# Patient Record
Sex: MALE | Race: WHITE | NOT HISPANIC OR LATINO | Employment: UNEMPLOYED | URBAN - METROPOLITAN AREA
[De-identification: names, ages, dates, MRNs, and addresses within clinical notes are randomized per-mention and may not be internally consistent; named-entity substitution may affect disease eponyms.]

---

## 2023-11-07 NOTE — H&P (VIEW-ONLY)
PULMONARY CONSULT     SERVICE DATE:  11/7/2023   SERVICE TIME:  5:20 PM    REASON FOR CONSULT:  acute pulmonary embolism, acute resp failure    REQUESTING PHYSICIAN:  Delmar Childress MD  PRIMARY CARE PHYSICIAN:  Garfield Eaton MD    Impression/Recommendations   Gaurav Mckay is a 53 year old male with acute hypoxemic respiratory failure, syncope due to, submassive pulmonary embolism, possible RUL lung infarct, acute RV strain and acute right leg DVT. Associated EDGAR, and mild thrombocytopenia. Medical history of HANY- CPAP non compliant, COPD/ asthma, laryngeal cancer s/p RTX, HTN, HLD, GERD, RLS, chronic pancreatitis, chronic pain, anxiety, depression, moderate obesity and nicotine use disorder.    Agree with plan to transfer to tertiary center for consideration for possible catheter directed thrombolytics. Continue heparin anticoagulation, o2 supplementation, DuoNeb, pain relief, PPI and current care. Thanks for the consult.   CC time ~ 40 minutes          Subjective  HISTORY OF PRESENT ILLNESS:  Mr. Mckay is a 53 year old male admitted yesterday with syncope and sob. Report he passed out while sitting on his couch at home. +worsening sob of 1 week duration. +dry cough, occasional wheezing. +retrosternal pain since yesterday. No diaphoresis.  ACTIVE SMOKER, smokes 1/2 ppd cigarettes daily.            Chest CT Done 11/6/2023, visualized:    1.  Saddle pulmonary embolus with bilateral pulmonary embolism.   2.  Increased RV LV ratio which can be seen with right ventricular strain.   3.  Small groundglass peripheral opacities in the right upper lobe which   raises the possibility of small pulmonary infarcts.         Bilateral LE Venous Doppler Done 11/7/2023:  THROMBUS WITHIN RIGHT DISTAL FEMORAL AND RIGHT POPLITEAL VEIN WITH   COMPLETE OCCLUSION.   NO ACUTE DVT IN THE LEFT LOWER EXTREMITY.         Echo Done 11/7/2023:  CONCLUSIONS:   - Exam indication: Acute pulmonary embolism to guide therapy   - The left  ventricle is normal in size. There is mild left ventricular   hypertrophy. Left ventricular systolic function is normal. EF = 60 ± 5% (2D   biplane) Definity contrast used for endocardial border detection. Grade I left   ventricular diastolic dysfunction.   - The right ventricle is dilated. Right ventricular systolic function is mildly   decreased.   - There is moderate (2+) tricuspid valve regurgitation.   - Estimated right ventricular systolic pressure is 53 mmHg consistent with   moderate pulmonary hypertension. Estimated right atrial pressure is 3 mmHg   (although IVC not seen).   - Exam was compared with the prior CC echocardiographic exam performed on 4/5/2016    All the findings of the RV function , dimenssion and pressure are new, as all   were normal in 2016.         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   • Allergic rhinitis    • Anxiety    • Arthritis    • ASNHL (asymmetrical sensorineural hearing loss)    • Asthma    • Back pain     disc herniated per patient    • COPD (chronic obstructive pulmonary disease) ([facility])    • Depression    • Dizziness and giddiness 3/9/2016   • ETOH abuse     quit    • GERD (gastroesophageal reflux disease)    • Hyperlipidemia    • Hypertension    • Lumbosacral radiculopathy 5/12/2016   • Lumbosacral radiculopathy 5/12/2016   • Neck pain    • Neurogenic orthostatic hypotension (HCC) 5/12/2016   • Pancreatitis    • RLS (restless legs syndrome) 8/1/2018   • Syncope and collapse 3/9/2016   • Tinnitus    • Tobacco abuse      PAST SURGICAL HISTORY   Procedure Laterality Date   • LARYNGOSCOPY DIRECT OPERATIVE W/BIOPSY  10/30/2023    by Dr Leonardo - Direct Laryngoscopy with Biopsy, Cervical Esophagoscopy   • LARYNGOSCOPY EXC KAYLAN&/STRIPPING CORDS/EPIGLOTT Bilateral 02/16/2022    by Dr Leonardo - Microlaryngoscopy w/ Coblation, removal of vocal cord polyps   • ORTHOPEDIC  SURGERY HX Left     ankle-plates and screws     FAMILY HISTORY    Problem Relation Age of Onset   • other (unknown) Father    • Diabetes Mother    • Hypertension Mother      Social History     Tobacco Use   • Smoking status: Every Day     Packs/day: 1.00     Years: 30.00     Additional pack years: 0.00     Total pack years: 30.00     Types: Cigarettes   • Smokeless tobacco: Former     Types: Snuff   • Tobacco comments:     1 PPD- Started at 17 yo   Vaping Use   • Vaping Use: Never used   Substance Use Topics   • Alcohol use: Not Currently     Comment: none since 7/27/2023   • Drug use: No     meclizine (ANTIVERT) 25 mg tab, Take 1 tablet by mouth three times a day., Disp: 90 tablet, Rfl: 5, Unknown  risperiDONE (RISPERDAL) 1 mg tablet, Take by mouth two times a day. 1.5 tablets at bedtime, Disp: , Rfl: , Unknown  atorvastatin (LIPITOR) 20 mg tablet, TAKE 1 TABLET BY MOUTH EVERY NIGHT AT BEDTIME FOR CHOLESTEROL, Disp: 30 tablet, Rfl: 0, Unknown  Disulfiram 500 mg tab, TAKE 1 TABLET BY MOUTH DAILY FOR ALCOHOL DEPENDENCE AS DIRECTED, Disp: 30 tablet, Rfl: 0, Unknown  docusate sodium (COLACE) 100 mg capsule, TAKE 1 CAPSULE BY MOUTH TWICE A DAY FOR CONSTIPATION, Disp: 60 capsule, Rfl: 0, Unknown  metoprolol tartrate, short acting, (LOPRESSOR) 25 mg tablet, take 1 tablet by mouth daily for high blood pressure, Disp: 30 tablet, Rfl: 0, Unknown  montelukast (SINGULAIR) 10 mg tablet, TAKE 1 TABLET BY MOUTH DAILY FOR ASTHMA, Disp: 30 tablet, Rfl: 0, Unknown  topiramate (TOPAMAX) 100 mg tablet, TAKE 1 TABLET BY MOUTH TWICE A DAY FOR RESTLESSNESS, Disp: 60 tablet, Rfl: 0, Unknown  Nicotine Polacrilex 2 mg lozenge, Place 1 Lozenge between cheek and gum as needed., Disp: 150 Lozenge, Rfl: 1, Unknown  loratadine (CLARITIN) 10 mg tablet, TAKE 1 TABLET BY MOUTH DAILY FOR ALLERGIES, Disp: 30 tablet, Rfl: 11, Unknown  celecoxib (CELEBREX) 200 mg capsule, Take 1 capsule by mouth once daily., Disp: 90 capsule, Rfl: 3,  Unknown  Pregabalin (LYRICA) 200 mg capsule, Take 1 capsule by mouth three times daily for 60 days., Disp: 90 capsule, Rfl: 1  calcium-carbonate-vitamin D3 (OYSTER SHELL CALCIUM-VITAMIN D) 500 mg-5 mcg (200 unit) per tablet, TAKE ONE (1) TABLET BY MOUTH TWICE DAILY, Disp: 60 tablet, Rfl: 11, Unknown  potassium chloride (K-TAB) 10 mEq tablet, Take 1 tablet by mouth once daily., Disp: 30 tablet, Rfl: 11, Unknown  DULoxetine (CYMBALTA) 60 mg capsule, Take 60 mg by mouth once daily., Disp: , Rfl: , Unknown  simvastatin (ZOCOR) 40 mg tablet, Take 1 tablet by mouth daily at bedtime., Disp: 90 tablet, Rfl: 3, Unknown  rOPINIRole (REQUIP) 0.5 mg tablet, TAKE 1 TABLET BY MOUTH THREE TIMES A DAY *EMERGENCY REFILL*, Disp: 90 tablet, Rfl: 0, Unknown  CREON 12,000-38,000 -60,000 unit delayed release capsule, TAKE 1 CAPSULE BY MOUTH THREE TIMES A DAY, Disp: 90 capsule, Rfl: 3, Unknown  omeprazole (PRILOSEC) 40 mg capsule, Take 1 capsule by mouth once daily., Disp: 30 capsule, Rfl: 10, Unknown  cyclobenzaprine (FLEXERIL) 10 mg tablet, TAKE 1 TABLET BY MOUTH THREE TIMES DAILY AS NEEDED, Disp: 90 tablet, Rfl: 10, Unknown  DULoxetine (CYMBALTA) 30 mg capsule, Take 30 mg by mouth twice daily., Disp: , Rfl: , Unknown  albuterol HFA (PROVENTIL HFA, VENTOLIN HFA) 90 mcg/actuation inhaler, Inhale 2 Puffs as instructed every 6 hours as needed for wheezing/shortness of breath., Disp: 1 Each, Rfl: 4  benztropine (COGENTIN) 0.5 mg tablet, Take 0.5 mg by mouth twice daily., Disp: , Rfl: , Unknown  melatonin 10 mg ODT, Dissolve 1 tablet under the tongue at bedtime as needed., Disp: , Rfl: , Unknown  CREON 6,000-19,000 -30,000 unit delayed release capsule, TAKE 2 CAPSULES BY MOUTH THREE TIMES A DAY AS NEEDED FOR DIGESTION (Patient not taking: Reported on 10/23/2023), Disp: 60 capsule, Rfl: 1, Unknown  VENTOLIN HFA 90 mcg/actuation inhaler, INHALE 1 PUFF BY MOUTH EVERY FOUR HOURS AS NEEDED FOR ASTHMA, Disp: 18 g, Rfl: 0, Unknown  QUEtiapine  (SEROQUEL) 25 mg tablet, TAKE 1-2 TABLET BY MOUTH AT BEDTIME AS NEEDED INSOMNIA, Disp: , Rfl: , Unknown  albuterol (PROVENTIL) 2.5 mg /3 mL (0.083 %) nebulizer solution, Use 3 mL via nebulizer every 4 hours as needed for wheezing/shortness of breath. OVER 5-15 MINUTES. FOR WHEEZING AND SHORTNESS OF BREATH. (Patient not taking: Reported on 10/23/2023), Disp: 270 mL, Rfl: 5, Unknown  traZODone (DESYREL) 50 mg tablet, TAKE 1 TABLET BY MOUTH DAILY AT BEDTIME, Disp: 30 tablet, Rfl: 10, Unknown      Current Facility-Administered Medications   Medication Dose Route Frequency   • iv contrast (radiology procedure)   INTRAVENOUS AS DIRECTED PRN   • heparin iv infusion ([facility] DVT/PE NOMOGRAM) 25,000 units in NaCl 0.45% 250 mL  400-2,000 Units/hr INTRAVENOUS CONTINUOUS   • heparin 1,000 unit/mL 2,500-5,000 Units injection  2,500-5,000 Units INTRAVENOUS PRN   • benztropine 0.5 mg tab(s) (COGENTIN)  0.5 mg ORAL BID   • DULoxetine 30 mg cap(s) (CYMBALTA)  30 mg ORAL BID   • cyclobenzaprine 10 mg tab(s) (FLEXERIL)  10 mg ORAL TID   • rOPINIRole 0.5 mg tab(s) (REQUIP)  0.5 mg ORAL TID   • lipase-protease-amylase 1 capsule cap(s) (CREON 12)  1 capsule ORAL TID   • omeprazole 40 mg cap(s) (PriLOSEC)  40 mg ORAL DAILY   • DULoxetine 60 mg cap(s) (CYMBALTA)  60 mg ORAL DAILY   • calcium-carbonate-vitamin D3 500 mg-5 mcg (200 unit) 1 tablet  1 tablet ORAL BID   • montelukast 10 mg tab(s) (SINGULAIR)  10 mg ORAL DAILY   • topiramate 100 mg tab(s) (TOPAMAX)  100 mg ORAL BID   • risperiDONE (RisperDAL) tab(s) 1.5 mg  1.5 mg ORAL BID   • meclizine 25 mg tab(s) (ANTIVERT)  25 mg ORAL TID   • NaCl 0.9% iv flush bag  20 mL INTRAVENOUS PRN   • NaCl 0.9% iv infusion  75 mL/hr INTRAVENOUS CONTINUOUS   • ipratropium-albuterol 3 mL nebulizer solution (DUONEB)  3 mL INHALATION q 4 H PRN   • ondansetron orally disintegrating 4 mg tab(s) (ZOFRAN ODT)  4 mg ORAL q 6 H PRN    Or   • ondansetron (PF) 4 mg injection (ZOFRAN)  4 mg INTRAVENOUS q 6 H PRN   •  docusate sodium 100 mg cap(s) (COLACE)  100 mg ORAL BID PRN   • sodium chloride 0.9 % (flush) 2-10 mL (BD POSIFLUSH)  2-10 mL INTRAVENOUS AS DIRECTED PRN    And   • perflutren protein-A microspheres 0.22 mg/mL 0.7 mg injection (OPTISON)  3 mL INTRAVENOUS AS DIRECTED PRN   • melatonin 9 mg tab(s)  9 mg ORAL DAILY (8 PM)   • acetaminophen 650 mg tab(s) (TYLENOL)  650 mg ORAL q 4 H PRN   • atorvastatin 20 mg tab(s) (LIPITOR)  20 mg ORAL DAILY   • cetirizine 10 mg tab(s) (ZYRTEC)  10 mg ORAL DAILY     ALLERGIES   Allergen Reactions   • Oxycontin [Oxycodon* Itching   • Seasonal Allergies   Unknown            Objective  PHYSICAL EXAM:  Patient Vitals for the past 24 hrs:   BP Temp Temp src Pulse Resp SpO2 Height Weight   11/07/23 1400 148/83 -- -- 97 23 91 % -- --   11/07/23 1300 152/93 -- -- 98 24 92 % -- --   11/07/23 1200 143/94 -- -- 109 -- 89 % -- --   11/07/23 1100 143/82 -- -- 100 15 90 % -- --   11/07/23 1000 148/90 -- -- 99 24 91 % -- --   11/07/23 0900 149/84 -- -- 97 (!) 32 93 % -- --   11/07/23 0800 123/64 -- -- 97 (!) 37 94 % -- --   11/07/23 0700 147/90 -- -- 93 21 96 % -- --   11/07/23 0600 134/92 -- -- 92 22 94 % -- --   11/07/23 0500 129/71 -- -- 94 25 94 % -- --   11/07/23 0400 148/70 36.4 °C (97.6 °F) Temporal 94 24 94 % -- --   11/07/23 0300 151/89 -- -- 93 23 94 % -- --   11/07/23 0230 146/75 -- -- 94 28 94 % -- --   11/07/23 0200 138/90 -- -- 96 26 93 % -- --   11/07/23 0130 146/78 -- -- 97 20 96 % -- --   11/07/23 0100 142/89 36.3 °C (97.4 °F) Temporal 96 20 96 % 182.9 cm (6') 126.9 kg (279 lb 12.2 oz)   11/07/23 0030 121/66 -- -- (!) 99 -- (!) 91 % -- --   11/07/23 0015 -- -- -- (!) 101 -- (!) 92 % -- --   11/07/23 0000 156/76 -- -- (!) 101 -- (!) 92 % -- --   11/06/23 2345 -- -- -- (!) 102 -- (!) 91 % -- --   11/06/23 2330 154/70 -- -- (!) 100 -- (!) 92 % -- --   11/06/23 2315 -- -- -- (!) 101 -- (!) 92 % -- --   11/06/23 2300 150/90 -- -- (!) 98 -- (!) 91 % -- --   11/06/23 2245 -- -- -- (!) 103  (!) 33 (!) 93 % -- --   11/06/23 2215 -- -- -- (!) 98 (!) 43 (!) 91 % -- --   11/06/23 2200 157/65 -- -- (!) 101 21 (!) 92 % -- --   11/06/23 2145 -- -- -- (!) 100 (!) 25 (!) 94 % -- --   11/06/23 2130 152/68 -- -- (!) 102 (!) 26 (!) 92 % -- --   11/06/23 2115 -- -- -- (!) 101 (!) 25 (!) 94 % -- --   11/06/23 2100 137/79 -- -- (!) 101 21 (!) 94 % -- --   11/06/23 2045 -- -- -- (!) 103 (!) 25 (!) 93 % -- --   11/06/23 2030 153/70 -- -- (!) 106 (!) 34 (!) 93 % -- --   11/06/23 2015 -- -- -- (!) 109 23 (!) 93 % -- --   11/06/23 2000 136/84 -- -- (!) 105 20 95 % -- --   11/06/23 1945 135/77 -- -- (!) 108 (!) 38 (!) 94 % -- --   11/06/23 1930 -- -- -- (!) 109 (!) 35 (!) 93 % -- --   11/06/23 1915 -- -- -- (!) 107 23 (!) 94 % -- --   11/06/23 1900 168/79 -- -- (!) 106 21 (!) 94 % -- --   11/06/23 1845 186/82 -- -- (!) 113 (!) 53 95 % -- --   11/06/23 1830 165/78 -- -- (!) 115 (!) 30 (!) 93 % -- --   11/06/23 1800 132/76 -- -- (!) 114 (!) 41 (!) 92 % -- --     Body mass index is 37.94 kg/m².      GENERAL: obese, on 3L o2  LUNGS: no wheezes, no rhonchi, no rales  CARDIAC: RRR  ABDOMEN: soft, obese, non-tender  EXTREMITIES: no leg edema  NEURO: awake, alert       DATA:   Diagnostic tests reviewed for today's visit:    Most recent labs BUN 11, Cr 1.1, neg Covid, neg troponins, nl BNP, wbc 10, Hct 40, plat 137       Latest Reference Range & Units 11/06/23 17:25   Temp BG F 98.6   pH, Arterial 7.35 - 7.45  7.38   pCO2, Arterial 35.0 - 45.0 TORR 33.7 (L)   pO2, Arterial 80.0 - 100.0 TORR 70.6 (L)   Bicarbonate, Arterial 22.0 - 26.0 MEQ/L 19.7 (L)   Base Deficit, Arterial MEQ/L 4.6   Oxyhemoglobin, Arterial 95.0 - 100.0 % 91.2 (L)   Carboxyhemoglobin, Arterial 0.0 - 2.0 % 2.2 (H)   Methemoglobin, Arterial 0.00 - 2.0 % 0.8   Allens ABG POSITIVE  POSITIVE   Blood Gas Comment, Art  2LNC 28%   BG FIO2 % 28.0   (L): Data is abnormally low  (H): Data is abnormally high      SIGNATURE: Gino Juárez MD PATIENT NAME: Gaurav Mckay    DATE: November 7, 2023 MRN: [id number]   TIME: 5:31 PM

## 2023-11-08 ENCOUNTER — HOSPITAL ENCOUNTER (INPATIENT)
Facility: HOSPITAL | Age: 53
LOS: 9 days | Discharge: HOME | End: 2023-11-17
Attending: INTERNAL MEDICINE | Admitting: INTERNAL MEDICINE
Payer: COMMERCIAL

## 2023-11-08 ENCOUNTER — HOSPITAL ENCOUNTER (OUTPATIENT)
Dept: CARDIOLOGY | Facility: HOSPITAL | Age: 53
Discharge: HOME | End: 2023-11-08
Payer: COMMERCIAL

## 2023-11-08 DIAGNOSIS — K59.00 CONSTIPATION, UNSPECIFIED CONSTIPATION TYPE: ICD-10-CM

## 2023-11-08 DIAGNOSIS — J42 CHRONIC BRONCHITIS, UNSPECIFIED CHRONIC BRONCHITIS TYPE (MULTI): ICD-10-CM

## 2023-11-08 DIAGNOSIS — I26.99 PULMONARY EMBOLISM (MULTI): ICD-10-CM

## 2023-11-08 DIAGNOSIS — I26.09 OTHER PULMONARY EMBOLISM WITH ACUTE COR PULMONALE (MULTI): ICD-10-CM

## 2023-11-08 DIAGNOSIS — I26.99 ACUTE MASSIVE PULMONARY EMBOLISM (MULTI): Primary | ICD-10-CM

## 2023-11-08 LAB
ANION GAP SERPL CALC-SCNC: 11 MMOL/L (ref 10–20)
BUN SERPL-MCNC: 14 MG/DL (ref 6–23)
CALCIUM SERPL-MCNC: 9.3 MG/DL (ref 8.6–10.3)
CHLORIDE SERPL-SCNC: 106 MMOL/L (ref 98–107)
CO2 SERPL-SCNC: 26 MMOL/L (ref 21–32)
CREAT SERPL-MCNC: 1.01 MG/DL (ref 0.5–1.3)
ERYTHROCYTE [DISTWIDTH] IN BLOOD BY AUTOMATED COUNT: 15.2 % (ref 11.5–14.5)
GFR SERPL CREATININE-BSD FRML MDRD: 89 ML/MIN/1.73M*2
GLUCOSE SERPL-MCNC: 108 MG/DL (ref 74–99)
HCT VFR BLD AUTO: 44.6 % (ref 41–52)
HGB BLD-MCNC: 14.7 G/DL (ref 13.5–17.5)
MAGNESIUM SERPL-MCNC: 2.19 MG/DL (ref 1.6–2.4)
MCH RBC QN AUTO: 29.6 PG (ref 26–34)
MCHC RBC AUTO-ENTMCNC: 33 G/DL (ref 32–36)
MCV RBC AUTO: 90 FL (ref 80–100)
NRBC BLD-RTO: 0 /100 WBCS (ref 0–0)
PLATELET # BLD AUTO: 155 X10*3/UL (ref 150–450)
POTASSIUM SERPL-SCNC: 3.8 MMOL/L (ref 3.5–5.3)
RBC # BLD AUTO: 4.96 X10*6/UL (ref 4.5–5.9)
SODIUM SERPL-SCNC: 139 MMOL/L (ref 136–145)
UFH PPP CHRO-ACNC: 0.5 IU/ML
UFH PPP CHRO-ACNC: 0.5 IU/ML
WBC # BLD AUTO: 8 X10*3/UL (ref 4.4–11.3)

## 2023-11-08 PROCEDURE — 9420000001 HC RT PATIENT EDUCATION 5 MIN

## 2023-11-08 PROCEDURE — 94762 N-INVAS EAR/PLS OXIMTRY CONT: CPT

## 2023-11-08 PROCEDURE — 36415 COLL VENOUS BLD VENIPUNCTURE: CPT

## 2023-11-08 PROCEDURE — 85347 COAGULATION TIME ACTIVATED: CPT | Performed by: INTERNAL MEDICINE

## 2023-11-08 PROCEDURE — 2550000001 HC RX 255 CONTRASTS: Performed by: INTERNAL MEDICINE

## 2023-11-08 PROCEDURE — 2780000003 HC OR 278 NO HCPCS: Performed by: INTERNAL MEDICINE

## 2023-11-08 PROCEDURE — 75743 ARTERY X-RAYS LUNGS: CPT | Performed by: INTERNAL MEDICINE

## 2023-11-08 PROCEDURE — 92973 PRQ TRLUML C MCHN ASP THRMBC: CPT | Performed by: INTERNAL MEDICINE

## 2023-11-08 PROCEDURE — 94664 DEMO&/EVAL PT USE INHALER: CPT

## 2023-11-08 PROCEDURE — 83735 ASSAY OF MAGNESIUM: CPT

## 2023-11-08 PROCEDURE — C1769 GUIDE WIRE: HCPCS | Performed by: INTERNAL MEDICINE

## 2023-11-08 PROCEDURE — 99291 CRITICAL CARE FIRST HOUR: CPT | Performed by: INTERNAL MEDICINE

## 2023-11-08 PROCEDURE — 2500000001 HC RX 250 WO HCPCS SELF ADMINISTERED DRUGS (ALT 637 FOR MEDICARE OP): Performed by: INTERNAL MEDICINE

## 2023-11-08 PROCEDURE — X2CY3T7 EXTIRPATION OF MATTER FROM GREAT VESSEL USING COMPUTER-AIDED MECHANICAL ASPIRATION, PERCUTANEOUS APPROACH, NEW TECHNOLOGY GROUP 7: ICD-10-PCS | Performed by: INTERNAL MEDICINE

## 2023-11-08 PROCEDURE — 2500000004 HC RX 250 GENERAL PHARMACY W/ HCPCS (ALT 636 FOR OP/ED)

## 2023-11-08 PROCEDURE — 93005 ELECTROCARDIOGRAM TRACING: CPT

## 2023-11-08 PROCEDURE — C1894 INTRO/SHEATH, NON-LASER: HCPCS | Performed by: INTERNAL MEDICINE

## 2023-11-08 PROCEDURE — 2500000002 HC RX 250 W HCPCS SELF ADMINISTERED DRUGS (ALT 637 FOR MEDICARE OP, ALT 636 FOR OP/ED): Performed by: INTERNAL MEDICINE

## 2023-11-08 PROCEDURE — 93010 ELECTROCARDIOGRAM REPORT: CPT | Performed by: INTERNAL MEDICINE

## 2023-11-08 PROCEDURE — 37184 PRIM ART M-THRMBC 1ST VSL: CPT | Performed by: INTERNAL MEDICINE

## 2023-11-08 PROCEDURE — 85520 HEPARIN ASSAY: CPT

## 2023-11-08 PROCEDURE — B31U1ZZ FLUOROSCOPY OF PULMONARY TRUNK USING LOW OSMOLAR CONTRAST: ICD-10-PCS | Performed by: INTERNAL MEDICINE

## 2023-11-08 PROCEDURE — 80048 BASIC METABOLIC PNL TOTAL CA: CPT

## 2023-11-08 PROCEDURE — C1753 CATH, INTRAVAS ULTRASOUND: HCPCS | Performed by: INTERNAL MEDICINE

## 2023-11-08 PROCEDURE — 2500000004 HC RX 250 GENERAL PHARMACY W/ HCPCS (ALT 636 FOR OP/ED): Performed by: INTERNAL MEDICINE

## 2023-11-08 PROCEDURE — 85027 COMPLETE CBC AUTOMATED: CPT

## 2023-11-08 PROCEDURE — 2500000005 HC RX 250 GENERAL PHARMACY W/O HCPCS: Performed by: INTERNAL MEDICINE

## 2023-11-08 PROCEDURE — 2020000001 HC ICU ROOM DAILY

## 2023-11-08 PROCEDURE — 36014 PLACE CATHETER IN ARTERY: CPT | Performed by: INTERNAL MEDICINE

## 2023-11-08 PROCEDURE — 2720000007 HC OR 272 NO HCPCS: Performed by: INTERNAL MEDICINE

## 2023-11-08 RX ORDER — SIMVASTATIN 40 MG/1
40 TABLET, FILM COATED ORAL NIGHTLY
COMMUNITY

## 2023-11-08 RX ORDER — ALBUTEROL SULFATE 90 UG/1
2 AEROSOL, METERED RESPIRATORY (INHALATION) EVERY 4 HOURS PRN
Status: DISCONTINUED | OUTPATIENT
Start: 2023-11-08 | End: 2023-11-17 | Stop reason: HOSPADM

## 2023-11-08 RX ORDER — TRAZODONE HYDROCHLORIDE 50 MG/1
50 TABLET ORAL NIGHTLY
COMMUNITY

## 2023-11-08 RX ORDER — HEPARIN SODIUM 5000 [USP'U]/ML
2000-4000 INJECTION, SOLUTION INTRAVENOUS; SUBCUTANEOUS EVERY 4 HOURS PRN
Status: DISCONTINUED | OUTPATIENT
Start: 2023-11-08 | End: 2023-11-08

## 2023-11-08 RX ORDER — CELECOXIB 200 MG/1
200 CAPSULE ORAL DAILY
COMMUNITY

## 2023-11-08 RX ORDER — QUETIAPINE FUMARATE 25 MG/1
25 TABLET, FILM COATED ORAL NIGHTLY PRN
Status: DISCONTINUED | OUTPATIENT
Start: 2023-11-08 | End: 2023-11-15

## 2023-11-08 RX ORDER — IPRATROPIUM BROMIDE AND ALBUTEROL SULFATE 2.5; .5 MG/3ML; MG/3ML
3 SOLUTION RESPIRATORY (INHALATION) EVERY 2 HOUR PRN
Status: DISCONTINUED | OUTPATIENT
Start: 2023-11-08 | End: 2023-11-17 | Stop reason: HOSPADM

## 2023-11-08 RX ORDER — PANTOPRAZOLE SODIUM 40 MG/1
40 TABLET, DELAYED RELEASE ORAL
Status: DISCONTINUED | OUTPATIENT
Start: 2023-11-09 | End: 2023-11-17 | Stop reason: HOSPADM

## 2023-11-08 RX ORDER — BENZTROPINE MESYLATE 0.5 MG/1
0.5 TABLET ORAL 2 TIMES DAILY
Status: DISCONTINUED | OUTPATIENT
Start: 2023-11-08 | End: 2023-11-17 | Stop reason: HOSPADM

## 2023-11-08 RX ORDER — DOCUSATE SODIUM 100 MG/1
100 CAPSULE, LIQUID FILLED ORAL 2 TIMES DAILY
COMMUNITY

## 2023-11-08 RX ORDER — SODIUM CHLORIDE 9 MG/ML
INJECTION, SOLUTION INTRAVENOUS CONTINUOUS PRN
Status: COMPLETED | OUTPATIENT
Start: 2023-11-08 | End: 2023-11-08

## 2023-11-08 RX ORDER — IPRATROPIUM BROMIDE AND ALBUTEROL SULFATE 2.5; .5 MG/3ML; MG/3ML
3 SOLUTION RESPIRATORY (INHALATION)
Status: DISCONTINUED | OUTPATIENT
Start: 2023-11-08 | End: 2023-11-08

## 2023-11-08 RX ORDER — HEPARIN SODIUM 10000 [USP'U]/100ML
1500 INJECTION, SOLUTION INTRAVENOUS CONTINUOUS
Status: DISCONTINUED | OUTPATIENT
Start: 2023-11-08 | End: 2023-11-09

## 2023-11-08 RX ORDER — ATORVASTATIN CALCIUM 20 MG/1
20 TABLET, FILM COATED ORAL NIGHTLY
COMMUNITY

## 2023-11-08 RX ORDER — HEPARIN SODIUM 5000 [USP'U]/ML
4000 INJECTION, SOLUTION INTRAVENOUS; SUBCUTANEOUS ONCE
Status: DISCONTINUED | OUTPATIENT
Start: 2023-11-08 | End: 2023-11-08

## 2023-11-08 RX ORDER — NICOTINE POLACRILEX 2 MG/1
2 LOZENGE ORAL AS NEEDED
COMMUNITY

## 2023-11-08 RX ORDER — TOPIRAMATE 100 MG/1
100 TABLET, FILM COATED ORAL 2 TIMES DAILY
COMMUNITY

## 2023-11-08 RX ORDER — HEPARIN SODIUM 1000 [USP'U]/ML
INJECTION, SOLUTION INTRAVENOUS; SUBCUTANEOUS AS NEEDED
Status: DISCONTINUED | OUTPATIENT
Start: 2023-11-08 | End: 2023-11-08 | Stop reason: HOSPADM

## 2023-11-08 RX ORDER — RISPERIDONE 1 MG/1
1.5 TABLET ORAL NIGHTLY
COMMUNITY

## 2023-11-08 RX ORDER — RISPERIDONE 1 MG/1
1 TABLET ORAL DAILY
COMMUNITY
End: 2023-11-17 | Stop reason: HOSPADM

## 2023-11-08 RX ORDER — DISULFIRAM 250 MG/1
500 TABLET ORAL DAILY
COMMUNITY

## 2023-11-08 RX ORDER — TRAZODONE HYDROCHLORIDE 50 MG/1
50 TABLET ORAL NIGHTLY
Status: DISCONTINUED | OUTPATIENT
Start: 2023-11-08 | End: 2023-11-17 | Stop reason: HOSPADM

## 2023-11-08 RX ORDER — LIDOCAINE HYDROCHLORIDE 20 MG/ML
INJECTION, SOLUTION INFILTRATION; PERINEURAL AS NEEDED
Status: DISCONTINUED | OUTPATIENT
Start: 2023-11-08 | End: 2023-11-08 | Stop reason: HOSPADM

## 2023-11-08 RX ORDER — LORATADINE 10 MG/1
10 TABLET ORAL DAILY
COMMUNITY
End: 2023-11-17 | Stop reason: HOSPADM

## 2023-11-08 RX ORDER — DULOXETIN HYDROCHLORIDE 30 MG/1
30 CAPSULE, DELAYED RELEASE ORAL NIGHTLY
COMMUNITY

## 2023-11-08 RX ORDER — FERROUS SULFATE, DRIED 160(50) MG
1 TABLET, EXTENDED RELEASE ORAL 2 TIMES DAILY
COMMUNITY

## 2023-11-08 RX ORDER — ALBUTEROL SULFATE 90 UG/1
2 AEROSOL, METERED RESPIRATORY (INHALATION) EVERY 4 HOURS PRN
COMMUNITY

## 2023-11-08 RX ORDER — QUETIAPINE FUMARATE 25 MG/1
25 TABLET, FILM COATED ORAL NIGHTLY PRN
COMMUNITY

## 2023-11-08 RX ORDER — OMEPRAZOLE 40 MG/1
40 CAPSULE, DELAYED RELEASE ORAL
COMMUNITY

## 2023-11-08 RX ORDER — HEPARIN SODIUM 5000 [USP'U]/ML
5000-10000 INJECTION, SOLUTION INTRAVENOUS; SUBCUTANEOUS EVERY 4 HOURS PRN
Status: DISCONTINUED | OUTPATIENT
Start: 2023-11-08 | End: 2023-11-09

## 2023-11-08 RX ORDER — MONTELUKAST SODIUM 10 MG/1
10 TABLET ORAL DAILY
COMMUNITY

## 2023-11-08 RX ORDER — CYCLOBENZAPRINE HCL 10 MG
10 TABLET ORAL 3 TIMES DAILY PRN
Status: DISCONTINUED | OUTPATIENT
Start: 2023-11-08 | End: 2023-11-17 | Stop reason: HOSPADM

## 2023-11-08 RX ORDER — CYCLOBENZAPRINE HCL 10 MG
10 TABLET ORAL 3 TIMES DAILY PRN
COMMUNITY

## 2023-11-08 RX ORDER — IPRATROPIUM BROMIDE AND ALBUTEROL SULFATE 2.5; .5 MG/3ML; MG/3ML
3 SOLUTION RESPIRATORY (INHALATION)
Status: DISCONTINUED | OUTPATIENT
Start: 2023-11-09 | End: 2023-11-15

## 2023-11-08 RX ORDER — FERROUS SULFATE, DRIED 160(50) MG
1 TABLET, EXTENDED RELEASE ORAL 2 TIMES DAILY
Status: DISCONTINUED | OUTPATIENT
Start: 2023-11-08 | End: 2023-11-17 | Stop reason: HOSPADM

## 2023-11-08 RX ORDER — BENZTROPINE MESYLATE 0.5 MG/1
0.5 TABLET ORAL 2 TIMES DAILY
COMMUNITY

## 2023-11-08 RX ORDER — ROPINIROLE 0.5 MG/1
0.5 TABLET, FILM COATED ORAL 3 TIMES DAILY
COMMUNITY

## 2023-11-08 RX ORDER — ATORVASTATIN CALCIUM 20 MG/1
20 TABLET, FILM COATED ORAL NIGHTLY
Status: DISCONTINUED | OUTPATIENT
Start: 2023-11-08 | End: 2023-11-17 | Stop reason: HOSPADM

## 2023-11-08 RX ORDER — METOPROLOL TARTRATE 25 MG/1
25 TABLET, FILM COATED ORAL DAILY
Status: DISCONTINUED | OUTPATIENT
Start: 2023-11-08 | End: 2023-11-17 | Stop reason: HOSPADM

## 2023-11-08 RX ORDER — CALCIUM CARBONATE 300MG(750)
1 TABLET,CHEWABLE ORAL NIGHTLY PRN
COMMUNITY

## 2023-11-08 RX ORDER — METOPROLOL TARTRATE 25 MG/1
25 TABLET, FILM COATED ORAL DAILY
COMMUNITY

## 2023-11-08 RX ORDER — PREGABALIN 200 MG/1
200 CAPSULE ORAL 3 TIMES DAILY
COMMUNITY

## 2023-11-08 RX ORDER — DULOXETIN HYDROCHLORIDE 60 MG/1
90 CAPSULE, DELAYED RELEASE ORAL EVERY MORNING
COMMUNITY

## 2023-11-08 RX ORDER — IBUPROFEN 200 MG
1 TABLET ORAL DAILY
Status: DISCONTINUED | OUTPATIENT
Start: 2023-11-08 | End: 2023-11-17 | Stop reason: HOSPADM

## 2023-11-08 RX ORDER — POTASSIUM CHLORIDE 750 MG/1
10 CAPSULE, EXTENDED RELEASE ORAL DAILY
COMMUNITY

## 2023-11-08 RX ORDER — MECLIZINE HYDROCHLORIDE 25 MG/1
25 TABLET ORAL 3 TIMES DAILY
COMMUNITY
End: 2023-11-17 | Stop reason: HOSPADM

## 2023-11-08 RX ADMIN — HEPARIN SODIUM 1500 UNITS/HR: 10000 INJECTION, SOLUTION INTRAVENOUS at 18:56

## 2023-11-08 RX ADMIN — PREGABALIN 200 MG: 75 CAPSULE ORAL at 20:44

## 2023-11-08 RX ADMIN — ATORVASTATIN CALCIUM 20 MG: 20 TABLET, FILM COATED ORAL at 20:44

## 2023-11-08 RX ADMIN — BENZTROPINE MESYLATE 0.5 MG: 0.5 TABLET ORAL at 21:43

## 2023-11-08 RX ADMIN — HEPARIN SODIUM 1500 UNITS/HR: 10000 INJECTION, SOLUTION INTRAVENOUS at 14:27

## 2023-11-08 RX ADMIN — TRAZODONE HYDROCHLORIDE 50 MG: 50 TABLET ORAL at 20:44

## 2023-11-08 RX ADMIN — IPRATROPIUM BROMIDE AND ALBUTEROL SULFATE 3 ML: 2.5; .5 SOLUTION RESPIRATORY (INHALATION) at 19:17

## 2023-11-08 RX ADMIN — Medication 1 TABLET: at 20:45

## 2023-11-08 SDOH — SOCIAL STABILITY: SOCIAL INSECURITY: HAS ANYONE EVER THREATENED TO HURT YOUR FAMILY OR YOUR PETS?: NO

## 2023-11-08 SDOH — SOCIAL STABILITY: SOCIAL INSECURITY: DO YOU FEEL UNSAFE GOING BACK TO THE PLACE WHERE YOU ARE LIVING?: NO

## 2023-11-08 SDOH — SOCIAL STABILITY: SOCIAL INSECURITY: HAVE YOU HAD THOUGHTS OF HARMING ANYONE ELSE?: NO

## 2023-11-08 SDOH — SOCIAL STABILITY: SOCIAL INSECURITY: ARE YOU OR HAVE YOU BEEN THREATENED OR ABUSED PHYSICALLY, EMOTIONALLY, OR SEXUALLY BY ANYONE?: NO

## 2023-11-08 SDOH — SOCIAL STABILITY: SOCIAL INSECURITY: ARE THERE ANY APPARENT SIGNS OF INJURIES/BEHAVIORS THAT COULD BE RELATED TO ABUSE/NEGLECT?: NO

## 2023-11-08 SDOH — SOCIAL STABILITY: SOCIAL INSECURITY: DO YOU FEEL ANYONE HAS EXPLOITED OR TAKEN ADVANTAGE OF YOU FINANCIALLY OR OF YOUR PERSONAL PROPERTY?: NO

## 2023-11-08 SDOH — SOCIAL STABILITY: SOCIAL INSECURITY: ABUSE: ADULT

## 2023-11-08 SDOH — SOCIAL STABILITY: SOCIAL INSECURITY: WERE YOU ABLE TO COMPLETE ALL THE BEHAVIORAL HEALTH SCREENINGS?: YES

## 2023-11-08 SDOH — SOCIAL STABILITY: SOCIAL INSECURITY: DOES ANYONE TRY TO KEEP YOU FROM HAVING/CONTACTING OTHER FRIENDS OR DOING THINGS OUTSIDE YOUR HOME?: NO

## 2023-11-08 ASSESSMENT — ACTIVITIES OF DAILY LIVING (ADL)
GROOMING: INDEPENDENT
WALKS IN HOME: INDEPENDENT
DRESSING YOURSELF: INDEPENDENT
LACK_OF_TRANSPORTATION: NO
JUDGMENT_ADEQUATE_SAFELY_COMPLETE_DAILY_ACTIVITIES: YES
HEARING - RIGHT EAR: FUNCTIONAL
TOILETING: INDEPENDENT
ADEQUATE_TO_COMPLETE_ADL: YES
BATHING: INDEPENDENT
PATIENT'S MEMORY ADEQUATE TO SAFELY COMPLETE DAILY ACTIVITIES?: YES
HEARING - LEFT EAR: FUNCTIONAL
FEEDING YOURSELF: INDEPENDENT

## 2023-11-08 ASSESSMENT — COGNITIVE AND FUNCTIONAL STATUS - GENERAL
MOBILITY SCORE: 24
DAILY ACTIVITIY SCORE: 24
PATIENT BASELINE BEDBOUND: NO

## 2023-11-08 ASSESSMENT — LIFESTYLE VARIABLES
SKIP TO QUESTIONS 9-10: 1
HOW OFTEN DO YOU HAVE 6 OR MORE DRINKS ON ONE OCCASION: NEVER
HOW MANY STANDARD DRINKS CONTAINING ALCOHOL DO YOU HAVE ON A TYPICAL DAY: PATIENT DOES NOT DRINK
AUDIT-C TOTAL SCORE: 0
AUDIT-C TOTAL SCORE: 0
HOW OFTEN DO YOU HAVE A DRINK CONTAINING ALCOHOL: NEVER

## 2023-11-08 ASSESSMENT — ENCOUNTER SYMPTOMS
AGITATION: 0
CONSTIPATION: 0
ABDOMINAL DISTENTION: 0
HEADACHES: 0
DYSURIA: 0
COUGH: 1
FACIAL ASYMMETRY: 0
APPETITE CHANGE: 0
ABDOMINAL PAIN: 0
SHORTNESS OF BREATH: 1
DIFFICULTY URINATING: 0
CHEST TIGHTNESS: 1
DIARRHEA: 0
ACTIVITY CHANGE: 0
PALPITATIONS: 0
FREQUENCY: 0
CHILLS: 0
DIZZINESS: 0

## 2023-11-08 ASSESSMENT — COLUMBIA-SUICIDE SEVERITY RATING SCALE - C-SSRS
6. HAVE YOU EVER DONE ANYTHING, STARTED TO DO ANYTHING, OR PREPARED TO DO ANYTHING TO END YOUR LIFE?: NO
2. HAVE YOU ACTUALLY HAD ANY THOUGHTS OF KILLING YOURSELF?: NO
1. IN THE PAST MONTH, HAVE YOU WISHED YOU WERE DEAD OR WISHED YOU COULD GO TO SLEEP AND NOT WAKE UP?: NO

## 2023-11-08 ASSESSMENT — PAIN SCALES - GENERAL
PAINLEVEL_OUTOF10: 0 - NO PAIN

## 2023-11-08 ASSESSMENT — PATIENT HEALTH QUESTIONNAIRE - PHQ9
2. FEELING DOWN, DEPRESSED OR HOPELESS: NOT AT ALL
SUM OF ALL RESPONSES TO PHQ9 QUESTIONS 1 & 2: 0
1. LITTLE INTEREST OR PLEASURE IN DOING THINGS: NOT AT ALL

## 2023-11-08 ASSESSMENT — PAIN - FUNCTIONAL ASSESSMENT
PAIN_FUNCTIONAL_ASSESSMENT: 0-10

## 2023-11-08 NOTE — Clinical Note
Injected with power injection. PSI = 800 PSI. Injection rate = 15 mL/sec. Injected volume = 30 mL. Single view taken.

## 2023-11-08 NOTE — Clinical Note
Vessel(s): PULMONARY. Injected with power injection. PSI = 800 PSI. Injection rate = 15 mL/sec. Injected volume = 30 mL.

## 2023-11-08 NOTE — INTERVAL H&P NOTE
H&P reviewed. The patient was examined and there are no changes to the H&P.    Risks, benefits, and alternatives discussed in full with the patient. Written informed consent obtained and placed in the chart. Proceed with pulmonary embolectomy.

## 2023-11-08 NOTE — HOSPITAL COURSE
Gaurav Mckay is a 53-year-old male with PMHx of HTN, HLD, COPD, EtOH abuse, aggressive insufficiency, laryngeal cancer status post radiation.  Who presented initially to OSH ([facility]) on account of shortness of breath, which has been diagnosed as saddle PE s/p CT angio     Patient was in his usual state of health until about 1 week prior to presentation at OSH when he noticed sudden onset of worsening shortness of breath and nonproductive cough which was progressive.  At about the same time patient noted he was having some chest discomfort, which he described as congestion.  He denied associated wheezing, palpitation, orthopnea, PND, fever or chills.  Patient stated that shortness of breath was initially on mild exertion, but continued to progress to SOB at rest.  On account of worsening SOB, patient decided to present to the ED at [facility].  There is no history of weakness, slurred speech, change in bowel habits, abdominal distention, pedal edema, or calf swelling.     At the OSH, patient was initially admitted to the ED where CXR was done which was unremarkable.  Labs done were unremarkable except for mild leukocytosis.  Chest x-ray done was unremarkable, duplex ultrasound done was unremarkable however CT angio was consistent with saddle embolism and bilateral pulmonary embolism.  Patient was admitted to the ICU where he was started on heparin gtt. pulmonologist was consulted, and after evaluation recommended transfer to tertiary  center for possible catheter thrombectomy. Patient was prepped for a catheter directed suction embolectomy which was done successfully. There was no immediate intra-op and or post op complications. Patient continued to remain stable and was transitioned to Eliqu. Ambulatory O2 completed, no supplemental oxygen required.  At this time patient is hemodynamically stable and ready to be discharged home.

## 2023-11-08 NOTE — H&P
History Of Present Illness  Gaurav Mckay is a 53-year-old male with PMHx of HTN, HLD, COPD, EtOH abuse, aggressive insufficiency, laryngeal cancer status post radiation.  Who presented initially to OSH ([facility]) on account of shortness of breath, which has been diagnosed as saddle PE s/p CT angio    Patient was in his usual state of health until about 1 week prior to presentation at OSH when he noticed sudden onset of worsening shortness of breath and nonproductive cough which was progressive.  At about the same time patient noted he was having some chest discomfort, which he described as congestion.  He denied associated wheezing, palpitation, orthopnea, PND, fever or chills.  Patient stated that shortness of breath was initially on mild exertion, but continued to progress to SOB at rest.  On account of worsening SOB, patient decided to present to the ED at [facility].  There is no history of weakness, slurred speech, change in bowel habits, abdominal distention, pedal edema, or calf swelling.    At the OSH, patient was initially admitted to the ED where CXR was done which was unremarkable.  Labs done were unremarkable except for mild leukocytosis.  Chest x-ray done was unremarkable, duplex ultrasound done was unremarkable however CT angio was consistent with saddle embolism and bilateral pulmonary embolism.  Patient was admitted to the ICU where he was started on heparin gtt. pulmonologist was consulted, and after evaluation recommended transfer to tertiary  center for possible catheter thrombectomy.    Past Medical History  He has a past medical history of Acute upper respiratory infection, unspecified (03/20/2015), Alcohol abuse, in remission (02/12/2015), Alcohol dependence, in remission (CMS/[facility]) (05/07/2015), Allergic contact dermatitis due to plants, except food (05/21/2014), Allergy status to unspecified drugs, medicaments and biological substances (08/29/2013), Anxiety disorder, unspecified (03/20/2015),  Bipolar disorder, current episode manic without psychotic features, unspecified (CMS/HCC), Cervicalgia, Chronic obstructive pulmonary disease, unspecified (CMS/HCC) (12/02/2014), Encounter for immunization (09/22/2016), Other cervical disc degeneration, unspecified cervical region, Other conditions influencing health status (06/27/2013), Other long term (current) drug therapy (06/02/2015), Other specified health status, Personal history of nicotine dependence (08/16/2017), Personal history of nicotine dependence (06/26/2017), Personal history of other diseases of the digestive system (04/11/2013), Personal history of other diseases of the digestive system (08/27/2015), Personal history of other diseases of the respiratory system (03/20/2017), Personal history of other diseases of the respiratory system (02/05/2014), Personal history of other diseases of the respiratory system (01/16/2014), Personal history of other diseases of the respiratory system (09/26/2013), Personal history of other diseases of the respiratory system (06/26/2014), Personal history of other infectious and parasitic diseases, Personal history of other mental and behavioral disorders, Personal history of other specified conditions (08/25/2016), Personal history of other specified conditions (02/16/2017), and Unspecified asthma, uncomplicated (02/13/2014).    Surgical History    LARYNGOSCOPY DIRECT OPERATIVE W/BIOPSY   10/30/2023      by Dr Leonardo - Direct Laryngoscopy with Biopsy, Cervical Esophagoscopy    LARYNGOSCOPY EXC KAYLAN&/STRIPPING CORDS/EPIGLOTT Bilateral 02/16/2022     by Dr Leonardo - Microlaryngoscopy w/ Coblation, removal of vocal cord polyps    ORTHOPEDIC SURGERY HX Left       ankle-plates and screws        Social History  He reports that he quit smoking 2 days ago. His smoking use included cigarettes. He smoked an average of 1 pack per day. He has never used smokeless tobacco. Patient lives with his Spous Jacqui Hernandez  ([phone])     Family History  No family history on file.     Allergies  Oxycodone    Review of Systems   Constitutional:  Negative for activity change, appetite change and chills.   Respiratory:  Positive for cough, chest tightness and shortness of breath.    Cardiovascular:  Positive for chest pain. Negative for palpitations and leg swelling.   Gastrointestinal:  Negative for abdominal distention, abdominal pain, constipation and diarrhea.   Genitourinary:  Negative for difficulty urinating, dysuria, enuresis and frequency.   Neurological:  Negative for dizziness, facial asymmetry and headaches.   Psychiatric/Behavioral:  Negative for agitation and behavioral problems.         Physical Exam  Constitutional:       Appearance: Normal appearance.   Cardiovascular:      Rate and Rhythm: Normal rate and regular rhythm.   Pulmonary:      Effort: Pulmonary effort is normal.      Breath sounds: Normal breath sounds.   Abdominal:      Palpations: Abdomen is soft.   Musculoskeletal:         General: No swelling or deformity.   Neurological:      General: No focal deficit present.      Mental Status: He is alert and oriented to person, place, and time.       Last Recorded Vitals  BP (!) 145/95   Pulse 93   Temp 35.9 °C (96.6 °F) (Tympanic)   Resp 20   Wt 127 kg (279 lb 1.6 oz)   SpO2 98%     Relevant Results  Results for orders placed or performed during the hospital encounter of 11/08/23 (from the past 24 hour(s))   Heparin Assay   Result Value Ref Range    Heparin Unfractionated 0.5 See Comment Below for Therapeutic Ranges IU/mL   Basic Metabolic Panel   Result Value Ref Range    Glucose 108 (H) 74 - 99 mg/dL    Sodium 139 136 - 145 mmol/L    Potassium 3.8 3.5 - 5.3 mmol/L    Chloride 106 98 - 107 mmol/L    Bicarbonate 26 21 - 32 mmol/L    Anion Gap 11 10 - 20 mmol/L    Urea Nitrogen 14 6 - 23 mg/dL    Creatinine 1.01 0.50 - 1.30 mg/dL    eGFR 89 >60 mL/min/1.73m*2    Calcium 9.3 8.6 - 10.3 mg/dL   CBC   Result  Value Ref Range    WBC 8.0 4.4 - 11.3 x10*3/uL    nRBC 0.0 0.0 - 0.0 /100 WBCs    RBC 4.96 4.50 - 5.90 x10*6/uL    Hemoglobin 14.7 13.5 - 17.5 g/dL    Hematocrit 44.6 41.0 - 52.0 %    MCV 90 80 - 100 fL    MCH 29.6 26.0 - 34.0 pg    MCHC 33.0 32.0 - 36.0 g/dL    RDW 15.2 (H) 11.5 - 14.5 %    Platelets 155 150 - 450 x10*3/uL   Magnesium   Result Value Ref Range    Magnesium 2.19 1.60 - 2.40 mg/dL      US DVT LOWER BILATERAL    Result Date: 11/7/2023  * * *Final Report* * * DATE OF EXAM: Nov 7 2023  7:23AM   [facility]   1005  -  US DVT LOWER GERMÁN  / ACCESSION #  [id number] PROCEDURE REASON: Pulmonary embolism (PE), eval for therapy      * * * * Physician Interpretation * * * *  BILATERAL LOWER EXTREMITY VENOUS ULTRASOUND WITH DUPLEX DOPPLER IMAGING 11/7/2023 7:23 AM HISTORY:  Pulmonary embolism (PE), eval for therapy TECHNIQUE:  Gray scale with compression maneuvers, color and spectral (duplex) Doppler at rest and with augmentation of the bilateral distal external iliac, common femoral, femoral and popliteal veins was performed.  Gray scale with compression maneuvers of the bilateral peroneal and posterior tibial veins was performed.  Images were obtained and stored in a permanent archive. COMPARISON:  None. RESULT: RIGHT LOWER EXTREMITY:      Distal external iliac and common femoral veins:                Compression:  Normal                Doppler:  Normal, spontaneous respirophasic flow                     Normal response to augmentation      Femoral vein: Proximal and mid femoral vein           Compression:  Normal           Doppler:  Normal, spontaneous respirophasic flow                     Normal response to augmentation       DISTAL FEMORAL VEIN: No evidence for compression flow indicating complete occlusion.      Popliteal vein: Complete occlusion.           Compression:  Absent           Doppler:  Absent                     Absent      Calf deep veins:           Peroneal veins:  Normal color flow           Posterior  tibial veins:  Normal color flow Gastrocnemius and soleal veins: Not imaged. Superficial Veins: Great saphenous vein: Patent and compressible at insertion into common femoral vein. Small saphenous vein: Patent and compressible in the proximal calf. LEFT LOWER EXTREMITY:      Distal external iliac and common femoral veins:                Compression:  Normal                Doppler:  Normal, spontaneous respirophasic flow                     Normal response to augmentation      Femoral vein:           Compression:  Normal           Doppler:  Normal, spontaneous respirophasic flow                     Normal response to augmentation       Popliteal vein:           Compression:  Normal           Doppler:  Normal, spontaneous respirophasic flow                     Normal response to augmentation      Calf deep veins:           Peroneal veins:  Normal color flow           Posterior tibial veins:  Normal color flow Gastrocnemius and soleal veins: Not imaged. Superficial Veins: Great saphenous vein: Patent and compressible at insertion into common femoral vein. Small saphenous vein: Patent and compressible in the proximal calf. -    IMPRESSION: THROMBUS WITHIN RIGHT DISTAL FEMORAL AND RIGHT POPLITEAL VEIN WITH COMPLETE OCCLUSION. NO ACUTE DVT IN THE LEFT LOWER EXTREMITY. : [facility]   Transcribe Date/Time: Nov 7 2023  9:12A Dictated by : PRINCE DRUMMOND MD This examination was interpreted and the report reviewed and electronically signed by: PRINCE DRUMMOND MD on Nov 7 2023  9:16AM  EST    CT CHEST W IVCON PE    Result Date: 11/6/2023  * * *Final Report* * * DATE OF EXAM: Nov 6 2023  6:55PM   [facility]   0540  -  CT CHEST W IVCON PE  / ACCESSION #  [id number] PROCEDURE REASON: Pulmonary embolism (PE) suspected, high prob      * * * * Physician Interpretation * * * *  EXAMINATION:  CHEST CT WITH CONTRAST (PULMONARY EMBOLISM PROTOCOL) CLINICAL HISTORY: Technique:  Spiral CT acquisition of the chest from the thoracic  inlet to the upper abdomen following IV contrast.  Axial 1 and 3 mm thick slices plus coronal and sagittal reformatted images. MQ:  CTCP_5 Contrast:  100 mL Omnipaque 350 IV CT Radiation dose: Integrated Dose-length product (DLP) for this visit =   775 mGy*cm CT Dose Reduction Employed: Automated exposure control(AEC) and iterative recon Comparison:  No relevant prior studies available. RESULT: Limitations:  None. Evaluation for thromboembolic disease:      - Right heart chambers:  No thromboembolic disease.      - Main pulmonary arteries:  A saddle embolus is noted.      - Lobar/segmental/subsegmental pulmonary arteries: Multiple pulmonary arterial filling defects are noted bilaterally in the bilateral upper, lower and right middle lobe pulmonary arteries.      - Additional pulmonary artery findings:  The main pulmonary artery is mildly increased in size.  Increased RV LV ratio which can be seen with right ventricular strain. Lines, tubes, and devices:  None. Lung parenchyma and airways: There are a few scattered small peripheral groundglass opacities abutting the pleural surfaces pleural surface in the right upper lobe.  Otherwise, the lungs appear clear. Pleural space:  No pleural effusion.  No pleural thickening. Lower neck, lymph nodes, and mediastinum:  The imaged thyroid gland is normal.  No lymphadenopathy in the supraclavicular, axillary, mediastinal, or hilar regions. Heart, pericardium, and thoracic vessels:  The thoracic aorta is normal in caliber. The cardiac chambers are normal in size. No coronary artery atherosclerotic calcifications are noted, although the study is not optimized for coronary assessment. No pericardial effusion or thickening. Bones and soft tissues:  Old left rib fracture deformities are noted. Upper abdomen:  No abnormality in the imaged upper abdomen.  (topogram) images:    IMPRESSION: 1.  Saddle pulmonary embolus with bilateral pulmonary embolism. 2.  Increased RV LV ratio  which can be seen with right ventricular strain. 3.  Small groundglass peripheral opacities in the right upper lobe which raises the possibility of small pulmonary infarcts. CRITICAL TEST/RESULTS: CRITICAL TEST/RESULTS: Acuity: Critical Finding: Pulmonary embolus Communication:  Communicated with TITA HERRERA on  11/6/2023 7:12 PM   via verbal communication. --END OF FINDING--   : JAIME   Transcribe Date/Time: Nov 6 2023  7:07P Dictated by : SANJAY MAE MD This examination was interpreted and the report reviewed and electronically signed by: SANJAY MAE MD on Nov 6 2023  7:14PM  EST    CT BRAIN WO IVCON    Result Date: 11/6/2023  * * *Final Report* * * DATE OF EXAM: Nov 6 2023  6:55PM   ASC   0504  -  CT BRAIN WO IVCON   / ACCESSION #  [id number] PROCEDURE REASON: Transient ischemic attack (TIA)      * * * * Physician Interpretation * * * *  EXAMINATION: CT BRAIN WO IVCON CLINICAL HISTORY: Transient ischemic attack. TECHNIQUE:  Serial axial images without IV contrast were obtained from the vertex to the foramen magnum. MQ:  CTBWO_3 CT Radiation dose: Integrated Dose-Length Product (DLP) for this visit = 830  mGy*cm CT Dose Reduction Employed: Automated exposure control(AEC) and iterative recon COMPARISON: 03/04/2019. RESULT:  (topogram) images: No additional findings. Post-operative change: None. Acute change: No evidence of an acute infarct or other acute parenchymal process. Hemorrhage: No evidence of acute intracranial hemorrhage. ECASS hemorrhagic transformation score: Not Applicable Mass Lesion / Mass Effect: There is no evidence of an intracranial mass or extraaxial fluid collection. No significant mass effect. Chronic change: Previously characterized encephalomalacia in the bety is grossly similar to previous exams but better characterized with MRI. Parenchyma: There is no significant volume loss. The brain parenchyma is otherwise within normal limits for age. Ventricles: The  ventricles are within normal limits of size and configuration for age. Paranasal sinuses and skull base: The visualized paranasal sinuses are grossly clear. The skull base and imaged soft tissues are unremarkable.    IMPRESSION: No acute intracranial abnormality. : [facility]   Transcribe Date/Time: Nov 6 2023  7:02P Dictated by : FIDEL GORDON MD This examination was interpreted and the report reviewed and electronically signed by:   FIDEL GORDON MD on Nov 6 2023  7:06PM  EST    XR CHEST 1V FRONTAL PORT    Result Date: 11/6/2023  * * *Final Report* * * DATE OF EXAM: Nov 6 2023  5:16PM   ASX   5376  -  XR CHEST 1V FRONTAL PORT  / ACCESSION #  [id number] PROCEDURE REASON: Shortness of breath      * * * * Physician Interpretation * * * *  EXAMINATION:  XR CHEST 1V FRONTAL PORT CLINICAL HISTORY: Shortness of breath Comparison:  Chest x-ray 12/16/2019 RESULT: Lines, tubes, and devices:  None Lungs and pleura:  No consolidation or edema. No pleural effusion or pneumothorax. Cardiomediastinal silhouette:  Normal cardiomediastinal silhouette. Bones/soft tissues:  No acute findings.  Remote right clavicular fracture and left-sided rib fractures.    IMPRESSION: No acute radiographic abnormality. : [facility]   Transcribe Date/Time: Nov 6 2023  5:53P Dictated by : ABEL ARMOS MD This examination was interpreted and the report reviewed and electronically signed by: ABEL RAMOS MD on Nov 6 2023  5:55PM  EST         Assessment/Plan   53-year-old male with PMHx of HTN, HLD, COPD, EtOH abuse, laryngeal cancer status post radiation.  Who presented initially to OSH ([facility]) on account of shortness of breath, which has been diagnosed as saddle PE s/p CT angio.  Patient is now transferred for further treatment and evaluation for catheter thrombectomy.    Neurology  #BiPolar Disorder   #Anxiety Disorder  - Will continue risperidone 1mg BID  - Continue trazodone 50mg at bedtime   - Continue  seroquel 25mg at bedtime      #Nicotine/alcohol use disorder  - Patient has a hx of ETOH abuse   - States that he quit 68 days ago   - No need for CIWA protocols at this time   - patient is a chronic smoker with 20pckyr hx  - Nicoderm patch 7mg/24 hours     #Restless leg Syndrome   - Continue ropinirole 0.5mg     Cardiology   #Hyperlipidemia  - Continue atorvastatin 20mg daily     #HTN  - Continue metoprolol 25mg daily     Pulmonology   #acute hypoxic respiratory failure 2/2 Saddle Pulmonary Embolism   -Patient presented to the ED with complaints of shortness of breath,  -Etiology/risk factor for PE unclear, patient does have a prior history of esophageal CA status post radiation therapy.  Has a sedentary lifestyle and is a chronic smoker with over 20-pack-year history.  -PESI score 143pts with simplified PESI graded as High risk   -CT angio consistent with saddle pulm embolism and bilateral pulm embolism  -Status post heparin GTT  -Patient mesentery maintaining SPO2 in 90s range on 3 L IN  -PERT team evaluated recommending catheter thrombectomy  -Keep patient n.p.o.  -Consult cardiology for probable suction thrombectomy    #COPD  -Continue DuoNebs every 4 as needed  -Continue INO2 to keep SPO2 greater than 88%.  -We will obtain incentive spirometry  -Tessalon Perles if persistent dry cough    Gastroenterology  #GERD  -Continue pantoprazole at 40 mg daily.    #Nutrition  -Keep patient n.p.o. pending thrombectomy  -Diet low calorie postsurgery  -We will obtain dietitian consult for patient dietary counseling for obesity     Renal   No active issue     Heme/Onc  #Esophageal cancer s/p radiation therapy  -s/p radiation therapy with last session being June 2022  -Follow up with ENT outpatient     MSK  - no active issues     Fluid: None   Electrolyte: Monitor and replete  Nutrition: NPO pending surgery   Gippx: Pantoprazole  DVTppx: heparin gtt   Access: PIV  Code Status: Full    Dispo: Patient is admitted for the  management of saddle PE with emergent catheter thrombectomy. Estimated LOS >2 days                               Gabriel Medina MD

## 2023-11-08 NOTE — POST-PROCEDURE NOTE
Physician Transition of Care Summary  Invasive Cardiovascular Lab    Procedure Date: 11/8/2023  Attending:    * Surya Templeton - Primary  Resident/Fellow/Other Assistant: Surgeon(s) and Role:    Indications:   Pre-op Diagnosis     * Pulmonary embolism (CMS/HCC) [I26.99]    Post-procedure diagnosis:   Post-op Diagnosis     * Pulmonary embolism (CMS/HCC) [I26.99]    Procedure(s):     * Pulmonary Angiogram      Procedure Findings:   Bilateral PE    Description of the Procedure:   Successful pulmonary embolectomy bilaterally; bilateral pulmonary angiography    Complications:   None    Stents/Implants:       Anticoagulation/Antiplatelet Plan:   Resume heparin gtt at previous rate    Estimated Blood Loss:   440 mL    Anesthesia: * No anesthesia type entered * Anesthesia Staff: No anesthesia staff entered.    Any Specimen(s) Removed:   No specimens collected during this procedure.    Disposition:   ICU; bedrest until 4 hours after Figure of 8 suture removed in AM on 11/9/2023.      Electronically signed by: Surya Templeton MD, 11/8/2023 5:16 PM

## 2023-11-08 NOTE — SIGNIFICANT EVENT
PULMONARY EMBOLISM RESPONSE TEAM (PERT) NOTE    This note represents a summary of a virtual evaluation by the pulmonary embolism response team requested by [facility] ICU team.  Suggestions and impression are summarized from the initial virtual encounter and discussion with the referring medical team. These suggestions supplement but should not be a substitute for bedside evaluation and management by the attending of record.     PERT Members on the Call:  Critical Care Attending: Dr. Cho.  [facility] Interventional Cardiology Attending: Dr. Bronson  Vascular Medicine Attending: Dr. Torre  Critical Care Fellow: Dr. Velez.    Brief Clinical Summary:  Gaurav Mckay is a 53 y.o. male presenting with PE.  Mr. Mckay presented on 11/6 with 1 episode of syncope, and shortness of breath for 1 week, was found to be tachycardic and hypoxic requiring 3 L of oxygen via nasal cannula.  CT PE showed saddle PE with bilateral emboli with signs suggestive of RV strain.  Patient was started on heparin drip, his blood pressure and heart rate remained stable, however he continued to be hypoxic requiring up to 6 L of oxygen via nasal cannula and tachypneic with respiratory rate up to 35.  Continued also to be symptomatic with worsening shortness of breath with any kind of exertion.     Vital Signs  There were no vitals taken for this visit.     Laboratory  Recent Labs     04/03/22  0530 04/02/22  2153 04/02/22  1921 03/29/20  1955 03/29/20  1715   BNP  --   --   --   --  14   LACTATE 1.8 2.8* 2.4*   < > 2.5*    < > = values in this interval not displayed.         PESI Score Dylan LILLY. Et al. Arch Intern Med. 2010;[id number].           PESI Score:  133, consistent with JLPESIRISK: Class V, or Very High risk (10-24.5% 30-day mortality risk) PE.    CT Scan reviewed:  Clot burden Saddle and bilateral PE, present in Main and bilateral PA.  RV/LV ratio >1 by CT scan    TTE reviewed (if available):  Dilated RV with  decreased systolic function.    Venous duplex (if available):  Right distal femoral and popliteal DVT.      Contraindications to anticoagulation: None.  Contraindications to thrombolytics: None.    Initial Impressions:  ERS/ESC Pulmonary Embolism Risk Category: JLPECLASS: Intermediate-high risk.  Intermediate- high risk PE with large clot burden and currently symptomatic with minimal exertion.     Initial Suggestions/Plans:  Transfer to [facility] ICU for possible thrombectomy by interventional cardiology.   Please send the images with the patient as the team was unable to get them to PACS.  Initial therapy suggested: Continue heparin gtt.  Consults suggested: Interventional cardiology.  Additional suggestions for re-evaluation/reconference or retesting: in case of of hemodynamic changes.    To re conference the PERT team please call the  Transfer Center at [phone].

## 2023-11-09 LAB
ALBUMIN SERPL BCP-MCNC: 3.5 G/DL (ref 3.4–5)
ANION GAP SERPL CALC-SCNC: 13 MMOL/L (ref 10–20)
BUN SERPL-MCNC: 13 MG/DL (ref 6–23)
CALCIUM SERPL-MCNC: 8.7 MG/DL (ref 8.6–10.3)
CHLORIDE SERPL-SCNC: 106 MMOL/L (ref 98–107)
CO2 SERPL-SCNC: 23 MMOL/L (ref 21–32)
CREAT SERPL-MCNC: 0.97 MG/DL (ref 0.5–1.3)
ERYTHROCYTE [DISTWIDTH] IN BLOOD BY AUTOMATED COUNT: 14.7 % (ref 11.5–14.5)
GFR SERPL CREATININE-BSD FRML MDRD: >90 ML/MIN/1.73M*2
GLUCOSE SERPL-MCNC: 109 MG/DL (ref 74–99)
HCT VFR BLD AUTO: 40.6 % (ref 41–52)
HGB BLD-MCNC: 13.2 G/DL (ref 13.5–17.5)
MAGNESIUM SERPL-MCNC: 1.93 MG/DL (ref 1.6–2.4)
MCH RBC QN AUTO: 28.9 PG (ref 26–34)
MCHC RBC AUTO-ENTMCNC: 32.5 G/DL (ref 32–36)
MCV RBC AUTO: 89 FL (ref 80–100)
NRBC BLD-RTO: 0 /100 WBCS (ref 0–0)
PHOSPHATE SERPL-MCNC: 3.9 MG/DL (ref 2.5–4.9)
PLATELET # BLD AUTO: 145 X10*3/UL (ref 150–450)
POTASSIUM SERPL-SCNC: 4 MMOL/L (ref 3.5–5.3)
RBC # BLD AUTO: 4.57 X10*6/UL (ref 4.5–5.9)
SODIUM SERPL-SCNC: 138 MMOL/L (ref 136–145)
UFH PPP CHRO-ACNC: 0.3 IU/ML
WBC # BLD AUTO: 8.2 X10*3/UL (ref 4.4–11.3)

## 2023-11-09 PROCEDURE — 2500000001 HC RX 250 WO HCPCS SELF ADMINISTERED DRUGS (ALT 637 FOR MEDICARE OP): Performed by: INTERNAL MEDICINE

## 2023-11-09 PROCEDURE — 36415 COLL VENOUS BLD VENIPUNCTURE: CPT

## 2023-11-09 PROCEDURE — 2500000005 HC RX 250 GENERAL PHARMACY W/O HCPCS: Performed by: INTERNAL MEDICINE

## 2023-11-09 PROCEDURE — 83735 ASSAY OF MAGNESIUM: CPT

## 2023-11-09 PROCEDURE — 2500000002 HC RX 250 W HCPCS SELF ADMINISTERED DRUGS (ALT 637 FOR MEDICARE OP, ALT 636 FOR OP/ED): Performed by: INTERNAL MEDICINE

## 2023-11-09 PROCEDURE — 2500000004 HC RX 250 GENERAL PHARMACY W/ HCPCS (ALT 636 FOR OP/ED): Performed by: INTERNAL MEDICINE

## 2023-11-09 PROCEDURE — 2500000005 HC RX 250 GENERAL PHARMACY W/O HCPCS

## 2023-11-09 PROCEDURE — 85520 HEPARIN ASSAY: CPT

## 2023-11-09 PROCEDURE — 1200000002 HC GENERAL ROOM WITH TELEMETRY DAILY

## 2023-11-09 PROCEDURE — 2500000004 HC RX 250 GENERAL PHARMACY W/ HCPCS (ALT 636 FOR OP/ED)

## 2023-11-09 PROCEDURE — 80069 RENAL FUNCTION PANEL: CPT

## 2023-11-09 PROCEDURE — S4991 NICOTINE PATCH NONLEGEND: HCPCS | Performed by: INTERNAL MEDICINE

## 2023-11-09 PROCEDURE — 2500000001 HC RX 250 WO HCPCS SELF ADMINISTERED DRUGS (ALT 637 FOR MEDICARE OP)

## 2023-11-09 PROCEDURE — 97165 OT EVAL LOW COMPLEX 30 MIN: CPT | Mod: GO

## 2023-11-09 PROCEDURE — 85027 COMPLETE CBC AUTOMATED: CPT

## 2023-11-09 PROCEDURE — 97161 PT EVAL LOW COMPLEX 20 MIN: CPT | Mod: GP

## 2023-11-09 PROCEDURE — 99291 CRITICAL CARE FIRST HOUR: CPT | Performed by: INTERNAL MEDICINE

## 2023-11-09 PROCEDURE — 94640 AIRWAY INHALATION TREATMENT: CPT

## 2023-11-09 RX ORDER — LIDOCAINE 560 MG/1
1 PATCH PERCUTANEOUS; TOPICAL; TRANSDERMAL DAILY
Status: DISCONTINUED | OUTPATIENT
Start: 2023-11-09 | End: 2023-11-15

## 2023-11-09 RX ORDER — AMOXICILLIN 250 MG
1 CAPSULE ORAL NIGHTLY
Status: DISCONTINUED | OUTPATIENT
Start: 2023-11-09 | End: 2023-11-17 | Stop reason: HOSPADM

## 2023-11-09 RX ORDER — DULOXETIN HYDROCHLORIDE 30 MG/1
30 CAPSULE, DELAYED RELEASE ORAL NIGHTLY
Status: DISCONTINUED | OUTPATIENT
Start: 2023-11-09 | End: 2023-11-15

## 2023-11-09 RX ORDER — POLYETHYLENE GLYCOL 3350 17 G/17G
17 POWDER, FOR SOLUTION ORAL DAILY
Status: DISCONTINUED | OUTPATIENT
Start: 2023-11-09 | End: 2023-11-17 | Stop reason: HOSPADM

## 2023-11-09 RX ORDER — ACETAMINOPHEN 325 MG/1
650 TABLET ORAL EVERY 6 HOURS PRN
Status: DISCONTINUED | OUTPATIENT
Start: 2023-11-09 | End: 2023-11-15

## 2023-11-09 RX ADMIN — Medication 2 L/MIN: at 10:30

## 2023-11-09 RX ADMIN — Medication 2 L/MIN: at 08:10

## 2023-11-09 RX ADMIN — POLYETHYLENE GLYCOL 3350 17 G: 17 POWDER, FOR SOLUTION ORAL at 10:46

## 2023-11-09 RX ADMIN — LIDOCAINE 1 PATCH: 4 PATCH TOPICAL at 11:20

## 2023-11-09 RX ADMIN — TRAZODONE HYDROCHLORIDE 50 MG: 50 TABLET ORAL at 20:59

## 2023-11-09 RX ADMIN — APIXABAN 10 MG: 5 TABLET, FILM COATED ORAL at 10:46

## 2023-11-09 RX ADMIN — APIXABAN 10 MG: 5 TABLET, FILM COATED ORAL at 20:58

## 2023-11-09 RX ADMIN — ATORVASTATIN CALCIUM 20 MG: 20 TABLET, FILM COATED ORAL at 20:58

## 2023-11-09 RX ADMIN — IPRATROPIUM BROMIDE AND ALBUTEROL SULFATE 3 ML: 2.5; .5 SOLUTION RESPIRATORY (INHALATION) at 19:27

## 2023-11-09 RX ADMIN — NICOTINE 1 PATCH: 14 PATCH, EXTENDED RELEASE TRANSDERMAL at 08:11

## 2023-11-09 RX ADMIN — IPRATROPIUM BROMIDE AND ALBUTEROL SULFATE 3 ML: 2.5; .5 SOLUTION RESPIRATORY (INHALATION) at 08:10

## 2023-11-09 RX ADMIN — PREGABALIN 200 MG: 75 CAPSULE ORAL at 20:57

## 2023-11-09 RX ADMIN — Medication 1 TABLET: at 08:01

## 2023-11-09 RX ADMIN — PANTOPRAZOLE SODIUM 40 MG: 40 TABLET, DELAYED RELEASE ORAL at 08:01

## 2023-11-09 RX ADMIN — Medication 1 TABLET: at 20:59

## 2023-11-09 RX ADMIN — BENZTROPINE MESYLATE 0.5 MG: 0.5 TABLET ORAL at 08:01

## 2023-11-09 RX ADMIN — ACETAMINOPHEN 650 MG: 325 TABLET ORAL at 11:21

## 2023-11-09 RX ADMIN — METOPROLOL TARTRATE 25 MG: 25 TABLET, FILM COATED ORAL at 08:01

## 2023-11-09 RX ADMIN — PREGABALIN 200 MG: 75 CAPSULE ORAL at 14:40

## 2023-11-09 RX ADMIN — BENZTROPINE MESYLATE 0.5 MG: 0.5 TABLET ORAL at 20:59

## 2023-11-09 RX ADMIN — DULOXETINE HYDROCHLORIDE 30 MG: 30 CAPSULE, DELAYED RELEASE ORAL at 20:57

## 2023-11-09 RX ADMIN — SENNOSIDES AND DOCUSATE SODIUM 1 TABLET: 8.6; 5 TABLET ORAL at 20:58

## 2023-11-09 RX ADMIN — PREGABALIN 200 MG: 75 CAPSULE ORAL at 08:01

## 2023-11-09 ASSESSMENT — COGNITIVE AND FUNCTIONAL STATUS - GENERAL
DRESSING REGULAR LOWER BODY CLOTHING: A LOT
TOILETING: A LOT
STANDING UP FROM CHAIR USING ARMS: A LOT
MOVING FROM LYING ON BACK TO SITTING ON SIDE OF FLAT BED WITH BEDRAILS: A LITTLE
TURNING FROM BACK TO SIDE WHILE IN FLAT BAD: A LITTLE
MOVING TO AND FROM BED TO CHAIR: A LOT
HELP NEEDED FOR BATHING: A LOT
WALKING IN HOSPITAL ROOM: A LOT
DAILY ACTIVITIY SCORE: 18
CLIMB 3 TO 5 STEPS WITH RAILING: A LOT
MOBILITY SCORE: 14

## 2023-11-09 ASSESSMENT — PAIN - FUNCTIONAL ASSESSMENT
PAIN_FUNCTIONAL_ASSESSMENT: 0-10

## 2023-11-09 ASSESSMENT — PAIN SCALES - GENERAL
PAINLEVEL_OUTOF10: 5 - MODERATE PAIN
PAINLEVEL_OUTOF10: 0 - NO PAIN
PAINLEVEL_OUTOF10: 0 - NO PAIN
PAINLEVEL_OUTOF10: 8
PAINLEVEL_OUTOF10: 4
PAINLEVEL_OUTOF10: 0 - NO PAIN

## 2023-11-09 ASSESSMENT — ACTIVITIES OF DAILY LIVING (ADL): ADL_ASSISTANCE: INDEPENDENT

## 2023-11-09 ASSESSMENT — PAIN DESCRIPTION - ORIENTATION: ORIENTATION: RIGHT

## 2023-11-09 ASSESSMENT — PAIN DESCRIPTION - LOCATION: LOCATION: GROIN

## 2023-11-09 NOTE — PROGRESS NOTES
Occupational Therapy    Evaluation    Patient Name: Gaurav Mckay  MRN: [id number]  Today's Date: 11/9/2023  Time Calculation  Start Time: 1440  Stop Time: 1456  Time Calculation (min): 16 min    Assessment  IP OT Assessment  OT Assessment: Pt is a 54 y/o male who was admitted for saddle PE and underwent pulmonary embolectomy bilaterally; bilateral pulmonary angiography. Pt typically is completely independent with ADL's and IADL's and not using any AD. Today pt presents with pain, dizziness, decreased balance, and endurance. Pt to benefit from skilled OT services to increase independence with ADL's, transfers, and mobility.  Prognosis: Excellent  Barriers to Discharge: None  Evaluation/Treatment Tolerance: Patient limited by fatigue  Medical Staff Made Aware: Yes  End of Session Communication: Bedside nurse  End of Session Patient Position: Bed, 3 rail up, Alarm on  Plan:  Treatment Interventions: ADL retraining, Functional transfer training, Endurance training  OT Frequency: 3 times per week  OT Discharge Recommendations: Moderate intensity level of continued care  Equipment Recommended upon Discharge: Wheeled walker  OT Recommended Transfer Status: Moderate assist, Assist of 1  OT - OK to Discharge: Yes    Subjective   Current Problem:  1. Acute massive pulmonary embolism (CMS/HCC)        2. Pulmonary embolism (CMS/HCC)  Case Request Cath Lab: Pulmonary Angiogram    Case Request Cath Lab: Pulmonary Angiogram    Invasive vascular procedure    Invasive vascular procedure        General:  General  Reason for Referral: Pt is a 54 y/o male who was admitted for acute massive pulmonary embolism. Pt underwent pulmonary embolectomy bilaterally; bilateral pulmonary angiography  Past Medical History Relevant to Rehab: HTN, HLD, laryngeal CA s/p radiation, bipolar  Family/Caregiver Present: No  Prior to Session Communication: Bedside nurse  Patient Position Received: Bed, 3 rail up, Alarm on  General Comment: Pt agreeable  to OT nestor. Pt presented with pain, SOB and dizziness with position change. Pt's SpO2 ranged from 89-92% on 2 L of oxygen.  Precautions:  Medical Precautions: Fall precautions (telemetry, 2 L of oxygen, alarms)  Vital Signs:  Heart Rate: 88  SpO2: 91 %  BP: 126/74  BP Location: Right arm  BP Method: Automatic  Patient Position: Lying  Pain:  Pain Assessment  Pain Assessment: 0-10  Pain Score: 8  Pain Type: Acute pain  Pain Location:  (R groin area where they went in for procedure)  Pain Interventions: Repositioned    Objective   Cognition:  Overall Cognitive Status: Within Functional Limits     Home Living:  Type of Home: Mobile home  Lives With: Spouse  Home Adaptive Equipment: None  Home Layout: One level  Home Access: Stairs to enter with rails  Entrance Stairs-Rails: Both  Entrance Stairs-Number of Steps: 5  Bathroom Shower/Tub: Tub/shower unit  Bathroom Toilet: Standard  Bathroom Equipment: Grab bars in shower   Prior Function:  Level of [place]: Independent with ADLs and functional transfers, Independent with homemaking with ambulation  ADL Assistance: Independent  Homemaking Assistance: Independent  Ambulatory Assistance: Independent  Prior Function Comments: Pt was completely independent and not using any AD  ADL:  LE Dressing Assistance: Minimal  LE Dressing Deficit:  (Pt required assistance to start the hospital pants over his feet while supine in bed. Pt then able to pull pants up over needs and over hips while bridging in bed)  ADL Comments: Anticipate min-mod A for other LB ADL's d/t decreased standing balance  Activity Tolerance:  Endurance: Tolerates less than 10 min exercise, no significant change in vital signs  Bed Mobility/Transfers: Bed Mobility  Bed Mobility: Yes  Bed Mobility 1  Bed Mobility 1: Supine to sitting, Sitting to supine  Level of Assistance 1: Close supervision  Bed Mobility 2  Bed Mobility  2: Scooting  Level of Assistance 2: Close supervision    Transfers  Transfer:  Yes  Transfer 1  Technique 1: Sit to stand  Transfer Device 1: Walker  Transfer Level of Assistance 1: Moderate assistance, Minimal verbal cues  Transfers 2  Technique 2: Stand to sit  Transfer Device 2: Walker  Transfer Level of Assistance 2: Minimum assistance, Minimal verbal cues    Ambulation/Gait Training:  Ambulation/Gait Training  Ambulation/Gait Training Performed: No (Not attempted d/t decreased standing balance and dizziness)  Sitting Balance:  Static Sitting Balance  Static Sitting-Balance Support: No upper extremity supported, Feet supported  Static Sitting-Level of Assistance: Independent  Standing Balance:  Static Standing Balance  Static Standing-Balance Support: Bilateral upper extremity supported  Static Standing-Level of Assistance: Moderate assistance    Strength:  Strength Comments: B UE's: 5/5    Extremities: RUE   RUE : Within Functional Limits and LUE   LUE: Within Functional Limits    Outcome Measures: [facility] Daily Activity  Putting on and taking off regular lower body clothing: A lot  Bathing (including washing, rinsing, drying): A lot  Putting on and taking off regular upper body clothing: None  Toileting, which includes using toilet, bedpan or urinal: A lot  Taking care of personal grooming such as brushing teeth: None  Eating Meals: None  Daily Activity - Total Score: 18      Education Documentation  Body Mechanics, taught by Isabella Serna OT at 11/9/2023  3:21 PM.  Learner: Patient  Readiness: Acceptance  Method: Explanation  Response: Verbalizes Understanding    Education Comments  No comments found.      Goals:   Encounter Problems       Encounter Problems (Active)       Balance       Pt will demo good static/dynamic standing balance during ADL and functional activity with LRAD for improved independence and participation in ADL        Start:  11/09/23    Expected End:  11/23/23               Dressings Lower Extremities       Patient to complete lower body dressing with LRAD at  mod I        Start:  11/09/23    Expected End:  11/23/23               Endurance       Pt will increase endurance to tolerate 10 min of activity with no more than 1 rest break in order to increase ability to engage in ADL completion.        Start:  11/09/23    Expected End:  11/23/23                     Mobility       Pt will demo increased functional mobility to tolerate tasks necessary to complete ADL routine with LRAD at mod I        Start:  11/09/23    Expected End:  11/23/23               Toileting       Patient will complete toileting tasks with LRAD at mod I        Start:  11/09/23    Expected End:  11/23/23            Patient to increase standing tolerance with LRAD >5 minutes       Start:  11/09/23    Expected End:  11/23/23               Transfers       Pt to demonstrate transfers with LRAD at mod I        Start:  11/09/23    Expected End:  11/23/23

## 2023-11-09 NOTE — PROGRESS NOTES
Shona Mcbride is a 53 y.o. male on day 1 of admission presenting with Acute massive pulmonary embolism (CMS/HCC).      Subjective   Patient seen and evaluated today. He has no new complaints. Denies chest pain and evaluation of femoral access shows no evidence of bleeding      Objective     Last Recorded Vitals  /79   Pulse 90   Temp 36.7 °C (98.1 °F) (Temporal)   Resp 21   Wt 128 kg (281 lb 8.4 oz)   SpO2 92%   Intake/Output last 3 Shifts:    Intake/Output Summary (Last 24 hours) at 11/9/2023 1039  Last data filed at 11/9/2023 0949  Gross per 24 hour   Intake 1211.04 ml   Output 740 ml   Net 471.04 ml       Admission Weight  Weight: 127 kg (279 lb 1.6 oz) (11/08/23 1410)    Daily Weight  11/09/23 : 128 kg (281 lb 8.4 oz)    Image Results  XR ankle  Narrative: Interpreted By:  DWIGHT GARZA MD  MRN: [id number]  Patient Name: SHONA MCBRIDE     STUDY:  ANKLE, COMPLETE, MIN 3 VIEWS; Left;  7/28/2023 3:41 pm     INDICATION:  ankle pain, previous surgery with hardware .     COMPARISON:  02/24/2021     ACCESSION NUMBER(S):  [id number]     ORDERING CLINICIAN:  LAZ CERVANTES     FINDINGS:  Interval post ORIF of the comminuted distal fibular and medial tibial  fractures, there is improved alignment of the fracture fragments.  Soft tissue swelling around the ankle. Mild degenerative changes in  the ankle joint. No acute fractures noted. Mild subcutaneous edema in  the distal leg.     Impression: No acute fracture, moderate soft tissue swelling around the ankle  joint.        MACRO:  None    Results for orders placed or performed during the hospital encounter of 11/08/23 (from the past 24 hour(s))   Heparin Assay   Result Value Ref Range    Heparin Unfractionated 0.5 See Comment Below for Therapeutic Ranges IU/mL   Basic Metabolic Panel   Result Value Ref Range    Glucose 108 (H) 74 - 99 mg/dL    Sodium 139 136 - 145 mmol/L    Potassium 3.8 3.5 - 5.3 mmol/L    Chloride 106 98 - 107 mmol/L    Bicarbonate 26 21 -  32 mmol/L    Anion Gap 11 10 - 20 mmol/L    Urea Nitrogen 14 6 - 23 mg/dL    Creatinine 1.01 0.50 - 1.30 mg/dL    eGFR 89 >60 mL/min/1.73m*2    Calcium 9.3 8.6 - 10.3 mg/dL   CBC   Result Value Ref Range    WBC 8.0 4.4 - 11.3 x10*3/uL    nRBC 0.0 0.0 - 0.0 /100 WBCs    RBC 4.96 4.50 - 5.90 x10*6/uL    Hemoglobin 14.7 13.5 - 17.5 g/dL    Hematocrit 44.6 41.0 - 52.0 %    MCV 90 80 - 100 fL    MCH 29.6 26.0 - 34.0 pg    MCHC 33.0 32.0 - 36.0 g/dL    RDW 15.2 (H) 11.5 - 14.5 %    Platelets 155 150 - 450 x10*3/uL   Magnesium   Result Value Ref Range    Magnesium 2.19 1.60 - 2.40 mg/dL   Heparin Assay, UFH   Result Value Ref Range    Heparin Unfractionated 0.5 See Comment Below for Therapeutic Ranges IU/mL   Renal Function Panel   Result Value Ref Range    Glucose 109 (H) 74 - 99 mg/dL    Sodium 138 136 - 145 mmol/L    Potassium 4.0 3.5 - 5.3 mmol/L    Chloride 106 98 - 107 mmol/L    Bicarbonate 23 21 - 32 mmol/L    Anion Gap 13 10 - 20 mmol/L    Urea Nitrogen 13 6 - 23 mg/dL    Creatinine 0.97 0.50 - 1.30 mg/dL    eGFR >90 >60 mL/min/1.73m*2    Calcium 8.7 8.6 - 10.3 mg/dL    Phosphorus 3.9 2.5 - 4.9 mg/dL    Albumin 3.5 3.4 - 5.0 g/dL   CBC   Result Value Ref Range    WBC 8.2 4.4 - 11.3 x10*3/uL    nRBC 0.0 0.0 - 0.0 /100 WBCs    RBC 4.57 4.50 - 5.90 x10*6/uL    Hemoglobin 13.2 (L) 13.5 - 17.5 g/dL    Hematocrit 40.6 (L) 41.0 - 52.0 %    MCV 89 80 - 100 fL    MCH 28.9 26.0 - 34.0 pg    MCHC 32.5 32.0 - 36.0 g/dL    RDW 14.7 (H) 11.5 - 14.5 %    Platelets 145 (L) 150 - 450 x10*3/uL   Magnesium   Result Value Ref Range    Magnesium 1.93 1.60 - 2.40 mg/dL   Heparin Assay, UFH   Result Value Ref Range    Heparin Unfractionated 0.3 See Comment Below for Therapeutic Ranges IU/mL      Physical Exam  Constitutional:       Appearance: Normal appearance.   Cardiovascular:      Rate and Rhythm: Normal rate and regular rhythm.      Pulses: Normal pulses.      Heart sounds: Normal heart sounds.   Pulmonary:      Breath sounds:  Normal breath sounds.   Abdominal:      Palpations: Abdomen is soft.   Skin:     General: Skin is dry.   Neurological:      General: No focal deficit present.      Mental Status: He is alert and oriented to person, place, and time.       Assessment/Plan   53-year-old male with PMHx of HTN, HLD, COPD, EtOH abuse, laryngeal cancer status post radiation.  Who presented initially to OSH ([facility]) on account of shortness of breath, which has been diagnosed as saddle PE s/p CT angio.  Patient is now transferred for further treatment and evaluation for catheter thrombectomy.     Neurology  #BiPolar Disorder   #Anxiety Disorder  - Will continue risperidone 1mg BID  - Continue trazodone 50mg at bedtime   - Continue seroquel 25mg at bedtime       #Nicotine/alcohol use disorder  - Patient has a hx of ETOH abuse   - States that he quit 68 days ago   - No need for CIWA protocols at this time   - patient is a chronic smoker with 20pckyr hx  - Nicoderm patch 7mg/24 hours      #Restless leg Syndrome   - Continue ropinirole 0.5mg      Cardiology   #Hyperlipidemia  - Continue atorvastatin 20mg daily      #HTN  - Continue metoprolol 25mg daily      Pulmonology   #acute hypoxic respiratory failure 2/2 Saddle Pulmonary Embolism   -Status post catheter guided suction thrombectomy   - Patient has continued to remain stable post off  - will wean off heparin gtt   - Start patient on eliquis 10mg Bid for 7 days then 5mg BID after   - Patient will be set up with cardiology on outpatient basis      #COPD  -Continue DuoNebs every 4 as needed  -Continue INO2 to keep SPO2 greater than 88%.  -We will obtain incentive spirometry  -Tessalon Perles if persistent dry cough     Gastroenterology  #GERD  -Continue pantoprazole at 40 mg daily.     #Nutrition  -Keep patient n.p.o. pending thrombectomy  -Diet low calorie postsurgery  -We will obtain dietitian consult for patient dietary counseling for obesity      Renal   No active issue       Heme/Onc  #Esophageal cancer s/p radiation therapy  -s/p radiation therapy with last session being June 2022  -Follow up with ENT outpatient      MSK  - no active issues      Fluid: None   Electrolyte: Monitor and replete  Nutrition: NPO pending surgery   Gippx: Pantoprazole  DVTppx: Eliquis 10mg BID   Access: PIV  Code Status: Full     Dispo: Patient will be transferred to the floors for further management   Gabriel Medina MD

## 2023-11-09 NOTE — PROGRESS NOTES
11/09/23 1001   Discharge Planning   Living Arrangements Spouse/significant other   Support Systems Spouse/significant other;Family members;Friends/neighbors   Assistance Needed Patient is from home with significant other, A&O X3 and uses no DME at baseline. Patient is currently requiring O2 which is ACUTE. Per patient he is on room air at home. Patient does not drive.   Type of Residence Private residence   Number of Stairs to Enter Residence 4   Number of Stairs Within Residence 0   Who is requesting discharge planning? Provider   Home or Post Acute Services Other (Comment)  (TBD- C VS SNF)   Patient expects to be discharged to: TBD   Does the patient need discharge transport arranged? No     11/09/2023 1000 Patient is from home with significant other and independent at baseline but voices concern of being too SOB to complete ADLs. Next site of care/DC needs to be determined once patient is more medically stable. Nursing [facility]- 24/24.

## 2023-11-09 NOTE — PROGRESS NOTES
Physical Therapy    Physical Therapy Evaluation & Treatment    Patient Name: Gaurav Mckay  MRN: [id number]  Today's Date: 11/9/2023   Time Calculation  Start Time: 1311  Stop Time: 1326  Time Calculation (min): 15 min    Assessment/Plan   PT Assessment  PT Assessment Results: Decreased strength, Decreased endurance, Decreased mobility, Obesity  IP OR SWING BED PT PLAN  Inpatient or Swing Bed: Inpatient  PT Plan  Treatment/Interventions: Bed mobility, Transfer training, Gait training, Strengthening  PT Plan: Skilled PT  PT Frequency: 3 times per week  PT - OK to Discharge: Yes (once progressing towards goals)      Subjective     General Visit Information:  General  Reason for Referral:  (52 yo male admitted 2' to SOB, saddle PE s/p CT angio with B pulmonary embolism.)  Referred By:  (Dr. NICK Medina)  Past Medical History Relevant to Rehab:  (HTN, HLD, laryngeal CA s/p radiation, bipolar)  Prior to Session Communication: Bedside nurse  Patient Position Received: Bed, 3 rail up, Alarm on  General Comment:  (Pt cleared for PT by RN, Pt supine and agreeable to participate with PT. Pt moving cautiously but gave good efort. Pt became lightheaded upon standing and was unable to take any steps as a result. recommend Pt have MODERATE follow up srevices.)  Home Living:  Home Living  Type of Home: Mobile home  Home Adaptive Equipment: None  Home Layout: One level  Home Access: Stairs to enter with rails  Entrance Stairs-Rails: Both  Entrance Stairs-Number of Steps:  (5)  Bathroom Shower/Tub: Tub/shower unit  Bathroom Toilet: Standard  Bathroom Equipment: Grab bars in shower  Prior Level of Function:  Prior Function Per Pt/Caregiver Report  Level of [place]: Independent with ADLs and functional transfers, Independent with homemaking with ambulation (no devices)  Precautions:  Precautions  Medical Precautions: Fall precautions  Vital Signs:  Vital Signs  Heart Rate: 86  SpO2: 95 %    Objective   Pain:  Pain  Assessment  Pain Assessment: 0-10  Cognition:  Cognition  Overall Cognitive Status: Within Functional Limits    General Assessments:                Activity Tolerance  Endurance: Tolerates less than 10 min exercise, no significant change in vital signs         Static Sitting Balance  Static Sitting-Balance Support: Bilateral upper extremity supported  Static Sitting-Level of Assistance: Close supervision    Static Standing Balance  Static Standing-Balance Support: No upper extremity supported  Static Standing-Level of Assistance: Minimum assistance, Moderate assistance (1 person)  Functional Assessments:  Bed Mobility  Bed Mobility: Yes  Bed Mobility 1  Bed Mobility 1: Supine to sitting, Sitting to supine  Level of Assistance 1: Contact guard, Close supervision    Transfers  Transfer: Yes  Transfer 1  Transfer From 1: Bed to  Transfer to 1: Stand  Technique 1: Sit to stand, Stand to sit  Transfer Device 1:  (no device)  Transfer Level of Assistance 1: Minimum assistance, Moderate assistance (Pt is exhibiting L retro lean)    Ambulation/Gait Training  Ambulation/Gait Training Performed: No (Pt is unable to take any steps at this time)  Extremity/Trunk Assessments:  RLE   RLE : Within Functional Limits  LLE   LLE : Within Functional Limits  Treatments:       Bed Mobility  Bed Mobility: Yes  Bed Mobility 1  Bed Mobility 1: Supine to sitting, Sitting to supine  Level of Assistance 1: Contact guard, Close supervision    Ambulation/Gait Training  Ambulation/Gait Training Performed: No (Pt is unable to take any steps at this time)  Outcome Measures:  [facility] Basic Mobility  Turning from your back to your side while in a flat bed without using bedrails: A little  Moving from lying on your back to sitting on the side of a flat bed without using bedrails: A little  Moving to and from bed to chair (including a wheelchair): A lot  Standing up from a chair using your arms (e.g. wheelchair or bedside chair): A lot  To walk in  hospital room: A lot  Climbing 3-5 steps with railing: A lot  Basic Mobility - Total Score: 14    Encounter Problems       Encounter Problems (Active)       Mobility       STG - Patient will ambulate 150 feet MOD I with LRD (Progressing)       Start:  11/09/23    Expected End:  11/23/23               Pain - Adult          Transfers       STG - Patient to transfer to and from sit to supine independently (Progressing)       Start:  11/09/23    Expected End:  11/16/23            STG - Patient will transfer sit to and from stand MOD I with LRD (Progressing)       Start:  11/09/23    Expected End:  11/23/23                   Education Documentation  No documentation found.  Education Comments  No comments found.

## 2023-11-10 LAB
ALBUMIN SERPL BCP-MCNC: 3.8 G/DL (ref 3.4–5)
ANION GAP SERPL CALC-SCNC: 15 MMOL/L (ref 10–20)
BUN SERPL-MCNC: 17 MG/DL (ref 6–23)
CALCIUM SERPL-MCNC: 9.5 MG/DL (ref 8.6–10.3)
CHLORIDE SERPL-SCNC: 104 MMOL/L (ref 98–107)
CO2 SERPL-SCNC: 24 MMOL/L (ref 21–32)
CREAT SERPL-MCNC: 1.01 MG/DL (ref 0.5–1.3)
ERYTHROCYTE [DISTWIDTH] IN BLOOD BY AUTOMATED COUNT: 14.8 % (ref 11.5–14.5)
GFR SERPL CREATININE-BSD FRML MDRD: 89 ML/MIN/1.73M*2
GLUCOSE SERPL-MCNC: 107 MG/DL (ref 74–99)
HCT VFR BLD AUTO: 41.6 % (ref 41–52)
HGB BLD-MCNC: 13.6 G/DL (ref 13.5–17.5)
MAGNESIUM SERPL-MCNC: 1.89 MG/DL (ref 1.6–2.4)
MCH RBC QN AUTO: 29 PG (ref 26–34)
MCHC RBC AUTO-ENTMCNC: 32.7 G/DL (ref 32–36)
MCV RBC AUTO: 89 FL (ref 80–100)
NRBC BLD-RTO: 0 /100 WBCS (ref 0–0)
PHOSPHATE SERPL-MCNC: 4.7 MG/DL (ref 2.5–4.9)
PLATELET # BLD AUTO: 171 X10*3/UL (ref 150–450)
POTASSIUM SERPL-SCNC: 3.8 MMOL/L (ref 3.5–5.3)
RBC # BLD AUTO: 4.69 X10*6/UL (ref 4.5–5.9)
SODIUM SERPL-SCNC: 139 MMOL/L (ref 136–145)
WBC # BLD AUTO: 9.7 X10*3/UL (ref 4.4–11.3)

## 2023-11-10 PROCEDURE — 2500000002 HC RX 250 W HCPCS SELF ADMINISTERED DRUGS (ALT 637 FOR MEDICARE OP, ALT 636 FOR OP/ED): Performed by: INTERNAL MEDICINE

## 2023-11-10 PROCEDURE — 94640 AIRWAY INHALATION TREATMENT: CPT

## 2023-11-10 PROCEDURE — 80069 RENAL FUNCTION PANEL: CPT

## 2023-11-10 PROCEDURE — 85027 COMPLETE CBC AUTOMATED: CPT

## 2023-11-10 PROCEDURE — 99233 SBSQ HOSP IP/OBS HIGH 50: CPT | Performed by: INTERNAL MEDICINE

## 2023-11-10 PROCEDURE — 2500000005 HC RX 250 GENERAL PHARMACY W/O HCPCS

## 2023-11-10 PROCEDURE — 2500000005 HC RX 250 GENERAL PHARMACY W/O HCPCS: Performed by: INTERNAL MEDICINE

## 2023-11-10 PROCEDURE — 2500000004 HC RX 250 GENERAL PHARMACY W/ HCPCS (ALT 636 FOR OP/ED): Performed by: INTERNAL MEDICINE

## 2023-11-10 PROCEDURE — 36415 COLL VENOUS BLD VENIPUNCTURE: CPT

## 2023-11-10 PROCEDURE — 1200000002 HC GENERAL ROOM WITH TELEMETRY DAILY

## 2023-11-10 PROCEDURE — 2500000004 HC RX 250 GENERAL PHARMACY W/ HCPCS (ALT 636 FOR OP/ED)

## 2023-11-10 PROCEDURE — 2500000001 HC RX 250 WO HCPCS SELF ADMINISTERED DRUGS (ALT 637 FOR MEDICARE OP)

## 2023-11-10 PROCEDURE — 2500000001 HC RX 250 WO HCPCS SELF ADMINISTERED DRUGS (ALT 637 FOR MEDICARE OP): Performed by: INTERNAL MEDICINE

## 2023-11-10 PROCEDURE — S4991 NICOTINE PATCH NONLEGEND: HCPCS | Performed by: INTERNAL MEDICINE

## 2023-11-10 PROCEDURE — 83735 ASSAY OF MAGNESIUM: CPT

## 2023-11-10 PROCEDURE — 97530 THERAPEUTIC ACTIVITIES: CPT | Mod: GO

## 2023-11-10 RX ADMIN — SENNOSIDES AND DOCUSATE SODIUM 1 TABLET: 8.6; 5 TABLET ORAL at 20:00

## 2023-11-10 RX ADMIN — Medication 1 TABLET: at 08:20

## 2023-11-10 RX ADMIN — DULOXETINE HYDROCHLORIDE 90 MG: 30 CAPSULE, DELAYED RELEASE ORAL at 08:20

## 2023-11-10 RX ADMIN — IPRATROPIUM BROMIDE AND ALBUTEROL SULFATE 3 ML: 2.5; .5 SOLUTION RESPIRATORY (INHALATION) at 08:00

## 2023-11-10 RX ADMIN — PANTOPRAZOLE SODIUM 40 MG: 40 TABLET, DELAYED RELEASE ORAL at 08:21

## 2023-11-10 RX ADMIN — PREGABALIN 200 MG: 75 CAPSULE ORAL at 08:19

## 2023-11-10 RX ADMIN — DULOXETINE HYDROCHLORIDE 30 MG: 30 CAPSULE, DELAYED RELEASE ORAL at 21:00

## 2023-11-10 RX ADMIN — ATORVASTATIN CALCIUM 20 MG: 20 TABLET, FILM COATED ORAL at 20:00

## 2023-11-10 RX ADMIN — POLYETHYLENE GLYCOL 3350 17 G: 17 POWDER, FOR SOLUTION ORAL at 08:21

## 2023-11-10 RX ADMIN — ACETAMINOPHEN 650 MG: 325 TABLET ORAL at 19:53

## 2023-11-10 RX ADMIN — TRAZODONE HYDROCHLORIDE 50 MG: 50 TABLET ORAL at 20:00

## 2023-11-10 RX ADMIN — APIXABAN 10 MG: 5 TABLET, FILM COATED ORAL at 08:19

## 2023-11-10 RX ADMIN — Medication 1 TABLET: at 21:00

## 2023-11-10 RX ADMIN — PREGABALIN 200 MG: 75 CAPSULE ORAL at 14:43

## 2023-11-10 RX ADMIN — BENZTROPINE MESYLATE 0.5 MG: 0.5 TABLET ORAL at 20:00

## 2023-11-10 RX ADMIN — LIDOCAINE 1 PATCH: 4 PATCH TOPICAL at 08:21

## 2023-11-10 RX ADMIN — Medication 3 L/MIN: at 20:00

## 2023-11-10 RX ADMIN — NICOTINE 1 PATCH: 14 PATCH, EXTENDED RELEASE TRANSDERMAL at 08:22

## 2023-11-10 RX ADMIN — METOPROLOL TARTRATE 25 MG: 25 TABLET, FILM COATED ORAL at 08:20

## 2023-11-10 RX ADMIN — BENZTROPINE MESYLATE 0.5 MG: 0.5 TABLET ORAL at 08:20

## 2023-11-10 RX ADMIN — APIXABAN 10 MG: 5 TABLET, FILM COATED ORAL at 20:02

## 2023-11-10 RX ADMIN — IPRATROPIUM BROMIDE AND ALBUTEROL SULFATE 3 ML: 2.5; .5 SOLUTION RESPIRATORY (INHALATION) at 20:13

## 2023-11-10 RX ADMIN — IPRATROPIUM BROMIDE AND ALBUTEROL SULFATE 3 ML: 2.5; .5 SOLUTION RESPIRATORY (INHALATION) at 14:34

## 2023-11-10 RX ADMIN — PREGABALIN 200 MG: 75 CAPSULE ORAL at 21:00

## 2023-11-10 ASSESSMENT — COGNITIVE AND FUNCTIONAL STATUS - GENERAL
MOVING FROM LYING ON BACK TO SITTING ON SIDE OF FLAT BED WITH BEDRAILS: A LITTLE
TURNING FROM BACK TO SIDE WHILE IN FLAT BAD: A LITTLE
TOILETING: A LOT
HELP NEEDED FOR BATHING: A LITTLE
DRESSING REGULAR LOWER BODY CLOTHING: A LOT
CLIMB 3 TO 5 STEPS WITH RAILING: A LOT
TOILETING: A LOT
MOBILITY SCORE: 15
MOVING TO AND FROM BED TO CHAIR: A LITTLE
WALKING IN HOSPITAL ROOM: A LOT
DRESSING REGULAR LOWER BODY CLOTHING: A LITTLE
DAILY ACTIVITIY SCORE: 20
STANDING UP FROM CHAIR USING ARMS: A LOT
HELP NEEDED FOR BATHING: A LITTLE
DAILY ACTIVITIY SCORE: 19

## 2023-11-10 ASSESSMENT — PAIN - FUNCTIONAL ASSESSMENT
PAIN_FUNCTIONAL_ASSESSMENT: 0-10

## 2023-11-10 ASSESSMENT — PAIN SCALES - GENERAL
PAINLEVEL_OUTOF10: 2
PAINLEVEL_OUTOF10: 8
PAINLEVEL_OUTOF10: 8

## 2023-11-10 ASSESSMENT — ACTIVITIES OF DAILY LIVING (ADL)
BATHING_LEVEL_OF_ASSISTANCE: CONTACT GUARD
BATHING_WHERE_ASSESSED: STANDING SINKSIDE

## 2023-11-10 NOTE — PROGRESS NOTES
Physical Therapy                 Therapy Communication Note    Patient Name: Gaurav Mckay  MRN: [id number]  Today's Date: 11/10/2023     Discipline: Physical Therapy    Missed Visit Reason: Missed Visit Reason: Patient refused (Patient declined therapy at this time. Patient stated he was too tired as he just worked with OT getting out of bed and getting self dressed. Attempt @ 1206)    Missed Time: Attempt    Comment:

## 2023-11-10 NOTE — PROGRESS NOTES
11/10/23 1538   Discharge Planning   Living Arrangements Spouse/significant other   Support Systems Spouse/significant other;Family members;Friends/neighbors   Assistance Needed Patient is from home with significant other, A&O X3 and uses no DME at baseline. Patient is currently requiring O2 which is ACUTE. Per patient he is on room air at home. Patient does not drive.   Type of Residence Private residence   Number of Stairs to Enter Residence 4   Number of Stairs Within Residence 0   Who is requesting discharge planning? Provider   Home or Post Acute Services Other (Comment)  (TBD- HHC VS SNF)   Patient expects to be discharged to: TBD   Does the patient need discharge transport arranged? Yes   RoundTrip coordination needed? Yes   Has discharge transport been arranged? No

## 2023-11-10 NOTE — PROGRESS NOTES
Occupational Therapy    Occupational Therapy Treatment    Name: Gaurav Mckay  MRN: [id number]  : 1970  Date: 11/10/23  Time Calculation  Start Time: 1116  Stop Time: 1143  Time Calculation (min): 27 min  Plan:  Treatment Interventions: ADL retraining, Functional transfer training, Endurance training  OT Frequency: 3 times per week  OT Discharge Recommendations: Moderate intensity level of continued care    Subjective   General:  OT Last Visit  OT Received On: 11/10/23  General  Reason for Referral: Pt is a 52 y/o male who was admitted for acute massive pulmonary embolism. Pt underwent pulmonary embolectomy bilaterally; bilateral pulmonary angiography  Referred By:  (Dr. NICK Medina)  Past Medical History Relevant to Rehab: HTN, HLD, laryngeal CA s/p radiation, bipolar  Family/Caregiver Present: No  Prior to Session Communication: Bedside nurse  Patient Position Received: Up in chair  General Comment: Pt pleasent and agreeable to therapy this date, pt SOB and dizzy when standing, no significant changes in vital signs     Pain Assessment:  Pain Assessment  Pain Assessment: 0-10  Pain Score: 8  Pain Type: Acute pain  Pain Location: Groin  Pain Interventions: Repositioned     Objective   Activities of Daily Living: Grooming  Grooming Level of Assistance: Independent  Grooming Where Assessed: Chair  Grooming Comments: completed oral hygiene    UE Bathing  UE Bathing Level of Assistance: Independent  UE Bathing Where Assessed: Sitting sinkside  UE Bathing Comments: washed BUE, chest, back with soap and water    LE Bathing  LE Bathing Level of Assistance: Contact guard  LE Bathing Where Assessed: Standing sinkside  LE Bathing Comments: stood at WW to complete BLE and front/rear sanjay hygiene, pt dizzy after ~2 minutes and rest breaks required. assist for safety         LE Dressing  LE Dressing: Yes  Pants Level of Assistance: Contact guard  LE Dressing Where Assessed: Chair  LE Dressing Comments: forward flexion  to thread feet through, CG when standing to arrange over hips with dizziness upon standing, increased time needed    Functional Standing Tolerance:  Functional Standing Tolerance  Time: 3 minutes  Activity: when completing ADL  Functional Standing Tolerance Comments: Pt dizzy and required rest breaks, no significant changes in vital signs  Bed Mobility/Transfers:      Transfers  Transfer: Yes  Transfer 1  Transfer From 1: Sit to  Transfer to 1: Stand  Transfer Device 1: Walker  Transfer Level of Assistance 1: Contact guard  Trials/Comments 1: multiple trials for ADL, CG for safety; demo'd good balance    Strength:  Strength  Strength Comments: ERYN UE's: 5/5    Outcome Measures:  [facility] Daily Activity  Putting on and taking off regular lower body clothing: A little  Bathing (including washing, rinsing, drying): A little  Putting on and taking off regular upper body clothing: None  Toileting, which includes using toilet, bedpan or urinal: A lot  Taking care of personal grooming such as brushing teeth: None  Eating Meals: None  Daily Activity - Total Score: 20        Education Documentation  Handouts, taught by Isauro Handy OT at 11/10/2023 12:06 PM.  Learner: Patient  Readiness: Acceptance  Method: Explanation  Response: Verbalizes Understanding    Body Mechanics, taught by Isauro Handy OT at 11/10/2023 12:06 PM.  Learner: Patient  Readiness: Acceptance  Method: Explanation  Response: Verbalizes Understanding    Precautions, taught by Isauro Handy OT at 11/10/2023 12:06 PM.  Learner: Patient  Readiness: Acceptance  Method: Explanation  Response: Verbalizes Understanding    Home Exercise Program, taught by Isauro Handy OT at 11/10/2023 12:06 PM.  Learner: Patient  Readiness: Acceptance  Method: Explanation  Response: Verbalizes Understanding    ADL Training, taught by Isauro Handy OT at 11/10/2023 12:06 PM.  Learner: Patient  Readiness: Acceptance  Method: Explanation  Response:  Verbalizes Understanding    Education Comments  No comments found.      Goals:  Encounter Problems       Encounter Problems (Active)       Balance       Pt will demo good static/dynamic standing balance during ADL and functional activity with LRAD for improved independence and participation in ADL  (Progressing)       Start:  11/09/23    Expected End:  11/23/23               Dressings Lower Extremities       Patient to complete lower body dressing with LRAD at mod I  (Progressing)       Start:  11/09/23    Expected End:  11/23/23               Endurance       Pt will increase endurance to tolerate 10 min of activity with no more than 1 rest break in order to increase ability to engage in ADL completion.  (Progressing)       Start:  11/09/23    Expected End:  11/23/23                       Mobility       Pt will demo increased functional mobility to tolerate tasks necessary to complete ADL routine with LRAD at mod I  (Progressing)       Start:  11/09/23    Expected End:  11/23/23               Toileting       Patient will complete toileting tasks with LRAD at mod I  (Progressing)       Start:  11/09/23    Expected End:  11/23/23            Patient to increase standing tolerance with LRAD >5 minutes (Progressing)       Start:  11/09/23    Expected End:  11/23/23                 Transfers       Pt to demonstrate transfers with LRAD at mod I  (Progressing)       Start:  11/09/23    Expected End:  11/23/23

## 2023-11-10 NOTE — CARE PLAN
The patient's goals for the shift include      The clinical goals for the shift include pt. vitals will remain hemodynamically stable

## 2023-11-10 NOTE — PROGRESS NOTES
Shona Mcbride is a 53 y.o. male on day 2 of admission presenting with Acute massive pulmonary embolism (CMS/HCC).      Subjective   Patient seen and evaluated today. He has no new complaints. Denies chest pain and evaluation of femoral access shows no evidence of bleeding. He is on minimal O2 requirements likely in the setting of COPD     Objective     Last Recorded Vitals  /84 (BP Location: Right arm, Patient Position: Lying)   Pulse 84   Temp 36.3 °C (97.3 °F) (Temporal)   Resp 21   Wt 123 kg (270 lb 4.5 oz)   SpO2 93%   Intake/Output last 3 Shifts:    Intake/Output Summary (Last 24 hours) at 11/10/2023 1108  Last data filed at 11/10/2023 0900  Gross per 24 hour   Intake 1038.25 ml   Output 2300 ml   Net -1261.75 ml         Admission Weight  Weight: 127 kg (279 lb 1.6 oz) (11/08/23 1410)    Daily Weight  11/10/23 : 123 kg (270 lb 4.5 oz)    Image Results  XR ankle  Narrative: Interpreted By:  DWIGHT GARZA MD  MRN: [id number]  Patient Name: SHONA MCBRIDE     STUDY:  ANKLE, COMPLETE, MIN 3 VIEWS; Left;  7/28/2023 3:41 pm     INDICATION:  ankle pain, previous surgery with hardware .     COMPARISON:  02/24/2021     ACCESSION NUMBER(S):  [id number]     ORDERING CLINICIAN:  LAZ CERVANTES     FINDINGS:  Interval post ORIF of the comminuted distal fibular and medial tibial  fractures, there is improved alignment of the fracture fragments.  Soft tissue swelling around the ankle. Mild degenerative changes in  the ankle joint. No acute fractures noted. Mild subcutaneous edema in  the distal leg.     Impression: No acute fracture, moderate soft tissue swelling around the ankle  joint.        MACRO:  None    Results for orders placed or performed during the hospital encounter of 11/08/23 (from the past 24 hour(s))   Renal Function Panel   Result Value Ref Range    Glucose 107 (H) 74 - 99 mg/dL    Sodium 139 136 - 145 mmol/L    Potassium 3.8 3.5 - 5.3 mmol/L    Chloride 104 98 - 107 mmol/L    Bicarbonate 24 21  - 32 mmol/L    Anion Gap 15 10 - 20 mmol/L    Urea Nitrogen 17 6 - 23 mg/dL    Creatinine 1.01 0.50 - 1.30 mg/dL    eGFR 89 >60 mL/min/1.73m*2    Calcium 9.5 8.6 - 10.3 mg/dL    Phosphorus 4.7 2.5 - 4.9 mg/dL    Albumin 3.8 3.4 - 5.0 g/dL   CBC   Result Value Ref Range    WBC 9.7 4.4 - 11.3 x10*3/uL    nRBC 0.0 0.0 - 0.0 /100 WBCs    RBC 4.69 4.50 - 5.90 x10*6/uL    Hemoglobin 13.6 13.5 - 17.5 g/dL    Hematocrit 41.6 41.0 - 52.0 %    MCV 89 80 - 100 fL    MCH 29.0 26.0 - 34.0 pg    MCHC 32.7 32.0 - 36.0 g/dL    RDW 14.8 (H) 11.5 - 14.5 %    Platelets 171 150 - 450 x10*3/uL   Magnesium   Result Value Ref Range    Magnesium 1.89 1.60 - 2.40 mg/dL      Physical Exam  Constitutional:       Appearance: Normal appearance.   Cardiovascular:      Rate and Rhythm: Normal rate and regular rhythm.      Pulses: Normal pulses.      Heart sounds: Normal heart sounds.   Pulmonary:      Breath sounds: Normal breath sounds.   Abdominal:      Palpations: Abdomen is soft.   Skin:     General: Skin is dry.   Neurological:      General: No focal deficit present.      Mental Status: He is alert and oriented to person, place, and time.       Assessment/Plan   53-year-old male with PMHx of HTN, HLD, COPD, EtOH abuse, laryngeal cancer status post radiation.  Who presented initially to OSH ([facility]) on account of shortness of breath, which has been diagnosed as saddle PE s/p CT angio.  Patient is now transferred for further treatment and evaluation for catheter thrombectomy.     Neurology  #BiPolar Disorder   #Anxiety Disorder  - Will continue risperidone 1mg BID  - Continue trazodone 50mg at bedtime   - Continue seroquel 25mg at bedtime       #Nicotine/alcohol use disorder  - No need for CIWA protocols at this time   - patient is a chronic smoker with 20pckyr hx  - Nicoderm patch 7mg/24 hours      #Restless leg Syndrome   - Continue ropinirole 0.5mg      Cardiology   #Hyperlipidemia  - Continue atorvastatin 20mg daily      #HTN  - Continue  metoprolol 25mg daily      Pulmonology   #Status post suction thrombectomy for saddle PE   - Patient has continued to remain stable post off  - Continue patient on eliquis 10mg Bid for 7 days then 5mg BID after   - Patient will be set up with cardiology on outpatient basis      #COPD  -Continue DuoNebs every 4 as needed  -Continue INO2 to keep SPO2 greater than 88%.  -Continue incentive spirometry    Gastroenterology  #GERD  -Continue pantoprazole at 40 mg daily.     #Nutrition  -Diet low calorie   -We will obtain dietitian consult for patient dietary counseling for obesity      Renal   No active issue      Heme/Onc  #Esophageal cancer s/p radiation therapy  -s/p radiation therapy with last session being June 2022  -Follow up with ENT/HemeOnc outpatient      MSK  - no active issues      Fluid: oral  Electrolyte: Monitor and replete  Nutrition: diet low ambrosio   Gippx: Pantoprazole  DVTppx: Eliquis 10mg BID   Access: PIV  Code Status: Full     Dispo: Patient will be transferred to the floors for further management   Gabriel Medina MD

## 2023-11-11 PROCEDURE — 2500000005 HC RX 250 GENERAL PHARMACY W/O HCPCS

## 2023-11-11 PROCEDURE — 94640 AIRWAY INHALATION TREATMENT: CPT

## 2023-11-11 PROCEDURE — 2500000004 HC RX 250 GENERAL PHARMACY W/ HCPCS (ALT 636 FOR OP/ED)

## 2023-11-11 PROCEDURE — 2500000001 HC RX 250 WO HCPCS SELF ADMINISTERED DRUGS (ALT 637 FOR MEDICARE OP)

## 2023-11-11 PROCEDURE — 2500000004 HC RX 250 GENERAL PHARMACY W/ HCPCS (ALT 636 FOR OP/ED): Performed by: INTERNAL MEDICINE

## 2023-11-11 PROCEDURE — S4991 NICOTINE PATCH NONLEGEND: HCPCS | Performed by: INTERNAL MEDICINE

## 2023-11-11 PROCEDURE — 2500000002 HC RX 250 W HCPCS SELF ADMINISTERED DRUGS (ALT 637 FOR MEDICARE OP, ALT 636 FOR OP/ED): Performed by: INTERNAL MEDICINE

## 2023-11-11 PROCEDURE — 1200000002 HC GENERAL ROOM WITH TELEMETRY DAILY

## 2023-11-11 PROCEDURE — 2500000001 HC RX 250 WO HCPCS SELF ADMINISTERED DRUGS (ALT 637 FOR MEDICARE OP): Performed by: INTERNAL MEDICINE

## 2023-11-11 PROCEDURE — 99233 SBSQ HOSP IP/OBS HIGH 50: CPT | Performed by: INTERNAL MEDICINE

## 2023-11-11 RX ORDER — POTASSIUM CHLORIDE 750 MG/1
10 TABLET, FILM COATED, EXTENDED RELEASE ORAL ONCE
Status: COMPLETED | OUTPATIENT
Start: 2023-11-11 | End: 2023-11-11

## 2023-11-11 RX ADMIN — QUETIAPINE FUMARATE 25 MG: 25 TABLET ORAL at 21:27

## 2023-11-11 RX ADMIN — POTASSIUM CHLORIDE 10 MEQ: 750 TABLET, FILM COATED, EXTENDED RELEASE ORAL at 15:23

## 2023-11-11 RX ADMIN — BENZTROPINE MESYLATE 0.5 MG: 0.5 TABLET ORAL at 21:26

## 2023-11-11 RX ADMIN — BENZTROPINE MESYLATE 0.5 MG: 0.5 TABLET ORAL at 08:16

## 2023-11-11 RX ADMIN — NICOTINE 1 PATCH: 14 PATCH, EXTENDED RELEASE TRANSDERMAL at 08:55

## 2023-11-11 RX ADMIN — Medication 1 TABLET: at 20:59

## 2023-11-11 RX ADMIN — CYCLOBENZAPRINE 10 MG: 10 TABLET, FILM COATED ORAL at 21:26

## 2023-11-11 RX ADMIN — IPRATROPIUM BROMIDE AND ALBUTEROL SULFATE 3 ML: 2.5; .5 SOLUTION RESPIRATORY (INHALATION) at 08:31

## 2023-11-11 RX ADMIN — LIDOCAINE 1 PATCH: 4 PATCH TOPICAL at 08:55

## 2023-11-11 RX ADMIN — DULOXETINE HYDROCHLORIDE 90 MG: 30 CAPSULE, DELAYED RELEASE ORAL at 08:14

## 2023-11-11 RX ADMIN — IPRATROPIUM BROMIDE AND ALBUTEROL SULFATE 3 ML: 2.5; .5 SOLUTION RESPIRATORY (INHALATION) at 19:42

## 2023-11-11 RX ADMIN — PREGABALIN 200 MG: 75 CAPSULE ORAL at 08:15

## 2023-11-11 RX ADMIN — PREGABALIN 200 MG: 75 CAPSULE ORAL at 15:16

## 2023-11-11 RX ADMIN — APIXABAN 10 MG: 5 TABLET, FILM COATED ORAL at 08:15

## 2023-11-11 RX ADMIN — DULOXETINE HYDROCHLORIDE 30 MG: 30 CAPSULE, DELAYED RELEASE ORAL at 20:59

## 2023-11-11 RX ADMIN — Medication 1 TABLET: at 08:15

## 2023-11-11 RX ADMIN — SENNOSIDES AND DOCUSATE SODIUM 1 TABLET: 8.6; 5 TABLET ORAL at 20:59

## 2023-11-11 RX ADMIN — PANTOPRAZOLE SODIUM 40 MG: 40 TABLET, DELAYED RELEASE ORAL at 08:12

## 2023-11-11 RX ADMIN — PREGABALIN 200 MG: 75 CAPSULE ORAL at 20:59

## 2023-11-11 RX ADMIN — APIXABAN 10 MG: 5 TABLET, FILM COATED ORAL at 20:59

## 2023-11-11 RX ADMIN — POLYETHYLENE GLYCOL 3350 17 G: 17 POWDER, FOR SOLUTION ORAL at 08:13

## 2023-11-11 RX ADMIN — ATORVASTATIN CALCIUM 20 MG: 20 TABLET, FILM COATED ORAL at 21:00

## 2023-11-11 RX ADMIN — METOPROLOL TARTRATE 25 MG: 25 TABLET, FILM COATED ORAL at 08:15

## 2023-11-11 RX ADMIN — TRAZODONE HYDROCHLORIDE 50 MG: 50 TABLET ORAL at 21:00

## 2023-11-11 ASSESSMENT — PAIN SCALES - GENERAL
PAINLEVEL_OUTOF10: 0 - NO PAIN
PAINLEVEL_OUTOF10: 8

## 2023-11-11 ASSESSMENT — PAIN - FUNCTIONAL ASSESSMENT
PAIN_FUNCTIONAL_ASSESSMENT: 0-10
PAIN_FUNCTIONAL_ASSESSMENT: 0-10

## 2023-11-11 NOTE — PROGRESS NOTES
11-11-23 @ 1431: OT recommending moderate skilled therapy at [place], patient did not work with PT today. Patient is agreeable to SNF. Patient selected 1. [facility] for Rehab and 2. [facility]. Referrals being sent. Will need precert started once accepted.

## 2023-11-11 NOTE — PROGRESS NOTES
Shona Mcbride is a 53 y.o. male on day 3 of admission presenting with Acute massive pulmonary embolism (CMS/HCC).      Subjective   Patient seen and evaluated today. He has no new complaints. Ambulatory O2 evaluation done today. SPO2 in the 90s on RA     Objective     Last Recorded Vitals  /85 (BP Location: Right arm, Patient Position: Lying)   Pulse 84   Temp 36.3 °C (97.3 °F) (Temporal)   Resp 18   Wt 126 kg (277 lb 1.9 oz)   SpO2 90%   Intake/Output last 3 Shifts:    Intake/Output Summary (Last 24 hours) at 11/11/2023 1427  Last data filed at 11/11/2023 0900  Gross per 24 hour   Intake 240 ml   Output 1500 ml   Net -1260 ml         Admission Weight  Weight: 127 kg (279 lb 1.6 oz) (11/08/23 1410)    Daily Weight  11/11/23 : 126 kg (277 lb 1.9 oz)    Image Results  XR ankle  Narrative: Interpreted By:  DWIGHT GARZA MD  MRN: [id number]  Patient Name: SHONA MCBRIDE     STUDY:  ANKLE, COMPLETE, MIN 3 VIEWS; Left;  7/28/2023 3:41 pm     INDICATION:  ankle pain, previous surgery with hardware .     COMPARISON:  02/24/2021     ACCESSION NUMBER(S):  [id number]     ORDERING CLINICIAN:  LAZ CERVANTES     FINDINGS:  Interval post ORIF of the comminuted distal fibular and medial tibial  fractures, there is improved alignment of the fracture fragments.  Soft tissue swelling around the ankle. Mild degenerative changes in  the ankle joint. No acute fractures noted. Mild subcutaneous edema in  the distal leg.     Impression: No acute fracture, moderate soft tissue swelling around the ankle  joint.        MACRO:  None    No results found for this or any previous visit (from the past 24 hour(s)).     Physical Exam  Constitutional:       Appearance: Normal appearance.   Cardiovascular:      Rate and Rhythm: Normal rate and regular rhythm.      Pulses: Normal pulses.      Heart sounds: Normal heart sounds.   Pulmonary:      Breath sounds: Normal breath sounds.   Abdominal:      Palpations: Abdomen is soft.   Skin:      General: Skin is dry.   Neurological:      General: No focal deficit present.      Mental Status: He is alert and oriented to person, place, and time.       Assessment/Plan   53-year-old male with PMHx of HTN, HLD, COPD, EtOH abuse, laryngeal cancer status post radiation.  Who presented initially to OSH ([facility]) on account of shortness of breath, which has been diagnosed as saddle PE s/p CT angio.  Patient is now transferred for further treatment and evaluation for catheter thrombectomy.     Neurology  #BiPolar Disorder   #Anxiety Disorder  - Will continue risperidone 1mg BID  - Continue trazodone 50mg at bedtime   - Continue seroquel 25mg at bedtime       #Nicotine/alcohol use disorder  - No need for CIWA protocols at this time   - patient is a chronic smoker with 20pckyr hx  - Nicoderm patch 7mg/24 hours      #Restless leg Syndrome   - Continue ropinirole 0.5mg      Cardiology   #Hyperlipidemia  - Continue atorvastatin 20mg daily      #HTN  - Continue metoprolol 25mg daily      Pulmonology   #Status post suction thrombectomy for saddle PE   - Patient has continued to remain stable post off  - Continue patient on eliquis 10mg Bid for 7 days then 5mg BID after   - Patient will be set up with cardiology on outpatient basis      #COPD  -Continue DuoNebs every 4 as needed  -Continue INO2 to keep SPO2 greater than 88%.  -Continue incentive spirometry    Gastroenterology  #GERD  -Continue pantoprazole at 40 mg daily.     #Nutrition  -Diet low calorie   -We will obtain dietitian consult for patient dietary counseling for obesity      Renal   No active issue      Heme/Onc  #Esophageal cancer s/p radiation therapy  -s/p radiation therapy with last session being June 2022  -Follow up with ENT/HemeOnc outpatient      MSK  - no active issues      Fluid: oral  Electrolyte: Monitor and replete  Nutrition: diet low ambrosio   Gippx: Pantoprazole  DVTppx: Eliquis 10mg BID   Access: PIV  Code Status: Full     Dispo: ICU for saddle PE.  Pending transfer to the floor.Pending SNF placement.     Susanne Chaudhari MD

## 2023-11-11 NOTE — CARE PLAN
The patient's goals for the shift include      The clinical goals for the shift include patient will increase mobiity by end of shift    Over the shift, the patient did not make progress toward the following goals. Barriers to progression include prolonged bedrest. Recommendations to address these barriers include involve pt/ot.

## 2023-11-11 NOTE — DISCHARGE INSTRUCTIONS
Please take your medications as instructed at time of hospital discharge.     Instructions at Discharge:  -Please take Apixaban (eliquis) 5 mg twice a day. This is a blood thinner to prevent another blood clot.   -please follow-up with your cardiologist     Please follow-up with your primary care provider within 5-7 days from time of discharge.   If you experience any worsening symptoms or any new acute concerns arise, please contact your primary care provider to discuss and possibly arrange an appointment. If you cannot get in touch with provider or severe symptoms are present, please return to nearest emergency room/urgent care for evaluation and treatment..

## 2023-11-12 LAB
ALBUMIN SERPL BCP-MCNC: 3.7 G/DL (ref 3.4–5)
ANION GAP SERPL CALC-SCNC: 14 MMOL/L (ref 10–20)
BUN SERPL-MCNC: 19 MG/DL (ref 6–23)
CALCIUM SERPL-MCNC: 9.8 MG/DL (ref 8.6–10.3)
CHLORIDE SERPL-SCNC: 102 MMOL/L (ref 98–107)
CO2 SERPL-SCNC: 26 MMOL/L (ref 21–32)
CREAT SERPL-MCNC: 1.02 MG/DL (ref 0.5–1.3)
ERYTHROCYTE [DISTWIDTH] IN BLOOD BY AUTOMATED COUNT: 14.5 % (ref 11.5–14.5)
GFR SERPL CREATININE-BSD FRML MDRD: 88 ML/MIN/1.73M*2
GLUCOSE SERPL-MCNC: 130 MG/DL (ref 74–99)
HCT VFR BLD AUTO: 41.1 % (ref 41–52)
HGB BLD-MCNC: 13.6 G/DL (ref 13.5–17.5)
MAGNESIUM SERPL-MCNC: 1.71 MG/DL (ref 1.6–2.4)
MCH RBC QN AUTO: 29.2 PG (ref 26–34)
MCHC RBC AUTO-ENTMCNC: 33.1 G/DL (ref 32–36)
MCV RBC AUTO: 88 FL (ref 80–100)
NRBC BLD-RTO: 0 /100 WBCS (ref 0–0)
PHOSPHATE SERPL-MCNC: 4.4 MG/DL (ref 2.5–4.9)
PLATELET # BLD AUTO: 204 X10*3/UL (ref 150–450)
POTASSIUM SERPL-SCNC: 3.8 MMOL/L (ref 3.5–5.3)
RBC # BLD AUTO: 4.65 X10*6/UL (ref 4.5–5.9)
SODIUM SERPL-SCNC: 138 MMOL/L (ref 136–145)
WBC # BLD AUTO: 11.2 X10*3/UL (ref 4.4–11.3)

## 2023-11-12 PROCEDURE — 2500000002 HC RX 250 W HCPCS SELF ADMINISTERED DRUGS (ALT 637 FOR MEDICARE OP, ALT 636 FOR OP/ED): Performed by: INTERNAL MEDICINE

## 2023-11-12 PROCEDURE — 9420000001 HC RT PATIENT EDUCATION 5 MIN

## 2023-11-12 PROCEDURE — 2500000001 HC RX 250 WO HCPCS SELF ADMINISTERED DRUGS (ALT 637 FOR MEDICARE OP)

## 2023-11-12 PROCEDURE — 85027 COMPLETE CBC AUTOMATED: CPT

## 2023-11-12 PROCEDURE — 97116 GAIT TRAINING THERAPY: CPT | Mod: GP

## 2023-11-12 PROCEDURE — 2500000004 HC RX 250 GENERAL PHARMACY W/ HCPCS (ALT 636 FOR OP/ED): Performed by: INTERNAL MEDICINE

## 2023-11-12 PROCEDURE — 83735 ASSAY OF MAGNESIUM: CPT

## 2023-11-12 PROCEDURE — 80069 RENAL FUNCTION PANEL: CPT

## 2023-11-12 PROCEDURE — 94640 AIRWAY INHALATION TREATMENT: CPT

## 2023-11-12 PROCEDURE — 36415 COLL VENOUS BLD VENIPUNCTURE: CPT

## 2023-11-12 PROCEDURE — S4991 NICOTINE PATCH NONLEGEND: HCPCS | Performed by: INTERNAL MEDICINE

## 2023-11-12 PROCEDURE — 2500000001 HC RX 250 WO HCPCS SELF ADMINISTERED DRUGS (ALT 637 FOR MEDICARE OP): Performed by: INTERNAL MEDICINE

## 2023-11-12 PROCEDURE — 2500000005 HC RX 250 GENERAL PHARMACY W/O HCPCS

## 2023-11-12 PROCEDURE — 99233 SBSQ HOSP IP/OBS HIGH 50: CPT | Performed by: INTERNAL MEDICINE

## 2023-11-12 PROCEDURE — 1200000002 HC GENERAL ROOM WITH TELEMETRY DAILY

## 2023-11-12 PROCEDURE — 97110 THERAPEUTIC EXERCISES: CPT | Mod: GP

## 2023-11-12 PROCEDURE — 2500000004 HC RX 250 GENERAL PHARMACY W/ HCPCS (ALT 636 FOR OP/ED)

## 2023-11-12 RX ADMIN — NICOTINE 1 PATCH: 14 PATCH, EXTENDED RELEASE TRANSDERMAL at 10:07

## 2023-11-12 RX ADMIN — SENNOSIDES AND DOCUSATE SODIUM 1 TABLET: 8.6; 5 TABLET ORAL at 20:47

## 2023-11-12 RX ADMIN — BENZTROPINE MESYLATE 0.5 MG: 0.5 TABLET ORAL at 20:46

## 2023-11-12 RX ADMIN — IPRATROPIUM BROMIDE AND ALBUTEROL SULFATE 3 ML: 2.5; .5 SOLUTION RESPIRATORY (INHALATION) at 08:01

## 2023-11-12 RX ADMIN — TRAZODONE HYDROCHLORIDE 50 MG: 50 TABLET ORAL at 20:47

## 2023-11-12 RX ADMIN — DULOXETINE HYDROCHLORIDE 30 MG: 30 CAPSULE, DELAYED RELEASE ORAL at 20:48

## 2023-11-12 RX ADMIN — METOPROLOL TARTRATE 25 MG: 25 TABLET, FILM COATED ORAL at 10:09

## 2023-11-12 RX ADMIN — Medication 1 TABLET: at 20:48

## 2023-11-12 RX ADMIN — PREGABALIN 200 MG: 75 CAPSULE ORAL at 20:47

## 2023-11-12 RX ADMIN — PREGABALIN 200 MG: 75 CAPSULE ORAL at 14:34

## 2023-11-12 RX ADMIN — Medication 1 TABLET: at 10:11

## 2023-11-12 RX ADMIN — PREGABALIN 200 MG: 75 CAPSULE ORAL at 10:09

## 2023-11-12 RX ADMIN — DULOXETINE HYDROCHLORIDE 90 MG: 30 CAPSULE, DELAYED RELEASE ORAL at 10:09

## 2023-11-12 RX ADMIN — BENZTROPINE MESYLATE 0.5 MG: 0.5 TABLET ORAL at 10:10

## 2023-11-12 RX ADMIN — PANTOPRAZOLE SODIUM 40 MG: 40 TABLET, DELAYED RELEASE ORAL at 10:09

## 2023-11-12 RX ADMIN — IPRATROPIUM BROMIDE AND ALBUTEROL SULFATE 3 ML: 2.5; .5 SOLUTION RESPIRATORY (INHALATION) at 14:30

## 2023-11-12 RX ADMIN — APIXABAN 10 MG: 5 TABLET, FILM COATED ORAL at 10:08

## 2023-11-12 RX ADMIN — APIXABAN 10 MG: 5 TABLET, FILM COATED ORAL at 20:46

## 2023-11-12 RX ADMIN — ATORVASTATIN CALCIUM 20 MG: 20 TABLET, FILM COATED ORAL at 20:47

## 2023-11-12 RX ADMIN — IPRATROPIUM BROMIDE AND ALBUTEROL SULFATE 3 ML: 2.5; .5 SOLUTION RESPIRATORY (INHALATION) at 19:45

## 2023-11-12 RX ADMIN — POLYETHYLENE GLYCOL 3350 17 G: 17 POWDER, FOR SOLUTION ORAL at 10:08

## 2023-11-12 RX ADMIN — LIDOCAINE 1 PATCH: 4 PATCH TOPICAL at 10:10

## 2023-11-12 ASSESSMENT — PAIN SCALES - GENERAL
PAINLEVEL_OUTOF10: 0 - NO PAIN
PAINLEVEL_OUTOF10: 0 - NO PAIN
PAINLEVEL_OUTOF10: 3

## 2023-11-12 ASSESSMENT — COGNITIVE AND FUNCTIONAL STATUS - GENERAL
WALKING IN HOSPITAL ROOM: A LITTLE
MOBILITY SCORE: 20
STANDING UP FROM CHAIR USING ARMS: A LITTLE
MOVING TO AND FROM BED TO CHAIR: A LITTLE
CLIMB 3 TO 5 STEPS WITH RAILING: A LITTLE

## 2023-11-12 ASSESSMENT — PAIN - FUNCTIONAL ASSESSMENT: PAIN_FUNCTIONAL_ASSESSMENT: 0-10

## 2023-11-12 NOTE — CARE PLAN
Problem: Skin  Goal: Participates in plan/prevention/treatment measures  11/12/2023 1225 by Francine Pugh RN  Outcome: Progressing  11/12/2023 1218 by Francine Pugh RN  Outcome: Progressing     Problem: Skin  Goal: Prevent/manage excess moisture  11/12/2023 1225 by Francine Pugh RN  Outcome: Progressing  11/12/2023 1218 by Francine Pugh RN  Outcome: Progressing   The patient's goals for the shift include      The clinical goals for the shift include pt will continue to increase mobility during this admission    .

## 2023-11-12 NOTE — PROGRESS NOTES
Shona Mcbride is a 53 y.o. male on day 4 of admission presenting with Acute massive pulmonary embolism (CMS/HCC).      Subjective   Patient seen and evaluated, he has no new complaints today.     Objective     Last Recorded Vitals  BP (!) 156/99 (BP Location: Right arm, Patient Position: Lying)   Pulse 79   Temp 35.7 °C (96.3 °F) (Temporal)   Resp 20   Wt 129 kg (283 lb 11.7 oz)   SpO2 92%   Intake/Output last 3 Shifts:    Intake/Output Summary (Last 24 hours) at 11/12/2023 1543  Last data filed at 11/12/2023 1100  Gross per 24 hour   Intake 680 ml   Output 800 ml   Net -120 ml     Results for orders placed or performed during the hospital encounter of 11/08/23 (from the past 24 hour(s))   Magnesium   Result Value Ref Range    Magnesium 1.71 1.60 - 2.40 mg/dL   Renal Function Panel   Result Value Ref Range    Glucose 130 (H) 74 - 99 mg/dL    Sodium 138 136 - 145 mmol/L    Potassium 3.8 3.5 - 5.3 mmol/L    Chloride 102 98 - 107 mmol/L    Bicarbonate 26 21 - 32 mmol/L    Anion Gap 14 10 - 20 mmol/L    Urea Nitrogen 19 6 - 23 mg/dL    Creatinine 1.02 0.50 - 1.30 mg/dL    eGFR 88 >60 mL/min/1.73m*2    Calcium 9.8 8.6 - 10.3 mg/dL    Phosphorus 4.4 2.5 - 4.9 mg/dL    Albumin 3.7 3.4 - 5.0 g/dL   CBC   Result Value Ref Range    WBC 11.2 4.4 - 11.3 x10*3/uL    nRBC 0.0 0.0 - 0.0 /100 WBCs    RBC 4.65 4.50 - 5.90 x10*6/uL    Hemoglobin 13.6 13.5 - 17.5 g/dL    Hematocrit 41.1 41.0 - 52.0 %    MCV 88 80 - 100 fL    MCH 29.2 26.0 - 34.0 pg    MCHC 33.1 32.0 - 36.0 g/dL    RDW 14.5 11.5 - 14.5 %    Platelets 204 150 - 450 x10*3/uL      Admission Weight  Weight: 127 kg (279 lb 1.6 oz) (11/08/23 1410)    Daily Weight  11/12/23 : 129 kg (283 lb 11.7 oz)    Image Results  XR ankle  Narrative: Interpreted By:  DWIGHT GARZA MD  MRN: [id number]  Patient Name: SHONA MCBRIDE     STUDY:  ANKLE, COMPLETE, MIN 3 VIEWS; Left;  7/28/2023 3:41 pm     INDICATION:  ankle pain, previous surgery with hardware .      COMPARISON:  02/24/2021     ACCESSION NUMBER(S):  [id number]     ORDERING CLINICIAN:  LAZ CERVANTES     FINDINGS:  Interval post ORIF of the comminuted distal fibular and medial tibial  fractures, there is improved alignment of the fracture fragments.  Soft tissue swelling around the ankle. Mild degenerative changes in  the ankle joint. No acute fractures noted. Mild subcutaneous edema in  the distal leg.     Impression: No acute fracture, moderate soft tissue swelling around the ankle  joint.        MACRO:  None      Physical Exam  Constitutional:       Appearance: Normal appearance.   Cardiovascular:      Rate and Rhythm: Normal rate and regular rhythm.      Pulses: Normal pulses.      Heart sounds: Normal heart sounds.   Pulmonary:      Breath sounds: Normal breath sounds.   Abdominal:      Palpations: Abdomen is soft.   Skin:     General: Skin is warm and dry.   Neurological:      General: No focal deficit present.      Mental Status: He is alert and oriented to person, place, and time.   Psychiatric:         Mood and Affect: Mood normal.         Behavior: Behavior normal.       Assessment/Plan   53-year-old male with PMHx of HTN, HLD, COPD, EtOH abuse, laryngeal cancer status post radiation.  Who presented initially to OSH ([facility]) on account of shortness of breath, which has been diagnosed as saddle PE s/p CT angio.  Patient is now transferred for further treatment and evaluation for catheter thrombectomy.     Neurology  #BiPolar Disorder   #Anxiety Disorder  - Will continue risperidone 1mg BID  - Continue trazodone 50mg at bedtime   - Continue seroquel 25mg at bedtime       #Nicotine/alcohol use disorder  - No need for CIWA protocols at this time   - patient is a chronic smoker with 20pckyr hx  - Nicoderm patch 7mg/24 hours      #Restless leg Syndrome   - Continue ropinirole 0.5mg      Cardiology   #Hyperlipidemia  - Continue atorvastatin 20mg daily      #HTN  - Continue metoprolol 25mg daily      Pulmonology    #Status post suction thrombectomy for saddle PE   - Patient has continued to remain stable post off  - Continue patient on eliquis 10mg Bid for 7 days (11/9 -11/16) then 5mg BID after   - Patient will be set up with cardiology on outpatient basis      #COPD  -Continue DuoNebs every 4 as needed  -Continue INO2 to keep SPO2 greater than 88%.  -Continue incentive spirometry     Gastroenterology  #GERD  -Continue pantoprazole at 40 mg daily.     #Nutrition  -Diet low calorie   -We will obtain dietitian consult for patient dietary counseling for obesity      Renal   No active issue      Heme/Onc  #Esophageal cancer s/p radiation therapy  -s/p radiation therapy with last session being June 2022  -Follow up with ENT/HemeOnc outpatient      MSK  - no active issues      Fluid: oral  Electrolyte: Monitor and replete  Nutrition: diet low ambrosio   Gippx: Pantoprazole  DVTppx: Eliquis 10mg BID   Access: PIV  Code Status: Full     Dispo: ICU for saddle PE. Pending transfer to the floor.Pending SNF placement.       Gabriel Medina MD

## 2023-11-12 NOTE — PROGRESS NOTES
Physical Therapy    Physical Therapy Treatment    Patient Name: Gaurav Mckay  MRN: [id number]  Today's Date: 11/12/2023  Time Calculation  Start Time: 1434  Stop Time: 1459  Time Calculation (min): 25 min       Assessment/Plan   PT Assessment  PT Assessment Results: Obesity  Rehab Prognosis: Good  Medical Staff Made Aware: Yes  Strengths: Ability to acquire knowledge  End of Session Communication: Bedside nurse  End of Session Patient Position: Bed, 3 rail up, Alarm off, not on at start of session  PT Plan  Inpatient/Swing Bed or Outpatient: Inpatient  PT Plan  Treatment/Interventions: Bed mobility, Transfer training, Gait training, Strengthening  PT Plan: Skilled PT  PT Frequency: 3 times per week  PT Discharge Recommendations: Low intensity level of continued care  PT - OK to Discharge: Yes (progress made towards goals)      General Visit Information:   PT  Visit  PT Received On: 11/12/23  Response to Previous Treatment: Patient with no complaints from previous session.  General  Reason for Referral: 54 yo male [facility]  Referred By:  (Dr. NICK Medina)  Past Medical History Relevant to Rehab:  (HTN, HLD, laryngeal CA s/p radiation, bipolar)  Prior to Session Communication: Bedside nurse  Patient Position Received: Bed, 3 rail up, Alarm off, not on at start of session  General Comment:  (Pt cleared for PT by RN. Pt supine and agreable to work with PT. Pt able to ambulate around the ICU floor and perfome strengthening ther ex. Based on Pt's increased functional status, recommend LOW follow up services)    Subjective   Precautions:  Precautions  Medical Precautions: Fall precautions  Vital Signs:  Vital Signs  Heart Rate: 79  SpO2: 92 %    Objective   Pain:  Pain Assessment  Pain Assessment: 0-10  Cognition:  Cognition  Overall Cognitive Status: Within Functional Limits (A+O x 4)  Postural Control:     Extremity/Trunk Assessments:  RUE   RUE : Within Functional  Limits  LUE   LUE: Within Functional Limits  RLE   RLE : Within Functional Limits  LLE   LLE : Within Functional Limits  Activity Tolerance:  Activity Tolerance  Endurance: Endurance does not limit participation in activity  Treatments:  Therapeutic Exercise  Therapeutic Exercise Performed: Yes  Therapeutic Exercise Activity 1:  (pt performed 10 sit<>stand functional strengthening trials. Standing exercises; squats, marching, B hip abd and B hips extension each x 20 reps)    Bed Mobility  Bed Mobility: Yes  Bed Mobility 1  Bed Mobility 1: Supine to sitting, Sitting to supine  Level of Assistance 1: Distant supervision    Ambulation/Gait Training  Ambulation/Gait Training Performed: Yes  Ambulation/Gait Training 1  Surface 1: Level tile  Device 1: No device  Assistance 1: Close supervision  Comments/Distance (ft) 1: 200 feet  Transfers  Transfer: Yes  Transfer 1  Transfer From 1: Bed to  Transfer to 1: Stand  Technique 1: Sit to stand, Stand to sit  Transfer Device 1:  (no device)  Transfer Level of Assistance 1: Close supervision    Outcome Measures:  [facility] Basic Mobility  Turning from your back to your side while in a flat bed without using bedrails: None  Moving from lying on your back to sitting on the side of a flat bed without using bedrails: None  Moving to and from bed to chair (including a wheelchair): A little  Standing up from a chair using your arms (e.g. wheelchair or bedside chair): A little  To walk in hospital room: A little  Climbing 3-5 steps with railing: A little  Basic Mobility - Total Score: 20    Education Documentation  No documentation found.  Education Comments  No comments found.        OP EDUCATION:  Education  Individual(s) Educated: Patient  Education Provided: Body Mechanics (bed mobility, transfers, ambulation and theraputic exercises)  Patient Response to Education: Patient/Caregiver Performed Return Demonstration of Exercises/Activities    Encounter Problems       Encounter Problems  (Active)       Balance       Pt will demo good static/dynamic standing balance during ADL and functional activity with LRAD for improved independence and participation in ADL  (Progressing)       Start:  11/09/23    Expected End:  11/11/23               Mobility       STG - Patient will ambulate 150 feet MOD I with LRD (Progressing)       Start:  11/09/23    Expected End:  11/11/23               Mobility       Pt will demo increased functional mobility to tolerate tasks necessary to complete ADL routine with LRAD at mod I  (Progressing)       Start:  11/09/23    Expected End:  11/11/23               Pain - Adult          Transfers       STG - Patient to transfer to and from sit to supine independently (Progressing)       Start:  11/09/23    Expected End:  11/11/23            STG - Patient will transfer sit to and from stand MOD I with LRD (Progressing)       Start:  11/09/23    Expected End:  11/11/23               Transfers       Pt to demonstrate transfers with LRAD at mod I  (Progressing)       Start:  11/09/23    Expected End:  11/11/23

## 2023-11-12 NOTE — CARE PLAN
Problem: Skin  Goal: Participates in plan/prevention/treatment measures  11/12/2023 1218 by Francine Pugh RN  Outcome: Progressing     Problem: Skin  Goal: Participates in plan/prevention/treatment measures  11/12/2023 1228 by Francine Pugh RN  Outcome: Progressing  Flowsheets (Taken 11/10/2023 2226 by Celeste Lopez RN)  Participates in plan/prevention/treatment measures:   Discuss with provider PT/OT consult   Increase activity/out of bed for meals   The patient's goals for the shift include      The clinical goals for the shift include pt will continue to increase mobility during this admission.

## 2023-11-12 NOTE — CARE PLAN
The patient's goals for the shift include      The clinical goals for the shift include patient will increase mobiity by end of shift

## 2023-11-12 NOTE — PROGRESS NOTES
11-12-23 @ 4844: Spoke with patient yesterday and he did not want to go to SNF but return to [facility] with resumption of [facility]. Therapy reccommended moderate at [facility]. Left a message yesterday for DON and again now about return to facility. Will await return call.

## 2023-11-13 LAB
ALBUMIN SERPL BCP-MCNC: 4.1 G/DL (ref 3.4–5)
ANION GAP SERPL CALC-SCNC: 14 MMOL/L (ref 10–20)
BUN SERPL-MCNC: 18 MG/DL (ref 6–23)
CALCIUM SERPL-MCNC: 9.8 MG/DL (ref 8.6–10.3)
CHLORIDE SERPL-SCNC: 102 MMOL/L (ref 98–107)
CO2 SERPL-SCNC: 24 MMOL/L (ref 21–32)
CREAT SERPL-MCNC: 0.97 MG/DL (ref 0.5–1.3)
ERYTHROCYTE [DISTWIDTH] IN BLOOD BY AUTOMATED COUNT: 14.4 % (ref 11.5–14.5)
GFR SERPL CREATININE-BSD FRML MDRD: >90 ML/MIN/1.73M*2
GLUCOSE SERPL-MCNC: 106 MG/DL (ref 74–99)
HCT VFR BLD AUTO: 45.9 % (ref 41–52)
HGB BLD-MCNC: 14.4 G/DL (ref 13.5–17.5)
MAGNESIUM SERPL-MCNC: 2.02 MG/DL (ref 1.6–2.4)
MCH RBC QN AUTO: 29.1 PG (ref 26–34)
MCHC RBC AUTO-ENTMCNC: 31.4 G/DL (ref 32–36)
MCV RBC AUTO: 93 FL (ref 80–100)
NRBC BLD-RTO: 0 /100 WBCS (ref 0–0)
PHOSPHATE SERPL-MCNC: 5.1 MG/DL (ref 2.5–4.9)
PLATELET # BLD AUTO: 245 X10*3/UL (ref 150–450)
POTASSIUM SERPL-SCNC: 4.2 MMOL/L (ref 3.5–5.3)
RBC # BLD AUTO: 4.94 X10*6/UL (ref 4.5–5.9)
SODIUM SERPL-SCNC: 136 MMOL/L (ref 136–145)
WBC # BLD AUTO: 10 X10*3/UL (ref 4.4–11.3)

## 2023-11-13 PROCEDURE — 85027 COMPLETE CBC AUTOMATED: CPT

## 2023-11-13 PROCEDURE — 83735 ASSAY OF MAGNESIUM: CPT

## 2023-11-13 PROCEDURE — 84100 ASSAY OF PHOSPHORUS: CPT

## 2023-11-13 PROCEDURE — 2500000004 HC RX 250 GENERAL PHARMACY W/ HCPCS (ALT 636 FOR OP/ED)

## 2023-11-13 PROCEDURE — 94640 AIRWAY INHALATION TREATMENT: CPT

## 2023-11-13 PROCEDURE — 97535 SELF CARE MNGMENT TRAINING: CPT | Mod: GO,CO

## 2023-11-13 PROCEDURE — 99233 SBSQ HOSP IP/OBS HIGH 50: CPT | Performed by: INTERNAL MEDICINE

## 2023-11-13 PROCEDURE — 82565 ASSAY OF CREATININE: CPT

## 2023-11-13 PROCEDURE — 2500000002 HC RX 250 W HCPCS SELF ADMINISTERED DRUGS (ALT 637 FOR MEDICARE OP, ALT 636 FOR OP/ED): Performed by: INTERNAL MEDICINE

## 2023-11-13 PROCEDURE — 2500000001 HC RX 250 WO HCPCS SELF ADMINISTERED DRUGS (ALT 637 FOR MEDICARE OP)

## 2023-11-13 PROCEDURE — 2500000005 HC RX 250 GENERAL PHARMACY W/O HCPCS

## 2023-11-13 PROCEDURE — 36415 COLL VENOUS BLD VENIPUNCTURE: CPT

## 2023-11-13 PROCEDURE — 2500000004 HC RX 250 GENERAL PHARMACY W/ HCPCS (ALT 636 FOR OP/ED): Performed by: INTERNAL MEDICINE

## 2023-11-13 PROCEDURE — S4991 NICOTINE PATCH NONLEGEND: HCPCS | Performed by: INTERNAL MEDICINE

## 2023-11-13 PROCEDURE — 2500000001 HC RX 250 WO HCPCS SELF ADMINISTERED DRUGS (ALT 637 FOR MEDICARE OP): Performed by: INTERNAL MEDICINE

## 2023-11-13 PROCEDURE — 1200000002 HC GENERAL ROOM WITH TELEMETRY DAILY

## 2023-11-13 RX ORDER — ACETAMINOPHEN 500 MG
5 TABLET ORAL NIGHTLY PRN
Status: DISCONTINUED | OUTPATIENT
Start: 2023-11-13 | End: 2023-11-17 | Stop reason: HOSPADM

## 2023-11-13 RX ORDER — KETOROLAC TROMETHAMINE 30 MG/ML
15 INJECTION, SOLUTION INTRAMUSCULAR; INTRAVENOUS ONCE
Status: COMPLETED | OUTPATIENT
Start: 2023-11-13 | End: 2023-11-13

## 2023-11-13 RX ORDER — KETOROLAC TROMETHAMINE 30 MG/ML
15 INJECTION, SOLUTION INTRAMUSCULAR; INTRAVENOUS EVERY 6 HOURS PRN
Status: DISCONTINUED | OUTPATIENT
Start: 2023-11-13 | End: 2023-11-13

## 2023-11-13 RX ORDER — OXYCODONE AND ACETAMINOPHEN 5; 325 MG/1; MG/1
1 TABLET ORAL ONCE
Status: DISCONTINUED | OUTPATIENT
Start: 2023-11-13 | End: 2023-11-13

## 2023-11-13 RX ADMIN — METOPROLOL TARTRATE 25 MG: 25 TABLET, FILM COATED ORAL at 09:17

## 2023-11-13 RX ADMIN — POLYETHYLENE GLYCOL 3350 17 G: 17 POWDER, FOR SOLUTION ORAL at 09:17

## 2023-11-13 RX ADMIN — APIXABAN 10 MG: 5 TABLET, FILM COATED ORAL at 09:16

## 2023-11-13 RX ADMIN — KETOROLAC TROMETHAMINE 15 MG: 30 INJECTION, SOLUTION INTRAMUSCULAR; INTRAVENOUS at 23:14

## 2023-11-13 RX ADMIN — Medication 1 TABLET: at 20:07

## 2023-11-13 RX ADMIN — PREGABALIN 200 MG: 75 CAPSULE ORAL at 20:07

## 2023-11-13 RX ADMIN — TRAZODONE HYDROCHLORIDE 50 MG: 50 TABLET ORAL at 20:08

## 2023-11-13 RX ADMIN — PREGABALIN 200 MG: 75 CAPSULE ORAL at 16:31

## 2023-11-13 RX ADMIN — BENZTROPINE MESYLATE 0.5 MG: 0.5 TABLET ORAL at 20:08

## 2023-11-13 RX ADMIN — ATORVASTATIN CALCIUM 20 MG: 20 TABLET, FILM COATED ORAL at 20:08

## 2023-11-13 RX ADMIN — DULOXETINE HYDROCHLORIDE 30 MG: 30 CAPSULE, DELAYED RELEASE ORAL at 20:08

## 2023-11-13 RX ADMIN — NICOTINE 1 PATCH: 14 PATCH, EXTENDED RELEASE TRANSDERMAL at 09:14

## 2023-11-13 RX ADMIN — Medication 1 TABLET: at 09:16

## 2023-11-13 RX ADMIN — APIXABAN 10 MG: 5 TABLET, FILM COATED ORAL at 20:07

## 2023-11-13 RX ADMIN — PREGABALIN 200 MG: 75 CAPSULE ORAL at 09:17

## 2023-11-13 RX ADMIN — SENNOSIDES AND DOCUSATE SODIUM 1 TABLET: 8.6; 5 TABLET ORAL at 20:08

## 2023-11-13 RX ADMIN — IPRATROPIUM BROMIDE AND ALBUTEROL SULFATE 3 ML: 2.5; .5 SOLUTION RESPIRATORY (INHALATION) at 19:51

## 2023-11-13 RX ADMIN — PANTOPRAZOLE SODIUM 40 MG: 40 TABLET, DELAYED RELEASE ORAL at 09:17

## 2023-11-13 RX ADMIN — LIDOCAINE 1 PATCH: 4 PATCH TOPICAL at 09:15

## 2023-11-13 RX ADMIN — Medication 5 MG: at 23:15

## 2023-11-13 RX ADMIN — ACETAMINOPHEN 650 MG: 325 TABLET ORAL at 23:15

## 2023-11-13 RX ADMIN — DULOXETINE HYDROCHLORIDE 90 MG: 30 CAPSULE, DELAYED RELEASE ORAL at 09:15

## 2023-11-13 RX ADMIN — BENZTROPINE MESYLATE 0.5 MG: 0.5 TABLET ORAL at 09:16

## 2023-11-13 ASSESSMENT — COGNITIVE AND FUNCTIONAL STATUS - GENERAL
DRESSING REGULAR LOWER BODY CLOTHING: A LITTLE
DRESSING REGULAR UPPER BODY CLOTHING: A LITTLE
DAILY ACTIVITIY SCORE: 19
PERSONAL GROOMING: A LITTLE
TOILETING: A LITTLE
HELP NEEDED FOR BATHING: A LITTLE

## 2023-11-13 ASSESSMENT — PAIN SCALES - GENERAL
PAINLEVEL_OUTOF10: 0 - NO PAIN

## 2023-11-13 ASSESSMENT — PAIN - FUNCTIONAL ASSESSMENT
PAIN_FUNCTIONAL_ASSESSMENT: 0-10
PAIN_FUNCTIONAL_ASSESSMENT: 0-10

## 2023-11-13 NOTE — PROGRESS NOTES
Occupational Therapy    Occupational Therapy Treatment    Name: Gaurav Mckay  MRN: [id number]  : 1970  Date: 23  Time Calculation  Start Time: 1412  Stop Time: 1423  Time Calculation (min): 11 min    Assessment:  OT Assessment: Pt is a 52 y/o male who was admitted for saddle PE and underwent pulmonary embolectomy bilaterally; bilateral pulmonary angiography. Pt typically is completely independent with ADL's and IADL's and not using any AD. Today pt presents with pain, dizziness, decreased balance, and endurance. Pt to benefit from continued skilled OT services to increase independence with ADL's, transfers, and mobility.  Prognosis: Excellent  Barriers to Discharge: None  Evaluation/Treatment Tolerance: Patient limited by fatigue  Medical Staff Made Aware: Yes  End of Session Communication: Bedside nurse  End of Session Patient Position: Bed, 3 rail up, Alarm off, not on at start of session  Plan:  Treatment Interventions: ADL retraining, Functional transfer training, Endurance training, Patient/family training, Compensatory technique education  OT Frequency: 3 times per week  OT Discharge Recommendations: Moderate intensity level of continued care  OT Recommended Transfer Status: Stand by assist  OT - OK to Discharge: Yes    Subjective   General:  OT Last Visit  OT Received On: 23  General  Reason for Referral: 52 yo male admitted 2' to SOB, saddle PEB pulmonary embolectomies   .  Referred By:  (Dr. NICK Medina)  Past Medical History Relevant to Rehab: HTN, HLD, laryngeal CA s/p radiation, bipolar    Family/Caregiver Present: No  Prior to Session Communication: Bedside nurse  Patient Position Received: Bed, 3 rail up, Alarm off, not on at start of session  Preferred Learning Style: verbal  General Comment: Pt pleasent and agreeable to therapy this date, pt SOB and dizzy when standing, no significant changes in vital signs. Rapidly fatigues in course of treatment.    Vitals:  No significant  changes noted in course of treatment.     Pain Assessment:  Pain Assessment  Pain Assessment:  (denied pain)     Objective   Activities of Daily Living: LE Dressing  LE Dressing: Yes  Pants Level of Assistance: Setup, Close supervision  Sock Level of Assistance: Setup, Close supervision  LE Dressing Where Assessed: Edge of bed  LE Dressing Comments: Pt required frequent breaks in course of tasks, easily fatigued using figure four position for tasks. Increased time required, discussed tools and pacing in tasks for increased endurance. SBA for pulling pants up over hips from below knees in standing without a device.    Functional Standing Tolerance:  Functional Standing Tolerance  Time: 4 minutes  Activity: Grooming and handwashing at sink.  Functional Standing Tolerance Comments: dizziness with standing resolved after a few minutes. Cues for standing breask provided, now significant change in vital signs noted.  Bed Mobility/Transfers: Bed Mobility  Bed Mobility: Yes  Bed Mobility 1  Bed Mobility 1: Supine to sitting, Sitting to supine  Level of Assistance 1: Distant supervision    Transfers  Transfer: Yes  Transfer 1  Transfer From 1: Bed to  Transfer to 1: Stand  Technique 1: Sit to stand, Stand to sit  Transfer Device 1:  (no device)  Transfer Level of Assistance 1: Close supervision  Trials/Comments 1: Min cues for slowed pace in activity.    Outcome Measures:  [facility] Daily Activity  Putting on and taking off regular lower body clothing: A little  Bathing (including washing, rinsing, drying): A little  Putting on and taking off regular upper body clothing: A little  Toileting, which includes using toilet, bedpan or urinal: A little  Taking care of personal grooming such as brushing teeth: A little  Eating Meals: None  Daily Activity - Total Score: 19    Education Documentation  Handouts, taught by EM Contreras at 11/13/2023  3:23 PM.  Learner: Patient  Readiness: Acceptance  Method: Explanation,  Demonstration  Response: Verbalizes Understanding, Demonstrated Understanding, Needs Reinforcement    Body Mechanics, taught by EM Contreras at 11/13/2023  3:23 PM.  Learner: Patient  Readiness: Acceptance  Method: Explanation, Demonstration  Response: Verbalizes Understanding, Demonstrated Understanding, Needs Reinforcement    Precautions, taught by EM Contreras at 11/13/2023  3:23 PM.  Learner: Patient  Readiness: Acceptance  Method: Explanation, Demonstration  Response: Verbalizes Understanding, Demonstrated Understanding, Needs Reinforcement    Home Exercise Program, taught by EM Contreras at 11/13/2023  3:23 PM.  Learner: Patient  Readiness: Acceptance  Method: Explanation, Demonstration  Response: Verbalizes Understanding, Demonstrated Understanding, Needs Reinforcement    ADL Training, taught by EM Contreras at 11/13/2023  3:23 PM.  Learner: Patient  Readiness: Acceptance  Method: Explanation, Demonstration  Response: Verbalizes Understanding, Demonstrated Understanding, Needs Reinforcement    Education Comments  Pt demonstrated good attention to instruction in course of session related to energy conservation instruction.    Goals:  Encounter Problems       Encounter Problems (Active)       Balance       Pt will demo good static/dynamic standing balance during ADL and functional activity with LRAD for improved independence and participation in ADL  (Progressing)       Start:  11/09/23    Expected End:  11/11/23               Dressings Lower Extremities       Patient to complete lower body dressing with LRAD at mod I  (Progressing)       Start:  11/09/23    Expected End:  11/11/23               Endurance       Pt will increase endurance to tolerate 10 min of activity with no more than 1 rest break in order to increase ability to engage in ADL completion.  (Progressing)       Start:  11/09/23    Expected End:  11/11/23               Mobility       Pt will demo increased functional mobility to  tolerate tasks necessary to complete ADL routine with LRAD at mod I  (Progressing)       Start:  11/09/23    Expected End:  11/11/23               Toileting       Patient will complete toileting tasks with LRAD at mod I  (Progressing)       Start:  11/09/23    Expected End:  11/11/23            Patient to increase standing tolerance with LRAD >5 minutes (Progressing)       Start:  11/09/23    Expected End:  11/11/23               Transfers       Pt to demonstrate transfers with LRAD at mod I  (Progressing)       Start:  11/09/23    Expected End:  11/11/23

## 2023-11-13 NOTE — CARE PLAN
The patient's goals for the shift include      The clinical goals for the shift include pt will continue to increase mobility during this admission    Over the shift, the patient did not make progress toward the following goals. Barriers to progression include . Recommendations to address these barriers include .

## 2023-11-13 NOTE — PROGRESS NOTES
Shona Mcbride is a 53 y.o. male on day 5 of admission presenting with Acute massive pulmonary embolism (CMS/HCC).      Subjective   Patient seen and evaluated today. He has no new complaints today. He has remained stable per vitals and has been completely weaned off O2        Objective     Last Recorded Vitals  /74 (BP Location: Left arm, Patient Position: Lying)   Pulse 77   Temp 35.8 °C (96.4 °F) (Temporal)   Resp 20   Wt 122 kg (268 lb 15.4 oz)   SpO2 96%   Intake/Output last 3 Shifts:    Intake/Output Summary (Last 24 hours) at 11/13/2023 1155  Last data filed at 11/13/2023 0900  Gross per 24 hour   Intake 360 ml   Output 1900 ml   Net -1540 ml       Admission Weight  Weight: 127 kg (279 lb 1.6 oz) (11/08/23 1410)    Daily Weight  11/13/23 : 122 kg (268 lb 15.4 oz)    Image Results  XR ankle  Narrative: Interpreted By:  DWIGHT GARZA MD  MRN: [id number]  Patient Name: SHONA MCBRIDE     STUDY:  ANKLE, COMPLETE, MIN 3 VIEWS; Left;  7/28/2023 3:41 pm     INDICATION:  ankle pain, previous surgery with hardware .     COMPARISON:  02/24/2021     ACCESSION NUMBER(S):  [id number]     ORDERING CLINICIAN:  LAZ CERVANTES     FINDINGS:  Interval post ORIF of the comminuted distal fibular and medial tibial  fractures, there is improved alignment of the fracture fragments.  Soft tissue swelling around the ankle. Mild degenerative changes in  the ankle joint. No acute fractures noted. Mild subcutaneous edema in  the distal leg.     Impression: No acute fracture, moderate soft tissue swelling around the ankle  joint.        MACRO:  None      Physical Exam  Constitutional:       Appearance: Normal appearance.   Cardiovascular:      Rate and Rhythm: Normal rate and regular rhythm.      Pulses: Normal pulses.      Heart sounds: Normal heart sounds.   Pulmonary:      Effort: Pulmonary effort is normal.      Breath sounds: Normal breath sounds.   Neurological:      General: No focal deficit present.      Mental  Status: He is alert and oriented to person, place, and time.   Psychiatric:         Mood and Affect: Mood normal.         Behavior: Behavior normal.         Relevant Results  Results for orders placed or performed during the hospital encounter of 11/08/23 (from the past 24 hour(s))   Renal Function Panel   Result Value Ref Range    Glucose 106 (H) 74 - 99 mg/dL    Sodium 136 136 - 145 mmol/L    Potassium 4.2 3.5 - 5.3 mmol/L    Chloride 102 98 - 107 mmol/L    Bicarbonate 24 21 - 32 mmol/L    Anion Gap 14 10 - 20 mmol/L    Urea Nitrogen 18 6 - 23 mg/dL    Creatinine 0.97 0.50 - 1.30 mg/dL    eGFR >90 >60 mL/min/1.73m*2    Calcium 9.8 8.6 - 10.3 mg/dL    Phosphorus 5.1 (H) 2.5 - 4.9 mg/dL    Albumin 4.1 3.4 - 5.0 g/dL   CBC   Result Value Ref Range    WBC 10.0 4.4 - 11.3 x10*3/uL    nRBC 0.0 0.0 - 0.0 /100 WBCs    RBC 4.94 4.50 - 5.90 x10*6/uL    Hemoglobin 14.4 13.5 - 17.5 g/dL    Hematocrit 45.9 41.0 - 52.0 %    MCV 93 80 - 100 fL    MCH 29.1 26.0 - 34.0 pg    MCHC 31.4 (L) 32.0 - 36.0 g/dL    RDW 14.4 11.5 - 14.5 %    Platelets 245 150 - 450 x10*3/uL   Magnesium   Result Value Ref Range    Magnesium 2.02 1.60 - 2.40 mg/dL           Assessment/Plan   53-year-old male with PMHx of HTN, HLD, COPD, EtOH abuse, laryngeal cancer status post radiation.  Who presented initially to OSH ([facility]) on account of shortness of breath, which has been diagnosed as saddle PE s/p CT angio.  Patient is now transferred for further treatment and evaluation for catheter thrombectomy.     Neurology  #BiPolar Disorder   #Anxiety Disorder  - Will continue risperidone 1mg BID  - Continue trazodone 50mg at bedtime   - Continue seroquel 25mg at bedtime       #Nicotine/alcohol use disorder  - No need for CIWA protocols at this time   - patient is a chronic smoker with 20pckyr hx  - Nicoderm patch 7mg/24 hours      #Restless leg Syndrome   - Continue ropinirole 0.5mg      Cardiology   #Hyperlipidemia  - Continue atorvastatin 20mg daily       #HTN  - Continue metoprolol 25mg daily      Pulmonology   #Status post suction thrombectomy for unprovoked saddle PE   - Patient has continued to remain stable post off  - Continue patient on eliquis 10mg Bid for 7 days (11/9 -11/16) then 5mg BID after   - Patient will be set up with cardiology on outpatient basis   - Patient will need evaluation for acquired thrombophilia      #COPD  -Continue DuoNebs every 4 as needed  -Continue INO2 to keep SPO2 greater than 88%.  -Continue incentive spirometry     Gastroenterology  #GERD  -Continue pantoprazole at 40 mg daily.     #Nutrition  -Diet low calorie   -We will obtain dietitian consult for patient dietary counseling for obesity      Renal   No active issue      Heme/Onc  #Esophageal cancer s/p radiation therapy  -s/p radiation therapy with last session being June 2022  -Follow up with ENT/HemeOnc outpatient      MSK  - no active issues      Fluid: oral  Electrolyte: Monitor and replete  Nutrition: diet low ambrosio   Gippx: Pantoprazole  DVTppx: Eliquis 10mg BID   Access: PIV  Code Status: Full     Dispo: ICU for saddle PE. Pending transfer to the floor.Pending SNF placement.         Gabriel Medina MD

## 2023-11-13 NOTE — PROGRESS NOTES
11/13/23 1314   Discharge Planning   Living Arrangements Spouse/significant other   Support Systems Spouse/significant other;Family members;Friends/neighbors   Assistance Needed Patient is from home with significant other, A&O X3 and uses no DME at baseline. Patient is currently requiring O2 which is ACUTE. Per patient he is on room air at home. Patient does not drive.   Type of Residence Private residence   Number of Stairs to Enter Residence 4   Number of Stairs Within Residence 0   Who is requesting discharge planning? Provider   Home or Post Acute Services Post acute facilities (Rehab/SNF/etc)   Type of Post Acute Facility Services Rehab;Skilled nursing   Patient expects to be discharged to: [facility]   Does the patient need discharge transport arranged? Yes   RoundTrip coordination needed? Yes   Has discharge transport been arranged? No     11/13/2023 1315 Patient is awaiting pre-cert for [facility]. Once pre-cert returns patient will DC.

## 2023-11-14 LAB
ALBUMIN SERPL BCP-MCNC: 4 G/DL (ref 3.4–5)
ANION GAP SERPL CALC-SCNC: 13 MMOL/L (ref 10–20)
BUN SERPL-MCNC: 22 MG/DL (ref 6–23)
CALCIUM SERPL-MCNC: 9.6 MG/DL (ref 8.6–10.3)
CHLORIDE SERPL-SCNC: 100 MMOL/L (ref 98–107)
CO2 SERPL-SCNC: 28 MMOL/L (ref 21–32)
CREAT SERPL-MCNC: 1.12 MG/DL (ref 0.5–1.3)
ERYTHROCYTE [DISTWIDTH] IN BLOOD BY AUTOMATED COUNT: 14.1 % (ref 11.5–14.5)
GFR SERPL CREATININE-BSD FRML MDRD: 79 ML/MIN/1.73M*2
GLUCOSE SERPL-MCNC: 116 MG/DL (ref 74–99)
HCT VFR BLD AUTO: 45.7 % (ref 41–52)
HGB BLD-MCNC: 15.1 G/DL (ref 13.5–17.5)
MAGNESIUM SERPL-MCNC: 2.39 MG/DL (ref 1.6–2.4)
MCH RBC QN AUTO: 29.2 PG (ref 26–34)
MCHC RBC AUTO-ENTMCNC: 33 G/DL (ref 32–36)
MCV RBC AUTO: 88 FL (ref 80–100)
NRBC BLD-RTO: 0 /100 WBCS (ref 0–0)
PHOSPHATE SERPL-MCNC: 5.2 MG/DL (ref 2.5–4.9)
PLATELET # BLD AUTO: 273 X10*3/UL (ref 150–450)
POTASSIUM SERPL-SCNC: 4.5 MMOL/L (ref 3.5–5.3)
RBC # BLD AUTO: 5.17 X10*6/UL (ref 4.5–5.9)
SODIUM SERPL-SCNC: 136 MMOL/L (ref 136–145)
WBC # BLD AUTO: 9.1 X10*3/UL (ref 4.4–11.3)

## 2023-11-14 PROCEDURE — 2500000001 HC RX 250 WO HCPCS SELF ADMINISTERED DRUGS (ALT 637 FOR MEDICARE OP)

## 2023-11-14 PROCEDURE — 2500000002 HC RX 250 W HCPCS SELF ADMINISTERED DRUGS (ALT 637 FOR MEDICARE OP, ALT 636 FOR OP/ED): Performed by: INTERNAL MEDICINE

## 2023-11-14 PROCEDURE — S4991 NICOTINE PATCH NONLEGEND: HCPCS | Performed by: INTERNAL MEDICINE

## 2023-11-14 PROCEDURE — 1200000002 HC GENERAL ROOM WITH TELEMETRY DAILY

## 2023-11-14 PROCEDURE — 36415 COLL VENOUS BLD VENIPUNCTURE: CPT

## 2023-11-14 PROCEDURE — 94640 AIRWAY INHALATION TREATMENT: CPT

## 2023-11-14 PROCEDURE — 99232 SBSQ HOSP IP/OBS MODERATE 35: CPT | Performed by: INTERNAL MEDICINE

## 2023-11-14 PROCEDURE — 83735 ASSAY OF MAGNESIUM: CPT

## 2023-11-14 PROCEDURE — 85027 COMPLETE CBC AUTOMATED: CPT

## 2023-11-14 PROCEDURE — 2500000005 HC RX 250 GENERAL PHARMACY W/O HCPCS: Performed by: INTERNAL MEDICINE

## 2023-11-14 PROCEDURE — 2500000005 HC RX 250 GENERAL PHARMACY W/O HCPCS

## 2023-11-14 PROCEDURE — 80069 RENAL FUNCTION PANEL: CPT

## 2023-11-14 PROCEDURE — 2500000001 HC RX 250 WO HCPCS SELF ADMINISTERED DRUGS (ALT 637 FOR MEDICARE OP): Performed by: INTERNAL MEDICINE

## 2023-11-14 PROCEDURE — 2500000004 HC RX 250 GENERAL PHARMACY W/ HCPCS (ALT 636 FOR OP/ED)

## 2023-11-14 PROCEDURE — 2500000004 HC RX 250 GENERAL PHARMACY W/ HCPCS (ALT 636 FOR OP/ED): Performed by: INTERNAL MEDICINE

## 2023-11-14 RX ORDER — IPRATROPIUM BROMIDE AND ALBUTEROL SULFATE 2.5; .5 MG/3ML; MG/3ML
3 SOLUTION RESPIRATORY (INHALATION) EVERY 2 HOUR PRN
Qty: 180 ML | Refills: 11 | Status: SHIPPED | OUTPATIENT
Start: 2023-11-14

## 2023-11-14 RX ORDER — IPRATROPIUM BROMIDE AND ALBUTEROL SULFATE 2.5; .5 MG/3ML; MG/3ML
3 SOLUTION RESPIRATORY (INHALATION)
Qty: 20 ML | Refills: 0 | Status: SHIPPED | OUTPATIENT
Start: 2023-11-14 | End: 2023-12-14

## 2023-11-14 RX ORDER — ACETAMINOPHEN 325 MG/1
650 TABLET ORAL EVERY 6 HOURS PRN
Qty: 30 TABLET | Refills: 0 | Status: SHIPPED | OUTPATIENT
Start: 2023-11-14

## 2023-11-14 RX ORDER — AMOXICILLIN 250 MG
1 CAPSULE ORAL NIGHTLY
Qty: 30 TABLET | Refills: 0 | Status: SHIPPED | OUTPATIENT
Start: 2023-11-14

## 2023-11-14 RX ORDER — POLYETHYLENE GLYCOL 3350 17 G/17G
17 POWDER, FOR SOLUTION ORAL DAILY
Qty: 30 PACKET | Refills: 0 | Status: SHIPPED | OUTPATIENT
Start: 2023-11-14 | End: 2023-12-14

## 2023-11-14 RX ORDER — KETOROLAC TROMETHAMINE 30 MG/ML
30 INJECTION, SOLUTION INTRAMUSCULAR; INTRAVENOUS EVERY 6 HOURS PRN
Status: DISCONTINUED | OUTPATIENT
Start: 2023-11-14 | End: 2023-11-15

## 2023-11-14 RX ADMIN — LIDOCAINE 1 PATCH: 4 PATCH TOPICAL at 08:34

## 2023-11-14 RX ADMIN — DULOXETINE HYDROCHLORIDE 30 MG: 30 CAPSULE, DELAYED RELEASE ORAL at 20:16

## 2023-11-14 RX ADMIN — PREGABALIN 200 MG: 75 CAPSULE ORAL at 20:17

## 2023-11-14 RX ADMIN — IPRATROPIUM BROMIDE AND ALBUTEROL SULFATE 3 ML: 2.5; .5 SOLUTION RESPIRATORY (INHALATION) at 08:11

## 2023-11-14 RX ADMIN — ATORVASTATIN CALCIUM 20 MG: 20 TABLET, FILM COATED ORAL at 20:18

## 2023-11-14 RX ADMIN — BENZTROPINE MESYLATE 0.5 MG: 0.5 TABLET ORAL at 20:24

## 2023-11-14 RX ADMIN — CYCLOBENZAPRINE 10 MG: 10 TABLET, FILM COATED ORAL at 20:18

## 2023-11-14 RX ADMIN — TRAZODONE HYDROCHLORIDE 50 MG: 50 TABLET ORAL at 20:18

## 2023-11-14 RX ADMIN — PREGABALIN 200 MG: 75 CAPSULE ORAL at 08:29

## 2023-11-14 RX ADMIN — METOPROLOL TARTRATE 25 MG: 25 TABLET, FILM COATED ORAL at 08:30

## 2023-11-14 RX ADMIN — APIXABAN 10 MG: 5 TABLET, FILM COATED ORAL at 08:28

## 2023-11-14 RX ADMIN — NICOTINE 1 PATCH: 14 PATCH, EXTENDED RELEASE TRANSDERMAL at 08:34

## 2023-11-14 RX ADMIN — Medication: at 14:30

## 2023-11-14 RX ADMIN — PREGABALIN 200 MG: 75 CAPSULE ORAL at 14:59

## 2023-11-14 RX ADMIN — Medication 1 TABLET: at 20:18

## 2023-11-14 RX ADMIN — KETOROLAC TROMETHAMINE 30 MG: 30 INJECTION, SOLUTION INTRAMUSCULAR; INTRAVENOUS at 18:30

## 2023-11-14 RX ADMIN — CYCLOBENZAPRINE 10 MG: 10 TABLET, FILM COATED ORAL at 08:36

## 2023-11-14 RX ADMIN — APIXABAN 10 MG: 5 TABLET, FILM COATED ORAL at 20:16

## 2023-11-14 RX ADMIN — POLYETHYLENE GLYCOL 3350 17 G: 17 POWDER, FOR SOLUTION ORAL at 14:59

## 2023-11-14 RX ADMIN — PANTOPRAZOLE SODIUM 40 MG: 40 TABLET, DELAYED RELEASE ORAL at 08:28

## 2023-11-14 RX ADMIN — Medication 1 TABLET: at 08:28

## 2023-11-14 RX ADMIN — BENZTROPINE MESYLATE 0.5 MG: 0.5 TABLET ORAL at 08:36

## 2023-11-14 RX ADMIN — CYCLOBENZAPRINE 10 MG: 10 TABLET, FILM COATED ORAL at 15:02

## 2023-11-14 RX ADMIN — Medication 5 MG: at 20:18

## 2023-11-14 RX ADMIN — DULOXETINE HYDROCHLORIDE 90 MG: 30 CAPSULE, DELAYED RELEASE ORAL at 08:29

## 2023-11-14 ASSESSMENT — PAIN SCALES - GENERAL
PAINLEVEL_OUTOF10: 8
PAINLEVEL_OUTOF10: 8
PAINLEVEL_OUTOF10: 0 - NO PAIN

## 2023-11-14 ASSESSMENT — PAIN - FUNCTIONAL ASSESSMENT
PAIN_FUNCTIONAL_ASSESSMENT: 0-10
PAIN_FUNCTIONAL_ASSESSMENT: 0-10

## 2023-11-14 NOTE — DISCHARGE SUMMARY
Discharge Diagnosis  Acute massive pulmonary embolism (CMS/HCC)    Issues Requiring Follow-Up  None     Discharge Meds     Your medication list        START taking these medications        Instructions Last Dose Given Next Dose Due   apixaban 5 mg tablet  Commonly known as: Eliquis      Take 2 tablets (10 mg) by mouth 2 times a day for 9 doses.       apixaban 5 mg tablet  Commonly known as: Eliquis  Start taking on: November 16, 2023      Take 1 tablet (5 mg) by mouth 2 times a day. Do not start before November 16, 2023.              CHANGE how you take these medications        Instructions Last Dose Given Next Dose Due   risperiDONE 1 mg tablet  Commonly known as: RisperDAL  What changed: Another medication with the same name was removed. Continue taking this medication, and follow the directions you see here.                  CONTINUE taking these medications        Instructions Last Dose Given Next Dose Due   atorvastatin 20 mg tablet  Commonly known as: Lipitor           benztropine 0.5 mg tablet  Commonly known as: Cogentin           calcium carbonate-vitamin D3 500 mg-5 mcg (200 unit) tablet           celecoxib 200 mg capsule  Commonly known as: CeleBREX           cyclobenzaprine 10 mg tablet  Commonly known as: Flexeril           disulfiram 250 mg tablet  Commonly known as: Antabuse           docusate sodium 100 mg capsule  Commonly known as: Colace           DULoxetine 60 mg DR capsule  Commonly known as: Cymbalta           DULoxetine 30 mg DR capsule  Commonly known as: Cymbalta           melatonin 10 mg tablet,disintegrating           metoprolol tartrate 25 mg tablet  Commonly known as: Lopressor           montelukast 10 mg tablet  Commonly known as: Singulair           nicotine polacrilex 2 mg lozenge  Commonly known as: Commit           omeprazole 40 mg DR capsule  Commonly known as: PriLOSEC           pregabalin 200 mg capsule  Commonly known as: Lyrica           Proventil HFA 90 mcg/actuation  inhaler  Generic drug: albuterol           QUEtiapine 25 mg tablet  Commonly known as: SEROquel           rOPINIRole 0.5 mg tablet  Commonly known as: Requip           simvastatin 40 mg tablet  Commonly known as: Zocor           topiramate 100 mg tablet  Commonly known as: Topamax           traZODone 50 mg tablet  Commonly known as: Desyrel                  STOP taking these medications      loratadine 10 mg tablet  Commonly known as: Claritin        meclizine 25 mg tablet  Commonly known as: Antivert               ASK your doctor about these medications        Instructions Last Dose Given Next Dose Due   potassium chloride ER 10 mEq ER capsule  Commonly known as: Micro-K                     Where to Get Your Medications        These medications were sent to [facility]/pharmacy #[id number] - [place], [place] - [address]  [address] [address]      Phone: [phone]   apixaban 5 mg tablet  apixaban 5 mg tablet         Test Results Pending At Discharge  Pending Labs       No current pending labs.            Hospital Course  Gaurav Mckay is a 53-year-old male with PMHx of HTN, HLD, COPD, EtOH abuse, aggressive insufficiency, laryngeal cancer status post radiation.  Who presented initially to OSH ([facility]) on account of shortness of breath, which has been diagnosed as saddle PE s/p CT angio     Patient was in his usual state of health until about 1 week prior to presentation at OSH when he noticed sudden onset of worsening shortness of breath and nonproductive cough which was progressive.  At about the same time patient noted he was having some chest discomfort, which he described as congestion.  He denied associated wheezing, palpitation, orthopnea, PND, fever or chills.  Patient stated that shortness of breath was initially on mild exertion, but continued to progress to SOB at rest.  On account of worsening SOB, patient decided to present to the ED at [facility].  There is no history of weakness, slurred speech, change  in bowel habits, abdominal distention, pedal edema, or calf swelling.     At the OSH, patient was initially admitted to the ED where CXR was done which was unremarkable.  Labs done were unremarkable except for mild leukocytosis.  Chest x-ray done was unremarkable, duplex ultrasound done was unremarkable however CT angio was consistent with saddle embolism and bilateral pulmonary embolism.  Patient was admitted to the ICU where he was started on heparin gtt. pulmonologist was consulted, and after evaluation recommended transfer to tertiary  center for possible catheter thrombectomy. Patient was prepped for a catheter directed suction embolectomy which was done successfully. There was no immediate intra-op and or post op complications. Patient continued to remain stable and was transitioned to Eliquis. Ambulatory O2 completed, no supplemental oxygen required.  At this time patient is hemodynamically stable and ready to be discharged home.      Pertinent Physical Exam At Time of Discharge  Physical Exam  Vitals and nursing note reviewed.   Constitutional:       Appearance: Normal appearance.   Cardiovascular:      Rate and Rhythm: Normal rate and regular rhythm.      Pulses: Normal pulses.      Heart sounds: Normal heart sounds.   Pulmonary:      Effort: Pulmonary effort is normal.      Breath sounds: Normal breath sounds.   Abdominal:      Palpations: Abdomen is soft.   Skin:     General: Skin is warm and dry.   Neurological:      Mental Status: He is alert and oriented to person, place, and time.         Outpatient Follow-Up  No future appointments.      Gabriel Medina MD

## 2023-11-14 NOTE — PROGRESS NOTES
Shona Mcbride is a 53 y.o. male on day 6 of admission presenting with Acute massive pulmonary embolism (CMS/HCC).      Subjective   Patient seen and evaluated today. He has no new complaint. Overnight, patient tried to stand by himself, but he got very weak and rapidly sat on the floor. Patient was found on the floor by his nurse. There was no head injury component. He denied loss of consciousness and or headaches. There is no associated blurring of vision     Objective     Last Recorded Vitals  BP (!) 130/92   Pulse 73   Temp 35.9 °C (96.6 °F) (Temporal)   Resp 19   Wt 122 kg (268 lb 15.4 oz)   SpO2 97%   Intake/Output last 3 Shifts:    Intake/Output Summary (Last 24 hours) at 11/14/2023 1120  Last data filed at 11/14/2023 0925  Gross per 24 hour   Intake 840 ml   Output 1427 ml   Net -587 ml       Admission Weight  Weight: 127 kg (279 lb 1.6 oz) (11/08/23 1410)    Daily Weight  11/14/23 : 122 kg (268 lb 15.4 oz)    Image Results  XR ankle  Narrative: Interpreted By:  DWIGHT GARZA MD  MRN: [id number]  Patient Name: SHONA MCBRIDE     STUDY:  ANKLE, COMPLETE, MIN 3 VIEWS; Left;  7/28/2023 3:41 pm     INDICATION:  ankle pain, previous surgery with hardware .     COMPARISON:  02/24/2021     ACCESSION NUMBER(S):  [id number]     ORDERING CLINICIAN:  LAZ CERVANTES     FINDINGS:  Interval post ORIF of the comminuted distal fibular and medial tibial  fractures, there is improved alignment of the fracture fragments.  Soft tissue swelling around the ankle. Mild degenerative changes in  the ankle joint. No acute fractures noted. Mild subcutaneous edema in  the distal leg.     Impression: No acute fracture, moderate soft tissue swelling around the ankle  joint.        MACRO:  None      Physical Exam  Constitutional:       Appearance: Normal appearance.   Cardiovascular:      Rate and Rhythm: Normal rate and regular rhythm.   Pulmonary:      Effort: Pulmonary effort is normal.      Breath sounds: Normal breath sounds.    Abdominal:      Palpations: Abdomen is soft.   Skin:     General: Skin is dry.   Neurological:      Mental Status: He is alert and oriented to person, place, and time.   Psychiatric:         Mood and Affect: Mood normal.         Behavior: Behavior normal.       Relevant Results  Results for orders placed or performed during the hospital encounter of 11/08/23 (from the past 24 hour(s))   Renal Function Panel   Result Value Ref Range    Glucose 116 (H) 74 - 99 mg/dL    Sodium 136 136 - 145 mmol/L    Potassium 4.5 3.5 - 5.3 mmol/L    Chloride 100 98 - 107 mmol/L    Bicarbonate 28 21 - 32 mmol/L    Anion Gap 13 10 - 20 mmol/L    Urea Nitrogen 22 6 - 23 mg/dL    Creatinine 1.12 0.50 - 1.30 mg/dL    eGFR 79 >60 mL/min/1.73m*2    Calcium 9.6 8.6 - 10.3 mg/dL    Phosphorus 5.2 (H) 2.5 - 4.9 mg/dL    Albumin 4.0 3.4 - 5.0 g/dL   CBC   Result Value Ref Range    WBC 9.1 4.4 - 11.3 x10*3/uL    nRBC 0.0 0.0 - 0.0 /100 WBCs    RBC 5.17 4.50 - 5.90 x10*6/uL    Hemoglobin 15.1 13.5 - 17.5 g/dL    Hematocrit 45.7 41.0 - 52.0 %    MCV 88 80 - 100 fL    MCH 29.2 26.0 - 34.0 pg    MCHC 33.0 32.0 - 36.0 g/dL    RDW 14.1 11.5 - 14.5 %    Platelets 273 150 - 450 x10*3/uL   Magnesium   Result Value Ref Range    Magnesium 2.39 1.60 - 2.40 mg/dL        Assessment/Plan   53-year-old male with PMHx of HTN, HLD, COPD, EtOH abuse, laryngeal cancer status post radiation.  Who presented initially to OSH ([facility]) on account of shortness of breath, which has been diagnosed as saddle PE s/p CT angio.  Patient is now transferred for further treatment and evaluation for catheter thrombectomy.     Neurology  #BiPolar Disorder   #Anxiety Disorder  - Will continue risperidone 1mg BID  - Continue trazodone 50mg at bedtime   - Continue seroquel 25mg at bedtime       #Nicotine/alcohol use disorder  - No need for CIWA protocols at this time   - patient is a chronic smoker with 20pckyr hx  - Nicoderm patch 7mg/24 hours      #Restless leg Syndrome   - Continue  ropinirole 0.5mg      Cardiology   #Hyperlipidemia  - Continue atorvastatin 20mg daily      #HTN  - Continue metoprolol 25mg daily      Pulmonology   #Status post suction thrombectomy for saddle PE   - Patient has continued to remain stable post off  - Continue patient on eliquis 10mg Bid for 7 days (11/9 -11/16) then 5mg BID after   - Patient will be set up with cardiology on outpatient basis      #COPD  -Continue DuoNebs every 4 as needed  -Continue incentive spirometry     Gastroenterology  #GERD  -Continue pantoprazole at 40 mg daily.     #Nutrition  -Diet low calorie   -We will obtain dietitian consult for patient dietary counseling for obesity      Renal   No active issue      Heme/Onc  #Esophageal cancer s/p radiation therapy  -s/p radiation therapy with last session being June 2022  -Follow up with ENT/HemeOnc outpatient      MSK  - no active issues      Fluid: oral  Electrolyte: Monitor and replete  Nutrition: diet low ambrosio   Gippx: Pantoprazole  DVTppx: Eliquis 10mg BID   Access: PIV  Code Status: Full     Dispo: ICU for saddle PE. Pending transfer to the floor.Pending SNF placement.          Gabriel Medina MD

## 2023-11-15 ENCOUNTER — APPOINTMENT (OUTPATIENT)
Dept: RADIOLOGY | Facility: HOSPITAL | Age: 53
End: 2023-11-15
Payer: COMMERCIAL

## 2023-11-15 LAB
ALBUMIN SERPL BCP-MCNC: 3.9 G/DL (ref 3.4–5)
ANION GAP SERPL CALC-SCNC: 10 MMOL/L (ref 10–20)
BUN SERPL-MCNC: 25 MG/DL (ref 6–23)
CALCIUM SERPL-MCNC: 9.6 MG/DL (ref 8.6–10.3)
CHLORIDE SERPL-SCNC: 100 MMOL/L (ref 98–107)
CO2 SERPL-SCNC: 31 MMOL/L (ref 21–32)
CREAT SERPL-MCNC: 1.16 MG/DL (ref 0.5–1.3)
ERYTHROCYTE [DISTWIDTH] IN BLOOD BY AUTOMATED COUNT: 14.1 % (ref 11.5–14.5)
GFR SERPL CREATININE-BSD FRML MDRD: 75 ML/MIN/1.73M*2
GLUCOSE SERPL-MCNC: 103 MG/DL (ref 74–99)
HCT VFR BLD AUTO: 48.3 % (ref 41–52)
HGB BLD-MCNC: 15.6 G/DL (ref 13.5–17.5)
MCH RBC QN AUTO: 28.9 PG (ref 26–34)
MCHC RBC AUTO-ENTMCNC: 32.3 G/DL (ref 32–36)
MCV RBC AUTO: 90 FL (ref 80–100)
NRBC BLD-RTO: 0 /100 WBCS (ref 0–0)
PHOSPHATE SERPL-MCNC: 5.6 MG/DL (ref 2.5–4.9)
PLATELET # BLD AUTO: 278 X10*3/UL (ref 150–450)
POTASSIUM SERPL-SCNC: 4.8 MMOL/L (ref 3.5–5.3)
RBC # BLD AUTO: 5.39 X10*6/UL (ref 4.5–5.9)
SODIUM SERPL-SCNC: 136 MMOL/L (ref 136–145)
WBC # BLD AUTO: 7.8 X10*3/UL (ref 4.4–11.3)

## 2023-11-15 PROCEDURE — 97535 SELF CARE MNGMENT TRAINING: CPT | Mod: GO,CO

## 2023-11-15 PROCEDURE — 36415 COLL VENOUS BLD VENIPUNCTURE: CPT

## 2023-11-15 PROCEDURE — 2500000005 HC RX 250 GENERAL PHARMACY W/O HCPCS

## 2023-11-15 PROCEDURE — 2500000001 HC RX 250 WO HCPCS SELF ADMINISTERED DRUGS (ALT 637 FOR MEDICARE OP)

## 2023-11-15 PROCEDURE — 71045 X-RAY EXAM CHEST 1 VIEW: CPT | Performed by: RADIOLOGY

## 2023-11-15 PROCEDURE — 2500000004 HC RX 250 GENERAL PHARMACY W/ HCPCS (ALT 636 FOR OP/ED)

## 2023-11-15 PROCEDURE — 2500000002 HC RX 250 W HCPCS SELF ADMINISTERED DRUGS (ALT 637 FOR MEDICARE OP, ALT 636 FOR OP/ED): Performed by: INTERNAL MEDICINE

## 2023-11-15 PROCEDURE — S4991 NICOTINE PATCH NONLEGEND: HCPCS | Performed by: INTERNAL MEDICINE

## 2023-11-15 PROCEDURE — 85027 COMPLETE CBC AUTOMATED: CPT

## 2023-11-15 PROCEDURE — 97530 THERAPEUTIC ACTIVITIES: CPT | Mod: GO,CO

## 2023-11-15 PROCEDURE — 2500000001 HC RX 250 WO HCPCS SELF ADMINISTERED DRUGS (ALT 637 FOR MEDICARE OP): Performed by: INTERNAL MEDICINE

## 2023-11-15 PROCEDURE — 80069 RENAL FUNCTION PANEL: CPT

## 2023-11-15 PROCEDURE — 1200000002 HC GENERAL ROOM WITH TELEMETRY DAILY

## 2023-11-15 PROCEDURE — 71045 X-RAY EXAM CHEST 1 VIEW: CPT

## 2023-11-15 PROCEDURE — 2500000004 HC RX 250 GENERAL PHARMACY W/ HCPCS (ALT 636 FOR OP/ED): Performed by: INTERNAL MEDICINE

## 2023-11-15 PROCEDURE — 2500000002 HC RX 250 W HCPCS SELF ADMINISTERED DRUGS (ALT 637 FOR MEDICARE OP, ALT 636 FOR OP/ED)

## 2023-11-15 PROCEDURE — 94640 AIRWAY INHALATION TREATMENT: CPT

## 2023-11-15 PROCEDURE — 99233 SBSQ HOSP IP/OBS HIGH 50: CPT | Performed by: INTERNAL MEDICINE

## 2023-11-15 PROCEDURE — 94760 N-INVAS EAR/PLS OXIMETRY 1: CPT

## 2023-11-15 RX ORDER — PREDNISONE 20 MG/1
40 TABLET ORAL DAILY
Status: DISCONTINUED | OUTPATIENT
Start: 2023-11-15 | End: 2023-11-17 | Stop reason: HOSPADM

## 2023-11-15 RX ORDER — LIDOCAINE 560 MG/1
2 PATCH PERCUTANEOUS; TOPICAL; TRANSDERMAL DAILY
Status: DISCONTINUED | OUTPATIENT
Start: 2023-11-16 | End: 2023-11-15

## 2023-11-15 RX ORDER — MIDODRINE HYDROCHLORIDE 5 MG/1
10 TABLET ORAL 2 TIMES DAILY
Status: DISCONTINUED | OUTPATIENT
Start: 2023-11-15 | End: 2023-11-15

## 2023-11-15 RX ORDER — ACETAMINOPHEN 325 MG/1
975 TABLET ORAL EVERY 6 HOURS PRN
Status: DISCONTINUED | OUTPATIENT
Start: 2023-11-15 | End: 2023-11-15

## 2023-11-15 RX ORDER — ROPINIROLE 0.25 MG/1
0.5 TABLET, FILM COATED ORAL DAILY
Status: DISCONTINUED | OUTPATIENT
Start: 2023-11-16 | End: 2023-11-17 | Stop reason: HOSPADM

## 2023-11-15 RX ORDER — QUETIAPINE FUMARATE 25 MG/1
25 TABLET, FILM COATED ORAL NIGHTLY
Status: DISCONTINUED | OUTPATIENT
Start: 2023-11-15 | End: 2023-11-17 | Stop reason: HOSPADM

## 2023-11-15 RX ORDER — FLUTICASONE FUROATE AND VILANTEROL 200; 25 UG/1; UG/1
1 POWDER RESPIRATORY (INHALATION) DAILY
Status: DISCONTINUED | OUTPATIENT
Start: 2023-11-15 | End: 2023-11-17 | Stop reason: HOSPADM

## 2023-11-15 RX ORDER — ACETAMINOPHEN 325 MG/1
975 TABLET ORAL EVERY 6 HOURS
Status: DISCONTINUED | OUTPATIENT
Start: 2023-11-16 | End: 2023-11-17 | Stop reason: HOSPADM

## 2023-11-15 RX ORDER — LIDOCAINE 560 MG/1
1 PATCH PERCUTANEOUS; TOPICAL; TRANSDERMAL DAILY
Status: DISCONTINUED | OUTPATIENT
Start: 2023-11-15 | End: 2023-11-17 | Stop reason: HOSPADM

## 2023-11-15 RX ORDER — RISPERIDONE 0.5 MG/1
1 TABLET ORAL 2 TIMES DAILY
Status: DISCONTINUED | OUTPATIENT
Start: 2023-11-15 | End: 2023-11-17 | Stop reason: HOSPADM

## 2023-11-15 RX ADMIN — DULOXETINE HYDROCHLORIDE 90 MG: 30 CAPSULE, DELAYED RELEASE ORAL at 08:50

## 2023-11-15 RX ADMIN — PREDNISONE 40 MG: 20 TABLET ORAL at 10:24

## 2023-11-15 RX ADMIN — ATORVASTATIN CALCIUM 20 MG: 20 TABLET, FILM COATED ORAL at 21:19

## 2023-11-15 RX ADMIN — PREGABALIN 200 MG: 75 CAPSULE ORAL at 08:50

## 2023-11-15 RX ADMIN — Medication 5 MG: at 21:23

## 2023-11-15 RX ADMIN — BENZTROPINE MESYLATE 0.5 MG: 0.5 TABLET ORAL at 21:19

## 2023-11-15 RX ADMIN — ACETAMINOPHEN 975 MG: 325 TABLET ORAL at 16:27

## 2023-11-15 RX ADMIN — IPRATROPIUM BROMIDE AND ALBUTEROL SULFATE 3 ML: 2.5; .5 SOLUTION RESPIRATORY (INHALATION) at 08:21

## 2023-11-15 RX ADMIN — QUETIAPINE FUMARATE 25 MG: 25 TABLET ORAL at 21:19

## 2023-11-15 RX ADMIN — APIXABAN 10 MG: 5 TABLET, FILM COATED ORAL at 21:18

## 2023-11-15 RX ADMIN — SODIUM CHLORIDE, POTASSIUM CHLORIDE, SODIUM LACTATE AND CALCIUM CHLORIDE 1000 ML: 600; 310; 30; 20 INJECTION, SOLUTION INTRAVENOUS at 13:35

## 2023-11-15 RX ADMIN — BENZTROPINE MESYLATE 0.5 MG: 0.5 TABLET ORAL at 08:51

## 2023-11-15 RX ADMIN — SENNOSIDES AND DOCUSATE SODIUM 1 TABLET: 8.6; 5 TABLET ORAL at 21:18

## 2023-11-15 RX ADMIN — POLYETHYLENE GLYCOL 3350 17 G: 17 POWDER, FOR SOLUTION ORAL at 08:58

## 2023-11-15 RX ADMIN — APIXABAN 10 MG: 5 TABLET, FILM COATED ORAL at 08:50

## 2023-11-15 RX ADMIN — LIDOCAINE 1 PATCH: 4 PATCH TOPICAL at 08:57

## 2023-11-15 RX ADMIN — MIDODRINE HYDROCHLORIDE 10 MG: 5 TABLET ORAL at 13:25

## 2023-11-15 RX ADMIN — LIDOCAINE 1 PATCH: 4 PATCH TOPICAL at 16:27

## 2023-11-15 RX ADMIN — NICOTINE 1 PATCH: 14 PATCH, EXTENDED RELEASE TRANSDERMAL at 08:57

## 2023-11-15 RX ADMIN — KETOROLAC TROMETHAMINE 30 MG: 30 INJECTION, SOLUTION INTRAMUSCULAR; INTRAVENOUS at 08:56

## 2023-11-15 RX ADMIN — PREGABALIN 200 MG: 75 CAPSULE ORAL at 14:49

## 2023-11-15 RX ADMIN — Medication 1 TABLET: at 21:18

## 2023-11-15 RX ADMIN — RISPERIDONE 1 MG: 0.5 TABLET ORAL at 21:19

## 2023-11-15 RX ADMIN — TRAZODONE HYDROCHLORIDE 50 MG: 50 TABLET ORAL at 21:18

## 2023-11-15 RX ADMIN — PANTOPRAZOLE SODIUM 40 MG: 40 TABLET, DELAYED RELEASE ORAL at 08:51

## 2023-11-15 RX ADMIN — KETOROLAC TROMETHAMINE 30 MG: 30 INJECTION, SOLUTION INTRAMUSCULAR; INTRAVENOUS at 01:52

## 2023-11-15 RX ADMIN — FLUTICASONE FUROATE AND VILANTEROL TRIFENATATE 1 PUFF: 200; 25 POWDER RESPIRATORY (INHALATION) at 12:43

## 2023-11-15 RX ADMIN — ACETAMINOPHEN 975 MG: 325 TABLET ORAL at 21:23

## 2023-11-15 RX ADMIN — METOPROLOL TARTRATE 25 MG: 25 TABLET, FILM COATED ORAL at 08:51

## 2023-11-15 RX ADMIN — IPRATROPIUM BROMIDE AND ALBUTEROL SULFATE 3 ML: 2.5; .5 SOLUTION RESPIRATORY (INHALATION) at 15:08

## 2023-11-15 RX ADMIN — CYCLOBENZAPRINE 10 MG: 10 TABLET, FILM COATED ORAL at 16:18

## 2023-11-15 RX ADMIN — Medication 1 TABLET: at 08:50

## 2023-11-15 ASSESSMENT — ACTIVITIES OF DAILY LIVING (ADL): HOME_MANAGEMENT_TIME_ENTRY: 13

## 2023-11-15 ASSESSMENT — PAIN SCALES - GENERAL
PAINLEVEL_OUTOF10: 5 - MODERATE PAIN
PAINLEVEL_OUTOF10: 3
PAINLEVEL_OUTOF10: 9

## 2023-11-15 ASSESSMENT — COGNITIVE AND FUNCTIONAL STATUS - GENERAL
PERSONAL GROOMING: A LITTLE
HELP NEEDED FOR BATHING: A LITTLE
DRESSING REGULAR LOWER BODY CLOTHING: A LITTLE
WALKING IN HOSPITAL ROOM: A LITTLE
PERSONAL GROOMING: A LITTLE
DRESSING REGULAR LOWER BODY CLOTHING: A LITTLE
CLIMB 3 TO 5 STEPS WITH RAILING: A LITTLE
DAILY ACTIVITIY SCORE: 20
MOVING TO AND FROM BED TO CHAIR: A LITTLE
DAILY ACTIVITIY SCORE: 20
TOILETING: A LITTLE
TOILETING: A LITTLE
STANDING UP FROM CHAIR USING ARMS: A LITTLE
MOBILITY SCORE: 20
HELP NEEDED FOR BATHING: A LITTLE

## 2023-11-15 ASSESSMENT — PAIN DESCRIPTION - LOCATION
LOCATION: BACK

## 2023-11-15 ASSESSMENT — PAIN - FUNCTIONAL ASSESSMENT
PAIN_FUNCTIONAL_ASSESSMENT: 0-10

## 2023-11-15 NOTE — PROGRESS NOTES
Gaurav Mckay is a 53 y.o. male on day 7 of admission presenting with Acute massive pulmonary embolism (CMS/HCC).      Subjective   Patient seen and evaluated today. He is on a 3L NC. Was evaluated by the PT today who got him out of bed, patient complained of dizziness on standing. An orthostat was done was done which was positive with BP of 53/20.      Objective     Last Recorded Vitals  /73 (BP Location: Left arm, Patient Position: Lying)   Pulse 64   Temp 36 °C (96.8 °F) (Tympanic)   Resp 13   Wt 122 kg (268 lb 15.4 oz)   SpO2 95%   Intake/Output last 3 Shifts:    Intake/Output Summary (Last 24 hours) at 11/15/2023 1319  Last data filed at 11/15/2023 1200  Gross per 24 hour   Intake 1040 ml   Output 0 ml   Net 1040 ml       Admission Weight  Weight: 127 kg (279 lb 1.6 oz) (11/08/23 1410)    Daily Weight  11/14/23 : 122 kg (268 lb 15.4 oz)    Image Results  XR chest 1 view  Narrative: Interpreted By:  Ilya Miles,   STUDY:  XR CHEST 1 VIEW;  11/15/2023 10:17 am      INDICATION:  Signs/Symptoms:cough, s/p PE.      COMPARISON:  None.  11/06/2023      ACCESSION NUMBER(S):  [id number]      ORDERING CLINICIAN:  ANSON ARROYO      FINDINGS:      The cardiac silhouette is upper limits of normal in size.  Costophrenic angles are sharp. Lungs are clear. The trachea is  midline. There is no pneumothorax. No acute osseous abnormality is  seen. Old right clavicular and left rib fractures are noted.      Impression: 1.  Borderline cardiomegaly. No acute pulmonary process      Signed by: Ilya Miles 11/15/2023 11:11 AM  Dictation workstation:   [id number]      Physical Exam  Constitutional:       Appearance: Normal appearance.   Cardiovascular:      Rate and Rhythm: Normal rate and regular rhythm.      Pulses: Normal pulses.      Heart sounds: Normal heart sounds.   Pulmonary:      Effort: Pulmonary effort is normal.      Breath sounds: Normal breath sounds.   Skin:     General: Skin is warm and dry.    Neurological:      Mental Status: He is alert and oriented to person, place, and time.       Relevant Results  Results for orders placed or performed during the hospital encounter of 11/08/23 (from the past 24 hour(s))   CBC   Result Value Ref Range    WBC 7.8 4.4 - 11.3 x10*3/uL    nRBC 0.0 0.0 - 0.0 /100 WBCs    RBC 5.39 4.50 - 5.90 x10*6/uL    Hemoglobin 15.6 13.5 - 17.5 g/dL    Hematocrit 48.3 41.0 - 52.0 %    MCV 90 80 - 100 fL    MCH 28.9 26.0 - 34.0 pg    MCHC 32.3 32.0 - 36.0 g/dL    RDW 14.1 11.5 - 14.5 %    Platelets 278 150 - 450 x10*3/uL   Renal function panel   Result Value Ref Range    Glucose 103 (H) 74 - 99 mg/dL    Sodium 136 136 - 145 mmol/L    Potassium 4.8 3.5 - 5.3 mmol/L    Chloride 100 98 - 107 mmol/L    Bicarbonate 31 21 - 32 mmol/L    Anion Gap 10 10 - 20 mmol/L    Urea Nitrogen 25 (H) 6 - 23 mg/dL    Creatinine 1.16 0.50 - 1.30 mg/dL    eGFR 75 >60 mL/min/1.73m*2    Calcium 9.6 8.6 - 10.3 mg/dL    Phosphorus 5.6 (H) 2.5 - 4.9 mg/dL    Albumin 3.9 3.4 - 5.0 g/dL           Assessment/Plan   53-year-old male with PMHx of HTN, HLD, COPD, EtOH abuse, laryngeal cancer status post radiation.  Who presented initially to OSH ([facility]) on account of shortness of breath, which has been diagnosed as saddle PE s/p CT angio.  Patient is now transferred for further treatment and evaluation for catheter thrombectomy.     Neurology  #Dizziness  #Orthostatic hypotension   - Patient is complaining of dizziness on standing, states that it is chronic and precedes hospital admission   - Orthostat done was positive with standing BP of 53/20   - will bolus 1L LR   - repeat orthostat post fluid bolus     #BiPolar Disorder   #Anxiety Disorder  - Will continue risperidone 1mg BID  - Continue trazodone 50mg at bedtime   - Continue seroquel 25mg at bedtime       #Nicotine/alcohol use disorder  - No need for CIWA protocols at this time   - patient is a chronic smoker with 20pckyr hx  - Nicoderm patch 7mg/24 hours       #Restless leg Syndrome   - Continue ropinirole 0.5mg      Cardiology   #Hyperlipidemia  - Continue atorvastatin 20mg daily      #HTN  - Continue metoprolol 25mg daily      Pulmonology   #Status post suction thrombectomy for saddle PE   - Patient has continued to remain stable post off  - Continue patient on eliquis 10mg Bid for 7 days (11/9 -11/16) then 5mg BID after   - Patient will be set up with cardiology on outpatient basis      #COPD  -Continue DuoNebs every 4 as needed  - Start breo ellipta   - Prednisone 40mg for 5 days   -Continue incentive spirometry     Gastroenterology  #GERD  -Continue pantoprazole at 40 mg daily.     #Nutrition  -Diet low calorie   -We will obtain dietitian consult for patient dietary counseling for obesity      Renal   No active issue      Heme/Onc  #Esophageal cancer s/p radiation therapy  -s/p radiation therapy with last session being June 2022  -Follow up with ENT/HemeOnc outpatient      MSK  - no active issues      Fluid: oral  Electrolyte: Monitor and replete  Nutrition: diet low ambrosio   Gippx: Pantoprazole  DVTppx: Eliquis 10mg BID   Access: PIV  Code Status: Full     Dispo: ICU for saddle PE. Pending transfer to the floor.Pending SNF placement.     Gabriel Medina MD

## 2023-11-15 NOTE — PROGRESS NOTES
Occupational Therapy    Occupational Therapy Treatment    Name: Gaurav Mckay  MRN: [id number]  : 1970  Date: 11/15/23  Time Calculation  Start Time: 911  Stop Time: 934  Time Calculation (min): 23 min    Assessment:  OT Assessment: Pt is a 54 y/o male who was admitted for saddle PE and underwent pulmonary embolectomy bilaterally; bilateral pulmonary angiography. Pt typically is completely independent with ADL's and IADL's and not using any AD. Today pt presents with pain, dizziness, decreased balance, and endurance. Pt to benefit from continued skilled OT services to increase independence with ADL's, transfers, and mobility.  Prognosis: Excellent  Barriers to Discharge: None  Evaluation/Treatment Tolerance: Patient limited by fatigue  Medical Staff Made Aware: Yes  End of Session Communication: Bedside nurse, Physician (Discussed dizziness in stance and mobility without apparent vital signs indicator. Pt seated in chair with call bell in reach without active alarm or alarm device available in room. Pt agreeable to call for assistance to transfer, nurse aware.)  End of Session Patient Position: Up in chair, Alarm off, caregiver present  Plan:  Treatment Interventions: ADL retraining, Functional transfer training, Endurance training, Patient/family training, Equipment evaluation/education, Compensatory technique education  OT Frequency: 3 times per week  OT Discharge Recommendations: Moderate intensity level of continued care  Equipment Recommended upon Discharge: Wheeled walker  OT Recommended Transfer Status: Stand by assist  OT - OK to Discharge: Yes    Subjective   General:  OT Last Visit  OT Received On: 11/15/23  General  Reason for Referral: 52 yo male admitted 2' to SOB, saddle PEB pulmonary embolectomies   .  Referred By:  (Dr. NICK Medina)  Past Medical History Relevant to Rehab: HTN, HLD, laryngeal CA s/p radiation, bipolar    Family/Caregiver Present: No  Prior to Session Communication: Bedside  nurse (Pt fall yesterday without injury was apparently result of pt sitting in floor while dizzy, per nurse. Okay to continue treatment this am without restrictions.)  Patient Position Received: Bed, 3 rail up, Alarm on  Preferred Learning Style: verbal  General Comment: Pt pleasent and agreeable to therapy this date, pt SOB and dizzy when standing, no significant changes in vital signs. Continues to rapidly fatigue in course of treatment.    Vitals:  Unremarkable in course of session.     Pain Assessment:  Pain Assessment  Pain Assessment:  (denied pain)     Objective   Activities of Daily Living: LE Dressing  LE Dressing: Yes  Pants Level of Assistance: Setup, Close supervision (Stood with SBA and close supervision for doffing old clothing and for pulling pants up over hips from below knees in standing.)  Sock Level of Assistance: Setup, Close supervision  LE Dressing Where Assessed: Edge of bed  LE Dressing Comments: Pt reports dizziness with short duration standing which remains largely unchanged in standing per pt sepite not significant change in BP, HR or OT sats.    Functional Standing Tolerance:  Functional Standing Tolerance  Time: 4 minutes  Activity: Grooming and handwashing at sink.  Functional Standing Tolerance Comments: dizziness with standing resolved after a few minutes. Cues for standing breask provided, now significant change in vital signs noted.  Bed Mobility/Transfers: Bed Mobility  Bed Mobility: Yes  Bed Mobility 1  Bed Mobility 1: Supine to sitting, Sitting to supine  Level of Assistance 1: Distant supervision  Bed Mobility 2  Bed Mobility  2: Scooting  Level of Assistance 2: Close supervision    Transfers  Transfer: Yes  Transfer 1  Transfer From 1: Bed to  Transfer to 1: Stand  Technique 1: Sit to stand, Stand to sit  Transfer Device 1: Walker  Transfer Level of Assistance 1: Close supervision  Trials/Comments 1: Pt demonstrated in multiple trials for ADL, no difficulty with balance noted  apart from pt c/o dizziness in stance.  Transfers 2  Transfer From 2: Bed to  Transfer to 2: Chair with arms  Technique 2: Stand to sit  Transfer Device 2: Walker  Transfer Level of Assistance 2: Close supervision  Trials/Comments 2: Reminders provided for safe hand placement in transitions.    Outcome Measures:  [facility] Daily Activity  Putting on and taking off regular lower body clothing: A little  Bathing (including washing, rinsing, drying): A little  Putting on and taking off regular upper body clothing: None  Toileting, which includes using toilet, bedpan or urinal: A little  Taking care of personal grooming such as brushing teeth: A little  Eating Meals: None  Daily Activity - Total Score: 20        Education Documentation  Handouts, taught by EM Contreras at 11/15/2023 10:11 AM.  Learner: Patient  Readiness: Acceptance  Method: Explanation  Response: Verbalizes Understanding, Demonstrated Understanding, Needs Reinforcement    Body Mechanics, taught by EM Contreras at 11/15/2023 10:11 AM.  Learner: Patient  Readiness: Acceptance  Method: Explanation  Response: Verbalizes Understanding, Demonstrated Understanding, Needs Reinforcement    Precautions, taught by EM Contreras at 11/15/2023 10:11 AM.  Learner: Patient  Readiness: Acceptance  Method: Explanation  Response: Verbalizes Understanding, Demonstrated Understanding, Needs Reinforcement    Home Exercise Program, taught by EM Contreras at 11/15/2023 10:11 AM.  Learner: Patient  Readiness: Acceptance  Method: Explanation  Response: Verbalizes Understanding, Demonstrated Understanding, Needs Reinforcement    ADL Training, taught by EM Contreras at 11/15/2023 10:11 AM.  Learner: Patient  Readiness: Acceptance  Method: Explanation  Response: Verbalizes Understanding, Demonstrated Understanding, Needs Reinforcement    Education Comments  No comments found.    Goals:  Encounter Problems       Encounter Problems (Active)       Balance       Pt  will demo good static/dynamic standing balance during ADL and functional activity with LRAD for improved independence and participation in ADL  (Progressing)       Start:  11/09/23    Expected End:  11/11/23               Dressing Lower Extremities       Patient to complete lower body dressing with LRAD at mod I  (Progressing)       Start:  11/09/23    Expected End:  11/15/23               Endurance       Pt will increase endurance to tolerate 10 min of activity with no more than 1 rest break in order to increase ability to engage in ADL completion.  (Progressing)       Start:  11/09/23    Expected End:  11/11/23               Mobility       Pt will demo increased functional mobility to tolerate tasks necessary to complete ADL routine with LRAD at mod I  (Progressing)       Start:  11/09/23    Expected End:  11/11/23               Toileting       Patient will complete toileting tasks with LRAD at mod I  (Progressing)       Start:  11/09/23    Expected End:  11/15/23               Transfers       Pt to demonstrate transfers with LRAD at mod I  (Progressing)       Start:  11/09/23    Expected End:  11/11/23

## 2023-11-15 NOTE — PROGRESS NOTES
11/15/23 1346   Discharge Planning   Living Arrangements Spouse/significant other   Support Systems Spouse/significant other;Family members;Friends/neighbors   Assistance Needed Patient is from home with significant other, A&O X3 and uses no DME at baseline. Patient is currently requiring O2 which is ACUTE. Per patient he is on room air at home. Patient does not drive.   Type of Residence Private residence   Number of Stairs to Enter Residence 4   Number of Stairs Within Residence 0   Who is requesting discharge planning? Provider   Home or Post Acute Services Post acute facilities (Rehab/SNF/etc)   Type of Post Acute Facility Services Rehab;Skilled nursing   Patient expects to be discharged to: [facility]   Does the patient need discharge transport arranged? Yes   RoundTrip coordination needed? Yes   Has discharge transport been arranged? No     11/13/2023 1315 Patient is awaiting pre-cert for [facility]. Once pre-cert returns patient will DC.      11/15/2023 1345 Patient continues to await pre-cert for [facility]. Once pre-cert returns patient will DC.

## 2023-11-16 LAB
ALBUMIN SERPL BCP-MCNC: 3.8 G/DL (ref 3.4–5)
ANION GAP SERPL CALC-SCNC: 13 MMOL/L (ref 10–20)
BUN SERPL-MCNC: 19 MG/DL (ref 6–23)
CALCIUM SERPL-MCNC: 9.2 MG/DL (ref 8.6–10.3)
CHLORIDE SERPL-SCNC: 101 MMOL/L (ref 98–107)
CO2 SERPL-SCNC: 29 MMOL/L (ref 21–32)
CREAT SERPL-MCNC: 0.94 MG/DL (ref 0.5–1.3)
ERYTHROCYTE [DISTWIDTH] IN BLOOD BY AUTOMATED COUNT: 13.7 % (ref 11.5–14.5)
GFR SERPL CREATININE-BSD FRML MDRD: >90 ML/MIN/1.73M*2
GLUCOSE SERPL-MCNC: 147 MG/DL (ref 74–99)
HCT VFR BLD AUTO: 43.8 % (ref 41–52)
HGB BLD-MCNC: 14.4 G/DL (ref 13.5–17.5)
MAGNESIUM SERPL-MCNC: 2.08 MG/DL (ref 1.6–2.4)
MCH RBC QN AUTO: 29 PG (ref 26–34)
MCHC RBC AUTO-ENTMCNC: 32.9 G/DL (ref 32–36)
MCV RBC AUTO: 88 FL (ref 80–100)
NRBC BLD-RTO: 0 /100 WBCS (ref 0–0)
PHOSPHATE SERPL-MCNC: 4 MG/DL (ref 2.5–4.9)
PLATELET # BLD AUTO: 325 X10*3/UL (ref 150–450)
POTASSIUM SERPL-SCNC: 3.8 MMOL/L (ref 3.5–5.3)
RBC # BLD AUTO: 4.97 X10*6/UL (ref 4.5–5.9)
SODIUM SERPL-SCNC: 139 MMOL/L (ref 136–145)
WBC # BLD AUTO: 10.4 X10*3/UL (ref 4.4–11.3)

## 2023-11-16 PROCEDURE — 2500000005 HC RX 250 GENERAL PHARMACY W/O HCPCS

## 2023-11-16 PROCEDURE — 2500000001 HC RX 250 WO HCPCS SELF ADMINISTERED DRUGS (ALT 637 FOR MEDICARE OP)

## 2023-11-16 PROCEDURE — 83735 ASSAY OF MAGNESIUM: CPT

## 2023-11-16 PROCEDURE — 97110 THERAPEUTIC EXERCISES: CPT | Mod: GP

## 2023-11-16 PROCEDURE — 85027 COMPLETE CBC AUTOMATED: CPT

## 2023-11-16 PROCEDURE — 36415 COLL VENOUS BLD VENIPUNCTURE: CPT

## 2023-11-16 PROCEDURE — 99232 SBSQ HOSP IP/OBS MODERATE 35: CPT

## 2023-11-16 PROCEDURE — 2500000004 HC RX 250 GENERAL PHARMACY W/ HCPCS (ALT 636 FOR OP/ED)

## 2023-11-16 PROCEDURE — 97116 GAIT TRAINING THERAPY: CPT | Mod: GP

## 2023-11-16 PROCEDURE — 94760 N-INVAS EAR/PLS OXIMETRY 1: CPT

## 2023-11-16 PROCEDURE — 2500000002 HC RX 250 W HCPCS SELF ADMINISTERED DRUGS (ALT 637 FOR MEDICARE OP, ALT 636 FOR OP/ED)

## 2023-11-16 PROCEDURE — 1200000002 HC GENERAL ROOM WITH TELEMETRY DAILY

## 2023-11-16 PROCEDURE — S4991 NICOTINE PATCH NONLEGEND: HCPCS

## 2023-11-16 PROCEDURE — 82374 ASSAY BLOOD CARBON DIOXIDE: CPT

## 2023-11-16 RX ORDER — PREDNISONE 20 MG/1
40 TABLET ORAL DAILY
Qty: 6 TABLET | Refills: 0
Start: 2023-11-17 | End: 2023-11-20

## 2023-11-16 RX ORDER — CYCLOBENZAPRINE HCL 5 MG
5 TABLET ORAL ONCE
Status: COMPLETED | OUTPATIENT
Start: 2023-11-16 | End: 2023-11-16

## 2023-11-16 RX ADMIN — FLUTICASONE FUROATE AND VILANTEROL TRIFENATATE 1 PUFF: 200; 25 POWDER RESPIRATORY (INHALATION) at 08:47

## 2023-11-16 RX ADMIN — TRAZODONE HYDROCHLORIDE 50 MG: 50 TABLET ORAL at 21:50

## 2023-11-16 RX ADMIN — ACETAMINOPHEN 975 MG: 325 TABLET ORAL at 11:22

## 2023-11-16 RX ADMIN — ACETAMINOPHEN 975 MG: 325 TABLET ORAL at 21:51

## 2023-11-16 RX ADMIN — LIDOCAINE 1 PATCH: 4 PATCH TOPICAL at 08:47

## 2023-11-16 RX ADMIN — ROPINIROLE HYDROCHLORIDE 0.5 MG: 0.25 TABLET, FILM COATED ORAL at 05:09

## 2023-11-16 RX ADMIN — PREGABALIN 200 MG: 75 CAPSULE ORAL at 21:49

## 2023-11-16 RX ADMIN — ATORVASTATIN CALCIUM 20 MG: 20 TABLET, FILM COATED ORAL at 21:50

## 2023-11-16 RX ADMIN — METOPROLOL TARTRATE 25 MG: 25 TABLET, FILM COATED ORAL at 08:47

## 2023-11-16 RX ADMIN — Medication 1 TABLET: at 21:50

## 2023-11-16 RX ADMIN — RISPERIDONE 1 MG: 0.5 TABLET ORAL at 21:50

## 2023-11-16 RX ADMIN — PREGABALIN 200 MG: 75 CAPSULE ORAL at 15:26

## 2023-11-16 RX ADMIN — Medication 5 MG: at 21:50

## 2023-11-16 RX ADMIN — PREDNISONE 40 MG: 20 TABLET ORAL at 08:47

## 2023-11-16 RX ADMIN — ACETAMINOPHEN 975 MG: 325 TABLET ORAL at 17:50

## 2023-11-16 RX ADMIN — ACETAMINOPHEN 975 MG: 325 TABLET ORAL at 05:09

## 2023-11-16 RX ADMIN — APIXABAN 5 MG: 5 TABLET, FILM COATED ORAL at 08:47

## 2023-11-16 RX ADMIN — Medication 1 TABLET: at 08:47

## 2023-11-16 RX ADMIN — RISPERIDONE 1 MG: 0.5 TABLET ORAL at 08:46

## 2023-11-16 RX ADMIN — NICOTINE 1 PATCH: 14 PATCH, EXTENDED RELEASE TRANSDERMAL at 08:47

## 2023-11-16 RX ADMIN — BENZTROPINE MESYLATE 0.5 MG: 0.5 TABLET ORAL at 08:47

## 2023-11-16 RX ADMIN — APIXABAN 5 MG: 5 TABLET, FILM COATED ORAL at 21:50

## 2023-11-16 RX ADMIN — PANTOPRAZOLE SODIUM 40 MG: 40 TABLET, DELAYED RELEASE ORAL at 08:47

## 2023-11-16 RX ADMIN — BENZTROPINE MESYLATE 0.5 MG: 0.5 TABLET ORAL at 21:49

## 2023-11-16 RX ADMIN — SENNOSIDES AND DOCUSATE SODIUM 1 TABLET: 8.6; 5 TABLET ORAL at 21:49

## 2023-11-16 RX ADMIN — POLYETHYLENE GLYCOL 3350 17 G: 17 POWDER, FOR SOLUTION ORAL at 08:47

## 2023-11-16 RX ADMIN — QUETIAPINE FUMARATE 25 MG: 25 TABLET ORAL at 21:50

## 2023-11-16 RX ADMIN — CYCLOBENZAPRINE HYDROCHLORIDE 5 MG: 5 TABLET, FILM COATED ORAL at 21:49

## 2023-11-16 ASSESSMENT — COGNITIVE AND FUNCTIONAL STATUS - GENERAL
DAILY ACTIVITIY SCORE: 24
MOBILITY SCORE: 24
DAILY ACTIVITIY SCORE: 24
MOBILITY SCORE: 24

## 2023-11-16 ASSESSMENT — PAIN SCALES - GENERAL
PAINLEVEL_OUTOF10: 0 - NO PAIN

## 2023-11-16 ASSESSMENT — PAIN - FUNCTIONAL ASSESSMENT
PAIN_FUNCTIONAL_ASSESSMENT: 0-10

## 2023-11-16 NOTE — SIGNIFICANT EVENT
"Clinical Event Note    Event:    - Seen patient at bedside after transfer from ICU to floor  - Patient hemodynamically stable  - O/E:   Constitutional: AxO 3  Cardiovascular: Regular rate and rhythm, S1, S2. No extra heart sounds or murmurs  Respiratory: Clear to auscultation bilaterally. No wheezing, rales or rhonchi. Good chest wall expansion  Abdomen: Soft, Nontender, Obese. Bowel sounds appreciated  Psychiatric: Appropriate mood and affect   - Had pain in his neck and lower back, continued lidocaine patch and scheduled tylenol 975 mg QID  - Agree with ICU team assessment and plan   - Will continue to monitor    Vital Signs:    Visit Vitals  /74 (BP Location: Right arm, Patient Position: Lying)   Pulse 73   Temp 36 °C (96.8 °F) (Temporal)   Resp 18   Ht 1.854 m (6' 1\")   Wt 126 kg (278 lb 3.5 oz)   SpO2 94%   BMI 36.71 kg/m²   Smoking Status Former   BSA 2.55 m²             Adolfo Agudelo MD  Internal Medicine, PGY- 1  11/15/23 at 9:47 PM     Disclaimer: Documentation completed with the information available at the time of input. The times in the chart may not be reflective of actual patient care times, interventions, or procedures. Documentation occurs after the physical care of the patient.    "

## 2023-11-16 NOTE — PROGRESS NOTES
Physical Therapy    Physical Therapy Treatment    Patient Name: Gaurav Mckay  MRN: [id number]  Today's Date: 11/16/2023  Time Calculation  Start Time: 1123  Stop Time: 1147  Time Calculation (min): 24 min       Assessment/Plan   PT Assessment  PT Assessment Results: Obesity, Decreased endurance  Rehab Prognosis: Good  Medical Staff Made Aware: Yes  Strengths: Ability to acquire knowledge  End of Session Communication: Bedside nurse  End of Session Patient Position: Up in chair, Alarm off, not on at start of session (Pt has his tray table in front of him to eat his lunch. All of his essentials are in reach)  PT Plan  Inpatient/Swing Bed or Outpatient: Inpatient  PT Plan  Treatment/Interventions: Bed mobility, Transfer training, Gait training, Strengthening, Therapeutic exercise  PT Plan: Skilled PT  PT Frequency: 3 times per week  PT Discharge Recommendations: Low intensity level of continued care  Equipment Recommended upon Discharge:  (no device)  PT - OK to Discharge: Yes (progressing towards goals)      General Visit Information:   PT  Visit  PT Received On: 11/16/23  Response to Previous Treatment: Patient with no complaints from previous session.  General  Reason for Referral:  (54 yo male admitted 2' to [facility])  Prior to Session Communication: Bedside nurse  Patient Position Received: Bed, 3 rail up, Alarm off, not on at start of session  General Comment:  (Pt cleared for PT by RN. Pt pleasant and agreeable to work with therapy. Pt was able to increase his ambulation status and performed standing exercises. Recommend Pt have LOW intensity follow up services.)    Subjective   Precautions:     Vital Signs:       Objective   Pain:  Pain Assessment  Pain Assessment: 0-10  Pain Score: 0 - No pain  Cognition:  Cognition  Overall Cognitive Status: Within Functional Limits (A+O x 4)  Postural Control:     Extremity/Trunk Assessments:  RUE   RUE : Within Functional Limits  LUE   LUE: Within Functional  Limits  RLE   RLE : Within Functional Limits  LLE   LLE : Within Functional Limits  Activity Tolerance:  Activity Tolerance  Endurance: Decreased tolerance for upright activites  Treatments:  Therapeutic Exercise  Therapeutic Exercise Performed: Yes  Therapeutic Exercise Activity 1:  (pt performed 10 sit<>stand functional strengthening trials from EOB to wheeled walker. Standing exercises; squats, marching, B hip extension and B hip abd each x 20 reps)    Bed Mobility  Bed Mobility: Yes  Bed Mobility 1  Bed Mobility 1: Supine to sitting  Level of Assistance 1: Close supervision    Ambulation/Gait Training  Ambulation/Gait Training Performed: Yes  Ambulation/Gait Training 1  Surface 1: Level tile  Device 1: No device  Assistance 1: Close supervision  Quality of Gait 1:  (no deviations)  Comments/Distance (ft) 1: 120 feet x 2  Transfers  Transfer: Yes  Transfer 1  Transfer From 1: Bed to  Transfer to 1: Stand  Technique 1: Sit to stand, Stand to sit  Transfer Device 1:  (no device)  Transfer Level of Assistance 1: Close supervision    Outcome Measures:  [facility] Basic Mobility  Turning from your back to your side while in a flat bed without using bedrails: None  Moving from lying on your back to sitting on the side of a flat bed without using bedrails: None  Moving to and from bed to chair (including a wheelchair): A little  Standing up from a chair using your arms (e.g. wheelchair or bedside chair): A little  To walk in hospital room: A little  Climbing 3-5 steps with railing: A little  Basic Mobility - Total Score: 20    Education Documentation  No documentation found.  Education Comments  No comments found.        OP EDUCATION:  Outpatient Education  Individual(s) Educated: Patient  Education Provided: Body Mechanics (bed mobility, transfers, gait and thearputic exercises)  Patient/Caregiver Demonstrated Understanding: yes  Patient Response to Education: Patient/Caregiver Performed Return Demonstration of  Exercises/Activities    Encounter Problems       Encounter Problems (Active)       Mobility       STG - Patient will ambulate 150 feet MOD I with LRD (Progressing)       Start:  11/09/23    Expected End:  11/30/23                   Transfers       STG - Patient to transfer to and from sit to supine independently (Progressing)       Start:  11/09/23    Expected End:  11/30/23            STG - Patient will transfer sit to and from stand MOD I with LRD (Progressing)       Start:  11/09/23    Expected End:  11/30/23

## 2023-11-16 NOTE — PROGRESS NOTES
Gaurav Mckay is a 53 y.o. male on day 8 of admission presenting with Acute massive pulmonary embolism (CMS/HCC).      Subjective   He c/o hoarse voice which he relates to recent procedure. He denies CP, palpitations. He endorses mild SOB and is currently not wearing oxygen. He denies NVDC. He denies leg swelling.        Objective     Last Recorded Vitals  /86 (BP Location: Right arm, Patient Position: Lying)   Pulse 80   Temp 36.3 °C (97.3 °F) (Temporal)   Resp 18   Wt 125 kg (276 lb 6.4 oz)   SpO2 91%   Intake/Output last 3 Shifts:    Intake/Output Summary (Last 24 hours) at 11/16/2023 1426  Last data filed at 11/16/2023 0859  Gross per 24 hour   Intake 1730 ml   Output 2000 ml   Net -270 ml       Admission Weight  Weight: 127 kg (279 lb 1.6 oz) (11/08/23 1410)    Daily Weight  11/16/23 : 125 kg (276 lb 6.4 oz)    Image Results  XR chest 1 view  Narrative: Interpreted By:  Ilya Miles,   STUDY:  XR CHEST 1 VIEW;  11/15/2023 10:17 am      INDICATION:  Signs/Symptoms:cough, s/p PE.      COMPARISON:  None.  11/06/2023      ACCESSION NUMBER(S):  [id number]      ORDERING CLINICIAN:  ANSON ARROYO      FINDINGS:      The cardiac silhouette is upper limits of normal in size.  Costophrenic angles are sharp. Lungs are clear. The trachea is  midline. There is no pneumothorax. No acute osseous abnormality is  seen. Old right clavicular and left rib fractures are noted.      Impression: 1.  Borderline cardiomegaly. No acute pulmonary process      Signed by: Ilya Miles 11/15/2023 11:11 AM  Dictation workstation:   [id number]      Physical Exam  Constitutional:       General: He is not in acute distress.     Appearance: Normal appearance. He is not ill-appearing, toxic-appearing or diaphoretic.   HENT:      Head: Normocephalic and atraumatic.      Nose: Nose normal. No congestion or rhinorrhea.   Eyes:      General: No scleral icterus.        Right eye: No discharge.         Left eye: No discharge.       Extraocular Movements: Extraocular movements intact.      Conjunctiva/sclera: Conjunctivae normal.      Pupils: Pupils are equal, round, and reactive to light.   Cardiovascular:      Rate and Rhythm: Normal rate and regular rhythm.      Heart sounds: Normal heart sounds. No murmur heard.     No friction rub. No gallop.   Pulmonary:      Effort: Pulmonary effort is normal. No respiratory distress.      Breath sounds: No stridor. No wheezing, rhonchi or rales.   Chest:      Chest wall: No tenderness.   Abdominal:      General: Abdomen is flat.      Palpations: Abdomen is soft.      Tenderness: There is no abdominal tenderness. There is no guarding or rebound.   Musculoskeletal:      Cervical back: Normal range of motion.   Skin:     General: Skin is warm and dry.      Coloration: Skin is not jaundiced.      Findings: No bruising or erythema.   Neurological:      General: No focal deficit present.      Mental Status: He is alert and oriented to person, place, and time. Mental status is at baseline.      Motor: No weakness.   Psychiatric:         Mood and Affect: Mood normal.         Behavior: Behavior normal.         Thought Content: Thought content normal.         Judgment: Judgment normal.         Relevant Results  Scheduled medications  acetaminophen, 975 mg, oral, q6h  apixaban, 5 mg, oral, BID  atorvastatin, 20 mg, oral, Nightly  benztropine, 0.5 mg, oral, BID  calcium carbonate-vitamin D3, 1 tablet, oral, BID  fluticasone furoate-vilanteroL, 1 puff, inhalation, Daily  lidocaine, 1 patch, transdermal, Daily  metoprolol tartrate, 25 mg, oral, Daily  nicotine, 1 patch, transdermal, Daily  pantoprazole, 40 mg, oral, Daily before breakfast  polyethylene glycol, 17 g, oral, Daily  predniSONE, 40 mg, oral, Daily  pregabalin, 200 mg, oral, TID  QUEtiapine, 25 mg, oral, Nightly  risperiDONE, 1 mg, oral, BID  rOPINIRole, 0.5 mg, oral, Daily  sennosides-docusate sodium, 1 tablet, oral, Nightly  traZODone, 50 mg, oral,  Nightly         PRN medications  PRN medications: albuterol, cyclobenzaprine, ipratropium-albuteroL, melatonin, oxygen    Results for orders placed or performed during the hospital encounter of 11/08/23 (from the past 24 hour(s))   CBC   Result Value Ref Range    WBC 10.4 4.4 - 11.3 x10*3/uL    nRBC 0.0 0.0 - 0.0 /100 WBCs    RBC 4.97 4.50 - 5.90 x10*6/uL    Hemoglobin 14.4 13.5 - 17.5 g/dL    Hematocrit 43.8 41.0 - 52.0 %    MCV 88 80 - 100 fL    MCH 29.0 26.0 - 34.0 pg    MCHC 32.9 32.0 - 36.0 g/dL    RDW 13.7 11.5 - 14.5 %    Platelets 325 150 - 450 x10*3/uL   Renal Function Panel   Result Value Ref Range    Glucose 147 (H) 74 - 99 mg/dL    Sodium 139 136 - 145 mmol/L    Potassium 3.8 3.5 - 5.3 mmol/L    Chloride 101 98 - 107 mmol/L    Bicarbonate 29 21 - 32 mmol/L    Anion Gap 13 10 - 20 mmol/L    Urea Nitrogen 19 6 - 23 mg/dL    Creatinine 0.94 0.50 - 1.30 mg/dL    eGFR >90 >60 mL/min/1.73m*2    Calcium 9.2 8.6 - 10.3 mg/dL    Phosphorus 4.0 2.5 - 4.9 mg/dL    Albumin 3.8 3.4 - 5.0 g/dL   Magnesium   Result Value Ref Range    Magnesium 2.08 1.60 - 2.40 mg/dL        Assessment/Plan        Gaurav Mckay is a 52 yo M who was transferred to the inpatient floor from the ICU on 11/15. He initially presented 11/7 with massive PE and was transferred to tertiary center for treatment with mechanical thrombectomy on 11/8.     #massive PE s/p thrombectomy:  -on Eliquis 5 mg BID    #BiPolar Disorder   #Anxiety Disorder  - Will continue risperidone 1mg BID  - Continue trazodone 50mg at bedtime   - Continue seroquel 25mg at bedtime       #Nicotine/alcohol use disorder  - No need for CIWA protocols at this time   - patient is a chronic smoker with 20pckyr hx  - Nicoderm patch 7mg/24 hours      #Restless leg Syndrome   - Continue ropinirole 0.5mg      #Hyperlipidemia  - Continue atorvastatin 20mg daily      #HTN  - Continue metoprolol 25mg daily      #COPD  -Continue DuoNebs every 4 as needed  - Start breo ellipta   -  Prednisone 40mg for 5 days   -Continue incentive spirometry     #GERD  -Continue pantoprazole at 40 mg daily.    Fluid: oral  Electrolyte: Monitor and replete  Nutrition: diet low ambrosio   Gippx: Pantoprazole  DVTppx: Eliquis 5mg BID   Access: PIV  Code Status: Full     Dispo: Pending SNF placement.     Starla Shankar DO

## 2023-11-17 VITALS
HEART RATE: 75 BPM | HEIGHT: 73 IN | OXYGEN SATURATION: 93 % | SYSTOLIC BLOOD PRESSURE: 139 MMHG | WEIGHT: 276.4 LBS | DIASTOLIC BLOOD PRESSURE: 88 MMHG | TEMPERATURE: 97.7 F | RESPIRATION RATE: 17 BRPM | BODY MASS INDEX: 36.63 KG/M2

## 2023-11-17 PROBLEM — I26.99 ACUTE MASSIVE PULMONARY EMBOLISM (MULTI): Status: RESOLVED | Noted: 2023-11-08 | Resolved: 2023-11-17

## 2023-11-17 PROBLEM — I26.99 PULMONARY EMBOLISM (MULTI): Status: RESOLVED | Noted: 2023-11-08 | Resolved: 2023-11-17

## 2023-11-17 LAB
ALBUMIN SERPL BCP-MCNC: 3.8 G/DL (ref 3.4–5)
ANION GAP SERPL CALC-SCNC: 12 MMOL/L (ref 10–20)
BUN SERPL-MCNC: 21 MG/DL (ref 6–23)
CALCIUM SERPL-MCNC: 9.3 MG/DL (ref 8.6–10.3)
CHLORIDE SERPL-SCNC: 102 MMOL/L (ref 98–107)
CO2 SERPL-SCNC: 30 MMOL/L (ref 21–32)
CREAT SERPL-MCNC: 1.1 MG/DL (ref 0.5–1.3)
ERYTHROCYTE [DISTWIDTH] IN BLOOD BY AUTOMATED COUNT: 14 % (ref 11.5–14.5)
GFR SERPL CREATININE-BSD FRML MDRD: 80 ML/MIN/1.73M*2
GLUCOSE SERPL-MCNC: 89 MG/DL (ref 74–99)
HCT VFR BLD AUTO: 44 % (ref 41–52)
HGB BLD-MCNC: 14.1 G/DL (ref 13.5–17.5)
MAGNESIUM SERPL-MCNC: 2.08 MG/DL (ref 1.6–2.4)
MCH RBC QN AUTO: 28.7 PG (ref 26–34)
MCHC RBC AUTO-ENTMCNC: 32 G/DL (ref 32–36)
MCV RBC AUTO: 90 FL (ref 80–100)
NRBC BLD-RTO: 0 /100 WBCS (ref 0–0)
PHOSPHATE SERPL-MCNC: 4.5 MG/DL (ref 2.5–4.9)
PLATELET # BLD AUTO: 313 X10*3/UL (ref 150–450)
POTASSIUM SERPL-SCNC: 3.9 MMOL/L (ref 3.5–5.3)
RBC # BLD AUTO: 4.91 X10*6/UL (ref 4.5–5.9)
SODIUM SERPL-SCNC: 140 MMOL/L (ref 136–145)
WBC # BLD AUTO: 10.2 X10*3/UL (ref 4.4–11.3)

## 2023-11-17 PROCEDURE — 83735 ASSAY OF MAGNESIUM: CPT

## 2023-11-17 PROCEDURE — S4991 NICOTINE PATCH NONLEGEND: HCPCS

## 2023-11-17 PROCEDURE — 2500000001 HC RX 250 WO HCPCS SELF ADMINISTERED DRUGS (ALT 637 FOR MEDICARE OP)

## 2023-11-17 PROCEDURE — 99238 HOSP IP/OBS DSCHRG MGMT 30/<: CPT | Performed by: STUDENT IN AN ORGANIZED HEALTH CARE EDUCATION/TRAINING PROGRAM

## 2023-11-17 PROCEDURE — 94760 N-INVAS EAR/PLS OXIMETRY 1: CPT

## 2023-11-17 PROCEDURE — 36415 COLL VENOUS BLD VENIPUNCTURE: CPT

## 2023-11-17 PROCEDURE — 85027 COMPLETE CBC AUTOMATED: CPT

## 2023-11-17 PROCEDURE — 2500000004 HC RX 250 GENERAL PHARMACY W/ HCPCS (ALT 636 FOR OP/ED)

## 2023-11-17 PROCEDURE — 2500000005 HC RX 250 GENERAL PHARMACY W/O HCPCS

## 2023-11-17 PROCEDURE — 80069 RENAL FUNCTION PANEL: CPT

## 2023-11-17 PROCEDURE — 2500000002 HC RX 250 W HCPCS SELF ADMINISTERED DRUGS (ALT 637 FOR MEDICARE OP, ALT 636 FOR OP/ED)

## 2023-11-17 RX ADMIN — FLUTICASONE FUROATE AND VILANTEROL TRIFENATATE 1 PUFF: 200; 25 POWDER RESPIRATORY (INHALATION) at 08:41

## 2023-11-17 RX ADMIN — APIXABAN 5 MG: 5 TABLET, FILM COATED ORAL at 08:31

## 2023-11-17 RX ADMIN — PREDNISONE 40 MG: 20 TABLET ORAL at 08:30

## 2023-11-17 RX ADMIN — BENZTROPINE MESYLATE 0.5 MG: 0.5 TABLET ORAL at 08:40

## 2023-11-17 RX ADMIN — PANTOPRAZOLE SODIUM 40 MG: 40 TABLET, DELAYED RELEASE ORAL at 08:31

## 2023-11-17 RX ADMIN — RISPERIDONE 1 MG: 0.5 TABLET ORAL at 08:30

## 2023-11-17 RX ADMIN — METOPROLOL TARTRATE 25 MG: 25 TABLET, FILM COATED ORAL at 08:29

## 2023-11-17 RX ADMIN — ACETAMINOPHEN 975 MG: 325 TABLET ORAL at 05:20

## 2023-11-17 RX ADMIN — NICOTINE 1 PATCH: 14 PATCH, EXTENDED RELEASE TRANSDERMAL at 08:43

## 2023-11-17 RX ADMIN — CYCLOBENZAPRINE 10 MG: 10 TABLET, FILM COATED ORAL at 05:20

## 2023-11-17 RX ADMIN — ACETAMINOPHEN 975 MG: 325 TABLET ORAL at 12:15

## 2023-11-17 RX ADMIN — Medication 1 TABLET: at 08:31

## 2023-11-17 RX ADMIN — LIDOCAINE 1 PATCH: 4 PATCH TOPICAL at 08:42

## 2023-11-17 RX ADMIN — ROPINIROLE HYDROCHLORIDE 0.5 MG: 0.25 TABLET, FILM COATED ORAL at 05:20

## 2023-11-17 RX ADMIN — PREGABALIN 200 MG: 75 CAPSULE ORAL at 08:30

## 2023-11-17 ASSESSMENT — PAIN SCALES - GENERAL: PAINLEVEL_OUTOF10: 3

## 2023-11-17 NOTE — PROGRESS NOTES
11/17/23 1130   Discharge Planning   Living Arrangements Spouse/significant other   Support Systems Spouse/significant other;Family members;Friends/neighbors   Type of Residence Private residence   Number of Stairs to Enter Residence 4   Number of Stairs Within Residence 0   Who is requesting discharge planning? Provider   Home or Post Acute Services None   Patient expects to be discharged to: Home     11/14/2023 1030: Spoke to patient at bedside to confirm changes to discharge plan due to his improvement. Patient feels confident he will do well at home, discussed the benefits of home health care, patient declined need. Provider made aware patient requested RX for walker at this time. Call placed to patient pharmacy for pricing of new medications, no copay for new eliquis.

## 2023-11-17 NOTE — CARE PLAN
The patient's goals for the shift include  discharging to facility    The clinical goals for the shift include Patient will participate in PT/OT today

## 2023-11-18 NOTE — DISCHARGE SUMMARY
Discharge Diagnosis  Acute massive pulmonary embolism (CMS/HCC)    Issues Requiring Follow-Up  None     Discharge Meds     Your medication list        START taking these medications        Instructions Last Dose Given Next Dose Due   acetaminophen 325 mg tablet  Commonly known as: Tylenol      Take 2 tablets (650 mg) by mouth every 6 hours if needed for mild pain (1 - 3).       apixaban 5 mg tablet  Commonly known as: Eliquis      Take 2 tablets (10 mg) by mouth 2 times a day for 9 doses.       apixaban 5 mg tablet  Commonly known as: Eliquis      Take 1 tablet (5 mg) by mouth 2 times a day. Do not start before November 16, 2023.       ipratropium-albuteroL 0.5-2.5 mg/3 mL nebulizer solution  Commonly known as: Duo-Neb      Take 3 mL by nebulization 3 times a day.       ipratropium-albuteroL 0.5-2.5 mg/3 mL nebulizer solution  Commonly known as: Duo-Neb      Take 3 mL by nebulization every 2 hours if needed for wheezing.       polyethylene glycol 17 gram packet  Commonly known as: Glycolax, Miralax      Take 17 g by mouth once daily.       predniSONE 20 mg tablet  Commonly known as: Deltasone      Take 2 tablets (40 mg) by mouth once daily for 3 doses. Do not start before November 17, 2023.       sennosides-docusate sodium 8.6-50 mg tablet  Commonly known as: Marya-Colace      Take 1 tablet by mouth once daily at bedtime.              CHANGE how you take these medications        Instructions Last Dose Given Next Dose Due   risperiDONE 1 mg tablet  Commonly known as: RisperDAL  What changed: Another medication with the same name was removed. Continue taking this medication, and follow the directions you see here.                  CONTINUE taking these medications        Instructions Last Dose Given Next Dose Due   atorvastatin 20 mg tablet  Commonly known as: Lipitor           benztropine 0.5 mg tablet  Commonly known as: Cogentin           calcium carbonate-vitamin D3 500 mg-5 mcg (200 unit) tablet            celecoxib 200 mg capsule  Commonly known as: CeleBREX           cyclobenzaprine 10 mg tablet  Commonly known as: Flexeril           disulfiram 250 mg tablet  Commonly known as: Antabuse           docusate sodium 100 mg capsule  Commonly known as: Colace           DULoxetine 60 mg DR capsule  Commonly known as: Cymbalta           DULoxetine 30 mg DR capsule  Commonly known as: Cymbalta           melatonin 10 mg tablet,disintegrating           metoprolol tartrate 25 mg tablet  Commonly known as: Lopressor           montelukast 10 mg tablet  Commonly known as: Singulair           nicotine polacrilex 2 mg lozenge  Commonly known as: Commit           omeprazole 40 mg DR capsule  Commonly known as: PriLOSEC           potassium chloride ER 10 mEq ER capsule  Commonly known as: Micro-K           pregabalin 200 mg capsule  Commonly known as: Lyrica           Proventil HFA 90 mcg/actuation inhaler  Generic drug: albuterol           QUEtiapine 25 mg tablet  Commonly known as: SEROquel           rOPINIRole 0.5 mg tablet  Commonly known as: Requip           simvastatin 40 mg tablet  Commonly known as: Zocor           topiramate 100 mg tablet  Commonly known as: Topamax           traZODone 50 mg tablet  Commonly known as: Desyrel                  STOP taking these medications      loratadine 10 mg tablet  Commonly known as: Claritin        meclizine 25 mg tablet  Commonly known as: Antivert                  Where to Get Your Medications        These medications were sent to [facility]/pharmacy #[id number] - [place], [place] - [address] [address]      Phone: [phone]   acetaminophen 325 mg tablet  apixaban 5 mg tablet  apixaban 5 mg tablet  ipratropium-albuteroL 0.5-2.5 mg/3 mL nebulizer solution  ipratropium-albuteroL 0.5-2.5 mg/3 mL nebulizer solution  polyethylene glycol 17 gram packet  sennosides-docusate sodium 8.6-50 mg tablet       Information about where to get these medications is not yet available     Ask your nurse or doctor about these medications  predniSONE 20 mg tablet         Test Results Pending At Discharge  Pending Labs       No current pending labs.            Hospital Course  Gaurav Mckay is a 53-year-old male with PMHx of HTN, HLD, COPD, EtOH abuse, aggressive insufficiency, laryngeal cancer status post radiation.  Who presented initially to OSH ([facility]) on account of shortness of breath, which has been diagnosed as saddle PE s/p CT angio     Patient was in his usual state of health until about 1 week prior to presentation at OSH when he noticed sudden onset of worsening shortness of breath and nonproductive cough which was progressive.  At about the same time patient noted he was having some chest discomfort, which he described as congestion.  He denied associated wheezing, palpitation, orthopnea, PND, fever or chills.  Patient stated that shortness of breath was initially on mild exertion, but continued to progress to SOB at rest.  On account of worsening SOB, patient decided to present to the ED at [facility].  There is no history of weakness, slurred speech, change in bowel habits, abdominal distention, pedal edema, or calf swelling.     At the OSH, patient was initially admitted to the ED where CXR was done which was unremarkable.  Labs done were unremarkable except for mild leukocytosis.  Chest x-ray done was unremarkable, duplex ultrasound done was unremarkable however CT angio was consistent with saddle embolism and bilateral pulmonary embolism.  Patient was admitted to the ICU where he was started on heparin gtt. pulmonologist was consulted, and after evaluation recommended transfer to tertiary  center for possible catheter thrombectomy. Patient was prepped for a catheter directed suction embolectomy which was done successfully. There was no immediate intra-op and or post op complications. Patient continued to remain stable and was transitioned to Eliquis. Ambulatory O2 completed, no  supplemental oxygen required.  At this time patient is hemodynamically stable and ready to be discharged home.      Pertinent Physical Exam At Time of Discharge  Physical Exam  Vitals and nursing note reviewed.   Constitutional:       Appearance: Normal appearance.   Cardiovascular:      Rate and Rhythm: Normal rate and regular rhythm.      Pulses: Normal pulses.      Heart sounds: Normal heart sounds.   Pulmonary:      Effort: Pulmonary effort is normal.      Breath sounds: Normal breath sounds.   Abdominal:      Palpations: Abdomen is soft.   Skin:     General: Skin is warm and dry.   Neurological:      Mental Status: He is alert and oriented to person, place, and time.         Outpatient Follow-Up  No future appointments.      Ryan Asencio MD

## 2023-11-20 NOTE — SIGNIFICANT EVENT
Follow Up Phone Call    Outgoing phone call    Spoke to: Gaurav Mckay Relationship:self   Phone number: [phone]      Outcome: I left a message on answering machine   No chief complaint on file.         Diagnosis:Not applicable

## 2024-02-08 ENCOUNTER — HOSPITAL ENCOUNTER (EMERGENCY)
Facility: HOSPITAL | Age: 54
Discharge: HOME | End: 2024-02-08
Payer: COMMERCIAL

## 2024-02-08 ENCOUNTER — APPOINTMENT (OUTPATIENT)
Dept: RADIOLOGY | Facility: HOSPITAL | Age: 54
End: 2024-02-08
Payer: COMMERCIAL

## 2024-02-08 ENCOUNTER — APPOINTMENT (OUTPATIENT)
Dept: CARDIOLOGY | Facility: HOSPITAL | Age: 54
End: 2024-02-08
Payer: COMMERCIAL

## 2024-02-08 VITALS
BODY MASS INDEX: 39.82 KG/M2 | SYSTOLIC BLOOD PRESSURE: 117 MMHG | HEART RATE: 91 BPM | RESPIRATION RATE: 17 BRPM | DIASTOLIC BLOOD PRESSURE: 72 MMHG | OXYGEN SATURATION: 100 % | TEMPERATURE: 98.7 F | HEIGHT: 72 IN | WEIGHT: 294 LBS

## 2024-02-08 DIAGNOSIS — R19.7 DIARRHEA, UNSPECIFIED TYPE: ICD-10-CM

## 2024-02-08 DIAGNOSIS — R10.33 PERIUMBILICAL ABDOMINAL PAIN: Primary | ICD-10-CM

## 2024-02-08 LAB
ALBUMIN SERPL BCP-MCNC: 4.1 G/DL (ref 3.4–5)
ALP SERPL-CCNC: 104 U/L (ref 33–120)
ALT SERPL W P-5'-P-CCNC: 30 U/L (ref 10–52)
ANION GAP SERPL CALC-SCNC: 15 MMOL/L (ref 10–20)
AST SERPL W P-5'-P-CCNC: 35 U/L (ref 9–39)
ATRIAL RATE: 109 BPM
BASOPHILS # BLD AUTO: 0.03 X10*3/UL (ref 0–0.1)
BASOPHILS NFR BLD AUTO: 0.4 %
BILIRUB SERPL-MCNC: 0.5 MG/DL (ref 0–1.2)
BUN SERPL-MCNC: 14 MG/DL (ref 6–23)
CALCIUM SERPL-MCNC: 8.8 MG/DL (ref 8.6–10.3)
CARDIAC TROPONIN I PNL SERPL HS: 4 NG/L (ref 0–20)
CHLORIDE SERPL-SCNC: 106 MMOL/L (ref 98–107)
CO2 SERPL-SCNC: 21 MMOL/L (ref 21–32)
CREAT SERPL-MCNC: 1.05 MG/DL (ref 0.5–1.3)
EGFRCR SERPLBLD CKD-EPI 2021: 85 ML/MIN/1.73M*2
EOSINOPHIL # BLD AUTO: 0.22 X10*3/UL (ref 0–0.7)
EOSINOPHIL NFR BLD AUTO: 2.7 %
ERYTHROCYTE [DISTWIDTH] IN BLOOD BY AUTOMATED COUNT: 14.6 % (ref 11.5–14.5)
FLUAV RNA RESP QL NAA+PROBE: NOT DETECTED
FLUBV RNA RESP QL NAA+PROBE: NOT DETECTED
GLUCOSE SERPL-MCNC: 107 MG/DL (ref 74–99)
HCT VFR BLD AUTO: 43.4 % (ref 41–52)
HGB BLD-MCNC: 14.2 G/DL (ref 13.5–17.5)
IMM GRANULOCYTES # BLD AUTO: 0.03 X10*3/UL (ref 0–0.7)
IMM GRANULOCYTES NFR BLD AUTO: 0.4 % (ref 0–0.9)
LIPASE SERPL-CCNC: 17 U/L (ref 9–82)
LYMPHOCYTES # BLD AUTO: 0.68 X10*3/UL (ref 1.2–4.8)
LYMPHOCYTES NFR BLD AUTO: 8.3 %
MAGNESIUM SERPL-MCNC: 1.75 MG/DL (ref 1.6–2.4)
MCH RBC QN AUTO: 27.5 PG (ref 26–34)
MCHC RBC AUTO-ENTMCNC: 32.7 G/DL (ref 32–36)
MCV RBC AUTO: 84 FL (ref 80–100)
MONOCYTES # BLD AUTO: 0.55 X10*3/UL (ref 0.1–1)
MONOCYTES NFR BLD AUTO: 6.7 %
NEUTROPHILS # BLD AUTO: 6.67 X10*3/UL (ref 1.2–7.7)
NEUTROPHILS NFR BLD AUTO: 81.5 %
NRBC BLD-RTO: 0 /100 WBCS (ref 0–0)
P AXIS: 68 DEGREES
P OFFSET: 201 MS
P ONSET: 153 MS
PLATELET # BLD AUTO: 198 X10*3/UL (ref 150–450)
POTASSIUM SERPL-SCNC: 3.9 MMOL/L (ref 3.5–5.3)
PR INTERVAL: 140 MS
PROT SERPL-MCNC: 7.4 G/DL (ref 6.4–8.2)
Q ONSET: 223 MS
QRS COUNT: 18 BEATS
QRS DURATION: 72 MS
QT INTERVAL: 334 MS
QTC CALCULATION(BAZETT): 449 MS
QTC FREDERICIA: 407 MS
R AXIS: 30 DEGREES
RBC # BLD AUTO: 5.17 X10*6/UL (ref 4.5–5.9)
SARS-COV-2 RNA RESP QL NAA+PROBE: NOT DETECTED
SODIUM SERPL-SCNC: 138 MMOL/L (ref 136–145)
T AXIS: 66 DEGREES
T OFFSET: 390 MS
VENTRICULAR RATE: 109 BPM
WBC # BLD AUTO: 8.2 X10*3/UL (ref 4.4–11.3)

## 2024-02-08 PROCEDURE — 2500000004 HC RX 250 GENERAL PHARMACY W/ HCPCS (ALT 636 FOR OP/ED): Mod: SE

## 2024-02-08 PROCEDURE — 83735 ASSAY OF MAGNESIUM: CPT

## 2024-02-08 PROCEDURE — 84484 ASSAY OF TROPONIN QUANT: CPT

## 2024-02-08 PROCEDURE — 80053 COMPREHEN METABOLIC PANEL: CPT

## 2024-02-08 PROCEDURE — 83690 ASSAY OF LIPASE: CPT

## 2024-02-08 PROCEDURE — 2550000001 HC RX 255 CONTRASTS: Mod: SE

## 2024-02-08 PROCEDURE — 96361 HYDRATE IV INFUSION ADD-ON: CPT

## 2024-02-08 PROCEDURE — 96375 TX/PRO/DX INJ NEW DRUG ADDON: CPT

## 2024-02-08 PROCEDURE — 74177 CT ABD & PELVIS W/CONTRAST: CPT | Performed by: RADIOLOGY

## 2024-02-08 PROCEDURE — 36415 COLL VENOUS BLD VENIPUNCTURE: CPT

## 2024-02-08 PROCEDURE — 96372 THER/PROPH/DIAG INJ SC/IM: CPT

## 2024-02-08 PROCEDURE — 99285 EMERGENCY DEPT VISIT HI MDM: CPT | Mod: 25,27

## 2024-02-08 PROCEDURE — 96374 THER/PROPH/DIAG INJ IV PUSH: CPT

## 2024-02-08 PROCEDURE — 99284 EMERGENCY DEPT VISIT MOD MDM: CPT | Mod: 25,27

## 2024-02-08 PROCEDURE — 85025 COMPLETE CBC W/AUTO DIFF WBC: CPT

## 2024-02-08 PROCEDURE — 87636 SARSCOV2 & INF A&B AMP PRB: CPT

## 2024-02-08 PROCEDURE — 93005 ELECTROCARDIOGRAM TRACING: CPT

## 2024-02-08 PROCEDURE — 74177 CT ABD & PELVIS W/CONTRAST: CPT

## 2024-02-08 RX ORDER — DICYCLOMINE HYDROCHLORIDE 10 MG/ML
20 INJECTION INTRAMUSCULAR ONCE
Status: COMPLETED | OUTPATIENT
Start: 2024-02-08 | End: 2024-02-08

## 2024-02-08 RX ORDER — KETOROLAC TROMETHAMINE 15 MG/ML
15 INJECTION, SOLUTION INTRAMUSCULAR; INTRAVENOUS ONCE
Status: COMPLETED | OUTPATIENT
Start: 2024-02-08 | End: 2024-02-08

## 2024-02-08 RX ORDER — ONDANSETRON HYDROCHLORIDE 2 MG/ML
4 INJECTION, SOLUTION INTRAVENOUS ONCE
Status: COMPLETED | OUTPATIENT
Start: 2024-02-08 | End: 2024-02-08

## 2024-02-08 RX ORDER — DICYCLOMINE HYDROCHLORIDE 20 MG/1
20 TABLET ORAL 2 TIMES DAILY
Qty: 14 TABLET | Refills: 0 | Status: SHIPPED | OUTPATIENT
Start: 2024-02-08 | End: 2024-02-15

## 2024-02-08 RX ADMIN — ONDANSETRON 4 MG: 2 INJECTION INTRAMUSCULAR; INTRAVENOUS at 16:08

## 2024-02-08 RX ADMIN — DICYCLOMINE HYDROCHLORIDE 20 MG: 20 INJECTION, SOLUTION INTRAMUSCULAR at 18:30

## 2024-02-08 RX ADMIN — IOHEXOL 75 ML: 350 INJECTION, SOLUTION INTRAVENOUS at 16:36

## 2024-02-08 RX ADMIN — KETOROLAC TROMETHAMINE 15 MG: 15 INJECTION INTRAMUSCULAR; INTRAVENOUS at 16:08

## 2024-02-08 RX ADMIN — SODIUM CHLORIDE, POTASSIUM CHLORIDE, SODIUM LACTATE AND CALCIUM CHLORIDE 1000 ML: 600; 310; 30; 20 INJECTION, SOLUTION INTRAVENOUS at 16:09

## 2024-02-08 ASSESSMENT — PAIN SCALES - GENERAL
PAINLEVEL_OUTOF10: 7
PAINLEVEL_OUTOF10: 5 - MODERATE PAIN
PAINLEVEL_OUTOF10: 6

## 2024-02-08 ASSESSMENT — PAIN - FUNCTIONAL ASSESSMENT
PAIN_FUNCTIONAL_ASSESSMENT: 0-10

## 2024-02-08 ASSESSMENT — COLUMBIA-SUICIDE SEVERITY RATING SCALE - C-SSRS
1. IN THE PAST MONTH, HAVE YOU WISHED YOU WERE DEAD OR WISHED YOU COULD GO TO SLEEP AND NOT WAKE UP?: NO
6. HAVE YOU EVER DONE ANYTHING, STARTED TO DO ANYTHING, OR PREPARED TO DO ANYTHING TO END YOUR LIFE?: NO
2. HAVE YOU ACTUALLY HAD ANY THOUGHTS OF KILLING YOURSELF?: NO

## 2024-02-08 ASSESSMENT — PAIN DESCRIPTION - DESCRIPTORS: DESCRIPTORS: ACHING

## 2024-02-08 ASSESSMENT — PAIN DESCRIPTION - PAIN TYPE: TYPE: ACUTE PAIN

## 2024-02-08 ASSESSMENT — PAIN DESCRIPTION - LOCATION: LOCATION: ABDOMEN

## 2024-02-08 NOTE — Clinical Note
Gaurav Mckay was seen and treated in our emergency department on 2/8/2024.  He may return to work on 02/12/2024.       If you have any questions or concerns, please don't hesitate to call.      Alma Moore PA-C

## 2024-02-08 NOTE — ED PROVIDER NOTES
HPI   Chief Complaint   Patient presents with    Abdominal Pain     Abdominal pain diarrhea x4 today       Is a 53-year-old male with significant PMH of alcohol abuse presents to the ED with cc of periumbilical abdominal pain times this morning.  Patient states pain is constant denies any aggravating factors.  Patient denies any radiation.  Patient states he has a history of pancreatitis but this feels different.  Patient states normally pancreatitis is higher.  Patient has had 4 episodes of diarrhea today that he describes as watery.  Patient is unsure of the color.  Patient is feeling nauseous denies any emesis.  Patient has not had any p.o. intake today and does not have an appetite.  Patient denies any contacts with similar symptoms.  Patient denies any recent antibiotics or travel.  Patient denies any new foods.  Patient denies any fever chills, congestion cough pharyngitis, chest pain, shortness of breath, dysuria, hematuria.  Denies any history of abdominal surgeries.  Patient smokes 1 pack/day denies drug abuse.  Patient states he is 7 months sober.                          No data recorded                   Patient History   Past Medical History:   Diagnosis Date    Acute upper respiratory infection, unspecified 03/20/2015    Acute upper respiratory infection    Alcohol abuse, in remission 02/12/2015    History of ETOH abuse    Alcohol dependence, in remission (CMS/[facility]) 05/07/2015    Alcohol dependence in remission    Allergic contact dermatitis due to plants, except food 05/21/2014    Contact dermatitis due to poison ivy    Allergy status to unspecified drugs, medicaments and biological substances 08/29/2013    History of allergy    Anxiety disorder, unspecified 03/20/2015    Anxiety    Bipolar disorder, current episode manic without psychotic features, unspecified (CMS/[facility])     Bipolar disorder, current episode manic without psychotic features    Cervicalgia     Cervicalgia    Chronic obstructive  pulmonary disease, unspecified (CMS/[facility]) 12/02/2014    Chronic asthmatic bronchitis    Encounter for immunization 09/22/2016    Flu vaccine need    Other cervical disc degeneration, unspecified cervical region     Degeneration of cervical intervertebral disc    Other conditions influencing health status 06/27/2013    Acute Inflammation Of The Orbit    Other long term (current) drug therapy 06/02/2015    High risk medication use    Other specified health status     No pertinent past surgical history    Personal history of nicotine dependence 08/16/2017    History of tobacco abuse    Personal history of nicotine dependence 06/26/2017    History of nicotine dependence    Personal history of other diseases of the digestive system 04/11/2013    History of gastroenteritis    Personal history of other diseases of the digestive system 08/27/2015    History of esophageal reflux    Personal history of other diseases of the respiratory system 03/20/2017    History of sore throat    Personal history of other diseases of the respiratory system 02/05/2014    History of sinusitis    Personal history of other diseases of the respiratory system 01/16/2014    History of allergic rhinitis    Personal history of other diseases of the respiratory system 09/26/2013    History of acute sinusitis    Personal history of other diseases of the respiratory system 06/26/2014    History of allergic rhinitis    Personal history of other infectious and parasitic diseases     History of hepatitis C    Personal history of other mental and behavioral disorders     History of depression    Personal history of other specified conditions 08/25/2016    History of dizziness    Personal history of other specified conditions 02/16/2017    History of abdominal pain    Unspecified asthma, uncomplicated 02/13/2014    Asthmatic bronchitis     Past Surgical History:   Procedure Laterality Date    CT ABDOMEN PELVIS ANGIOGRAM W AND/OR WO IV CONTRAST  4/1/2020     CT ABDOMEN PELVIS ANGIOGRAM W AND/OR WO IV CONTRAST [facility]    INVASIVE VASCULAR PROCEDURE N/A 2023    Procedure: Pulmonary Angiogram;  Surgeon: Surya Templeton MD;  Location: [facility] Cardiac Cath [facility];  Service: Cardiovascular;  Laterality: N/A;    MR HEAD ANGIO WO IV CONTRAST  2016    MR HEAD ANGIO WO IV CONTRAST 2016 [facility]    MR NECK ANGIO WO IV CONTRAST  2016    MR NECK ANGIO WO IV CONTRAST 2016 [facility]     No family history on file.  Social History     Tobacco Use    Smoking status: Former     Packs/day: 1     Types: Cigarettes     Quit date: 2023     Years since quittin.2    Smokeless tobacco: Never   Substance Use Topics    Alcohol use: Not on file    Drug use: Not on file       Physical Exam   ED Triage Vitals [24 1514]   Temperature Heart Rate Respirations BP   37.1 °C (98.7 °F) (!) 101 14 116/74      Pulse Ox Temp Source Heart Rate Source Patient Position   95 % Tympanic -- Sitting      BP Location FiO2 (%)     Left arm --       Physical Exam  HENT:      Head: Normocephalic.   Eyes:      Extraocular Movements: Extraocular movements intact.      Pupils: Pupils are equal, round, and reactive to light.   Cardiovascular:      Rate and Rhythm: Normal rate and regular rhythm.      Heart sounds: Normal heart sounds.   Pulmonary:      Effort: Pulmonary effort is normal.      Breath sounds: Normal breath sounds. No wheezing, rhonchi or rales.   Abdominal:      General: Bowel sounds are normal. There is distension.      Palpations: Abdomen is soft.      Tenderness: There is abdominal tenderness in the periumbilical area. There is no right CVA tenderness, left CVA tenderness, guarding or rebound.   Skin:     Capillary Refill: Capillary refill takes less than 2 seconds.   Neurological:      General: No focal deficit present.      Mental Status: He is alert and oriented to person, place, and time.      Cranial Nerves: No cranial nerve  deficit.      Motor: No weakness.   Psychiatric:         Mood and Affect: Mood normal.         ED Course & MDM   ED Course as of 02/08/24 1755   Thu Feb 08, 2024   1532 EKG performed at 1521 showing sinus tachycardia ventricular rate of 109 no ST elevation or depression essentially normal EKG interpreted by me. [KA]      ED Course User Index  [KA] James Yates          Diagnoses as of 02/08/24 1755   Periumbilical abdominal pain   Diarrhea, unspecified type       Medical Decision Making  Medical Decision Making:  Patient presented as described in HPI. Patient case including ROS, PE, and treatment and plan discussed with ED attending if attached as cosigner. Due to patients presentation orders completed include as documented.  Presents to the ED with cc of abdominal pain shortness of morning.  Patient states the pain is periumbilical and began at 4 AM.  Patient has had 4 episodes of diarrhea.  Patient states he does have a history of pancreatitis but states this feels different because it is lower in the abdomen.  States he has not had alcohol for 7 months.  Patient is nontoxic-appearing, abdomen is soft and tender to periumbilical region, no CVA tenderness lung sounds are clear bilaterally no peripheral edema.  Patient given fluids Zofran and Toradol for symptoms.  Pending labs and imaging.  COVID flu troponin lipase, magnesium, CMP, CBC all within normal limits.  CT scan reveals multiple fluid filled small bowel and colonic loops nonspecific findings which can be seen with acute enterocolitis.  No evidence of acute appendicitis.  Diffuse hepatic steatosis.  1 patient educated on these findings.  Patient educated on supportive care and fluids.  Patient educated on any worsening symptoms to return.  Patient educated on follow-up with primary doctor.  Patient given Bentyl before discharge.  Patient discharged home with Bentyl.  Patient remained stable and discharged home  Patient was advised to follow up with PCP  or recommended provider in 2-3 days for another evaluation and exam. I advised patient/guardian to return or go to closest emergency room immediately if symptoms change, get worse, new symptoms develop prior to follow up. If there is no improvement in symptoms in the next 24 hours they are advised to return for further evaluation and exam. I also explained the plan and treatment course. Patient/guardian is in agreement with plan, treatment course, and follow up and states verbally that they will comply.        Patient care discussed with: N/A  Social Determinants affecting care: N/A    Final diagnosis and disposition as below.  See CI    Homegoing. I discussed the differential; results and discharge plan with the patient and/or family/friend/caregiver if present.  I emphasized the importance of follow-up with the physician I referred them to in the timeframe recommended.  I explained reasons for the patient to return to the Emergency Department. They agreed that if they feel their condition is worsening or if they have any other concern they should call 911 immediately for further assistance. I gave the patient an opportunity to ask all questions they had and answered all of them accordingly. They understand return precautions and discharge instructions. The patient and/or family/friend/caregiver expressed understanding verbally and that they would comply.       Disposition:  Discharge      This note has been transcribed using voice recognition and may contain grammatical errors, misplaced words, incorrect words, incorrect phrases or other errors.        Labs Reviewed   CBC WITH AUTO DIFFERENTIAL - Abnormal       Result Value    WBC 8.2      nRBC 0.0      RBC 5.17      Hemoglobin 14.2      Hematocrit 43.4      MCV 84      MCH 27.5      MCHC 32.7      RDW 14.6 (*)     Platelets 198      Neutrophils % 81.5      Immature Granulocytes %, Automated 0.4      Lymphocytes % 8.3      Monocytes % 6.7      Eosinophils % 2.7       Basophils % 0.4      Neutrophils Absolute 6.67      Immature Granulocytes Absolute, Automated 0.03      Lymphocytes Absolute 0.68 (*)     Monocytes Absolute 0.55      Eosinophils Absolute 0.22      Basophils Absolute 0.03     COMPREHENSIVE METABOLIC PANEL - Abnormal    Glucose 107 (*)     Sodium 138      Potassium 3.9      Chloride 106      Bicarbonate 21      Anion Gap 15      Urea Nitrogen 14      Creatinine 1.05      eGFR 85      Calcium 8.8      Albumin 4.1      Alkaline Phosphatase 104      Total Protein 7.4      AST 35      Bilirubin, Total 0.5      ALT 30     MAGNESIUM - Normal    Magnesium 1.75     LIPASE - Normal    Lipase 17      Narrative:     Venipuncture immediately after or during the administration of Metamizole may lead to falsely low results. Testing should be performed immediately prior to Metamizole dosing.   SARS-COV-2 AND INFLUENZA A/B PCR - Normal    Flu A Result Not Detected      Flu B Result Not Detected      Coronavirus 2019, PCR Not Detected      Narrative:     This assay has received FDA Emergency Use Authorization (EUA) and  is only authorized for the duration of time that circumstances exist to justify the authorization of the emergency use of in vitro diagnostic tests for the detection of SARS-CoV-2 virus and/or diagnosis of COVID-19 infection under section 564(b)(1) of the Act, 21 U.S.C. 360bbb-3(b)(1). Testing for SARS-CoV-2 is only recommended for patients who meet current clinical and/or epidemiological criteria as defined by federal, state, or local public health directives. This assay is an in vitro diagnostic nucleic acid amplification test for the qualitative detection of SARS-CoV-2, Influenza A, and Influenza B from nasopharyngeal specimens and has been validated for use at [facility]. Negative results do not preclude COVID-19 infections or Influenza A/B infections, and should not be used as the sole basis for diagnosis, treatment, or other management  decisions. If Influenza A/B and RSV PCR results are negative, testing for Parainfluenza virus, Adenovirus and Metapneumovirus is routinely performed for [facility] pediatric oncology and intensive care inpatients, and is available on other patients by placing an add-on request.    TROPONIN I, HIGH SENSITIVITY - Normal    Troponin I, High Sensitivity 4      Narrative:     Less than 99th percentile of normal range cutoff-  Female and children under 18 years old <14 ng/L; Male <21 ng/L: Negative  Repeat testing should be performed if clinically indicated.     Female and children under 18 years old 14-50 ng/L; Male 21-50 ng/L:  Consistent with possible cardiac damage and possible increased clinical   risk. Serial measurements may help to assess extent of myocardial damage.     >50 ng/L: Consistent with cardiac damage, increased clinical risk and  myocardial infarction. Serial measurements may help assess extent of   myocardial damage.      NOTE: Children less than 1 year old may have higher baseline troponin   levels and results should be interpreted in conjunction with the overall   clinical context.     NOTE: Troponin I testing is performed using a different   testing methodology at [facility] than at other   [facility]. Direct result comparisons should only   be made within the same method.   STOOL PATHOGEN PANEL, PCR      CT abdomen pelvis w IV contrast   Final Result   1. Multiple fluid-filled small bowel and colonic loops, nonspecific   finding which can be seen with acute enterocolitis.   2. No CT evidence of acute appendicitis.   3. Mild atrophy of the bilateral kidneys.   4. Diffuse hepatic steatosis        Signed by: Ilya Miles 2/8/2024 5:33 PM   Dictation workstation:   [id number]           Procedure  Procedures     Alma Moore PA-C  02/08/24 1069

## 2024-02-15 ENCOUNTER — APPOINTMENT (OUTPATIENT)
Dept: RADIOLOGY | Facility: HOSPITAL | Age: 54
End: 2024-02-15
Payer: COMMERCIAL

## 2024-02-15 ENCOUNTER — HOSPITAL ENCOUNTER (EMERGENCY)
Facility: HOSPITAL | Age: 54
Discharge: HOME | End: 2024-02-15
Payer: COMMERCIAL

## 2024-02-15 VITALS
BODY MASS INDEX: 39.82 KG/M2 | SYSTOLIC BLOOD PRESSURE: 134 MMHG | TEMPERATURE: 96.5 F | WEIGHT: 294 LBS | HEIGHT: 72 IN | DIASTOLIC BLOOD PRESSURE: 86 MMHG | HEART RATE: 74 BPM | OXYGEN SATURATION: 94 % | RESPIRATION RATE: 18 BRPM

## 2024-02-15 DIAGNOSIS — M54.50 CHRONIC BILATERAL LOW BACK PAIN WITHOUT SCIATICA: Primary | ICD-10-CM

## 2024-02-15 DIAGNOSIS — G89.29 CHRONIC BILATERAL LOW BACK PAIN WITHOUT SCIATICA: Primary | ICD-10-CM

## 2024-02-15 PROCEDURE — 96375 TX/PRO/DX INJ NEW DRUG ADDON: CPT

## 2024-02-15 PROCEDURE — 96372 THER/PROPH/DIAG INJ SC/IM: CPT

## 2024-02-15 PROCEDURE — 96374 THER/PROPH/DIAG INJ IV PUSH: CPT

## 2024-02-15 PROCEDURE — 2500000004 HC RX 250 GENERAL PHARMACY W/ HCPCS (ALT 636 FOR OP/ED): Mod: SE | Performed by: HEALTH CARE PROVIDER

## 2024-02-15 PROCEDURE — 72131 CT LUMBAR SPINE W/O DYE: CPT

## 2024-02-15 PROCEDURE — 72131 CT LUMBAR SPINE W/O DYE: CPT | Performed by: RADIOLOGY

## 2024-02-15 PROCEDURE — 2500000005 HC RX 250 GENERAL PHARMACY W/O HCPCS: Mod: SE | Performed by: HEALTH CARE PROVIDER

## 2024-02-15 PROCEDURE — 99284 EMERGENCY DEPT VISIT MOD MDM: CPT | Mod: 25

## 2024-02-15 RX ORDER — LIDOCAINE 560 MG/1
1 PATCH PERCUTANEOUS; TOPICAL; TRANSDERMAL DAILY
Status: DISCONTINUED | OUTPATIENT
Start: 2024-02-15 | End: 2024-02-15 | Stop reason: HOSPADM

## 2024-02-15 RX ORDER — LIDOCAINE 50 MG/G
1 PATCH TOPICAL DAILY
Qty: 15 PATCH | Refills: 0 | Status: SHIPPED | OUTPATIENT
Start: 2024-02-15

## 2024-02-15 RX ORDER — PREDNISONE 20 MG/1
40 TABLET ORAL DAILY
Qty: 10 TABLET | Refills: 0 | Status: SHIPPED | OUTPATIENT
Start: 2024-02-15 | End: 2024-02-20

## 2024-02-15 RX ORDER — METHOCARBAMOL 500 MG/1
500 TABLET, FILM COATED ORAL 2 TIMES DAILY
Qty: 10 TABLET | Refills: 0 | Status: SHIPPED | OUTPATIENT
Start: 2024-02-15 | End: 2024-02-20

## 2024-02-15 RX ORDER — ORPHENADRINE CITRATE 30 MG/ML
30 INJECTION INTRAMUSCULAR; INTRAVENOUS ONCE
Status: COMPLETED | OUTPATIENT
Start: 2024-02-15 | End: 2024-02-15

## 2024-02-15 RX ORDER — KETOROLAC TROMETHAMINE 15 MG/ML
15 INJECTION, SOLUTION INTRAMUSCULAR; INTRAVENOUS ONCE
Status: COMPLETED | OUTPATIENT
Start: 2024-02-15 | End: 2024-02-15

## 2024-02-15 RX ADMIN — ORPHENADRINE CITRATE 30 MG: 60 INJECTION INTRAMUSCULAR; INTRAVENOUS at 13:08

## 2024-02-15 RX ADMIN — KETOROLAC TROMETHAMINE 15 MG: 15 INJECTION INTRAMUSCULAR; INTRAVENOUS at 13:03

## 2024-02-15 RX ADMIN — LIDOCAINE 1 PATCH: 4 PATCH TOPICAL at 13:10

## 2024-02-15 RX ADMIN — METHYLPREDNISOLONE SODIUM SUCCINATE 125 MG: 125 INJECTION, POWDER, FOR SOLUTION INTRAMUSCULAR; INTRAVENOUS at 13:03

## 2024-02-15 ASSESSMENT — PAIN DESCRIPTION - LOCATION: LOCATION: BACK

## 2024-02-15 ASSESSMENT — PAIN - FUNCTIONAL ASSESSMENT: PAIN_FUNCTIONAL_ASSESSMENT: 0-10

## 2024-02-15 ASSESSMENT — PAIN SCALES - GENERAL
PAINLEVEL_OUTOF10: 9
PAINLEVEL_OUTOF10: 9

## 2024-02-15 ASSESSMENT — PAIN DESCRIPTION - DESCRIPTORS: DESCRIPTORS: SQUEEZING

## 2024-02-15 NOTE — ED PROVIDER NOTES
HPI   Chief Complaint   Patient presents with    Back Pain     Mid/lower back pain since last evening, flare-up of chronic back pain       CC: Lower back pain  HPI:   53-year-old male brought to ED via EMS for lower back pain, he notes history of chronic back pain however this 1 feels different, he denies any recent or remote significant mechanism of injury, he denies any saddle anesthesia, no urinary retention/incontinence or loss of bowel control, and patient states he was able to bear weight and ambulate early this morning, patient is on Xarelto for pulmonary emboli he is also on Lyrica.    Additional Limitations to History:   External Records Reviewed: I reviewed recent and relevant outside records including   History Obtained From:     Past Medical History: Per HPI  Medications: Reviewed in EMR and with patient  Allergies:  Reviewed in EMR  Past Surgical History:   Social History:     ------------------------------------------------------------------------------------------------------  Physical Exam:  --Vital signs reviewed in nursing triage note, EMR flow sheets, and at patient's bedside  GEN:  A&Ox3, no acute distress, appears comfortable.  Conversational and appropriate.  No confusion or gross mental status changes.  EYES: EOMI, non-injected sclera.  ENT: Moist mucous membranes, no apparent injuries or lesions.   CARDIO: Normal rate and regular rhythm. No murmurs, rubs, or gallops.  2+ equal pulses of the distal extremities.   PULM: Clear to auscultation bilaterally. No rales, rhonchi, or wheezes. Good symmetric chest expansion.  GI: Soft, non-tender, non-distended. No rebound tenderness or guarding.  SKIN: Warm and dry, no rashes or lesions.  MSK: ROM intact the extremities without contractures.   EXT: No peripheral edema, contusions, or wounds.   NEURO: Cranial nerves II-XII grossly intact. Sensation to light touch intact and equal bilaterally in upper and lower extremities.  Symmetric 5/5 strength in  upper and lower extremities.  PSYCH: Appropriate mood and behavior, converses and responds appropriately during exam.  -------------------------------------------------------------------------------------------------------      Differential Diagnoses Considered:   Chronic Medical Conditions Significantly Affecting Care:   Diagnostic testing considered: [PERC, D-Dimer, PECARN, etc.]    - EKG interpreted by myself (ED attending physician):   - I independently interpreted: [CXR, CT, POCUS, etc. including your interpretation]  - Labs notable for     Escalation of Care: Appropriate for   Social Determinants of Health Significantly Affecting Care: [Homelessness, lacking transportation, uninsured, unable to afford medications]  Prescription Drug Consideration: [Antibiotics, antivirals, pain medications, etc.]  Discussion of Management with Other Providers:  I discussed the patient/results with: [admitting team, consultant, radiologist, social work, EPAT, case management, PT/OT, RT, PCP, etc.]      Harsh Duckworth PA-C                          Kimberly Coma Scale Score: 15                     Patient History   Past Medical History:   Diagnosis Date    Acute upper respiratory infection, unspecified 03/20/2015    Acute upper respiratory infection    Alcohol abuse, in remission 02/12/2015    History of ETOH abuse    Alcohol dependence, in remission (CMS/[facility]) 05/07/2015    Alcohol dependence in remission    Allergic contact dermatitis due to plants, except food 05/21/2014    Contact dermatitis due to poison ivy    Allergy status to unspecified drugs, medicaments and biological substances 08/29/2013    History of allergy    Anxiety disorder, unspecified 03/20/2015    Anxiety    Bipolar disorder, current episode manic without psychotic features, unspecified (CMS/[facility])     Bipolar disorder, current episode manic without psychotic features    Cervicalgia     Cervicalgia    Chronic obstructive pulmonary disease, unspecified (CMS/[facility])  12/02/2014    Chronic asthmatic bronchitis    Encounter for immunization 09/22/2016    Flu vaccine need    Other cervical disc degeneration, unspecified cervical region     Degeneration of cervical intervertebral disc    Other conditions influencing health status 06/27/2013    Acute Inflammation Of The Orbit    Other long term (current) drug therapy 06/02/2015    High risk medication use    Other specified health status     No pertinent past surgical history    Personal history of nicotine dependence 08/16/2017    History of tobacco abuse    Personal history of nicotine dependence 06/26/2017    History of nicotine dependence    Personal history of other diseases of the digestive system 04/11/2013    History of gastroenteritis    Personal history of other diseases of the digestive system 08/27/2015    History of esophageal reflux    Personal history of other diseases of the respiratory system 03/20/2017    History of sore throat    Personal history of other diseases of the respiratory system 02/05/2014    History of sinusitis    Personal history of other diseases of the respiratory system 01/16/2014    History of allergic rhinitis    Personal history of other diseases of the respiratory system 09/26/2013    History of acute sinusitis    Personal history of other diseases of the respiratory system 06/26/2014    History of allergic rhinitis    Personal history of other infectious and parasitic diseases     History of hepatitis C    Personal history of other mental and behavioral disorders     History of depression    Personal history of other specified conditions 08/25/2016    History of dizziness    Personal history of other specified conditions 02/16/2017    History of abdominal pain    Unspecified asthma, uncomplicated 02/13/2014    Asthmatic bronchitis     Past Surgical History:   Procedure Laterality Date    CT ABDOMEN PELVIS ANGIOGRAM W AND/OR WO IV CONTRAST  4/1/2020    CT ABDOMEN PELVIS ANGIOGRAM W AND/OR WO IV  CONTRAST 2020 [facility]    INVASIVE VASCULAR PROCEDURE N/A 2023    Procedure: Pulmonary Angiogram;  Surgeon: Surya Templeton MD;  Location: [facility] Cardiac Cath Lab;  Service: Cardiovascular;  Laterality: N/A;    MR HEAD ANGIO WO IV CONTRAST  2016    MR HEAD ANGIO WO IV CONTRAST 2016 [facility]    MR NECK ANGIO WO IV CONTRAST  2016    MR NECK ANGIO WO IV CONTRAST 2016 [facility]     No family history on file.  Social History     Tobacco Use    Smoking status: Former     Packs/day: 1     Types: Cigarettes     Quit date: 2023     Years since quittin.2    Smokeless tobacco: Never   Substance Use Topics    Alcohol use: Not Currently    Drug use: Not on file       Physical Exam   ED Triage Vitals [02/15/24 1135]   Temperature Heart Rate Respirations BP   35.8 °C (96.5 °F) 77 20 104/78      Pulse Ox Temp src Heart Rate Source Patient Position   94 % -- -- Lying      BP Location FiO2 (%)     Left arm --       Physical Exam  Musculoskeletal:      Comments:   Back: There is tenderness to palpation in the midline and paraspinal planes throughout the LS. Normal motor sensory, symmetric reflexes, strong equal peripheral pulses, and normal Babinski's bilaterally.               ED Course & MDM   Diagnoses as of 02/15/24 1435   Chronic bilateral low back pain without sciatica       Medical Decision Making  53-year-old male with acute on chronic lower back pain, no evidence suggesting cauda equina or epidural abscess, patient was given Toradol, p.o. Percocet, Solu-Medrol, he is able to bear weight and ambulate, no acute fracture or subluxation on imaging, advised outpatient follow-up with orthopedic.        Procedure  Procedures     Harsh Duckworth PA-C  02/15/24 1151       Harsh Duckworth PA-C  02/15/24 1438

## 2024-02-26 LAB
ATRIAL RATE: 94 BPM
P AXIS: 2 DEGREES
P OFFSET: 195 MS
P ONSET: 144 MS
PR INTERVAL: 142 MS
Q ONSET: 215 MS
QRS COUNT: 15 BEATS
QRS DURATION: 86 MS
QT INTERVAL: 368 MS
QTC CALCULATION(BAZETT): 460 MS
QTC FREDERICIA: 427 MS
R AXIS: 57 DEGREES
T AXIS: 5 DEGREES
T OFFSET: 399 MS
VENTRICULAR RATE: 94 BPM

## 2024-05-02 ENCOUNTER — HOSPITAL ENCOUNTER (INPATIENT)
Facility: HOSPITAL | Age: 54
LOS: 2 days | Discharge: HOME | End: 2024-05-05
Attending: EMERGENCY MEDICINE | Admitting: INTERNAL MEDICINE
Payer: COMMERCIAL

## 2024-05-02 ENCOUNTER — APPOINTMENT (OUTPATIENT)
Dept: RADIOLOGY | Facility: HOSPITAL | Age: 54
End: 2024-05-02
Payer: COMMERCIAL

## 2024-05-02 DIAGNOSIS — I88.9 LYMPHADENITIS: ICD-10-CM

## 2024-05-02 DIAGNOSIS — R22.1 NECK MASS: Primary | ICD-10-CM

## 2024-05-02 LAB
ANION GAP SERPL CALC-SCNC: 11 MMOL/L (ref 10–20)
BASOPHILS # BLD AUTO: 0.07 X10*3/UL (ref 0–0.1)
BASOPHILS NFR BLD AUTO: 1.1 %
BUN SERPL-MCNC: 12 MG/DL (ref 6–23)
CALCIUM SERPL-MCNC: 8.8 MG/DL (ref 8.6–10.3)
CHLORIDE SERPL-SCNC: 109 MMOL/L (ref 98–107)
CO2 SERPL-SCNC: 22 MMOL/L (ref 21–32)
CREAT SERPL-MCNC: 1.11 MG/DL (ref 0.5–1.3)
EGFRCR SERPLBLD CKD-EPI 2021: 79 ML/MIN/1.73M*2
EOSINOPHIL # BLD AUTO: 0.3 X10*3/UL (ref 0–0.7)
EOSINOPHIL NFR BLD AUTO: 4.6 %
ERYTHROCYTE [DISTWIDTH] IN BLOOD BY AUTOMATED COUNT: 16.4 % (ref 11.5–14.5)
GLUCOSE SERPL-MCNC: 154 MG/DL (ref 74–99)
HCT VFR BLD AUTO: 37.3 % (ref 41–52)
HGB BLD-MCNC: 12 G/DL (ref 13.5–17.5)
IMM GRANULOCYTES # BLD AUTO: 0.01 X10*3/UL (ref 0–0.7)
IMM GRANULOCYTES NFR BLD AUTO: 0.2 % (ref 0–0.9)
LYMPHOCYTES # BLD AUTO: 1.85 X10*3/UL (ref 1.2–4.8)
LYMPHOCYTES NFR BLD AUTO: 28.6 %
MAGNESIUM SERPL-MCNC: 2.12 MG/DL (ref 1.6–2.4)
MCH RBC QN AUTO: 27.4 PG (ref 26–34)
MCHC RBC AUTO-ENTMCNC: 32.2 G/DL (ref 32–36)
MCV RBC AUTO: 85 FL (ref 80–100)
MONOCYTES # BLD AUTO: 0.58 X10*3/UL (ref 0.1–1)
MONOCYTES NFR BLD AUTO: 9 %
NEUTROPHILS # BLD AUTO: 3.65 X10*3/UL (ref 1.2–7.7)
NEUTROPHILS NFR BLD AUTO: 56.5 %
NRBC BLD-RTO: 0 /100 WBCS (ref 0–0)
PLATELET # BLD AUTO: 243 X10*3/UL (ref 150–450)
POTASSIUM SERPL-SCNC: 4 MMOL/L (ref 3.5–5.3)
RBC # BLD AUTO: 4.38 X10*6/UL (ref 4.5–5.9)
SODIUM SERPL-SCNC: 138 MMOL/L (ref 136–145)
WBC # BLD AUTO: 6.5 X10*3/UL (ref 4.4–11.3)

## 2024-05-02 PROCEDURE — 2550000001 HC RX 255 CONTRASTS: Mod: SE | Performed by: EMERGENCY MEDICINE

## 2024-05-02 PROCEDURE — G0378 HOSPITAL OBSERVATION PER HR: HCPCS

## 2024-05-02 PROCEDURE — 83735 ASSAY OF MAGNESIUM: CPT | Performed by: EMERGENCY MEDICINE

## 2024-05-02 PROCEDURE — 85025 COMPLETE CBC W/AUTO DIFF WBC: CPT | Performed by: EMERGENCY MEDICINE

## 2024-05-02 PROCEDURE — 80048 BASIC METABOLIC PNL TOTAL CA: CPT | Performed by: EMERGENCY MEDICINE

## 2024-05-02 PROCEDURE — 36415 COLL VENOUS BLD VENIPUNCTURE: CPT | Performed by: EMERGENCY MEDICINE

## 2024-05-02 PROCEDURE — 70491 CT SOFT TISSUE NECK W/DYE: CPT | Performed by: STUDENT IN AN ORGANIZED HEALTH CARE EDUCATION/TRAINING PROGRAM

## 2024-05-02 PROCEDURE — G0425 INPT/ED TELECONSULT30: HCPCS | Performed by: OTOLARYNGOLOGY

## 2024-05-02 PROCEDURE — 2500000004 HC RX 250 GENERAL PHARMACY W/ HCPCS (ALT 636 FOR OP/ED): Mod: SE | Performed by: EMERGENCY MEDICINE

## 2024-05-02 PROCEDURE — 2500000001 HC RX 250 WO HCPCS SELF ADMINISTERED DRUGS (ALT 637 FOR MEDICARE OP): Mod: SE | Performed by: NURSE PRACTITIONER

## 2024-05-02 PROCEDURE — 99285 EMERGENCY DEPT VISIT HI MDM: CPT | Mod: 25

## 2024-05-02 PROCEDURE — 2500000006 HC RX 250 W HCPCS SELF ADMINISTERED DRUGS (ALT 637 FOR ALL PAYERS): Mod: SE | Performed by: NURSE PRACTITIONER

## 2024-05-02 PROCEDURE — 70491 CT SOFT TISSUE NECK W/DYE: CPT

## 2024-05-02 RX ORDER — IBUPROFEN 200 MG
1 TABLET ORAL DAILY
Status: DISCONTINUED | OUTPATIENT
Start: 2024-05-03 | End: 2024-05-05 | Stop reason: HOSPADM

## 2024-05-02 RX ORDER — KETOROLAC TROMETHAMINE 15 MG/ML
INJECTION, SOLUTION INTRAMUSCULAR; INTRAVENOUS
Status: DISPENSED
Start: 2024-05-02 | End: 2024-05-03

## 2024-05-02 RX ORDER — ROPINIROLE 0.25 MG/1
0.5 TABLET, FILM COATED ORAL 3 TIMES DAILY
Status: DISCONTINUED | OUTPATIENT
Start: 2024-05-02 | End: 2024-05-05 | Stop reason: HOSPADM

## 2024-05-02 RX ORDER — ONDANSETRON HYDROCHLORIDE 2 MG/ML
4 INJECTION, SOLUTION INTRAVENOUS EVERY 8 HOURS PRN
Status: DISCONTINUED | OUTPATIENT
Start: 2024-05-02 | End: 2024-05-05 | Stop reason: HOSPADM

## 2024-05-02 RX ORDER — PANTOPRAZOLE SODIUM 40 MG/1
40 TABLET, DELAYED RELEASE ORAL
Status: DISCONTINUED | OUTPATIENT
Start: 2024-05-03 | End: 2024-05-05 | Stop reason: HOSPADM

## 2024-05-02 RX ORDER — ONDANSETRON 4 MG/1
4 TABLET, FILM COATED ORAL EVERY 8 HOURS PRN
Status: DISCONTINUED | OUTPATIENT
Start: 2024-05-02 | End: 2024-05-05 | Stop reason: HOSPADM

## 2024-05-02 RX ORDER — FERROUS SULFATE, DRIED 160(50) MG
1 TABLET, EXTENDED RELEASE ORAL 2 TIMES DAILY
Status: DISCONTINUED | OUTPATIENT
Start: 2024-05-02 | End: 2024-05-05 | Stop reason: HOSPADM

## 2024-05-02 RX ORDER — AMOXICILLIN 250 MG
1 CAPSULE ORAL NIGHTLY
Status: DISCONTINUED | OUTPATIENT
Start: 2024-05-02 | End: 2024-05-05 | Stop reason: HOSPADM

## 2024-05-02 RX ORDER — TOPIRAMATE 100 MG/1
100 TABLET, FILM COATED ORAL 2 TIMES DAILY
Status: DISCONTINUED | OUTPATIENT
Start: 2024-05-02 | End: 2024-05-05 | Stop reason: HOSPADM

## 2024-05-02 RX ORDER — BENZTROPINE MESYLATE 1 MG/1
0.5 TABLET ORAL 2 TIMES DAILY
Status: DISCONTINUED | OUTPATIENT
Start: 2024-05-02 | End: 2024-05-05 | Stop reason: HOSPADM

## 2024-05-02 RX ORDER — QUETIAPINE FUMARATE 25 MG/1
25 TABLET, FILM COATED ORAL NIGHTLY PRN
Status: DISCONTINUED | OUTPATIENT
Start: 2024-05-02 | End: 2024-05-05 | Stop reason: HOSPADM

## 2024-05-02 RX ORDER — DOCUSATE SODIUM 100 MG/1
100 CAPSULE, LIQUID FILLED ORAL 2 TIMES DAILY
Status: DISCONTINUED | OUTPATIENT
Start: 2024-05-02 | End: 2024-05-05 | Stop reason: HOSPADM

## 2024-05-02 RX ORDER — METHOCARBAMOL 500 MG/1
500 TABLET, FILM COATED ORAL 2 TIMES DAILY
Status: DISCONTINUED | OUTPATIENT
Start: 2024-05-02 | End: 2024-05-02

## 2024-05-02 RX ORDER — TALC
6 POWDER (GRAM) TOPICAL DAILY
Status: DISCONTINUED | OUTPATIENT
Start: 2024-05-03 | End: 2024-05-03

## 2024-05-02 RX ORDER — KETOROLAC TROMETHAMINE 15 MG/ML
15 INJECTION, SOLUTION INTRAMUSCULAR; INTRAVENOUS ONCE
Status: DISCONTINUED | OUTPATIENT
Start: 2024-05-02 | End: 2024-05-02

## 2024-05-02 RX ORDER — PANTOPRAZOLE SODIUM 40 MG/10ML
40 INJECTION, POWDER, LYOPHILIZED, FOR SOLUTION INTRAVENOUS
Status: DISCONTINUED | OUTPATIENT
Start: 2024-05-03 | End: 2024-05-02

## 2024-05-02 RX ORDER — SIMVASTATIN 40 MG/1
40 TABLET, FILM COATED ORAL NIGHTLY
Status: DISCONTINUED | OUTPATIENT
Start: 2024-05-02 | End: 2024-05-05 | Stop reason: HOSPADM

## 2024-05-02 RX ORDER — DULOXETIN HYDROCHLORIDE 30 MG/1
30 CAPSULE, DELAYED RELEASE ORAL NIGHTLY
Status: DISCONTINUED | OUTPATIENT
Start: 2024-05-02 | End: 2024-05-05 | Stop reason: HOSPADM

## 2024-05-02 RX ORDER — ACETAMINOPHEN 325 MG/1
650 TABLET ORAL EVERY 4 HOURS PRN
Status: DISCONTINUED | OUTPATIENT
Start: 2024-05-02 | End: 2024-05-05 | Stop reason: HOSPADM

## 2024-05-02 RX ORDER — PANTOPRAZOLE SODIUM 40 MG/1
40 TABLET, DELAYED RELEASE ORAL
Status: DISCONTINUED | OUTPATIENT
Start: 2024-05-03 | End: 2024-05-02

## 2024-05-02 RX ORDER — PREGABALIN 100 MG/1
200 CAPSULE ORAL 3 TIMES DAILY
Status: DISCONTINUED | OUTPATIENT
Start: 2024-05-02 | End: 2024-05-05 | Stop reason: HOSPADM

## 2024-05-02 RX ORDER — TRAZODONE HYDROCHLORIDE 50 MG/1
50 TABLET ORAL NIGHTLY
Status: DISCONTINUED | OUTPATIENT
Start: 2024-05-02 | End: 2024-05-05 | Stop reason: HOSPADM

## 2024-05-02 RX ORDER — TALC
9 POWDER (GRAM) TOPICAL NIGHTLY PRN
Status: DISCONTINUED | OUTPATIENT
Start: 2024-05-02 | End: 2024-05-05 | Stop reason: HOSPADM

## 2024-05-02 RX ORDER — CELECOXIB 100 MG/1
200 CAPSULE ORAL DAILY
Status: DISCONTINUED | OUTPATIENT
Start: 2024-05-03 | End: 2024-05-05 | Stop reason: HOSPADM

## 2024-05-02 RX ORDER — KETOROLAC TROMETHAMINE 15 MG/ML
15 INJECTION, SOLUTION INTRAMUSCULAR; INTRAVENOUS ONCE
Status: COMPLETED | OUTPATIENT
Start: 2024-05-02 | End: 2024-05-02

## 2024-05-02 RX ORDER — POTASSIUM CHLORIDE 750 MG/1
10 CAPSULE, EXTENDED RELEASE ORAL DAILY
Status: DISCONTINUED | OUTPATIENT
Start: 2024-05-03 | End: 2024-05-03

## 2024-05-02 RX ORDER — CYCLOBENZAPRINE HCL 5 MG
10 TABLET ORAL 3 TIMES DAILY PRN
Status: DISCONTINUED | OUTPATIENT
Start: 2024-05-02 | End: 2024-05-05 | Stop reason: HOSPADM

## 2024-05-02 RX ORDER — METOPROLOL TARTRATE 25 MG/1
25 TABLET, FILM COATED ORAL DAILY
Status: DISCONTINUED | OUTPATIENT
Start: 2024-05-03 | End: 2024-05-05 | Stop reason: HOSPADM

## 2024-05-02 RX ORDER — ATORVASTATIN CALCIUM 10 MG/1
20 TABLET, FILM COATED ORAL NIGHTLY
Status: DISCONTINUED | OUTPATIENT
Start: 2024-05-02 | End: 2024-05-02

## 2024-05-02 RX ORDER — DISULFIRAM 250 MG/1
500 TABLET ORAL DAILY
Status: DISCONTINUED | OUTPATIENT
Start: 2024-05-03 | End: 2024-05-05 | Stop reason: HOSPADM

## 2024-05-02 RX ORDER — MONTELUKAST SODIUM 10 MG/1
10 TABLET ORAL DAILY
Status: DISCONTINUED | OUTPATIENT
Start: 2024-05-03 | End: 2024-05-05 | Stop reason: HOSPADM

## 2024-05-02 RX ORDER — POLYETHYLENE GLYCOL 3350 17 G/17G
17 POWDER, FOR SOLUTION ORAL DAILY PRN
Status: DISCONTINUED | OUTPATIENT
Start: 2024-05-02 | End: 2024-05-05 | Stop reason: HOSPADM

## 2024-05-02 RX ADMIN — KETOROLAC TROMETHAMINE 15 MG: 15 INJECTION INTRAMUSCULAR; INTRAVENOUS at 18:15

## 2024-05-02 RX ADMIN — IOHEXOL 75 ML: 350 INJECTION, SOLUTION INTRAVENOUS at 15:46

## 2024-05-02 RX ADMIN — APIXABAN 5 MG: 5 TABLET, FILM COATED ORAL at 22:42

## 2024-05-02 RX ADMIN — TRAZODONE HYDROCHLORIDE 50 MG: 50 TABLET ORAL at 22:41

## 2024-05-02 RX ADMIN — DOCUSATE SODIUM 100 MG: 100 CAPSULE, LIQUID FILLED ORAL at 22:41

## 2024-05-02 RX ADMIN — PREGABALIN 200 MG: 100 CAPSULE ORAL at 22:53

## 2024-05-02 RX ADMIN — SENNOSIDES AND DOCUSATE SODIUM 1 TABLET: 8.6; 5 TABLET ORAL at 22:41

## 2024-05-02 RX ADMIN — RISPERIDONE 1.5 MG: 0.25 TABLET, FILM COATED ORAL at 22:53

## 2024-05-02 RX ADMIN — ROPINIROLE HYDROCHLORIDE 0.5 MG: 0.25 TABLET, FILM COATED ORAL at 22:53

## 2024-05-02 RX ADMIN — BENZTROPINE MESYLATE 0.5 MG: 1 TABLET ORAL at 22:41

## 2024-05-02 RX ADMIN — SIMVASTATIN 40 MG: 40 TABLET, FILM COATED ORAL at 22:53

## 2024-05-02 RX ADMIN — AMPICILLIN SODIUM AND SULBACTAM SODIUM 3 G: 2; 1 INJECTION, POWDER, FOR SOLUTION INTRAMUSCULAR; INTRAVENOUS at 18:04

## 2024-05-02 RX ADMIN — ACETAMINOPHEN 650 MG: 325 TABLET ORAL at 22:44

## 2024-05-02 RX ADMIN — DULOXETINE HYDROCHLORIDE 30 MG: 30 CAPSULE, DELAYED RELEASE ORAL at 22:41

## 2024-05-02 RX ADMIN — CYCLOBENZAPRINE HYDROCHLORIDE 10 MG: 5 TABLET, FILM COATED ORAL at 22:44

## 2024-05-02 SDOH — SOCIAL STABILITY: SOCIAL INSECURITY: ARE YOU OR HAVE YOU BEEN THREATENED OR ABUSED PHYSICALLY, EMOTIONALLY, OR SEXUALLY BY ANYONE?: NO

## 2024-05-02 SDOH — SOCIAL STABILITY: SOCIAL INSECURITY: HAS ANYONE EVER THREATENED TO HURT YOUR FAMILY OR YOUR PETS?: NO

## 2024-05-02 SDOH — SOCIAL STABILITY: SOCIAL INSECURITY: HAVE YOU HAD ANY THOUGHTS OF HARMING ANYONE ELSE?: NO

## 2024-05-02 SDOH — SOCIAL STABILITY: SOCIAL INSECURITY: DO YOU FEEL ANYONE HAS EXPLOITED OR TAKEN ADVANTAGE OF YOU FINANCIALLY OR OF YOUR PERSONAL PROPERTY?: NO

## 2024-05-02 SDOH — SOCIAL STABILITY: SOCIAL INSECURITY: DO YOU FEEL UNSAFE GOING BACK TO THE PLACE WHERE YOU ARE LIVING?: NO

## 2024-05-02 SDOH — SOCIAL STABILITY: SOCIAL INSECURITY: ARE THERE ANY APPARENT SIGNS OF INJURIES/BEHAVIORS THAT COULD BE RELATED TO ABUSE/NEGLECT?: NO

## 2024-05-02 SDOH — SOCIAL STABILITY: SOCIAL INSECURITY: DOES ANYONE TRY TO KEEP YOU FROM HAVING/CONTACTING OTHER FRIENDS OR DOING THINGS OUTSIDE YOUR HOME?: NO

## 2024-05-02 SDOH — SOCIAL STABILITY: SOCIAL INSECURITY: HAVE YOU HAD THOUGHTS OF HARMING ANYONE ELSE?: NO

## 2024-05-02 SDOH — SOCIAL STABILITY: SOCIAL INSECURITY: ABUSE: ADULT

## 2024-05-02 ASSESSMENT — LIFESTYLE VARIABLES
AUDIT-C TOTAL SCORE: 0
HOW OFTEN DO YOU HAVE 6 OR MORE DRINKS ON ONE OCCASION: NEVER
PRESCIPTION_ABUSE_PAST_12_MONTHS: NO
AUDIT-C TOTAL SCORE: 0
HOW MANY STANDARD DRINKS CONTAINING ALCOHOL DO YOU HAVE ON A TYPICAL DAY: PATIENT DOES NOT DRINK
SKIP TO QUESTIONS 9-10: 1
SUBSTANCE_ABUSE_PAST_12_MONTHS: NO
HOW OFTEN DO YOU HAVE A DRINK CONTAINING ALCOHOL: NEVER

## 2024-05-02 ASSESSMENT — ACTIVITIES OF DAILY LIVING (ADL)
JUDGMENT_ADEQUATE_SAFELY_COMPLETE_DAILY_ACTIVITIES: YES
DRESSING YOURSELF: INDEPENDENT
HEARING - RIGHT EAR: FUNCTIONAL
GROOMING: INDEPENDENT
WALKS IN HOME: INDEPENDENT
TOILETING: INDEPENDENT
LACK_OF_TRANSPORTATION: NO
ADEQUATE_TO_COMPLETE_ADL: YES
FEEDING YOURSELF: INDEPENDENT
HEARING - LEFT EAR: FUNCTIONAL
LACK_OF_TRANSPORTATION: NO
BATHING: INDEPENDENT
PATIENT'S MEMORY ADEQUATE TO SAFELY COMPLETE DAILY ACTIVITIES?: YES
LACK_OF_TRANSPORTATION: NO

## 2024-05-02 ASSESSMENT — PAIN DESCRIPTION - FREQUENCY: FREQUENCY: CONSTANT/CONTINUOUS

## 2024-05-02 ASSESSMENT — COGNITIVE AND FUNCTIONAL STATUS - GENERAL
DAILY ACTIVITIY SCORE: 24
PATIENT BASELINE BEDBOUND: NO
MOBILITY SCORE: 24

## 2024-05-02 ASSESSMENT — PAIN DESCRIPTION - LOCATION
LOCATION: NECK

## 2024-05-02 ASSESSMENT — PAIN DESCRIPTION - ORIENTATION
ORIENTATION: RIGHT
ORIENTATION: LEFT;RIGHT

## 2024-05-02 ASSESSMENT — PATIENT HEALTH QUESTIONNAIRE - PHQ9
SUM OF ALL RESPONSES TO PHQ9 QUESTIONS 1 & 2: 1
2. FEELING DOWN, DEPRESSED OR HOPELESS: NOT AT ALL
1. LITTLE INTEREST OR PLEASURE IN DOING THINGS: SEVERAL DAYS

## 2024-05-02 ASSESSMENT — PAIN SCALES - GENERAL
PAINLEVEL_OUTOF10: 8
PAINLEVEL_OUTOF10: 10 - WORST POSSIBLE PAIN
PAINLEVEL_OUTOF10: 0 - NO PAIN
PAINLEVEL_OUTOF10: 8
PAINLEVEL_OUTOF10: 5 - MODERATE PAIN

## 2024-05-02 ASSESSMENT — PAIN - FUNCTIONAL ASSESSMENT
PAIN_FUNCTIONAL_ASSESSMENT: 0-10
PAIN_FUNCTIONAL_ASSESSMENT: WONG-BAKER FACES
PAIN_FUNCTIONAL_ASSESSMENT: 0-10
PAIN_FUNCTIONAL_ASSESSMENT: 0-10

## 2024-05-02 ASSESSMENT — PAIN SCALES - WONG BAKER: WONGBAKER_NUMERICALRESPONSE: NO HURT

## 2024-05-02 ASSESSMENT — PAIN DESCRIPTION - PAIN TYPE: TYPE: ACUTE PAIN

## 2024-05-02 NOTE — ED PROVIDER NOTES
HPI   Chief Complaint   Patient presents with    Mass     MASS ON FRONT OF NECK       HPI                    No data recorded                   Patient History   Past Medical History:   Diagnosis Date    Acute upper respiratory infection, unspecified 03/20/2015    Acute upper respiratory infection    Alcohol abuse, in remission 02/12/2015    History of ETOH abuse    Alcohol dependence, in remission (Multi) 05/07/2015    Alcohol dependence in remission    Allergic contact dermatitis due to plants, except food 05/21/2014    Contact dermatitis due to poison ivy    Allergy status to unspecified drugs, medicaments and biological substances 08/29/2013    History of allergy    Anxiety disorder, unspecified 03/20/2015    Anxiety    Bipolar disorder, current episode manic without psychotic features, unspecified (Multi)     Bipolar disorder, current episode manic without psychotic features    Cervicalgia     Cervicalgia    Chronic obstructive pulmonary disease, unspecified (Multi) 12/02/2014    Chronic asthmatic bronchitis    Encounter for immunization 09/22/2016    Flu vaccine need    Other cervical disc degeneration, unspecified cervical region     Degeneration of cervical intervertebral disc    Other conditions influencing health status 06/27/2013    Acute Inflammation Of The Orbit    Other long term (current) drug therapy 06/02/2015    High risk medication use    Other specified health status     No pertinent past surgical history    Personal history of nicotine dependence 08/16/2017    History of tobacco abuse    Personal history of nicotine dependence 06/26/2017    History of nicotine dependence    Personal history of other diseases of the digestive system 04/11/2013    History of gastroenteritis    Personal history of other diseases of the digestive system 08/27/2015    History of esophageal reflux    Personal history of other diseases of the respiratory system 03/20/2017    History of sore throat    Personal history  of other diseases of the respiratory system 2014    History of sinusitis    Personal history of other diseases of the respiratory system 2014    History of allergic rhinitis    Personal history of other diseases of the respiratory system 2013    History of acute sinusitis    Personal history of other diseases of the respiratory system 2014    History of allergic rhinitis    Personal history of other infectious and parasitic diseases     History of hepatitis C    Personal history of other mental and behavioral disorders     History of depression    Personal history of other specified conditions 2016    History of dizziness    Personal history of other specified conditions 2017    History of abdominal pain    Unspecified asthma, uncomplicated ([facility]) 2014    Asthmatic bronchitis     Past Surgical History:   Procedure Laterality Date    CT ABDOMEN PELVIS ANGIOGRAM W AND/OR WO IV CONTRAST  2020    CT ABDOMEN PELVIS ANGIOGRAM W AND/OR WO IV CONTRAST 2020 [facility]    INVASIVE VASCULAR PROCEDURE N/A 2023    Procedure: Pulmonary Angiogram;  Surgeon: Surya Templeton MD;  Location: [facility];  Service: Cardiovascular;  Laterality: N/A;    MR HEAD ANGIO WO IV CONTRAST  2016    MR HEAD ANGIO WO IV CONTRAST 2016 [facility]    MR NECK ANGIO WO IV CONTRAST  2016    MR NECK ANGIO WO IV CONTRAST 2016 [facility]     No family history on file.  Social History     Tobacco Use    Smoking status: Former     Current packs/day: 0.00     Types: Cigarettes     Quit date: 2023     Years since quittin.4    Smokeless tobacco: Never   Substance Use Topics    Alcohol use: Not Currently    Drug use: Not on file       Physical Exam   ED Triage Vitals [24 1459]   Temperature Heart Rate Respirations BP   36.9 °C (98.5 °F) 76 18 118/52      Pulse Ox Temp Source Heart Rate Source Patient Position   96 % Tympanic --  Sitting      BP Location FiO2 (%)     Left arm --       Physical Exam  Vitals and nursing note reviewed.   Constitutional:       General: He is not in acute distress.     Appearance: He is well-developed.   HENT:      Head: Normocephalic and atraumatic.     Eyes:      Conjunctiva/sclera: Conjunctivae normal.   Cardiovascular:      Rate and Rhythm: Normal rate and regular rhythm.      Heart sounds: No murmur heard.  Pulmonary:      Effort: Pulmonary effort is normal. No respiratory distress.      Breath sounds: Normal breath sounds.   Abdominal:      Palpations: Abdomen is soft.      Tenderness: There is no abdominal tenderness.   Musculoskeletal:         General: No swelling.      Cervical back: Neck supple.   Skin:     General: Skin is warm and dry.      Capillary Refill: Capillary refill takes less than 2 seconds.   Neurological:      Mental Status: He is alert.   Psychiatric:         Mood and Affect: Mood normal.         ED Course & MDM   Diagnoses as of 05/02/24 1742   Neck mass       Medical Decision Making  53-year-old gentleman very pleasant presents to the ER with chief complaint of some neck pain on the right side of his neck patient reports started yesterday he started noticing a progressively worse patient came to ED for evaluation. Patient has a history of larynx CA with radiation treatment. I was able to reach out to our otolaryngologist Dr. Cisse, who recommended IV antibiotics. We will meet the patient to the hospital for further evaluation treatment at this time patient has no respiratory distress no compromise of his airway. Patient was admitted to hospital for further treatment.        Procedure  Procedures     James Yates, DO  05/02/24 1742

## 2024-05-03 ENCOUNTER — APPOINTMENT (OUTPATIENT)
Dept: RADIOLOGY | Facility: HOSPITAL | Age: 54
End: 2024-05-03
Payer: COMMERCIAL

## 2024-05-03 PROBLEM — R22.1 NECK MASS: Status: ACTIVE | Noted: 2024-05-03

## 2024-05-03 LAB
ANION GAP SERPL CALC-SCNC: 11 MMOL/L (ref 10–20)
BUN SERPL-MCNC: 10 MG/DL (ref 6–23)
CALCIUM SERPL-MCNC: 8.6 MG/DL (ref 8.6–10.3)
CHLORIDE SERPL-SCNC: 112 MMOL/L (ref 98–107)
CO2 SERPL-SCNC: 21 MMOL/L (ref 21–32)
CREAT SERPL-MCNC: 0.97 MG/DL (ref 0.5–1.3)
EGFRCR SERPLBLD CKD-EPI 2021: >90 ML/MIN/1.73M*2
ERYTHROCYTE [DISTWIDTH] IN BLOOD BY AUTOMATED COUNT: 16.7 % (ref 11.5–14.5)
GLUCOSE SERPL-MCNC: 109 MG/DL (ref 74–99)
HCT VFR BLD AUTO: 36.9 % (ref 41–52)
HGB BLD-MCNC: 11.7 G/DL (ref 13.5–17.5)
MCH RBC QN AUTO: 27.5 PG (ref 26–34)
MCHC RBC AUTO-ENTMCNC: 31.7 G/DL (ref 32–36)
MCV RBC AUTO: 87 FL (ref 80–100)
NRBC BLD-RTO: 0 /100 WBCS (ref 0–0)
PLATELET # BLD AUTO: 225 X10*3/UL (ref 150–450)
POTASSIUM SERPL-SCNC: 3.5 MMOL/L (ref 3.5–5.3)
RBC # BLD AUTO: 4.26 X10*6/UL (ref 4.5–5.9)
SODIUM SERPL-SCNC: 140 MMOL/L (ref 136–145)
WBC # BLD AUTO: 5.1 X10*3/UL (ref 4.4–11.3)

## 2024-05-03 PROCEDURE — 2500000004 HC RX 250 GENERAL PHARMACY W/ HCPCS (ALT 636 FOR OP/ED): Mod: SE | Performed by: NURSE PRACTITIONER

## 2024-05-03 PROCEDURE — 2550000001 HC RX 255 CONTRASTS: Mod: IPSPLIT | Performed by: INTERNAL MEDICINE

## 2024-05-03 PROCEDURE — 85027 COMPLETE CBC AUTOMATED: CPT

## 2024-05-03 PROCEDURE — 2500000001 HC RX 250 WO HCPCS SELF ADMINISTERED DRUGS (ALT 637 FOR MEDICARE OP): Mod: IPSPLIT

## 2024-05-03 PROCEDURE — 2500000001 HC RX 250 WO HCPCS SELF ADMINISTERED DRUGS (ALT 637 FOR MEDICARE OP): Mod: SE | Performed by: NURSE PRACTITIONER

## 2024-05-03 PROCEDURE — 70543 MRI ORBT/FAC/NCK W/O &W/DYE: CPT | Mod: IPSPLIT

## 2024-05-03 PROCEDURE — S4991 NICOTINE PATCH NONLEGEND: HCPCS | Mod: IPSPLIT | Performed by: NURSE PRACTITIONER

## 2024-05-03 PROCEDURE — 2500000004 HC RX 250 GENERAL PHARMACY W/ HCPCS (ALT 636 FOR OP/ED): Mod: SE

## 2024-05-03 PROCEDURE — 36415 COLL VENOUS BLD VENIPUNCTURE: CPT

## 2024-05-03 PROCEDURE — 70543 MRI ORBT/FAC/NCK W/O &W/DYE: CPT | Performed by: RADIOLOGY

## 2024-05-03 PROCEDURE — 1100000001 HC PRIVATE ROOM DAILY: Mod: IPSPLIT

## 2024-05-03 PROCEDURE — 2500000002 HC RX 250 W HCPCS SELF ADMINISTERED DRUGS (ALT 637 FOR MEDICARE OP, ALT 636 FOR OP/ED): Mod: IPSPLIT | Performed by: NURSE PRACTITIONER

## 2024-05-03 PROCEDURE — 2500000006 HC RX 250 W HCPCS SELF ADMINISTERED DRUGS (ALT 637 FOR ALL PAYERS): Mod: IPSPLIT

## 2024-05-03 PROCEDURE — 2500000004 HC RX 250 GENERAL PHARMACY W/ HCPCS (ALT 636 FOR OP/ED): Mod: IPSPLIT

## 2024-05-03 PROCEDURE — 2500000006 HC RX 250 W HCPCS SELF ADMINISTERED DRUGS (ALT 637 FOR ALL PAYERS): Mod: IPSPLIT | Performed by: NURSE PRACTITIONER

## 2024-05-03 PROCEDURE — 82374 ASSAY BLOOD CARBON DIOXIDE: CPT

## 2024-05-03 PROCEDURE — A9575 INJ GADOTERATE MEGLUMI 0.1ML: HCPCS | Mod: IPSPLIT | Performed by: INTERNAL MEDICINE

## 2024-05-03 RX ORDER — SODIUM CHLORIDE 9 MG/ML
INJECTION, SOLUTION INTRAVENOUS
Status: COMPLETED
Start: 2024-05-03 | End: 2024-05-03

## 2024-05-03 RX ORDER — KETOROLAC TROMETHAMINE 30 MG/ML
30 INJECTION, SOLUTION INTRAMUSCULAR; INTRAVENOUS EVERY 6 HOURS PRN
Status: DISCONTINUED | OUTPATIENT
Start: 2024-05-03 | End: 2024-05-05 | Stop reason: HOSPADM

## 2024-05-03 RX ORDER — AMPICILLIN AND SULBACTAM 2; 1 G/1; G/1
INJECTION, POWDER, FOR SOLUTION INTRAMUSCULAR; INTRAVENOUS
Status: DISPENSED
Start: 2024-05-03 | End: 2024-05-03

## 2024-05-03 RX ORDER — SODIUM CHLORIDE 900 MG/100ML
INJECTION INTRAVENOUS
Status: COMPLETED
Start: 2024-05-03 | End: 2024-05-03

## 2024-05-03 RX ORDER — AMPICILLIN AND SULBACTAM 2; 1 G/1; G/1
INJECTION, POWDER, FOR SOLUTION INTRAMUSCULAR; INTRAVENOUS
Status: COMPLETED
Start: 2024-05-03 | End: 2024-05-03

## 2024-05-03 RX ORDER — TRAMADOL HYDROCHLORIDE 50 MG/1
50 TABLET ORAL EVERY 6 HOURS PRN
Status: DISCONTINUED | OUTPATIENT
Start: 2024-05-03 | End: 2024-05-05 | Stop reason: HOSPADM

## 2024-05-03 RX ORDER — POTASSIUM CHLORIDE 750 MG/1
10 TABLET, FILM COATED, EXTENDED RELEASE ORAL DAILY
Status: DISCONTINUED | OUTPATIENT
Start: 2024-05-03 | End: 2024-05-05 | Stop reason: HOSPADM

## 2024-05-03 RX ORDER — SODIUM CHLORIDE 9 MG/ML
10 INJECTION, SOLUTION INTRAVENOUS CONTINUOUS PRN
Status: DISCONTINUED | OUTPATIENT
Start: 2024-05-03 | End: 2024-05-05 | Stop reason: HOSPADM

## 2024-05-03 RX ORDER — DULOXETIN HYDROCHLORIDE 30 MG/1
90 CAPSULE, DELAYED RELEASE ORAL EVERY MORNING
Status: DISCONTINUED | OUTPATIENT
Start: 2024-05-03 | End: 2024-05-05 | Stop reason: HOSPADM

## 2024-05-03 RX ORDER — GADOTERATE MEGLUMINE 376.9 MG/ML
27 INJECTION INTRAVENOUS
Status: COMPLETED | OUTPATIENT
Start: 2024-05-03 | End: 2024-05-03

## 2024-05-03 RX ADMIN — AMPICILLIN SODIUM AND SULBACTAM SODIUM 3 G: 2; 1 INJECTION, POWDER, FOR SOLUTION INTRAMUSCULAR; INTRAVENOUS at 06:00

## 2024-05-03 RX ADMIN — SODIUM CHLORIDE 250 ML: 9 INJECTION, SOLUTION INTRAVENOUS at 00:26

## 2024-05-03 RX ADMIN — AMPICILLIN SODIUM AND SULBACTAM SODIUM 3 G: 2; 1 INJECTION, POWDER, FOR SOLUTION INTRAMUSCULAR; INTRAVENOUS at 23:10

## 2024-05-03 RX ADMIN — AMPICILLIN AND SULBACTAM 3 G: 2; 1 INJECTION, POWDER, FOR SOLUTION INTRAVENOUS at 05:54

## 2024-05-03 RX ADMIN — AMPICILLIN SODIUM AND SULBACTAM SODIUM 3 G: 2; 1 INJECTION, POWDER, FOR SOLUTION INTRAMUSCULAR; INTRAVENOUS at 16:54

## 2024-05-03 RX ADMIN — BENZTROPINE MESYLATE 0.5 MG: 1 TABLET ORAL at 22:15

## 2024-05-03 RX ADMIN — PREGABALIN 200 MG: 100 CAPSULE ORAL at 22:14

## 2024-05-03 RX ADMIN — METOPROLOL TARTRATE 25 MG: 25 TABLET, FILM COATED ORAL at 09:07

## 2024-05-03 RX ADMIN — SIMVASTATIN 40 MG: 40 TABLET, FILM COATED ORAL at 22:17

## 2024-05-03 RX ADMIN — TRAMADOL HYDROCHLORIDE 50 MG: 50 TABLET, COATED ORAL at 12:13

## 2024-05-03 RX ADMIN — DEXAMETHASONE SODIUM PHOSPHATE 10 MG: 4 INJECTION, SOLUTION INTRAMUSCULAR; INTRAVENOUS at 09:07

## 2024-05-03 RX ADMIN — CYCLOBENZAPRINE HYDROCHLORIDE 10 MG: 5 TABLET, FILM COATED ORAL at 09:17

## 2024-05-03 RX ADMIN — TOPIRAMATE 100 MG: 100 TABLET, FILM COATED ORAL at 22:29

## 2024-05-03 RX ADMIN — BENZTROPINE MESYLATE 0.5 MG: 1 TABLET ORAL at 09:06

## 2024-05-03 RX ADMIN — KETOROLAC TROMETHAMINE 30 MG: 30 INJECTION, SOLUTION INTRAMUSCULAR at 22:18

## 2024-05-03 RX ADMIN — KETOROLAC TROMETHAMINE 30 MG: 30 INJECTION, SOLUTION INTRAMUSCULAR at 05:52

## 2024-05-03 RX ADMIN — DULOXETINE HYDROCHLORIDE 30 MG: 30 CAPSULE, DELAYED RELEASE ORAL at 22:15

## 2024-05-03 RX ADMIN — APIXABAN 5 MG: 5 TABLET, FILM COATED ORAL at 22:16

## 2024-05-03 RX ADMIN — APIXABAN 5 MG: 5 TABLET, FILM COATED ORAL at 09:06

## 2024-05-03 RX ADMIN — PREGABALIN 200 MG: 100 CAPSULE ORAL at 14:44

## 2024-05-03 RX ADMIN — TOPIRAMATE 100 MG: 100 TABLET, FILM COATED ORAL at 09:06

## 2024-05-03 RX ADMIN — ROPINIROLE HYDROCHLORIDE 0.5 MG: 0.25 TABLET, FILM COATED ORAL at 22:14

## 2024-05-03 RX ADMIN — TRAMADOL HYDROCHLORIDE 50 MG: 50 TABLET, COATED ORAL at 18:32

## 2024-05-03 RX ADMIN — Medication 1 TABLET: at 22:14

## 2024-05-03 RX ADMIN — SODIUM CHLORIDE: 9 INJECTION, SOLUTION INTRAVENOUS at 00:30

## 2024-05-03 RX ADMIN — TRAZODONE HYDROCHLORIDE 50 MG: 50 TABLET ORAL at 22:28

## 2024-05-03 RX ADMIN — NICOTINE 1 PATCH: 14 PATCH, EXTENDED RELEASE TRANSDERMAL at 09:04

## 2024-05-03 RX ADMIN — MONTELUKAST 10 MG: 10 TABLET, FILM COATED ORAL at 09:05

## 2024-05-03 RX ADMIN — QUETIAPINE FUMARATE 25 MG: 25 TABLET ORAL at 22:14

## 2024-05-03 RX ADMIN — ROPINIROLE HYDROCHLORIDE 0.5 MG: 0.25 TABLET, FILM COATED ORAL at 09:05

## 2024-05-03 RX ADMIN — KETOROLAC TROMETHAMINE 30 MG: 30 INJECTION, SOLUTION INTRAMUSCULAR at 14:48

## 2024-05-03 RX ADMIN — GADOTERATE MEGLUMINE 27 ML: 376.9 INJECTION INTRAVENOUS at 11:57

## 2024-05-03 RX ADMIN — SODIUM CHLORIDE 100 ML: 900 INJECTION INTRAVENOUS at 05:54

## 2024-05-03 RX ADMIN — ROPINIROLE HYDROCHLORIDE 0.5 MG: 0.25 TABLET, FILM COATED ORAL at 14:44

## 2024-05-03 RX ADMIN — PREGABALIN 200 MG: 100 CAPSULE ORAL at 09:05

## 2024-05-03 RX ADMIN — DOCUSATE SODIUM 100 MG: 100 CAPSULE, LIQUID FILLED ORAL at 09:07

## 2024-05-03 RX ADMIN — AMPICILLIN SODIUM AND SULBACTAM SODIUM 3 G: 2; 1 INJECTION, POWDER, FOR SOLUTION INTRAMUSCULAR; INTRAVENOUS at 12:14

## 2024-05-03 RX ADMIN — CELECOXIB 200 MG: 100 CAPSULE ORAL at 09:06

## 2024-05-03 RX ADMIN — AMPICILLIN SODIUM AND SULBACTAM SODIUM 3 G: 2; 1 INJECTION, POWDER, FOR SOLUTION INTRAMUSCULAR; INTRAVENOUS at 00:25

## 2024-05-03 RX ADMIN — RISPERIDONE 1.5 MG: 0.25 TABLET, FILM COATED ORAL at 22:15

## 2024-05-03 RX ADMIN — ACETAMINOPHEN 650 MG: 325 TABLET ORAL at 22:30

## 2024-05-03 RX ADMIN — DOCUSATE SODIUM 100 MG: 100 CAPSULE, LIQUID FILLED ORAL at 22:15

## 2024-05-03 RX ADMIN — SENNOSIDES AND DOCUSATE SODIUM 1 TABLET: 8.6; 5 TABLET ORAL at 22:17

## 2024-05-03 RX ADMIN — PANTOPRAZOLE SODIUM 40 MG: 40 TABLET, DELAYED RELEASE ORAL at 06:25

## 2024-05-03 RX ADMIN — POTASSIUM CHLORIDE 10 MEQ: 750 TABLET, FILM COATED, EXTENDED RELEASE ORAL at 09:17

## 2024-05-03 RX ADMIN — Medication 1 TABLET: at 09:05

## 2024-05-03 ASSESSMENT — PAIN - FUNCTIONAL ASSESSMENT
PAIN_FUNCTIONAL_ASSESSMENT: 0-10
PAIN_FUNCTIONAL_ASSESSMENT: WONG-BAKER FACES
PAIN_FUNCTIONAL_ASSESSMENT: 0-10
PAIN_FUNCTIONAL_ASSESSMENT: WONG-BAKER FACES
PAIN_FUNCTIONAL_ASSESSMENT: 0-10

## 2024-05-03 ASSESSMENT — PAIN SCALES - GENERAL
PAINLEVEL_OUTOF10: 6
PAINLEVEL_OUTOF10: 7
PAINLEVEL_OUTOF10: 0 - NO PAIN
PAINLEVEL_OUTOF10: 7
PAINLEVEL_OUTOF10: 8
PAINLEVEL_OUTOF10: 8
PAINLEVEL_OUTOF10: 6
PAINLEVEL_OUTOF10: 8
PAINLEVEL_OUTOF10: 6
PAINLEVEL_OUTOF10: 4
PAINLEVEL_OUTOF10: 7
PAINLEVEL_OUTOF10: 0 - NO PAIN

## 2024-05-03 ASSESSMENT — PAIN DESCRIPTION - ORIENTATION
ORIENTATION: RIGHT
ORIENTATION: RIGHT;LEFT
ORIENTATION: RIGHT

## 2024-05-03 ASSESSMENT — PAIN DESCRIPTION - LOCATION
LOCATION: NECK
LOCATION: NECK
LOCATION: HEAD
LOCATION: NECK

## 2024-05-03 ASSESSMENT — PAIN SCALES - WONG BAKER
WONGBAKER_NUMERICALRESPONSE: NO HURT
WONGBAKER_NUMERICALRESPONSE: NO HURT

## 2024-05-03 NOTE — CARE PLAN
The patient's goals for the shift include  to have less neck pain    The clinical goals for the shift include Pain will be controlled to 5/10 today    Pt rating Right neck pain at 7-8/10 most of the day. Getting Tramadol and Toradol for pain with slight relief. Taking diet well. No nausea. Up ad shelley to bathroom. Pt on Unasyn and fuad it well.

## 2024-05-03 NOTE — H&P
History and Physical         Gaurav Mckay 53 y.o. 1970     History Of Present Illness  Gaurav Mckay is a 53 y.o. male presented to [facility] with Chief  complaint of  right neck pain.  PMH  T2 larynx CA and radiation treatment in 2022. He was seen by Dr. Leonardo at [facility] in January 2024 when he had direct laryngoscopy and neck CT. No recurrence was found, but there was diffuse edema of larynx. There was no cervical lymphadenopathy by size criteria. CT of Soft Tissue of Neck  showed  enlarged ~3.6 cm right level 3 lymph node with multiple smaller cervical lymph nodes. Labs WNL  Received Decadron in ED  Continue Unasyn 3 mg IV Q 6 hours Consult Dr. Cisse ,Otolaryngologist. On exam patient resting  in bed. Alert Cooperative  No  acute distress noted on exam      Past Medical History  Past Medical History:   Diagnosis Date    Acute upper respiratory infection, unspecified 03/20/2015    Acute upper respiratory infection    Alcohol abuse, in remission 02/12/2015    History of ETOH abuse    Alcohol dependence, in remission (Multi) 05/07/2015    Alcohol dependence in remission    Allergic contact dermatitis due to plants, except food 05/21/2014    Contact dermatitis due to poison ivy    Allergy status to unspecified drugs, medicaments and biological substances 08/29/2013    History of allergy    Anxiety disorder, unspecified 03/20/2015    Anxiety    Bipolar disorder, current episode manic without psychotic features, unspecified (Multi)     Bipolar disorder, current episode manic without psychotic features    Cervicalgia     Cervicalgia    Chronic obstructive pulmonary disease, unspecified (Multi) 12/02/2014    Chronic asthmatic bronchitis    Encounter for immunization 09/22/2016    Flu vaccine need    Other cervical disc degeneration, unspecified cervical region     Degeneration of cervical intervertebral disc    Other conditions influencing health status 06/27/2013    Acute  Inflammation Of The Orbit    Other long term (current) drug therapy 06/02/2015    High risk medication use    Other specified health status     No pertinent past surgical history    Personal history of nicotine dependence 08/16/2017    History of tobacco abuse    Personal history of nicotine dependence 06/26/2017    History of nicotine dependence    Personal history of other diseases of the digestive system 04/11/2013    History of gastroenteritis    Personal history of other diseases of the digestive system 08/27/2015    History of esophageal reflux    Personal history of other diseases of the respiratory system 03/20/2017    History of sore throat    Personal history of other diseases of the respiratory system 02/05/2014    History of sinusitis    Personal history of other diseases of the respiratory system 01/16/2014    History of allergic rhinitis    Personal history of other diseases of the respiratory system 09/26/2013    History of acute sinusitis    Personal history of other diseases of the respiratory system 06/26/2014    History of allergic rhinitis    Personal history of other infectious and parasitic diseases     History of hepatitis C    Personal history of other mental and behavioral disorders     History of depression    Personal history of other specified conditions 08/25/2016    History of dizziness    Personal history of other specified conditions 02/16/2017    History of abdominal pain    Unspecified asthma, uncomplicated ([facility]) 02/13/2014    Asthmatic bronchitis        Surgical History  He has a past surgical history that includes MR angio head wo IV contrast (1/24/2016); MR angio neck wo IV contrast (1/24/2016); CT angio abdomen pelvis w and or wo IV IV contrast (4/1/2020); and Invasive Vascular Procedure (N/A, 11/8/2023).     Social History  Social History     Socioeconomic History    Marital status:      Spouse name: Not on file    Number of children: Not on file    Years of  education: Not on file    Highest education level: Not on file   Occupational History    Not on file   Tobacco Use    Smoking status: Former     Current packs/day: 0.00     Types: Cigarettes     Quit date: 2023     Years since quittin.4    Smokeless tobacco: Never   Substance and Sexual Activity    Alcohol use: Not Currently    Drug use: Not on file    Sexual activity: Not on file   Other Topics Concern    Not on file   Social History Narrative    Not on file     Social Determinants of Health     Financial Resource Strain: Low Risk  (2024)    Overall Financial Resource Strain (CARDIA)     Difficulty of Paying Living Expenses: Not hard at all   Food Insecurity: No Food Insecurity (2023)    Received from [facility]    Hunger Vital Sign     Worried About Running Out of Food in the Last Year: Never true     Ran Out of Food in the Last Year: Never true   Transportation Needs: No Transportation Needs (2024)    PRAPARE - Transportation     Lack of Transportation (Medical): No     Lack of Transportation (Non-Medical): No   Physical Activity: Not on file   Stress: Not on file   Social Connections: Not on file   Intimate Partner Violence: Not on file   Housing Stability: Low Risk  (2024)    Housing Stability Vital Sign     Unable to Pay for Housing in the Last Year: No     Number of Places Lived in the Last Year: 2     Unstable Housing in the Last Year: No        Family History  No family history on file.     Allergies  Allergies   Allergen Reactions    Oxycodone Itching        Vital Signs  Temp:  [36.2 °C (97.2 °F)-36.9 °C (98.5 °F)] 36.2 °C (97.2 °F)  Heart Rate:  [64-76] 66  Resp:  [16-18] 17  BP: (118-136)/(52-77) 136/77    Home Medications   Prior to Admission Medications   Prescriptions Last Dose Informant Patient Reported? Taking?   DULoxetine (Cymbalta) 30 mg DR capsule 2024  Yes Yes   Sig: Take 1 capsule (30 mg) by mouth once daily at bedtime. Do not crush or chew.   DULoxetine  (Cymbalta) 60 mg DR capsule 5/2/2024  Yes Yes   Sig: Take 90 mg by mouth once daily in the morning. Do not crush or chew.   QUEtiapine (SEROquel) 25 mg tablet 5/1/2024  Yes Yes   Sig: Take 1 tablet (25 mg) by mouth as needed at bedtime (insomnia). 1-2 tablets   acetaminophen (Tylenol) 325 mg tablet 5/1/2024  No Yes   Sig: Take 2 tablets (650 mg) by mouth every 6 hours if needed for mild pain (1 - 3).   albuterol (Proventil HFA) 90 mcg/actuation inhaler Unknown  Yes No   Sig: Inhale 2 puffs every 4 hours if needed for wheezing or shortness of breath.   apixaban (Eliquis) 5 mg tablet   No No   Sig: Take 2 tablets (10 mg) by mouth 2 times a day for 9 doses.   apixaban (Eliquis) 5 mg tablet Unknown  No No   Sig: Take 1 tablet (5 mg) by mouth 2 times a day. Do not start before November 16, 2023.   atorvastatin (Lipitor) 20 mg tablet Unknown  Yes No   Sig: Take 1 tablet (20 mg) by mouth once daily at bedtime.   benztropine (Cogentin) 0.5 mg tablet Unknown  Yes No   Sig: Take 1 tablet (0.5 mg) by mouth 2 times a day.   calcium carbonate-vitamin D3 500 mg-5 mcg (200 unit) tablet   Yes No   Sig: Take 1 tablet by mouth 2 times a day.   celecoxib (CeleBREX) 200 mg capsule 5/2/2024  Yes Yes   Sig: Take 1 capsule (200 mg) by mouth once daily.   cyclobenzaprine (Flexeril) 10 mg tablet Past Week  Yes Yes   Sig: Take 1 tablet (10 mg) by mouth 3 times a day as needed for muscle spasms.   disulfiram (Antabuse) 250 mg tablet 5/2/2024  Yes Yes   Sig: Take 2 tablets (500 mg) by mouth once daily.   docusate sodium (Colace) 100 mg capsule 5/2/2024  Yes Yes   Sig: Take 1 capsule (100 mg) by mouth 2 times a day.   ipratropium-albuteroL (Duo-Neb) 0.5-2.5 mg/3 mL nebulizer solution   No No   Sig: Take 3 mL by nebulization 3 times a day.   ipratropium-albuteroL (Duo-Neb) 0.5-2.5 mg/3 mL nebulizer solution Unknown  No No   Sig: Take 3 mL by nebulization every 2 hours if needed for wheezing.   lidocaine (Lidoderm) 5 % patch Unknown  No No   Sig:  Place 1 patch over 12 hours on the skin once daily. Remove & discard patch within 12 hours or as directed by MD.   melatonin 10 mg tablet,disintegrating 5/1/2024  Yes Yes   Sig: Take 1 tablet by mouth as needed at bedtime (for sleep).   methocarbamol (Robaxin) 500 mg tablet   No No   Sig: Take 1 tablet (500 mg) by mouth 2 times a day for 5 days.   metoprolol tartrate (Lopressor) 25 mg tablet 5/2/2024  Yes Yes   Sig: Take 1 tablet (25 mg) by mouth once daily.   montelukast (Singulair) 10 mg tablet 5/2/2024  Yes Yes   Sig: Take 1 tablet (10 mg) by mouth once daily.   nicotine polacrilex (Commit) 2 mg lozenge 5/1/2024  Yes Yes   Sig: Use 1 lozenge (2 mg) in the mouth or throat if needed for smoking cessation.   omeprazole (PriLOSEC) 40 mg DR capsule 5/2/2024  Yes Yes   Sig: Take 1 capsule (40 mg) by mouth once daily in the morning. Take before meals. Do not crush or chew.   potassium chloride ER (Micro-K) 10 mEq ER capsule 5/2/2024  Yes Yes   Sig: Take 1 capsule (10 mEq) by mouth once daily. Do not crush or chew.   pregabalin (Lyrica) 200 mg capsule 5/1/2024  Yes Yes   Sig: Take 1 capsule (200 mg) by mouth 3 times a day. For 60 days   rOPINIRole (Requip) 0.5 mg tablet 5/2/2024  Yes Yes   Sig: Take 1 tablet (0.5 mg) by mouth 3 times a day.   risperiDONE (RisperDAL) 1 mg tablet 5/1/2024  Yes Yes   Sig: Take 1.5 tablets (1.5 mg) by mouth once daily at bedtime.   sennosides-docusate sodium (Marya-Colace) 8.6-50 mg tablet 5/1/2024  No Yes   Sig: Take 1 tablet by mouth once daily at bedtime.   simvastatin (Zocor) 40 mg tablet Unknown  Yes No   Sig: Take 1 tablet (40 mg) by mouth once daily at bedtime.   topiramate (Topamax) 100 mg tablet 5/2/2024  Yes Yes   Sig: Take 1 tablet (100 mg) by mouth 2 times a day.   traZODone (Desyrel) 50 mg tablet Unknown  Yes No   Sig: Take 1 tablet (50 mg) by mouth once daily at bedtime.      Facility-Administered Medications: None       New Hospital Orders  Current Facility-Administered  Medications   Medication Dose Route Frequency Provider Last Rate Last Admin    ampicillin-sulbactam (Unasyn) injection  - Omnicell Override Pull             sodium chloride 0.9% infusion  - Omnicell Override Pull             acetaminophen (Tylenol) tablet 650 mg  650 mg oral q4h PRN JUSTINO Bang   650 mg at 05/02/24 2244    acetaminophen (Tylenol) tablet 650 mg  650 mg oral q4h PRN JUSTINO Bang        ampicillin-sulbactam (Unasyn) 3 g in sodium chloride 0.9 % 100 mL IV  3 g intravenous q6h JUSTINO Bang 200 mL/hr at 05/03/24 0025 3 g at 05/03/24 0025    apixaban (Eliquis) tablet 5 mg  5 mg oral BID JUSTINO Ansari   5 mg at 05/02/24 2242    benztropine (Cogentin) tablet 0.5 mg  0.5 mg oral BID JUSTINO Ansari   0.5 mg at 05/02/24 2241    calcium carbonate-vitamin D3 500 mg-5 mcg (200 unit) per tablet 1 tablet  1 tablet oral BID JUSTINO Ansari        celecoxib (CeleBREX) capsule 200 mg  200 mg oral Daily JUSTINO Ansari        cyclobenzaprine (Flexeril) tablet 10 mg  10 mg oral TID PRN JUSTINO Ansari   10 mg at 05/02/24 2244    disulfiram (Antabuse) tablet 500 mg  500 mg oral Daily JUSTINO Ansari        docusate sodium (Colace) capsule 100 mg  100 mg oral BID JUSTINO Ansari   100 mg at 05/02/24 2241    DULoxetine (Cymbalta) DR capsule 30 mg  30 mg oral Nightly JUSTINO Ansari   30 mg at 05/02/24 2241    melatonin tablet 6 mg  6 mg oral Daily JUSTINO Ansari        melatonin tablet 9 mg  9 mg oral Nightly PRN JUSTINO Bang        metoprolol tartrate (Lopressor) tablet 25 mg  25 mg oral Daily JUSTINO Ansari        montelukast (Singulair) tablet 10 mg  10 mg oral Daily JUSTINO Ansari        nicotine (Nicoderm CQ) 14 mg/24 hr patch 1 patch  1 patch transdermal Daily JUSTINO Ansari        ondansetron (Zofran) tablet 4 mg  4 mg oral q8h PRN JUSTINO Bang        Or     ondansetron (Zofran) injection 4 mg  4 mg intravenous q8h PRN JUSTINO Bang        pantoprazole (ProtoNix) EC tablet 40 mg  40 mg oral Daily before breakfast JUSTINO Ansari        polyethylene glycol (Glycolax, Miralax) packet 17 g  17 g oral Daily PRN JUSTINO Bang        potassium chloride ER (Micro-K) ER capsule 10 mEq  10 mEq oral Daily JUSTINO Ansari        pregabalin (Lyrica) capsule 200 mg  200 mg oral TID JUSTINO Ansari   200 mg at 05/02/24 2253    QUEtiapine (SEROquel) tablet 25 mg  25 mg oral Nightly PRN JUSTINO Ansari        risperiDONE (RisperDAL) tablet 1.5 mg  1.5 mg oral Nightly JUSTINO Ansari   1.5 mg at 05/02/24 2253    rOPINIRole (Requip) tablet 0.5 mg  0.5 mg oral TID JUSTINO Ansari   0.5 mg at 05/02/24 2253    sennosides-docusate sodium (Marya-Colace) 8.6-50 mg per tablet 1 tablet  1 tablet oral Nightly JUSTINO Ansari   1 tablet at 05/02/24 2241    simvastatin (Zocor) tablet 40 mg  40 mg oral Nightly ANASTACIA Ansari-CNP   40 mg at 05/02/24 2253    topiramate (Topamax) tablet 100 mg  100 mg oral BID JUSTINO Ansari        traZODone (Desyrel) tablet 50 mg  50 mg oral Nightly JUSTINO Ansari   50 mg at 05/02/24 2241           Review of Systems   All other systems reviewed and are negative.          Physical Exam  Constitutional:       Appearance: He is obese. He is ill-appearing.   HENT:      Head: Normocephalic and atraumatic.      Nose: Nose normal.      Mouth/Throat:      Mouth: Mucous membranes are moist.      Comments: Edentulous   Eyes:      Extraocular Movements: Extraocular movements intact.      Pupils: Pupils are equal, round, and reactive to light.   Neck:      Comments: Right cervical lymphadenopathy  Cardiovascular:      Rate and Rhythm: Normal rate and regular rhythm.      Pulses: Normal pulses.      Heart sounds: Murmur heard.   Pulmonary:      Effort: Pulmonary effort is normal.       Breath sounds: Normal breath sounds. Stridor present.   Abdominal:      General: Bowel sounds are normal.      Tenderness: There is abdominal tenderness.   Musculoskeletal:         General: Normal range of motion.   Skin:     General: Skin is warm and dry.   Neurological:      General: No focal deficit present.      Mental Status: He is alert and oriented to person, place, and time. Mental status is at baseline.   Psychiatric:         Mood and Affect: Mood normal.         Behavior: Behavior normal.            Last Recorded Vitals  Blood pressure 136/77, pulse 66, temperature 36.2 °C (97.2 °F), temperature source Temporal, resp. rate 17, height 1.829 m (6'), weight 132 kg (290 lb), SpO2 99%.    Relevant Results  Results for orders placed or performed during the hospital encounter of 05/02/24   CBC and Auto Differential   Result Value Ref Range    WBC 6.5 4.4 - 11.3 x10*3/uL    nRBC 0.0 0.0 - 0.0 /100 WBCs    RBC 4.38 (L) 4.50 - 5.90 x10*6/uL    Hemoglobin 12.0 (L) 13.5 - 17.5 g/dL    Hematocrit 37.3 (L) 41.0 - 52.0 %    MCV 85 80 - 100 fL    MCH 27.4 26.0 - 34.0 pg    MCHC 32.2 32.0 - 36.0 g/dL    RDW 16.4 (H) 11.5 - 14.5 %    Platelets 243 150 - 450 x10*3/uL    Neutrophils % 56.5 40.0 - 80.0 %    Immature Granulocytes %, Automated 0.2 0.0 - 0.9 %    Lymphocytes % 28.6 13.0 - 44.0 %    Monocytes % 9.0 2.0 - 10.0 %    Eosinophils % 4.6 0.0 - 6.0 %    Basophils % 1.1 0.0 - 2.0 %    Neutrophils Absolute 3.65 1.20 - 7.70 x10*3/uL    Immature Granulocytes Absolute, Automated 0.01 0.00 - 0.70 x10*3/uL    Lymphocytes Absolute 1.85 1.20 - 4.80 x10*3/uL    Monocytes Absolute 0.58 0.10 - 1.00 x10*3/uL    Eosinophils Absolute 0.30 0.00 - 0.70 x10*3/uL    Basophils Absolute 0.07 0.00 - 0.10 x10*3/uL   Basic metabolic panel   Result Value Ref Range    Glucose 154 (H) 74 - 99 mg/dL    Sodium 138 136 - 145 mmol/L    Potassium 4.0 3.5 - 5.3 mmol/L    Chloride 109 (H) 98 - 107 mmol/L    Bicarbonate 22 21 - 32 mmol/L    Anion Gap  11 10 - 20 mmol/L    Urea Nitrogen 12 6 - 23 mg/dL    Creatinine 1.11 0.50 - 1.30 mg/dL    eGFR 79 >60 mL/min/1.73m*2    Calcium 8.8 8.6 - 10.3 mg/dL   Magnesium   Result Value Ref Range    Magnesium 2.12 1.60 - 2.40 mg/dL        Imaging   CT soft tissue neck w IV contrast    Result Date: 5/2/2024  Interpreted By:  Raul Pickering, STUDY: CT SOFT TISSUE NECK W IV CONTRAST;  5/2/2024 3:40 pm   INDICATION: Signs/Symptoms: right neck mass.   COMPARISON: None.   ACCESSION NUMBER(S): [id number]   ORDERING CLINICIAN: NELLY CABELLO   TECHNIQUE: An external marker was placed in the area of concern and axial CT images of the neck were obtained.  The patient received 75 mL Omnipaque 350 intravenous contrast agent. The images were reformatted in angled axial, coronal and sagittal planes.   FINDINGS: A heterogeneously enhancing nodule in the area of concern at level 3 on the right measures up 2 2.6 x 3.6 x 3.5 cm (axial image 72/142 and coronal image 64). There is extensive fluid stranding in the adjacent fat, including the right parapharyngeal space and associated thickening of the skin and platysma. Multiple additional enlarged lymph nodes are noted at levels 3-5 on the right. There is mass effect on the right internal jugular vein with associated loss of enhancement, no filling defect is identified in the opacified segments of the vein. There is also mass effect on the right sternocleidomastoid without definite invasion. No fluid collection is identified.   The patient is edentulous, the oral cavity is otherwise unremarkable. The pharynx and larynx are within normal limits. The thyroid gland is unremarkable. The major salivary glands are within normal limits. No prevertebral swelling. The major cervical vessels are otherwise unremarkable.   Chronic changes in the right maxillary sinus with scattered paranasal sinus mucosal thickening. Left mastoid effusion. Orbits and visualized intracranial contents are unremarkable. There  is mild biapical scarring with additional areas of scarring or atelectasis dependently right greater than left. The visualized lungs are otherwise clear. Degenerative changes in the spine.       Right cervical lymphadenopathy, most pronounced at level 3, with extensive inflammatory changes in the adjacent soft tissues. No associated fluid collection is identified. This may represent severe lymphadenitis, however given the patient's age and the degree of katia enlargement attention on follow-up is recommended.   MACRO: Raul Pickering discussed the significance and urgency of this critical finding by telephone with  NELLY CABELLO on 5/2/2024 at 4:23 pm.  (**-RCF-**) Findings:  See findings.   Signed by: Raul Pickering 5/2/2024 4:25 PM Dictation workstation:   [id number]        Assessment/Plan   Principal Problem:    Lymphadenitis  53 year old PMH  T2 larynx CA and radiation treatment in 2022. He was seen by Dr. Leonardo at [facility] in January 2024 when he had direct laryngoscopy and neck CT. No recurrence was found, but there was diffuse edema of larynx. There was no cervical lymphadenopathy by size criteria.    -CT of Soft Tissue of Neck  showed  enlarged ~3.6 cm right level 3 lymph node with multiple smaller cervical lymph nodes.  -Labs WNL   -Received Decadron in ED   -Continue Unasyn 3 mg IV Q 6 hours   -Consult Dr. Cisse ,Otolaryngologist     11/2023 Successful pulmonary embolectomy bilaterally; bilateral pulmonary angiography   Continue Eliquis      #BiPolar Disorder   #Anxiety Disorder  - Will continue risperidone 1mg BID  - Continue trazodone 50mg at bedtime   - Continue seroquel 25mg at bedtime       #Nicotine/alcohol use disorder  - Patient has a hx of ETOH abuse   - patient is a chronic smoker with 20pckyr hx  - Nicoderm patch 14mg/24 hours   -Continue Antabuse      #Restless leg Syndrome   - Continue ropinirole 0.5mg      #Hyperlipidemia  - Continue atorvastatin 20mg daily      #HTN  - Continue metoprolol  25mg daily       #COPD  -Continue DuoNebs every 4 as needed  -Continue INO2 to keep SPO2 greater than 88%.    #GERD  -Continue pantoprazole at 40 mg daily.     #Esophageal cancer s/p radiation therapy  -Follow up with ENT outpatient      DVT Prophylaxis Eliquis   Fluids: PRN   Electrolytes: replace as needed  Nutrition: Regular   Adjuncts: PIV        Total accumulated time spent face to face and not face to face preparing to see the patient, obtaining and reviewing separately obtained history; performing a medically appropriate examination and/or evaluation; counseling and educating the patient, family; ordering medications, tests, or procedures; referring and communicating with other health care professionals; documenting clinical information in the patient's medical record; independently interpreting results and communicating the results to the patient, family; and care coordination was 40 minutes      ANASTACIA Ansari-CNP

## 2024-05-03 NOTE — CONSULTS
Reason For Consult  Cervical lymphadenitis    History Of Present Illness  Gaurav Mckay is a 53 y.o. male, who presented to [place] ER today with chief complaint of right neck pain. Pain started yesterday and progressively got worse, so he came to ED today for evaluation. Patient has a history of T2 larynx CA and radiation treatment in 2022. He was seen by Dr. Leonardo at [facility] in January 2024. He had direct laryngoscopy and neck CT. No recurrence was found, but there was diffuse edema of larynx. There was no cervical lymphadenopathy by size criteria.    His CT scan from today shows an enlarged ~3.6 cm right level 3 lymph node with multiple smaller cervical lymph nodes.     Past Medical History  He has a past medical history of Acute upper respiratory infection, unspecified (03/20/2015), Alcohol abuse, in remission (02/12/2015), Alcohol dependence, in remission (Multi) (05/07/2015), Allergic contact dermatitis due to plants, except food (05/21/2014), Allergy status to unspecified drugs, medicaments and biological substances (08/29/2013), Anxiety disorder, unspecified (03/20/2015), Bipolar disorder, current episode manic without psychotic features, unspecified (Multi), Cervicalgia, Chronic obstructive pulmonary disease, unspecified (Multi) (12/02/2014), Encounter for immunization (09/22/2016), Other cervical disc degeneration, unspecified cervical region, Other conditions influencing health status (06/27/2013), Other long term (current) drug therapy (06/02/2015), Other specified health status, Personal history of nicotine dependence (08/16/2017), Personal history of nicotine dependence (06/26/2017), Personal history of other diseases of the digestive system (04/11/2013), Personal history of other diseases of the digestive system (08/27/2015), Personal history of other diseases of the respiratory system (03/20/2017), Personal history of other diseases of the respiratory system (02/05/2014), Personal history of  other diseases of the respiratory system (01/16/2014), Personal history of other diseases of the respiratory system (09/26/2013), Personal history of other diseases of the respiratory system (06/26/2014), Personal history of other infectious and parasitic diseases, Personal history of other mental and behavioral disorders, Personal history of other specified conditions (08/25/2016), Personal history of other specified conditions (02/16/2017), and Unspecified asthma, uncomplicated ([facility]) (02/13/2014).    Surgical History  He has a past surgical history that includes MR angio head wo IV contrast (1/24/2016); MR angio neck wo IV contrast (1/24/2016); CT angio abdomen pelvis w and or wo IV IV contrast (4/1/2020); and Invasive Vascular Procedure (N/A, 11/8/2023).     Social History  He reports that he quit smoking about 5 months ago. His smoking use included cigarettes. He has never used smokeless tobacco. He reports that he does not currently use alcohol. No history on file for drug use.    Family History  No family history on file.     Allergies  Oxycodone    Review of Systems  Neck pain     Radiological Exam         Last Recorded Vitals  Blood pressure 136/77, pulse 66, temperature 36.2 °C (97.2 °F), temperature source Temporal, resp. rate 17, height 1.829 m (6'), weight 132 kg (290 lb), SpO2 99%.    Relevant Results  Interpreted By:  Raul Pickering,   STUDY:  CT SOFT TISSUE NECK W IV CONTRAST;  5/2/2024 3:40 pm      INDICATION:  Signs/Symptoms: right neck mass.      COMPARISON:  None.      ACCESSION NUMBER(S):  [id number]      ORDERING CLINICIAN:  NELLY CABELLO      TECHNIQUE:  An external marker was placed in the area of concern and axial CT  images of the neck were obtained.  The patient received 75 mL  Omnipaque 350 intravenous contrast agent. The images were reformatted  in angled axial, coronal and sagittal planes.      FINDINGS:  A heterogeneously enhancing nodule in the area of concern at level 3  on the  right measures up to 2.6 x 3.6 x 3.5 cm (axial image 72/142 and  coronal image 64). There is extensive fluid stranding in the adjacent  fat, including the right parapharyngeal space and associated  thickening of the skin and platysma. Multiple additional enlarged  lymph nodes are noted at levels 3-5 on the right. There is mass  effect on the right internal jugular vein with associated loss of  enhancement, no filling defect is identified in the opacified  segments of the vein. There is also mass effect on the right  sternocleidomastoid without definite invasion. No fluid collection is  identified.      The patient is edentulous, the oral cavity is otherwise unremarkable.  The pharynx and larynx are within normal limits. The thyroid gland is  unremarkable. The major salivary glands are within normal limits. No  prevertebral swelling. The major cervical vessels are otherwise  unremarkable.      Chronic changes in the right maxillary sinus with scattered paranasal  sinus mucosal thickening. Left mastoid effusion. Orbits and  visualized intracranial contents are unremarkable. There is mild  biapical scarring with additional areas of scarring or atelectasis  dependently right greater than left. The visualized lungs are  otherwise clear. Degenerative changes in the spine.      IMPRESSION:  Right cervical lymphadenopathy, most pronounced at level 3, with  extensive inflammatory changes in the adjacent soft tissues. No  associated fluid collection is identified. This may represent severe  lymphadenitis, however given the patient's age and the degree of  katia enlargement attention on follow-up is recommended.      MACRO:  Raul Pickering discussed the significance and urgency of this  critical finding by telephone with  NELLY CABELLO on 5/2/2024 at 4:23  pm.  (**-RCF-**) Findings:  See findings.      Signed by: Raul Pickering 5/2/2024 4:25 PM  Dictation workstation:   [id number]     Assessment/Plan     Considering the acute  and painful nature of the LAP, I think it is infections-inflammatory in origin. I recommend IV Unasyn 1.5 gr, q6 hr for 2 days and IV decadron 10 mg daily for 2 days. Likely he can be discharged over the weekend with oral 875 mg augmentin tablet, twice a day. I would like to see him in [place] office ([address], [address], [place]) for follow up, on Tuesday at 9:00 am.     Considering his history of larynx ca and smoking for about 30 years, close follow up is  important. He may need FNA, if the LAP fails to resolve.    Alternatively, he can follow up with Dr. Leonardo after his discharge, if he wants to.    Smith Cisse MD

## 2024-05-03 NOTE — CARE PLAN
Problem: Pain  Goal: Turns in bed with improved pain control throughout the shift  Outcome: Progressing     Problem: Pain  Goal: Performs ADL's with improved pain control throughout shift  Outcome: Progressing     Problem: Pain - Adult  Goal: Verbalizes/displays adequate comfort level or baseline comfort level  Flowsheets (Taken 5/3/2024 0504)  Verbalizes/displays adequate comfort level or baseline comfort level:   Encourage patient to monitor pain and request assistance   Assess pain using appropriate pain scale   Implement non-pharmacological measures as appropriate and evaluate response   Administer analgesics based on type and severity of pain and evaluate response     Problem: Discharge Planning  Goal: Discharge to home or other facility with appropriate resources  Flowsheets (Taken 5/3/2024 0504)  Discharge to home or other facility with appropriate resources:   Identify barriers to discharge with patient and caregiver   Arrange for needed discharge resources and transportation as appropriate   The patient's goals for the shift include      The clinical goals for the shift include Pt will have no pain this shift.    Over the shift, the patient did make progress toward the following goals.

## 2024-05-03 NOTE — PROGRESS NOTES
05/03/24 1313   Discharge Planning   Living Arrangements Spouse/significant other  (Girlfriend - Jacqui Hernandez)   Support Systems Spouse/significant other   Assistance Needed Independent in ADL's and IADL's.  (DME - shower chair, grab bars)   Type of Residence Private residence  (mobile home)   Number of Stairs to Enter Residence 4  (bilateral railings)   Number of Stairs Within Residence 0   Do you have animals or pets at home? Yes   Type of Animals or Pets 1 cat   Who is requesting discharge planning? Provider   Home or Post Acute Services None   Patient expects to be discharged to: Home no needs   Does the patient need discharge transport arranged? No  (his girlfriend will pick him up when ready.)     Patient evaluated at bedside. AAOX3. Patient does not drive. He relies on his girlfriend for transportation.  Patient cannot recall if he has had any falls within the past 6 months. PCP: Garfield Eaton MD last seen a couple of days ago. Pharmacy: [facility] in [place]. Patient self manages their home medications directly from the medication bottles without difficulty, and denies issues with affordability. Patient denies any need for further assistance after discharge home. They feel comfortable going home when medically ready.     Discharge plan: Home no needs.  DC Secure

## 2024-05-03 NOTE — DISCHARGE INSTR - OTHER ORDERS
Thank you for choosing [facility] for your Health Care needs. As you transition from the hospital back to home, we hope we took your preferences into account on how you manage your health needs so you can manage your health at home.     You may receive a survey in the mail within the next couple weeks. Please take the time to complete it and return it. Your input is ALWAYS important to us. Thank you!  Your Care Transition Team - [employer] - [phone]    For questions about your medications listed on your discharge instructions, please call the Nurses Station at [phone].

## 2024-05-04 LAB
ANION GAP SERPL CALC-SCNC: 9 MMOL/L (ref 10–20)
BUN SERPL-MCNC: 15 MG/DL (ref 6–23)
CALCIUM SERPL-MCNC: 9.1 MG/DL (ref 8.6–10.3)
CHLORIDE SERPL-SCNC: 112 MMOL/L (ref 98–107)
CO2 SERPL-SCNC: 23 MMOL/L (ref 21–32)
CREAT SERPL-MCNC: 1 MG/DL (ref 0.5–1.3)
EGFRCR SERPLBLD CKD-EPI 2021: 90 ML/MIN/1.73M*2
ERYTHROCYTE [DISTWIDTH] IN BLOOD BY AUTOMATED COUNT: 16 % (ref 11.5–14.5)
GLUCOSE SERPL-MCNC: 145 MG/DL (ref 74–99)
HCT VFR BLD AUTO: 37.5 % (ref 41–52)
HGB BLD-MCNC: 12 G/DL (ref 13.5–17.5)
MCH RBC QN AUTO: 27 PG (ref 26–34)
MCHC RBC AUTO-ENTMCNC: 32 G/DL (ref 32–36)
MCV RBC AUTO: 85 FL (ref 80–100)
NRBC BLD-RTO: 0 /100 WBCS (ref 0–0)
PLATELET # BLD AUTO: 259 X10*3/UL (ref 150–450)
POTASSIUM SERPL-SCNC: 4 MMOL/L (ref 3.5–5.3)
RBC # BLD AUTO: 4.44 X10*6/UL (ref 4.5–5.9)
SODIUM SERPL-SCNC: 140 MMOL/L (ref 136–145)
WBC # BLD AUTO: 9.6 X10*3/UL (ref 4.4–11.3)

## 2024-05-04 PROCEDURE — S4991 NICOTINE PATCH NONLEGEND: HCPCS | Mod: IPSPLIT | Performed by: NURSE PRACTITIONER

## 2024-05-04 PROCEDURE — 2500000006 HC RX 250 W HCPCS SELF ADMINISTERED DRUGS (ALT 637 FOR ALL PAYERS): Mod: IPSPLIT | Performed by: NURSE PRACTITIONER

## 2024-05-04 PROCEDURE — 2500000001 HC RX 250 WO HCPCS SELF ADMINISTERED DRUGS (ALT 637 FOR MEDICARE OP): Mod: IPSPLIT | Performed by: NURSE PRACTITIONER

## 2024-05-04 PROCEDURE — 2500000004 HC RX 250 GENERAL PHARMACY W/ HCPCS (ALT 636 FOR OP/ED): Mod: IPSPLIT

## 2024-05-04 PROCEDURE — 2500000006 HC RX 250 W HCPCS SELF ADMINISTERED DRUGS (ALT 637 FOR ALL PAYERS): Mod: IPSPLIT

## 2024-05-04 PROCEDURE — 85027 COMPLETE CBC AUTOMATED: CPT | Mod: IPSPLIT

## 2024-05-04 PROCEDURE — 36415 COLL VENOUS BLD VENIPUNCTURE: CPT | Mod: IPSPLIT

## 2024-05-04 PROCEDURE — 2500000002 HC RX 250 W HCPCS SELF ADMINISTERED DRUGS (ALT 637 FOR MEDICARE OP, ALT 636 FOR OP/ED): Mod: IPSPLIT | Performed by: NURSE PRACTITIONER

## 2024-05-04 PROCEDURE — 2500000004 HC RX 250 GENERAL PHARMACY W/ HCPCS (ALT 636 FOR OP/ED): Mod: IPSPLIT | Performed by: NURSE PRACTITIONER

## 2024-05-04 PROCEDURE — 2500000001 HC RX 250 WO HCPCS SELF ADMINISTERED DRUGS (ALT 637 FOR MEDICARE OP): Mod: IPSPLIT

## 2024-05-04 PROCEDURE — 80048 BASIC METABOLIC PNL TOTAL CA: CPT | Mod: IPSPLIT

## 2024-05-04 PROCEDURE — 1100000001 HC PRIVATE ROOM DAILY: Mod: IPSPLIT

## 2024-05-04 RX ADMIN — RISPERIDONE 1.5 MG: 0.25 TABLET, FILM COATED ORAL at 20:57

## 2024-05-04 RX ADMIN — DOCUSATE SODIUM 100 MG: 100 CAPSULE, LIQUID FILLED ORAL at 09:46

## 2024-05-04 RX ADMIN — ROPINIROLE HYDROCHLORIDE 0.5 MG: 0.25 TABLET, FILM COATED ORAL at 20:56

## 2024-05-04 RX ADMIN — KETOROLAC TROMETHAMINE 30 MG: 30 INJECTION, SOLUTION INTRAMUSCULAR at 18:04

## 2024-05-04 RX ADMIN — TRAMADOL HYDROCHLORIDE 50 MG: 50 TABLET, COATED ORAL at 14:49

## 2024-05-04 RX ADMIN — CELECOXIB 200 MG: 100 CAPSULE ORAL at 09:46

## 2024-05-04 RX ADMIN — SIMVASTATIN 40 MG: 40 TABLET, FILM COATED ORAL at 20:57

## 2024-05-04 RX ADMIN — TOPIRAMATE 100 MG: 100 TABLET, FILM COATED ORAL at 09:46

## 2024-05-04 RX ADMIN — AMPICILLIN SODIUM AND SULBACTAM SODIUM 3 G: 2; 1 INJECTION, POWDER, FOR SOLUTION INTRAMUSCULAR; INTRAVENOUS at 04:56

## 2024-05-04 RX ADMIN — Medication 1 TABLET: at 20:57

## 2024-05-04 RX ADMIN — KETOROLAC TROMETHAMINE 30 MG: 30 INJECTION, SOLUTION INTRAMUSCULAR at 09:58

## 2024-05-04 RX ADMIN — ROPINIROLE HYDROCHLORIDE 0.5 MG: 0.25 TABLET, FILM COATED ORAL at 14:49

## 2024-05-04 RX ADMIN — AMPICILLIN SODIUM AND SULBACTAM SODIUM 3 G: 2; 1 INJECTION, POWDER, FOR SOLUTION INTRAMUSCULAR; INTRAVENOUS at 17:30

## 2024-05-04 RX ADMIN — MONTELUKAST 10 MG: 10 TABLET, FILM COATED ORAL at 09:46

## 2024-05-04 RX ADMIN — PANTOPRAZOLE SODIUM 40 MG: 40 TABLET, DELAYED RELEASE ORAL at 06:41

## 2024-05-04 RX ADMIN — DOCUSATE SODIUM 100 MG: 100 CAPSULE, LIQUID FILLED ORAL at 20:57

## 2024-05-04 RX ADMIN — AMPICILLIN SODIUM AND SULBACTAM SODIUM 3 G: 2; 1 INJECTION, POWDER, FOR SOLUTION INTRAMUSCULAR; INTRAVENOUS at 11:21

## 2024-05-04 RX ADMIN — Medication 1 TABLET: at 09:46

## 2024-05-04 RX ADMIN — ACETAMINOPHEN 650 MG: 325 TABLET ORAL at 15:50

## 2024-05-04 RX ADMIN — NICOTINE 1 PATCH: 14 PATCH, EXTENDED RELEASE TRANSDERMAL at 09:45

## 2024-05-04 RX ADMIN — APIXABAN 5 MG: 5 TABLET, FILM COATED ORAL at 09:45

## 2024-05-04 RX ADMIN — TRAMADOL HYDROCHLORIDE 50 MG: 50 TABLET, COATED ORAL at 21:06

## 2024-05-04 RX ADMIN — APIXABAN 5 MG: 5 TABLET, FILM COATED ORAL at 20:57

## 2024-05-04 RX ADMIN — SENNOSIDES AND DOCUSATE SODIUM 1 TABLET: 8.6; 5 TABLET ORAL at 20:57

## 2024-05-04 RX ADMIN — PREGABALIN 200 MG: 100 CAPSULE ORAL at 20:56

## 2024-05-04 RX ADMIN — TOPIRAMATE 100 MG: 100 TABLET, FILM COATED ORAL at 20:57

## 2024-05-04 RX ADMIN — DEXAMETHASONE SODIUM PHOSPHATE 10 MG: 4 INJECTION, SOLUTION INTRAMUSCULAR; INTRAVENOUS at 09:50

## 2024-05-04 RX ADMIN — TRAZODONE HYDROCHLORIDE 50 MG: 50 TABLET ORAL at 20:57

## 2024-05-04 RX ADMIN — PREGABALIN 200 MG: 100 CAPSULE ORAL at 14:49

## 2024-05-04 RX ADMIN — BENZTROPINE MESYLATE 0.5 MG: 1 TABLET ORAL at 09:46

## 2024-05-04 RX ADMIN — POTASSIUM CHLORIDE 10 MEQ: 750 TABLET, FILM COATED, EXTENDED RELEASE ORAL at 09:46

## 2024-05-04 RX ADMIN — BENZTROPINE MESYLATE 0.5 MG: 1 TABLET ORAL at 20:56

## 2024-05-04 RX ADMIN — DULOXETINE HYDROCHLORIDE 30 MG: 30 CAPSULE, DELAYED RELEASE ORAL at 20:56

## 2024-05-04 RX ADMIN — PREGABALIN 200 MG: 100 CAPSULE ORAL at 09:46

## 2024-05-04 RX ADMIN — METOPROLOL TARTRATE 25 MG: 25 TABLET, FILM COATED ORAL at 09:46

## 2024-05-04 RX ADMIN — ROPINIROLE HYDROCHLORIDE 0.5 MG: 0.25 TABLET, FILM COATED ORAL at 09:46

## 2024-05-04 RX ADMIN — DULOXETINE HYDROCHLORIDE 90 MG: 30 CAPSULE, DELAYED RELEASE ORAL at 09:45

## 2024-05-04 ASSESSMENT — PAIN SCALES - WONG BAKER: WONGBAKER_NUMERICALRESPONSE: NO HURT

## 2024-05-04 ASSESSMENT — PAIN SCALES - GENERAL
PAINLEVEL_OUTOF10: 6
PAINLEVEL_OUTOF10: 7
PAINLEVEL_OUTOF10: 3
PAINLEVEL_OUTOF10: 0 - NO PAIN
PAINLEVEL_OUTOF10: 6
PAINLEVEL_OUTOF10: 0 - NO PAIN
PAINLEVEL_OUTOF10: 8
PAINLEVEL_OUTOF10: 6
PAINLEVEL_OUTOF10: 2

## 2024-05-04 ASSESSMENT — PAIN - FUNCTIONAL ASSESSMENT
PAIN_FUNCTIONAL_ASSESSMENT: 0-10
PAIN_FUNCTIONAL_ASSESSMENT: WONG-BAKER FACES
PAIN_FUNCTIONAL_ASSESSMENT: 0-10

## 2024-05-04 ASSESSMENT — PAIN DESCRIPTION - LOCATION
LOCATION: HEAD
LOCATION: NECK
LOCATION: HEAD
LOCATION: HEAD

## 2024-05-04 ASSESSMENT — PAIN DESCRIPTION - ORIENTATION: ORIENTATION: POSTERIOR

## 2024-05-04 NOTE — CARE PLAN
Problem: Pain  Goal: Takes deep breaths with improved pain control throughout the shift  Outcome: Progressing     Problem: Pain  Goal: Turns in bed with improved pain control throughout the shift  Outcome: Progressing     Problem: Pain  Goal: Walks with improved pain control throughout the shift  Outcome: Progressing     Problem: Pain - Adult  Goal: Verbalizes/displays adequate comfort level or baseline comfort level  Flowsheets (Taken 5/4/2024 0416)  Verbalizes/displays adequate comfort level or baseline comfort level:   Encourage patient to monitor pain and request assistance   Assess pain using appropriate pain scale   Administer analgesics based on type and severity of pain and evaluate response   Implement non-pharmacological measures as appropriate and evaluate response     Problem: Safety - Adult  Goal: Free from fall injury  Flowsheets (Taken 5/4/2024 0416)  Free from fall injury: Instruct family/caregiver on patient safety     Problem: Discharge Planning  Goal: Discharge to home or other facility with appropriate resources  Flowsheets (Taken 5/4/2024 0416)  Discharge to home or other facility with appropriate resources:   Identify barriers to discharge with patient and caregiver   Identify discharge learning needs (meds, wound care, etc)   The patient's goals for the shift include      The clinical goals for the shift include Pt will have pain controlled this shift.    Over the shift, the patient did make progress toward the following goals.

## 2024-05-04 NOTE — CARE PLAN
Problem: Pain - Adult  Goal: Verbalizes/displays adequate comfort level or baseline comfort level  Outcome: Progressing     Problem: Safety - Adult  Goal: Free from fall injury  Outcome: Progressing     Problem: Discharge Planning  Goal: Discharge to home or other facility with appropriate resources  Outcome: Progressing     Problem: Chronic Conditions and Co-morbidities  Goal: Patient's chronic conditions and co-morbidity symptoms are monitored and maintained or improved  Outcome: Progressing     Problem: Pain  Goal: Takes deep breaths with improved pain control throughout the shift  Outcome: Progressing  Goal: Turns in bed with improved pain control throughout the shift  Outcome: Progressing  Goal: Walks with improved pain control throughout the shift  Outcome: Progressing  Goal: Performs ADL's with improved pain control throughout shift  Outcome: Progressing  Goal: Participates in PT with improved pain control throughout the shift  Outcome: Progressing  Goal: Free from opioid side effects throughout the shift  Outcome: Progressing  Goal: Free from acute confusion related to pain meds throughout the shift  Outcome: Progressing   The patient's goals for the shift include      The clinical goals for the shift include Improved pain control    Over the shift, the patient did not make progress toward the following goals. Barriers to progression include swelling. Recommendations to address these barriers include give medications as prescribed.

## 2024-05-04 NOTE — PROGRESS NOTES
Gaurav Mckay is a 53 y.o. male on day 1 of admission presenting with Lymphadenitis.      Subjective   Patient assessed at bedside; lying in bed. He complained of pain in his neck. He has a raspy voice. He denies SOB, fever, chills.       Objective     Last Recorded Vitals  /78 (BP Location: Left arm, Patient Position: Lying)   Pulse 67   Temp 36.3 °C (97.3 °F) (Temporal)   Resp 19   Wt 132 kg (290 lb)   SpO2 94%   Intake/Output last 3 Shifts:    Intake/Output Summary (Last 24 hours) at 5/4/2024 1105  Last data filed at 5/4/2024 0526  Gross per 24 hour   Intake 1430 ml   Output --   Net 1430 ml       Admission Weight  Weight: 132 kg (290 lb) (05/02/24 1459)    Daily Weight  05/02/24 : 132 kg (290 lb)    Image Results      Physical Exam  Vitals reviewed.   Constitutional:       Appearance: Normal appearance. He is obese.   HENT:      Head: Normocephalic and atraumatic.      Nose: Nose normal.      Mouth/Throat:      Mouth: Mucous membranes are moist.      Pharynx: Oropharynx is clear.   Eyes:      Conjunctiva/sclera: Conjunctivae normal.      Pupils: Pupils are equal, round, and reactive to light.   Cardiovascular:      Rate and Rhythm: Normal rate and regular rhythm.      Pulses: Normal pulses.      Heart sounds: Normal heart sounds.   Pulmonary:      Effort: Pulmonary effort is normal.      Breath sounds: Normal breath sounds.   Abdominal:      General: Bowel sounds are normal. There is distension.      Palpations: Abdomen is soft.   Musculoskeletal:         General: Normal range of motion.      Cervical back: Normal range of motion. Tenderness present.   Skin:     General: Skin is warm and dry.   Neurological:      General: No focal deficit present.      Mental Status: He is alert and oriented to person, place, and time.   Psychiatric:         Behavior: Behavior normal.         Relevant Results    Scheduled medications  ampicillin-sulbactam, 3 g, intravenous, q6h  apixaban, 5 mg, oral,  BID  benztropine, 0.5 mg, oral, BID  calcium carbonate-vitamin D3, 1 tablet, oral, BID  celecoxib, 200 mg, oral, Daily  disulfiram, 500 mg, oral, Daily  docusate sodium, 100 mg, oral, BID  DULoxetine, 30 mg, oral, Nightly  DULoxetine, 90 mg, oral, q AM  metoprolol tartrate, 25 mg, oral, Daily  montelukast, 10 mg, oral, Daily  nicotine, 1 patch, transdermal, Daily  pantoprazole, 40 mg, oral, Daily before breakfast  potassium chloride CR, 10 mEq, oral, Daily  pregabalin, 200 mg, oral, TID  risperiDONE, 1.5 mg, oral, Nightly  rOPINIRole, 0.5 mg, oral, TID  sennosides-docusate sodium, 1 tablet, oral, Nightly  simvastatin, 40 mg, oral, Nightly  topiramate, 100 mg, oral, BID  traZODone, 50 mg, oral, Nightly      Continuous medications  sodium chloride 0.9%, 10 mL/hr      PRN medications  PRN medications: acetaminophen, acetaminophen, cyclobenzaprine, ketorolac, melatonin, ondansetron **OR** ondansetron, polyethylene glycol, QUEtiapine, sodium chloride 0.9%, traMADol      Results for orders placed or performed during the hospital encounter of 05/02/24 (from the past 24 hour(s))   CBC   Result Value Ref Range    WBC 9.6 4.4 - 11.3 x10*3/uL    nRBC 0.0 0.0 - 0.0 /100 WBCs    RBC 4.44 (L) 4.50 - 5.90 x10*6/uL    Hemoglobin 12.0 (L) 13.5 - 17.5 g/dL    Hematocrit 37.5 (L) 41.0 - 52.0 %    MCV 85 80 - 100 fL    MCH 27.0 26.0 - 34.0 pg    MCHC 32.0 32.0 - 36.0 g/dL    RDW 16.0 (H) 11.5 - 14.5 %    Platelets 259 150 - 450 x10*3/uL   Basic metabolic panel   Result Value Ref Range    Glucose 145 (H) 74 - 99 mg/dL    Sodium 140 136 - 145 mmol/L    Potassium 4.0 3.5 - 5.3 mmol/L    Chloride 112 (H) 98 - 107 mmol/L    Bicarbonate 23 21 - 32 mmol/L    Anion Gap 9 (L) 10 - 20 mmol/L    Urea Nitrogen 15 6 - 23 mg/dL    Creatinine 1.00 0.50 - 1.30 mg/dL    eGFR 90 >60 mL/min/1.73m*2    Calcium 9.1 8.6 - 10.3 mg/dL                   Assessment/Plan      Principal Problem:    Lymphadenitis  Active Problems:    Neck  mass      Lymphadenitis  Hx of throat cancer  - CT Neck: enlarged ~3.6 cm right level 3 lymph node with multiple smaller cervical lymph nodes.   - MRI Neck: enlarged right level 3-5, largest in right level 3 measuring 3.2 cm with heterogeneous enhancement and surrounding inflammatory change.  - ENT consult; recommend IV unasyn and decadron x 2 day; than switch to PO augmentin  - continue unasyn; day 2  - continue decadron IV; day 2  - follow up outpatient with ENT    Essential HTN  HLD  - continue metoprolol tartrate, simvastatin  - monitor BP    COPD not in exacerbation  Hx pulmonary embolism s/p pulmonary embolectomy  - continue apixaban, montelukast    RLS  - continue ropinirole    Depression  Bipolar disorder  Anxiety  Alcohol abuse  - continue benztropine, disulfiram, duloxetine, risperidone, topiramate, trazodone    Chronic Pain  - continue celecoxib, pregabalin    Tobacco Use  - continue nicotine patch  - smoking cessation education    PUD ppx  - continue pantoprazole    DVT ppx  - continue apixaban    Code Status: Full    Disposition: Patient requires more than 2 inpatient days.    Total accumulated time spent face to face and not face to face preparing to see the patient, obtaining and reviewing separately obtained history; performing a medically appropriate examination and/or evaluation; counseling and educating the patient, family; ordering medications, tests, or procedures; referring and communicating with other health care professionals; documenting clinical information in the patient's medical record; independently interpreting results and communicating the results to the patient, family; and care coordination was 45 minutes.               Francine Quinn, APRN-CNP

## 2024-05-05 VITALS
DIASTOLIC BLOOD PRESSURE: 81 MMHG | OXYGEN SATURATION: 96 % | SYSTOLIC BLOOD PRESSURE: 144 MMHG | TEMPERATURE: 97.7 F | HEART RATE: 70 BPM | BODY MASS INDEX: 39.28 KG/M2 | WEIGHT: 290 LBS | RESPIRATION RATE: 19 BRPM | HEIGHT: 72 IN

## 2024-05-05 LAB
ANION GAP SERPL CALC-SCNC: 9 MMOL/L (ref 10–20)
BUN SERPL-MCNC: 17 MG/DL (ref 6–23)
CALCIUM SERPL-MCNC: 8.9 MG/DL (ref 8.6–10.3)
CHLORIDE SERPL-SCNC: 110 MMOL/L (ref 98–107)
CO2 SERPL-SCNC: 23 MMOL/L (ref 21–32)
CREAT SERPL-MCNC: 1.01 MG/DL (ref 0.5–1.3)
EGFRCR SERPLBLD CKD-EPI 2021: 89 ML/MIN/1.73M*2
ERYTHROCYTE [DISTWIDTH] IN BLOOD BY AUTOMATED COUNT: 16.1 % (ref 11.5–14.5)
GLUCOSE SERPL-MCNC: 127 MG/DL (ref 74–99)
HCT VFR BLD AUTO: 38.6 % (ref 41–52)
HGB BLD-MCNC: 11.9 G/DL (ref 13.5–17.5)
MCH RBC QN AUTO: 27.1 PG (ref 26–34)
MCHC RBC AUTO-ENTMCNC: 30.8 G/DL (ref 32–36)
MCV RBC AUTO: 88 FL (ref 80–100)
NRBC BLD-RTO: 0 /100 WBCS (ref 0–0)
PLATELET # BLD AUTO: 267 X10*3/UL (ref 150–450)
POTASSIUM SERPL-SCNC: 3.8 MMOL/L (ref 3.5–5.3)
RBC # BLD AUTO: 4.39 X10*6/UL (ref 4.5–5.9)
SODIUM SERPL-SCNC: 138 MMOL/L (ref 136–145)
WBC # BLD AUTO: 10.2 X10*3/UL (ref 4.4–11.3)

## 2024-05-05 PROCEDURE — 85027 COMPLETE CBC AUTOMATED: CPT | Mod: IPSPLIT | Performed by: NURSE PRACTITIONER

## 2024-05-05 PROCEDURE — 2500000004 HC RX 250 GENERAL PHARMACY W/ HCPCS (ALT 636 FOR OP/ED): Mod: IPSPLIT | Performed by: INTERNAL MEDICINE

## 2024-05-05 PROCEDURE — S4991 NICOTINE PATCH NONLEGEND: HCPCS | Mod: IPSPLIT | Performed by: NURSE PRACTITIONER

## 2024-05-05 PROCEDURE — 2500000001 HC RX 250 WO HCPCS SELF ADMINISTERED DRUGS (ALT 637 FOR MEDICARE OP): Mod: IPSPLIT | Performed by: NURSE PRACTITIONER

## 2024-05-05 PROCEDURE — 2500000002 HC RX 250 W HCPCS SELF ADMINISTERED DRUGS (ALT 637 FOR MEDICARE OP, ALT 636 FOR OP/ED): Mod: IPSPLIT | Performed by: NURSE PRACTITIONER

## 2024-05-05 PROCEDURE — 36415 COLL VENOUS BLD VENIPUNCTURE: CPT | Mod: IPSPLIT | Performed by: NURSE PRACTITIONER

## 2024-05-05 PROCEDURE — 2500000006 HC RX 250 W HCPCS SELF ADMINISTERED DRUGS (ALT 637 FOR ALL PAYERS): Mod: IPSPLIT | Performed by: NURSE PRACTITIONER

## 2024-05-05 PROCEDURE — 2500000001 HC RX 250 WO HCPCS SELF ADMINISTERED DRUGS (ALT 637 FOR MEDICARE OP): Mod: IPSPLIT

## 2024-05-05 PROCEDURE — 2500000006 HC RX 250 W HCPCS SELF ADMINISTERED DRUGS (ALT 637 FOR ALL PAYERS): Mod: IPSPLIT

## 2024-05-05 PROCEDURE — 2500000004 HC RX 250 GENERAL PHARMACY W/ HCPCS (ALT 636 FOR OP/ED): Mod: IPSPLIT | Performed by: NURSE PRACTITIONER

## 2024-05-05 PROCEDURE — 80048 BASIC METABOLIC PNL TOTAL CA: CPT | Mod: IPSPLIT | Performed by: NURSE PRACTITIONER

## 2024-05-05 PROCEDURE — 2500000004 HC RX 250 GENERAL PHARMACY W/ HCPCS (ALT 636 FOR OP/ED): Mod: IPSPLIT

## 2024-05-05 RX ORDER — METHYLPREDNISOLONE 4 MG/1
TABLET ORAL
Qty: 21 TABLET | Refills: 0 | Status: SHIPPED | OUTPATIENT
Start: 2024-05-05 | End: 2024-05-12

## 2024-05-05 RX ORDER — TRAMADOL HYDROCHLORIDE 50 MG/1
50 TABLET ORAL EVERY 6 HOURS PRN
Qty: 15 TABLET | Refills: 0 | Status: SHIPPED | OUTPATIENT
Start: 2024-05-05 | End: 2024-05-08

## 2024-05-05 RX ORDER — AMOXICILLIN AND CLAVULANATE POTASSIUM 875; 125 MG/1; MG/1
1 TABLET, FILM COATED ORAL 2 TIMES DAILY
Qty: 10 TABLET | Refills: 0 | Status: SHIPPED | OUTPATIENT
Start: 2024-05-05 | End: 2024-05-10

## 2024-05-05 RX ADMIN — TRAMADOL HYDROCHLORIDE 50 MG: 50 TABLET, COATED ORAL at 07:44

## 2024-05-05 RX ADMIN — APIXABAN 5 MG: 5 TABLET, FILM COATED ORAL at 09:00

## 2024-05-05 RX ADMIN — CELECOXIB 200 MG: 100 CAPSULE ORAL at 09:13

## 2024-05-05 RX ADMIN — PREGABALIN 200 MG: 100 CAPSULE ORAL at 09:12

## 2024-05-05 RX ADMIN — SODIUM CHLORIDE 10 ML/HR: 9 INJECTION, SOLUTION INTRAVENOUS at 06:03

## 2024-05-05 RX ADMIN — POTASSIUM CHLORIDE 10 MEQ: 750 TABLET, FILM COATED, EXTENDED RELEASE ORAL at 09:13

## 2024-05-05 RX ADMIN — METOPROLOL TARTRATE 25 MG: 25 TABLET, FILM COATED ORAL at 09:13

## 2024-05-05 RX ADMIN — NICOTINE 1 PATCH: 14 PATCH, EXTENDED RELEASE TRANSDERMAL at 09:13

## 2024-05-05 RX ADMIN — DOCUSATE SODIUM 100 MG: 100 CAPSULE, LIQUID FILLED ORAL at 09:13

## 2024-05-05 RX ADMIN — PANTOPRAZOLE SODIUM 40 MG: 40 TABLET, DELAYED RELEASE ORAL at 08:00

## 2024-05-05 RX ADMIN — TOPIRAMATE 100 MG: 100 TABLET, FILM COATED ORAL at 09:13

## 2024-05-05 RX ADMIN — MONTELUKAST 10 MG: 10 TABLET, FILM COATED ORAL at 09:13

## 2024-05-05 RX ADMIN — KETOROLAC TROMETHAMINE 30 MG: 30 INJECTION, SOLUTION INTRAMUSCULAR at 04:56

## 2024-05-05 RX ADMIN — Medication 1 TABLET: at 09:13

## 2024-05-05 RX ADMIN — BENZTROPINE MESYLATE 0.5 MG: 1 TABLET ORAL at 09:13

## 2024-05-05 RX ADMIN — ROPINIROLE HYDROCHLORIDE 0.5 MG: 0.25 TABLET, FILM COATED ORAL at 09:12

## 2024-05-05 RX ADMIN — AMPICILLIN SODIUM AND SULBACTAM SODIUM 3 G: 2; 1 INJECTION, POWDER, FOR SOLUTION INTRAMUSCULAR; INTRAVENOUS at 04:56

## 2024-05-05 RX ADMIN — DULOXETINE HYDROCHLORIDE 90 MG: 30 CAPSULE, DELAYED RELEASE ORAL at 09:12

## 2024-05-05 RX ADMIN — AMPICILLIN SODIUM AND SULBACTAM SODIUM 3 G: 2; 1 INJECTION, POWDER, FOR SOLUTION INTRAMUSCULAR; INTRAVENOUS at 00:07

## 2024-05-05 ASSESSMENT — PAIN SCALES - GENERAL
PAINLEVEL_OUTOF10: 0 - NO PAIN
PAINLEVEL_OUTOF10: 9
PAINLEVEL_OUTOF10: 4
PAINLEVEL_OUTOF10: 6

## 2024-05-05 ASSESSMENT — COGNITIVE AND FUNCTIONAL STATUS - GENERAL
MOBILITY SCORE: 24
DAILY ACTIVITIY SCORE: 24

## 2024-05-05 ASSESSMENT — PAIN - FUNCTIONAL ASSESSMENT
PAIN_FUNCTIONAL_ASSESSMENT: 0-10

## 2024-05-05 ASSESSMENT — PAIN DESCRIPTION - LOCATION: LOCATION: NECK

## 2024-05-05 NOTE — CARE PLAN
The patient's goals for the shift include      The clinical goals for the shift include Pt will tolerate IV ATB therapy with no adverse reactions    Over the shift, the patient remained safe and stable. VS WNL. Medicated twice for neck and head pain. Tolerating IV ATB.

## 2024-05-05 NOTE — DISCHARGE SUMMARY
Discharge Diagnosis  Lymphadenitis    Issues Requiring Follow-Up      Discharge Meds     Your medication list        START taking these medications        Instructions Last Dose Given Next Dose Due   amoxicillin-pot clavulanate 875-125 mg tablet  Commonly known as: Augmentin      Take 1 tablet by mouth 2 times a day for 5 days.       methylPREDNISolone 4 mg tablets  Commonly known as: Medrol Dospak      Take as directed on package.       traMADol 50 mg tablet  Commonly known as: Ultram      Take 1 tablet (50 mg) by mouth every 6 hours if needed for severe pain (7 - 10) for up to 3 days.              CHANGE how you take these medications        Instructions Last Dose Given Next Dose Due   apixaban 5 mg tablet  Commonly known as: Eliquis  What changed: Another medication with the same name was removed. Continue taking this medication, and follow the directions you see here.      Take 1 tablet (5 mg) by mouth 2 times a day. Do not start before November 16, 2023.              CONTINUE taking these medications        Instructions Last Dose Given Next Dose Due   acetaminophen 325 mg tablet  Commonly known as: Tylenol      Take 2 tablets (650 mg) by mouth every 6 hours if needed for mild pain (1 - 3).       atorvastatin 20 mg tablet  Commonly known as: Lipitor           benztropine 0.5 mg tablet  Commonly known as: Cogentin           calcium carbonate-vitamin D3 500 mg-5 mcg (200 unit) tablet           celecoxib 200 mg capsule  Commonly known as: CeleBREX           cyclobenzaprine 10 mg tablet  Commonly known as: Flexeril           disulfiram 250 mg tablet  Commonly known as: Antabuse           docusate sodium 100 mg capsule  Commonly known as: Colace           DULoxetine 60 mg DR capsule  Commonly known as: Cymbalta           DULoxetine 30 mg DR capsule  Commonly known as: Cymbalta           ipratropium-albuteroL 0.5-2.5 mg/3 mL nebulizer solution  Commonly known as: Duo-Neb      Take 3 mL by nebulization 3 times a day.        ipratropium-albuteroL 0.5-2.5 mg/3 mL nebulizer solution  Commonly known as: Duo-Neb      Take 3 mL by nebulization every 2 hours if needed for wheezing.       lidocaine 5 % patch  Commonly known as: Lidoderm      Place 1 patch over 12 hours on the skin once daily. Remove & discard patch within 12 hours or as directed by MD.       melatonin 10 mg tablet,disintegrating           methocarbamol 500 mg tablet  Commonly known as: Robaxin      Take 1 tablet (500 mg) by mouth 2 times a day for 5 days.       metoprolol tartrate 25 mg tablet  Commonly known as: Lopressor           montelukast 10 mg tablet  Commonly known as: Singulair           nicotine polacrilex 2 mg lozenge  Commonly known as: Commit           omeprazole 40 mg DR capsule  Commonly known as: PriLOSEC           potassium chloride ER 10 mEq ER capsule  Commonly known as: Micro-K           pregabalin 200 mg capsule  Commonly known as: Lyrica           Proventil HFA 90 mcg/actuation inhaler  Generic drug: albuterol           QUEtiapine 25 mg tablet  Commonly known as: SEROquel           risperiDONE 1 mg tablet  Commonly known as: RisperDAL           rOPINIRole 0.5 mg tablet  Commonly known as: Requip           sennosides-docusate sodium 8.6-50 mg tablet  Commonly known as: Marya-Colace      Take 1 tablet by mouth once daily at bedtime.       simvastatin 40 mg tablet  Commonly known as: Zocor           topiramate 100 mg tablet  Commonly known as: Topamax           traZODone 50 mg tablet  Commonly known as: Desyrel                     Where to Get Your Medications        These medications were sent to [facility]/pharmacy #[id number] [place], [place] - [address]  [address] [address]      Phone: [phone]   amoxicillin-pot clavulanate 875-125 mg tablet  methylPREDNISolone 4 mg tablets  traMADol 50 mg tablet         Test Results Pending At Discharge  Pending Labs       No current pending labs.        53 y.o. male presented to [facility] with Chief   complaint of  right neck pain.  PMH  T2 larynx CA and radiation treatment in 2022. He was seen by Dr. Leonardo at [facility] in January 2024 when he had direct laryngoscopy and neck CT. No recurrence was found, but there was diffuse edema of larynx. There was no cervical lymphadenopathy by size criteria. CT of Soft Tissue of Neck  showed  enlarged ~3.6 cm right level 3 lymph node with multiple smaller cervical lymph nodes. Labs WNL  Received Decadron in ED  Continue Unasyn 3 mg IV Q 6 hours Consult Dr. Cisse ,Otolaryngologist. On exam patient resting  in bed. Alert Cooperative  No  acute distress noted on exam     Hospital Course    Lymphadenitis  Hx of throat cancer  - CT Neck: enlarged ~3.6 cm right level 3 lymph node with multiple smaller cervical lymph nodes.   - MRI Neck: enlarged right level 3-5, largest in right level 3 measuring 3.2 cm with heterogeneous enhancement and surrounding inflammatory change.  - ENT consult; recommend IV unasyn and decadron x 2 day; than switch to PO augmentin  - continue unasyn; day 2  - continue decadron IV; day 2  - follow up outpatient with ENT     Essential HTN  HLD  - continue metoprolol tartrate, simvastatin  - monitor BP     COPD not in exacerbation  Hx pulmonary embolism s/p pulmonary embolectomy  - continue apixaban, montelukast     RLS  - continue ropinirole     Depression  Bipolar disorder  Anxiety  Alcohol abuse  - continue benztropine, disulfiram, duloxetine, risperidone, topiramate, trazodone     Chronic Pain  - continue celecoxib, pregabalin     Tobacco Use  - continue nicotine patch  - smoking cessation education     PUD ppx  - continue pantoprazole     DVT ppx  - continue apixaban     Code Status: Full     Disposition: Patient was stable to be discharged to home. He was discharged on Augmentin, medrol dose pack, tramadol. He will follow up with his PCP and ENT.    Total cumulative time spent in preparation of this discharge including documentation review, coordination  of care with the medical team including PT/SW/care coordinators and treating consultants, discussion with patient and pertinent family members and finalization of prescriptions, follow-up appointments, and this discharge summary was approximately 45 minutes.     Pertinent Physical Exam At Time of Discharge  Physical Exam  Vitals reviewed.   Constitutional:       Appearance: Normal appearance. He is obese.   HENT:      Head: Normocephalic and atraumatic.      Nose: Nose normal.      Mouth/Throat:      Mouth: Mucous membranes are moist.      Pharynx: Oropharynx is clear.      Comments: Right neck lymph node enlarged and painful  Eyes:      Conjunctiva/sclera: Conjunctivae normal.   Cardiovascular:      Rate and Rhythm: Normal rate and regular rhythm.      Pulses: Normal pulses.      Heart sounds: Normal heart sounds.   Pulmonary:      Effort: Pulmonary effort is normal.   Abdominal:      General: Bowel sounds are normal.      Palpations: Abdomen is soft.   Musculoskeletal:         General: Normal range of motion.      Cervical back: Normal range of motion and neck supple.   Skin:     General: Skin is warm and dry.   Neurological:      General: No focal deficit present.      Mental Status: He is alert and oriented to person, place, and time.   Psychiatric:         Mood and Affect: Mood normal.         Behavior: Behavior normal.         Outpatient Follow-Up  Future Appointments   Date Time Provider Department Center   6/24/2024  8:00 AM Mariah Ma MD [id number] [facility]         ANASTACIA Wisdom-CNP

## 2024-05-05 NOTE — CARE PLAN
The patient's goals for the shift include  remain afebrile     The clinical goals for the shift include pt will have a decrease in pain    Over the shift, the patient remained safe and VSS. Medicated for pain as needed. Decrease in pain after medicated. Tolerated Breakfast. Airway intact.

## 2024-05-05 NOTE — NURSING NOTE
0745: Assumed care of pt. VSS. Given Ultram for pain. No other needs at this time.    1030: pt given discharge instructions, and educated. VS done. Belongings with pt.

## 2024-05-07 ENCOUNTER — OFFICE VISIT (OUTPATIENT)
Dept: OTOLARYNGOLOGY | Facility: CLINIC | Age: 54
End: 2024-05-07
Payer: COMMERCIAL

## 2024-05-07 ENCOUNTER — TELEPHONE (OUTPATIENT)
Dept: HEMATOLOGY/ONCOLOGY | Facility: HOSPITAL | Age: 54
End: 2024-05-07

## 2024-05-07 DIAGNOSIS — J04.0 ACUTE LARYNGITIS: ICD-10-CM

## 2024-05-07 DIAGNOSIS — Z08 ENCOUNTER FOR FOLLOW-UP SURVEILLANCE OF LARYNGEAL CANCER: Primary | ICD-10-CM

## 2024-05-07 DIAGNOSIS — L04.0 ACUTE CERVICAL LYMPHADENITIS: ICD-10-CM

## 2024-05-07 DIAGNOSIS — Z85.21 ENCOUNTER FOR FOLLOW-UP SURVEILLANCE OF LARYNGEAL CANCER: Primary | ICD-10-CM

## 2024-05-07 PROCEDURE — 1036F TOBACCO NON-USER: CPT | Performed by: OTOLARYNGOLOGY

## 2024-05-07 PROCEDURE — 99214 OFFICE O/P EST MOD 30 MIN: CPT | Performed by: OTOLARYNGOLOGY

## 2024-05-07 PROCEDURE — 31575 DIAGNOSTIC LARYNGOSCOPY: CPT | Performed by: OTOLARYNGOLOGY

## 2024-05-07 RX ORDER — FAMOTIDINE 10 MG/ML
20 INJECTION INTRAVENOUS ONCE AS NEEDED
Status: CANCELLED | OUTPATIENT
Start: 2024-05-08

## 2024-05-07 RX ORDER — ALBUTEROL SULFATE 0.83 MG/ML
3 SOLUTION RESPIRATORY (INHALATION) AS NEEDED
Status: CANCELLED | OUTPATIENT
Start: 2024-05-08

## 2024-05-07 RX ORDER — HEPARIN SODIUM,PORCINE/PF 10 UNIT/ML
50 SYRINGE (ML) INTRAVENOUS AS NEEDED
Status: CANCELLED | OUTPATIENT
Start: 2024-05-08

## 2024-05-07 RX ORDER — DIPHENHYDRAMINE HYDROCHLORIDE 50 MG/ML
50 INJECTION INTRAMUSCULAR; INTRAVENOUS AS NEEDED
Status: CANCELLED | OUTPATIENT
Start: 2024-05-08

## 2024-05-07 RX ORDER — EPINEPHRINE 0.3 MG/.3ML
0.3 INJECTION SUBCUTANEOUS EVERY 5 MIN PRN
Status: CANCELLED | OUTPATIENT
Start: 2024-05-08

## 2024-05-07 RX ORDER — HEPARIN 100 UNIT/ML
500 SYRINGE INTRAVENOUS AS NEEDED
Status: CANCELLED | OUTPATIENT
Start: 2024-05-08

## 2024-05-07 NOTE — TELEPHONE ENCOUNTER
Received IV antibiotic orders from Dr. Cisse. Per secure chat from ordering provider, he would like the patient to receive vancomycin and zosyn twice a day for 10 days. Attempted to reach patient to schedule. Left detailed message, with call back number, for patient to call back at earliest convenience.

## 2024-05-07 NOTE — PROGRESS NOTES
"History Of Present Illness  Gaurav Mckay is a 53 y.o. male presenting with: \"Lump in throat\".  He is kindly referred by Garfield Eaton MD.     He has noticed a lump at his right neck for the past 3-4 days. He received IV antibiotics for 2 days and now on oral antibiotics.     He has hoarseness of voice, it comes and goes. 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 He is an active smoker.     Flexible endoscope was advanced through patient's left nasal cavity.  I have not noted any mass or lesion at nasopharynx.  There are dried yellowish secretions at pharynx, and the dried yellowish purulent secretions over the petiole of the epiglottis, at anterior commissure and over left vocal cord.    Patient has a firm tender swelling at the right level 3.    My impression, patient has destruction of mucus producing glands in his throat (due to RT), he developed bacterial pharyngolaryngitis and cervical lymphadenitis.    Plan  1-outpatient IV antibiotics  2-follow-up in 10 days  3-if the right neck infection-swelling fails to resolve, then we can consider needle aspiration biopsy of right cervical lymph node, considering his history of larynx SCC.     Past Medical History  He has a past medical history of Acute upper respiratory infection, unspecified (03/20/2015), Alcohol abuse, in remission (02/12/2015), Alcohol dependence, in remission (Multi) (05/07/2015), Allergic contact dermatitis due to plants, except food (05/21/2014), Allergy status to unspecified drugs, medicaments and biological substances (08/29/2013), Anxiety disorder, unspecified (03/20/2015), Bipolar disorder, current episode manic without psychotic features, unspecified (Multi), Cervicalgia, Chronic obstructive pulmonary disease, unspecified (Multi) (12/02/2014), Encounter for immunization (09/22/2016), Other cervical disc degeneration, unspecified cervical region, Other conditions influencing health status (06/27/2013), Other long term (current) " drug therapy (06/02/2015), Other specified health status, Personal history of nicotine dependence (08/16/2017), Personal history of nicotine dependence (06/26/2017), Personal history of other diseases of the digestive system (04/11/2013), Personal history of other diseases of the digestive system (08/27/2015), Personal history of other diseases of the respiratory system (03/20/2017), Personal history of other diseases of the respiratory system (02/05/2014), Personal history of other diseases of the respiratory system (01/16/2014), Personal history of other diseases of the respiratory system (09/26/2013), Personal history of other diseases of the respiratory system (06/26/2014), Personal history of other infectious and parasitic diseases, Personal history of other mental and behavioral disorders, Personal history of other specified conditions (08/25/2016), Personal history of other specified conditions (02/16/2017), and Unspecified asthma, uncomplicated ([facility]) (02/13/2014).    Surgical History  He has a past surgical history that includes MR angio head wo IV contrast (1/24/2016); MR angio neck wo IV contrast (1/24/2016); CT angio abdomen pelvis w and or wo IV IV contrast (4/1/2020); and Invasive Vascular Procedure (N/A, 11/8/2023).     Social History  He reports that he quit smoking about 6 months ago. His smoking use included cigarettes. He has never used smokeless tobacco. He reports that he does not currently use alcohol. No history on file for drug use.    Family History  No family history on file.     Allergies  Oxycodone    Review of Systems   Feeling poorly  Feeling tired  Eye pain  Vertigo  Sore throat  Hoarseness  Dry mouth/mouth breathing  Dizziness upon standing  Shortness of breath  Nausea  Itching  Fainting  Headache  Sleep disturbance  Anxiety  Depression  Increased thirst     Physical Exam    General appearance: Healthy-appearing, well-nourished, well groomed, in no acute distress.     Head and  Face: Atraumatic with no masses, lesions, or scarring.      Salivary glands: No tenderness of the parotid glands or parotid masses.     No tenderness of the submandibular glands or submandibular masses.      Facial strength: Normal strength and symmetry, no synkinesis or facial tic.     Eyes: Conjunctivas look non-hyperemic bilaterally    Ears: Bilaterally ear canals look normal. Tympanic membranes look intact, no hyperemia, fluid or retraction. Hearing grossly normal.      Nose: Mucosa looks normal. No purulent discharge. Septum essentially straight.     Oral Cavity/Mouth: Lips and tongue look normal.     Throat: No postnasal discharge. No tonsil hypertrophy. No hyperemia.    Neck: Symmetrical, trachea midline.     Pulmonary: Normal respiratory effort.     Lymphatic: No palpable pathologic lymph nodes at neck.     Neurological/Psychiatric Orientation to person, place, and time: Normal.     Mood and affect: Normal.      Extremities: No clubbing.     Skin: No significant skin lesions were noted at face or neck        Procedure  FLEXIBLE LARYNGOSCOPY 05.07.2024  Flexible endoscope was advanced through patient's left nasal cavity.  I have not noted any mass or lesion at nasopharynx.  There are dried yellowish secretions at pharynx, and the dried yellowish purulent secretions over the petiole of the epiglottis, at anterior commissure and over left vocal cord.           Last Recorded Vitals  There were no vitals taken for this visit.    Relevant Results  CT SOFT TISSUE NECK W IV CONTRAST;  5/2/2024 3:40 pm      INDICATION:  Signs/Symptoms: right neck mass.      COMPARISON:  None.      ACCESSION NUMBER(S):  [id number]      ORDERING CLINICIAN:  NELLY CABELLO      TECHNIQUE:  An external marker was placed in the area of concern and axial CT  images of the neck were obtained.  The patient received 75 mL  Omnipaque 350 intravenous contrast agent. The images were reformatted  in angled axial, coronal and sagittal planes.       FINDINGS:  A heterogeneously enhancing nodule in the area of concern at level 3  on the right measures up to 2.6 x 3.6 x 3.5 cm (axial image 72/142 and  coronal image 64). There is extensive fluid stranding in the adjacent  fat, including the right parapharyngeal space and associated  thickening of the skin and platysma. Multiple additional enlarged  lymph nodes are noted at levels 3-5 on the right. There is mass  effect on the right internal jugular vein with associated loss of  enhancement, no filling defect is identified in the opacified  segments of the vein. There is also mass effect on the right  sternocleidomastoid without definite invasion. No fluid collection is  identified.      The patient is edentulous, the oral cavity is otherwise unremarkable.  The pharynx and larynx are within normal limits. The thyroid gland is  unremarkable. The major salivary glands are within normal limits. No  prevertebral swelling. The major cervical vessels are otherwise  unremarkable.      Chronic changes in the right maxillary sinus with scattered paranasal  sinus mucosal thickening. Left mastoid effusion. Orbits and  visualized intracranial contents are unremarkable. There is mild  biapical scarring with additional areas of scarring or atelectasis  dependently right greater than left. The visualized lungs are  otherwise clear. Degenerative changes in the spine.      IMPRESSION:  Right cervical lymphadenopathy, most pronounced at level 3, with  extensive inflammatory changes in the adjacent soft tissues. No  associated fluid collection is identified. This may represent severe  lymphadenitis, however given the patient's age and the degree of  katia enlargement attention on follow-up is recommended.      MACRO:  Raul Pickering discussed the significance and urgency of this  critical finding by telephone with  NELLY CABELLO on 5/2/2024 at 4:23  pm.  (**-RCF-**) Findings:  See findings.      Signed by: Raul Pickering 5/2/2024  "4:25 PM  Dictation workstation:   [id number]    Assessment and Plan:  Gaurav Mckay is a 53 y.o. male presenting with: \"Lump in throat\".  He is kindly referred by Garfield Eaton MD.     He has noticed a lump at his right neck for the past 3-4 days. He received IV antibiotics for 2 days and now on oral antibiotics.     He has hoarseness of voice, it comes and goes. 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 He is an active smoker.     Flexible endoscope was advanced through patient's left nasal cavity.  I have not noted any mass or lesion at nasopharynx.  There are dried yellowish secretions at pharynx, and the dried yellowish purulent secretions over the petiole of the epiglottis, at anterior commissure and over left vocal cord.    Patient has a firm tender swelling at the right level 3.    My impression, patient has destruction of mucus producing glands in his throat (due to RT), he developed bacterial pharyngolaryngitis and cervical lymphadenitis.    Plan  1-outpatient IV antibiotics  2-follow-up in 10 days  3-if the right neck infection-swelling fails to resolve, then we can consider needle aspiration biopsy of right cervical lymph node, considering his history of larynx SCC.      _________________________________________________________________  Smith Cisse  Otolaryngology - Head & Neck Surgery  "

## 2024-05-08 ENCOUNTER — APPOINTMENT (OUTPATIENT)
Dept: CARDIOLOGY | Facility: HOSPITAL | Age: 54
End: 2024-05-08
Payer: COMMERCIAL

## 2024-05-08 ENCOUNTER — APPOINTMENT (OUTPATIENT)
Dept: RADIOLOGY | Facility: HOSPITAL | Age: 54
End: 2024-05-08
Payer: COMMERCIAL

## 2024-05-08 ENCOUNTER — HOSPITAL ENCOUNTER (EMERGENCY)
Facility: HOSPITAL | Age: 54
Discharge: HOME | End: 2024-05-08
Attending: EMERGENCY MEDICINE
Payer: COMMERCIAL

## 2024-05-08 VITALS
HEIGHT: 72 IN | WEIGHT: 250 LBS | TEMPERATURE: 97.3 F | RESPIRATION RATE: 20 BRPM | BODY MASS INDEX: 33.86 KG/M2 | SYSTOLIC BLOOD PRESSURE: 141 MMHG | DIASTOLIC BLOOD PRESSURE: 94 MMHG | HEART RATE: 72 BPM | OXYGEN SATURATION: 99 %

## 2024-05-08 DIAGNOSIS — R42 LIGHTHEADEDNESS: ICD-10-CM

## 2024-05-08 DIAGNOSIS — R53.83 OTHER FATIGUE: ICD-10-CM

## 2024-05-08 DIAGNOSIS — M54.2 NECK PAIN ON RIGHT SIDE: Primary | ICD-10-CM

## 2024-05-08 DIAGNOSIS — R59.0 CERVICAL LYMPHADENOPATHY: ICD-10-CM

## 2024-05-08 LAB
ALBUMIN SERPL BCP-MCNC: 4.1 G/DL (ref 3.4–5)
ALP SERPL-CCNC: 110 U/L (ref 33–120)
ALT SERPL W P-5'-P-CCNC: 25 U/L (ref 10–52)
ANION GAP SERPL CALC-SCNC: 15 MMOL/L (ref 10–20)
AST SERPL W P-5'-P-CCNC: 27 U/L (ref 9–39)
BASOPHILS # BLD AUTO: 0.08 X10*3/UL (ref 0–0.1)
BASOPHILS NFR BLD AUTO: 0.7 %
BILIRUB SERPL-MCNC: 0.4 MG/DL (ref 0–1.2)
BUN SERPL-MCNC: 17 MG/DL (ref 6–23)
CALCIUM SERPL-MCNC: 9.3 MG/DL (ref 8.6–10.3)
CARDIAC TROPONIN I PNL SERPL HS: 5 NG/L (ref 0–20)
CHLORIDE SERPL-SCNC: 104 MMOL/L (ref 98–107)
CO2 SERPL-SCNC: 23 MMOL/L (ref 21–32)
CREAT SERPL-MCNC: 1.06 MG/DL (ref 0.5–1.3)
EGFRCR SERPLBLD CKD-EPI 2021: 84 ML/MIN/1.73M*2
EOSINOPHIL # BLD AUTO: 0.24 X10*3/UL (ref 0–0.7)
EOSINOPHIL NFR BLD AUTO: 2.1 %
ERYTHROCYTE [DISTWIDTH] IN BLOOD BY AUTOMATED COUNT: 15.4 % (ref 11.5–14.5)
GLUCOSE SERPL-MCNC: 94 MG/DL (ref 74–99)
HCT VFR BLD AUTO: 44.3 % (ref 41–52)
HGB BLD-MCNC: 14.3 G/DL (ref 13.5–17.5)
IMM GRANULOCYTES # BLD AUTO: 0.06 X10*3/UL (ref 0–0.7)
IMM GRANULOCYTES NFR BLD AUTO: 0.5 % (ref 0–0.9)
LYMPHOCYTES # BLD AUTO: 2.7 X10*3/UL (ref 1.2–4.8)
LYMPHOCYTES NFR BLD AUTO: 24.1 %
MCH RBC QN AUTO: 27.1 PG (ref 26–34)
MCHC RBC AUTO-ENTMCNC: 32.3 G/DL (ref 32–36)
MCV RBC AUTO: 84 FL (ref 80–100)
MONOCYTES # BLD AUTO: 0.83 X10*3/UL (ref 0.1–1)
MONOCYTES NFR BLD AUTO: 7.4 %
NEUTROPHILS # BLD AUTO: 7.3 X10*3/UL (ref 1.2–7.7)
NEUTROPHILS NFR BLD AUTO: 65.2 %
NRBC BLD-RTO: 0 /100 WBCS (ref 0–0)
PLATELET # BLD AUTO: 338 X10*3/UL (ref 150–450)
POTASSIUM SERPL-SCNC: 3.6 MMOL/L (ref 3.5–5.3)
PROT SERPL-MCNC: 7.4 G/DL (ref 6.4–8.2)
RBC # BLD AUTO: 5.28 X10*6/UL (ref 4.5–5.9)
S PYO DNA THROAT QL NAA+PROBE: NOT DETECTED
SODIUM SERPL-SCNC: 138 MMOL/L (ref 136–145)
WBC # BLD AUTO: 11.2 X10*3/UL (ref 4.4–11.3)

## 2024-05-08 PROCEDURE — 70450 CT HEAD/BRAIN W/O DYE: CPT | Performed by: RADIOLOGY

## 2024-05-08 PROCEDURE — 70450 CT HEAD/BRAIN W/O DYE: CPT

## 2024-05-08 PROCEDURE — 80053 COMPREHEN METABOLIC PANEL: CPT | Performed by: EMERGENCY MEDICINE

## 2024-05-08 PROCEDURE — 96361 HYDRATE IV INFUSION ADD-ON: CPT

## 2024-05-08 PROCEDURE — 85025 COMPLETE CBC W/AUTO DIFF WBC: CPT | Performed by: EMERGENCY MEDICINE

## 2024-05-08 PROCEDURE — 70491 CT SOFT TISSUE NECK W/DYE: CPT

## 2024-05-08 PROCEDURE — 2550000001 HC RX 255 CONTRASTS: Mod: SE | Performed by: EMERGENCY MEDICINE

## 2024-05-08 PROCEDURE — 70491 CT SOFT TISSUE NECK W/DYE: CPT | Performed by: RADIOLOGY

## 2024-05-08 PROCEDURE — 93005 ELECTROCARDIOGRAM TRACING: CPT

## 2024-05-08 PROCEDURE — 2500000004 HC RX 250 GENERAL PHARMACY W/ HCPCS (ALT 636 FOR OP/ED): Mod: SE | Performed by: EMERGENCY MEDICINE

## 2024-05-08 PROCEDURE — 96365 THER/PROPH/DIAG IV INF INIT: CPT

## 2024-05-08 PROCEDURE — 84484 ASSAY OF TROPONIN QUANT: CPT | Performed by: EMERGENCY MEDICINE

## 2024-05-08 PROCEDURE — 36415 COLL VENOUS BLD VENIPUNCTURE: CPT | Performed by: EMERGENCY MEDICINE

## 2024-05-08 PROCEDURE — 99285 EMERGENCY DEPT VISIT HI MDM: CPT | Mod: 25

## 2024-05-08 PROCEDURE — 2500000001 HC RX 250 WO HCPCS SELF ADMINISTERED DRUGS (ALT 637 FOR MEDICARE OP): Mod: SE | Performed by: EMERGENCY MEDICINE

## 2024-05-08 PROCEDURE — 87651 STREP A DNA AMP PROBE: CPT | Performed by: EMERGENCY MEDICINE

## 2024-05-08 PROCEDURE — 96375 TX/PRO/DX INJ NEW DRUG ADDON: CPT

## 2024-05-08 RX ORDER — KETOROLAC TROMETHAMINE 15 MG/ML
15 INJECTION, SOLUTION INTRAMUSCULAR; INTRAVENOUS ONCE
Status: COMPLETED | OUTPATIENT
Start: 2024-05-08 | End: 2024-05-08

## 2024-05-08 RX ORDER — LIDOCAINE HYDROCHLORIDE 20 MG/ML
15 SOLUTION OROPHARYNGEAL ONCE
Status: COMPLETED | OUTPATIENT
Start: 2024-05-08 | End: 2024-05-08

## 2024-05-08 RX ORDER — ALUMINUM HYDROXIDE, MAGNESIUM HYDROXIDE, AND SIMETHICONE 1200; 120; 1200 MG/30ML; MG/30ML; MG/30ML
30 SUSPENSION ORAL ONCE
Status: COMPLETED | OUTPATIENT
Start: 2024-05-08 | End: 2024-05-08

## 2024-05-08 RX ADMIN — KETOROLAC TROMETHAMINE 15 MG: 15 INJECTION INTRAMUSCULAR; INTRAVENOUS at 16:22

## 2024-05-08 RX ADMIN — IOHEXOL 75 ML: 350 INJECTION, SOLUTION INTRAVENOUS at 16:36

## 2024-05-08 RX ADMIN — PIPERACILLIN SODIUM AND TAZOBACTAM SODIUM 3.38 G: 3; .375 INJECTION, SOLUTION INTRAVENOUS at 17:35

## 2024-05-08 RX ADMIN — SODIUM CHLORIDE 1000 ML: 9 INJECTION, SOLUTION INTRAVENOUS at 15:45

## 2024-05-08 RX ADMIN — DEXAMETHASONE SODIUM PHOSPHATE 10 MG: 4 INJECTION, SOLUTION INTRAMUSCULAR; INTRAVENOUS at 17:29

## 2024-05-08 RX ADMIN — LIDOCAINE HYDROCHLORIDE 15 ML: 20 SOLUTION ORAL at 16:23

## 2024-05-08 RX ADMIN — ALUMINUM HYDROXIDE, MAGNESIUM HYDROXIDE, AND DIMETHICONE 30 ML: 200; 20; 200 SUSPENSION ORAL at 16:23

## 2024-05-08 ASSESSMENT — PAIN DESCRIPTION - LOCATION: LOCATION: NECK

## 2024-05-08 ASSESSMENT — PAIN DESCRIPTION - FREQUENCY: FREQUENCY: CONSTANT/CONTINUOUS

## 2024-05-08 ASSESSMENT — PAIN DESCRIPTION - ORIENTATION: ORIENTATION: RIGHT

## 2024-05-08 ASSESSMENT — ENCOUNTER SYMPTOMS
SYNCOPE: 0
NECK PAIN: 1
SORE THROAT: 1
LIGHT-HEADEDNESS: 1

## 2024-05-08 ASSESSMENT — PAIN DESCRIPTION - PAIN TYPE: TYPE: ACUTE PAIN

## 2024-05-08 ASSESSMENT — COLUMBIA-SUICIDE SEVERITY RATING SCALE - C-SSRS
6. HAVE YOU EVER DONE ANYTHING, STARTED TO DO ANYTHING, OR PREPARED TO DO ANYTHING TO END YOUR LIFE?: NO
1. IN THE PAST MONTH, HAVE YOU WISHED YOU WERE DEAD OR WISHED YOU COULD GO TO SLEEP AND NOT WAKE UP?: NO
2. HAVE YOU ACTUALLY HAD ANY THOUGHTS OF KILLING YOURSELF?: NO

## 2024-05-08 ASSESSMENT — PAIN - FUNCTIONAL ASSESSMENT
PAIN_FUNCTIONAL_ASSESSMENT: 0-10
PAIN_FUNCTIONAL_ASSESSMENT: 0-10

## 2024-05-08 ASSESSMENT — PAIN SCALES - GENERAL: PAINLEVEL_OUTOF10: 9

## 2024-05-08 NOTE — ED PROVIDER NOTES
"HPI   Chief Complaint   Patient presents with    Dizziness    Neck Pain     Was discharged from the hospital yesterday. Has increased pain to the lump on right side of neck and dizziness. 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  States he has ongoing right-sided neck pain.  He has hoarseness of the voice that is chronic.  He states there is a lump on the right side of his neck.  He also has dizziness described as lightheadedness without falls or syncopal episodes.  Recent hospitalization for cervical lymphadenitis.  Subsequently followed up with ENT.  Advised to be on 2 weeks of antibiotics      History provided by:  EMS personnel, patient and medical records    Review of Systems   HENT:  Positive for sore throat.    Cardiovascular:  Negative for chest pain and syncope.   Musculoskeletal:  Positive for neck pain.   Neurological:  Positive for light-headedness.   All other systems reviewed and are negative.                        [place] Coma Scale Score: 15                     Patient History   Past Medical History:   Diagnosis Date    Acute upper respiratory infection, unspecified 03/20/2015    Acute upper respiratory infection    Alcohol abuse, in remission 02/12/2015    History of ETOH abuse    Alcohol dependence, in remission (Multi) 05/07/2015    Alcohol dependence in remission    Allergic contact dermatitis due to plants, except food 05/21/2014    Contact dermatitis due to poison ivy    Allergy status to unspecified drugs, medicaments and biological substances 08/29/2013    History of allergy    Anxiety disorder, unspecified 03/20/2015    Anxiety    Bipolar disorder, current episode manic without psychotic features, unspecified (Multi)     Bipolar disorder, current episode manic without " psychotic features    Cervicalgia     Cervicalgia    Chronic obstructive pulmonary disease, unspecified (Multi) 12/02/2014    Chronic asthmatic bronchitis    Encounter for immunization 09/22/2016    Flu vaccine need    Other cervical disc degeneration, unspecified cervical region     Degeneration of cervical intervertebral disc    Other conditions influencing health status 06/27/2013    Acute Inflammation Of The Orbit    Other long term (current) drug therapy 06/02/2015    High risk medication use    Other specified health status     No pertinent past surgical history    Personal history of nicotine dependence 08/16/2017    History of tobacco abuse    Personal history of nicotine dependence 06/26/2017    History of nicotine dependence    Personal history of other diseases of the digestive system 04/11/2013    History of gastroenteritis    Personal history of other diseases of the digestive system 08/27/2015    History of esophageal reflux    Personal history of other diseases of the respiratory system 03/20/2017    History of sore throat    Personal history of other diseases of the respiratory system 02/05/2014    History of sinusitis    Personal history of other diseases of the respiratory system 01/16/2014    History of allergic rhinitis    Personal history of other diseases of the respiratory system 09/26/2013    History of acute sinusitis    Personal history of other diseases of the respiratory system 06/26/2014    History of allergic rhinitis    Personal history of other infectious and parasitic diseases     History of hepatitis C    Personal history of other mental and behavioral disorders     History of depression    Personal history of other specified conditions 08/25/2016    History of dizziness    Personal history of other specified conditions 02/16/2017    History of abdominal pain    Unspecified asthma, uncomplicated ([facility]) 02/13/2014    Asthmatic bronchitis     Past Surgical History:   Procedure  Laterality Date    CT ABDOMEN PELVIS ANGIOGRAM W AND/OR WO IV CONTRAST  4/1/2020    CT ABDOMEN PELVIS ANGIOGRAM W AND/OR WO IV CONTRAST 4/1/2020 [facility]    INVASIVE VASCULAR PROCEDURE N/A 11/8/2023    Procedure: Pulmonary Angiogram;  Surgeon: Surya Templeton MD;  Location: [facility] Card[facility];  Service: Cardiovascular;  Laterality: N/A;    MR HEAD ANGIO WO IV CONTRAST  1/24/2016    MR HEAD ANGIO WO IV CONTRAST 1/24/2016 [facility]    MR NECK ANGIO WO IV CONTRAST  1/24/2016    MR NECK ANGIO WO IV CONTRAST 1/24/2016 [facility]     No family history on file.  Social History     Tobacco Use    Smoking status: Every Day     Current packs/day: 1.00     Types: Cigarettes    Smokeless tobacco: Never   Substance Use Topics    Alcohol use: Not Currently    Drug use: Never       Physical Exam   ED Triage Vitals [05/08/24 1539]   Temperature Heart Rate Respirations BP   36.3 °C (97.3 °F) 72 20 141/88      Pulse Ox Temp Source Heart Rate Source Patient Position   99 % Oral Monitor Sitting      BP Location FiO2 (%)     Right arm --       Physical Exam  Vitals and nursing note reviewed.   Constitutional:       General: He is not in acute distress.     Appearance: He is well-developed.   HENT:      Head: Normocephalic and atraumatic.      Mouth/Throat:      Mouth: Mucous membranes are dry.      Tonsils: No tonsillar exudate or tonsillar abscesses.   Eyes:      Conjunctiva/sclera: Conjunctivae normal.   Neck:        Comments: Right anterior cervical lymphadenopathy  Cardiovascular:      Rate and Rhythm: Normal rate and regular rhythm.      Pulses: Normal pulses.      Heart sounds: No murmur heard.  Pulmonary:      Effort: Pulmonary effort is normal. No respiratory distress.      Breath sounds: Normal breath sounds.   Abdominal:      Palpations: Abdomen is soft.      Tenderness: There is no abdominal tenderness.   Musculoskeletal:         General: No swelling, tenderness or deformity.      Cervical  back: Neck supple.   Lymphadenopathy:      Cervical: Cervical adenopathy present.      Right cervical: Superficial cervical adenopathy present.   Skin:     General: Skin is warm and dry.      Capillary Refill: Capillary refill takes less than 2 seconds.   Neurological:      Mental Status: He is alert.   Psychiatric:         Mood and Affect: Mood normal.         ED Course & MDM   ED Course as of 05/08/24 1735   Wed May 08, 2024   1547 ECG 12 lead  EKG shows sinus rhythm with normal intervals.  No acute ST-T changes.  Rate = 73.  Unchanged from prior. [BT]   1728 CT soft tissue neck w IV contrast  CT soft tissue neck shows unchanged right cervical adenopathy.  Consider lymphoma, anaplastic, infectious, or granulomatous disease.  This is known and unchanged.  His airway is patent.  Tentative plan is for 10 days of vancomycin and Zosyn.  If swelling does not resolve, fine-needle aspiration will be planned at that time. [BT]   1729 CT head wo IV contrast  CT head without hemorrhage or mass [BT]   1730 I considered admission.  Tolerating oral intake.  Feels much better after treatment.  I reviewed his chart.  He is scheduled to be on Zosyn and vancomycin twice daily for 10 days.  He was given Decadron and Zosyn here. [BT]   1730 Troponin I, High Sensitivity: 5  Normal troponin.  Doubt acute coronary syndrome. [BT]   1731 Advised to keep follow-up appointment with ENT as scheduled.  Advised to reach back out to the medical assistant regarding scheduling of IV antibiotics.  Patient advised to return with worsening neck pain difficulty swallowing or other concerns.  He is agreeable with this plan and verbalized understanding.  Discharged home. [BT]      ED Course User Index  [BT] Yves Nielson DO         Diagnoses as of 05/08/24 1735   Neck pain on right side   Lightheadedness   Other fatigue   Cervical lymphadenopathy       Medical Decision Making  53-year-old male presents with right-sided neck pain and dizziness.   Describes dizziness as lightheadedness    Labs including troponin.  EKG.  Saline bolus.   CT head as well as CT soft tissue neck.    Toradol for pain.        Problems Addressed:  Lightheadedness: acute illness or injury    Amount and/or Complexity of Data Reviewed  External Data Reviewed: notes.     Details: Seen by ENT yesterday 5/7.  Noted to have bacterial pharyngolaryngitis.  Continue 2 weeks of antibiotics.  If right cervical lymphadenopathy persists, patient can be considered for fine-needle aspiration.    Medical assistant has been trying to call the patient today to schedule vancomycin and Zosyn twice daily for 10 days.        Procedure  Procedures     Yves Nielson, DO  05/08/24 1734       Yves Nielson, DO  05/08/24 1736

## 2024-05-08 NOTE — TELEPHONE ENCOUNTER
Received IV antibiotic orders from Dr. Cisse. Per secure chat from ordering provider, he would like the patient to receive vancomycin and zosyn twice a day for 10 days. Attempted to reach patient x2 to schedule. Left detailed message, with call back number, for patient to call back at earliest convenience.

## 2024-05-10 ENCOUNTER — HOSPITAL ENCOUNTER (EMERGENCY)
Facility: HOSPITAL | Age: 54
Discharge: HOME | End: 2024-05-10
Attending: EMERGENCY MEDICINE
Payer: COMMERCIAL

## 2024-05-10 VITALS
RESPIRATION RATE: 20 BRPM | WEIGHT: 250 LBS | DIASTOLIC BLOOD PRESSURE: 82 MMHG | OXYGEN SATURATION: 96 % | SYSTOLIC BLOOD PRESSURE: 120 MMHG | HEIGHT: 72 IN | HEART RATE: 57 BPM | BODY MASS INDEX: 33.86 KG/M2

## 2024-05-10 DIAGNOSIS — M54.2 NECK PAIN ON RIGHT SIDE: Primary | ICD-10-CM

## 2024-05-10 LAB
ATRIAL RATE: 73 BPM
P AXIS: 21 DEGREES
P OFFSET: 202 MS
P ONSET: 152 MS
PR INTERVAL: 144 MS
Q ONSET: 224 MS
QRS COUNT: 12 BEATS
QRS DURATION: 86 MS
QT INTERVAL: 426 MS
QTC CALCULATION(BAZETT): 469 MS
QTC FREDERICIA: 455 MS
R AXIS: 25 DEGREES
T AXIS: 39 DEGREES
T OFFSET: 437 MS
VENTRICULAR RATE: 73 BPM

## 2024-05-10 PROCEDURE — 99284 EMERGENCY DEPT VISIT MOD MDM: CPT | Mod: 25

## 2024-05-10 PROCEDURE — 2500000004 HC RX 250 GENERAL PHARMACY W/ HCPCS (ALT 636 FOR OP/ED): Mod: SE | Performed by: EMERGENCY MEDICINE

## 2024-05-10 PROCEDURE — 96374 THER/PROPH/DIAG INJ IV PUSH: CPT

## 2024-05-10 RX ORDER — HYDROCODONE BITARTRATE AND ACETAMINOPHEN 5; 325 MG/1; MG/1
1 TABLET ORAL 3 TIMES DAILY
Qty: 9 TABLET | Refills: 0 | Status: SHIPPED | OUTPATIENT
Start: 2024-05-10 | End: 2024-05-13

## 2024-05-10 RX ORDER — VANCOMYCIN 1.25 G/250ML
1750 INJECTION, SOLUTION INTRAVENOUS ONCE
Qty: 350 ML | Refills: 0 | Status: COMPLETED | OUTPATIENT
Start: 2024-05-11 | End: 2024-05-11

## 2024-05-10 RX ORDER — ACETAMINOPHEN 325 MG/1
975 TABLET ORAL ONCE
Status: COMPLETED | OUTPATIENT
Start: 2024-05-10 | End: 2024-05-10

## 2024-05-10 RX ORDER — VANCOMYCIN 1.25 G/250ML
1750 INJECTION, SOLUTION INTRAVENOUS ONCE
Qty: 350 ML | Refills: 0 | Status: COMPLETED | OUTPATIENT
Start: 2024-05-12 | End: 2024-05-12

## 2024-05-10 RX ORDER — KETOROLAC TROMETHAMINE 15 MG/ML
15 INJECTION, SOLUTION INTRAMUSCULAR; INTRAVENOUS ONCE
Status: COMPLETED | OUTPATIENT
Start: 2024-05-10 | End: 2024-05-10

## 2024-05-10 RX ORDER — IBUPROFEN 600 MG/1
600 TABLET ORAL EVERY 8 HOURS PRN
Qty: 30 TABLET | Refills: 0 | Status: SHIPPED | OUTPATIENT
Start: 2024-05-10

## 2024-05-10 RX ORDER — VANCOMYCIN 1.25 G/250ML
1750 INJECTION, SOLUTION INTRAVENOUS ONCE
Status: CANCELLED | OUTPATIENT
Start: 2024-05-10 | End: 2024-05-10

## 2024-05-10 RX ADMIN — KETOROLAC TROMETHAMINE 15 MG: 15 INJECTION INTRAMUSCULAR; INTRAVENOUS at 10:56

## 2024-05-10 RX ADMIN — ACETAMINOPHEN 975 MG: 325 TABLET ORAL at 10:55

## 2024-05-10 ASSESSMENT — PAIN DESCRIPTION - ORIENTATION: ORIENTATION: RIGHT

## 2024-05-10 ASSESSMENT — COLUMBIA-SUICIDE SEVERITY RATING SCALE - C-SSRS
2. HAVE YOU ACTUALLY HAD ANY THOUGHTS OF KILLING YOURSELF?: NO
6. HAVE YOU EVER DONE ANYTHING, STARTED TO DO ANYTHING, OR PREPARED TO DO ANYTHING TO END YOUR LIFE?: NO
1. IN THE PAST MONTH, HAVE YOU WISHED YOU WERE DEAD OR WISHED YOU COULD GO TO SLEEP AND NOT WAKE UP?: NO

## 2024-05-10 ASSESSMENT — PAIN DESCRIPTION - PAIN TYPE: TYPE: ACUTE PAIN

## 2024-05-10 ASSESSMENT — PAIN DESCRIPTION - FREQUENCY: FREQUENCY: CONSTANT/CONTINUOUS

## 2024-05-10 ASSESSMENT — PAIN DESCRIPTION - ONSET: ONSET: SUDDEN

## 2024-05-10 ASSESSMENT — PAIN - FUNCTIONAL ASSESSMENT
PAIN_FUNCTIONAL_ASSESSMENT: 0-10
PAIN_FUNCTIONAL_ASSESSMENT: 0-10

## 2024-05-10 ASSESSMENT — PAIN DESCRIPTION - LOCATION: LOCATION: NECK

## 2024-05-10 ASSESSMENT — PAIN DESCRIPTION - PROGRESSION: CLINICAL_PROGRESSION: GRADUALLY WORSENING

## 2024-05-10 ASSESSMENT — PAIN SCALES - GENERAL: PAINLEVEL_OUTOF10: 10 - WORST POSSIBLE PAIN

## 2024-05-10 ASSESSMENT — PAIN DESCRIPTION - DIRECTION: RADIATING_TOWARDS: HEAD

## 2024-05-10 ASSESSMENT — PAIN DESCRIPTION - DESCRIPTORS: DESCRIPTORS: SHARP

## 2024-05-10 NOTE — TELEPHONE ENCOUNTER
Received secure chat from Dr. Yates that patient was currently in [place] ER. Went downstairs to schedule patient for antibiotic infusions. Informed patient Dr. Cisse would like him to receive vancomycin and zosyn twice a day for 10 days. Patient agreeable and requested to start antibiotics tomorrow, 5/11/24. Explained to patient, due to our infusion hours, he will need to present to the ER on Saturday and Sunday for both doses. But starting Monday morning, patient will present to infusion center in the AM and ER in the PM. Patient verbalized understanding and agreed to plan of care/appointments. Scheduled through Monday, 5/20/24. Email sent to Wendy/[facility].

## 2024-05-10 NOTE — ED PROVIDER NOTES
HPI   Chief Complaint   Patient presents with    Neck Pain     Pt here via EMS with complaints of neck pain. Has a large lump on right neck that showed up Sunday, on antibiotic. Has had 2 CT scans. Pain is worsening. Also complains of dizziness and HA.       HPI                    [place] Coma Scale Score: 15                     Patient History   Past Medical History:   Diagnosis Date    Acute upper respiratory infection, unspecified 03/20/2015    Acute upper respiratory infection    Alcohol abuse, in remission 02/12/2015    History of ETOH abuse    Alcohol dependence, in remission (Multi) 05/07/2015    Alcohol dependence in remission    Allergic contact dermatitis due to plants, except food 05/21/2014    Contact dermatitis due to poison ivy    Allergy status to unspecified drugs, medicaments and biological substances 08/29/2013    History of allergy    Anxiety disorder, unspecified 03/20/2015    Anxiety    Bipolar disorder, current episode manic without psychotic features, unspecified (Multi)     Bipolar disorder, current episode manic without psychotic features    Cervicalgia     Cervicalgia    Chronic obstructive pulmonary disease, unspecified (Multi) 12/02/2014    Chronic asthmatic bronchitis    Encounter for immunization 09/22/2016    Flu vaccine need    Hypertension     Other cervical disc degeneration, unspecified cervical region     Degeneration of cervical intervertebral disc    Other conditions influencing health status 06/27/2013    Acute Inflammation Of The Orbit    Other long term (current) drug therapy 06/02/2015    High risk medication use    Other specified health status     No pertinent past surgical history    Personal history of nicotine dependence 08/16/2017    History of tobacco abuse    Personal history of nicotine dependence 06/26/2017    History of nicotine dependence    Personal history of other diseases of the digestive system 04/11/2013    History of gastroenteritis    Personal history of  other diseases of the digestive system 08/27/2015    History of esophageal reflux    Personal history of other diseases of the respiratory system 03/20/2017    History of sore throat    Personal history of other diseases of the respiratory system 02/05/2014    History of sinusitis    Personal history of other diseases of the respiratory system 01/16/2014    History of allergic rhinitis    Personal history of other diseases of the respiratory system 09/26/2013    History of acute sinusitis    Personal history of other diseases of the respiratory system 06/26/2014    History of allergic rhinitis    Personal history of other infectious and parasitic diseases     History of hepatitis C    Personal history of other mental and behavioral disorders     History of depression    Personal history of other specified conditions 08/25/2016    History of dizziness    Personal history of other specified conditions 02/16/2017    History of abdominal pain    Unspecified asthma, uncomplicated ([facility]) 02/13/2014    Asthmatic bronchitis     Past Surgical History:   Procedure Laterality Date    CT ABDOMEN PELVIS ANGIOGRAM W AND/OR WO IV CONTRAST  4/1/2020    CT ABDOMEN PELVIS ANGIOGRAM W AND/OR WO IV CONTRAST 4/1/2020 [facility]    INVASIVE VASCULAR PROCEDURE N/A 11/8/2023    Procedure: Pulmonary Angiogram;  Surgeon: Surya Templeton MD;  Location: [facility] Cardiac Cath Lab;  Service: Cardiovascular;  Laterality: N/A;    MR HEAD ANGIO WO IV CONTRAST  1/24/2016    MR HEAD ANGIO WO IV CONTRAST 1/24/2016 [facility]    MR NECK ANGIO WO IV CONTRAST  1/24/2016    MR NECK ANGIO WO IV CONTRAST 1/24/2016 [facility]     No family history on file.  Social History     Tobacco Use    Smoking status: Every Day     Current packs/day: 1.00     Types: Cigarettes    Smokeless tobacco: Never   Substance Use Topics    Alcohol use: Not Currently    Drug use: Never       Physical Exam   ED Triage Vitals [05/10/24 0953]   Temp Heart  Rate Respirations BP   -- 65 20 119/80      Pulse Ox Temp src Heart Rate Source Patient Position   98 % -- -- --      BP Location FiO2 (%)     -- --       Physical Exam  Constitutional:       General: He is not in acute distress.     Appearance: Normal appearance. He is not toxic-appearing.   HENT:      Head: Normocephalic and atraumatic.        Right Ear: Tympanic membrane normal.      Left Ear: Tympanic membrane normal.      Mouth/Throat:      Mouth: Mucous membranes are moist.      Pharynx: Oropharynx is clear.   Eyes:      Conjunctiva/sclera: Conjunctivae normal.      Pupils: Pupils are equal, round, and reactive to light.   Cardiovascular:      Rate and Rhythm: Normal rate and regular rhythm.      Pulses: Normal pulses.      Heart sounds: Normal heart sounds.   Pulmonary:      Effort: Pulmonary effort is normal. No respiratory distress.      Breath sounds: Normal breath sounds. No wheezing.   Abdominal:      General: Bowel sounds are normal.      Palpations: Abdomen is soft.      Tenderness: There is no abdominal tenderness. There is no guarding or rebound.   Musculoskeletal:         General: Normal range of motion.      Cervical back: Normal range of motion.   Skin:     General: Skin is warm and dry.   Neurological:      General: No focal deficit present.      Mental Status: He is alert and oriented to person, place, and time.         ED Course & MDM   Diagnoses as of 05/10/24 1149   Neck pain on right side       Medical Decision Making  53-year-old gentleman presents to the ER with pain on his right side of his neck.  I initially saw the patient when he first arrived here and has the enlarged lymph node.  The patient came to the ED for pain control patient reports that has improved but he still has some pain associated with the swelling.  He had a CAT scan 2 days ago which did not show anything worsening.  I was able to reach out to the ENT physician.  And patient is already supposed to have infusions of  antibiotics.  The infusion center [facility] came down to the ED and arrange for the patient to have his infusions.  Clarified with the patient again he said yes he just needs pain control I did give him the education about the slippery slope of narcotics.  Patient understands the risk versus benefit.  Will give him Motrin here in the ED given Toradol and acetaminophen patient reports he feels markedly better.  Will give him a few narcotics for breakthrough pain.  Otherwise to use anti-inflammatories.  Patient again will follow-up with a specialist patient denies any fever chills denies any other symptoms at this time patient discharged home        Procedure  Procedures     James Yates, DO  05/10/24 1141

## 2024-05-10 NOTE — TELEPHONE ENCOUNTER
Received IV antibiotic orders from Dr. Cisse. Per secure chat from ordering provider, he would like the patient to receive vancomycin and zosyn twice a day for 10 days. Attempted to reach patient x3 to schedule. Called patient at primary number listed, unable to leave a message due to voicemail not being set up. Called patient at secondary number. Left detailed message, with call back number, for patient to call back at earliest convenience.

## 2024-05-11 ENCOUNTER — INFUSION (OUTPATIENT)
Dept: HEMATOLOGY/ONCOLOGY | Facility: HOSPITAL | Age: 54
End: 2024-05-11
Payer: COMMERCIAL

## 2024-05-11 ENCOUNTER — DOCUMENTATION (OUTPATIENT)
Dept: INPATIENT UNIT | Facility: HOSPITAL | Age: 54
End: 2024-05-11

## 2024-05-11 VITALS
RESPIRATION RATE: 16 BRPM | HEART RATE: 67 BPM | DIASTOLIC BLOOD PRESSURE: 66 MMHG | TEMPERATURE: 98.3 F | SYSTOLIC BLOOD PRESSURE: 107 MMHG | OXYGEN SATURATION: 97 %

## 2024-05-11 VITALS — HEART RATE: 64 BPM

## 2024-05-11 DIAGNOSIS — L04.0 ACUTE CERVICAL LYMPHADENITIS: Primary | ICD-10-CM

## 2024-05-11 PROCEDURE — 2500000004 HC RX 250 GENERAL PHARMACY W/ HCPCS (ALT 636 FOR OP/ED): Mod: JZ,SE | Performed by: OTOLARYNGOLOGY

## 2024-05-11 PROCEDURE — 2500000004 HC RX 250 GENERAL PHARMACY W/ HCPCS (ALT 636 FOR OP/ED): Mod: SE | Performed by: OTOLARYNGOLOGY

## 2024-05-11 PROCEDURE — 96366 THER/PROPH/DIAG IV INF ADDON: CPT

## 2024-05-11 PROCEDURE — 96365 THER/PROPH/DIAG IV INF INIT: CPT | Mod: INF

## 2024-05-11 PROCEDURE — 96367 TX/PROPH/DG ADDL SEQ IV INF: CPT | Mod: INF,INF2

## 2024-05-11 RX ADMIN — VANCOMYCIN 1750 MG: 1.25 INJECTION, SOLUTION INTRAVENOUS at 20:39

## 2024-05-11 RX ADMIN — PIPERACILLIN SODIUM AND TAZOBACTAM SODIUM 3.38 G: 3; .375 INJECTION, SOLUTION INTRAVENOUS at 08:30

## 2024-05-11 RX ADMIN — PIPERACILLIN SODIUM AND TAZOBACTAM SODIUM 3.38 G: 3; .375 INJECTION, SOLUTION INTRAVENOUS at 20:04

## 2024-05-11 RX ADMIN — VANCOMYCIN 1750 MG: 1.25 INJECTION, SOLUTION INTRAVENOUS at 09:12

## 2024-05-11 NOTE — PROGRESS NOTES
".Patient came in for outpatient infusion. When walking to room patient was unsteady and shaking, patient states \"I get epilepsy episodes\".  Transferred patient to a wheelchair then to outpatient cot. Patient identified and allergies reviewed. Idalmis NORIEGA started IV right AC without difficulty. /63 98%, 64,  20, 36.3. Started first infusion, patient holding his head and states he has a bad headache, that started before he came, but is getting worse. Lights dimmed, cool washcloth to forehead. Patient states he had noting to eat or drink this am, denies being diabetic. Goldfish crackers & ginger ale given. Patient resting, infusion running without difficulty.   " 61 y/o male c/o palpitations and high BP s/p binge drinking vodka or the last two days. pt reports binge drinking 2 pints of vodka yesterday and the day before with last drink today at 2am. pt also c/o headache, unknown LOC or head injury from yesterday. pt BP and HR WNL in ED. pt has noticeable mild tremors. denies v/t/a hallucinations, si/hi, illicit drug use, hx of seizures, sob, dizziness, fever/chills. EKG completed, pt placed on continuous cardiac monitor. pt a&ox4, speaking in full sentences, continue to monitor.

## 2024-05-11 NOTE — PROGRESS NOTES
Patient sleep during all of infusions. Continues to complain of HA, refuses to go to ED. VS stable. PICC line flushed with heparin flush after infusion. 110/65, 98%, 61, 20.  Patient up to side of bed and transferred to wheelchair.

## 2024-05-11 NOTE — PROGRESS NOTES
"Patient came in for outpatient infusion. When walking to room patient was unsteady and shaking, patient states \"I get epilepsy episodes\". Transferred patient to a wheelchair then to outpatient cot. Patient identified and allergies reviewed. Idalmis NORIEGA started IV right AC without difficulty. /63 98%, 64, 20, 36.3. Started first infusion, patient holding his head and states he has a bad headache, that started before he came, but is getting worse. Lights dimmed, cool washcloth to forehead. Patient states he had noting to eat or drink this am, denies being diabetic. Goldfish crackers & ginger ale given. Patient resting, infusion running without difficulty.   "

## 2024-05-12 ENCOUNTER — INFUSION (OUTPATIENT)
Dept: HEMATOLOGY/ONCOLOGY | Facility: HOSPITAL | Age: 54
End: 2024-05-12
Payer: COMMERCIAL

## 2024-05-12 VITALS
SYSTOLIC BLOOD PRESSURE: 125 MMHG | DIASTOLIC BLOOD PRESSURE: 70 MMHG | HEART RATE: 65 BPM | RESPIRATION RATE: 18 BRPM | TEMPERATURE: 97.2 F

## 2024-05-12 VITALS
SYSTOLIC BLOOD PRESSURE: 143 MMHG | DIASTOLIC BLOOD PRESSURE: 98 MMHG | HEART RATE: 68 BPM | TEMPERATURE: 97 F | OXYGEN SATURATION: 100 % | RESPIRATION RATE: 18 BRPM

## 2024-05-12 DIAGNOSIS — L04.0 ACUTE CERVICAL LYMPHADENITIS: ICD-10-CM

## 2024-05-12 PROCEDURE — 96366 THER/PROPH/DIAG IV INF ADDON: CPT

## 2024-05-12 PROCEDURE — 96367 TX/PROPH/DG ADDL SEQ IV INF: CPT | Mod: INF,INF2

## 2024-05-12 PROCEDURE — 2500000004 HC RX 250 GENERAL PHARMACY W/ HCPCS (ALT 636 FOR OP/ED): Mod: SE | Performed by: OTOLARYNGOLOGY

## 2024-05-12 PROCEDURE — 96365 THER/PROPH/DIAG IV INF INIT: CPT | Mod: INF

## 2024-05-12 PROCEDURE — 2500000004 HC RX 250 GENERAL PHARMACY W/ HCPCS (ALT 636 FOR OP/ED): Mod: JZ,SE | Performed by: OTOLARYNGOLOGY

## 2024-05-12 RX ADMIN — PIPERACILLIN SODIUM AND TAZOBACTAM SODIUM 3.38 G: 3; .375 INJECTION, SOLUTION INTRAVENOUS at 08:25

## 2024-05-12 RX ADMIN — VANCOMYCIN 1750 MG: 1.25 INJECTION, SOLUTION INTRAVENOUS at 08:55

## 2024-05-12 RX ADMIN — VANCOMYCIN 1750 MG: 1.25 INJECTION, SOLUTION INTRAVENOUS at 20:55

## 2024-05-12 RX ADMIN — PIPERACILLIN SODIUM AND TAZOBACTAM SODIUM 3.38 G: 3; .375 INJECTION, SOLUTION INTRAVENOUS at 20:19

## 2024-05-12 ASSESSMENT — PAIN - FUNCTIONAL ASSESSMENT: PAIN_FUNCTIONAL_ASSESSMENT: 0-10

## 2024-05-12 ASSESSMENT — PAIN SCALES - GENERAL: PAINLEVEL_OUTOF10: 7

## 2024-05-13 ENCOUNTER — INFUSION (OUTPATIENT)
Dept: HEMATOLOGY/ONCOLOGY | Facility: HOSPITAL | Age: 54
End: 2024-05-13
Payer: COMMERCIAL

## 2024-05-13 VITALS
RESPIRATION RATE: 18 BRPM | DIASTOLIC BLOOD PRESSURE: 86 MMHG | SYSTOLIC BLOOD PRESSURE: 130 MMHG | OXYGEN SATURATION: 97 % | HEART RATE: 89 BPM | TEMPERATURE: 97.2 F

## 2024-05-13 VITALS
OXYGEN SATURATION: 97 % | TEMPERATURE: 96.6 F | SYSTOLIC BLOOD PRESSURE: 130 MMHG | WEIGHT: 265.21 LBS | DIASTOLIC BLOOD PRESSURE: 72 MMHG | HEART RATE: 72 BPM | RESPIRATION RATE: 16 BRPM | BODY MASS INDEX: 35.97 KG/M2

## 2024-05-13 DIAGNOSIS — I88.9 LYMPHADENITIS: ICD-10-CM

## 2024-05-13 DIAGNOSIS — L04.0 ACUTE CERVICAL LYMPHADENITIS: ICD-10-CM

## 2024-05-13 DIAGNOSIS — R22.1 NECK MASS: ICD-10-CM

## 2024-05-13 PROCEDURE — 2500000004 HC RX 250 GENERAL PHARMACY W/ HCPCS (ALT 636 FOR OP/ED): Mod: SE

## 2024-05-13 PROCEDURE — 96366 THER/PROPH/DIAG IV INF ADDON: CPT

## 2024-05-13 PROCEDURE — 2500000004 HC RX 250 GENERAL PHARMACY W/ HCPCS (ALT 636 FOR OP/ED): Mod: JZ,SE | Performed by: OTOLARYNGOLOGY

## 2024-05-13 PROCEDURE — 96365 THER/PROPH/DIAG IV INF INIT: CPT | Mod: INF

## 2024-05-13 PROCEDURE — 96367 TX/PROPH/DG ADDL SEQ IV INF: CPT | Mod: INF,INF2

## 2024-05-13 RX ORDER — PIPERACILLIN SODIUM, TAZOBACTAM SODIUM 3; .375 G/15ML; G/15ML
INJECTION, POWDER, LYOPHILIZED, FOR SOLUTION INTRAVENOUS
Status: COMPLETED
Start: 2024-05-13 | End: 2024-05-13

## 2024-05-13 RX ORDER — FAMOTIDINE 10 MG/ML
20 INJECTION INTRAVENOUS ONCE AS NEEDED
Status: CANCELLED | OUTPATIENT
Start: 2024-05-14

## 2024-05-13 RX ORDER — DIPHENHYDRAMINE HYDROCHLORIDE 50 MG/ML
50 INJECTION INTRAMUSCULAR; INTRAVENOUS AS NEEDED
Status: DISCONTINUED | OUTPATIENT
Start: 2024-05-13 | End: 2024-05-13 | Stop reason: HOSPADM

## 2024-05-13 RX ORDER — DIPHENHYDRAMINE HYDROCHLORIDE 50 MG/ML
50 INJECTION INTRAMUSCULAR; INTRAVENOUS AS NEEDED
Status: CANCELLED | OUTPATIENT
Start: 2024-05-14

## 2024-05-13 RX ORDER — ALBUTEROL SULFATE 0.83 MG/ML
3 SOLUTION RESPIRATORY (INHALATION) AS NEEDED
Status: DISCONTINUED | OUTPATIENT
Start: 2024-05-13 | End: 2024-05-13 | Stop reason: HOSPADM

## 2024-05-13 RX ORDER — EPINEPHRINE 0.3 MG/.3ML
0.3 INJECTION SUBCUTANEOUS EVERY 5 MIN PRN
Status: CANCELLED | OUTPATIENT
Start: 2024-05-14

## 2024-05-13 RX ORDER — VANCOMYCIN 1.25 G/250ML
1750 INJECTION, SOLUTION INTRAVENOUS ONCE
Status: CANCELLED | OUTPATIENT
Start: 2024-05-14 | End: 2024-05-14

## 2024-05-13 RX ORDER — VANCOMYCIN HYDROCHLORIDE 1 G/20ML
INJECTION, POWDER, LYOPHILIZED, FOR SOLUTION INTRAVENOUS
Status: COMPLETED
Start: 2024-05-13 | End: 2024-05-13

## 2024-05-13 RX ORDER — HEPARIN SODIUM,PORCINE/PF 10 UNIT/ML
50 SYRINGE (ML) INTRAVENOUS AS NEEDED
OUTPATIENT
Start: 2024-05-13

## 2024-05-13 RX ORDER — HEPARIN 100 UNIT/ML
500 SYRINGE INTRAVENOUS AS NEEDED
OUTPATIENT
Start: 2024-05-13

## 2024-05-13 RX ORDER — FAMOTIDINE 10 MG/ML
20 INJECTION INTRAVENOUS ONCE AS NEEDED
Status: DISCONTINUED | OUTPATIENT
Start: 2024-05-13 | End: 2024-05-13 | Stop reason: HOSPADM

## 2024-05-13 RX ORDER — ALBUTEROL SULFATE 0.83 MG/ML
3 SOLUTION RESPIRATORY (INHALATION) AS NEEDED
Status: CANCELLED | OUTPATIENT
Start: 2024-05-14

## 2024-05-13 RX ORDER — VANCOMYCIN 1.25 G/250ML
1750 INJECTION, SOLUTION INTRAVENOUS ONCE
Qty: 350 ML | Refills: 0 | Status: COMPLETED | OUTPATIENT
Start: 2024-05-13 | End: 2024-05-13

## 2024-05-13 RX ORDER — EPINEPHRINE 0.3 MG/.3ML
0.3 INJECTION SUBCUTANEOUS EVERY 5 MIN PRN
Status: DISCONTINUED | OUTPATIENT
Start: 2024-05-13 | End: 2024-05-13 | Stop reason: HOSPADM

## 2024-05-13 RX ADMIN — PIPERACILLIN SODIUM AND TAZOBACTAM SODIUM 3.38 G: 3; .375 INJECTION, SOLUTION INTRAVENOUS at 08:14

## 2024-05-13 RX ADMIN — PIPERACILLIN SODIUM AND TAZOBACTAM SODIUM 3375 MG: 3; .375 INJECTION, POWDER, LYOPHILIZED, FOR SOLUTION INTRAVENOUS at 21:54

## 2024-05-13 RX ADMIN — VANCOMYCIN HYDROCHLORIDE 2 G: 1 INJECTION, POWDER, LYOPHILIZED, FOR SOLUTION INTRAVENOUS at 21:50

## 2024-05-13 RX ADMIN — VANCOMYCIN 1750 MG: 1.25 INJECTION, SOLUTION INTRAVENOUS at 08:14

## 2024-05-13 ASSESSMENT — PATIENT HEALTH QUESTIONNAIRE - PHQ9
2. FEELING DOWN, DEPRESSED OR HOPELESS: NOT AT ALL
1. LITTLE INTEREST OR PLEASURE IN DOING THINGS: NOT AT ALL
SUM OF ALL RESPONSES TO PHQ9 QUESTIONS 1 & 2: 0

## 2024-05-13 ASSESSMENT — PAIN SCALES - GENERAL: PAINLEVEL: 9

## 2024-05-14 ENCOUNTER — HOSPITAL ENCOUNTER (EMERGENCY)
Facility: HOSPITAL | Age: 54
Discharge: LEFT BEFORE DISCHARGE | End: 2024-05-14
Payer: COMMERCIAL

## 2024-05-14 ENCOUNTER — HOSPITAL ENCOUNTER (EMERGENCY)
Facility: HOSPITAL | Age: 54
Discharge: HOME | End: 2024-05-14
Attending: EMERGENCY MEDICINE
Payer: COMMERCIAL

## 2024-05-14 ENCOUNTER — INFUSION (OUTPATIENT)
Dept: HEMATOLOGY/ONCOLOGY | Facility: HOSPITAL | Age: 54
End: 2024-05-14
Payer: COMMERCIAL

## 2024-05-14 ENCOUNTER — OFFICE VISIT (OUTPATIENT)
Dept: OTOLARYNGOLOGY | Facility: CLINIC | Age: 54
End: 2024-05-14
Payer: COMMERCIAL

## 2024-05-14 ENCOUNTER — APPOINTMENT (OUTPATIENT)
Dept: HEMATOLOGY/ONCOLOGY | Facility: HOSPITAL | Age: 54
End: 2024-05-14
Payer: COMMERCIAL

## 2024-05-14 VITALS
TEMPERATURE: 95.9 F | HEART RATE: 83 BPM | DIASTOLIC BLOOD PRESSURE: 81 MMHG | OXYGEN SATURATION: 97 % | SYSTOLIC BLOOD PRESSURE: 127 MMHG | RESPIRATION RATE: 16 BRPM

## 2024-05-14 VITALS
OXYGEN SATURATION: 95 % | HEART RATE: 77 BPM | RESPIRATION RATE: 16 BRPM | SYSTOLIC BLOOD PRESSURE: 145 MMHG | DIASTOLIC BLOOD PRESSURE: 91 MMHG | HEIGHT: 72 IN | TEMPERATURE: 97.7 F | BODY MASS INDEX: 35.83 KG/M2 | WEIGHT: 264.55 LBS

## 2024-05-14 DIAGNOSIS — E87.6 HYPOKALEMIA: ICD-10-CM

## 2024-05-14 DIAGNOSIS — M54.2 NECK PAIN ON RIGHT SIDE: Primary | ICD-10-CM

## 2024-05-14 DIAGNOSIS — L04.0 ACUTE CERVICAL LYMPHADENITIS: Primary | ICD-10-CM

## 2024-05-14 DIAGNOSIS — L04.0 ACUTE CERVICAL LYMPHADENITIS: ICD-10-CM

## 2024-05-14 LAB
ALBUMIN SERPL BCP-MCNC: 3.9 G/DL (ref 3.4–5)
ALP SERPL-CCNC: 99 U/L (ref 33–120)
ALT SERPL W P-5'-P-CCNC: 17 U/L (ref 10–52)
ANION GAP SERPL CALC-SCNC: 13 MMOL/L (ref 10–20)
AST SERPL W P-5'-P-CCNC: 12 U/L (ref 9–39)
BASOPHILS # BLD AUTO: 0.06 X10*3/UL (ref 0–0.1)
BASOPHILS NFR BLD AUTO: 0.5 %
BILIRUB SERPL-MCNC: 0.4 MG/DL (ref 0–1.2)
BUN SERPL-MCNC: 10 MG/DL (ref 6–23)
CALCIUM SERPL-MCNC: 9.2 MG/DL (ref 8.6–10.3)
CHLORIDE SERPL-SCNC: 107 MMOL/L (ref 98–107)
CO2 SERPL-SCNC: 21 MMOL/L (ref 21–32)
CREAT SERPL-MCNC: 1.06 MG/DL (ref 0.5–1.3)
EGFRCR SERPLBLD CKD-EPI 2021: 84 ML/MIN/1.73M*2
EOSINOPHIL # BLD AUTO: 0.19 X10*3/UL (ref 0–0.7)
EOSINOPHIL NFR BLD AUTO: 1.7 %
ERYTHROCYTE [DISTWIDTH] IN BLOOD BY AUTOMATED COUNT: 15.4 % (ref 11.5–14.5)
GLUCOSE SERPL-MCNC: 119 MG/DL (ref 74–99)
HCT VFR BLD AUTO: 42 % (ref 41–52)
HGB BLD-MCNC: 13.8 G/DL (ref 13.5–17.5)
IMM GRANULOCYTES # BLD AUTO: 0.05 X10*3/UL (ref 0–0.7)
IMM GRANULOCYTES NFR BLD AUTO: 0.5 % (ref 0–0.9)
LACTATE SERPL-SCNC: 1.6 MMOL/L (ref 0.4–2)
LYMPHOCYTES # BLD AUTO: 2.04 X10*3/UL (ref 1.2–4.8)
LYMPHOCYTES NFR BLD AUTO: 18.4 %
MCH RBC QN AUTO: 26.9 PG (ref 26–34)
MCHC RBC AUTO-ENTMCNC: 32.9 G/DL (ref 32–36)
MCV RBC AUTO: 82 FL (ref 80–100)
MONOCYTES # BLD AUTO: 0.76 X10*3/UL (ref 0.1–1)
MONOCYTES NFR BLD AUTO: 6.8 %
NEUTROPHILS # BLD AUTO: 8.01 X10*3/UL (ref 1.2–7.7)
NEUTROPHILS NFR BLD AUTO: 72.1 %
NRBC BLD-RTO: 0 /100 WBCS (ref 0–0)
PLATELET # BLD AUTO: 327 X10*3/UL (ref 150–450)
POTASSIUM SERPL-SCNC: 3.3 MMOL/L (ref 3.5–5.3)
PROT SERPL-MCNC: 7.2 G/DL (ref 6.4–8.2)
RBC # BLD AUTO: 5.13 X10*6/UL (ref 4.5–5.9)
SODIUM SERPL-SCNC: 138 MMOL/L (ref 136–145)
VANCOMYCIN TROUGH SERPL-MCNC: 17.6 UG/ML (ref 5–20)
WBC # BLD AUTO: 11.1 X10*3/UL (ref 4.4–11.3)

## 2024-05-14 PROCEDURE — 99283 EMERGENCY DEPT VISIT LOW MDM: CPT

## 2024-05-14 PROCEDURE — 85025 COMPLETE CBC W/AUTO DIFF WBC: CPT | Performed by: FAMILY MEDICINE

## 2024-05-14 PROCEDURE — 96365 THER/PROPH/DIAG IV INF INIT: CPT | Mod: INF

## 2024-05-14 PROCEDURE — 80202 ASSAY OF VANCOMYCIN: CPT

## 2024-05-14 PROCEDURE — 80053 COMPREHEN METABOLIC PANEL: CPT | Performed by: FAMILY MEDICINE

## 2024-05-14 PROCEDURE — 83605 ASSAY OF LACTIC ACID: CPT | Performed by: FAMILY MEDICINE

## 2024-05-14 PROCEDURE — 4500999001 HC ED NO CHARGE

## 2024-05-14 PROCEDURE — 2500000004 HC RX 250 GENERAL PHARMACY W/ HCPCS (ALT 636 FOR OP/ED): Mod: JZ,SE | Performed by: OTOLARYNGOLOGY

## 2024-05-14 PROCEDURE — 36415 COLL VENOUS BLD VENIPUNCTURE: CPT | Performed by: FAMILY MEDICINE

## 2024-05-14 PROCEDURE — 99213 OFFICE O/P EST LOW 20 MIN: CPT | Performed by: OTOLARYNGOLOGY

## 2024-05-14 RX ORDER — ALBUTEROL SULFATE 0.83 MG/ML
3 SOLUTION RESPIRATORY (INHALATION) AS NEEDED
OUTPATIENT
Start: 2024-05-15

## 2024-05-14 RX ORDER — DIPHENHYDRAMINE HYDROCHLORIDE 50 MG/ML
50 INJECTION INTRAMUSCULAR; INTRAVENOUS AS NEEDED
Status: DISCONTINUED | OUTPATIENT
Start: 2024-05-14 | End: 2024-05-14 | Stop reason: HOSPADM

## 2024-05-14 RX ORDER — VANCOMYCIN 1.25 G/250ML
1750 INJECTION, SOLUTION INTRAVENOUS ONCE
OUTPATIENT
Start: 2024-05-15 | End: 2024-05-15

## 2024-05-14 RX ORDER — DIPHENHYDRAMINE HYDROCHLORIDE 50 MG/ML
50 INJECTION INTRAMUSCULAR; INTRAVENOUS AS NEEDED
OUTPATIENT
Start: 2024-05-15

## 2024-05-14 RX ORDER — VANCOMYCIN 1.25 G/250ML
1750 INJECTION, SOLUTION INTRAVENOUS ONCE
Qty: 350 ML | Refills: 0 | Status: COMPLETED | OUTPATIENT
Start: 2024-05-14 | End: 2024-05-14

## 2024-05-14 RX ORDER — EPINEPHRINE 0.3 MG/.3ML
0.3 INJECTION SUBCUTANEOUS EVERY 5 MIN PRN
Status: DISCONTINUED | OUTPATIENT
Start: 2024-05-14 | End: 2024-05-14 | Stop reason: HOSPADM

## 2024-05-14 RX ORDER — FAMOTIDINE 10 MG/ML
20 INJECTION INTRAVENOUS ONCE AS NEEDED
Status: DISCONTINUED | OUTPATIENT
Start: 2024-05-14 | End: 2024-05-14 | Stop reason: HOSPADM

## 2024-05-14 RX ORDER — VANCOMYCIN 1.25 G/250ML
1750 INJECTION, SOLUTION INTRAVENOUS ONCE
Qty: 350 ML | Refills: 0 | Status: DISPENSED | OUTPATIENT
Start: 2024-05-14

## 2024-05-14 RX ORDER — ALBUTEROL SULFATE 0.83 MG/ML
3 SOLUTION RESPIRATORY (INHALATION) AS NEEDED
Status: DISCONTINUED | OUTPATIENT
Start: 2024-05-14 | End: 2024-05-14 | Stop reason: HOSPADM

## 2024-05-14 RX ORDER — FAMOTIDINE 10 MG/ML
20 INJECTION INTRAVENOUS ONCE AS NEEDED
OUTPATIENT
Start: 2024-05-15

## 2024-05-14 RX ORDER — POTASSIUM CHLORIDE 20 MEQ/1
20 TABLET, EXTENDED RELEASE ORAL ONCE
Status: DISCONTINUED | OUTPATIENT
Start: 2024-05-14 | End: 2024-05-14 | Stop reason: HOSPADM

## 2024-05-14 RX ORDER — EPINEPHRINE 0.3 MG/.3ML
0.3 INJECTION SUBCUTANEOUS EVERY 5 MIN PRN
OUTPATIENT
Start: 2024-05-15

## 2024-05-14 RX ADMIN — VANCOMYCIN 1750 MG: 1.25 INJECTION, SOLUTION INTRAVENOUS at 08:10

## 2024-05-14 ASSESSMENT — PATIENT HEALTH QUESTIONNAIRE - PHQ9
1. LITTLE INTEREST OR PLEASURE IN DOING THINGS: NOT AT ALL
2. FEELING DOWN, DEPRESSED OR HOPELESS: NOT AT ALL
SUM OF ALL RESPONSES TO PHQ9 QUESTIONS 1 & 2: 0

## 2024-05-14 ASSESSMENT — PAIN - FUNCTIONAL ASSESSMENT: PAIN_FUNCTIONAL_ASSESSMENT: 0-10

## 2024-05-14 ASSESSMENT — PAIN SCALES - GENERAL
PAINLEVEL_OUTOF10: 10 - WORST POSSIBLE PAIN
PAINLEVEL: 10-WORST PAIN EVER

## 2024-05-14 ASSESSMENT — PAIN DESCRIPTION - LOCATION: LOCATION: NECK

## 2024-05-14 NOTE — PROGRESS NOTES
After transferring pt to ER, Dr Cisse sent me a new message stating the pt had recent CT with no changes so instead of ER, he wants the pt to come to his office.  The pt was brought back to our unit and we called for his ride so he could go to Dr Cisse's office at this time.

## 2024-05-14 NOTE — ED PROVIDER NOTES
HPI   Chief Complaint   Patient presents with    Mass     Neck mass x2 weeks. Seen recently in ED, ID team consulted. Pt receiving antibiotics returned to ED with c/o worsening pain.Vital signs stable. No s/s of distress       HPI  Patient is a 53-year-old male presenting to the ED today for right neck pain.  Patient has a right-sided neck mass for which she is being currently treated outpatient by Dr. Cisse, ENT.  He comes in to the hospital for Vancomycin and Zosyn infusions twice a day.  He has been seen here in the ED three times over the past 2 weeks for the same neck mass and pain control.  He has received multiple forms of imaging over this time.  Today, patient returns to the ED for continued neck pain.  He was last in the ED 4 days ago and was prescribed 9 Norco pills at that time.  He he states that he has since run out and he is again having pain.  He has not yet followed up with his PCP or ENT.  He otherwise denies any other changes to his neck mass, including increasing size or fever.                  Bannister Coma Scale Score: 15                     Patient History   Past Medical History:   Diagnosis Date    Acute upper respiratory infection, unspecified 03/20/2015    Acute upper respiratory infection    Alcohol abuse, in remission 02/12/2015    History of ETOH abuse    Alcohol dependence, in remission (Multi) 05/07/2015    Alcohol dependence in remission    Allergic contact dermatitis due to plants, except food 05/21/2014    Contact dermatitis due to poison ivy    Allergy status to unspecified drugs, medicaments and biological substances 08/29/2013    History of allergy    Anxiety disorder, unspecified 03/20/2015    Anxiety    Bipolar disorder, current episode manic without psychotic features, unspecified (Multi)     Bipolar disorder, current episode manic without psychotic features    Cervicalgia     Cervicalgia    Chronic obstructive pulmonary disease, unspecified (Multi) 12/02/2014    Chronic  asthmatic bronchitis    Encounter for immunization 09/22/2016    Flu vaccine need    Hypertension     Other cervical disc degeneration, unspecified cervical region     Degeneration of cervical intervertebral disc    Other conditions influencing health status 06/27/2013    Acute Inflammation Of The Orbit    Other long term (current) drug therapy 06/02/2015    High risk medication use    Other specified health status     No pertinent past surgical history    Personal history of nicotine dependence 08/16/2017    History of tobacco abuse    Personal history of nicotine dependence 06/26/2017    History of nicotine dependence    Personal history of other diseases of the digestive system 04/11/2013    History of gastroenteritis    Personal history of other diseases of the digestive system 08/27/2015    History of esophageal reflux    Personal history of other diseases of the respiratory system 03/20/2017    History of sore throat    Personal history of other diseases of the respiratory system 02/05/2014    History of sinusitis    Personal history of other diseases of the respiratory system 01/16/2014    History of allergic rhinitis    Personal history of other diseases of the respiratory system 09/26/2013    History of acute sinusitis    Personal history of other diseases of the respiratory system 06/26/2014    History of allergic rhinitis    Personal history of other infectious and parasitic diseases     History of hepatitis C    Personal history of other mental and behavioral disorders     History of depression    Personal history of other specified conditions 08/25/2016    History of dizziness    Personal history of other specified conditions 02/16/2017    History of abdominal pain    Unspecified asthma, uncomplicated ([facility]) 02/13/2014    Asthmatic bronchitis     Past Surgical History:   Procedure Laterality Date    CT ABDOMEN PELVIS ANGIOGRAM W AND/OR WO IV CONTRAST  4/1/2020    CT ABDOMEN PELVIS ANGIOGRAM W AND/OR  WO IV CONTRAST 4/1/2020 [facility]    INVASIVE VASCULAR PROCEDURE N/A 11/8/2023    Procedure: Pulmonary Angiogram;  Surgeon: Surya Templeton MD;  Location: [facility];  Service: Cardiovascular;  Laterality: N/A;    MR HEAD ANGIO WO IV CONTRAST  1/24/2016    MR HEAD ANGIO WO IV CONTRAST 1/24/2016 [facility]    MR NECK ANGIO WO IV CONTRAST  1/24/2016    MR NECK ANGIO WO IV CONTRAST 1/24/2016 [facility]     No family history on file.  Social History     Tobacco Use    Smoking status: Every Day     Current packs/day: 0.50     Types: Cigarettes    Smokeless tobacco: Never   Substance Use Topics    Alcohol use: Not Currently    Drug use: Never       Physical Exam   ED Triage Vitals [05/14/24 0641]   Temperature Heart Rate Respirations BP   36.5 °C (97.7 °F) 77 16 (!) 145/91      Pulse Ox Temp Source Heart Rate Source Patient Position   95 % Oral Monitor Lying      BP Location FiO2 (%)     Left arm --       Physical Exam  Vitals and nursing note reviewed.   Constitutional:       General: He is not in acute distress.     Appearance: He is not toxic-appearing.   HENT:      Head: Normocephalic.      Comments: Right anterior cervical lymphadenopathy     Mouth/Throat:      Mouth: Mucous membranes are moist.   Eyes:      Extraocular Movements: Extraocular movements intact.      Conjunctiva/sclera: Conjunctivae normal.   Cardiovascular:      Rate and Rhythm: Normal rate and regular rhythm.      Pulses: Normal pulses.   Pulmonary:      Effort: Pulmonary effort is normal. No respiratory distress.      Breath sounds: Normal breath sounds. No wheezing.   Abdominal:      Palpations: Abdomen is soft.   Musculoskeletal:         General: No swelling.      Cervical back: Neck supple.   Skin:     General: Skin is warm and dry.      Capillary Refill: Capillary refill takes less than 2 seconds.   Neurological:      General: No focal deficit present.      Mental Status: He is alert. Mental status is at  "baseline.         ED Course & MDM   Diagnoses as of 05/14/24 0729   Hypokalemia   Neck pain on right side       Medical Decision Making  Patient was seen and evaluated for worsening neck pain on the right side.  Patient has a known neck mass for which she is currently receiving Zosyn and vancomycin twice a day outpatient.  He has been in the ED multiple times, and is currently being managed by ENT.  I reviewed patient's recent ED visits and imaging regarding his neck mass.  Most recent imaging was 6 days ago.    IMPRESSION:  * Unchanged right cervical adenopathy.  *Consider lymphoma  *Consider infectious, neoplastic or granulomatous causes.    Repeat lab work today shows mild hypokalemia with potassium of 3.3, otherwise unremarkable.  This is replaced with 20 mill equivalents KCl orally.  Lactic acid is normal at 1.6.  Patient has no leukocytosis.    At time of my evaluation, patient states that \"the nurse has everyone brainwashed and I don't want to be here anymore.\"  When I asked for clarification, patient states that he just wants to go upstairs for his infusion appointment in 30 minutes.  When asked about patient's pain management, he stated \"I just want to leave, just send me upstairs.\"  Patient was informed of their lab results, and all questions and concerns were answered.  He accepted the 20 mEq Kcl PO for his hypokalemia.  We discussed appropriate methods of outpatient pain management, and I emphasized following up with his PCP and ENT, as he does not know when any of his appointments are. Discharge planning with close outpatient follow-up was discussed at this time, to which the patient was agreeable. Strict return precautions were given, and patient was discharged in stable condition.    Procedure  Procedures     Leila MURILLO MD  05/14/24 0739    "

## 2024-05-14 NOTE — PROGRESS NOTES
"History Of Present Illness    05.14.2024: He comes for follow up. He is receiving IV vancomycin and zosyn as outpatient. Today is day 3. He has severe right neck pain. On examination, the swelling in his right neck seems to have progressed to right anterosuperior part of his neck. No fluctuation on palpation, but there is tenderness. No respiratory distress but he looks pale and exhausted. Feels he is getting worse.     My impression, patient needs to be admitted to a tertiary care hospital for further evaluation and treatment. He does not seem to be responding to IV antibiotics. Since his primary hospital is [facility], he had treatment for larynx ca previously with them, he can go to [facility] ER. If they don't admit and transfer him, than we can initiate his transfer through [facility] ER.     _________________________________________________________________    Gaurav Mckay is a 53 y.o. male presenting with: \"Lump in throat\".  He is kindly referred by Garfield Eaton MD.     He has noticed a lump at his right neck for the past 3-4 days. He received IV antibiotics for 2 days and now on oral antibiotics.     He has hoarseness of voice, it comes and goes. 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 He is an active smoker.     Flexible endoscope was advanced through patient's left nasal cavity.  I have not noted any mass or lesion at nasopharynx.  There are dried yellowish secretions at pharynx, and the dried yellowish purulent secretions over the petiole of the epiglottis, at anterior commissure and over left vocal cord.    Patient has a firm tender swelling at the right level 3.    My impression, patient has destruction of mucus producing glands in his throat (due to RT), he developed bacterial pharyngolaryngitis and cervical lymphadenitis.    Plan  1-outpatient IV antibiotics  2-follow-up in 10 days  3-if the right neck infection-swelling fails to resolve, then we can consider needle aspiration biopsy of right cervical " lymph node, considering his history of larynx SCC.     Past Medical History  He has a past medical history of Acute upper respiratory infection, unspecified (03/20/2015), Alcohol abuse, in remission (02/12/2015), Alcohol dependence, in remission (Multi) (05/07/2015), Allergic contact dermatitis due to plants, except food (05/21/2014), Allergy status to unspecified drugs, medicaments and biological substances (08/29/2013), Anxiety disorder, unspecified (03/20/2015), Bipolar disorder, current episode manic without psychotic features, unspecified (Multi), Cervicalgia, Chronic obstructive pulmonary disease, unspecified (Multi) (12/02/2014), Encounter for immunization (09/22/2016), Hypertension, Other cervical disc degeneration, unspecified cervical region, Other conditions influencing health status (06/27/2013), Other long term (current) drug therapy (06/02/2015), Other specified health status, Personal history of nicotine dependence (08/16/2017), Personal history of nicotine dependence (06/26/2017), Personal history of other diseases of the digestive system (04/11/2013), Personal history of other diseases of the digestive system (08/27/2015), Personal history of other diseases of the respiratory system (03/20/2017), Personal history of other diseases of the respiratory system (02/05/2014), Personal history of other diseases of the respiratory system (01/16/2014), Personal history of other diseases of the respiratory system (09/26/2013), Personal history of other diseases of the respiratory system (06/26/2014), Personal history of other infectious and parasitic diseases, Personal history of other mental and behavioral disorders, Personal history of other specified conditions (08/25/2016), Personal history of other specified conditions (02/16/2017), and Unspecified asthma, uncomplicated ([facility]) (02/13/2014).    Surgical History  He has a past surgical history that includes MR angio head wo IV contrast (1/24/2016); MR  angio neck wo IV contrast (1/24/2016); CT angio abdomen pelvis w and or wo IV IV contrast (4/1/2020); and Invasive Vascular Procedure (N/A, 11/8/2023).     Social History  He reports that he has been smoking cigarettes. He has never used smokeless tobacco. He reports that he does not currently use alcohol. He reports that he does not use drugs.    Family History  No family history on file.     Allergies  Oxycodone    Review of Systems   Feeling poorly  Feeling tired  Eye pain  Vertigo  Sore throat  Hoarseness  Dry mouth/mouth breathing  Dizziness upon standing  Shortness of breath  Nausea  Itching  Fainting  Headache  Sleep disturbance  Anxiety  Depression  Increased thirst     Physical Exam    General appearance: Healthy-appearing, well-nourished, well groomed, in no acute distress.     Head and Face: Atraumatic with no masses, lesions, or scarring.      Salivary glands: No tenderness of the parotid glands or parotid masses.     No tenderness of the submandibular glands or submandibular masses.      Facial strength: Normal strength and symmetry, no synkinesis or facial tic.     Eyes: Conjunctivas look non-hyperemic bilaterally    Ears: Bilaterally ear canals look normal. Tympanic membranes look intact, no hyperemia, fluid or retraction. Hearing grossly normal.      Nose: Mucosa looks normal. No purulent discharge. Septum essentially straight.     Oral Cavity/Mouth: Lips and tongue look normal.     Throat: No postnasal discharge. No tonsil hypertrophy. No hyperemia.    Neck: Symmetrical, trachea midline.     Pulmonary: Normal respiratory effort.     Lymphatic: No palpable pathologic lymph nodes at neck.     Neurological/Psychiatric Orientation to person, place, and time: Normal.     Mood and affect: Normal.      Extremities: No clubbing.     Skin: No significant skin lesions were noted at face or neck        Procedure  FLEXIBLE LARYNGOSCOPY 05.07.2024  Flexible endoscope was advanced through patient's left nasal  cavity.  I have not noted any mass or lesion at nasopharynx.  There are dried yellowish secretions at pharynx, and the dried yellowish purulent secretions over the petiole of the epiglottis, at anterior commissure and over left vocal cord.       Last Recorded Vitals  There were no vitals taken for this visit.    Relevant Results  CT SOFT TISSUE NECK W IV CONTRAST;  5/2/2024 3:40 pm      INDICATION:  Signs/Symptoms: right neck mass.      COMPARISON:  None.      ACCESSION NUMBER(S):  [id number]      ORDERING CLINICIAN:  NELLY CABELLO      TECHNIQUE:  An external marker was placed in the area of concern and axial CT  images of the neck were obtained.  The patient received 75 mL  Omnipaque 350 intravenous contrast agent. The images were reformatted  in angled axial, coronal and sagittal planes.      FINDINGS:  A heterogeneously enhancing nodule in the area of concern at level 3  on the right measures up to 2.6 x 3.6 x 3.5 cm (axial image 72/142 and  coronal image 64). There is extensive fluid stranding in the adjacent  fat, including the right parapharyngeal space and associated  thickening of the skin and platysma. Multiple additional enlarged  lymph nodes are noted at levels 3-5 on the right. There is mass  effect on the right internal jugular vein with associated loss of  enhancement, no filling defect is identified in the opacified  segments of the vein. There is also mass effect on the right  sternocleidomastoid without definite invasion. No fluid collection is  identified.      The patient is edentulous, the oral cavity is otherwise unremarkable.  The pharynx and larynx are within normal limits. The thyroid gland is  unremarkable. The major salivary glands are within normal limits. No  prevertebral swelling. The major cervical vessels are otherwise  unremarkable.      Chronic changes in the right maxillary sinus with scattered paranasal  sinus mucosal thickening. Left mastoid effusion. Orbits and  visualized  "intracranial contents are unremarkable. There is mild  biapical scarring with additional areas of scarring or atelectasis  dependently right greater than left. The visualized lungs are  otherwise clear. Degenerative changes in the spine.      IMPRESSION:  Right cervical lymphadenopathy, most pronounced at level 3, with  extensive inflammatory changes in the adjacent soft tissues. No  associated fluid collection is identified. This may represent severe  lymphadenitis, however given the patient's age and the degree of  katia enlargement attention on follow-up is recommended.      MACRO:  Raul Pickering discussed the significance and urgency of this  critical finding by telephone with  NELLY CABELLO on 5/2/2024 at 4:23  pm.  (**-RCF-**) Findings:  See findings.      Signed by: Raul Raheel 5/2/2024 4:25 PM  Dictation workstation:   [id number]    Assessment and Plan:    05.14.2024: He comes for follow up. He is receiving IV vancomycin and zosyn as outpatient. Today is day 3. He has severe right neck pain. On examination, the swelling in his right neck seems to have progressed to right anterosuperior part of his neck. No fluctuation on palpation, but there is tenderness. No respiratory distress but he looks pale and exhausted. Feels he is getting worse.     My impression, patient needs to be admitted to a tertiary care hospital for further evaluation and treatment. He does not seem to be responding to IV antibiotics. Since his primary hospital is [facility], he had treatment for larynx ca previously with them, he can go to [facility] ER. If they don't admit and transfer him, than we can initiate his transfer through [facility] ER.     _________________________________________________________________    Gaurav Mckay is a 53 y.o. male presenting with: \"Lump in throat\".  He is kindly referred by Garfield Eaton MD.     He has noticed a lump at his right neck for the past 3-4 days. He received IV antibiotics for 2 days and now on " oral antibiotics.     He has hoarseness of voice, it comes and goes. 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 He is an active smoker.     Flexible endoscope was advanced through patient's left nasal cavity.  I have not noted any mass or lesion at nasopharynx.  There are dried yellowish secretions at pharynx, and the dried yellowish purulent secretions over the petiole of the epiglottis, at anterior commissure and over left vocal cord.    Patient has a firm tender swelling at the right level 3.    My impression, patient has destruction of mucus producing glands in his throat (due to RT), he developed bacterial pharyngolaryngitis and cervical lymphadenitis.    Plan  1-outpatient IV antibiotics  2-follow-up in 10 days  3-if the right neck infection-swelling fails to resolve, then we can consider needle aspiration biopsy of right cervical lymph node, considering his history of larynx SCC.      _________________________________________________________________  Smith Cisse  Otolaryngology - Head & Neck Surgery

## 2024-05-14 NOTE — NURSING NOTE
Unable to sign off medications for patient. Spoke with [facility] pharmacy and they were unable to fix MAR medications were read only in the MAR. Spoke with on call pharmacist Priscilla Lewis able to come to hospital and place orders for patient

## 2024-05-14 NOTE — PROGRESS NOTES
Pt came in this am after being in the ER for pain, he was scheduled for his antibiotics however he was complaining of 10/10 pain so I notified Dr Cisse.  No pain meds were ordered, however Dr cisse stated he wanted to see the pt after meds were given.  Gaurav came up with 22g in right AC and 18g in left AC.  The right AC was a occluded, but the left IV was functioning, so I started his vanco after drawing his vanco trough. After partial treatment was given the IV infiltrated.  After 4 attempts to get another, I notified Dr Cisse of the situation.  I told him it would be wise to place a midline or PICC after he evaluates the pt.  With this conversation, Dr Cisse advised pt go to ER for CT of his neck to see if there was progressive changes.  I called Dr Smith and gave her report and the pt was transferred to ER via .

## 2024-05-15 ENCOUNTER — APPOINTMENT (OUTPATIENT)
Dept: HEMATOLOGY/ONCOLOGY | Facility: HOSPITAL | Age: 54
End: 2024-05-15
Payer: COMMERCIAL

## 2024-05-16 ENCOUNTER — APPOINTMENT (OUTPATIENT)
Dept: HEMATOLOGY/ONCOLOGY | Facility: HOSPITAL | Age: 54
End: 2024-05-16
Payer: COMMERCIAL

## 2024-05-17 ENCOUNTER — APPOINTMENT (OUTPATIENT)
Dept: HEMATOLOGY/ONCOLOGY | Facility: HOSPITAL | Age: 54
End: 2024-05-17
Payer: COMMERCIAL

## 2024-05-18 ENCOUNTER — APPOINTMENT (OUTPATIENT)
Dept: HEMATOLOGY/ONCOLOGY | Facility: HOSPITAL | Age: 54
End: 2024-05-18
Payer: COMMERCIAL

## 2024-05-19 ENCOUNTER — APPOINTMENT (OUTPATIENT)
Dept: HEMATOLOGY/ONCOLOGY | Facility: HOSPITAL | Age: 54
End: 2024-05-19
Payer: COMMERCIAL

## 2024-05-20 ENCOUNTER — APPOINTMENT (OUTPATIENT)
Dept: HEMATOLOGY/ONCOLOGY | Facility: HOSPITAL | Age: 54
End: 2024-05-20
Payer: COMMERCIAL

## 2024-06-08 ENCOUNTER — NURSING HOME VISIT (OUTPATIENT)
Dept: POST ACUTE CARE | Facility: EXTERNAL LOCATION | Age: 54
End: 2024-06-08
Payer: COMMERCIAL

## 2024-06-08 DIAGNOSIS — E78.49 OTHER HYPERLIPIDEMIA: ICD-10-CM

## 2024-06-08 DIAGNOSIS — R22.1 NECK MASS: ICD-10-CM

## 2024-06-08 DIAGNOSIS — J44.9 CHRONIC OBSTRUCTIVE PULMONARY DISEASE, UNSPECIFIED COPD TYPE (MULTI): ICD-10-CM

## 2024-06-08 DIAGNOSIS — R53.1 GENERALIZED WEAKNESS: ICD-10-CM

## 2024-06-08 DIAGNOSIS — I10 HYPERTENSION, UNSPECIFIED TYPE: ICD-10-CM

## 2024-06-08 DIAGNOSIS — R33.9 URINARY RETENTION: ICD-10-CM

## 2024-06-08 DIAGNOSIS — Z86.711 HISTORY OF PULMONARY EMBOLUS (PE): ICD-10-CM

## 2024-06-08 DIAGNOSIS — F10.11 HISTORY OF ETOH ABUSE: ICD-10-CM

## 2024-06-08 PROCEDURE — 99305 1ST NF CARE MODERATE MDM 35: CPT | Performed by: INTERNAL MEDICINE

## 2024-06-08 NOTE — LETTER
Patient: Gaurav Mckay  : 1970    Encounter Date: 2024    Name: Gaurav Mckay  : 1970  MRN: [id number]  Visit Date: 2024  Chief Complaint: HISTORY AND PHYSICAL    HPI: Gaurav Mckay is 54 y.o. gentleman with PMH remarkable for laryngeal cancer, GERD, HTN, HLD, bilateral PE, COPD, history of drug abuse and ETOH who presented to [facility] main ER on 05/15/24 with R cervical lymph node adenopathy. Per hospital notes, he was previously treated for infectious process with no improvement. Upon admission, he was started on IV Zosyn. His hospitalization was complicated by an ICU stay for hypercarbia iso untreated HANY. Per discharge summary, while in the ICU he was placed on CPAP with improvement and zosyn was stopped.  Palliative medicine was consulted for pain control and he was started on Suboxone with adequate control.  He was transferred back to the [facility] and RCLN bx was attempted on , however pt became combative and it was instead completed on  under general anesthesia. MRI brain was obtained due to AMS which was negative for any acute abnormalities.  PET scan was done as well and showed right level II-III hypermetabolic katia conglomerate measuring up to 5.9 cm.  At least 5 other FDG avid cervical lymph nodes . Per hospital notes, given his history of ETOH, folic acid/vitamin b12 and thiamine levels were obtained and low, and he was started on replacement therapy. A anders catheter was placed for urinary retention, UA showed concerns for UTI and Bactrim was started but then stopped due to no growth on culture.  Anders was successfully removed on  without signs of further retention. Infectious work-up was sent d/t fever and concerns for sepsis which remained negative. He was started on IV Zosyn, but continued to have fevers. ID was consulted, recommendation to stop Zosyn, continue to follow cultures. He had an episode of blood in stool . Eliquis was then held and he was  "restarted on heparin gtt. Per hospital notes, he had no further episodes of bleeding, and was transitioned back to [facility] on 6/4. PT was consulted, and recommended SNF. He was transferred to [facility] on 06/09/24.  Per discharge summary: \"Plan for patient to f/u outpatient with Dr. Barahona and Dr. Mccracken on 6/7. Message sent to cancer answer to follow biopsy and assist in establishing with med onc. He will also follow-up with palliative medicine outpatient\".    Subjective: Seen and examined today. He is lying in bed awake in no acute distress. He denies any new issues or complaints.     The patient was counseled regarding diagnostic results, instructions for management, risk factor reductions, prognosis, patient and family education, impressions, risks and benefits of treatment options and importance of compliance with treatment. I have reviewed nursing notes since my last visit and document any significant changes Reviewed orders, medications, Labs. Reviewed chart looking at current medications, treatments, labs, x-rays etc.     ROS:  As above in subjective. Otherwise, all other systems have been reviewed and are negative for complaint.    Medications:  Medications reviewed and verified in NH chart.     Past Medical/Surgical History:  Past Medical History:   Diagnosis Date   • Acute upper respiratory infection, unspecified 03/20/2015    Acute upper respiratory infection   • Alcohol abuse, in remission 02/12/2015    History of ETOH abuse   • Alcohol dependence, in remission (Multi) 05/07/2015    Alcohol dependence in remission   • Allergic contact dermatitis due to plants, except food 05/21/2014    Contact dermatitis due to poison ivy   • Allergy status to unspecified drugs, medicaments and biological substances 08/29/2013    History of allergy   • Anxiety disorder, unspecified 03/20/2015    Anxiety   • Bipolar disorder, current episode manic without psychotic features, unspecified (Multi)     Bipolar " disorder, current episode manic without psychotic features   • Cervicalgia     Cervicalgia   • Chronic obstructive pulmonary disease, unspecified (Multi) 12/02/2014    Chronic asthmatic bronchitis   • Encounter for immunization 09/22/2016    Flu vaccine need   • Hypertension    • Other cervical disc degeneration, unspecified cervical region     Degeneration of cervical intervertebral disc   • Other conditions influencing health status 06/27/2013    Acute Inflammation Of The Orbit   • Other long term (current) drug therapy 06/02/2015    High risk medication use   • Other specified health status     No pertinent past surgical history   • Personal history of nicotine dependence 08/16/2017    History of tobacco abuse   • Personal history of nicotine dependence 06/26/2017    History of nicotine dependence   • Personal history of other diseases of the digestive system 04/11/2013    History of gastroenteritis   • Personal history of other diseases of the digestive system 08/27/2015    History of esophageal reflux   • Personal history of other diseases of the respiratory system 03/20/2017    History of sore throat   • Personal history of other diseases of the respiratory system 02/05/2014    History of sinusitis   • Personal history of other diseases of the respiratory system 01/16/2014    History of allergic rhinitis   • Personal history of other diseases of the respiratory system 09/26/2013    History of acute sinusitis   • Personal history of other diseases of the respiratory system 06/26/2014    History of allergic rhinitis   • Personal history of other infectious and parasitic diseases     History of hepatitis C   • Personal history of other mental and behavioral disorders     History of depression   • Personal history of other specified conditions 08/25/2016    History of dizziness   • Personal history of other specified conditions 02/16/2017    History of abdominal pain   • Unspecified asthma, uncomplicated ([facility])  02/13/2014    Asthmatic bronchitis       Past Surgical History:   Procedure Laterality Date   • CT ABDOMEN PELVIS ANGIOGRAM W AND/OR WO IV CONTRAST  4/1/2020    CT ABDOMEN PELVIS ANGIOGRAM W AND/OR WO IV CONTRAST 4/1/2020 [facility]   • INVASIVE VASCULAR PROCEDURE N/A 11/8/2023    Procedure: Pulmonary Angiogram;  Surgeon: Surya Templeton MD;  Location: [facility] Cardiac Cath Lab;  Service: Cardiovascular;  Laterality: N/A;   • MR HEAD ANGIO WO IV CONTRAST  1/24/2016    MR HEAD ANGIO WO IV CONTRAST 1/24/2016 [facility]   • MR NECK ANGIO WO IV CONTRAST  1/24/2016    MR NECK ANGIO WO IV CONTRAST 1/24/2016 [facility]     No family history on file.    Social History     Tobacco Use   • Smoking status: Every Day     Current packs/day: 0.50     Types: Cigarettes   • Smokeless tobacco: Never   Substance Use Topics   • Alcohol use: Not Currently       Allergies   Allergen Reactions   • Oxycodone Itching      Vital Signs:   Vital Signs were reviewed in nursing home documentation.    Physical Exam  Vitals reviewed.   Constitutional:       Appearance: Normal appearance.   HENT:      Head: Normocephalic and atraumatic.   Cardiovascular:      Rate and Rhythm: Normal rate and regular rhythm.      Pulses: Normal pulses.      Heart sounds: Normal heart sounds.   Pulmonary:      Effort: Pulmonary effort is normal.      Breath sounds: Normal breath sounds.   Abdominal:      General: Bowel sounds are normal.      Palpations: Abdomen is soft.   Musculoskeletal:         General: Normal range of motion.   Skin:     General: Skin is warm and dry.   Neurological:      General: No focal deficit present.      Mental Status: He is alert and oriented to person, place, and time.   Psychiatric:         Mood and Affect: Mood normal.         Behavior: Behavior normal.     Results/Data:   Lab Results   Component Value Date    WBC 11.1 05/14/2024    HGB 13.8 05/14/2024    HCT 42.0 05/14/2024     05/14/2024    TRIG 256  (H) 02/27/2019    ALT 17 05/14/2024    AST 12 05/14/2024     05/14/2024    K 3.3 (L) 05/14/2024     05/14/2024    CREATININE 1.06 05/14/2024    BUN 10 05/14/2024    CO2 21 05/14/2024    INR 1.2 (H) 04/02/2022    HGBA1C 6.1 (H) 05/17/2024     Results were reviewed and addressed accordingly. Lab Results were also reviewed in [facility].     Provider Impression:   Problem List Items Addressed This Visit             ICD-10-CM    Neck mass R22.1     LN biopsy with general anesthesia on 5/31  Wound/abscess culture negative   Surgical pathology showed: Invasive moderately to poorly differentiated squamous cell carcinoma, involving fibrous tissue.   PET on 5/24 with Right level II-III hypermetabolic katia conglomerate measuring up to 5.9 cm.  At least 5 other FDG avid cervical lymph nodes   Palliative medicine was consulted in hospital  Continue with pain medication, was placed on Subaxone in hospital  Follow up with oncology         COPD (chronic obstructive pulmonary disease) (Multi) J44.9     Seems to be stable  Continue with inhalers         Urinary retention R33.9     He had anders inserted during inpatient stay  Continue with Flomax  Monitor urination         History of ETOH abuse F10.11     He had agitation in hospital  Continue with Seroquel  Continue with thiamine and folic acid         History of pulmonary embolus (PE) Z86.711     Continue with Eliquis         HTN (hypertension) I10     Continue with Metoprolol  Monitor BP         Other hyperlipidemia E78.49     C/w statin         Generalized weakness R53.1     Consult PT/OT          ----------------  Written by Andreina Atwood LPN, acting as a scribe for Dr. Gurrola. This note accurately reflects the work and decisions made by Dr. Gurrola.     I, Dr. Gurrola, attest all medical record entries made by the scribe were under my direction and were personally dictated by me. I have reviewed the chart and agree that the record accurately reflects my performance of the  history, physical exam, and assessment and plan.         Electronically Signed By: Caterina Guthrie MD   7/9/24 11:04 AM

## 2024-06-24 ENCOUNTER — APPOINTMENT (OUTPATIENT)
Dept: NEUROLOGY | Facility: CLINIC | Age: 54
End: 2024-06-24
Payer: COMMERCIAL

## 2024-07-09 PROBLEM — R53.1 GENERALIZED WEAKNESS: Status: ACTIVE | Noted: 2024-07-09

## 2024-07-09 PROBLEM — I10 HTN (HYPERTENSION): Status: ACTIVE | Noted: 2024-07-09

## 2024-07-09 PROBLEM — E78.49 OTHER HYPERLIPIDEMIA: Status: ACTIVE | Noted: 2024-07-09

## 2024-07-09 PROBLEM — F10.11 HISTORY OF ETOH ABUSE: Status: ACTIVE | Noted: 2024-07-09

## 2024-07-09 PROBLEM — J44.9 COPD (CHRONIC OBSTRUCTIVE PULMONARY DISEASE) (MULTI): Status: ACTIVE | Noted: 2024-07-09

## 2024-07-09 PROBLEM — R33.9 URINARY RETENTION: Status: ACTIVE | Noted: 2024-07-09

## 2024-07-09 PROBLEM — K21.9 GASTROESOPHAGEAL REFLUX DISEASE WITHOUT ESOPHAGITIS: Status: ACTIVE | Noted: 2024-07-09

## 2024-07-09 PROBLEM — Z86.711 HISTORY OF PULMONARY EMBOLUS (PE): Status: ACTIVE | Noted: 2024-07-09

## 2024-07-09 NOTE — ASSESSMENT & PLAN NOTE
LN biopsy with general anesthesia on 5/31  Wound/abscess culture negative   Surgical pathology showed: Invasive moderately to poorly differentiated squamous cell carcinoma, involving fibrous tissue.   PET on 5/24 with Right level II-III hypermetabolic katia conglomerate measuring up to 5.9 cm.  At least 5 other FDG avid cervical lymph nodes   Palliative medicine was consulted in hospital  Continue with pain medication, was placed on Subaxone in hospital  Follow up with oncology

## 2024-07-09 NOTE — PROGRESS NOTES
Name: Gaurav Mckay  : 1970  MRN: [id number]  Visit Date: 2024  Chief Complaint: HISTORY AND PHYSICAL    HPI: Gaurav Mckay is 54 y.o. gentleman with PMH remarkable for laryngeal cancer, GERD, HTN, HLD, bilateral PE, COPD, history of drug abuse and ETOH who presented to [facility] main ER on 05/15/24 with R cervical lymph node adenopathy. Per hospital notes, he was previously treated for infectious process with no improvement. Upon admission, he was started on IV Zosyn. His hospitalization was complicated by an ICU stay for hypercarbia iso untreated HANY. Per discharge summary, while in the ICU he was placed on CPAP with improvement and zosyn was stopped.  Palliative medicine was consulted for pain control and he was started on Suboxone with adequate control.  He was transferred back to the [facility] and RCLN bx was attempted on , however pt became combative and it was instead completed on  under general anesthesia. MRI brain was obtained due to AMS which was negative for any acute abnormalities.  PET scan was done as well and showed right level II-III hypermetabolic katia conglomerate measuring up to 5.9 cm.  At least 5 other FDG avid cervical lymph nodes . Per hospital notes, given his history of ETOH, folic acid/vitamin b12 and thiamine levels were obtained and low, and he was started on replacement therapy. A anders catheter was placed for urinary retention, UA showed concerns for UTI and Bactrim was started but then stopped due to no growth on culture.  Anders was successfully removed on  without signs of further retention. Infectious work-up was sent d/t fever and concerns for sepsis which remained negative. He was started on IV Zosyn, but continued to have fevers. ID was consulted, recommendation to stop Zosyn, continue to follow cultures. He had an episode of blood in stool . Eliquis was then held and he was restarted on heparin gtt. Per hospital notes, he had no further episodes of  "bleeding, and was transitioned back to [facility] on 6/4. PT was consulted, and recommended SNF. He was transferred to [facility] on 06/09/24.  Per discharge summary: \"Plan for patient to f/u outpatient with Dr. Barahona and Dr. Mccrackne on 6/7. Message sent to cancer answer to follow biopsy and assist in establishing with med onc. He will also follow-up with palliative medicine outpatient\".    Subjective: Seen and examined today. He is lying in bed awake in no acute distress. He denies any new issues or complaints.     The patient was counseled regarding diagnostic results, instructions for management, risk factor reductions, prognosis, patient and family education, impressions, risks and benefits of treatment options and importance of compliance with treatment. I have reviewed nursing notes since my last visit and document any significant changes Reviewed orders, medications, Labs. Reviewed chart looking at current medications, treatments, labs, x-rays etc.     ROS:  As above in subjective. Otherwise, all other systems have been reviewed and are negative for complaint.    Medications:  Medications reviewed and verified in NH chart.     Past Medical/Surgical History:  Past Medical History:   Diagnosis Date    Acute upper respiratory infection, unspecified 03/20/2015    Acute upper respiratory infection    Alcohol abuse, in remission 02/12/2015    History of ETOH abuse    Alcohol dependence, in remission (Multi) 05/07/2015    Alcohol dependence in remission    Allergic contact dermatitis due to plants, except food 05/21/2014    Contact dermatitis due to poison ivy    Allergy status to unspecified drugs, medicaments and biological substances 08/29/2013    History of allergy    Anxiety disorder, unspecified 03/20/2015    Anxiety    Bipolar disorder, current episode manic without psychotic features, unspecified (Multi)     Bipolar disorder, current episode manic without psychotic features    Cervicalgia     Cervicalgia "    Chronic obstructive pulmonary disease, unspecified (Multi) 12/02/2014    Chronic asthmatic bronchitis    Encounter for immunization 09/22/2016    Flu vaccine need    Hypertension     Other cervical disc degeneration, unspecified cervical region     Degeneration of cervical intervertebral disc    Other conditions influencing health status 06/27/2013    Acute Inflammation Of The Orbit    Other long term (current) drug therapy 06/02/2015    High risk medication use    Other specified health status     No pertinent past surgical history    Personal history of nicotine dependence 08/16/2017    History of tobacco abuse    Personal history of nicotine dependence 06/26/2017    History of nicotine dependence    Personal history of other diseases of the digestive system 04/11/2013    History of gastroenteritis    Personal history of other diseases of the digestive system 08/27/2015    History of esophageal reflux    Personal history of other diseases of the respiratory system 03/20/2017    History of sore throat    Personal history of other diseases of the respiratory system 02/05/2014    History of sinusitis    Personal history of other diseases of the respiratory system 01/16/2014    History of allergic rhinitis    Personal history of other diseases of the respiratory system 09/26/2013    History of acute sinusitis    Personal history of other diseases of the respiratory system 06/26/2014    History of allergic rhinitis    Personal history of other infectious and parasitic diseases     History of hepatitis C    Personal history of other mental and behavioral disorders     History of depression    Personal history of other specified conditions 08/25/2016    History of dizziness    Personal history of other specified conditions 02/16/2017    History of abdominal pain    Unspecified asthma, uncomplicated ([facility]) 02/13/2014    Asthmatic bronchitis       Past Surgical History:   Procedure Laterality Date    CT ABDOMEN  PELVIS ANGIOGRAM W AND/OR WO IV CONTRAST  4/1/2020    CT ABDOMEN PELVIS ANGIOGRAM W AND/OR WO IV CONTRAST 4/1/2020 [facility]    INVASIVE VASCULAR PROCEDURE N/A 11/8/2023    Procedure: Pulmonary Angiogram;  Surgeon: Surya Templeton MD;  Location: [facility] Cardiac Cath Lab;  Service: Cardiovascular;  Laterality: N/A;    MR HEAD ANGIO WO IV CONTRAST  1/24/2016    MR HEAD ANGIO WO IV CONTRAST 1/24/2016 [facility]    MR NECK ANGIO WO IV CONTRAST  1/24/2016    MR NECK ANGIO WO IV CONTRAST 1/24/2016 [facility]     No family history on file.    Social History     Tobacco Use    Smoking status: Every Day     Current packs/day: 0.50     Types: Cigarettes    Smokeless tobacco: Never   Substance Use Topics    Alcohol use: Not Currently       Allergies   Allergen Reactions    Oxycodone Itching      Vital Signs:   Vital Signs were reviewed in nursing home documentation.    Physical Exam  Vitals reviewed.   Constitutional:       Appearance: Normal appearance.   HENT:      Head: Normocephalic and atraumatic.   Cardiovascular:      Rate and Rhythm: Normal rate and regular rhythm.      Pulses: Normal pulses.      Heart sounds: Normal heart sounds.   Pulmonary:      Effort: Pulmonary effort is normal.      Breath sounds: Normal breath sounds.   Abdominal:      General: Bowel sounds are normal.      Palpations: Abdomen is soft.   Musculoskeletal:         General: Normal range of motion.   Skin:     General: Skin is warm and dry.   Neurological:      General: No focal deficit present.      Mental Status: He is alert and oriented to person, place, and time.   Psychiatric:         Mood and Affect: Mood normal.         Behavior: Behavior normal.     Results/Data:   Lab Results   Component Value Date    WBC 11.1 05/14/2024    HGB 13.8 05/14/2024    HCT 42.0 05/14/2024     05/14/2024    TRIG 256 (H) 02/27/2019    ALT 17 05/14/2024    AST 12 05/14/2024     05/14/2024    K 3.3 (L) 05/14/2024      05/14/2024    CREATININE 1.06 05/14/2024    BUN 10 05/14/2024    CO2 21 05/14/2024    INR 1.2 (H) 04/02/2022    HGBA1C 6.1 (H) 05/17/2024     Results were reviewed and addressed accordingly. Lab Results were also reviewed in [facility].     Provider Impression:   Problem List Items Addressed This Visit             ICD-10-CM    Neck mass R22.1     LN biopsy with general anesthesia on 5/31  Wound/abscess culture negative   Surgical pathology showed: Invasive moderately to poorly differentiated squamous cell carcinoma, involving fibrous tissue.   PET on 5/24 with Right level II-III hypermetabolic katia conglomerate measuring up to 5.9 cm.  At least 5 other FDG avid cervical lymph nodes   Palliative medicine was consulted in hospital  Continue with pain medication, was placed on Subaxone in hospital  Follow up with oncology         COPD (chronic obstructive pulmonary disease) (Multi) J44.9     Seems to be stable  Continue with inhalers         Urinary retention R33.9     He had anders inserted during inpatient stay  Continue with Flomax  Monitor urination         History of ETOH abuse F10.11     He had agitation in hospital  Continue with Seroquel  Continue with thiamine and folic acid         History of pulmonary embolus (PE) Z86.711     Continue with Eliquis         HTN (hypertension) I10     Continue with Metoprolol  Monitor BP         Other hyperlipidemia E78.49     C/w statin         Generalized weakness R53.1     Consult PT/OT          ----------------  Written by Andreina Atwood LPN, acting as a scribe for Dr. Gurrola. This note accurately reflects the work and decisions made by Dr. Gurrola.     I, Dr. Gurrola, attest all medical record entries made by the scribe were under my direction and were personally dictated by me. I have reviewed the chart and agree that the record accurately reflects my performance of the history, physical exam, and assessment and plan.

## 2024-09-04 ENCOUNTER — HOSPITAL ENCOUNTER (OUTPATIENT)
Facility: HOSPITAL | Age: 54
Setting detail: OBSERVATION
Discharge: HOME | End: 2024-09-06
Attending: EMERGENCY MEDICINE | Admitting: INTERNAL MEDICINE
Payer: COMMERCIAL

## 2024-09-04 ENCOUNTER — APPOINTMENT (OUTPATIENT)
Dept: CARDIOLOGY | Facility: HOSPITAL | Age: 54
End: 2024-09-04
Payer: COMMERCIAL

## 2024-09-04 DIAGNOSIS — L04.0 ACUTE CERVICAL LYMPHADENITIS: ICD-10-CM

## 2024-09-04 DIAGNOSIS — R52 PAIN AFTER RADIATION THERAPY: ICD-10-CM

## 2024-09-04 DIAGNOSIS — E87.6 HYPOKALEMIA: Primary | ICD-10-CM

## 2024-09-04 DIAGNOSIS — Z53.29 LEFT AGAINST MEDICAL ADVICE: ICD-10-CM

## 2024-09-04 DIAGNOSIS — G89.29 CHRONIC BILATERAL LOW BACK PAIN WITHOUT SCIATICA: ICD-10-CM
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
## 2024-09-04 DIAGNOSIS — M54.2 ACUTE NECK PAIN: ICD-10-CM

## 2024-09-04 DIAGNOSIS — C32.9 LARYNGEAL CANCER (MULTI): ICD-10-CM

## 2024-09-04 DIAGNOSIS — M54.50 CHRONIC BILATERAL LOW BACK PAIN WITHOUT SCIATICA: ICD-10-CM

## 2024-09-04 DIAGNOSIS — E83.42 HYPOMAGNESEMIA: ICD-10-CM

## 2024-09-04 DIAGNOSIS — R33.9 URINARY RETENTION: ICD-10-CM

## 2024-09-04 LAB
ALBUMIN SERPL BCP-MCNC: 3.7 G/DL (ref 3.4–5)
ALP SERPL-CCNC: 123 U/L (ref 33–120)
ALT SERPL W P-5'-P-CCNC: 11 U/L (ref 10–52)
ANION GAP SERPL CALC-SCNC: 19 MMOL/L (ref 10–20)
AST SERPL W P-5'-P-CCNC: 14 U/L (ref 9–39)
BASOPHILS # BLD AUTO: 0.03 X10*3/UL (ref 0–0.1)
BASOPHILS NFR BLD AUTO: 1.2 %
BILIRUB SERPL-MCNC: 0.5 MG/DL (ref 0–1.2)
BUN SERPL-MCNC: 12 MG/DL (ref 6–23)
CALCIUM SERPL-MCNC: 9.4 MG/DL (ref 8.6–10.3)
CHLORIDE SERPL-SCNC: 101 MMOL/L (ref 98–107)
CO2 SERPL-SCNC: 20 MMOL/L (ref 21–32)
CREAT SERPL-MCNC: 0.86 MG/DL (ref 0.5–1.3)
EGFRCR SERPLBLD CKD-EPI 2021: >90 ML/MIN/1.73M*2
EOSINOPHIL # BLD AUTO: 0.09 X10*3/UL (ref 0–0.7)
EOSINOPHIL NFR BLD AUTO: 3.6 %
ERYTHROCYTE [DISTWIDTH] IN BLOOD BY AUTOMATED COUNT: 19.6 % (ref 11.5–14.5)
ETHANOL SERPL-MCNC: <10 MG/DL
GLUCOSE SERPL-MCNC: 104 MG/DL (ref 74–99)
HCT VFR BLD AUTO: 33 % (ref 41–52)
HGB BLD-MCNC: 10.1 G/DL (ref 13.5–17.5)
HOLD SPECIMEN: NORMAL
IMM GRANULOCYTES # BLD AUTO: 0.01 X10*3/UL (ref 0–0.7)
IMM GRANULOCYTES NFR BLD AUTO: 0.4 % (ref 0–0.9)
LACTATE SERPL-SCNC: 2 MMOL/L (ref 0.4–2)
LACTATE SERPL-SCNC: 2.9 MMOL/L (ref 0.4–2)
LYMPHOCYTES # BLD AUTO: 0.61 X10*3/UL (ref 1.2–4.8)
LYMPHOCYTES NFR BLD AUTO: 24.6 %
MAGNESIUM SERPL-MCNC: 1.24 MG/DL (ref 1.6–2.4)
MCH RBC QN AUTO: 24.2 PG (ref 26–34)
MCHC RBC AUTO-ENTMCNC: 30.6 G/DL (ref 32–36)
MCV RBC AUTO: 79 FL (ref 80–100)
MONOCYTES # BLD AUTO: 0.45 X10*3/UL (ref 0.1–1)
MONOCYTES NFR BLD AUTO: 18.1 %
NEUTROPHILS # BLD AUTO: 1.29 X10*3/UL (ref 1.2–7.7)
NEUTROPHILS NFR BLD AUTO: 52.1 %
NRBC BLD-RTO: 0 /100 WBCS (ref 0–0)
PLATELET # BLD AUTO: 147 X10*3/UL (ref 150–450)
POTASSIUM SERPL-SCNC: 2.5 MMOL/L (ref 3.5–5.3)
PROT SERPL-MCNC: 7 G/DL (ref 6.4–8.2)
RBC # BLD AUTO: 4.17 X10*6/UL (ref 4.5–5.9)
SODIUM SERPL-SCNC: 137 MMOL/L (ref 136–145)
WBC # BLD AUTO: 2.5 X10*3/UL (ref 4.4–11.3)

## 2024-09-04 PROCEDURE — 99285 EMERGENCY DEPT VISIT HI MDM: CPT

## 2024-09-04 PROCEDURE — 80053 COMPREHEN METABOLIC PANEL: CPT | Performed by: EMERGENCY MEDICINE

## 2024-09-04 PROCEDURE — 83605 ASSAY OF LACTIC ACID: CPT | Performed by: EMERGENCY MEDICINE

## 2024-09-04 PROCEDURE — 96365 THER/PROPH/DIAG IV INF INIT: CPT | Mod: 59

## 2024-09-04 PROCEDURE — G0378 HOSPITAL OBSERVATION PER HR: HCPCS

## 2024-09-04 PROCEDURE — 96367 TX/PROPH/DG ADDL SEQ IV INF: CPT

## 2024-09-04 PROCEDURE — 83735 ASSAY OF MAGNESIUM: CPT | Performed by: EMERGENCY MEDICINE

## 2024-09-04 PROCEDURE — 2500000002 HC RX 250 W HCPCS SELF ADMINISTERED DRUGS (ALT 637 FOR MEDICARE OP, ALT 636 FOR OP/ED): Mod: SE | Performed by: NURSE PRACTITIONER

## 2024-09-04 PROCEDURE — 2500000004 HC RX 250 GENERAL PHARMACY W/ HCPCS (ALT 636 FOR OP/ED): Mod: SE | Performed by: NURSE PRACTITIONER

## 2024-09-04 PROCEDURE — 2500000005 HC RX 250 GENERAL PHARMACY W/O HCPCS: Mod: SE | Performed by: EMERGENCY MEDICINE

## 2024-09-04 PROCEDURE — 96366 THER/PROPH/DIAG IV INF ADDON: CPT

## 2024-09-04 PROCEDURE — 87040 BLOOD CULTURE FOR BACTERIA: CPT | Mod: GENLAB | Performed by: EMERGENCY MEDICINE

## 2024-09-04 PROCEDURE — 36415 COLL VENOUS BLD VENIPUNCTURE: CPT | Performed by: EMERGENCY MEDICINE

## 2024-09-04 PROCEDURE — 96372 THER/PROPH/DIAG INJ SC/IM: CPT | Performed by: NURSE PRACTITIONER

## 2024-09-04 PROCEDURE — 96375 TX/PRO/DX INJ NEW DRUG ADDON: CPT

## 2024-09-04 PROCEDURE — 85025 COMPLETE CBC W/AUTO DIFF WBC: CPT | Performed by: EMERGENCY MEDICINE

## 2024-09-04 PROCEDURE — 2500000002 HC RX 250 W HCPCS SELF ADMINISTERED DRUGS (ALT 637 FOR MEDICARE OP, ALT 636 FOR OP/ED): Mod: SE

## 2024-09-04 PROCEDURE — 2500000005 HC RX 250 GENERAL PHARMACY W/O HCPCS: Mod: SE | Performed by: NURSE PRACTITIONER

## 2024-09-04 PROCEDURE — 2500000004 HC RX 250 GENERAL PHARMACY W/ HCPCS (ALT 636 FOR OP/ED): Mod: SE

## 2024-09-04 PROCEDURE — 94760 N-INVAS EAR/PLS OXIMETRY 1: CPT

## 2024-09-04 PROCEDURE — 93005 ELECTROCARDIOGRAM TRACING: CPT

## 2024-09-04 PROCEDURE — 2500000004 HC RX 250 GENERAL PHARMACY W/ HCPCS (ALT 636 FOR OP/ED): Mod: SE | Performed by: EMERGENCY MEDICINE

## 2024-09-04 PROCEDURE — 82077 ASSAY SPEC XCP UR&BREATH IA: CPT | Performed by: EMERGENCY MEDICINE

## 2024-09-04 PROCEDURE — 2500000001 HC RX 250 WO HCPCS SELF ADMINISTERED DRUGS (ALT 637 FOR MEDICARE OP): Mod: SE | Performed by: NURSE PRACTITIONER

## 2024-09-04 PROCEDURE — 84075 ASSAY ALKALINE PHOSPHATASE: CPT | Performed by: EMERGENCY MEDICINE

## 2024-09-04 RX ORDER — MICONAZOLE NITRATE 2 %
2 CREAM (GRAM) TOPICAL AS NEEDED
Status: DISCONTINUED | OUTPATIENT
Start: 2024-09-04 | End: 2024-09-06 | Stop reason: HOSPADM

## 2024-09-04 RX ORDER — LIDOCAINE 50 MG/G
1 PATCH TOPICAL DAILY
Qty: 15 PATCH | Refills: 0 | Status: SHIPPED | OUTPATIENT
Start: 2024-09-04 | End: 2024-09-06 | Stop reason: HOSPADM

## 2024-09-04 RX ORDER — ACETAMINOPHEN 650 MG/1
650 SUPPOSITORY RECTAL EVERY 4 HOURS PRN
Status: DISCONTINUED | OUTPATIENT
Start: 2024-09-04 | End: 2024-09-04

## 2024-09-04 RX ORDER — METOPROLOL TARTRATE 25 MG/1
25 TABLET, FILM COATED ORAL DAILY
Status: DISCONTINUED | OUTPATIENT
Start: 2024-09-04 | End: 2024-09-06 | Stop reason: HOSPADM

## 2024-09-04 RX ORDER — LIDOCAINE 560 MG/1
PATCH PERCUTANEOUS; TOPICAL; TRANSDERMAL
Status: COMPLETED
Start: 2024-09-04 | End: 2024-09-05

## 2024-09-04 RX ORDER — DULOXETIN HYDROCHLORIDE 30 MG/1
90 CAPSULE, DELAYED RELEASE ORAL EVERY MORNING
Status: DISCONTINUED | OUTPATIENT
Start: 2024-09-05 | End: 2024-09-06 | Stop reason: HOSPADM

## 2024-09-04 RX ORDER — MONTELUKAST SODIUM 10 MG/1
10 TABLET ORAL DAILY
Status: DISCONTINUED | OUTPATIENT
Start: 2024-09-04 | End: 2024-09-06 | Stop reason: HOSPADM

## 2024-09-04 RX ORDER — ALBUTEROL SULFATE 0.83 MG/ML
2.5 SOLUTION RESPIRATORY (INHALATION) EVERY 2 HOUR PRN
Status: DISCONTINUED | OUTPATIENT
Start: 2024-09-04 | End: 2024-09-06 | Stop reason: HOSPADM

## 2024-09-04 RX ORDER — ALBUTEROL SULFATE 90 UG/1
2 INHALANT RESPIRATORY (INHALATION) EVERY 4 HOURS PRN
Status: DISCONTINUED | OUTPATIENT
Start: 2024-09-04 | End: 2024-09-04

## 2024-09-04 RX ORDER — ENOXAPARIN SODIUM 100 MG/ML
40 INJECTION SUBCUTANEOUS EVERY 24 HOURS
Status: DISCONTINUED | OUTPATIENT
Start: 2024-09-04 | End: 2024-09-04

## 2024-09-04 RX ORDER — POTASSIUM CHLORIDE 20 MEQ/1
40 TABLET, EXTENDED RELEASE ORAL DAILY
Status: DISCONTINUED | OUTPATIENT
Start: 2024-09-04 | End: 2024-09-06 | Stop reason: HOSPADM

## 2024-09-04 RX ORDER — CELECOXIB 100 MG/1
200 CAPSULE ORAL DAILY
Status: DISCONTINUED | OUTPATIENT
Start: 2024-09-04 | End: 2024-09-06 | Stop reason: HOSPADM

## 2024-09-04 RX ORDER — PANTOPRAZOLE SODIUM 40 MG/10ML
40 INJECTION, POWDER, LYOPHILIZED, FOR SOLUTION INTRAVENOUS
Status: DISCONTINUED | OUTPATIENT
Start: 2024-09-05 | End: 2024-09-06 | Stop reason: HOSPADM

## 2024-09-04 RX ORDER — POTASSIUM CHLORIDE 20 MEQ/1
40 TABLET, EXTENDED RELEASE ORAL ONCE
Status: COMPLETED | OUTPATIENT
Start: 2024-09-04 | End: 2024-09-04

## 2024-09-04 RX ORDER — ACETAMINOPHEN 325 MG/1
650 TABLET ORAL EVERY 4 HOURS PRN
Status: DISCONTINUED | OUTPATIENT
Start: 2024-09-04 | End: 2024-09-06 | Stop reason: HOSPADM

## 2024-09-04 RX ORDER — IPRATROPIUM BROMIDE AND ALBUTEROL SULFATE 2.5; .5 MG/3ML; MG/3ML
3 SOLUTION RESPIRATORY (INHALATION) EVERY 2 HOUR PRN
Status: DISCONTINUED | OUTPATIENT
Start: 2024-09-04 | End: 2024-09-04

## 2024-09-04 RX ORDER — ACETAMINOPHEN 500 MG
5 TABLET ORAL NIGHTLY PRN
Status: DISCONTINUED | OUTPATIENT
Start: 2024-09-04 | End: 2024-09-06 | Stop reason: HOSPADM

## 2024-09-04 RX ORDER — SODIUM CHLORIDE 9 MG/ML
75 INJECTION, SOLUTION INTRAVENOUS CONTINUOUS
Status: DISCONTINUED | OUTPATIENT
Start: 2024-09-04 | End: 2024-09-06 | Stop reason: HOSPADM

## 2024-09-04 RX ORDER — DISULFIRAM 250 MG/1
500 TABLET ORAL DAILY
Status: DISCONTINUED | OUTPATIENT
Start: 2024-09-04 | End: 2024-09-04

## 2024-09-04 RX ORDER — BENZTROPINE MESYLATE 1 MG/1
0.5 TABLET ORAL 2 TIMES DAILY
Status: DISCONTINUED | OUTPATIENT
Start: 2024-09-04 | End: 2024-09-06 | Stop reason: HOSPADM

## 2024-09-04 RX ORDER — DOCUSATE SODIUM 100 MG/1
100 CAPSULE, LIQUID FILLED ORAL 2 TIMES DAILY
Status: DISCONTINUED | OUTPATIENT
Start: 2024-09-04 | End: 2024-09-06 | Stop reason: HOSPADM

## 2024-09-04 RX ORDER — LANOLIN ALCOHOL/MO/W.PET/CERES
400 CREAM (GRAM) TOPICAL 2 TIMES DAILY
Status: COMPLETED | OUTPATIENT
Start: 2024-09-04 | End: 2024-09-05

## 2024-09-04 RX ORDER — POTASSIUM CHLORIDE 20 MEQ/1
TABLET, EXTENDED RELEASE ORAL
Status: COMPLETED
Start: 2024-09-04 | End: 2024-09-04

## 2024-09-04 RX ORDER — POTASSIUM CHLORIDE 14.9 MG/ML
INJECTION INTRAVENOUS
Status: COMPLETED
Start: 2024-09-04 | End: 2024-09-04

## 2024-09-04 RX ORDER — TRAMADOL HYDROCHLORIDE 50 MG/1
50 TABLET ORAL EVERY 6 HOURS PRN
Status: DISCONTINUED | OUTPATIENT
Start: 2024-09-04 | End: 2024-09-06 | Stop reason: HOSPADM

## 2024-09-04 RX ORDER — ACETAMINOPHEN 160 MG/5ML
650 SOLUTION ORAL EVERY 4 HOURS PRN
Status: DISCONTINUED | OUTPATIENT
Start: 2024-09-04 | End: 2024-09-04

## 2024-09-04 RX ORDER — QUETIAPINE FUMARATE 25 MG/1
25 TABLET, FILM COATED ORAL NIGHTLY PRN
Status: DISCONTINUED | OUTPATIENT
Start: 2024-09-04 | End: 2024-09-06 | Stop reason: HOSPADM

## 2024-09-04 RX ORDER — MAGNESIUM OXIDE 240 MG
1 POWDER IN PACKET (EA) ORAL 2 TIMES DAILY
Qty: 28 EACH | Refills: 0 | Status: ON HOLD | OUTPATIENT
Start: 2024-09-04 | End: 2024-09-18

## 2024-09-04 RX ORDER — AMOXICILLIN 250 MG
1 CAPSULE ORAL NIGHTLY
Status: DISCONTINUED | OUTPATIENT
Start: 2024-09-04 | End: 2024-09-06 | Stop reason: HOSPADM

## 2024-09-04 RX ORDER — SIMVASTATIN 40 MG/1
40 TABLET, FILM COATED ORAL NIGHTLY
Status: DISCONTINUED | OUTPATIENT
Start: 2024-09-04 | End: 2024-09-04

## 2024-09-04 RX ORDER — MAGNESIUM SULFATE HEPTAHYDRATE 40 MG/ML
2 INJECTION, SOLUTION INTRAVENOUS ONCE
Status: COMPLETED | OUTPATIENT
Start: 2024-09-04 | End: 2024-09-05

## 2024-09-04 RX ORDER — TOPIRAMATE 100 MG/1
100 TABLET, FILM COATED ORAL 2 TIMES DAILY
Status: DISCONTINUED | OUTPATIENT
Start: 2024-09-04 | End: 2024-09-06 | Stop reason: HOSPADM

## 2024-09-04 RX ORDER — CALCIUM CARBONATE 200(500)MG
500 TABLET,CHEWABLE ORAL 4 TIMES DAILY PRN
Status: DISCONTINUED | OUTPATIENT
Start: 2024-09-04 | End: 2024-09-06 | Stop reason: HOSPADM

## 2024-09-04 RX ORDER — HYDROMORPHONE HYDROCHLORIDE 1 MG/ML
1 INJECTION, SOLUTION INTRAMUSCULAR; INTRAVENOUS; SUBCUTANEOUS ONCE
Status: COMPLETED | OUTPATIENT
Start: 2024-09-04 | End: 2024-09-04

## 2024-09-04 RX ORDER — SODIUM CHLORIDE, SODIUM LACTATE, POTASSIUM CHLORIDE, CALCIUM CHLORIDE 600; 310; 30; 20 MG/100ML; MG/100ML; MG/100ML; MG/100ML
125 INJECTION, SOLUTION INTRAVENOUS CONTINUOUS
Status: DISCONTINUED | OUTPATIENT
Start: 2024-09-04 | End: 2024-09-04

## 2024-09-04 RX ORDER — ROPINIROLE 0.25 MG/1
0.5 TABLET, FILM COATED ORAL 3 TIMES DAILY
Status: DISCONTINUED | OUTPATIENT
Start: 2024-09-04 | End: 2024-09-06 | Stop reason: HOSPADM

## 2024-09-04 RX ORDER — ONDANSETRON HYDROCHLORIDE 2 MG/ML
4 INJECTION, SOLUTION INTRAVENOUS EVERY 8 HOURS PRN
Status: DISCONTINUED | OUTPATIENT
Start: 2024-09-04 | End: 2024-09-04

## 2024-09-04 RX ORDER — LIDOCAINE 560 MG/1
1 PATCH PERCUTANEOUS; TOPICAL; TRANSDERMAL DAILY
Status: DISCONTINUED | OUTPATIENT
Start: 2024-09-04 | End: 2024-09-04

## 2024-09-04 RX ORDER — ONDANSETRON HYDROCHLORIDE 2 MG/ML
4 INJECTION, SOLUTION INTRAVENOUS ONCE
Status: COMPLETED | OUTPATIENT
Start: 2024-09-04 | End: 2024-09-04

## 2024-09-04 RX ORDER — CYCLOBENZAPRINE HCL 5 MG
10 TABLET ORAL 3 TIMES DAILY PRN
Status: DISCONTINUED | OUTPATIENT
Start: 2024-09-04 | End: 2024-09-06 | Stop reason: HOSPADM

## 2024-09-04 RX ORDER — FERROUS SULFATE, DRIED 160(50) MG
1 TABLET, EXTENDED RELEASE ORAL 2 TIMES DAILY
Status: DISCONTINUED | OUTPATIENT
Start: 2024-09-04 | End: 2024-09-06 | Stop reason: HOSPADM

## 2024-09-04 RX ORDER — TRAZODONE HYDROCHLORIDE 50 MG/1
50 TABLET ORAL NIGHTLY
Status: DISCONTINUED | OUTPATIENT
Start: 2024-09-04 | End: 2024-09-06 | Stop reason: HOSPADM

## 2024-09-04 RX ORDER — POTASSIUM CHLORIDE 14.9 MG/ML
20 INJECTION INTRAVENOUS
Status: COMPLETED | OUTPATIENT
Start: 2024-09-04 | End: 2024-09-04

## 2024-09-04 RX ORDER — AMOXICILLIN 250 MG
1 CAPSULE ORAL 2 TIMES DAILY
Status: DISCONTINUED | OUTPATIENT
Start: 2024-09-04 | End: 2024-09-04

## 2024-09-04 RX ORDER — ATORVASTATIN CALCIUM 10 MG/1
20 TABLET, FILM COATED ORAL NIGHTLY
Status: DISCONTINUED | OUTPATIENT
Start: 2024-09-04 | End: 2024-09-06 | Stop reason: HOSPADM

## 2024-09-04 RX ORDER — POTASSIUM CHLORIDE 1.5 G/1.58G
20 POWDER, FOR SOLUTION ORAL 2 TIMES DAILY
Qty: 28 PACKET | Refills: 0 | Status: ON HOLD | OUTPATIENT
Start: 2024-09-04 | End: 2024-09-18

## 2024-09-04 RX ORDER — PREGABALIN 100 MG/1
200 CAPSULE ORAL 3 TIMES DAILY
Status: DISCONTINUED | OUTPATIENT
Start: 2024-09-04 | End: 2024-09-06 | Stop reason: HOSPADM

## 2024-09-04 RX ORDER — PANTOPRAZOLE SODIUM 40 MG/1
40 TABLET, DELAYED RELEASE ORAL
Status: DISCONTINUED | OUTPATIENT
Start: 2024-09-05 | End: 2024-09-06 | Stop reason: HOSPADM

## 2024-09-04 RX ORDER — LIDOCAINE 560 MG/1
1 PATCH PERCUTANEOUS; TOPICAL; TRANSDERMAL DAILY
Status: DISCONTINUED | OUTPATIENT
Start: 2024-09-04 | End: 2024-09-06 | Stop reason: HOSPADM

## 2024-09-04 RX ORDER — DULOXETIN HYDROCHLORIDE 30 MG/1
30 CAPSULE, DELAYED RELEASE ORAL NIGHTLY
Status: DISCONTINUED | OUTPATIENT
Start: 2024-09-04 | End: 2024-09-06 | Stop reason: HOSPADM

## 2024-09-04 RX ORDER — ONDANSETRON 4 MG/1
4 TABLET, FILM COATED ORAL EVERY 8 HOURS PRN
Status: DISCONTINUED | OUTPATIENT
Start: 2024-09-04 | End: 2024-09-04

## 2024-09-04 SDOH — SOCIAL STABILITY: SOCIAL INSECURITY: DOES ANYONE TRY TO KEEP YOU FROM HAVING/CONTACTING OTHER FRIENDS OR DOING THINGS OUTSIDE YOUR HOME?: NO

## 2024-09-04 SDOH — SOCIAL STABILITY: SOCIAL INSECURITY: ARE THERE ANY APPARENT SIGNS OF INJURIES/BEHAVIORS THAT COULD BE RELATED TO ABUSE/NEGLECT?: NO

## 2024-09-04 SDOH — SOCIAL STABILITY: SOCIAL INSECURITY: ARE YOU OR HAVE YOU BEEN THREATENED OR ABUSED PHYSICALLY, EMOTIONALLY, OR SEXUALLY BY ANYONE?: NO

## 2024-09-04 SDOH — SOCIAL STABILITY: SOCIAL INSECURITY: WERE YOU ABLE TO COMPLETE ALL THE BEHAVIORAL HEALTH SCREENINGS?: YES

## 2024-09-04 SDOH — SOCIAL STABILITY: SOCIAL INSECURITY: HAS ANYONE EVER THREATENED TO HURT YOUR FAMILY OR YOUR PETS?: NO

## 2024-09-04 SDOH — SOCIAL STABILITY: SOCIAL INSECURITY: HAVE YOU HAD THOUGHTS OF HARMING ANYONE ELSE?: NO

## 2024-09-04 SDOH — SOCIAL STABILITY: SOCIAL INSECURITY: DO YOU FEEL UNSAFE GOING BACK TO THE PLACE WHERE YOU ARE LIVING?: NO

## 2024-09-04 SDOH — SOCIAL STABILITY: SOCIAL INSECURITY: DO YOU FEEL ANYONE HAS EXPLOITED OR TAKEN ADVANTAGE OF YOU FINANCIALLY OR OF YOUR PERSONAL PROPERTY?: NO

## 2024-09-04 SDOH — SOCIAL STABILITY: SOCIAL INSECURITY: ABUSE: ADULT

## 2024-09-04 SDOH — SOCIAL STABILITY: SOCIAL INSECURITY: HAVE YOU HAD ANY THOUGHTS OF HARMING ANYONE ELSE?: NO

## 2024-09-04 ASSESSMENT — COGNITIVE AND FUNCTIONAL STATUS - GENERAL
STANDING UP FROM CHAIR USING ARMS: A LITTLE
MOVING TO AND FROM BED TO CHAIR: A LITTLE
DAILY ACTIVITIY SCORE: 22
DAILY ACTIVITIY SCORE: 22
MOBILITY SCORE: 22
DRESSING REGULAR UPPER BODY CLOTHING: A LITTLE
MOBILITY SCORE: 22
WALKING IN HOSPITAL ROOM: A LITTLE
DRESSING REGULAR UPPER BODY CLOTHING: A LITTLE
HELP NEEDED FOR BATHING: A LITTLE
TOILETING: A LITTLE
CLIMB 3 TO 5 STEPS WITH RAILING: A LITTLE
CLIMB 3 TO 5 STEPS WITH RAILING: A LITTLE
MOBILITY SCORE: 21
DRESSING REGULAR LOWER BODY CLOTHING: A LITTLE
PATIENT BASELINE BEDBOUND: NO
TOILETING: A LITTLE
CLIMB 3 TO 5 STEPS WITH RAILING: A LITTLE
WALKING IN HOSPITAL ROOM: A LITTLE
DAILY ACTIVITIY SCORE: 22

## 2024-09-04 ASSESSMENT — PAIN DESCRIPTION - ORIENTATION
ORIENTATION: RIGHT
ORIENTATION: RIGHT
ORIENTATION: RIGHT;LEFT

## 2024-09-04 ASSESSMENT — ACTIVITIES OF DAILY LIVING (ADL)
WALKS IN HOME: INDEPENDENT
HEARING - LEFT EAR: FUNCTIONAL
ADEQUATE_TO_COMPLETE_ADL: YES
ASSISTIVE_DEVICE: WALKER
JUDGMENT_ADEQUATE_SAFELY_COMPLETE_DAILY_ACTIVITIES: YES
TOILETING: INDEPENDENT
HEARING - RIGHT EAR: FUNCTIONAL
FEEDING YOURSELF: INDEPENDENT
DRESSING YOURSELF: INDEPENDENT
PATIENT'S MEMORY ADEQUATE TO SAFELY COMPLETE DAILY ACTIVITIES?: YES
GROOMING: INDEPENDENT
BATHING: INDEPENDENT
LACK_OF_TRANSPORTATION: NO

## 2024-09-04 ASSESSMENT — PAIN SCALES - GENERAL
PAINLEVEL_OUTOF10: 0 - NO PAIN
PAINLEVEL_OUTOF10: 10 - WORST POSSIBLE PAIN
PAINLEVEL_OUTOF10: 0 - NO PAIN

## 2024-09-04 ASSESSMENT — LIFESTYLE VARIABLES
AUDIT-C TOTAL SCORE: 0
AUDIT-C TOTAL SCORE: 0
SKIP TO QUESTIONS 9-10: 1
HOW MANY STANDARD DRINKS CONTAINING ALCOHOL DO YOU HAVE ON A TYPICAL DAY: PATIENT DOES NOT DRINK
HOW OFTEN DO YOU HAVE 6 OR MORE DRINKS ON ONE OCCASION: NEVER
HOW OFTEN DO YOU HAVE A DRINK CONTAINING ALCOHOL: NEVER

## 2024-09-04 ASSESSMENT — COLUMBIA-SUICIDE SEVERITY RATING SCALE - C-SSRS
1. IN THE PAST MONTH, HAVE YOU WISHED YOU WERE DEAD OR WISHED YOU COULD GO TO SLEEP AND NOT WAKE UP?: NO
2. HAVE YOU ACTUALLY HAD ANY THOUGHTS OF KILLING YOURSELF?: NO
6. HAVE YOU EVER DONE ANYTHING, STARTED TO DO ANYTHING, OR PREPARED TO DO ANYTHING TO END YOUR LIFE?: NO

## 2024-09-04 ASSESSMENT — PAIN SCALES - WONG BAKER: WONGBAKER_NUMERICALRESPONSE: HURTS WHOLE LOT

## 2024-09-04 ASSESSMENT — PAIN - FUNCTIONAL ASSESSMENT
PAIN_FUNCTIONAL_ASSESSMENT: 0-10
PAIN_FUNCTIONAL_ASSESSMENT: 0-10

## 2024-09-04 ASSESSMENT — PAIN DESCRIPTION - DESCRIPTORS: DESCRIPTORS: BURNING

## 2024-09-04 ASSESSMENT — PAIN DESCRIPTION - ONSET: ONSET: ONGOING

## 2024-09-04 ASSESSMENT — PAIN DESCRIPTION - FREQUENCY: FREQUENCY: CONSTANT/CONTINUOUS

## 2024-09-04 ASSESSMENT — PAIN DESCRIPTION - LOCATION
LOCATION: NECK

## 2024-09-04 ASSESSMENT — PAIN DESCRIPTION - PROGRESSION: CLINICAL_PROGRESSION: RAPIDLY WORSENING

## 2024-09-04 NOTE — ED PROVIDER NOTES
HPI   Chief Complaint   Patient presents with    Neck Pain       54-year-old male with laryngeal cancer on radiation presents with neck pain.  Diffuse pain throughout his neck.  He feels like there is something in his neck that he needs to get out.  He is picking at his neck.  He takes Suboxone.  He is in palliative care.  Most recent radiation treatment was 8/29.      History provided by:  Patient and medical records          Patient History   Past Medical History:   Diagnosis Date    Acute upper respiratory infection, unspecified 03/20/2015    Acute upper respiratory infection    Alcohol abuse, in remission 02/12/2015    History of ETOH abuse    Alcohol dependence, in remission (Multi) 05/07/2015    Alcohol dependence in remission    Allergic contact dermatitis due to plants, except food 05/21/2014    Contact dermatitis due to poison ivy    Allergy status to unspecified drugs, medicaments and biological substances 08/29/2013    History of allergy    Anxiety disorder, unspecified 03/20/2015    Anxiety    Bipolar disorder, current episode manic without psychotic features, unspecified (Multi)     Bipolar disorder, current episode manic without psychotic features    Cervicalgia     Cervicalgia    Chronic obstructive pulmonary disease, unspecified (Multi) 12/02/2014    Chronic asthmatic bronchitis    Encounter for immunization 09/22/2016    Flu vaccine need    Hypertension     Other cervical disc degeneration, unspecified cervical region     Degeneration of cervical intervertebral disc    Other conditions influencing health status 06/27/2013    Acute Inflammation Of The Orbit    Other long term (current) drug therapy 06/02/2015    High risk medication use    Other specified health status     No pertinent past surgical history    Personal history of nicotine dependence 08/16/2017    History of tobacco abuse    Personal history of nicotine dependence 06/26/2017    History of nicotine dependence    Personal history of  other diseases of the digestive system 04/11/2013    History of gastroenteritis    Personal history of other diseases of the digestive system 08/27/2015    History of esophageal reflux    Personal history of other diseases of the respiratory system 03/20/2017    History of sore throat    Personal history of other diseases of the respiratory system 02/05/2014    History of sinusitis    Personal history of other diseases of the respiratory system 01/16/2014    History of allergic rhinitis    Personal history of other diseases of the respiratory system 09/26/2013    History of acute sinusitis    Personal history of other diseases of the respiratory system 06/26/2014    History of allergic rhinitis    Personal history of other infectious and parasitic diseases     History of hepatitis C    Personal history of other mental and behavioral disorders     History of depression    Personal history of other specified conditions 08/25/2016    History of dizziness    Personal history of other specified conditions 02/16/2017    History of abdominal pain    Unspecified asthma, uncomplicated ([facility]) 02/13/2014    Asthmatic bronchitis     Past Surgical History:   Procedure Laterality Date    CHOLECYSTECTOMY      CT ABDOMEN PELVIS ANGIOGRAM W AND/OR WO IV CONTRAST  04/01/2020    CT ABDOMEN PELVIS ANGIOGRAM W AND/OR WO IV CONTRAST 4/1/2020 [facility]    INVASIVE VASCULAR PROCEDURE N/A 11/08/2023    Procedure: Pulmonary Angiogram;  Surgeon: Surya Templeton MD;  Location: [facility] Cardiac Cath Lab;  Service: Cardiovascular;  Laterality: N/A;    MR HEAD ANGIO WO IV CONTRAST  01/24/2016    MR HEAD ANGIO WO IV CONTRAST 1/24/2016 [facility]    MR NECK ANGIO WO IV CONTRAST  01/24/2016    MR NECK ANGIO WO IV CONTRAST 1/24/2016 [facility]     No family history on file.  Social History     Tobacco Use    Smoking status: Every Day     Current packs/day: 0.50     Types: Cigarettes    Smokeless tobacco: Never   Substance  Use Topics    Alcohol use: Not Currently    Drug use: Never       Physical Exam   ED Triage Vitals [09/04/24 1144]   Temperature Heart Rate Respirations BP   36.8 °C (98.2 °F) (!) 102 20 136/72      Pulse Ox Temp Source Heart Rate Source Patient Position   100 % Temporal Monitor Lying      BP Location FiO2 (%)     Right arm --       Physical Exam  Vitals and nursing note reviewed.   Constitutional:       General: He is not in acute distress.     Appearance: He is well-developed.      Comments: Patient is uncomfortable.  Appears to be in pain.  He is picking at his neck.  His voice is hoarse.  His airway is patent.  He is handling his secretions   HENT:      Head: Normocephalic and atraumatic.   Eyes:      Conjunctiva/sclera: Conjunctivae normal.   Neck:      Comments: Diffuse erythema across the neck consistent with skin burn due to radiation therapy.  Cardiovascular:      Rate and Rhythm: Regular rhythm. Tachycardia present.      Heart sounds: No murmur heard.  Pulmonary:      Effort: Pulmonary effort is normal. No respiratory distress.      Breath sounds: Normal breath sounds.   Abdominal:      Palpations: Abdomen is soft.      Tenderness: There is no abdominal tenderness.   Musculoskeletal:         General: No swelling.      Cervical back: Neck supple.   Skin:     General: Skin is warm and dry.      Capillary Refill: Capillary refill takes less than 2 seconds.      Findings: Erythema present.   Neurological:      Mental Status: He is alert and oriented to person, place, and time.      Cranial Nerves: No cranial nerve deficit.      Sensory: No sensory deficit.      Motor: No weakness.   Psychiatric:         Mood and Affect: Mood normal.           ED Course & MDM   ED Course as of 09/04/24 1719   Wed Sep 04, 2024   1347 POTASSIUM(!!): 2.5  Potassium 2.5.  Noted.  Replaced with oral and IV potassium [BT]   1405 ECG 12 lead  EKG shows sinus rhythm with rate of 86.  Nonspecific ST-T changes.  No acute injury pattern  [BT]   1458 MAGNESIUM(!): 1.24 [BT]   1515 54-year-old male with laryngeal cancer presents with neck pain.  He was given lidocaine patch, Zofran, Dilaudid.    Most recent radiation treatment was 8/29.    Noted to be mildly pancytopenic.  No active blood loss    Potassium 2.5.  Replaced with oral and IV potassium.    Magnesium 1.24.  Replaced with 2 g mag sulfate IV.    Case discussed with medicine hospitalist team MICHAEL Yañez who will hospitalize under observation the service of Dr. Barcenas for further evaluation management of hypokalemia, hypomagnesemia. [BT]   1719 Patient decided he wants to go home.  States his potassium is always low.  Decision making capacity intact.  Prescribed potassium and magnesium powder.  Advised to discuss his neck pain with his radiation oncologist.  Advised to return at any time to receive the care he is refusing today.  Advised he is at risk for permanent disability or death.  Patient understands these risks and still decided to go home.  He left AGAINST MEDICAL ADVICE [BT]      ED Course User Index  [BT] Yves Nielson DO         Diagnoses as of 09/04/24 1719   Acute neck pain   Laryngeal cancer (Multi)   Pain after radiation therapy   Hypokalemia   Hypomagnesemia   Left against medical advice                 No data recorded     Kimberly Coma Scale Score: 15 (09/04/24 1339 : Viviana Gipson, LEANN)                           Medical Decision Making      Procedure  Procedures     Yves Nielson DO  09/04/24 1517       Yves Nielson DO  09/04/24 1719

## 2024-09-04 NOTE — NURSING NOTE
Admitted to the floor with no IV access. Will try with ultrasound to get IV re sited for IV potassium. Patient requesting icepacks for his neck. Right side of neck extremely swollen . Skin graft visible. Protein shake ordered for patient as he has not eaten in 5 days. Unable to answer all questions as he has difficulty talking. Admitted to smoking this am. Also admitted to using Meth day before yesterday. Denies other street drugs. Last radiation treatment on Friday.

## 2024-09-04 NOTE — ED TRIAGE NOTES
"Pt currently undergoing radiation treatment for cancer in his neck, last treatment prior Friday. Pt verbalizes continued 10/10 pain in his right neck, picking at the wound stating \"I have to get them out of there\".  Pt denies SOB, difficulty swallowing or breathing  "

## 2024-09-04 NOTE — CARE PLAN
The patient's goals for the shift include  decrease in pain    The clinical goals for the shift include Decrease in pain      Problem: Nutrition  Goal: Less than 5 days NPO/clear liquids  Outcome: Not Progressing  Goal: Oral intake greater than 50%  Outcome: Not Progressing  Goal: Oral intake greater 75%  Outcome: Not Progressing  Goal: Consume prescribed supplement  Outcome: Not Progressing  Goal: Adequate PO fluid intake  Outcome: Not Progressing  Goal: Nutrition support goals are met within 48 hrs  Outcome: Not Progressing  Goal: Nutrition support is meeting 75% of nutrient needs  Outcome: Not Progressing  Goal: Tube feed tolerance  Outcome: Not Progressing  Goal: BG  mg/dL  Outcome: Not Progressing  Goal: Lab values WNL  Outcome: Not Progressing  Goal: Electrolytes WNL  Outcome: Not Progressing  Goal: Promote healing  Outcome: Not Progressing  Goal: Maintain stable weight  Outcome: Not Progressing  Goal: Reduce weight from edema/fluid  Outcome: Not Progressing  Goal: Gradual weight gain  Outcome: Not Progressing  Goal: Improve ostomy output  Outcome: Not Progressing     Problem: Pain - Adult  Goal: Verbalizes/displays adequate comfort level or baseline comfort level  Outcome: Not Progressing     Problem: Safety - Adult  Goal: Free from fall injury  Outcome: Not Progressing     Problem: Discharge Planning  Goal: Discharge to home or other facility with appropriate resources  Outcome: Not Progressing     Problem: Chronic Conditions and Co-morbidities  Goal: Patient's chronic conditions and co-morbidity symptoms are monitored and maintained or improved  Outcome: Not Progressing     Problem: Pain  Goal: Takes deep breaths with improved pain control throughout the shift  Outcome: Not Progressing  Goal: Turns in bed with improved pain control throughout the shift  Outcome: Not Progressing  Goal: Walks with improved pain control throughout the shift  Outcome: Not Progressing  Goal: Performs ADL's with improved  pain control throughout shift  Outcome: Not Progressing  Goal: Participates in PT with improved pain control throughout the shift  Outcome: Not Progressing  Goal: Free from opioid side effects throughout the shift  Outcome: Not Progressing  Goal: Free from acute confusion related to pain meds throughout the shift  Outcome: Not Progressing     Problem: Nutrition  Goal: Less than 5 days NPO/clear liquids  Outcome: Not Progressing  Goal: Oral intake greater than 50%  Outcome: Not Progressing  Goal: Oral intake greater 75%  Outcome: Not Progressing  Goal: Consume prescribed supplement  Outcome: Not Progressing  Goal: Adequate PO fluid intake  Outcome: Not Progressing  Goal: Nutrition support goals are met within 48 hrs  Outcome: Not Progressing  Goal: Nutrition support is meeting 75% of nutrient needs  Outcome: Not Progressing  Goal: Tube feed tolerance  Outcome: Not Progressing  Goal: BG  mg/dL  Outcome: Not Progressing  Goal: Lab values WNL  Outcome: Not Progressing  Goal: Electrolytes WNL  Outcome: Not Progressing  Goal: Promote healing  Outcome: Not Progressing  Goal: Maintain stable weight  Outcome: Not Progressing  Goal: Reduce weight from edema/fluid  Outcome: Not Progressing  Goal: Gradual weight gain  Outcome: Not Progressing  Goal: Improve ostomy output  Outcome: Not Progressing     Problem: Pain - Adult  Goal: Verbalizes/displays adequate comfort level or baseline comfort level  Outcome: Not Progressing     Problem: Safety - Adult  Goal: Free from fall injury  Outcome: Not Progressing     Problem: Discharge Planning  Goal: Discharge to home or other facility with appropriate resources  Outcome: Not Progressing     Problem: Chronic Conditions and Co-morbidities  Goal: Patient's chronic conditions and co-morbidity symptoms are monitored and maintained or improved  Outcome: Not Progressing     Problem: Pain  Goal: Takes deep breaths with improved pain control throughout the shift  Outcome: Not  Progressing  Goal: Turns in bed with improved pain control throughout the shift  Outcome: Not Progressing  Goal: Walks with improved pain control throughout the shift  Outcome: Not Progressing  Goal: Performs ADL's with improved pain control throughout shift  Outcome: Not Progressing  Goal: Participates in PT with improved pain control throughout the shift  Outcome: Not Progressing  Goal: Free from opioid side effects throughout the shift  Outcome: Not Progressing  Goal: Free from acute confusion related to pain meds throughout the shift  Outcome: Not Progressing

## 2024-09-05 PROBLEM — J96.01 ACUTE HYPOXIC RESPIRATORY FAILURE (MULTI): Status: ACTIVE | Noted: 2024-09-05

## 2024-09-05 PROBLEM — M54.2 NECK PAIN: Status: ACTIVE | Noted: 2024-09-05

## 2024-09-05 PROBLEM — I26.92 CHRONIC SADDLE PULMONARY EMBOLISM WITHOUT ACUTE COR PULMONALE (MULTI): Status: ACTIVE | Noted: 2023-11-08

## 2024-09-05 PROBLEM — T30.0 RADIATION BURN: Status: ACTIVE | Noted: 2024-09-05

## 2024-09-05 PROBLEM — D61.818 PANCYTOPENIA (MULTI): Status: ACTIVE | Noted: 2024-09-05

## 2024-09-05 PROBLEM — E83.42 HYPOMAGNESEMIA: Status: ACTIVE | Noted: 2024-09-05

## 2024-09-05 PROBLEM — F19.10 POLYSUBSTANCE ABUSE (MULTI): Status: ACTIVE | Noted: 2024-09-05

## 2024-09-05 PROBLEM — F19.94 SUBSTANCE INDUCED MOOD DISORDER (MULTI): Status: ACTIVE | Noted: 2024-09-05
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
## 2024-09-05 PROBLEM — C32.9 LARYNGEAL CANCER (MULTI): Status: ACTIVE | Noted: 2024-09-05

## 2024-09-05 PROBLEM — G25.81 RLS (RESTLESS LEGS SYNDROME): Status: ACTIVE | Noted: 2024-09-05

## 2024-09-05 PROBLEM — N40.0 BPH (BENIGN PROSTATIC HYPERPLASIA): Status: ACTIVE | Noted: 2024-09-05

## 2024-09-05 PROBLEM — R52 PAIN AFTER RADIATION THERAPY: Status: ACTIVE | Noted: 2024-09-05

## 2024-09-05 PROBLEM — I27.82 CHRONIC SADDLE PULMONARY EMBOLISM WITHOUT ACUTE COR PULMONALE (MULTI): Status: ACTIVE | Noted: 2023-11-08

## 2024-09-05 LAB
AMPHETAMINES UR QL SCN: ABNORMAL
ANION GAP SERPL CALC-SCNC: 12 MMOL/L (ref 10–20)
APPEARANCE UR: CLEAR
ATRIAL RATE: 86 BPM
BARBITURATES UR QL SCN: ABNORMAL
BENZODIAZ UR QL SCN: ABNORMAL
BILIRUB UR STRIP.AUTO-MCNC: NEGATIVE MG/DL
BUN SERPL-MCNC: 10 MG/DL (ref 6–23)
BZE UR QL SCN: ABNORMAL
CALCIUM SERPL-MCNC: 8.7 MG/DL (ref 8.6–10.3)
CANNABINOIDS UR QL SCN: ABNORMAL
CHLORIDE SERPL-SCNC: 107 MMOL/L (ref 98–107)
CO2 SERPL-SCNC: 24 MMOL/L (ref 21–32)
COLOR UR: YELLOW
CREAT SERPL-MCNC: 0.73 MG/DL (ref 0.5–1.3)
EGFRCR SERPLBLD CKD-EPI 2021: >90 ML/MIN/1.73M*2
ERYTHROCYTE [DISTWIDTH] IN BLOOD BY AUTOMATED COUNT: 19.4 % (ref 11.5–14.5)
FENTANYL+NORFENTANYL UR QL SCN: ABNORMAL
GLUCOSE SERPL-MCNC: 93 MG/DL (ref 74–99)
GLUCOSE UR STRIP.AUTO-MCNC: NORMAL MG/DL
HCT VFR BLD AUTO: 28.3 % (ref 41–52)
HGB BLD-MCNC: 8.5 G/DL (ref 13.5–17.5)
KETONES UR STRIP.AUTO-MCNC: NEGATIVE MG/DL
LEUKOCYTE ESTERASE UR QL STRIP.AUTO: NEGATIVE
MAGNESIUM SERPL-MCNC: 1.68 MG/DL (ref 1.6–2.4)
MCH RBC QN AUTO: 24.5 PG (ref 26–34)
MCHC RBC AUTO-ENTMCNC: 30 G/DL (ref 32–36)
MCV RBC AUTO: 82 FL (ref 80–100)
METHADONE UR QL SCN: ABNORMAL
MUCOUS THREADS #/AREA URNS AUTO: NORMAL /LPF
NITRITE UR QL STRIP.AUTO: NEGATIVE
NRBC BLD-RTO: 0 /100 WBCS (ref 0–0)
OPIATES UR QL SCN: ABNORMAL
OXYCODONE+OXYMORPHONE UR QL SCN: ABNORMAL
P AXIS: 31 DEGREES
P OFFSET: 207 MS
P ONSET: 156 MS
PCP UR QL SCN: ABNORMAL
PH UR STRIP.AUTO: 6 [PH]
PLATELET # BLD AUTO: 119 X10*3/UL (ref 150–450)
POTASSIUM SERPL-SCNC: 3.5 MMOL/L (ref 3.5–5.3)
PR INTERVAL: 130 MS
PROT UR STRIP.AUTO-MCNC: ABNORMAL MG/DL
Q ONSET: 221 MS
QRS COUNT: 14 BEATS
QRS DURATION: 90 MS
QT INTERVAL: 468 MS
QTC CALCULATION(BAZETT): 560 MS
QTC FREDERICIA: 528 MS
R AXIS: 18 DEGREES
RBC # BLD AUTO: 3.47 X10*6/UL (ref 4.5–5.9)
RBC # UR STRIP.AUTO: NEGATIVE /UL
RBC #/AREA URNS AUTO: NORMAL /HPF
SODIUM SERPL-SCNC: 139 MMOL/L (ref 136–145)
SP GR UR STRIP.AUTO: 1.02
SQUAMOUS #/AREA URNS AUTO: NORMAL /HPF
T AXIS: 29 DEGREES
T OFFSET: 455 MS
UROBILINOGEN UR STRIP.AUTO-MCNC: NORMAL MG/DL
VENTRICULAR RATE: 86 BPM
WBC # BLD AUTO: 2 X10*3/UL (ref 4.4–11.3)
WBC #/AREA URNS AUTO: NORMAL /HPF

## 2024-09-05 PROCEDURE — 36415 COLL VENOUS BLD VENIPUNCTURE: CPT | Performed by: NURSE PRACTITIONER

## 2024-09-05 PROCEDURE — 99223 1ST HOSP IP/OBS HIGH 75: CPT

## 2024-09-05 PROCEDURE — 85027 COMPLETE CBC AUTOMATED: CPT | Performed by: NURSE PRACTITIONER

## 2024-09-05 PROCEDURE — G0378 HOSPITAL OBSERVATION PER HR: HCPCS

## 2024-09-05 PROCEDURE — 2500000005 HC RX 250 GENERAL PHARMACY W/O HCPCS: Mod: SE | Performed by: NURSE PRACTITIONER

## 2024-09-05 PROCEDURE — 2500000002 HC RX 250 W HCPCS SELF ADMINISTERED DRUGS (ALT 637 FOR MEDICARE OP, ALT 636 FOR OP/ED): Mod: SE

## 2024-09-05 PROCEDURE — 2500000004 HC RX 250 GENERAL PHARMACY W/ HCPCS (ALT 636 FOR OP/ED): Mod: SE

## 2024-09-05 PROCEDURE — 2500000001 HC RX 250 WO HCPCS SELF ADMINISTERED DRUGS (ALT 637 FOR MEDICARE OP): Mod: SE

## 2024-09-05 PROCEDURE — 81001 URINALYSIS AUTO W/SCOPE: CPT | Performed by: NURSE PRACTITIONER

## 2024-09-05 PROCEDURE — 2500000004 HC RX 250 GENERAL PHARMACY W/ HCPCS (ALT 636 FOR OP/ED): Mod: SE | Performed by: NURSE PRACTITIONER

## 2024-09-05 PROCEDURE — 80048 BASIC METABOLIC PNL TOTAL CA: CPT | Performed by: NURSE PRACTITIONER

## 2024-09-05 PROCEDURE — 2500000001 HC RX 250 WO HCPCS SELF ADMINISTERED DRUGS (ALT 637 FOR MEDICARE OP): Mod: SE | Performed by: NURSE PRACTITIONER

## 2024-09-05 PROCEDURE — 2500000002 HC RX 250 W HCPCS SELF ADMINISTERED DRUGS (ALT 637 FOR MEDICARE OP, ALT 636 FOR OP/ED): Mod: SE | Performed by: NURSE PRACTITIONER

## 2024-09-05 PROCEDURE — 96376 TX/PRO/DX INJ SAME DRUG ADON: CPT

## 2024-09-05 PROCEDURE — 83735 ASSAY OF MAGNESIUM: CPT | Performed by: NURSE PRACTITIONER

## 2024-09-05 PROCEDURE — 96375 TX/PRO/DX INJ NEW DRUG ADDON: CPT

## 2024-09-05 PROCEDURE — 9420000001 HC RT PATIENT EDUCATION 5 MIN

## 2024-09-05 PROCEDURE — 80307 DRUG TEST PRSMV CHEM ANLYZR: CPT | Performed by: NURSE PRACTITIONER

## 2024-09-05 RX ORDER — PETROLATUM 420 MG/G
1 OINTMENT TOPICAL
Status: DISCONTINUED | OUTPATIENT
Start: 2024-09-05 | End: 2024-09-06 | Stop reason: HOSPADM

## 2024-09-05 RX ORDER — ZINC OXIDE 20 G/100G
1 OINTMENT TOPICAL
Status: DISCONTINUED | OUTPATIENT
Start: 2024-09-05 | End: 2024-09-05

## 2024-09-05 RX ORDER — TAMSULOSIN HYDROCHLORIDE 0.4 MG/1
0.4 CAPSULE ORAL DAILY
Status: DISCONTINUED | OUTPATIENT
Start: 2024-09-05 | End: 2024-09-06 | Stop reason: HOSPADM

## 2024-09-05 RX ORDER — SODIUM CHLORIDE 9 MG/ML
INJECTION, SOLUTION INTRAVENOUS
Status: COMPLETED
Start: 2024-09-05 | End: 2024-09-05

## 2024-09-05 RX ORDER — PETROLATUM 420 MG/G
OINTMENT TOPICAL
Status: DISCONTINUED | OUTPATIENT
Start: 2024-09-05 | End: 2024-09-05

## 2024-09-05 RX ORDER — LIDOCAINE HYDROCHLORIDE 20 MG/ML
1.25 SOLUTION OROPHARYNGEAL AS NEEDED
Status: DISCONTINUED | OUTPATIENT
Start: 2024-09-05 | End: 2024-09-06 | Stop reason: HOSPADM

## 2024-09-05 RX ORDER — SODIUM CHLORIDE 9 MG/ML
10 INJECTION, SOLUTION INTRAVENOUS CONTINUOUS PRN
Status: DISCONTINUED | OUTPATIENT
Start: 2024-09-05 | End: 2024-09-06 | Stop reason: HOSPADM

## 2024-09-05 RX ORDER — SODIUM CHLORIDE 0.9 G/100ML
IRRIGANT IRRIGATION
Status: DISPENSED
Start: 2024-09-05 | End: 2024-09-05

## 2024-09-05 ASSESSMENT — ENCOUNTER SYMPTOMS
ENDOCRINE NEGATIVE: 1
GASTROINTESTINAL NEGATIVE: 1
CARDIOVASCULAR NEGATIVE: 1
DIFFICULTY URINATING: 1
HEMATOLOGIC/LYMPHATIC NEGATIVE: 1
WOUND: 1
PSYCHIATRIC NEGATIVE: 1
CONSTITUTIONAL NEGATIVE: 1
RESPIRATORY NEGATIVE: 1
ALLERGIC/IMMUNOLOGIC NEGATIVE: 1
NEUROLOGICAL NEGATIVE: 1
EYES NEGATIVE: 1
NECK PAIN: 1

## 2024-09-05 ASSESSMENT — COGNITIVE AND FUNCTIONAL STATUS - GENERAL
CLIMB 3 TO 5 STEPS WITH RAILING: A LITTLE
TOILETING: A LITTLE
MOBILITY SCORE: 22
DRESSING REGULAR UPPER BODY CLOTHING: A LITTLE
DAILY ACTIVITIY SCORE: 22
WALKING IN HOSPITAL ROOM: A LITTLE

## 2024-09-05 ASSESSMENT — PAIN SCALES - GENERAL
PAINLEVEL_OUTOF10: 6
PAINLEVEL_OUTOF10: 6
PAINLEVEL_OUTOF10: 0 - NO PAIN

## 2024-09-05 ASSESSMENT — PAIN - FUNCTIONAL ASSESSMENT: PAIN_FUNCTIONAL_ASSESSMENT: 0-10

## 2024-09-05 NOTE — NURSING NOTE
Patient own wound vac battery , no home supplies able to be obtained. Wound vac clear dressing and tubing changed and wound vac now connected to continuous wall suction at 120.

## 2024-09-05 NOTE — CARE PLAN
The patient's goals for the shift include  Pt will have controlled pain throughout shift.     The clinical goals for the shift include Swallow withouit hurting so much    Patient remained safe and stable throughout shift. VS were stable and pt had ice pack on neck to help with pain. Pt eating dinner in chair. Will place anders cath in after dinner. No other complaints.

## 2024-09-05 NOTE — CARE PLAN
The patient's goals for the shift include      The clinical goals for the shift include Decrease in pain    Problem: Nutrition  Goal: Less than 5 days NPO/clear liquids  Outcome: Progressing  Goal: Oral intake greater than 50%  Outcome: Progressing  Goal: Oral intake greater 75%  Outcome: Progressing  Goal: Consume prescribed supplement  Outcome: Progressing  Goal: Adequate PO fluid intake  Outcome: Progressing  Goal: Nutrition support goals are met within 48 hrs  Outcome: Progressing  Goal: Nutrition support is meeting 75% of nutrient needs  Outcome: Progressing  Goal: Tube feed tolerance  Outcome: Progressing  Goal: BG  mg/dL  Outcome: Progressing  Goal: Lab values WNL  Outcome: Progressing  Goal: Electrolytes WNL  Outcome: Progressing  Goal: Promote healing  Outcome: Progressing  Goal: Maintain stable weight  Outcome: Progressing  Goal: Reduce weight from edema/fluid  Outcome: Progressing  Goal: Gradual weight gain  Outcome: Progressing  Goal: Improve ostomy output  Outcome: Progressing     Problem: Pain - Adult  Goal: Verbalizes/displays adequate comfort level or baseline comfort level  Outcome: Progressing     Problem: Safety - Adult  Goal: Free from fall injury  Outcome: Progressing     Problem: Discharge Planning  Goal: Discharge to home or other facility with appropriate resources  Outcome: Progressing     Problem: Chronic Conditions and Co-morbidities  Goal: Patient's chronic conditions and co-morbidity symptoms are monitored and maintained or improved  Outcome: Progressing     Problem: Pain  Goal: Takes deep breaths with improved pain control throughout the shift  Outcome: Progressing  Goal: Turns in bed with improved pain control throughout the shift  Outcome: Progressing  Goal: Walks with improved pain control throughout the shift  Outcome: Progressing  Goal: Performs ADL's with improved pain control throughout shift  Outcome: Progressing  Goal: Participates in PT with improved pain control  throughout the shift  Outcome: Progressing  Goal: Free from opioid side effects throughout the shift  Outcome: Progressing  Goal: Free from acute confusion related to pain meds throughout the shift  Outcome: Progressing

## 2024-09-05 NOTE — CONSULTS
Reason For Consult  COPD Navigator    History Of Present Illness  Gaurav Mckay is a 54 y.o. male      RT bedside to discuss the COPD navigator with patient. The patient states that he did not want to go over material or questions. He agreed to accepting the booklet and material to read on his own. He was interested in getting a sleep study. We discussed the outpatient sleep lab referral on discharge and location of the [facility] sleep lab.      Allyson Dias, RRT

## 2024-09-05 NOTE — NURSING NOTE
Patient had wound Vaq dressing changed. He was incontinent of stool. Large loose stool in bed and bathroom. States he has not voided. Will bladder scan.

## 2024-09-05 NOTE — H&P
History Of Present Illness  Gaurav Mckay is a 54 y.o. male presenting with neck pain. Pt has a history of laryngeal cancer and is currently undergoing radiation therapy through [facility]. Pt presented to the ED with increasing pain to his neck. Pt also admits to being sober for ~ 11 months and recently relapsing on methamphetamines. Pt was found to have electrolyte abnormalities and admitted for further care.     ED VS: T36.8, , RR 20, /72, Sp02 100%RA    Imaging: No imaging    Labs: Glu 104, Na 137, K 2.5, Bun/creat 12/0.86, Mg 1.24, Lactate 2.9, WBC 2.5, H/H 10.1/33.0, Plt 147      Past Medical History  Past Medical History:   Diagnosis Date    Acute upper respiratory infection, unspecified 03/20/2015    Acute upper respiratory infection    Alcohol abuse, in remission 02/12/2015    History of ETOH abuse    Alcohol dependence, in remission (Multi) 05/07/2015    Alcohol dependence in remission    Allergic contact dermatitis due to plants, except food 05/21/2014    Contact dermatitis due to poison ivy    Allergy status to unspecified drugs, medicaments and biological substances 08/29/2013    History of allergy    Anxiety disorder, unspecified 03/20/2015    Anxiety    Bipolar disorder, current episode manic without psychotic features, unspecified (Multi)     Bipolar disorder, current episode manic without psychotic features    Cervicalgia     Cervicalgia    Chronic obstructive pulmonary disease, unspecified (Multi) 12/02/2014    Chronic asthmatic bronchitis    Encounter for immunization 09/22/2016    Flu vaccine need    Hypertension     Other cervical disc degeneration, unspecified cervical region     Degeneration of cervical intervertebral disc    Other conditions influencing health status 06/27/2013    Acute Inflammation Of The Orbit    Other long term (current) drug therapy 06/02/2015    High risk medication use    Other specified health status     No pertinent past surgical history    Personal history  of nicotine dependence 08/16/2017    History of tobacco abuse    Personal history of nicotine dependence 06/26/2017    History of nicotine dependence    Personal history of other diseases of the digestive system 04/11/2013    History of gastroenteritis    Personal history of other diseases of the digestive system 08/27/2015    History of esophageal reflux    Personal history of other diseases of the respiratory system 03/20/2017    History of sore throat    Personal history of other diseases of the respiratory system 02/05/2014    History of sinusitis    Personal history of other diseases of the respiratory system 01/16/2014    History of allergic rhinitis    Personal history of other diseases of the respiratory system 09/26/2013    History of acute sinusitis    Personal history of other diseases of the respiratory system 06/26/2014    History of allergic rhinitis    Personal history of other infectious and parasitic diseases     History of hepatitis C    Personal history of other mental and behavioral disorders     History of depression    Personal history of other specified conditions 08/25/2016    History of dizziness    Personal history of other specified conditions 02/16/2017    History of abdominal pain    Unspecified asthma, uncomplicated ([facility]) 02/13/2014    Asthmatic bronchitis       Surgical History  Past Surgical History:   Procedure Laterality Date    CHOLECYSTECTOMY      CT ABDOMEN PELVIS ANGIOGRAM W AND/OR WO IV CONTRAST  04/01/2020    CT ABDOMEN PELVIS ANGIOGRAM W AND/OR WO IV CONTRAST 4/1/2020 [facility]    INVASIVE VASCULAR PROCEDURE N/A 11/08/2023    Procedure: Pulmonary Angiogram;  Surgeon: Surya Templeton MD;  Location: [facility] Cardiac Cath Lab;  Service: Cardiovascular;  Laterality: N/A;    MR HEAD ANGIO WO IV CONTRAST  01/24/2016    MR HEAD ANGIO WO IV CONTRAST 1/24/2016 [facility]    MR NECK ANGIO WO IV CONTRAST  01/24/2016    MR NECK ANGIO WO IV CONTRAST 1/24/2016 [facility]  INPATIENT LEGACY        Social History  He reports that he has been smoking cigarettes. He has never used smokeless tobacco. He reports that he does not currently use alcohol. He reports current drug use. Drug: Methamphetamines.    Family History  No family history on file.     Allergies  Oxycodone    Review of Systems   Constitutional: Negative.    HENT: Negative.     Eyes: Negative.    Respiratory: Negative.     Cardiovascular: Negative.    Gastrointestinal: Negative.    Endocrine: Negative.    Genitourinary:  Positive for difficulty urinating.   Musculoskeletal:  Positive for neck pain.   Skin:  Positive for wound.   Allergic/Immunologic: Negative.    Neurological: Negative.    Hematological: Negative.    Psychiatric/Behavioral: Negative.          Physical Exam  Vitals reviewed.   Constitutional:       Appearance: He is obese.   HENT:      Head: Normocephalic and atraumatic.      Right Ear: External ear normal.      Left Ear: External ear normal.      Nose: Nose normal.      Mouth/Throat:      Pharynx: Oropharynx is clear. Posterior oropharyngeal erythema present.   Eyes:      Conjunctiva/sclera: Conjunctivae normal.   Neck:      Comments: Open wound right side of neck from radiation   Cardiovascular:      Rate and Rhythm: Normal rate and regular rhythm.      Pulses: Normal pulses.      Heart sounds: Normal heart sounds.   Pulmonary:      Effort: Pulmonary effort is normal.      Breath sounds: Normal breath sounds.   Abdominal:      General: Bowel sounds are normal.      Palpations: Abdomen is soft.   Musculoskeletal:         General: Normal range of motion.   Skin:     General: Skin is dry.   Neurological:      General: No focal deficit present.      Mental Status: He is alert and oriented to person, place, and time.   Psychiatric:         Mood and Affect: Mood normal.         Behavior: Behavior normal.          Last Recorded Vitals  Blood pressure 113/66, pulse 86, temperature 36.4 °C (97.5 °F), temperature  "source Temporal, resp. rate 17, height 1.83 m (6' 0.05\"), weight 100 kg (221 lb 5.5 oz), SpO2 98%.    Relevant Results  Scheduled medications  apixaban, 5 mg, oral, BID  atorvastatin, 20 mg, oral, Nightly  benztropine, 0.5 mg, oral, BID  calcium carbonate-vitamin D3, 1 tablet, oral, BID  celecoxib, 200 mg, oral, Daily  docusate sodium, 100 mg, oral, BID  DULoxetine, 30 mg, oral, Nightly  DULoxetine, 90 mg, oral, q AM  influenza, 0.5 mL, intramuscular, During hospitalization  lidocaine, 1 patch, transdermal, Daily  magnesium oxide, 400 mg, oral, BID  methylPREDNISolone sodium succinate (PF), 40 mg, intravenous, q8h  metoprolol tartrate, 25 mg, oral, Daily  montelukast, 10 mg, oral, Daily  oxygen, , inhalation, Continuous - Inhalation  pantoprazole, 40 mg, oral, Daily before breakfast   Or  pantoprazole, 40 mg, intravenous, Daily before breakfast  potassium chloride CR, 40 mEq, oral, Daily  pregabalin, 200 mg, oral, TID  risperiDONE, 1.5 mg, oral, Nightly  rOPINIRole, 0.5 mg, oral, TID  sennosides-docusate sodium, 1 tablet, oral, Nightly  sodium chloride, , ,   tamsulosin, 0.4 mg, oral, Daily  topiramate, 100 mg, oral, BID  traZODone, 50 mg, oral, Nightly      Continuous medications  sodium chloride 0.9%, 75 mL/hr, Last Rate: 125 mL/hr (09/05/24 0818)  sodium chloride 0.9%, 10 mL/hr      PRN medications  PRN medications: acetaminophen **OR** [DISCONTINUED] acetaminophen **OR** [DISCONTINUED] acetaminophen, acetaminophen **OR** [DISCONTINUED] acetaminophen **OR** [DISCONTINUED] acetaminophen, albuterol, calcium carbonate, cyclobenzaprine, lidocaine, melatonin, nicotine polacrilex, QUEtiapine, sodium chloride 0.9%, sodium chloride, traMADol    Results for orders placed or performed during the hospital encounter of 09/04/24 (from the past 24 hour(s))   Blood Culture    Specimen: Peripheral Venipuncture; Blood culture   Result Value Ref Range    Blood Culture Loaded on Instrument - Culture in progress    CBC with " Differential   Result Value Ref Range    WBC 2.5 (L) 4.4 - 11.3 x10*3/uL    nRBC 0.0 0.0 - 0.0 /100 WBCs    RBC 4.17 (L) 4.50 - 5.90 x10*6/uL    Hemoglobin 10.1 (L) 13.5 - 17.5 g/dL    Hematocrit 33.0 (L) 41.0 - 52.0 %    MCV 79 (L) 80 - 100 fL    MCH 24.2 (L) 26.0 - 34.0 pg    MCHC 30.6 (L) 32.0 - 36.0 g/dL    RDW 19.6 (H) 11.5 - 14.5 %    Platelets 147 (L) 150 - 450 x10*3/uL    Neutrophils % 52.1 40.0 - 80.0 %    Immature Granulocytes %, Automated 0.4 0.0 - 0.9 %    Lymphocytes % 24.6 13.0 - 44.0 %    Monocytes % 18.1 2.0 - 10.0 %    Eosinophils % 3.6 0.0 - 6.0 %    Basophils % 1.2 0.0 - 2.0 %    Neutrophils Absolute 1.29 1.20 - 7.70 x10*3/uL    Immature Granulocytes Absolute, Automated 0.01 0.00 - 0.70 x10*3/uL    Lymphocytes Absolute 0.61 (L) 1.20 - 4.80 x10*3/uL    Monocytes Absolute 0.45 0.10 - 1.00 x10*3/uL    Eosinophils Absolute 0.09 0.00 - 0.70 x10*3/uL    Basophils Absolute 0.03 0.00 - 0.10 x10*3/uL   Comprehensive Metabolic Panel   Result Value Ref Range    Glucose 104 (H) 74 - 99 mg/dL    Sodium 137 136 - 145 mmol/L    Potassium 2.5 (LL) 3.5 - 5.3 mmol/L    Chloride 101 98 - 107 mmol/L    Bicarbonate 20 (L) 21 - 32 mmol/L    Anion Gap 19 10 - 20 mmol/L    Urea Nitrogen 12 6 - 23 mg/dL    Creatinine 0.86 0.50 - 1.30 mg/dL    eGFR >90 >60 mL/min/1.73m*2    Calcium 9.4 8.6 - 10.3 mg/dL    Albumin 3.7 3.4 - 5.0 g/dL    Alkaline Phosphatase 123 (H) 33 - 120 U/L    Total Protein 7.0 6.4 - 8.2 g/dL    AST 14 9 - 39 U/L    Bilirubin, Total 0.5 0.0 - 1.2 mg/dL    ALT 11 10 - 52 U/L   Lactate   Result Value Ref Range    Lactate 2.9 (H) 0.4 - 2.0 mmol/L   Ethanol   Result Value Ref Range    Alcohol <10 <=10 mg/dL   SST TOP   Result Value Ref Range    Extra Tube Hold for add-ons.    Magnesium   Result Value Ref Range    Magnesium 1.24 (L) 1.60 - 2.40 mg/dL   ECG 12 lead   Result Value Ref Range    Ventricular Rate 86 BPM    Atrial Rate 86 BPM    NY Interval 130 ms    QRS Duration 90 ms    QT Interval 468 ms    QTC  Calculation(Bazett) 560 ms    P Axis 31 degrees    R Axis 18 degrees    T Axis 29 degrees    QRS Count 14 beats    Q Onset 221 ms    P Onset 156 ms    P Offset 207 ms    T Offset 455 ms    QTC Fredericia 528 ms   Lactate   Result Value Ref Range    Lactate 2.0 0.4 - 2.0 mmol/L   Basic metabolic panel   Result Value Ref Range    Glucose 93 74 - 99 mg/dL    Sodium 139 136 - 145 mmol/L    Potassium 3.5 3.5 - 5.3 mmol/L    Chloride 107 98 - 107 mmol/L    Bicarbonate 24 21 - 32 mmol/L    Anion Gap 12 10 - 20 mmol/L    Urea Nitrogen 10 6 - 23 mg/dL    Creatinine 0.73 0.50 - 1.30 mg/dL    eGFR >90 >60 mL/min/1.73m*2    Calcium 8.7 8.6 - 10.3 mg/dL   CBC   Result Value Ref Range    WBC 2.0 (L) 4.4 - 11.3 x10*3/uL    nRBC 0.0 0.0 - 0.0 /100 WBCs    RBC 3.47 (L) 4.50 - 5.90 x10*6/uL    Hemoglobin 8.5 (L) 13.5 - 17.5 g/dL    Hematocrit 28.3 (L) 41.0 - 52.0 %    MCV 82 80 - 100 fL    MCH 24.5 (L) 26.0 - 34.0 pg    MCHC 30.0 (L) 32.0 - 36.0 g/dL    RDW 19.4 (H) 11.5 - 14.5 %    Platelets 119 (L) 150 - 450 x10*3/uL   Magnesium   Result Value Ref Range    Magnesium 1.68 1.60 - 2.40 mg/dL        Assessment/Plan   Assessment & Plan  Hypokalemia    Hypomagnesemia    Laryngeal cancer (Multi)    Pain after radiation therapy    Neck pain    Radiation burn    Pancytopenia (Multi)    COPD (chronic obstructive pulmonary disease) (Multi)    Acute hypoxic respiratory failure (Multi)    BPH (benign prostatic hyperplasia)    RLS (restless legs syndrome)    Chronic saddle pulmonary embolism 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 (Multi)    HTN (hypertension)    Other hyperlipidemia    Substance induced mood disorder (Multi)    Polysubstance abuse (Multi)      #Hypokalemia, resolved   #Hypomagnesia, resolved   -K 2.5 > 3.5   -Mg 1.24 > 1.68  -Repleted per protocol  -Daily BMP  -Daily Mg     #Laryngeal CA with radiation  #Neck pain  #Radiation burn of the neck   -Blood cx pending   -Currently undergoing radiation therapy at [facility]   -Aquaphor PRN to neck per  [facility] recs   -IV solumedrol q8 for inflammation  -Dietician consult  -Lidocaine mouth solution for pain  -Tramadol PRN     #Pancytopenia   -WBC 2.0  -H.H 8.5/28.3  -Plt 119   -Daily CBC   -Monitor for s/s infection, bleeding     #COPD, not in acute exacerbation  #Acute hypoxic respiratory failure   -Supplemental oxygen to maintain an sp02 greater than 92%  -Currently on 2L  -Baseline RA  -Continue montelukast     #BPH  -Bladder scan PRN  -Continue tamsulosin     #RLS  -Continue ropinirole     #Hx of saddle PE   #Essential HTN   #HLD  -Continue apixaban, atorvastatin, metoprolol     #Substance induced mood disorder   #Polysubstance abuse  -Recently sober x11 months; recently relapsed on meth   -Tox screen pending   -Encourage cessation   -Continue duloxetine, pregabalin, risperidone, trazodone, topiramate     DVT ppx  -apixaban    PUD ppx  -pantoprazole    F: NS @ 75ml/hr  E: Replete per protocol  N: Regular  A: PIV    Disposition: Pt requires less than 2 inpatient days at this time   Code Status: Full Code       ANASTACIA Bang-CNP  Attending Attestation:    I was present with the APRN-CNP who participated in the documentation of this note. I have personally seen and re-examined the patient and performed the medical decision-making components (assessment and plan of care). I have reviewed the documentation and verified the findings in the note as written with additions or exceptions as stated in the body of this note.     Dr. Caterina Guthrie MD  Internal Medicine

## 2024-09-05 NOTE — PROGRESS NOTES
09/05/24 1336   Discharge Planning   Living Arrangements Spouse/significant other   Support Systems Spouse/significant other   Assistance Needed [facility] for wound vac care.   Type of Residence Private residence   Home or Post Acute Services In home services   Type of Home Care Services Home PT;Home OT;Home nursing visits   Expected Discharge Disposition  Services   Does the patient need discharge transport arranged? No     Met with patient at bedside to discuss discharge planning, patient has difficulty speaking due to laryngeal cancer, patient admitted for right side radiation wound/pain. Called contact to discuss discharge planning, unable to leave message,not accepting calls, patient did say he is active with [facility]  for wound vac care, preference list given for [facility], patient chose [facility]. Called and left message with Alem at [facility] to verify if patient is active with them, referral sent via [email or web address].

## 2024-09-06 VITALS
HEIGHT: 72 IN | WEIGHT: 221.34 LBS | SYSTOLIC BLOOD PRESSURE: 118 MMHG | DIASTOLIC BLOOD PRESSURE: 62 MMHG | HEART RATE: 69 BPM | OXYGEN SATURATION: 96 % | BODY MASS INDEX: 29.98 KG/M2 | RESPIRATION RATE: 17 BRPM | TEMPERATURE: 97.7 F

## 2024-09-06 LAB
ANION GAP SERPL CALC-SCNC: 14 MMOL/L (ref 10–20)
BUN SERPL-MCNC: 10 MG/DL (ref 6–23)
CALCIUM SERPL-MCNC: 8.8 MG/DL (ref 8.6–10.3)
CHLORIDE SERPL-SCNC: 107 MMOL/L (ref 98–107)
CO2 SERPL-SCNC: 20 MMOL/L (ref 21–32)
CREAT SERPL-MCNC: 0.59 MG/DL (ref 0.5–1.3)
EGFRCR SERPLBLD CKD-EPI 2021: >90 ML/MIN/1.73M*2
ERYTHROCYTE [DISTWIDTH] IN BLOOD BY AUTOMATED COUNT: 19.3 % (ref 11.5–14.5)
GLUCOSE SERPL-MCNC: 143 MG/DL (ref 74–99)
HCT VFR BLD AUTO: 29.2 % (ref 41–52)
HGB BLD-MCNC: 8.6 G/DL (ref 13.5–17.5)
HOLD SPECIMEN: NORMAL
MCH RBC QN AUTO: 24.6 PG (ref 26–34)
MCHC RBC AUTO-ENTMCNC: 29.5 G/DL (ref 32–36)
MCV RBC AUTO: 83 FL (ref 80–100)
NRBC BLD-RTO: 0 /100 WBCS (ref 0–0)
PLATELET # BLD AUTO: 128 X10*3/UL (ref 150–450)
POTASSIUM SERPL-SCNC: 4 MMOL/L (ref 3.5–5.3)
RBC # BLD AUTO: 3.5 X10*6/UL (ref 4.5–5.9)
SODIUM SERPL-SCNC: 137 MMOL/L (ref 136–145)
WBC # BLD AUTO: 4.8 X10*3/UL (ref 4.4–11.3)

## 2024-09-06 PROCEDURE — 36415 COLL VENOUS BLD VENIPUNCTURE: CPT | Performed by: NURSE PRACTITIONER

## 2024-09-06 PROCEDURE — 99239 HOSP IP/OBS DSCHRG MGMT >30: CPT

## 2024-09-06 PROCEDURE — G0378 HOSPITAL OBSERVATION PER HR: HCPCS

## 2024-09-06 PROCEDURE — 2500000001 HC RX 250 WO HCPCS SELF ADMINISTERED DRUGS (ALT 637 FOR MEDICARE OP): Mod: SE | Performed by: NURSE PRACTITIONER

## 2024-09-06 PROCEDURE — 2500000002 HC RX 250 W HCPCS SELF ADMINISTERED DRUGS (ALT 637 FOR MEDICARE OP, ALT 636 FOR OP/ED): Mod: SE | Performed by: NURSE PRACTITIONER

## 2024-09-06 PROCEDURE — 2500000004 HC RX 250 GENERAL PHARMACY W/ HCPCS (ALT 636 FOR OP/ED): Mod: SE

## 2024-09-06 PROCEDURE — 80048 BASIC METABOLIC PNL TOTAL CA: CPT | Performed by: NURSE PRACTITIONER

## 2024-09-06 PROCEDURE — 2500000001 HC RX 250 WO HCPCS SELF ADMINISTERED DRUGS (ALT 637 FOR MEDICARE OP): Mod: SE

## 2024-09-06 PROCEDURE — 2500000002 HC RX 250 W HCPCS SELF ADMINISTERED DRUGS (ALT 637 FOR MEDICARE OP, ALT 636 FOR OP/ED): Mod: SE

## 2024-09-06 PROCEDURE — 85027 COMPLETE CBC AUTOMATED: CPT | Performed by: NURSE PRACTITIONER

## 2024-09-06 PROCEDURE — 96376 TX/PRO/DX INJ SAME DRUG ADON: CPT

## 2024-09-06 RX ORDER — METHYLPREDNISOLONE 4 MG/1
TABLET ORAL
Qty: 21 TABLET | Refills: 0 | Status: ON HOLD | OUTPATIENT
Start: 2024-09-06 | End: 2024-09-13

## 2024-09-06 RX ORDER — PETROLATUM 420 MG/G
1 OINTMENT TOPICAL
Status: ON HOLD
Start: 2024-09-06 | End: 2024-11-05

## 2024-09-06 RX ORDER — TAMSULOSIN HYDROCHLORIDE 0.4 MG/1
0.4 CAPSULE ORAL DAILY
Status: ON HOLD
Start: 2024-09-06 | End: 2024-11-05

## 2024-09-06 ASSESSMENT — PAIN SCALES - GENERAL
PAINLEVEL_OUTOF10: 7
PAINLEVEL_OUTOF10: 3

## 2024-09-06 ASSESSMENT — COGNITIVE AND FUNCTIONAL STATUS - GENERAL
TOILETING: A LITTLE
CLIMB 3 TO 5 STEPS WITH RAILING: A LOT
MOBILITY SCORE: 19
DRESSING REGULAR LOWER BODY CLOTHING: A LITTLE
DRESSING REGULAR UPPER BODY CLOTHING: A LITTLE
WALKING IN HOSPITAL ROOM: A LITTLE
HELP NEEDED FOR BATHING: A LITTLE
MOVING TO AND FROM BED TO CHAIR: A LITTLE
STANDING UP FROM CHAIR USING ARMS: A LITTLE
DAILY ACTIVITIY SCORE: 20

## 2024-09-06 ASSESSMENT — PAIN DESCRIPTION - LOCATION: LOCATION: NECK

## 2024-09-06 ASSESSMENT — PAIN DESCRIPTION - ORIENTATION: ORIENTATION: RIGHT;LEFT

## 2024-09-06 NOTE — DISCHARGE SUMMARY
Discharge Diagnosis  Hypokalemia    Issues Requiring Follow-Up  Follow up with PCP, Oncology  Resume [facility]    Discharge Meds     Medication List      START taking these medications     magnesium oxide 240 mg magnesium powder in packet; Take 1 packet by   mouth 2 times a day for 14 days.   methylPREDNISolone 4 mg tablets; Commonly known as: Medrol Dospak;   Follow schedule on package instructions   potassium chloride 20 mEq packet; Commonly known as: Klor-Con; Take 20   mEq by mouth 2 times a day for 14 days.; Replaces: potassium chloride ER   10 mEq ER capsule   tamsulosin 0.4 mg 24 hr capsule; Commonly known as: Flomax; Take 1   capsule (0.4 mg) by mouth once daily.   white petrolatum 41 % ointment ointment; Commonly known as: Aquaphor;   Apply 1 Application topically every 1 hour if needed (neck burn).     CHANGE how you take these medications     ipratropium-albuteroL 0.5-2.5 mg/3 mL nebulizer solution; Commonly known   as: Duo-Neb; Take 3 mL by nebulization every 2 hours if needed for   wheezing.; What changed: Another medication with the same name was   removed. Continue taking this medication, and follow the directions you   see here.     CONTINUE taking these medications     acetaminophen 325 mg tablet; Commonly known as: Tylenol; Take 2 tablets   (650 mg) by mouth every 6 hours if needed for mild pain (1 - 3).   apixaban 5 mg tablet; Commonly known as: Eliquis; Take 1 tablet (5 mg)   by mouth 2 times a day. Do not start before November 16, 2023.   atorvastatin 20 mg tablet; Commonly known as: Lipitor   benztropine 0.5 mg tablet; Commonly known as: Cogentin   calcium carbonate-vitamin D3 500 mg-5 mcg (200 unit) tablet   celecoxib 200 mg capsule; Commonly known as: CeleBREX   cyclobenzaprine 10 mg tablet; Commonly known as: Flexeril   disulfiram 250 mg tablet; Commonly known as: Antabuse   docusate sodium 100 mg capsule; Commonly known as: Colace   * DULoxetine 60 mg DR capsule; Commonly known as: Cymbalta   *  DULoxetine 30 mg DR capsule; Commonly known as: Cymbalta   ibuprofen 600 mg tablet; Take 1 tablet (600 mg) by mouth every 8 hours   if needed for mild pain (1 - 3) or fever (temp greater than 38.0 C).   melatonin 10 mg tablet,disintegrating   metoprolol tartrate 25 mg tablet; Commonly known as: Lopressor   montelukast 10 mg tablet; Commonly known as: Singulair   nicotine polacrilex 2 mg lozenge; Commonly known as: Commit   omeprazole 40 mg DR capsule; Commonly known as: PriLOSEC   pregabalin 200 mg capsule; Commonly known as: Lyrica   Proventil HFA 90 mcg/actuation inhaler; Generic drug: albuterol   QUEtiapine 25 mg tablet; Commonly known as: SEROquel   risperiDONE 1 mg tablet; Commonly known as: RisperDAL   rOPINIRole 0.5 mg tablet; Commonly known as: Requip   sennosides-docusate sodium 8.6-50 mg tablet; Commonly known as:   Marya-Colace; Take 1 tablet by mouth once daily at bedtime.   simvastatin 40 mg tablet; Commonly known as: Zocor   topiramate 100 mg tablet; Commonly known as: Topamax   traZODone 50 mg tablet; Commonly known as: Desyrel  * This list has 2 medication(s) that are the same as other medications   prescribed for you. Read the directions carefully, and ask your doctor or   other care provider to review them with you.     STOP taking these medications     lidocaine 5 % patch; Commonly known as: Lidoderm   methocarbamol 500 mg tablet; Commonly known as: Robaxin   potassium chloride ER 10 mEq ER capsule; Commonly known as: Micro-K;   Replaced by: potassium chloride 20 mEq packet       Test Results Pending At Discharge  Pending Labs       Order Current Status    Extra Tubes In process    SST TOP In process    Blood Culture Preliminary result            Hospital Course   Gaurav Mckay is a 54 y.o. male presenting with neck pain. Pt has a history of laryngeal cancer and is currently undergoing radiation therapy through [facility]. Pt presented to the ED with increasing pain to his neck. Pt also admits to  being sober for ~ 11 months and recently relapsing on methamphetamines. Pt was found to have electrolyte abnormalities and admitted for further care.      ED VS: T36.8, , RR 20, /72, Sp02 100%RA     Imaging: No imaging     Labs: Glu 104, Na 137, K 2.5, Bun/creat 12/0.86, Mg 1.24, Lactate 2.9, WBC 2.5, H/H 10.1/33.0, Plt 147     Hospital Course:  #Hypokalemia, resolved   #Hypomagnesia, resolved   -K 2.5 > 3.5   -Mg 1.24 > 1.68  -Repleted per protocol  -Daily BMP  -Daily Mg      #Laryngeal CA with radiation  #Neck pain  #Radiation burn of the neck   -Blood cx pending   -Currently undergoing radiation therapy at [facility]   -Aquaphor PRN to neck per [facility] recs   -IV solumedrol q8 for inflammation  -Dietician consult  -Lidocaine mouth solution for pain  -Tramadol PRN      #Pancytopenia   -WBC 2.0  -H.H 8.5/28.3  -Plt 119   -Daily CBC   -Monitor for s/s infection, bleeding      #COPD, not in acute exacerbation  #Acute hypoxic respiratory failure   -Supplemental oxygen to maintain an sp02 greater than 92%  -Currently on 2L  -Baseline RA  -Continue montelukast      #BPH  -Bladder scan PRN  -Continue tamsulosin      #RLS  -Continue ropinirole      #Hx of saddle PE   #Essential HTN   #HLD  -Continue apixaban, atorvastatin, metoprolol      #Substance induced mood disorder   #Polysubstance abuse  -Recently sober x11 months; recently relapsed on meth   -Tox screen pending   -Encourage cessation   -Continue duloxetine, pregabalin, risperidone, trazodone, topiramate      DVT ppx  -apixaban     PUD ppx  -pantoprazole     F: NS @ 75ml/hr  E: Replete per protocol  N: Regular  A: PIV     Disposition: Pt  stable for discharge home. Will continue on oral k and mag for 14 days. Will continue with home care for wound vac changes. Will follow up with oncology     Code Status: Full Code    Total cumulative time spent in preparation of this discharge including documentation review, coordination of care with the medical team including  PT/SW/care coordinators and treating consultants, discussion with patient and pertinent family members and finalization of prescriptions, followup appointments and this discharge summary was approximately  45 minutes. Over 50% of the time was spent face-to-face counseling the patient on diagnosis, prescriptions, and follow up appointments.     Pertinent Physical Exam At Time of Discharge  Physical Exam  Vitals reviewed.   Constitutional:       Appearance: He is obese.   HENT:      Head: Normocephalic and atraumatic.      Right Ear: External ear normal.      Left Ear: External ear normal.      Nose: Nose normal.      Mouth/Throat:      Pharynx: Oropharynx is clear. Posterior oropharyngeal erythema present.   Eyes:      Conjunctiva/sclera: Conjunctivae normal.   Neck:      Comments: Open wound right side of neck from radiation   Cardiovascular:      Rate and Rhythm: Normal rate and regular rhythm.      Pulses: Normal pulses.      Heart sounds: Normal heart sounds.   Pulmonary:      Effort: Pulmonary effort is normal.      Breath sounds: Normal breath sounds.   Abdominal:      General: Bowel sounds are normal.      Palpations: Abdomen is soft.   Musculoskeletal:         General: Normal range of motion.   Skin:     General: Skin is dry.   Neurological:      General: No focal deficit present.      Mental Status: He is alert and oriented to person, place, and time.   Psychiatric:         Mood and Affect: Mood normal.         Behavior: Behavior normal.     Outpatient Follow-Up  No future appointments.      ANASTACIA Bang-ZEYNEP

## 2024-09-06 NOTE — CARE PLAN
The clinical goals for the shift include Decrease in pain this shift      Problem: Nutrition  Goal: Less than 5 days NPO/clear liquids  Outcome: Progressing  Goal: Oral intake greater than 50%  Outcome: Progressing  Goal: Oral intake greater 75%  Outcome: Progressing  Goal: Consume prescribed supplement  Outcome: Progressing  Goal: Adequate PO fluid intake  Outcome: Progressing  Goal: Nutrition support goals are met within 48 hrs  Outcome: Progressing  Goal: Nutrition support is meeting 75% of nutrient needs  Outcome: Progressing  Goal: Tube feed tolerance  Outcome: Progressing  Goal: BG  mg/dL  Outcome: Progressing  Goal: Lab values WNL  Outcome: Progressing  Goal: Electrolytes WNL  Outcome: Progressing  Goal: Promote healing  Outcome: Progressing  Goal: Maintain stable weight  Outcome: Progressing  Goal: Reduce weight from edema/fluid  Outcome: Progressing  Goal: Gradual weight gain  Outcome: Progressing  Goal: Improve ostomy output  Outcome: Progressing     Problem: Pain - Adult  Goal: Verbalizes/displays adequate comfort level or baseline comfort level  Outcome: Progressing     Problem: Safety - Adult  Goal: Free from fall injury  Outcome: Progressing     Problem: Discharge Planning  Goal: Discharge to home or other facility with appropriate resources  Outcome: Progressing     Problem: Chronic Conditions and Co-morbidities  Goal: Patient's chronic conditions and co-morbidity symptoms are monitored and maintained or improved  Outcome: Progressing     Problem: Pain  Goal: Takes deep breaths with improved pain control throughout the shift  Outcome: Progressing  Goal: Turns in bed with improved pain control throughout the shift  Outcome: Progressing  Goal: Walks with improved pain control throughout the shift  Outcome: Progressing  Goal: Performs ADL's with improved pain control throughout shift  Outcome: Progressing  Goal: Participates in PT with improved pain control throughout the shift  Outcome:  Progressing  Goal: Free from opioid side effects throughout the shift  Outcome: Progressing  Goal: Free from acute confusion related to pain meds throughout the shift  Outcome: Progressing   Feeling much better today. Able to swallow and tolerate breakfast. Wound on neck not as red or weepy as the day of admission. Patient will have to use shuttle for discharge as he has no transport.

## 2024-09-06 NOTE — NURSING NOTE
Out of bed in chair. Dressing dry and intact. Patient to be discharged today . Shuttle ordered for 1.30. Patient will change dressing when he gets home as he does not have the tubing etc that goes with his machine. Up ambulating to bathroom. Wound on neck not as red as admission date. Ice packs as ordered.

## 2024-09-06 NOTE — PROGRESS NOTES
09/06/24 1252   Discharge Planning   Living Arrangements Spouse/significant other   Support Systems Spouse/significant other   Assistance Needed resume [facility] on Sunday for wound vac change.   Type of Residence Private residence   Home or Post Acute Services In home services   Type of Home Care Services Home PT;Home OT;Home nursing visits   Expected Discharge Disposition  Services   Does the patient need discharge transport arranged? No     Final orders sent to [facility] to resume services, [facility] accepted and will resume services Sunday 9/8, Patient aware.

## 2024-09-06 NOTE — CARE PLAN
The patient's goals for the shift include      The clinical goals for the shift include Swallow withouit hurting so much    Problem: Nutrition  Goal: Less than 5 days NPO/clear liquids  Outcome: Progressing  Goal: Oral intake greater than 50%  Outcome: Progressing  Goal: Oral intake greater 75%  Outcome: Progressing  Goal: Consume prescribed supplement  Outcome: Progressing  Goal: Adequate PO fluid intake  Outcome: Progressing  Goal: Nutrition support goals are met within 48 hrs  Outcome: Progressing  Goal: Nutrition support is meeting 75% of nutrient needs  Outcome: Progressing  Goal: Tube feed tolerance  Outcome: Progressing  Goal: BG  mg/dL  Outcome: Progressing  Goal: Lab values WNL  Outcome: Progressing  Goal: Electrolytes WNL  Outcome: Progressing  Goal: Promote healing  Outcome: Progressing  Goal: Maintain stable weight  Outcome: Progressing  Goal: Reduce weight from edema/fluid  Outcome: Progressing  Goal: Gradual weight gain  Outcome: Progressing  Goal: Improve ostomy output  Outcome: Progressing     Problem: Pain - Adult  Goal: Verbalizes/displays adequate comfort level or baseline comfort level  Outcome: Progressing     Problem: Safety - Adult  Goal: Free from fall injury  Outcome: Progressing     Problem: Discharge Planning  Goal: Discharge to home or other facility with appropriate resources  Outcome: Progressing     Problem: Chronic Conditions and Co-morbidities  Goal: Patient's chronic conditions and co-morbidity symptoms are monitored and maintained or improved  Outcome: Progressing     Problem: Pain  Goal: Takes deep breaths with improved pain control throughout the shift  Outcome: Progressing  Goal: Turns in bed with improved pain control throughout the shift  Outcome: Progressing  Goal: Walks with improved pain control throughout the shift  Outcome: Progressing  Goal: Performs ADL's with improved pain control throughout shift  Outcome: Progressing  Goal: Participates in PT with improved pain  control throughout the shift  Outcome: Progressing  Goal: Free from opioid side effects throughout the shift  Outcome: Progressing  Goal: Free from acute confusion related to pain meds throughout the shift  Outcome: Progressing

## 2024-09-08 ENCOUNTER — APPOINTMENT (OUTPATIENT)
Dept: CARDIOLOGY | Facility: HOSPITAL | Age: 54
End: 2024-09-08
Payer: COMMERCIAL

## 2024-09-08 ENCOUNTER — HOSPITAL ENCOUNTER (INPATIENT)
Facility: HOSPITAL | Age: 54
LOS: 1 days | Discharge: ANOTHER HOSPITAL | End: 2024-09-10
Attending: INTERNAL MEDICINE | Admitting: INTERNAL MEDICINE
Payer: COMMERCIAL

## 2024-09-08 ENCOUNTER — APPOINTMENT (OUTPATIENT)
Dept: RADIOLOGY | Facility: HOSPITAL | Age: 54
End: 2024-09-08
Payer: COMMERCIAL

## 2024-09-08 ENCOUNTER — HOSPITAL ENCOUNTER (EMERGENCY)
Facility: HOSPITAL | Age: 54
Discharge: OTHER INSTITUTION | End: 2024-09-08
Attending: EMERGENCY MEDICINE
Payer: COMMERCIAL

## 2024-09-08 VITALS
OXYGEN SATURATION: 99 % | WEIGHT: 218 LBS | HEIGHT: 72 IN | DIASTOLIC BLOOD PRESSURE: 79 MMHG | TEMPERATURE: 97 F | SYSTOLIC BLOOD PRESSURE: 143 MMHG | RESPIRATION RATE: 17 BRPM | BODY MASS INDEX: 29.53 KG/M2 | HEART RATE: 73 BPM

## 2024-09-08 DIAGNOSIS — R26.81 GAIT INSTABILITY: ICD-10-CM

## 2024-09-08 DIAGNOSIS — M79.661 PAIN OF RIGHT LOWER LEG: ICD-10-CM

## 2024-09-08 DIAGNOSIS — Z78.9 IMPAIRED MOBILITY AND ADLS: ICD-10-CM

## 2024-09-08 DIAGNOSIS — R55 SYNCOPE, UNSPECIFIED SYNCOPE TYPE: Primary | ICD-10-CM

## 2024-09-08 DIAGNOSIS — Z74.09 IMPAIRED MOBILITY AND ADLS: ICD-10-CM

## 2024-09-08 DIAGNOSIS — C32.9 LARYNGEAL CANCER (MULTI): ICD-10-CM

## 2024-09-08 DIAGNOSIS — E87.6 HYPOKALEMIA: ICD-10-CM

## 2024-09-08 DIAGNOSIS — E83.42 HYPOMAGNESEMIA: ICD-10-CM

## 2024-09-08 DIAGNOSIS — R55 SYNCOPE AND COLLAPSE: Primary | ICD-10-CM

## 2024-09-08 LAB
ALBUMIN SERPL BCP-MCNC: 3.8 G/DL (ref 3.4–5)
ALP SERPL-CCNC: 139 U/L (ref 33–120)
ALT SERPL W P-5'-P-CCNC: 10 U/L (ref 10–52)
ANION GAP SERPL CALC-SCNC: 15 MMOL/L (ref 10–20)
AST SERPL W P-5'-P-CCNC: 12 U/L (ref 9–39)
BACTERIA BLD CULT: NORMAL
BASOPHILS # BLD AUTO: 0.04 X10*3/UL (ref 0–0.1)
BASOPHILS NFR BLD AUTO: 1.3 %
BILIRUB SERPL-MCNC: 0.3 MG/DL (ref 0–1.2)
BUN SERPL-MCNC: 9 MG/DL (ref 6–23)
CALCIUM SERPL-MCNC: 9.5 MG/DL (ref 8.6–10.3)
CARDIAC TROPONIN I PNL SERPL HS: 3 NG/L (ref 0–20)
CARDIAC TROPONIN I PNL SERPL HS: 4 NG/L (ref 0–20)
CHLORIDE SERPL-SCNC: 102 MMOL/L (ref 98–107)
CO2 SERPL-SCNC: 27 MMOL/L (ref 21–32)
CREAT SERPL-MCNC: 0.75 MG/DL (ref 0.5–1.3)
EGFRCR SERPLBLD CKD-EPI 2021: >90 ML/MIN/1.73M*2
EOSINOPHIL # BLD AUTO: 0.11 X10*3/UL (ref 0–0.7)
EOSINOPHIL NFR BLD AUTO: 3.5 %
ERYTHROCYTE [DISTWIDTH] IN BLOOD BY AUTOMATED COUNT: 20.3 % (ref 11.5–14.5)
GLUCOSE SERPL-MCNC: 112 MG/DL (ref 74–99)
HCT VFR BLD AUTO: 37.6 % (ref 41–52)
HGB BLD-MCNC: 11.6 G/DL (ref 13.5–17.5)
IMM GRANULOCYTES # BLD AUTO: 0.01 X10*3/UL (ref 0–0.7)
IMM GRANULOCYTES NFR BLD AUTO: 0.3 % (ref 0–0.9)
LYMPHOCYTES # BLD AUTO: 0.76 X10*3/UL (ref 1.2–4.8)
LYMPHOCYTES NFR BLD AUTO: 24.4 %
MAGNESIUM SERPL-MCNC: 1.43 MG/DL (ref 1.6–2.4)
MCH RBC QN AUTO: 25.1 PG (ref 26–34)
MCHC RBC AUTO-ENTMCNC: 30.9 G/DL (ref 32–36)
MCV RBC AUTO: 81 FL (ref 80–100)
MONOCYTES # BLD AUTO: 0.31 X10*3/UL (ref 0.1–1)
MONOCYTES NFR BLD AUTO: 9.9 %
NEUTROPHILS # BLD AUTO: 1.89 X10*3/UL (ref 1.2–7.7)
NEUTROPHILS NFR BLD AUTO: 60.6 %
NRBC BLD-RTO: 0 /100 WBCS (ref 0–0)
PLATELET # BLD AUTO: 174 X10*3/UL (ref 150–450)
POTASSIUM SERPL-SCNC: 3.3 MMOL/L (ref 3.5–5.3)
PROT SERPL-MCNC: 7 G/DL (ref 6.4–8.2)
RBC # BLD AUTO: 4.63 X10*6/UL (ref 4.5–5.9)
SODIUM SERPL-SCNC: 141 MMOL/L (ref 136–145)
WBC # BLD AUTO: 3.1 X10*3/UL (ref 4.4–11.3)

## 2024-09-08 PROCEDURE — 96376 TX/PRO/DX INJ SAME DRUG ADON: CPT

## 2024-09-08 PROCEDURE — 2500000004 HC RX 250 GENERAL PHARMACY W/ HCPCS (ALT 636 FOR OP/ED): Mod: SE | Performed by: EMERGENCY MEDICINE

## 2024-09-08 PROCEDURE — 84484 ASSAY OF TROPONIN QUANT: CPT | Performed by: EMERGENCY MEDICINE

## 2024-09-08 PROCEDURE — 96361 HYDRATE IV INFUSION ADD-ON: CPT

## 2024-09-08 PROCEDURE — G0378 HOSPITAL OBSERVATION PER HR: HCPCS

## 2024-09-08 PROCEDURE — 93005 ELECTROCARDIOGRAM TRACING: CPT

## 2024-09-08 PROCEDURE — 80053 COMPREHEN METABOLIC PANEL: CPT | Performed by: EMERGENCY MEDICINE

## 2024-09-08 PROCEDURE — 73590 X-RAY EXAM OF LOWER LEG: CPT | Mod: RIGHT SIDE | Performed by: RADIOLOGY

## 2024-09-08 PROCEDURE — 73630 X-RAY EXAM OF FOOT: CPT | Mod: RT

## 2024-09-08 PROCEDURE — 83735 ASSAY OF MAGNESIUM: CPT | Performed by: EMERGENCY MEDICINE

## 2024-09-08 PROCEDURE — 36415 COLL VENOUS BLD VENIPUNCTURE: CPT | Performed by: EMERGENCY MEDICINE

## 2024-09-08 PROCEDURE — 96366 THER/PROPH/DIAG IV INF ADDON: CPT

## 2024-09-08 PROCEDURE — 96365 THER/PROPH/DIAG IV INF INIT: CPT

## 2024-09-08 PROCEDURE — 85025 COMPLETE CBC W/AUTO DIFF WBC: CPT | Performed by: EMERGENCY MEDICINE

## 2024-09-08 PROCEDURE — 2500000005 HC RX 250 GENERAL PHARMACY W/O HCPCS: Mod: SE | Performed by: EMERGENCY MEDICINE

## 2024-09-08 PROCEDURE — 73630 X-RAY EXAM OF FOOT: CPT | Mod: RIGHT SIDE | Performed by: RADIOLOGY

## 2024-09-08 PROCEDURE — 71045 X-RAY EXAM CHEST 1 VIEW: CPT | Mod: FOREIGN READ | Performed by: RADIOLOGY

## 2024-09-08 PROCEDURE — 71045 X-RAY EXAM CHEST 1 VIEW: CPT

## 2024-09-08 PROCEDURE — 2500000004 HC RX 250 GENERAL PHARMACY W/ HCPCS (ALT 636 FOR OP/ED): Mod: SE

## 2024-09-08 PROCEDURE — 73610 X-RAY EXAM OF ANKLE: CPT | Mod: RT

## 2024-09-08 PROCEDURE — 96375 TX/PRO/DX INJ NEW DRUG ADDON: CPT

## 2024-09-08 PROCEDURE — 2500000001 HC RX 250 WO HCPCS SELF ADMINISTERED DRUGS (ALT 637 FOR MEDICARE OP): Mod: SE | Performed by: EMERGENCY MEDICINE

## 2024-09-08 PROCEDURE — 70450 CT HEAD/BRAIN W/O DYE: CPT | Performed by: RADIOLOGY

## 2024-09-08 PROCEDURE — 99285 EMERGENCY DEPT VISIT HI MDM: CPT | Mod: 25

## 2024-09-08 PROCEDURE — 73590 X-RAY EXAM OF LOWER LEG: CPT | Mod: RT

## 2024-09-08 PROCEDURE — 70450 CT HEAD/BRAIN W/O DYE: CPT

## 2024-09-08 PROCEDURE — 73610 X-RAY EXAM OF ANKLE: CPT | Mod: RIGHT SIDE | Performed by: RADIOLOGY

## 2024-09-08 RX ORDER — POTASSIUM CHLORIDE 1.5 G/1.58G
40 POWDER, FOR SOLUTION ORAL ONCE
Status: COMPLETED | OUTPATIENT
Start: 2024-09-08 | End: 2024-09-08

## 2024-09-08 RX ORDER — HYDROMORPHONE HYDROCHLORIDE 1 MG/ML
INJECTION, SOLUTION INTRAMUSCULAR; INTRAVENOUS; SUBCUTANEOUS
Status: COMPLETED
Start: 2024-09-08 | End: 2024-09-08

## 2024-09-08 RX ORDER — ONDANSETRON HYDROCHLORIDE 2 MG/ML
4 INJECTION, SOLUTION INTRAVENOUS ONCE
Status: COMPLETED | OUTPATIENT
Start: 2024-09-08 | End: 2024-09-08

## 2024-09-08 RX ORDER — ONDANSETRON HYDROCHLORIDE 2 MG/ML
INJECTION, SOLUTION INTRAVENOUS
Status: COMPLETED
Start: 2024-09-08 | End: 2024-09-08

## 2024-09-08 RX ORDER — MAGNESIUM SULFATE HEPTAHYDRATE 40 MG/ML
2 INJECTION, SOLUTION INTRAVENOUS ONCE
Status: COMPLETED | OUTPATIENT
Start: 2024-09-08 | End: 2024-09-08

## 2024-09-08 RX ORDER — HYDROMORPHONE HYDROCHLORIDE 1 MG/ML
1 INJECTION, SOLUTION INTRAMUSCULAR; INTRAVENOUS; SUBCUTANEOUS ONCE
Status: COMPLETED | OUTPATIENT
Start: 2024-09-08 | End: 2024-09-08

## 2024-09-08 RX ORDER — KETOROLAC TROMETHAMINE 30 MG/ML
30 INJECTION, SOLUTION INTRAMUSCULAR; INTRAVENOUS ONCE
Status: COMPLETED | OUTPATIENT
Start: 2024-09-09 | End: 2024-09-09

## 2024-09-08 SDOH — SOCIAL STABILITY: SOCIAL INSECURITY: HAVE YOU HAD ANY THOUGHTS OF HARMING ANYONE ELSE?: NO

## 2024-09-08 SDOH — SOCIAL STABILITY: SOCIAL INSECURITY: ABUSE: ADULT

## 2024-09-08 SDOH — SOCIAL STABILITY: SOCIAL INSECURITY: ARE THERE ANY APPARENT SIGNS OF INJURIES/BEHAVIORS THAT COULD BE RELATED TO ABUSE/NEGLECT?: NO

## 2024-09-08 SDOH — SOCIAL STABILITY: SOCIAL INSECURITY: DO YOU FEEL ANYONE HAS EXPLOITED OR TAKEN ADVANTAGE OF YOU FINANCIALLY OR OF YOUR PERSONAL PROPERTY?: NO

## 2024-09-08 SDOH — SOCIAL STABILITY: SOCIAL INSECURITY: HAS ANYONE EVER THREATENED TO HURT YOUR FAMILY OR YOUR PETS?: NO

## 2024-09-08 SDOH — SOCIAL STABILITY: SOCIAL INSECURITY: WERE YOU ABLE TO COMPLETE ALL THE BEHAVIORAL HEALTH SCREENINGS?: YES

## 2024-09-08 SDOH — SOCIAL STABILITY: SOCIAL INSECURITY: DOES ANYONE TRY TO KEEP YOU FROM HAVING/CONTACTING OTHER FRIENDS OR DOING THINGS OUTSIDE YOUR HOME?: NO

## 2024-09-08 SDOH — SOCIAL STABILITY: SOCIAL INSECURITY: DO YOU FEEL UNSAFE GOING BACK TO THE PLACE WHERE YOU ARE LIVING?: NO

## 2024-09-08 SDOH — SOCIAL STABILITY: SOCIAL INSECURITY: HAVE YOU HAD THOUGHTS OF HARMING ANYONE ELSE?: NO

## 2024-09-08 SDOH — SOCIAL STABILITY: SOCIAL INSECURITY: ARE YOU OR HAVE YOU BEEN THREATENED OR ABUSED PHYSICALLY, EMOTIONALLY, OR SEXUALLY BY ANYONE?: NO

## 2024-09-08 ASSESSMENT — ACTIVITIES OF DAILY LIVING (ADL)
LACK_OF_TRANSPORTATION: PATIENT DECLINED
ADEQUATE_TO_COMPLETE_ADL: YES
ASSISTIVE_DEVICE: WALKER
PATIENT'S MEMORY ADEQUATE TO SAFELY COMPLETE DAILY ACTIVITIES?: YES
FEEDING YOURSELF: INDEPENDENT
WALKS IN HOME: INDEPENDENT
BATHING: INDEPENDENT
TOILETING: INDEPENDENT
GROOMING: INDEPENDENT
HEARING - RIGHT EAR: FUNCTIONAL
JUDGMENT_ADEQUATE_SAFELY_COMPLETE_DAILY_ACTIVITIES: YES
HEARING - LEFT EAR: FUNCTIONAL
DRESSING YOURSELF: INDEPENDENT

## 2024-09-08 ASSESSMENT — LIFESTYLE VARIABLES
AUDIT-C TOTAL SCORE: 0
HOW OFTEN DO YOU HAVE 6 OR MORE DRINKS ON ONE OCCASION: NEVER
AUDIT-C TOTAL SCORE: 0
HOW MANY STANDARD DRINKS CONTAINING ALCOHOL DO YOU HAVE ON A TYPICAL DAY: PATIENT DOES NOT DRINK
SKIP TO QUESTIONS 9-10: 1
HOW OFTEN DO YOU HAVE A DRINK CONTAINING ALCOHOL: NEVER

## 2024-09-08 ASSESSMENT — COGNITIVE AND FUNCTIONAL STATUS - GENERAL
TOILETING: A LITTLE
MOVING TO AND FROM BED TO CHAIR: A LITTLE
MOBILITY SCORE: 16
WALKING IN HOSPITAL ROOM: A LOT
CLIMB 3 TO 5 STEPS WITH RAILING: A LOT
TURNING FROM BACK TO SIDE WHILE IN FLAT BAD: A LITTLE
STANDING UP FROM CHAIR USING ARMS: A LITTLE
DAILY ACTIVITIY SCORE: 22
MOVING FROM LYING ON BACK TO SITTING ON SIDE OF FLAT BED WITH BEDRAILS: A LITTLE
PATIENT BASELINE BEDBOUND: NO
DRESSING REGULAR LOWER BODY CLOTHING: A LITTLE

## 2024-09-08 ASSESSMENT — PAIN SCALES - GENERAL
PAINLEVEL_OUTOF10: 8
PAINLEVEL_OUTOF10: 10 - WORST POSSIBLE PAIN
PAINLEVEL_OUTOF10: 7
PAINLEVEL_OUTOF10: 8

## 2024-09-08 ASSESSMENT — PAIN - FUNCTIONAL ASSESSMENT
PAIN_FUNCTIONAL_ASSESSMENT: 0-10
PAIN_FUNCTIONAL_ASSESSMENT: 0-10

## 2024-09-08 ASSESSMENT — PAIN DESCRIPTION - ORIENTATION
ORIENTATION: RIGHT;LOWER
ORIENTATION: RIGHT

## 2024-09-08 ASSESSMENT — PATIENT HEALTH QUESTIONNAIRE - PHQ9
2. FEELING DOWN, DEPRESSED OR HOPELESS: NEARLY EVERY DAY
SUM OF ALL RESPONSES TO PHQ9 QUESTIONS 1 & 2: 6
1. LITTLE INTEREST OR PLEASURE IN DOING THINGS: NEARLY EVERY DAY

## 2024-09-08 ASSESSMENT — COLUMBIA-SUICIDE SEVERITY RATING SCALE - C-SSRS
2. HAVE YOU ACTUALLY HAD ANY THOUGHTS OF KILLING YOURSELF?: NO
1. IN THE PAST MONTH, HAVE YOU WISHED YOU WERE DEAD OR WISHED YOU COULD GO TO SLEEP AND NOT WAKE UP?: NO
2. HAVE YOU ACTUALLY HAD ANY THOUGHTS OF KILLING YOURSELF?: NO
6. HAVE YOU EVER DONE ANYTHING, STARTED TO DO ANYTHING, OR PREPARED TO DO ANYTHING TO END YOUR LIFE?: NO
6. HAVE YOU EVER DONE ANYTHING, STARTED TO DO ANYTHING, OR PREPARED TO DO ANYTHING TO END YOUR LIFE?: NO
1. IN THE PAST MONTH, HAVE YOU WISHED YOU WERE DEAD OR WISHED YOU COULD GO TO SLEEP AND NOT WAKE UP?: NO

## 2024-09-08 ASSESSMENT — PAIN DESCRIPTION - PAIN TYPE: TYPE: ACUTE PAIN

## 2024-09-08 ASSESSMENT — PAIN DESCRIPTION - LOCATION
LOCATION: LEG
LOCATION: KNEE

## 2024-09-08 ASSESSMENT — PAIN SCALES - WONG BAKER: WONGBAKER_NUMERICALRESPONSE: HURTS LITTLE MORE

## 2024-09-08 NOTE — ED TRIAGE NOTES
Patient was getting up with home health and she stated he passed out and fell. He is complaining of right lower leg pain. Previous history of breaking it in July. Also the nurse was concerned that is neck radiation spot was looking infected.

## 2024-09-09 ENCOUNTER — TELEPHONE (OUTPATIENT)
Dept: EMERGENCY MEDICINE | Facility: HOSPITAL | Age: 54
End: 2024-09-09

## 2024-09-09 PROBLEM — Z78.9 DECREASED ACTIVITIES OF DAILY LIVING (ADL): Status: RESOLVED | Noted: 2024-09-09 | Resolved: 2024-09-09

## 2024-09-09 PROBLEM — R55 SYNCOPE AND COLLAPSE: Status: ACTIVE | Noted: 2024-09-09

## 2024-09-09 PROBLEM — Z74.09 IMPAIRED MOBILITY AND ACTIVITIES OF DAILY LIVING: Status: ACTIVE | Noted: 2024-09-09

## 2024-09-09 PROBLEM — Z78.9 IMPAIRED MOBILITY AND ACTIVITIES OF DAILY LIVING: Status: ACTIVE | Noted: 2024-09-09

## 2024-09-09 PROBLEM — M79.604 PAIN OF RIGHT LOWER EXTREMITY: Status: ACTIVE | Noted: 2024-09-09

## 2024-09-09 PROBLEM — R55 SYNCOPE: Status: ACTIVE | Noted: 2024-09-09

## 2024-09-09 PROBLEM — Z78.9 DECREASED ACTIVITIES OF DAILY LIVING (ADL): Status: ACTIVE | Noted: 2024-09-09

## 2024-09-09 PROBLEM — R26.9 GAIT ABNORMALITY: Status: ACTIVE | Noted: 2024-09-09

## 2024-09-09 LAB
AMPHETAMINES UR QL SCN: ABNORMAL
ANION GAP SERPL CALC-SCNC: 12 MMOL/L (ref 10–20)
BARBITURATES UR QL SCN: ABNORMAL
BASOPHILS # BLD AUTO: 0.02 X10*3/UL (ref 0–0.1)
BASOPHILS NFR BLD AUTO: 1 %
BENZODIAZ UR QL SCN: ABNORMAL
BUN SERPL-MCNC: 12 MG/DL (ref 6–23)
BZE UR QL SCN: ABNORMAL
CALCIUM SERPL-MCNC: 8.3 MG/DL (ref 8.6–10.3)
CANNABINOIDS UR QL SCN: ABNORMAL
CHLORIDE SERPL-SCNC: 105 MMOL/L (ref 98–107)
CO2 SERPL-SCNC: 27 MMOL/L (ref 21–32)
CREAT SERPL-MCNC: 0.74 MG/DL (ref 0.5–1.3)
EGFRCR SERPLBLD CKD-EPI 2021: >90 ML/MIN/1.73M*2
EOSINOPHIL # BLD AUTO: 0.16 X10*3/UL (ref 0–0.7)
EOSINOPHIL NFR BLD AUTO: 8.2 %
ERYTHROCYTE [DISTWIDTH] IN BLOOD BY AUTOMATED COUNT: 20.2 % (ref 11.5–14.5)
ERYTHROCYTE [DISTWIDTH] IN BLOOD BY AUTOMATED COUNT: 20.2 % (ref 11.5–14.5)
FENTANYL+NORFENTANYL UR QL SCN: ABNORMAL
FLUAV RNA RESP QL NAA+PROBE: NOT DETECTED
FLUBV RNA RESP QL NAA+PROBE: NOT DETECTED
GLUCOSE SERPL-MCNC: 109 MG/DL (ref 74–99)
HCT VFR BLD AUTO: 32.3 % (ref 41–52)
HCT VFR BLD AUTO: 32.3 % (ref 41–52)
HGB BLD-MCNC: 9.7 G/DL (ref 13.5–17.5)
HGB BLD-MCNC: 9.7 G/DL (ref 13.5–17.5)
IMM GRANULOCYTES # BLD AUTO: 0.01 X10*3/UL (ref 0–0.7)
IMM GRANULOCYTES NFR BLD AUTO: 0.5 % (ref 0–0.9)
LYMPHOCYTES # BLD AUTO: 0.49 X10*3/UL (ref 1.2–4.8)
LYMPHOCYTES NFR BLD AUTO: 25.3 %
MAGNESIUM SERPL-MCNC: 1.72 MG/DL (ref 1.6–2.4)
MCH RBC QN AUTO: 24.8 PG (ref 26–34)
MCH RBC QN AUTO: 24.8 PG (ref 26–34)
MCHC RBC AUTO-ENTMCNC: 30 G/DL (ref 32–36)
MCHC RBC AUTO-ENTMCNC: 30 G/DL (ref 32–36)
MCV RBC AUTO: 83 FL (ref 80–100)
MCV RBC AUTO: 83 FL (ref 80–100)
METHADONE UR QL SCN: ABNORMAL
MONOCYTES # BLD AUTO: 0.27 X10*3/UL (ref 0.1–1)
MONOCYTES NFR BLD AUTO: 13.9 %
NEUTROPHILS # BLD AUTO: 0.99 X10*3/UL (ref 1.2–7.7)
NEUTROPHILS NFR BLD AUTO: 51.1 %
NRBC BLD-RTO: 0 /100 WBCS (ref 0–0)
NRBC BLD-RTO: 0 /100 WBCS (ref 0–0)
OPIATES UR QL SCN: ABNORMAL
OXYCODONE+OXYMORPHONE UR QL SCN: ABNORMAL
PCP UR QL SCN: ABNORMAL
PHOSPHATE SERPL-MCNC: 4.9 MG/DL (ref 2.5–4.9)
PLATELET # BLD AUTO: 135 X10*3/UL (ref 150–450)
PLATELET # BLD AUTO: 135 X10*3/UL (ref 150–450)
POLYCHROMASIA BLD QL SMEAR: NORMAL
POTASSIUM SERPL-SCNC: 3.8 MMOL/L (ref 3.5–5.3)
PROCALCITONIN SERPL-MCNC: 1.28 NG/ML
RBC # BLD AUTO: 3.91 X10*6/UL (ref 4.5–5.9)
RBC # BLD AUTO: 3.91 X10*6/UL (ref 4.5–5.9)
RBC MORPH BLD: NORMAL
SARS-COV-2 RNA RESP QL NAA+PROBE: NOT DETECTED
SODIUM SERPL-SCNC: 140 MMOL/L (ref 136–145)
WBC # BLD AUTO: 1.9 X10*3/UL (ref 4.4–11.3)
WBC # BLD AUTO: 1.9 X10*3/UL (ref 4.4–11.3)

## 2024-09-09 PROCEDURE — 97161 PT EVAL LOW COMPLEX 20 MIN: CPT | Mod: GP | Performed by: PHYSICAL THERAPIST

## 2024-09-09 PROCEDURE — 84145 PROCALCITONIN (PCT): CPT | Mod: GEALAB | Performed by: NURSE PRACTITIONER

## 2024-09-09 PROCEDURE — 2500000004 HC RX 250 GENERAL PHARMACY W/ HCPCS (ALT 636 FOR OP/ED): Performed by: INTERNAL MEDICINE

## 2024-09-09 PROCEDURE — 2500000002 HC RX 250 W HCPCS SELF ADMINISTERED DRUGS (ALT 637 FOR MEDICARE OP, ALT 636 FOR OP/ED): Performed by: NURSE PRACTITIONER

## 2024-09-09 PROCEDURE — 36415 COLL VENOUS BLD VENIPUNCTURE: CPT | Performed by: NURSE PRACTITIONER

## 2024-09-09 PROCEDURE — 87077 CULTURE AEROBIC IDENTIFY: CPT | Mod: GEALAB | Performed by: NURSE PRACTITIONER

## 2024-09-09 PROCEDURE — 2500000005 HC RX 250 GENERAL PHARMACY W/O HCPCS: Performed by: INTERNAL MEDICINE

## 2024-09-09 PROCEDURE — 87636 SARSCOV2 & INF A&B AMP PRB: CPT | Performed by: NURSE PRACTITIONER

## 2024-09-09 PROCEDURE — 94760 N-INVAS EAR/PLS OXIMETRY 1: CPT

## 2024-09-09 PROCEDURE — 87081 CULTURE SCREEN ONLY: CPT | Mod: GEALAB | Performed by: NURSE PRACTITIONER

## 2024-09-09 PROCEDURE — 94664 DEMO&/EVAL PT USE INHALER: CPT

## 2024-09-09 PROCEDURE — 2500000001 HC RX 250 WO HCPCS SELF ADMINISTERED DRUGS (ALT 637 FOR MEDICARE OP): Performed by: NURSE PRACTITIONER

## 2024-09-09 PROCEDURE — 9420000001 HC RT PATIENT EDUCATION 5 MIN

## 2024-09-09 PROCEDURE — 80307 DRUG TEST PRSMV CHEM ANLYZR: CPT | Performed by: PHYSICIAN ASSISTANT

## 2024-09-09 PROCEDURE — 87205 SMEAR GRAM STAIN: CPT | Mod: GEALAB | Performed by: NURSE PRACTITIONER

## 2024-09-09 PROCEDURE — 1200000002 HC GENERAL ROOM WITH TELEMETRY DAILY

## 2024-09-09 PROCEDURE — 80048 BASIC METABOLIC PNL TOTAL CA: CPT | Performed by: NURSE PRACTITIONER

## 2024-09-09 PROCEDURE — 99239 HOSP IP/OBS DSCHRG MGMT >30: CPT | Performed by: INTERNAL MEDICINE

## 2024-09-09 PROCEDURE — 84100 ASSAY OF PHOSPHORUS: CPT | Performed by: NURSE PRACTITIONER

## 2024-09-09 PROCEDURE — 2500000004 HC RX 250 GENERAL PHARMACY W/ HCPCS (ALT 636 FOR OP/ED): Performed by: NURSE PRACTITIONER

## 2024-09-09 PROCEDURE — 85025 COMPLETE CBC W/AUTO DIFF WBC: CPT | Performed by: NURSE PRACTITIONER

## 2024-09-09 PROCEDURE — 99223 1ST HOSP IP/OBS HIGH 75: CPT | Performed by: NURSE PRACTITIONER

## 2024-09-09 PROCEDURE — 83735 ASSAY OF MAGNESIUM: CPT | Performed by: NURSE PRACTITIONER

## 2024-09-09 RX ORDER — ACETAMINOPHEN 325 MG/1
650 TABLET ORAL EVERY 4 HOURS PRN
Status: DISCONTINUED | OUTPATIENT
Start: 2024-09-09 | End: 2024-09-09

## 2024-09-09 RX ORDER — PANTOPRAZOLE SODIUM 40 MG/1
40 TABLET, DELAYED RELEASE ORAL
Status: DISCONTINUED | OUTPATIENT
Start: 2024-09-09 | End: 2024-09-10 | Stop reason: HOSPADM

## 2024-09-09 RX ORDER — TALC
3 POWDER (GRAM) TOPICAL NIGHTLY PRN
Status: DISCONTINUED | OUTPATIENT
Start: 2024-09-09 | End: 2024-09-10 | Stop reason: HOSPADM

## 2024-09-09 RX ORDER — NALOXONE HYDROCHLORIDE 0.4 MG/ML
0.4 INJECTION, SOLUTION INTRAMUSCULAR; INTRAVENOUS; SUBCUTANEOUS EVERY 5 MIN PRN
Status: DISCONTINUED | OUTPATIENT
Start: 2024-09-09 | End: 2024-09-10 | Stop reason: HOSPADM

## 2024-09-09 RX ORDER — CYCLOBENZAPRINE HCL 10 MG
10 TABLET ORAL 3 TIMES DAILY PRN
Status: DISCONTINUED | OUTPATIENT
Start: 2024-09-09 | End: 2024-09-09

## 2024-09-09 RX ORDER — CYCLOBENZAPRINE HCL 5 MG
5 TABLET ORAL 3 TIMES DAILY PRN
Status: DISCONTINUED | OUTPATIENT
Start: 2024-09-09 | End: 2024-09-10 | Stop reason: HOSPADM

## 2024-09-09 RX ORDER — LIDOCAINE HYDROCHLORIDE 20 MG/ML
15 SOLUTION OROPHARYNGEAL
Status: DISCONTINUED | OUTPATIENT
Start: 2024-09-09 | End: 2024-09-10 | Stop reason: HOSPADM

## 2024-09-09 RX ORDER — DULOXETIN HYDROCHLORIDE 30 MG/1
90 CAPSULE, DELAYED RELEASE ORAL EVERY MORNING
Status: DISCONTINUED | OUTPATIENT
Start: 2024-09-09 | End: 2024-09-09

## 2024-09-09 RX ORDER — CEFAZOLIN SODIUM 2 G/100ML
2 INJECTION, SOLUTION INTRAVENOUS EVERY 8 HOURS
Status: DISCONTINUED | OUTPATIENT
Start: 2024-09-09 | End: 2024-09-09

## 2024-09-09 RX ORDER — METHYLPREDNISOLONE 4 MG/1
4 TABLET ORAL ONCE
Status: DISCONTINUED | OUTPATIENT
Start: 2024-09-11 | End: 2024-09-10 | Stop reason: HOSPADM

## 2024-09-09 RX ORDER — ACETAMINOPHEN 650 MG/1
650 SUPPOSITORY RECTAL EVERY 4 HOURS PRN
Status: DISCONTINUED | OUTPATIENT
Start: 2024-09-09 | End: 2024-09-09

## 2024-09-09 RX ORDER — MICONAZOLE NITRATE 2 %
2 CREAM (GRAM) TOPICAL AS NEEDED
Status: DISCONTINUED | OUTPATIENT
Start: 2024-09-09 | End: 2024-09-10 | Stop reason: HOSPADM

## 2024-09-09 RX ORDER — PETROLATUM 420 MG/G
1 OINTMENT TOPICAL
Status: DISCONTINUED | OUTPATIENT
Start: 2024-09-09 | End: 2024-09-10 | Stop reason: HOSPADM

## 2024-09-09 RX ORDER — METHYLPREDNISOLONE 4 MG/1
8 TABLET ORAL ONCE
Status: DISCONTINUED | OUTPATIENT
Start: 2024-09-10 | End: 2024-09-10 | Stop reason: HOSPADM

## 2024-09-09 RX ORDER — ATORVASTATIN CALCIUM 20 MG/1
20 TABLET, FILM COATED ORAL NIGHTLY
Status: DISCONTINUED | OUTPATIENT
Start: 2024-09-09 | End: 2024-09-10 | Stop reason: HOSPADM

## 2024-09-09 RX ORDER — CEFAZOLIN SODIUM 2 G/50ML
2 SOLUTION INTRAVENOUS EVERY 8 HOURS
Status: DISCONTINUED | OUTPATIENT
Start: 2024-09-09 | End: 2024-09-09

## 2024-09-09 RX ORDER — LIDOCAINE HYDROCHLORIDE 20 MG/ML
15 SOLUTION OROPHARYNGEAL
Start: 2024-09-09

## 2024-09-09 RX ORDER — SODIUM CHLORIDE 9 MG/ML
100 INJECTION, SOLUTION INTRAVENOUS CONTINUOUS
Status: DISCONTINUED | OUTPATIENT
Start: 2024-09-09 | End: 2024-09-10 | Stop reason: HOSPADM

## 2024-09-09 RX ORDER — FERROUS SULFATE, DRIED 160(50) MG
1 TABLET, EXTENDED RELEASE ORAL 2 TIMES DAILY
Status: DISCONTINUED | OUTPATIENT
Start: 2024-09-09 | End: 2024-09-10 | Stop reason: HOSPADM

## 2024-09-09 RX ORDER — PREGABALIN 50 MG/1
200 CAPSULE ORAL 3 TIMES DAILY
Status: DISCONTINUED | OUTPATIENT
Start: 2024-09-09 | End: 2024-09-10 | Stop reason: HOSPADM

## 2024-09-09 RX ORDER — MONTELUKAST SODIUM 10 MG/1
10 TABLET ORAL DAILY
Status: DISCONTINUED | OUTPATIENT
Start: 2024-09-09 | End: 2024-09-10 | Stop reason: HOSPADM

## 2024-09-09 RX ORDER — NALOXONE HYDROCHLORIDE 0.4 MG/ML
0.4 INJECTION, SOLUTION INTRAMUSCULAR; INTRAVENOUS; SUBCUTANEOUS EVERY 5 MIN PRN
Qty: 1 ML | Status: SHIPPED | OUTPATIENT
Start: 2024-09-09

## 2024-09-09 RX ORDER — QUETIAPINE FUMARATE 25 MG/1
25 TABLET, FILM COATED ORAL NIGHTLY PRN
Status: DISCONTINUED | OUTPATIENT
Start: 2024-09-09 | End: 2024-09-09

## 2024-09-09 RX ORDER — PANTOPRAZOLE SODIUM 40 MG/1
40 TABLET, DELAYED RELEASE ORAL
Start: 2024-09-10

## 2024-09-09 RX ORDER — TRAZODONE HYDROCHLORIDE 50 MG/1
50 TABLET ORAL NIGHTLY
Status: DISCONTINUED | OUTPATIENT
Start: 2024-09-09 | End: 2024-09-10 | Stop reason: HOSPADM

## 2024-09-09 RX ORDER — MICONAZOLE NITRATE 2 %
2 CREAM (GRAM) TOPICAL AS NEEDED
Qty: 100 EACH | Refills: 0 | Status: SHIPPED | OUTPATIENT
Start: 2024-09-09

## 2024-09-09 RX ORDER — DISULFIRAM 250 MG/1
500 TABLET ORAL DAILY
Status: DISCONTINUED | OUTPATIENT
Start: 2024-09-09 | End: 2024-09-09

## 2024-09-09 RX ORDER — ACETAMINOPHEN 160 MG/5ML
650 SOLUTION ORAL EVERY 4 HOURS PRN
Status: DISCONTINUED | OUTPATIENT
Start: 2024-09-09 | End: 2024-09-09

## 2024-09-09 RX ORDER — DOCUSATE SODIUM 100 MG/1
100 CAPSULE, LIQUID FILLED ORAL 2 TIMES DAILY
Status: DISCONTINUED | OUTPATIENT
Start: 2024-09-09 | End: 2024-09-09

## 2024-09-09 RX ORDER — TOPIRAMATE 25 MG/1
100 TABLET ORAL 2 TIMES DAILY
Status: DISCONTINUED | OUTPATIENT
Start: 2024-09-09 | End: 2024-09-10 | Stop reason: HOSPADM

## 2024-09-09 RX ORDER — ROPINIROLE 0.25 MG/1
0.5 TABLET, FILM COATED ORAL NIGHTLY
Status: DISCONTINUED | OUTPATIENT
Start: 2024-09-09 | End: 2024-09-10 | Stop reason: HOSPADM

## 2024-09-09 RX ORDER — LANOLIN ALCOHOL/MO/W.PET/CERES
400 CREAM (GRAM) TOPICAL 2 TIMES DAILY
Status: DISCONTINUED | OUTPATIENT
Start: 2024-09-09 | End: 2024-09-10 | Stop reason: HOSPADM

## 2024-09-09 RX ORDER — SODIUM CHLORIDE 9 MG/ML
100 INJECTION, SOLUTION INTRAVENOUS CONTINUOUS
Start: 2024-09-09

## 2024-09-09 RX ORDER — METOPROLOL TARTRATE 25 MG/1
25 TABLET, FILM COATED ORAL DAILY
Status: DISCONTINUED | OUTPATIENT
Start: 2024-09-09 | End: 2024-09-10 | Stop reason: HOSPADM

## 2024-09-09 RX ORDER — DULOXETIN HYDROCHLORIDE 30 MG/1
30 CAPSULE, DELAYED RELEASE ORAL NIGHTLY
Status: DISCONTINUED | OUTPATIENT
Start: 2024-09-09 | End: 2024-09-09

## 2024-09-09 RX ORDER — METHYLPREDNISOLONE 4 MG/1
12 TABLET ORAL ONCE
Status: COMPLETED | OUTPATIENT
Start: 2024-09-09 | End: 2024-09-09

## 2024-09-09 RX ORDER — ACETAMINOPHEN 325 MG/1
975 TABLET ORAL EVERY 8 HOURS
Status: DISCONTINUED | OUTPATIENT
Start: 2024-09-09 | End: 2024-09-10 | Stop reason: HOSPADM

## 2024-09-09 RX ORDER — CELECOXIB 100 MG/1
200 CAPSULE ORAL DAILY
Status: DISCONTINUED | OUTPATIENT
Start: 2024-09-09 | End: 2024-09-10 | Stop reason: HOSPADM

## 2024-09-09 RX ORDER — PETROLATUM 420 MG/G
1 OINTMENT TOPICAL
Start: 2024-09-09

## 2024-09-09 RX ORDER — AMOXICILLIN 250 MG
1 CAPSULE ORAL NIGHTLY
Status: DISCONTINUED | OUTPATIENT
Start: 2024-09-09 | End: 2024-09-10 | Stop reason: HOSPADM

## 2024-09-09 RX ORDER — POTASSIUM CHLORIDE 1.5 G/1.58G
20 POWDER, FOR SOLUTION ORAL 2 TIMES DAILY
Status: DISCONTINUED | OUTPATIENT
Start: 2024-09-09 | End: 2024-09-10 | Stop reason: HOSPADM

## 2024-09-09 RX ORDER — BENZTROPINE MESYLATE 0.5 MG/1
0.5 TABLET ORAL 2 TIMES DAILY
Status: DISCONTINUED | OUTPATIENT
Start: 2024-09-09 | End: 2024-09-09

## 2024-09-09 RX ORDER — SIMVASTATIN 20 MG/1
40 TABLET, FILM COATED ORAL NIGHTLY
Status: DISCONTINUED | OUTPATIENT
Start: 2024-09-09 | End: 2024-09-09

## 2024-09-09 RX ORDER — VANCOMYCIN HYDROCHLORIDE 1 G/20ML
INJECTION, POWDER, LYOPHILIZED, FOR SOLUTION INTRAVENOUS DAILY PRN
Status: DISCONTINUED | OUTPATIENT
Start: 2024-09-09 | End: 2024-09-10 | Stop reason: HOSPADM

## 2024-09-09 RX ORDER — IPRATROPIUM BROMIDE AND ALBUTEROL SULFATE 2.5; .5 MG/3ML; MG/3ML
3 SOLUTION RESPIRATORY (INHALATION) EVERY 2 HOUR PRN
Status: DISCONTINUED | OUTPATIENT
Start: 2024-09-09 | End: 2024-09-10 | Stop reason: HOSPADM

## 2024-09-09 RX ORDER — TAMSULOSIN HYDROCHLORIDE 0.4 MG/1
0.4 CAPSULE ORAL DAILY
Status: DISCONTINUED | OUTPATIENT
Start: 2024-09-09 | End: 2024-09-10 | Stop reason: HOSPADM

## 2024-09-09 ASSESSMENT — COGNITIVE AND FUNCTIONAL STATUS - GENERAL
DRESSING REGULAR LOWER BODY CLOTHING: A LITTLE
CLIMB 3 TO 5 STEPS WITH RAILING: A LOT
MOBILITY SCORE: 16
TURNING FROM BACK TO SIDE WHILE IN FLAT BAD: A LITTLE
TOILETING: A LITTLE
STANDING UP FROM CHAIR USING ARMS: A LOT
MOVING TO AND FROM BED TO CHAIR: A LITTLE
WALKING IN HOSPITAL ROOM: A LOT
MOBILITY SCORE: 16
WALKING IN HOSPITAL ROOM: A LOT
STANDING UP FROM CHAIR USING ARMS: A LITTLE
MOVING FROM LYING ON BACK TO SITTING ON SIDE OF FLAT BED WITH BEDRAILS: A LITTLE
DAILY ACTIVITIY SCORE: 22
MOVING TO AND FROM BED TO CHAIR: A LOT
CLIMB 3 TO 5 STEPS WITH RAILING: A LOT

## 2024-09-09 ASSESSMENT — ENCOUNTER SYMPTOMS
HEMATURIA: 0
COUGH: 0
FEVER: 0
CHILLS: 0
DIARRHEA: 1
WEAKNESS: 1
AGITATION: 0
SORE THROAT: 1
NECK PAIN: 1
CONFUSION: 0
ABDOMINAL PAIN: 0
VOICE CHANGE: 1
CONSTIPATION: 0
DYSURIA: 0
PALPITATIONS: 0
WOUND: 1
APPETITE CHANGE: 1
WHEEZING: 0
SEIZURES: 0
VOMITING: 0
EYE REDNESS: 0
EYE ITCHING: 0
SHORTNESS OF BREATH: 0

## 2024-09-09 ASSESSMENT — PAIN SCALES - GENERAL
PAINLEVEL_OUTOF10: 8
PAINLEVEL_OUTOF10: 8
PAINLEVEL_OUTOF10: 0 - NO PAIN
PAINLEVEL_OUTOF10: 0 - NO PAIN
PAINLEVEL_OUTOF10: 7

## 2024-09-09 ASSESSMENT — PAIN - FUNCTIONAL ASSESSMENT
PAIN_FUNCTIONAL_ASSESSMENT: 0-10

## 2024-09-09 ASSESSMENT — PAIN DESCRIPTION - LOCATION: LOCATION: NECK

## 2024-09-09 ASSESSMENT — ACTIVITIES OF DAILY LIVING (ADL): ADL_ASSISTANCE: INDEPENDENT

## 2024-09-09 NOTE — CONSULTS
Wound Care Consult     Visit Date: 9/9/2024      Patient Name: Gaurav Mckay         MRN: [id number]           YOB: 1970     Reason for Consult: wound vac to leg and skin graft to neck        Wound History: [place] transfer, 54 year old male on palliative treatment for lung cancer with cellulitis of skin graft and wound vac to right thigh     Pertinent Labs:   Albumin   Date Value Ref Range Status   09/08/2024 3.8 3.4 - 5.0 g/dL Final       Wound Assessment:  Wound 09/04/24 Throat (Active)   Wound Image   09/09/24 0839   Drainage Amount Scant 09/08/24 2338   Dressing Open to air 09/08/24 2338       Wound 09/04/24 Leg Left;Upper;Anterior (Active)   Wound Image   09/08/24 2311   Dressing Vacuum dressing 09/08/24 2337   Dressing Status Occlusive 09/08/24 2337       Wound 09/08/24 Ankle Right (Active)   Wound Image   09/09/24 0840   Dressing Open to air;Steri-strips 09/08/24 2338       Wound 09/08/24 Pretibial Right (Active)   Wound Image   09/09/24 0840   Dressing Steri-strips;Open to air 09/08/24 2338       Wound Team Summary Assessment: Patient seen in bed awaiting possible transfer to [facility].    -- Right neck with skin graft and cellulitis, red linear fissures 0.5 x 3 cm and 0.3 x 2 cm and 0.2 x 3 cm with slough and serous dried drainage. 2 x 3 cm white tan skin graft.  -- Left thigh wound vac, suction intact, patient home vac intact.     Wound Team Plan: Gaurav preparing to transfer to [facility] to be treated by Palliative team for pain control.  If patient does not transfer will return and change wound vac and provide in house vac per protocol, Nataly NORIEGA will message update later this afternoon.     Clary Long RN, [facility]  9/9/2024  12:45 PM

## 2024-09-09 NOTE — CONSULTS
Do you have any home inhalers?    YES  Do you get relief when using it?     SOMETIMES  Spacer education and have them teach back. YES  Any previous PFTs? NO  Do you have a pulmonary Dr.? NO   Pulmonary cards given, YES  Do you currently smoke or vape or have you ever?   YES  Quit date or planning to quit? MAYBE  How long have you smoked for?   PPD? 0.5  Smoking education given and class information given . YES  Do you have a Primary Dr.? YES  Name: Garfield Eaton MD  Do you have any home O2 . yes               How much O2 at home? yes   Educate on acceptable O2 levels and the importance of monitoring O2 at home. Complete home O2 evaluation if needed. yes    This RT to see patient for COPD consult. The patient was given a COPD booklet with educational materials regarding pulmonary issues. Smoking cessation education reviewed, documentation given.  I discussed various options for quitting smoking. I talked about different strategies that can be used to support changing habits and reduce the urges of withdraw symptoms.  [employer] support group discussed. Flyer given for next month's meeting. I educated patient about the disease process and how it affected the lungs making it difficult to breathe. We discussed current medications and if their current medications give them relief. Patient given  pulmonary office phone number to make an appt. Patient was very receptive to all information given.    Spacer- The patient was given an aerochamber (spacer) to be administered with a meter dose inhaler at home. I instructed the patient on how to use the spacer with the proper technique to get the best results. In return, the patient demonstrated spacer training appropriately with a good understanding of how it is utilized. I also discussed the names, reasons, and side effects of respiratory medications that are prescribed at home, including does and frequency.

## 2024-09-09 NOTE — PROGRESS NOTES
Physical Therapy    Physical Therapy Evaluation    Patient Name: Gaurav Mckay  MRN: [id number]  Today's Date: 9/9/2024   Time Calculation  Start Time: 1007  Stop Time: 1021  Time Calculation (min): 14 min    Assessment/Plan   PT Assessment  PT Assessment Results: Decreased strength, Decreased range of motion, Decreased mobility, Orthopedic restrictions, Pain, Decreased skin integrity  Evaluation/Treatment Tolerance: Patient limited by pain, Treatment limited secondary to agitation (Patient was lying in supine answering questions in inteview, then became agitiated, sat up to long sit abruptly to don his pants.  He refused to continue evaluation after that.)  Medical Staff Made Aware: Yes  Strengths: Access to adaptive/assistive products  Barriers to Participation: Attitude of self  End of Session Communication: Bedside nurse  Assessment Comment: Pt is a 54 y.o. male admitted due to syncope and fall at home.  Per patient he was Independent with a walker prior to admission.  He is currently NWB per RN, but uncertain what his WB status was prior to admission. Limited evaluation was performed today.  Anticipate that patient will likely be able to return home with low intensity therapy, but will update recs after mobility is evaluated further.  End of Session Patient Position: Bed, 3 rail up, Alarm on  IP OR SWING BED PT PLAN  Inpatient or Swing Bed: Inpatient  PT Plan  Treatment/Interventions: Bed mobility, Transfer training, Gait training, Strengthening, Therapeutic exercise, Therapeutic activity  PT Plan: Ongoing PT  PT Frequency: 3 times per week  PT Discharge Recommendations: Low intensity level of continued care, Other (comment) (Will update with further mobillity assessment)  Equipment Recommended upon Discharge: Standard walker  PT Recommended Transfer Status: Assist x2, Assistive device  PT - OK to Discharge: Yes (PT POC was established)      Subjective   General Visit Information:  General  Reason for  Referral: Pt is a 54 y.o. male admitted for syncope and collapse, referred to PT for Impaired mobility  Referred By: ANASTACIA Maxwell-CNP  Past Medical History Relevant to Rehab: Patient has a recent PMH as follows per EMR: laryngeal cancer status post grafted lesion to right neck with healing graft site to left thigh and currently on chemo and radiation who presented to [place] ER after having a syncopal episode with fall due to orthostatic hypotension from poor oral intake as a result of above. Patient also had a recent right Tibia/Fibula Fracture with subsequent surgery in outside hospital.  CT of head was negative, No new fracture in right LE per Xray  Family/Caregiver Present: No  Prior to Session Communication: Bedside nurse (RN states that she and the NA previously assisted patient to the [facility] [place] [facility]  Patient Position Received: Bed, 3 rail up, Alarm on  General Comment: Patient was awake and speaking to therapist, but kept his eys closed.  He was on 2.5 L per n.c., wound vac and IV in place.  He reported having pain in his neck and requested pain meds.  Home Living:  Home Living  Type of Home: Mobile home  Lives With: Spouse  Home Adaptive Equipment: Walker rolling or standard  Home Layout: One level  Home Access: Stairs to enter with rails  Entrance Stairs-Rails: Both  Entrance Stairs-Number of Steps: 3  Home Living Comments: Bathroom set up information not obtained.  Prior Level of Function:  Prior Function Per Pt/Caregiver Report  Level of [place]: Independent with ADLs and functional transfers  Receives Help From: Family (wife)  ADL Assistance: Independent  Homemaking Assistance: Needs assistance  Ambulatory Assistance: Independent (With RW)  Precautions:  Precautions  LE Weight Bearing Status: Right Non-Weight Bearing (Per RN, NWB right LE)  Medical Precautions: Fall precautions, Oxygen therapy device and L/min (Neutropenic Precautions, Skin Care precautions.)  Braces Applied: Soft boot right LE         Objective   Pain:  Pain Assessment  Pain Assessment: 0-10  0-10 (Numeric) Pain Score: 8  Pain Location: Neck  Pain Interventions: Medication (See MAR), Other (Comment) (RN was notified of pain level and brought pain medications to patient.)  Cognition:  Cognition  Overall Cognitive Status: Within Functional Limits    General Assessments:  General Observation  General Observation: Patient with soft right LE splint/boot, wound vac.  Pt was holding his t-shirt away from his neck, as he was irritated over the healing site.        Strength  Strength Comments: Not assessed formally, right calf muscles appear atrophied,  Static Sitting Balance  Static Sitting-Balance Support: No upper extremity supported  Static Sitting-Comment/Number of Minutes: Patient sat up in long sit position and donned his pants independently with good trunk control observed.       Functional Assessments:  Bed Mobility  Bed Mobility: Yes  Bed Mobility 1  Bed Mobility 1: Supine to sitting, Long sit, Sitting to supine  Level of Assistance 1: Independent    Transfers  Transfer: No (Pt declined)    Ambulation/Gait Training  Ambulation/Gait Training Performed: No (Pt declined)  Extremity/Trunk Assessments:  RLE   RLE :  (Patient was able to flex hip and knee in order to don pants. Right ankle Not assessed due to recent sx)  LLE   LLE : Within Functional Limits  Outcome Measures:  [facility] Basic Mobility  Turning from your back to your side while in a flat bed without using bedrails: None  Moving from lying on your back to sitting on the side of a flat bed without using bedrails: None  Moving to and from bed to chair (including a wheelchair): A lot  Standing up from a chair using your arms (e.g. wheelchair or bedside chair): A lot  To walk in hospital room: A lot  Climbing 3-5 steps with railing: A lot  Basic Mobility - Total Score: 16    Encounter Problems       Encounter Problems (Active)       PT Problem       Pt will perform bed mobility  independently.        Start:  09/09/24    Expected End:  09/23/24            Pt will perform sit to stand with walker with mod I., maintaining WB status.        Start:  09/09/24    Expected End:  09/23/24            Pt will  perform bed to chair transfers with walker with mod I, maintaining WB status.        Start:  09/09/24    Expected End:  09/23/24            Pt will ambulate 25 feet or more with walker with mod I, maintaining WB status.       Start:  09/09/24    Expected End:  09/23/24               Pain - Adult              Education Documentation  Precautions, taught by Sana Mendez, PT at 9/9/2024 11:08 AM.  Learner: Patient  Readiness: Acceptance  Method: Explanation  Response: Needs Reinforcement, Verbalizes Understanding    Mobility Training, taught by Sana Mendez, PT at 9/9/2024 11:08 AM.  Learner: Patient  Readiness: Acceptance  Method: Explanation  Response: Needs Reinforcement, Verbalizes Understanding    Education Comments  No comments found.

## 2024-09-09 NOTE — PROGRESS NOTES
Gaurav Mckay is a 54 y.o. male on day 0 of admission presenting with Syncope and collapse.      Subjective   Neck pain improved after IV dilaudid.  His last radiation treatment was on 8/29.  He is not sure he wants to continue with any further radiation but does want chemo.  He was to discuss with pall med.  Denies cough, CP or SOB.  Currently on 2 L oxygen which is new for him.         Objective     Last Recorded Vitals  /79 (Patient Position: Sitting)   Pulse 68   Temp 36.1 °C (96.9 °F)   Resp 18   Wt 94.8 kg (209 lb)   SpO2 100%   Intake/Output last 3 Shifts:    Intake/Output Summary (Last 24 hours) at 9/9/2024 1137  Last data filed at 9/9/2024 0836  Gross per 24 hour   Intake 1797.5 ml   Output --   Net 1797.5 ml       Admission Weight  Weight: 94.8 kg (209 lb) (09/08/24 2325)    Daily Weight  09/08/24 : 94.8 kg (209 lb)    Image Results  XR foot right 3+ views  Narrative: STUDY:  Right foot, ankle, and tibia and fibula radiographs; 9/8/2024 1:46PM  INDICATION:  Fall.  COMPARISON:  None Available.  ACCESSION NUMBER(S):  [id number], [id number], [id number]  ORDERING CLINICIAN:  THALIA GRANDE  TECHNIQUE:  Three views of the right foot, three views of the right  ankle, and two views (four images) of the right tibia and fibula.  FINDINGS:    Right Foot:  There is no displaced fracture.  The alignment is  anatomic.  Lisfranc joint demonstrates normal alignment.  Metatarsal  bones are intact.  No soft tissue abnormality is seen.  Right Ankle: There are post surgical changes to the ankle with medial  compression plate-screw fixation and fixation screw in the distal  fibula.  There are healing fractures of the distal tibia fibula with  periosteal reaction greater along lateral margin.  Hardware is intact.   There is lateral soft tissue swelling.  Right Tibia and Fibula:  There is no displaced fracture.  There are  prior screw tracts within the tibia.  There is soft tissue swelling.   The  alignment is anatomic.  No soft tissue abnormality is seen.  Impression: Right Foot: No acute fracture or malalignment  Right Ankle: Post surgical changes with healing distal tibial-fibular  fractures with intact hardware.  Soft tissue swelling.  Right Tibia and Fibula: No acute fracture or malalignment.  Soft  tissue swelling.  Signed by Grover Villalta DO  XR ankle right 3+ views  Narrative: STUDY:  Right foot, ankle, and tibia and fibula radiographs; 9/8/2024 1:46PM  INDICATION:  Fall.  COMPARISON:  None Available.  ACCESSION NUMBER(S):  [id number], [id number], [id number]  ORDERING CLINICIAN:  THALIA GRANDE  TECHNIQUE:  Three views of the right foot, three views of the right  ankle, and two views (four images) of the right tibia and fibula.  FINDINGS:    Right Foot:  There is no displaced fracture.  The alignment is  anatomic.  Lisfranc joint demonstrates normal alignment.  Metatarsal  bones are intact.  No soft tissue abnormality is seen.  Right Ankle: There are post surgical changes to the ankle with medial  compression plate-screw fixation and fixation screw in the distal  fibula.  There are healing fractures of the distal tibia fibula with  periosteal reaction greater along lateral margin.  Hardware is intact.   There is lateral soft tissue swelling.  Right Tibia and Fibula:  There is no displaced fracture.  There are  prior screw tracts within the tibia.  There is soft tissue swelling.   The alignment is anatomic.  No soft tissue abnormality is seen.  Impression: Right Foot: No acute fracture or malalignment  Right Ankle: Post surgical changes with healing distal tibial-fibular  fractures with intact hardware.  Soft tissue swelling.  Right Tibia and Fibula: No acute fracture or malalignment.  Soft  tissue swelling.  Signed by Grover Villalta DO  XR tibia fibula right 2 views  Narrative: STUDY:  Right foot, ankle, and tibia and fibula radiographs; 9/8/2024  1:46PM  INDICATION:  Fall.  COMPARISON:  None Available.  ACCESSION NUMBER(S):  [id number], [id number], [id number]  ORDERING CLINICIAN:  THALIA GRANDE  TECHNIQUE:  Three views of the right foot, three views of the right  ankle, and two views (four images) of the right tibia and fibula.  FINDINGS:    Right Foot:  There is no displaced fracture.  The alignment is  anatomic.  Lisfranc joint demonstrates normal alignment.  Metatarsal  bones are intact.  No soft tissue abnormality is seen.  Right Ankle: There are post surgical changes to the ankle with medial  compression plate-screw fixation and fixation screw in the distal  fibula.  There are healing fractures of the distal tibia fibula with  periosteal reaction greater along lateral margin.  Hardware is intact.   There is lateral soft tissue swelling.  Right Tibia and Fibula:  There is no displaced fracture.  There are  prior screw tracts within the tibia.  There is soft tissue swelling.   The alignment is anatomic.  No soft tissue abnormality is seen.  Impression: Right Foot: No acute fracture or malalignment  Right Ankle: Post surgical changes with healing distal tibial-fibular  fractures with intact hardware.  Soft tissue swelling.  Right Tibia and Fibula: No acute fracture or malalignment.  Soft  tissue swelling.  Signed by Grover Villalta,   CT head wo IV contrast  Narrative: Interpreted By:  Nuno Evans,   STUDY:  CT HEAD WO IV CONTRAST; 9/8/2024 3:22 pm      INDICATION:  Signs/Symptoms:Fall, syncope.      COMPARISON:  CT head dated 05/08/2024      ACCESSION NUMBER(S):  [id number]      ORDERING CLINICIAN:  THALIA GRANDE      TECHNIQUE:  Contiguous axial CT images were obtained through the head at 5 mm  slice thickness without contrast administration.      FINDINGS:  INTRACRANIAL:  The ventricles, sulci and basal cisterns are within normal limits for  size and configuration. The grey-white differentiation is intact.  There is no mass effect  or midline shift. There is no extraaxial  fluid collection. There is no intracranial hemorrhage.  The calvarium  is unremarkable.      EXTRACRANIAL:  Visualized paranasal sinuses and mastoids are clear.      Impression: No evidence of acute cortical infarct or intracranial hemorrhage.      MACRO:  None          Signed by: Nuno Evans 9/8/2024 3:46 PM  Dictation workstation:   [id number]  XR chest 1 view  Narrative: STUDY:  Chest Radiograph;  9/8/2024 1:42 PM  INDICATION:  Chest pain.  COMPARISON:  XR chest 11/15/2023.  ACCESSION NUMBER(S):  [id number]  ORDERING CLINICIAN:  THALIA GRANDE  TECHNIQUE:  Frontal chest was obtained at 13:42 hours.  FINDINGS:  CARDIOMEDIASTINAL SILHOUETTE:  Cardiomediastinal silhouette is normal in size and configuration.     LUNGS:  Lungs are clear.     ABDOMEN:  No remarkable upper abdominal findings.     BONES:  No acute osseous changes.  Impression: No acute process.  Signed by Ciro Sequeira MD      Physical Exam  Physical Exam  Gen: resting, hoarse voice  Eyes:  eyes closed but responds to questions  ENT: MM dry  Neck: right sided erythema, edema, purulent drainage near graft site  Respiratory: diminished but no obvious adventitious sounds, on 2 L  Cardiac: RRR, no murmurs rubs or gallops  Abdomen: soft, NT, +BS  Extremities: soft cast on RLE with wounds present, Left LE graft site with wound vac present  Neuro: No focal deficits, alert and oriented x 3  Psych:  resting, calm      Assessment/Plan    Syncope 2/2 orthostatic hypotension  -orthostatic VS significantly positive this morning  -BP 66/38 upon standing  -continue IVF  -monitor electrolytes/orthostats  -tele  -PT/OT    Laryngeal CA with poor oral intake and pain/pancytopenia  -pall med for goals of care   -he is unsure if he wants to continue radiation, he has missed several sessions  -he may be candidate for feeding tube  -RD evaluation  -pain management  -replete electrolytes as needed  -ID Evaluation for possible  cellulitis  -continue rocephin given immunocompromise  -protective isolation  -monitor labs    Acute hypoxic RF  -CXR clear  -history of PE on eliquis and COPD/tobacco use  -IS  -duonebs  -nicotine replacement  -wean as able    Recent right tib/fib fracture  -s/p ORIF  -PT/OT    Essential HTN/HLD  -Continue apixaban, atorvastatin  -hold metoprolol   -monitor orthostats    Polysubstance abuse  -Recently sober x11 months; recently relapsed on meth  -UTOX positive amphetamines  -SW consult    DVT prophy  -Eliquis    Dispo:  Recommend full admission for pain management, IVF as he is significantly orthostatic, pall med evaluation for goals of care  D/w THIAGO Arriaga-C

## 2024-09-09 NOTE — SIGNIFICANT EVENT
Discussed patient case with Dr. Ni. Plan for patient to be transferred to [facility] where he receives both his Oncology and Palliative Medicine Care. Established with MD Osmani Govea (outpatient [facility] Palliative Medicine Provider). Author called his office and the office of the last inpatient Palliative Medicine Provider (MD Candice Matute) to let them know patient was being transferred to [facility] and will need to be seen by inpatient Palliative Team upon arrival. Both providers in agreement with this plan. Also plan to involve addiction medicine and psychiatry when patient arrives. In setting of this, Palliative consult will be discontinued at this time. MD Ni and THIAGO Kraus in agreement. Please re-consult if needs arise.

## 2024-09-09 NOTE — CONSULTS
Reason For Consult  Cellulitis of right neck    History Of Present Illness  Gaurav Mckay is a 54 y.o. male with a hx of laryngeal cancer status post grafted lesion to right neck with healing graft site to left thigh (7/2024, follows at [facility]) and currently on chemo and radiation (last radiation 2 weeks ago), presented with syncopal episode and surgical site infection.   Endorses lethargy and purulent drainage from his right neck, but denies fever, chills, headache, changes in voice/stridor, dysphagia, SOB, chest pain, changes in urination/BM.      Past Medical History  He has a past medical history of Acute upper respiratory infection, unspecified (03/20/2015), Alcohol abuse, in remission (02/12/2015), Alcohol dependence, in remission (Multi) (05/07/2015), Allergic contact dermatitis due to plants, except food (05/21/2014), Allergy status to unspecified drugs, medicaments and biological substances (08/29/2013), Anxiety disorder, unspecified (03/20/2015), Bipolar disorder, current episode manic without psychotic features, unspecified (Multi), Cervicalgia, Chronic obstructive pulmonary disease, unspecified (Multi) (12/02/2014), Encounter for immunization (09/22/2016), Hypertension, Other cervical disc degeneration, unspecified cervical region, Other conditions influencing health status (06/27/2013), Other long term (current) drug therapy (06/02/2015), Other specified health status, Personal history of nicotine dependence (08/16/2017), Personal history of nicotine dependence (06/26/2017), Personal history of other diseases of the digestive system (04/11/2013), Personal history of other diseases of the digestive system (08/27/2015), Personal history of other diseases of the respiratory system (03/20/2017), Personal history of other diseases of the respiratory system (02/05/2014), Personal history of other diseases of the respiratory system (01/16/2014), Personal history of other diseases of the respiratory system  (09/26/2013), Personal history of other diseases of the respiratory system (06/26/2014), Personal history of other infectious and parasitic diseases, Personal history of other mental and behavioral disorders, Personal history of other specified conditions (08/25/2016), Personal history of other specified conditions (02/16/2017), and Unspecified asthma, uncomplicated (HHS-HCC) (02/13/2014).    Surgical History  He has a past surgical history that includes MR angio head wo IV contrast (01/24/2016); MR angio neck wo IV contrast (01/24/2016); CT angio abdomen pelvis w and or wo IV IV contrast (04/01/2020); Invasive Vascular Procedure (N/A, 11/08/2023); and Cholecystectomy.     Social History  He reports that he has been smoking cigarettes. He has never used smokeless tobacco. He reports that he does not currently use alcohol. He reports current drug use. Drug: Methamphetamines.    Family History  No family history on file.  Pneumonia vaccine is up to date  Pot[place] fall risk 95, preventive protocol was implemented  Depression screen is negative    Allergies  Oxycodone    Physical Exam  Constitutional: Ill appearing  HEENT: clear sclera, moist mucous membranes  CV: RRR  PULM: CTAB  ABDOMEN: Soft, NT/ND. No TTP. + BSx4.  SKIN: intact wound vac on left thigh, right neck skin graft with purulent drainage   EXTREMITIES: Non-Tender  NEURO: A&O x 4    Last Recorded Vitals  /79 (Patient Position: Sitting)   Pulse 68   Temp 36.1 °C (96.9 °F)   Resp 18   Wt 94.8 kg (209 lb)   SpO2 100%    BMI 29.99, nutritional consult    Assessment/Plan   Gaurav Mckay is a 54 y.o. male with a hx of laryngeal cancer status post grafted lesion to right neck with healing graft site to left thigh (7/2024, follows at [facility]) and currently on chemo and radiation, presented with syncopal episode and surgical site infection.     #Flap Right neck, surgical site infection with purulent drainage   Micro:  Follow up: wound culture, blood  culture  - Switch antibiotics to Vancomycin    Assessment and Plan discussed with Dr. Sinclair        Attending note : the patient was evaluated, the note was reviewed and updated  Surgical wound infection / neck cellulitis  Left thigh none healing wound  Respiratory failure / atelectasis  Squamous cell cancer of the larynx on cisplatin & radiation  Recommendations :  Vancomycin  Cultures  Chest PT  Wound care    Delmar Sinclair M.D

## 2024-09-09 NOTE — PROGRESS NOTES
09/09/24 1441   Discharge Planning   Living Arrangements Spouse/significant other   Support Systems Spouse/significant other   Assistance Needed patient is A&Ox3, reports wife assists with any needs, uses a walker and does not drive   Type of Residence Private residence   Number of Stairs to Enter Residence 4   Do you have animals or pets at home? Yes   Type of Animals or Pets Cat   Who is requesting discharge planning? Provider   Expected Discharge Disposition Other Fac

## 2024-09-09 NOTE — H&P
History Of Present Illness  Gaurav Mckay is a 54 y.o. male (full code) with a pertinent medical history of laryngeal cancer status post grafted lesion to right neck with healing graft site to left thigh and currently on chemo and radiation who presented to [facility] after having a syncopal episode with fall due to orthostatic hypotension from poor oral intake as a result of above. Patient was also found to have hypokalemia and hypomagnesemia. Patient is being treated at home under palliative care and was prescribed Suboxone, but does not like feeling like he is high and says he quit taking a week ago. His pain is poorly controlled and he requested palliative consult here. He admits to using methamphetamines. Patient is also being followed by Dr. Nixon orthopedic at [place] for his healing right tib-fib fracture.  He denied urinary symptoms, chest pain, abdominal pain, dyspnea, fever or chills.  Pertinent workup in the ER included: Potassium 3.3, mag 1.4, troponin x 2 negative, leukopenia/ lymphocytopenia, flu and COVID are negative.  CT of the head negative for any acute findings, x-ray of the right tib-fib, ankle and foot showed no acute new fracture fracture, postsurgical changes with healing distal tibial fibular fracture with intact hardware and soft tissue swelling noted to the right ankle and tib-fib.  Chest x-ray negative for acute findings.    Past Medical History  He has a past medical history of Acute upper respiratory infection, unspecified (03/20/2015), Alcohol abuse, in remission (02/12/2015), Alcohol dependence, in remission (Multi) (05/07/2015), Allergic contact dermatitis due to plants, except food (05/21/2014), Allergy status to unspecified drugs, medicaments and biological substances (08/29/2013), Anxiety disorder, unspecified (03/20/2015), Bipolar disorder, current episode manic without psychotic features, unspecified (Multi), Cervicalgia, Chronic obstructive pulmonary disease,  unspecified (Multi) (12/02/2014), Encounter for immunization (09/22/2016), Hypertension, Other cervical disc degeneration, unspecified cervical region, Other conditions influencing health status (06/27/2013), Other long term (current) drug therapy (06/02/2015), Other specified health status, Personal history of nicotine dependence (08/16/2017), Personal history of nicotine dependence (06/26/2017), Personal history of other diseases of the digestive system (04/11/2013), Personal history of other diseases of the digestive system (08/27/2015), Personal history of other diseases of the respiratory system (03/20/2017), Personal history of other diseases of the respiratory system (02/05/2014), Personal history of other diseases of the respiratory system (01/16/2014), Personal history of other diseases of the respiratory system (09/26/2013), Personal history of other diseases of the respiratory system (06/26/2014), Personal history of other infectious and parasitic diseases, Personal history of other mental and behavioral disorders, Personal history of other specified conditions (08/25/2016), Personal history of other specified conditions (02/16/2017), and Unspecified asthma, uncomplicated ([facility]) (02/13/2014).    Surgical History  He has a past surgical history that includes MR angio head wo IV contrast (01/24/2016); MR angio neck wo IV contrast (01/24/2016); CT angio abdomen pelvis w and or wo IV IV contrast (04/01/2020); Invasive Vascular Procedure (N/A, 11/08/2023); and Cholecystectomy.     Social History  He reports that he has been smoking cigarettes- a quarter pack a day. He has never used smokeless tobacco. He reports that he does not currently use alcohol. He reports current drug use. Drug: Methamphetamines.    Family History  DM-mother     Allergies  Oxycodone    Review of Systems   Constitutional:  Positive for appetite change. Negative for chills and fever.   HENT:  Positive for sore throat and voice change.     Eyes:  Negative for redness and itching.   Respiratory:  Negative for cough, shortness of breath and wheezing.    Cardiovascular:  Negative for chest pain and palpitations.   Gastrointestinal:  Positive for diarrhea. Negative for abdominal pain, constipation and vomiting.        Once a day, not kaylynn eating or drinking much   Genitourinary:  Negative for dysuria and hematuria.   Musculoskeletal:  Positive for neck pain.        Pain in RLE   Skin:  Positive for rash and wound.   Neurological:  Positive for syncope and weakness. Negative for seizures (.meds).        +generalized weakness   Psychiatric/Behavioral:  Negative for agitation and confusion.      Physical Exam  Vitals reviewed.   Constitutional:       General: He is not in acute distress.     Appearance: He is obese. He is ill-appearing. He is not toxic-appearing or diaphoretic.   HENT:      Head: Normocephalic and atraumatic.      Mouth/Throat:      Mouth: Mucous membranes are dry.      Pharynx: Oropharynx is clear.   Eyes:      General:         Right eye: No discharge.         Left eye: No discharge.      Extraocular Movements: Extraocular movements intact.      Conjunctiva/sclera: Conjunctivae normal.      Pupils: Pupils are equal, round, and reactive to light.   Neck:      Comments: Neck with radiation burn, tenderness and erythema and purulent drainage around graft site  Cardiovascular:      Rate and Rhythm: Normal rate and regular rhythm.      Pulses: Normal pulses.      Heart sounds: No murmur heard.  Pulmonary:      Effort: Pulmonary effort is normal. No respiratory distress.      Breath sounds: Normal breath sounds. No wheezing, rhonchi or rales.      Comments: On oxygen  Abdominal:      General: Bowel sounds are normal. There is no distension.      Palpations: Abdomen is soft.      Tenderness: There is no abdominal tenderness. There is no guarding or rebound.   Musculoskeletal:         General: No swelling.      Cervical back: Tenderness present.       Right lower leg: No edema.      Left lower leg: No edema.      Comments: Limited ROM in LE and soft cast to RLE, wounds to RLE noted by nursing -see doc, left leg graft site with wound vac and no drainage   Skin:     General: Skin is warm and dry.      Findings: Rash present.      Comments: + skin tenting >3 sec, scabs to abdomen from picking skin   Neurological:      General: No focal deficit present.      Mental Status: He is alert and oriented to person, place, and time. Mental status is at baseline.      Motor: No weakness.   Psychiatric:         Mood and Affect: Mood normal.         Behavior: Behavior normal.       Last Recorded Vitals  /74 (BP Location: Right arm, Patient Position: Lying)   Pulse 68   Temp 36.2 °C (97.2 °F) (Temporal)   Resp 18   Wt 94.8 kg (209 lb)   SpO2 99%     Relevant Results  Scheduled medications  apixaban, 5 mg, oral, BID  atorvastatin, 20 mg, oral, Nightly  calcium carbonate-vitamin D3, 1 tablet, oral, BID  ceFAZolin, 2 g, intravenous, q8h  celecoxib, 200 mg, oral, Daily  magnesium oxide, 400 mg, oral, BID  methylPREDNISolone, 12 mg, oral, Once  [START ON 9/11/2024] methylPREDNISolone, 4 mg, oral, Once  [START ON 9/10/2024] methylPREDNISolone, 8 mg, oral, Once  metoprolol tartrate, 25 mg, oral, Daily  montelukast, 10 mg, oral, Daily  pantoprazole, 40 mg, oral, Daily before breakfast  potassium chloride, 20 mEq, oral, BID  pregabalin, 200 mg, oral, TID  rOPINIRole, 0.5 mg, oral, Nightly  sennosides-docusate sodium, 1 tablet, oral, Nightly  sodium chloride, 1,000 mL, intravenous, Once  tamsulosin, 0.4 mg, oral, Daily  topiramate, 100 mg, oral, BID  traZODone, 50 mg, oral, Nightly      Continuous medications  sodium chloride 0.9%, 100 mL/hr, Last Rate: Stopped (09/09/24 0119)      PRN medications  PRN medications: acetaminophen **OR** acetaminophen **OR** acetaminophen, [Held by provider] cyclobenzaprine, ipratropium-albuteroL, melatonin, nicotine polacrilex, white  petrolatum      Results for orders placed or performed during the hospital encounter of 09/08/24 (from the past 24 hour(s))   Sars-CoV-2 PCR   Result Value Ref Range    Coronavirus 2019, PCR Not Detected Not Detected   Influenza A, and B PCR   Result Value Ref Range    Flu A Result Not Detected Not Detected    Flu B Result Not Detected Not Detected     XR tibia fibula right 2 views    Result Date: 9/8/2024  STUDY: Right foot, ankle, and tibia and fibula radiographs; 9/8/2024 1:46PM INDICATION: Fall. COMPARISON: None Available. ACCESSION NUMBER(S): [id number], [id number], [id number] ORDERING CLINICIAN: THALIA GRANDE TECHNIQUE:  Three views of the right foot, three views of the right ankle, and two views (four images) of the right tibia and fibula. FINDINGS:  Right Foot:  There is no displaced fracture.  The alignment is anatomic.  Lisfranc joint demonstrates normal alignment.  Metatarsal bones are intact.  No soft tissue abnormality is seen. Right Ankle: There are post surgical changes to the ankle with medial compression plate-screw fixation and fixation screw in the distal fibula.  There are healing fractures of the distal tibia fibula with periosteal reaction greater along lateral margin.  Hardware is intact.  There is lateral soft tissue swelling. Right Tibia and Fibula:  There is no displaced fracture.  There are prior screw tracts within the tibia.  There is soft tissue swelling. The alignment is anatomic.  No soft tissue abnormality is seen.    Right Foot: No acute fracture or malalignment Right Ankle: Post surgical changes with healing distal tibial-fibular fractures with intact hardware.  Soft tissue swelling. Right Tibia and Fibula: No acute fracture or malalignment.  Soft tissue swelling. Signed by Grover Villalta DO    XR ankle right 3+ views    Result Date: 9/8/2024  STUDY: Right foot, ankle, and tibia and fibula radiographs; 9/8/2024 1:46PM INDICATION: Fall. COMPARISON: None Available.  ACCESSION NUMBER(S): [id number], [id number], [id number] ORDERING CLINICIAN: THALIA GRANDE TECHNIQUE:  Three views of the right foot, three views of the right ankle, and two views (four images) of the right tibia and fibula. FINDINGS:  Right Foot:  There is no displaced fracture.  The alignment is anatomic.  Lisfranc joint demonstrates normal alignment.  Metatarsal bones are intact.  No soft tissue abnormality is seen. Right Ankle: There are post surgical changes to the ankle with medial compression plate-screw fixation and fixation screw in the distal fibula.  There are healing fractures of the distal tibia fibula with periosteal reaction greater along lateral margin.  Hardware is intact.  There is lateral soft tissue swelling. Right Tibia and Fibula:  There is no displaced fracture.  There are prior screw tracts within the tibia.  There is soft tissue swelling. The alignment is anatomic.  No soft tissue abnormality is seen.    Right Foot: No acute fracture or malalignment Right Ankle: Post surgical changes with healing distal tibial-fibular fractures with intact hardware.  Soft tissue swelling. Right Tibia and Fibula: No acute fracture or malalignment.  Soft tissue swelling. Signed by Grover Villalta,     XR foot right 3+ views    Result Date: 9/8/2024  STUDY: Right foot, ankle, and tibia and fibula radiographs; 9/8/2024 1:46PM INDICATION: Fall. COMPARISON: None Available. ACCESSION NUMBER(S): [id number], [id number], [id number] ORDERING CLINICIAN: THALIA GRANDE TECHNIQUE:  Three views of the right foot, three views of the right ankle, and two views (four images) of the right tibia and fibula. FINDINGS:  Right Foot:  There is no displaced fracture.  The alignment is anatomic.  Lisfranc joint demonstrates normal alignment.  Metatarsal bones are intact.  No soft tissue abnormality is seen. Right Ankle: There are post surgical changes to the ankle with medial compression plate-screw fixation  and fixation screw in the distal fibula.  There are healing fractures of the distal tibia fibula with periosteal reaction greater along lateral margin.  Hardware is intact.  There is lateral soft tissue swelling. Right Tibia and Fibula:  There is no displaced fracture.  There are prior screw tracts within the tibia.  There is soft tissue swelling. The alignment is anatomic.  No soft tissue abnormality is seen.    Right Foot: No acute fracture or malalignment Right Ankle: Post surgical changes with healing distal tibial-fibular fractures with intact hardware.  Soft tissue swelling. Right Tibia and Fibula: No acute fracture or malalignment.  Soft tissue swelling. Signed by Grover Villalta DO    CT head wo IV contrast    Result Date: 9/8/2024  Interpreted By:  Nuno Evans, STUDY: CT HEAD WO IV CONTRAST; 9/8/2024 3:22 pm   INDICATION: Signs/Symptoms:Fall, syncope.   COMPARISON: CT head dated 05/08/2024   ACCESSION NUMBER(S): [id number]   ORDERING CLINICIAN: THALIA GRANDE   TECHNIQUE: Contiguous axial CT images were obtained through the head at 5 mm slice thickness without contrast administration.   FINDINGS: INTRACRANIAL: The ventricles, sulci and basal cisterns are within normal limits for size and configuration. The grey-white differentiation is intact. There is no mass effect or midline shift. There is no extraaxial fluid collection. There is no intracranial hemorrhage.  The calvarium is unremarkable.   EXTRACRANIAL: Visualized paranasal sinuses and mastoids are clear.       No evidence of acute cortical infarct or intracranial hemorrhage.   MACRO: None     Signed by: Nuno Evans 9/8/2024 3:46 PM Dictation workstation:   [id number]    XR chest 1 view    Result Date: 9/8/2024  STUDY: Chest Radiograph;  9/8/2024 1:42 PM INDICATION: Chest pain. COMPARISON: XR chest 11/15/2023. ACCESSION NUMBER(S): [id number] ORDERING CLINICIAN: THALIA GRANDE TECHNIQUE:  Frontal chest was obtained at 13:42 hours.  FINDINGS: CARDIOMEDIASTINAL SILHOUETTE: Cardiomediastinal silhouette is normal in size and configuration.  LUNGS: Lungs are clear.  ABDOMEN: No remarkable upper abdominal findings.  BONES: No acute osseous changes.    No acute process. Signed by Ciro Sequeira MD     Assessment/Plan   Assessment & Plan    Syncope D/T Orthostatic Hypotension  IV bolus and IV fluids  Recheck orthostatics in morning-f/U  Tele    Dehydration from Poor Oral Intake  IV fluids and replace electrolytes  Recheck K+ and Mg-f/U  Nutrition consulted and suspect may need a feeding tube or supplements    Laryngeal Cancer with Cellulitis of the Neck with Grafted Skin & Wound Vac to Graft Site  -last chemo 2 weeks ago  -On chemo and radiation  ID consulted-appreciate recs  Wound care consult-appreciate recs  MRSA, wound culture-f/U  Ancef Q 8  PPI    Acute Hypoxic Respiratory Failure  -Requiring oxygen  Suspect from dilaudid in ER  Pulse ox    Broken Tib/Fibula with Wounds  Under care of orthopedics-Dr Burnett at [place]  PT/OT/SW, may need rehab, but prefers to go home  Calcium/Vit D  Wound care    Chronic Pain  -Doesn't want to take suboxone prescribed  Requested palliative consult for pain control and has palliative at home  Limit narcotics, says he stopped suboxone a week ago  Tylenol, Celebrex, Lyrica  Added viscous lidocaine before AC HS  PRN dilaudid x's 3 doses and narcan    Methamphetamine Use  Tox screen-f/U    Nicotine Use Disorder  Nicotine replacements  Smoking cessation education  Defer Wellbutrin start to PCP    High Fall Risk  Fall Precautions    Chronic Comorbidities  History of alcohol abuse in remission  Anxiety/Bipolar disorder  COPD/asthmatic bronchitis  Allergic rhinitis  HPLD  RLS  BPH    Full Code    DVT Px/HX PE  [place], SCDs    Kristine Thibodeaux, APRN-CNP  Probably qualifies to flip to inpatient.  76 min spent interviewing and assessing patient, updating MAR, placing admission orders, reviewing labs diagnostics and chart  discussing CODE STATUS.

## 2024-09-09 NOTE — DISCHARGE SUMMARY
Discharge Diagnosis  Syncope and collapse  Cellulitis right side of the neck  Neutropenia    Issues Requiring Follow-Up  -Follow-up with oncology, palliative medicine, addiction medicine, psychiatry on transfer to [facility].    Discharge Meds     Medication List      START taking these medications     lidocaine 2 % solution; Commonly known as: Xylocaine; Take 15 mL by   mouth 4 times a day before meals.   naloxone 0.4 mg/mL injection; Commonly known as: Narcan; Infuse 1 mL   (0.4 mg) into a venous catheter every 5 minutes if needed for respiratory   depression or opioid reversal.   nicotine polacrilex 2 mg gum; Commonly known as: Nicorette; Chew 1 each   (2 mg) if needed for smoking cessation.; Replaces: nicotine polacrilex 2   mg lozenge   oxygen gas therapy; Commonly known as: O2; Inhale 2 L/min once every 24   hours.   pantoprazole 40 mg EC tablet; Commonly known as: ProtoNix; Take 1 tablet   (40 mg) by mouth once daily in the morning. Take before meals. Do not   crush, chew, or split.; Start taking on: September 10, 2024; Replaces:   omeprazole 40 mg DR capsule   sodium chloride 0.9% solution; Infuse 100 mL/hr at 100 mL/hr into a   venous catheter continuously.   vancomycin 1750 mg/500 mL piggyback IV; Commonly known as: Vancocin;   Infuse 500 mL (1,750 mg) over 105 minutes into a venous catheter every 12   hours.     CONTINUE taking these medications     acetaminophen 325 mg tablet; Commonly known as: Tylenol; Take 2 tablets   (650 mg) by mouth every 6 hours if needed for mild pain (1 - 3).   apixaban 5 mg tablet; Commonly known as: Eliquis; Take 1 tablet (5 mg)   by mouth 2 times a day. Do not start before November 16, 2023.   atorvastatin 20 mg tablet; Commonly known as: Lipitor   benztropine 0.5 mg tablet; Commonly known as: Cogentin   calcium carbonate-vitamin D3 500 mg-5 mcg (200 unit) tablet   cyclobenzaprine 10 mg tablet; Commonly known as: Flexeril   disulfiram 250 mg tablet; Commonly known as:  Antabuse   docusate sodium 100 mg capsule; Commonly known as: Colace   * DULoxetine 60 mg DR capsule; Commonly known as: Cymbalta   * DULoxetine 30 mg DR capsule; Commonly known as: Cymbalta   ipratropium-albuteroL 0.5-2.5 mg/3 mL nebulizer solution; Commonly known   as: Duo-Neb; Take 3 mL by nebulization every 2 hours if needed for   wheezing.   magnesium oxide 240 mg magnesium powder in packet; Take 1 packet by   mouth 2 times a day for 14 days.   melatonin 10 mg tablet,disintegrating   methylPREDNISolone 4 mg tablets; Commonly known as: Medrol Dospak;   Follow schedule on package instructions   montelukast 10 mg tablet; Commonly known as: Singulair   potassium chloride 20 mEq packet; Commonly known as: Klor-Con; Take 20   mEq by mouth 2 times a day for 14 days.   pregabalin 200 mg capsule; Commonly known as: Lyrica   Proventil HFA 90 mcg/actuation inhaler; Generic drug: albuterol   QUEtiapine 25 mg tablet; Commonly known as: SEROquel   risperiDONE 1 mg tablet; Commonly known as: RisperDAL   rOPINIRole 0.5 mg tablet; Commonly known as: Requip   sennosides-docusate sodium 8.6-50 mg tablet; Commonly known as:   Marya-Colace; Take 1 tablet by mouth once daily at bedtime.   simvastatin 40 mg tablet; Commonly known as: Zocor   topiramate 100 mg tablet; Commonly known as: Topamax   traZODone 50 mg tablet; Commonly known as: Desyrel   white petrolatum 41 % ointment ointment; Commonly known as: Aquaphor;   Apply 1 Application topically every 1 hour if needed (neck burn).  * This list has 2 medication(s) that are the same as other medications   prescribed for you. Read the directions carefully, and ask your doctor or   other care provider to review them with you.     STOP taking these medications     celecoxib 200 mg capsule; Commonly known as: CeleBREX   ibuprofen 600 mg tablet   metoprolol tartrate 25 mg tablet; Commonly known as: Lopressor   nicotine polacrilex 2 mg lozenge; Commonly known as: Commit; Replaced   by:  nicotine polacrilex 2 mg gum   omeprazole 40 mg DR capsule; Commonly known as: PriLOSEC; Replaced by:   pantoprazole 40 mg EC tablet   tamsulosin 0.4 mg 24 hr capsule; Commonly known as: Flomax       Test Results Pending At Discharge  Pending Labs       Order Current Status    Procalcitonin In process    Staphylococcus aureus/MRSA colonization, Culture In process    Tissue/Wound Culture/Smear In process            Hospital Course  Patient is a 54-year-old male with past medical history of laryngeal cancer status post graft a lesion to the right neck with graft site to the left thigh on chemotherapy and radiation, recent right tib-fib fracture status post ORIF, COPD, pulmonary embolism on apixaban, hypertension, history of methamphetamine use, admitted to the hospital secondary to syncope, possible right neck cellulitis.    Syncope  -Likely due to orthostatic hypotension as patient orthostatic on vitals from lying to standing.  -Held home medication metoprolol.  -Patient was placed on IV fluids. I reviewed patient's EKG which reveals normal sinus rhythm with some flattening T waves in leads V1 with no significant acute ST-T changes as compared to EKG from 9/4/24, except that QTc interval has improved and within normal limits on EKG from 9/8 as compared to the EKG from 9/4 where QTc was 560 MS.  -As patient is followed at the [facility] for his oncology care with Dr. Barahona, patient and wife would like for patient to be transferred to [facility] for continuity of care and for follow-up with his oncologist there.  We will transfer patient to [facility] and Dr. Idalmis Rodgers, oncologist, accepted the patient for transfer to [facility].    Laryngeal cancer with poor oral intake along with pain pancytopenia/neutropenia and cellulitis with history of grafted lesion to the right neck from graft site of the left thigh  -Palliative care consulted.  -ID followed the patient and recommend IV  vancomycin.  -Patient was placed on neutropenic precautions.  -Follow-up with cultures.  -Patient had been on Suboxone as an outpatient, but he did not like the way that he felt, so he stopped taking Suboxone.  Recommend to follow-up with palliative care team at [facility] on transfer to [facility], and consider consult to addiction medicine and psychiatry as well.  -As stated above, patient's oncologist is at [facility], with Dr. Barahona, and patient will transfer to [facility] for continuity of care. Patient was accepted by Dr. Idalmis Rodgers, oncologist at [facility], for transfer to [facility].      Acute hypoxic respiratory failure with history of COPD  -Chest x-ray with no acute process.  -Not sure if related to pain medications given versus COPD.  Patient had 100% O2 sat this morning with supplemental oxygen.  -Continue methylprednisolone tabs, Singulair, DuoNebs as needed.  -Attempt to wean down O2 as tolerated.  -Monitor on transfer to [facility].     Recent right tip/fib fracture status post ORIF  -PT/OT consulted.  -Pain medications as needed.  -Follow-up with patient's regular orthopedic surgeon as an outpatient, Dr. Nixon, at the [facility].      History of saddle pulmonary embolism  -Continue apixaban.     Hypertension  -Patient is not on antihypertensive medications and BP actually on the low side with orthostasis as stated above.  -Monitor.     Illicit substance abuse  -Patient was sober x 11 months and recently relapsed on methamphetamine.  -Recommend illicit drug use cessation.     DVT prophylaxis  -Eliquis.    Time spent on discharge is approximately 35 minutes.    Pertinent Physical Exam At Time of Discharge  General: Patient is alert. No acute distress.   HEENT: Sclera clear.  Neck: Erythema of the right side of the neck from anterior aspect to the posterior aspect of the right side of the neck and status post graft placement.  CVS: RRR.  Lungs: CTAB.    Abdomen: Soft.  Nontender.  Bowel sounds present.  Extremities: No pitting edema bilateral ankles.  Left thigh with wound VAC in place with no significant erythema around the wound VAC.  Psychiatric: Cooperative.    Outpatient Follow-Up  No future appointments.      Fer Ni DO

## 2024-09-09 NOTE — PROGRESS NOTES
"Vancomycin Dosing by Pharmacy- INITIAL    Gaurav Mckay  54YM  Ht 5'10\" Wt 209# (95kg)  Hospital Day #1/ Vancomycin Day #0  Pharmacy has been consulted for vancomycin dosing for surgical wound infection on his neck (laryngeal cancer). Based on the patient's indication and renal status this patient will be dosed based on a goal AUC of 400-600.     Renal function is currently stable.  Clcr 125 ml/min     Visit Vitals  BP 90/56 (BP Location: Right arm, Patient Position: Lying)   Pulse 60   Temp 36.6 °C (97.9 °F) (Temporal)   Resp 18        Lab Results   Component Value Date    CREATININE 0.74 09/09/2024    CREATININE 0.75 09/08/2024    CREATININE 0.59 09/06/2024    CREATININE 0.73 09/05/2024        Patient weight is as follows:   Vitals:    09/08/24 2325   Weight: 94.8 kg (209 lb)              Assessment/Plan     Will initiate vancomycin maintenance,  1750 mg every 12 hours     This dosing regimen is predicted by [employer] to result in the following pharmacokinetic parameters:  Regimen: 1750 mg IV every 12 hours.  Start time: 2000 on 09/09/2024  Exposure target: AUC24 (range)400-600 mg/L.hr   QCP66-61: 434 mg/L.hr  AUC24,ss: 483 mg/L.hr  Probability of AUC24 > 400: 70 %  Ctrough,ss: 11.1 mg/L  Probability of Ctrough,ss > 20: 12 %    Follow-up level will be ordered on 9/11/24 w/  am labs.   Will continue to monitor renal function daily while on vancomycin and order serum creatinine at least every 48 hours if not already ordered.  Follow for continued vancomycin needs, clinical response, and signs/symptoms of toxicity.       Blair Treviño, PharmD       "

## 2024-09-09 NOTE — PROGRESS NOTES
Occupational Therapy                 Therapy Communication Note    Patient Name: Gaurav Mckay  MRN: [id number]  Today's Date: 9/9/2024     Discipline: Occupational Therapy    Missed Visit Reason: Missed Visit Reason: Other (Comment) (At 10:24, discussed with RN and PT who just completed evaluation. Pt abruptly requested end of therapy session due to increased pain and ultimately worsening agitation. PT advised to defer OT eval at this time and RN agreed. Will follow up as appropriate)    Missed Time: Attempt

## 2024-09-10 VITALS
SYSTOLIC BLOOD PRESSURE: 107 MMHG | RESPIRATION RATE: 18 BRPM | BODY MASS INDEX: 29.92 KG/M2 | HEART RATE: 62 BPM | HEIGHT: 70 IN | DIASTOLIC BLOOD PRESSURE: 64 MMHG | WEIGHT: 209 LBS | OXYGEN SATURATION: 94 % | TEMPERATURE: 97.2 F

## 2024-09-10 NOTE — CARE PLAN
Problem: Safety - Adult  Goal: Free from fall injury  Outcome: Progressing   The patient's goals for the shift include      The clinical goals for the shift include Patient will remain free from injury throughout shift    Over the shift, the patient did not make progress toward the following goals. Barriers to progression include . Recommendations to address these barriers include   Problem: Safety - Adult  Goal: Free from fall injury  Outcome: Progressing   .

## 2024-09-10 NOTE — DOCUMENTATION CLARIFICATION NOTE
"    PATIENT:               SHONA MCBRIDE  ACCT #:                  [id number]  MRN:                       [id number]  :                       1970  ADMIT DATE:       2024 11:01 PM  DISCH DATE:        9/10/2024 2:12 AM  RESPONDING PROVIDER #:        [id number]          PROVIDER RESPONSE TEXT:    Pancytopenia due to chemotherapy    CDI QUERY TEXT:    Clarification        Instruction:    Based on your assessment of the patient and the clinical information, please provide the requested documentation by clicking on the appropriate radio button and enter any additional information if prompted.    Question: Can the diagnosis of pancytopenia be further clarified    When answering this query, please exercise your independent professional judgment. The fact that a question is being asked, does not imply that any particular answer is desired or expected.    The patient's clinical indicators include:  Clinical Information: 54 year old man transferred from [place]. Pt's diagnoses include orthostatic hypotension, dehydration, surgical site infection, and pancytopenia. Pt's PMH includes laryngeal cancer currently on chemotherapy and radiation, COPD, HTN, alcohol abuse in remission, and PE on anticoagulants.    Clinical Indicators:  DC Summary Dr. Ni  \"Laryngeal cancer with poor oral intake along with pain pancytopenia/neutropenia and cellulitis with history of grafted lesion...\"    Labs    WBC  2.5    RBC  4.17    Platelets  147    Neutrophil absolute  1.29    WBC  3.1    RBC  4.63    Platelets  174    Neutrophil absolute  1.89    WBC  1.9    RBC  3.91    Platelets  135    Neutrophil absolute  0.99    MARs  -Vancomycin 1.75gm iv:   -Cefazolin 2gm iv every 8 hours:     Treatment: Neutropenic precautions    Risk Factors: laryngeal cancer, chemotherapy/radiation, surgical site infection  Options provided:  -- Pancytopenia due to chemotherapy  -- Pancytopenia due to laryngeal cancer  -- Pancytopenia due to " antibiotics  -- Other - I will add my own diagnosis  -- Refer to Clinical Documentation Reviewer    Query created by: Nga Quijano on 9/10/2024 3:12 PM      Electronically signed by:  NAOMIE BARAHONA DO 9/10/2024 3:59 PM

## 2024-09-11 LAB
BACTERIA SPEC CULT: ABNORMAL
GRAM STN SPEC: ABNORMAL
GRAM STN SPEC: ABNORMAL
STAPHYLOCOCCUS SPEC CULT: ABNORMAL

## 2024-09-15 LAB
ATRIAL RATE: 71 BPM
ATRIAL RATE: 81 BPM
P AXIS: 33 DEGREES
P AXIS: 42 DEGREES
P OFFSET: 196 MS
P OFFSET: 201 MS
P ONSET: 152 MS
P ONSET: 153 MS
PR INTERVAL: 140 MS
PR INTERVAL: 140 MS
Q ONSET: 222 MS
Q ONSET: 223 MS
QRS COUNT: 11 BEATS
QRS COUNT: 13 BEATS
QRS DURATION: 88 MS
QRS DURATION: 90 MS
QT INTERVAL: 406 MS
QT INTERVAL: 442 MS
QTC CALCULATION(BAZETT): 471 MS
QTC CALCULATION(BAZETT): 480 MS
QTC FREDERICIA: 448 MS
QTC FREDERICIA: 467 MS
R AXIS: 22 DEGREES
R AXIS: 31 DEGREES
T AXIS: 46 DEGREES
T AXIS: 51 DEGREES
T OFFSET: 426 MS
T OFFSET: 443 MS
VENTRICULAR RATE: 71 BPM
VENTRICULAR RATE: 81 BPM

## 2024-09-19 ENCOUNTER — NURSING HOME VISIT (OUTPATIENT)
Dept: POST ACUTE CARE | Facility: EXTERNAL LOCATION | Age: 54
End: 2024-09-19
Payer: COMMERCIAL

## 2024-09-19 DIAGNOSIS — E46 PROTEIN-CALORIE MALNUTRITION, UNSPECIFIED SEVERITY (MULTI): ICD-10-CM

## 2024-09-19 DIAGNOSIS — F10.11 HISTORY OF ETOH ABUSE: ICD-10-CM

## 2024-09-19 DIAGNOSIS — R53.1 GENERALIZED WEAKNESS: ICD-10-CM

## 2024-09-19 DIAGNOSIS — J44.9 CHRONIC OBSTRUCTIVE PULMONARY DISEASE, UNSPECIFIED COPD TYPE (MULTI): ICD-10-CM

## 2024-09-19 DIAGNOSIS — I10 HYPERTENSION, UNSPECIFIED TYPE: ICD-10-CM

## 2024-09-19 DIAGNOSIS — E78.49 OTHER HYPERLIPIDEMIA: ICD-10-CM

## 2024-09-19 DIAGNOSIS — R33.9 URINARY RETENTION: ICD-10-CM

## 2024-09-19 DIAGNOSIS — K59.00 CONSTIPATION, UNSPECIFIED CONSTIPATION TYPE: ICD-10-CM

## 2024-09-19 DIAGNOSIS — K21.9 GASTROESOPHAGEAL REFLUX DISEASE WITHOUT ESOPHAGITIS: ICD-10-CM

## 2024-09-19 DIAGNOSIS — F17.200 TOBACCO USE DISORDER: ICD-10-CM

## 2024-09-19 DIAGNOSIS — G25.81 RLS (RESTLESS LEGS SYNDROME): ICD-10-CM

## 2024-09-19 DIAGNOSIS — C32.9 LARYNGEAL CANCER (MULTI): ICD-10-CM

## 2024-09-19 DIAGNOSIS — L03.221 CELLULITIS OF NECK: ICD-10-CM

## 2024-09-19 DIAGNOSIS — S71.102D OPEN WOUND OF LEFT THIGH, SUBSEQUENT ENCOUNTER: ICD-10-CM

## 2024-09-19 DIAGNOSIS — D50.9 IRON DEFICIENCY ANEMIA, UNSPECIFIED IRON DEFICIENCY ANEMIA TYPE: ICD-10-CM

## 2024-09-19 DIAGNOSIS — F19.10 POLYSUBSTANCE ABUSE (MULTI): ICD-10-CM

## 2024-09-19 DIAGNOSIS — Z86.711 HISTORY OF PULMONARY EMBOLUS (PE): ICD-10-CM

## 2024-09-19 PROCEDURE — 99305 1ST NF CARE MODERATE MDM 35: CPT | Performed by: INTERNAL MEDICINE

## 2024-09-19 NOTE — LETTER
Patient: Gaurav Mckay  : 1970    Encounter Date: 2024    Name: Gaurav Mckay  : 1970  MRN: [id number]  Visit Date: 2024  Chief Complaint: HISTORY AND PHYSICAL    HPI: Gaurav Mckay is a 54 y.o. gentleman with PMH remarkable for recurrent SCC of the larynx s/p R radical neck dissection 24 on chemoradiation therapy, COPD, hx of PE on Eliquis, R tib-fib fracture s/p ORIF, HTN, methamphetamine abuse, RLS, BPH, chronic pancreatitis who has had multiple hospitalizations over this past year with a previous skilled stay at this facility in  of this year. During hospitalization in -July for radical neck dissection, he had woundvac placed to his left thigh. His most recent hospitalization occurred after he presented to [place] ER on 24 with complaint of syncopal episode and fall at home(all imaging was negative in ER), was transferred and admitted to [facility] where his syncope was thought to be secondary to orthostasis per hospital notes. While at [facility], he was given IV fluids and there was concern of neck skin graft cellulitis for which ID was consulted and recommend IV vancomycin. Per documentation patient had not been taking suboxone as outpatient due to the way it made him feel and he had relapsed with methamphetamine just prior to his admission to hospital.  There was concern for COPD exacerbation for which he received steroids and was continued on Singulair and Duoneb PRN, CXR completed and negative. On 09/10/24, he was transferred to [facility] for further care. A CT scan of his neck was completed on 9/10 which revealed: Status post right radical neck dissection without gross residual cervical lymphadenopathy. There is stranding and skin/platysma thickening which is nonspecific in the setting of recent surgery and prior radiation.  Superimposed cellulitis can be considered clinically.  There is no evidence of an organized/drainable collection.  "Treatment changes in the larynx without evidence of gross recurrence at the primary site.  He was continued on IV vanco and ID followed and managed ATB's. Blood cultures were negative. Per discharge summary, \"The plan for discharge for abx is for vanco to continue till 10/2.\"  He was evaluated by ST while at [facility] and they recommended Regular/liquid diet (MBS on 09/11/24). While inpatient, Palliative medicine was consulted for pain management assistance. He received inpatient radiation while at [facility] and finished radiation on 9/13 with plan for rad onc to follow up outpatient in 1 wk. Per hospital notes, an Echo was done while he was inpatient and that was normal. Per hospital notes, patient admitted that he had weight loss, had not been eating well at home prior to this hospitalization. He had a PICC line placed on 09/17/24,  was discharged to [facility] on 09/18/24.    Subjective: Seen and examined today. He is lying in bed awake in no acute distress. He denies any new issues or complaints.     The patient was counseled regarding diagnostic results, instructions for management, risk factor reductions, prognosis, patient and family education, impressions, risks and benefits of treatment options and importance of compliance with treatment. I have reviewed nursing notes since my last visit and document any significant changes Reviewed orders, medications, Labs. Reviewed chart looking at current medications, treatments, labs, x-rays etc.     ROS:  As above in subjective. Otherwise, all other systems have been reviewed and are negative for complaint.    Medications:  Medications reviewed and verified in NH chart.     Past Medical History:   Diagnosis Date   • Acute upper respiratory infection, unspecified 03/20/2015    Acute upper respiratory infection   • Alcohol abuse, in remission 02/12/2015    History of ETOH abuse   • Alcohol dependence, in remission (Multi) 05/07/2015    Alcohol dependence in remission   • " Allergic contact dermatitis due to plants, except food 05/21/2014    Contact dermatitis due to poison ivy   • Allergy status to unspecified drugs, medicaments and biological substances 08/29/2013    History of allergy   • Anxiety disorder, unspecified 03/20/2015    Anxiety   • Bipolar disorder, current episode manic without psychotic features, unspecified (Multi)     Bipolar disorder, current episode manic without psychotic features   • Cervicalgia     Cervicalgia   • Chronic obstructive pulmonary disease, unspecified (Multi) 12/02/2014    Chronic asthmatic bronchitis   • Encounter for immunization 09/22/2016    Flu vaccine need   • Hypertension    • Other cervical disc degeneration, unspecified cervical region     Degeneration of cervical intervertebral disc   • Other conditions influencing health status 06/27/2013    Acute Inflammation Of The Orbit   • Other long term (current) drug therapy 06/02/2015    High risk medication use   • Other specified health status     No pertinent past surgical history   • Personal history of nicotine dependence 08/16/2017    History of tobacco abuse   • Personal history of nicotine dependence 06/26/2017    History of nicotine dependence   • Personal history of other diseases of the digestive system 04/11/2013    History of gastroenteritis   • Personal history of other diseases of the digestive system 08/27/2015    History of esophageal reflux   • Personal history of other diseases of the respiratory system 03/20/2017    History of sore throat   • Personal history of other diseases of the respiratory system 02/05/2014    History of sinusitis   • Personal history of other diseases of the respiratory system 01/16/2014    History of allergic rhinitis   • Personal history of other diseases of the respiratory system 09/26/2013    History of acute sinusitis   • Personal history of other diseases of the respiratory system 06/26/2014    History of allergic rhinitis   • Personal history of  other infectious and parasitic diseases     History of hepatitis C   • Personal history of other mental and behavioral disorders     History of depression   • Personal history of other specified conditions 08/25/2016    History of dizziness   • Personal history of other specified conditions 02/16/2017    History of abdominal pain   • Unspecified asthma, uncomplicated ([facility]) 02/13/2014    Asthmatic bronchitis       Past Surgical History:   Procedure Laterality Date   • CHOLECYSTECTOMY     • CT ABDOMEN PELVIS ANGIOGRAM W AND/OR WO IV CONTRAST  04/01/2020    CT ABDOMEN PELVIS ANGIOGRAM W AND/OR WO IV CONTRAST 4/1/2020 [facility]   • INVASIVE VASCULAR PROCEDURE N/A 11/08/2023    Procedure: Pulmonary Angiogram;  Surgeon: Surya Templeton MD;  Location: [facility] Cardiac Cath Lab;  Service: Cardiovascular;  Laterality: N/A;   • MR HEAD ANGIO WO IV CONTRAST  01/24/2016    MR HEAD ANGIO WO IV CONTRAST 1/24/2016 [facility]   • MR NECK ANGIO WO IV CONTRAST  01/24/2016    MR NECK ANGIO WO IV CONTRAST 1/24/2016 [facility]       No family history on file.    Social History     Tobacco Use   • Smoking status: Every Day     Current packs/day: 0.50     Types: Cigarettes   • Smokeless tobacco: Never   Substance Use Topics   • Alcohol use: Not Currently       Allergies   Allergen Reactions   • Oxycodone Itching      Vital Signs:   Vital Signs were reviewed in nursing home documentation.    Physical Exam  Vitals and nursing note reviewed.   Constitutional:       Appearance: Normal appearance.   HENT:      Head: Normocephalic and atraumatic.   Neck:      Comments: Right neck radiation ulcer  Cardiovascular:      Rate and Rhythm: Normal rate and regular rhythm.      Pulses: Normal pulses.      Heart sounds: Normal heart sounds.   Pulmonary:      Effort: Pulmonary effort is normal.      Breath sounds: Normal breath sounds.   Abdominal:      General: Bowel sounds are normal.      Palpations: Abdomen is soft.    Musculoskeletal:         General: Normal range of motion.      Comments: PICC line in place LUE   Skin:     General: Skin is warm and dry.      Comments: Woundvac in place Left thigh   Neurological:      General: No focal deficit present.      Mental Status: He is alert and oriented to person, place, and time.   Psychiatric:         Mood and Affect: Mood normal.         Behavior: Behavior normal.         Results/Data:   Lab Results   Component Value Date    WBC 1.9 (L) 09/09/2024    WBC 1.9 (L) 09/09/2024    HGB 9.7 (L) 09/09/2024    HGB 9.7 (L) 09/09/2024    HCT 32.3 (L) 09/09/2024    HCT 32.3 (L) 09/09/2024     (L) 09/09/2024     (L) 09/09/2024    TRIG 256 (H) 02/27/2019    ALT 10 09/08/2024    AST 12 09/08/2024     09/09/2024    K 3.8 09/09/2024     09/09/2024    CREATININE 0.74 09/09/2024    BUN 12 09/09/2024    CO2 27 09/09/2024    INR 1.2 (H) 04/02/2022    HGBA1C 6.1 (H) 05/17/2024     Results were reviewed and addressed accordingly. Lab Results were also reviewed in edlab.     Provider Impression:   Problem List Items Addressed This Visit       COPD (chronic obstructive pulmonary disease) (Multi)    Current Assessment & Plan     Seems to be stable  Continue with Singulair,  inhalers, bronchdilators         Urinary retention    Current Assessment & Plan     Continue with Flomax  Monitor urination         History of ETOH abuse    Current Assessment & Plan     Continue with thiamine and folic acid         History of pulmonary embolus (PE)    Current Assessment & Plan     Continue with Eliquis         HTN (hypertension)    Current Assessment & Plan     Not currently on any antihypertensive medications  Monitor vital signs         Other hyperlipidemia    Current Assessment & Plan     C/w statin         Gastroesophageal reflux disease without esophagitis    Current Assessment & Plan     Continue with PPI         Generalized weakness    Current Assessment & Plan     Consult PT/OT          Laryngeal cancer (Multi)    Current Assessment & Plan     SCC of the larynx, s/p R radical neck dissection 6/30/24   He received chemoradiation treatment  Continue with pain medication  Follow up with heme-onc         RLS (restless legs syndrome)    Current Assessment & Plan     Continue with Ropinirole and Topamax         Polysubstance abuse (Multi)    Current Assessment & Plan     Continue with subaxone         Cellulitis of neck    Current Assessment & Plan     Continue with IV Vanco with stop date per ID rec  He has LUE PICC line in place         Open wound of left thigh    Current Assessment & Plan     Skin graft site(radical neck dissection)  Woundvac in place  Continue with dressing change  Wound team to follow  Follow up with surgeon  Continue with pain medications         Iron deficiency anemia    Current Assessment & Plan     Continue with iron supplement  Monitor CBC         Constipation    Current Assessment & Plan     Continue with stool softeners and laxatives  Monitor BM's         Protein-calorie malnutrition (Multi)    Current Assessment & Plan     Dietician to follow  Monitor weight  Nutritional supplements         Tobacco use disorder    Current Assessment & Plan     Continue with Nicotine patches           ----------------  Written by Andreina Atwood LPN, acting as a scribe for Dr. Gurrola. This note accurately reflects the work and decisions made by Dr. Gurrola.     I, Dr. Gurrola, attest all medical record entries made by the scribe were under my direction and were personally dictated by me. I have reviewed the chart and agree that the record accurately reflects my performance of the history, physical exam, and assessment and plan.     Electronically Signed By: Caterina Guthrie MD   10/8/24 12:44 PM

## 2024-09-20 ENCOUNTER — NURSING HOME VISIT (OUTPATIENT)
Dept: POST ACUTE CARE | Facility: EXTERNAL LOCATION | Age: 54
End: 2024-09-20
Payer: COMMERCIAL

## 2024-09-20 DIAGNOSIS — R55 SYNCOPE AND COLLAPSE: ICD-10-CM

## 2024-09-20 DIAGNOSIS — D50.9 IRON DEFICIENCY ANEMIA, UNSPECIFIED IRON DEFICIENCY ANEMIA TYPE: ICD-10-CM

## 2024-09-20 DIAGNOSIS — E86.0 DEHYDRATION: ICD-10-CM

## 2024-09-20 DIAGNOSIS — L03.221 CELLULITIS OF NECK: ICD-10-CM

## 2024-09-20 DIAGNOSIS — R78.81 MRSA BACTEREMIA: ICD-10-CM

## 2024-09-20 DIAGNOSIS — B95.62 MRSA BACTEREMIA: ICD-10-CM

## 2024-09-20 DIAGNOSIS — R53.81 PHYSICAL DECONDITIONING: ICD-10-CM

## 2024-09-20 DIAGNOSIS — K64.8 OTHER HEMORRHOIDS: ICD-10-CM

## 2024-09-20 DIAGNOSIS — C32.9 LARYNGEAL CANCER (MULTI): ICD-10-CM

## 2024-09-20 DIAGNOSIS — J44.1 COPD EXACERBATION (MULTI): ICD-10-CM

## 2024-09-20 DIAGNOSIS — I95.1 ORTHOSTATIC HYPOTENSION: Primary | ICD-10-CM

## 2024-09-20 DIAGNOSIS — R13.10 ODYNOPHAGIA: ICD-10-CM

## 2024-09-20 DIAGNOSIS — K59.00 CONSTIPATION, UNSPECIFIED CONSTIPATION TYPE: ICD-10-CM

## 2024-09-20 PROCEDURE — 99310 SBSQ NF CARE HIGH MDM 45: CPT | Performed by: NURSE PRACTITIONER

## 2024-09-20 NOTE — LETTER
Patient: Gaurav Mckay  : 1970    Encounter Date: 2024    Chief Complaint:   SNF F/U  -Cellulitis of neck  -Orthostatic hypotension  -Physical deconditioning/weakness  -Malignant neoplasm of larynx  -COPD exacerbation  -Severe protein-calorie malnutrition   -Oropharyngeal dysphagia    HPI:   54 year-old male presenting to [facility] ER on 24 w/ c/o neck pain. Work-up in ER: T 36.8, , RR 20, /72, SpO2 100% on RA, Glu 104, Na+ 137, K+ 2.5, BUN 12, Cr 0.86, Mag 1.24, Lactate 2.9, WBC 2.5, Hgb 10.1, Hct 33.0, Plt 147. He was admitted to the hospital for further evaluation and treatment. Hospital course:    Hypokalemia/hypomagnesemia-repleted, monitored  Laryngeal cancer/neck pain/radiation burn of the neck-undergoing radiation therapy at [facility], aquaphor prn to neck per F recs, IV solumedrol Q 8 hours for inflammation, RD consult, lidocaine mouth solution for pain, tramadol prn  Pancytopenia-monitor CBC    Pt. was stabilized and discharged home on 24 w/ oral KCl and oral magnesium x 14 days. On 24, patient presented to [facility] ER with c/o syncope and RLE pain. [facility] was also concerned about infection of R neck, where patient was receiving radiation. Work-up in ER: K+ 3.3, Mag 1.4, Trop x 2 negative, Flu/COVID neg, CT of the head negative for acute findings, XR of R tib-fib/ankle/foot showed no acute new fracture, post-surgical changes w/ healing distal tibial fibular fracture w/ intact hardware and soft tissue swelling noted in the right ankle and tib-fib, CXR negative for acute findings. Pt. was started on IVF and admitted to the hospital for further evaluation and treatment. Hospital course:    Syncope 2/2 orthostatic hypotension-IVF, orthostatic VS, telemetry monitoring, metoprolol held   Dehydration 2/2 poor oral intake-IVF, electrolytes repleted, RD consult  Laryngeal cancer-on chemo and radiation, neutropenic precautions, palliative care consult, pain mgmt, patient transferred  "to [facility] on 9/10 for further care, received inpatient radiation and finished on 9/13, patient to F/U with radiation oncology as outpatient  MRSA bacteremia 2/2 neck cellulitis-ID consult, IV Vancomycin through 10/2, PICC line placed   COPD exacerbation-Medrol DP, c/w singulair and duonebs prn   Odynophagia-underwent MBS with SLP, tolerating regular diet with thin liquids  Physical deconditioning/weakness-PT/OT evaluations recommending SNF    Pt. was HDS and discharged to [facility] on 9/18/24. Today, patient is c/o hemorrhoids and \"hard\" stools. He denies abdominal pain, nausea, appetite changes, fevers, or chills. He reports improvement in R neck cellulitis pain. He denies SOB or chest pain. Staff report no clinical concerns.     ROS:    As above in HPI. Otherwise, all other systems have been reviewed and are negative for complaint.    Medications reviewed and verified in NH chart.     Patient Active Problem List:  Cellulitis of neck  Orthostatic hypotension  Physical deconditioning/weakness  Malignant neoplasm of larynx  COPD exacerbation  Severe protein-calorie malnutrition   Oropharyngeal dysphagia  Neck mass (May 2024; + for SCC)  Hx bilateral PE  Hx RLE DVT  HTN  HLD  Tinnitus  Constipation  BPH  Right cervical adenopathy  Hypokalemia  Hypomagnesemia  Pancytopenia  GERD  Neuropathy  Folic acid deficiency  Thiamine deficiency  Vit B12 deficiency  HANY (undiagnosed per EMR)  Allergic rhinitis  Anxiety  Arthritis  Hearing loss  Lumbosacral radiculopathy  Chronic pancreatitis  Asthma/COPD  Chronic back pain  Depression  RLS    Past Medical History:  RIJ thrombosis  Hepatitis C  Gastroenteritis  Sepsis  Urinary retention  UTI  Alcohol abuse  Acute respiratory failure  Bronchitis    Past Surgical History:   Procedure Laterality Date   • ANKLE SURGERY Left    • ANKLE SURGERY  07/08/2024    R ankle spanning ex-fix revision   • CHOLECYSTECTOMY     • CT ABDOMEN PELVIS ANGIOGRAM W AND/OR WO IV CONTRAST  04/01/2020    CT " ABDOMEN PELVIS ANGIOGRAM W AND/OR WO IV CONTRAST 4/1/2020 [facility]   • FRACTURE SURGERY Right 07/15/2024    Open reduction and internal fixation of right distal tibia pilon and fibula fractures, removal of right ankle spanning external fixation under anesthesia, debridement down to including bone external fixator pin sites right leg   • INVASIVE VASCULAR PROCEDURE N/A 11/08/2023    Procedure: Pulmonary Angiogram;  Surgeon: Surya Templeton MD;  Location: [facility] Cardiac Cath Lab;  Service: Cardiovascular;  Laterality: N/A;   • LARYNGOSCOPY  07/01/2024    R radical neck dissection and L ALT free flap reconstruction   • LEG SURGERY  07/06/2024    RLE ex-fix placement   • MR HEAD ANGIO WO IV CONTRAST  01/24/2016    MR HEAD ANGIO WO IV CONTRAST 1/24/2016 [facility]   • MR NECK ANGIO WO IV CONTRAST  01/24/2016    MR NECK ANGIO WO IV CONTRAST 1/24/2016 [facility]   • THROMBECTOMY     • WOUND DEBRIDEMENT Left 08/06/2024    L thigh debridement with woud vac placement 2/2 surgical wound dehiscence       Family History   Problem Relation Name Age of Onset   • Diabetes Mother     • Hypertension Mother         Social History     Tobacco Use   Smoking Status Every Day   • Types: Cigarettes   Smokeless Tobacco Former   • Types: Snuff   Tobacco Comments    1 ppd since the age of 16       Social History     Substance and Sexual Activity   Alcohol Use Not Currently    Comment: Quit ~ 7/27/23 per EMR       Social History     Substance and Sexual Activity   Drug Use Not Currently    Comment: Hx methamphetamine (last used in March 2024 per EMR) and cocaine use       Allergies   Allergen Reactions   • Oxycodone Itching        Vital Signs:   139/83-73-18-97.4-97% on RA     Physical Exam:  General: Sitting up in bed in NAD, alert   Head/Face: NCAT, symmetrical  Eyes: PERRLA, no injection, no discharge  ENT: Hearing impaired, ears without scars or lesions, nasal mucosa and turbinates pink, septum midline, lips  pink and moist  Neck: Supple, symmetrical  Respiratory: CTA but diminished without adventitious sounds, respirations even and nonlabored without use of accessory muscles, good air exchange  Cardio: RRR without murmur or gallops, normal S1S2, no edema, pedal pulses 3+/4 bilaterally  Chest/Breast: Symmetrical  GI: BS x 4, normoactive, non-distended, abd round and soft, no masses or tenderness  : No suprapubic tenderness or distention  MSK: Gait not assessed, joints with full ROM without pain or contractures  Skin: Skin warm and dry, no induration, PICC to LUE, radiation burn to R neck   Neurologic: Cranial nerves II through XII intact, superficial touch and pain sensation intact  Psychiatric: Alert to person, place, and time, calm and cooperative     Results/Data:   9/20/24: Alb 3.3, Phos 3.5, Mag 1.8, Hgb 9.7, Hct 30.1    Assessment/Plan:  Syncope 2/2 orthostatic hypotension-RESOLVED, continue to encourage fluids, monitor VS closely  Dehydration 2/2 poor oral intake-RESOLVED, continue to encourage PO intake, monitor BMP  Constipation/hemorrhoids-encourage fluids, increase mobility as tolerated, c/w senna and miralax, add colace 100 mg BID, monitor closely  COPD with exacerbation-EXACERBATION RESOLVED, c/w singavis fitzgerald prn, monitor resp. status closely  Physical deconditioning/weakness-c/w PT/OT  Odynophagia-c/w regular diet, SLP to follow  MRSA bacteremia 2/2 neck cellulitis-c/w local wound care, c/w IV Vancomycin, monitor Vanco trough, PICC line care, monitor labs, monitor VS  Laryngeal cancer-s/p radiation (end date 9/23), c/w pain mgmt (lyrica 200 mg TID, flexeril 5 mg TID, suboxone, and oxycodone prn), F/U with radiation oncology, oncology, and palliative medicine as scheduled  Anemia-c/w thiamine, iron, and Vit B12 supplementation, check iron panel, Vit B12 level, and folic acid level on 9/23, monitor CBC  GERD-c/w PPI  Tobacco abuse-c/w nicotine patch, smoking cessation encouraged  Pancreatic  insufficiency-c/w Creon  Hx DVT/PE-c/w eliquis  HTN/HLD-2 gm Na+ diet, c/w ASA and atorvastatin, monitor BP, monitor lipid panel and LFTs  BPH-c/w flomax  RLS-c/w requip  Depression/anxiety-c/w remeron and topamax,  monitor behaviors, supportive care, consult Psych    Orders:  Colace 100 mg PO BID   Iron panel, Vit B12, and folic acid level on 9/23    Code Status:   Full Code    Time spent reviewing patient's chart on the unit (PMH, PSH, FH, Social Hx AND progress and/or consult notes, labs, and radiology results): 30 minutes  Time spent interviewing and/or examining the patient: 10 minutes  Time spent writing note on the unit: 5 minutes  Time spent reviewing POC w/ patient, family, and/or staff: 5 minutes  Total visit time: 50 minutes. Greater than 50% of time was spent on counseling and/or coordination of care of the patient. Start time: 2:01 PM, End time: 2:51 PM.        Electronically Signed By: JUSTINO Jerome   11/6/24 12:00 AM

## 2024-09-24 DIAGNOSIS — G62.9 NEUROPATHY: Primary | ICD-10-CM

## 2024-09-24 RX ORDER — PREGABALIN 200 MG/1
200 CAPSULE ORAL 3 TIMES DAILY
Qty: 42 CAPSULE | Refills: 0 | Status: SHIPPED | OUTPATIENT
Start: 2024-09-24 | End: 2024-10-08

## 2024-09-25 DIAGNOSIS — C32.9 LARYNGEAL CANCER (MULTI): Primary | ICD-10-CM

## 2024-09-25 RX ORDER — OXYCODONE HYDROCHLORIDE 5 MG/1
5 TABLET ORAL EVERY 8 HOURS PRN
Qty: 21 TABLET | Refills: 0 | Status: SHIPPED | OUTPATIENT
Start: 2024-09-25 | End: 2024-10-02

## 2024-09-26 ENCOUNTER — NURSING HOME VISIT (OUTPATIENT)
Dept: POST ACUTE CARE | Facility: EXTERNAL LOCATION | Age: 54
End: 2024-09-26
Payer: COMMERCIAL

## 2024-09-26 DIAGNOSIS — F17.200 TOBACCO USE DISORDER: ICD-10-CM

## 2024-09-26 DIAGNOSIS — Z86.711 HISTORY OF PULMONARY EMBOLUS (PE): ICD-10-CM

## 2024-09-26 DIAGNOSIS — E78.49 OTHER HYPERLIPIDEMIA: ICD-10-CM

## 2024-09-26 DIAGNOSIS — R33.9 URINARY RETENTION: ICD-10-CM

## 2024-09-26 DIAGNOSIS — L03.221 CELLULITIS OF NECK: ICD-10-CM

## 2024-09-26 DIAGNOSIS — K59.00 CONSTIPATION, UNSPECIFIED CONSTIPATION TYPE: ICD-10-CM

## 2024-09-26 DIAGNOSIS — R42 DIZZINESS: ICD-10-CM

## 2024-09-26 DIAGNOSIS — R22.1 NECK MASS: ICD-10-CM

## 2024-09-26 DIAGNOSIS — K21.9 GASTROESOPHAGEAL REFLUX DISEASE WITHOUT ESOPHAGITIS: ICD-10-CM

## 2024-09-26 DIAGNOSIS — R53.1 GENERALIZED WEAKNESS: ICD-10-CM

## 2024-09-26 DIAGNOSIS — F19.10 POLYSUBSTANCE ABUSE (MULTI): ICD-10-CM

## 2024-09-26 DIAGNOSIS — K64.8 OTHER HEMORRHOIDS: ICD-10-CM

## 2024-09-26 DIAGNOSIS — G25.81 RLS (RESTLESS LEGS SYNDROME): ICD-10-CM

## 2024-09-26 DIAGNOSIS — D50.9 IRON DEFICIENCY ANEMIA, UNSPECIFIED IRON DEFICIENCY ANEMIA TYPE: ICD-10-CM

## 2024-09-26 DIAGNOSIS — S71.102D OPEN WOUND OF LEFT THIGH, SUBSEQUENT ENCOUNTER: ICD-10-CM

## 2024-09-26 DIAGNOSIS — I10 HYPERTENSION, UNSPECIFIED TYPE: ICD-10-CM

## 2024-09-26 DIAGNOSIS — E46 PROTEIN-CALORIE MALNUTRITION, UNSPECIFIED SEVERITY (MULTI): ICD-10-CM

## 2024-09-26 DIAGNOSIS — C32.9 LARYNGEAL CANCER (MULTI): ICD-10-CM

## 2024-09-26 DIAGNOSIS — F10.11 HISTORY OF ETOH ABUSE: ICD-10-CM

## 2024-09-26 DIAGNOSIS — J44.9 CHRONIC OBSTRUCTIVE PULMONARY DISEASE, UNSPECIFIED COPD TYPE (MULTI): ICD-10-CM

## 2024-09-26 PROCEDURE — 99309 SBSQ NF CARE MODERATE MDM 30: CPT | Performed by: INTERNAL MEDICINE

## 2024-09-26 NOTE — LETTER
Patient: Gaurav Mckay  : 1970    Encounter Date: 2024    Name: Gaurav Mckay  : 1970  MRN: [id number]  Visit Date: 2024  Chief Complaint: WEEKLY SNF PHYSICIAN VISIT    HPI: Gaurav Mckay is a 54 y.o. gentleman with PMH remarkable for recurrent SCC of the larynx s/p R radical neck dissection 24 on chemoradiation therapy, COPD, hx of PE on Eliquis, R tib-fib fracture s/p ORIF, HTN, methamphetamine abuse, RLS, BPH, chronic pancreatitis who has had multiple hospitalizations over this past year with a previous skilled stay at this facility in  of this year. During hospitalization in -July for radical neck dissection, he had woundvac placed to his left thigh. His most recent hospitalization occurred after he presented to [place] ER on 24 with complaint of syncopal episode and fall at home(all imaging was negative in ER), was transferred and admitted to [facility] where his syncope was thought to be secondary to orthostasis per hospital notes. While at [facility], he was given IV fluids and there was concern of neck skin graft cellulitis for which ID was consulted and recommend IV vancomycin. Per documentation patient had not been taking suboxone as outpatient due to the way it made him feel and he had relapsed with methamphetamine just prior to his admission to hospital.  There was concern for COPD exacerbation for which he received steroids and was continued on Singulair and Duoneb PRN, CXR completed and negative. On 09/10/24, he was transferred to [facility] for further care. A CT scan of his neck was completed on 9/10 which revealed: Status post right radical neck dissection without gross residual cervical lymphadenopathy. There is stranding and skin/platysma thickening which is nonspecific in the setting of recent surgery and prior radiation.  Superimposed cellulitis can be considered clinically.  There is no evidence of an organized/drainable collection.  "Treatment changes in the larynx without evidence of gross recurrence at the primary site.  He was continued on IV vanco and ID followed and managed ATB's. Blood cultures were negative. Per discharge summary, \"The plan for discharge for abx is for vanco to continue till 10/2.\"  He was evaluated by ST while at [facility] and they recommended Regular/liquid diet (MBS on 09/11/24). While inpatient, Palliative medicine was consulted for pain management assistance. He received inpatient radiation while at [facility] and finished radiation on 9/13 with plan for rad onc to follow up outpatient in 1 wk. Per hospital notes, an Echo was done while he was inpatient and that was normal. Per hospital notes, patient admitted that he had weight loss, had not been eating well at home prior to this hospitalization. He had a PICC line placed on 09/17/24,  was discharged to [facility] on 09/18/24.    Subjective: Seen and examined today. He is sitting up in bed awake on exam in no acute distress. He has complaint of dizziness.     ROS:  As above in HPI. Otherwise, all other systems have been reviewed and are negative for complaint.    Medications:  Medications reviewed and verified in NH chart.     Vital Signs: Reviewed in [facility]    Physical Exam  Vitals and nursing note reviewed.   Constitutional:       Appearance: Normal appearance.   HENT:      Head: Normocephalic and atraumatic.   Neck:      Comments: Right neck radiation ulcer  Cardiovascular:      Rate and Rhythm: Normal rate and regular rhythm.      Pulses: Normal pulses.      Heart sounds: Normal heart sounds.   Pulmonary:      Effort: Pulmonary effort is normal.      Breath sounds: Normal breath sounds.   Abdominal:      General: Bowel sounds are normal.      Palpations: Abdomen is soft.   Musculoskeletal:         General: Normal range of motion.      Comments: PICC line in place LUE   Skin:     General: Skin is warm and dry.      Comments: Woundvac in place Left thigh   Neurological:     "  General: No focal deficit present.      Mental Status: He is alert and oriented to person, place, and time.   Psychiatric:         Mood and Affect: Mood normal.         Behavior: Behavior normal.     Results/Data:   Lab Results   Component Value Date    WBC 1.9 (L) 09/09/2024    WBC 1.9 (L) 09/09/2024    HGB 9.7 (L) 09/09/2024    HGB 9.7 (L) 09/09/2024    HCT 32.3 (L) 09/09/2024    HCT 32.3 (L) 09/09/2024     (L) 09/09/2024     (L) 09/09/2024    TRIG 256 (H) 02/27/2019    ALT 10 09/08/2024    AST 12 09/08/2024     09/09/2024    K 3.8 09/09/2024     09/09/2024    CREATININE 0.74 09/09/2024    BUN 12 09/09/2024    CO2 27 09/09/2024    INR 1.2 (H) 04/02/2022    HGBA1C 6.1 (H) 05/17/2024       Provider Impression:   Problem List Items Addressed This Visit       Neck mass    Current Assessment & Plan     LN biopsy with general anesthesia on 5/31  Wound/abscess culture negative   Surgical pathology showed: Invasive moderately to poorly differentiated squamous cell carcinoma, involving fibrous tissue.   PET on 5/24 with Right level II-III hypermetabolic katia conglomerate measuring up to 5.9 cm.  At least 5 other FDG avid cervical lymph nodes   Palliative medicine was consulted in hospital  Continue with pain medication, was placed on Subaxone in hospital  Follow up with oncology         COPD (chronic obstructive pulmonary disease) (Multi)    Current Assessment & Plan     Has been stable, he denies any increased difficulty breathing  Continue with Singulair,  inhalers, bronchdilators         Urinary retention    Current Assessment & Plan     Continue with Flomax  Monitor urination         History of ETOH abuse    Current Assessment & Plan     Continue with thiamine and folic acid         History of pulmonary embolus (PE)    Current Assessment & Plan     Continue with Eliquis         HTN (hypertension)    Current Assessment & Plan     Not currently on any antihypertensive medications  Continue to  monitor vital signs         Other hyperlipidemia    Current Assessment & Plan     C/w statin         Gastroesophageal reflux disease without esophagitis    Current Assessment & Plan     Continue with PPI         Generalized weakness    Current Assessment & Plan     Continue with PT/OT         Laryngeal cancer (Multi)    Current Assessment & Plan     SCC of the larynx, s/p R radical neck dissection 6/30/24   He received chemoradiation treatment  Continue with pain medication  Follow up with heme-onc         RLS (restless legs syndrome)    Current Assessment & Plan     Continue with Ropinirole and Topamax         Polysubstance abuse (Multi)    Current Assessment & Plan     Continue with subaxone         Cellulitis of neck    Current Assessment & Plan     Continue with IV Vanco with stop date per ID rec  Monitor vanco trough levels, adjust dose accordingly  He has LUE PICC line in place         Open wound of left thigh    Current Assessment & Plan     Skin graft site(radical neck dissection)  Woundvac in place  Continue with dressing change  Wound team to follow  Follow up with surgeon  Continue with pain medications         Iron deficiency anemia    Constipation    Current Assessment & Plan     Continue with stool softeners and laxatives  Continue to monitor BM's         Protein-calorie malnutrition (Multi)    Current Assessment & Plan     Dietician to follow  Continue to Monitor weight  Continue with nutritional supplements         Tobacco use disorder    Current Assessment & Plan     Continue with Nicotine patches         Other hemorrhoids    Current Assessment & Plan     Continue with anti inflammatory suppositories         Dizziness    Current Assessment & Plan     Start Meclizine 25mg 3X daily as needed  monitor             ----------------  Written by Andreina Atwood LPN, acting as a scribe for Dr. Gurrola. This note accurately reflects the work and decisions made by Dr. Gurrola.     I, Dr. Gurrola, attest all medical record  entries made by the scribe were under my direction and were personally dictated by me. I have reviewed the chart and agree that the record accurately reflects my performance of the history, physical exam, and assessment and plan.     Electronically Signed By: Caterina Guthrie MD   10/15/24  1:35 PM

## 2024-09-27 ENCOUNTER — APPOINTMENT (OUTPATIENT)
Dept: CARDIOLOGY | Facility: HOSPITAL | Age: 54
End: 2024-09-27
Payer: COMMERCIAL

## 2024-09-27 ENCOUNTER — HOSPITAL ENCOUNTER (EMERGENCY)
Facility: HOSPITAL | Age: 54
Discharge: HOME | End: 2024-09-27
Attending: EMERGENCY MEDICINE
Payer: COMMERCIAL

## 2024-09-27 ENCOUNTER — APPOINTMENT (OUTPATIENT)
Dept: RADIOLOGY | Facility: HOSPITAL | Age: 54
End: 2024-09-27
Payer: COMMERCIAL

## 2024-09-27 VITALS
DIASTOLIC BLOOD PRESSURE: 76 MMHG | HEIGHT: 72 IN | HEART RATE: 99 BPM | WEIGHT: 213 LBS | OXYGEN SATURATION: 98 % | RESPIRATION RATE: 17 BRPM | SYSTOLIC BLOOD PRESSURE: 126 MMHG | BODY MASS INDEX: 28.85 KG/M2 | TEMPERATURE: 98.2 F

## 2024-09-27 DIAGNOSIS — R42 POSTURAL DIZZINESS WITH NEAR SYNCOPE: Primary | ICD-10-CM

## 2024-09-27 DIAGNOSIS — R55 POSTURAL DIZZINESS WITH NEAR SYNCOPE: Primary | ICD-10-CM

## 2024-09-27 LAB
ATRIAL RATE: 90 BPM
P AXIS: 40 DEGREES
P OFFSET: 205 MS
P ONSET: 154 MS
PR INTERVAL: 132 MS
Q ONSET: 220 MS
QRS COUNT: 15 BEATS
QRS DURATION: 90 MS
QT INTERVAL: 364 MS
QTC CALCULATION(BAZETT): 445 MS
QTC FREDERICIA: 416 MS
R AXIS: 21 DEGREES
T AXIS: 52 DEGREES
T OFFSET: 402 MS
VENTRICULAR RATE: 90 BPM

## 2024-09-27 PROCEDURE — 2500000001 HC RX 250 WO HCPCS SELF ADMINISTERED DRUGS (ALT 637 FOR MEDICARE OP): Mod: SE | Performed by: EMERGENCY MEDICINE

## 2024-09-27 PROCEDURE — 93005 ELECTROCARDIOGRAM TRACING: CPT

## 2024-09-27 PROCEDURE — 72125 CT NECK SPINE W/O DYE: CPT | Performed by: RADIOLOGY

## 2024-09-27 PROCEDURE — 70450 CT HEAD/BRAIN W/O DYE: CPT | Performed by: RADIOLOGY

## 2024-09-27 PROCEDURE — 70450 CT HEAD/BRAIN W/O DYE: CPT

## 2024-09-27 PROCEDURE — 99285 EMERGENCY DEPT VISIT HI MDM: CPT | Mod: 25

## 2024-09-27 PROCEDURE — 72125 CT NECK SPINE W/O DYE: CPT

## 2024-09-27 RX ORDER — OXYCODONE AND ACETAMINOPHEN 10; 325 MG/1; MG/1
1 TABLET ORAL EVERY 6 HOURS PRN
Status: DISCONTINUED | OUTPATIENT
Start: 2024-09-27 | End: 2024-09-27

## 2024-09-27 RX ORDER — OXYCODONE AND ACETAMINOPHEN 5; 325 MG/1; MG/1
2 TABLET ORAL ONCE
Status: COMPLETED | OUTPATIENT
Start: 2024-09-27 | End: 2024-09-27

## 2024-09-27 ASSESSMENT — PAIN SCALES - GENERAL
PAINLEVEL_OUTOF10: 9

## 2024-09-27 ASSESSMENT — PAIN - FUNCTIONAL ASSESSMENT: PAIN_FUNCTIONAL_ASSESSMENT: 0-10

## 2024-09-27 NOTE — ED PROVIDER NOTES
HPI   Chief Complaint   Patient presents with    Fall       Patient is recovering at [facility] for rehab while being treated for a cellulitis of the neck as well as for laryngeal carcinoma, COPD, alcohol dependency, saddle embolus and many others.  He has been on Eliquis for several months.  Tonight he got up to go to the bathroom and he admittedly got up very quickly as he has done in the past and when he gets to the bathroom stood up quickly to go back to bed and got very lightheaded and collapsed to the floor, striking the left edge of his orbit on a metal tray.  No loss of consciousness.  Denies any blurred vision vertigo or nausea.  Since he is on a blood thinner and did fall, he was called as a trauma alert.            Patient History   Past Medical History:   Diagnosis Date    Acute upper respiratory infection, unspecified 03/20/2015    Acute upper respiratory infection    Alcohol abuse, in remission 02/12/2015    History of ETOH abuse    Alcohol dependence, in remission (Multi) 05/07/2015    Alcohol dependence in remission    Allergic contact dermatitis due to plants, except food 05/21/2014    Contact dermatitis due to poison ivy    Allergy status to unspecified drugs, medicaments and biological substances 08/29/2013    History of allergy    Anxiety disorder, unspecified 03/20/2015    Anxiety    Bipolar disorder, current episode manic without psychotic features, unspecified (Multi)     Bipolar disorder, current episode manic without psychotic features    Cervicalgia     Cervicalgia    Chronic obstructive pulmonary disease, unspecified (Multi) 12/02/2014    Chronic asthmatic bronchitis    Encounter for immunization 09/22/2016    Flu vaccine need    Hypertension     Other cervical disc degeneration, unspecified cervical region     Degeneration of cervical intervertebral disc    Other conditions influencing health status 06/27/2013    Acute Inflammation Of The Orbit    Other long term (current) drug therapy  06/02/2015    High risk medication use    Other specified health status     No pertinent past surgical history    Personal history of nicotine dependence 08/16/2017    History of tobacco abuse    Personal history of nicotine dependence 06/26/2017    History of nicotine dependence    Personal history of other diseases of the digestive system 04/11/2013    History of gastroenteritis    Personal history of other diseases of the digestive system 08/27/2015    History of esophageal reflux    Personal history of other diseases of the respiratory system 03/20/2017    History of sore throat    Personal history of other diseases of the respiratory system 02/05/2014    History of sinusitis    Personal history of other diseases of the respiratory system 01/16/2014    History of allergic rhinitis    Personal history of other diseases of the respiratory system 09/26/2013    History of acute sinusitis    Personal history of other diseases of the respiratory system 06/26/2014    History of allergic rhinitis    Personal history of other infectious and parasitic diseases     History of hepatitis C    Personal history of other mental and behavioral disorders     History of depression    Personal history of other specified conditions 08/25/2016    History of dizziness    Personal history of other specified conditions 02/16/2017    History of abdominal pain    Unspecified asthma, uncomplicated ([facility]) 02/13/2014    Asthmatic bronchitis     Past Surgical History:   Procedure Laterality Date    CHOLECYSTECTOMY      CT ABDOMEN PELVIS ANGIOGRAM W AND/OR WO IV CONTRAST  04/01/2020    CT ABDOMEN PELVIS ANGIOGRAM W AND/OR WO IV CONTRAST 4/1/2020 [facility]    INVASIVE VASCULAR PROCEDURE N/A 11/08/2023    Procedure: Pulmonary Angiogram;  Surgeon: Surya Templeton MD;  Location: [facility] Cardiac Cath Lab;  Service: Cardiovascular;  Laterality: N/A;    MR HEAD ANGIO WO IV CONTRAST  01/24/2016    MR HEAD ANGIO WO IV CONTRAST 1/24/2016 [facility]  INPATIENT LEGACY    MR NECK ANGIO WO IV CONTRAST  01/24/2016    MR NECK ANGIO WO IV CONTRAST 1/24/2016 [facility]     No family history on file.  Social History     Tobacco Use    Smoking status: Every Day     Current packs/day: 0.50     Types: Cigarettes    Smokeless tobacco: Never   Substance Use Topics    Alcohol use: Not Currently    Drug use: Yes     Types: Methamphetamines     Comment: Night before       Physical Exam   ED Triage Vitals [09/27/24 0530]   Temperature Heart Rate Respirations BP   36.8 °C (98.2 °F) 99 18 118/76      Pulse Ox Temp Source Heart Rate Source Patient Position   98 % Temporal -- --      BP Location FiO2 (%)     -- --       Physical Exam  Vitals and nursing note reviewed.   HENT:      Head: Normocephalic and atraumatic.      Right Ear: Tympanic membrane and ear canal normal.      Left Ear: Tympanic membrane and ear canal normal.      Nose: Nose normal.      Mouth/Throat:      Mouth: Mucous membranes are moist.   Cardiovascular:      Rate and Rhythm: Normal rate.   Pulmonary:      Effort: Pulmonary effort is normal.      Breath sounds: Normal breath sounds.   Abdominal:      General: Abdomen is flat.      Palpations: Abdomen is soft.   Musculoskeletal:         General: Normal range of motion.      Cervical back: Normal range of motion.      Comments: Small bruise lateral to the left orbit; no compromise of the skin   Skin:     General: Skin is warm and dry.      Comments: There are some areas of skin breakdown around the neck   Neurological:      General: No focal deficit present.      Mental Status: He is alert.           ED Course & MDM                  No data recorded                                 Medical Decision Making  Patient's vitals been stable since he has been here.  He says this is the same things done numerous times in the past 2 months.  Denies any chest pain shortness of breath nausea or diaphoresis.  He did strike his head on the way down there were waiting  on results of his CT.  He is on Eliquis for saddle embolus.  We did check an EKG, interpreted by me, which showed a normal sinus rhythm with normal intervals and axes.  The patient should be able to return to the rehab center for breakfast and continue his IV antibiotics for his cervical cellulitis.        Procedure  Procedures     Matt Lamb MD  09/27/24 0618       Matt Lamb MD  09/27/24 0622

## 2024-09-27 NOTE — ED NOTES
Report called to Adam NORIEGA at [facility] for rehab [phone]  Will call with an ETA for transport      Charu Garcia RN  09/27/24 [id number]

## 2024-10-03 ENCOUNTER — NURSING HOME VISIT (OUTPATIENT)
Dept: POST ACUTE CARE | Facility: EXTERNAL LOCATION | Age: 54
End: 2024-10-03
Payer: COMMERCIAL

## 2024-10-03 DIAGNOSIS — R33.9 URINARY RETENTION: ICD-10-CM

## 2024-10-03 DIAGNOSIS — K64.8 OTHER HEMORRHOIDS: ICD-10-CM

## 2024-10-03 DIAGNOSIS — G25.81 RLS (RESTLESS LEGS SYNDROME): ICD-10-CM

## 2024-10-03 DIAGNOSIS — Z86.711 HISTORY OF PULMONARY EMBOLUS (PE): ICD-10-CM

## 2024-10-03 DIAGNOSIS — L03.221 CELLULITIS OF NECK: ICD-10-CM

## 2024-10-03 DIAGNOSIS — F19.10 POLYSUBSTANCE ABUSE (MULTI): ICD-10-CM

## 2024-10-03 DIAGNOSIS — S71.102D OPEN WOUND OF LEFT THIGH, SUBSEQUENT ENCOUNTER: ICD-10-CM

## 2024-10-03 DIAGNOSIS — J44.9 CHRONIC OBSTRUCTIVE PULMONARY DISEASE, UNSPECIFIED COPD TYPE (MULTI): ICD-10-CM

## 2024-10-03 DIAGNOSIS — F17.200 TOBACCO USE DISORDER: ICD-10-CM

## 2024-10-03 DIAGNOSIS — C32.9 LARYNGEAL CANCER (MULTI): ICD-10-CM

## 2024-10-03 DIAGNOSIS — F10.11 HISTORY OF ETOH ABUSE: ICD-10-CM

## 2024-10-03 DIAGNOSIS — R53.1 GENERALIZED WEAKNESS: ICD-10-CM

## 2024-10-03 DIAGNOSIS — K59.00 CONSTIPATION, UNSPECIFIED CONSTIPATION TYPE: ICD-10-CM

## 2024-10-03 DIAGNOSIS — E46 PROTEIN-CALORIE MALNUTRITION, UNSPECIFIED SEVERITY (MULTI): ICD-10-CM

## 2024-10-03 DIAGNOSIS — D50.9 IRON DEFICIENCY ANEMIA, UNSPECIFIED IRON DEFICIENCY ANEMIA TYPE: ICD-10-CM

## 2024-10-03 DIAGNOSIS — I10 HYPERTENSION, UNSPECIFIED TYPE: ICD-10-CM

## 2024-10-03 DIAGNOSIS — R42 DIZZINESS: ICD-10-CM

## 2024-10-03 DIAGNOSIS — E78.49 OTHER HYPERLIPIDEMIA: ICD-10-CM

## 2024-10-03 DIAGNOSIS — K21.9 GASTROESOPHAGEAL REFLUX DISEASE WITHOUT ESOPHAGITIS: ICD-10-CM

## 2024-10-03 DIAGNOSIS — R22.1 NECK MASS: ICD-10-CM

## 2024-10-03 PROCEDURE — 99309 SBSQ NF CARE MODERATE MDM 30: CPT | Performed by: INTERNAL MEDICINE

## 2024-10-03 NOTE — LETTER
Patient: Gaurav Mckay  : 1970    Encounter Date: 10/03/2024    Name: Gaurav Mckay  : 1970  MRN: [id number]  Visit Date: 10/03/2024  Chief Complaint: WEEKLY SNF PHYSICIAN VISIT    HPI: Gaurav Mckay is a 54 y.o. gentleman with PMH remarkable for recurrent SCC of the larynx s/p R radical neck dissection 24 on chemoradiation therapy, COPD, hx of PE on Eliquis, R tib-fib fracture s/p ORIF, HTN, methamphetamine abuse, RLS, BPH, chronic pancreatitis who has had multiple hospitalizations over this past year with a previous skilled stay at this facility in  of this year. During hospitalization in -July for radical neck dissection, he had woundvac placed to his left thigh. His most recent hospitalization occurred after he presented to [facility] on 24 with complaint of syncopal episode and fall at home(all imaging was negative in ER), was transferred and admitted to [facility] where his syncope was thought to be secondary to orthostasis per hospital notes. While at [facility], he was given IV fluids and there was concern of neck skin graft cellulitis for which ID was consulted and recommend IV vancomycin. Per documentation patient had not been taking suboxone as outpatient due to the way it made him feel and he had relapsed with methamphetamine just prior to his admission to hospital.  There was concern for COPD exacerbation for which he received steroids and was continued on Singulair and Duoneb PRN, CXR completed and negative. On 09/10/24, he was transferred to [facility] for further care. A CT scan of his neck was completed on 9/10 which revealed: Status post right radical neck dissection without gross residual cervical lymphadenopathy. There is stranding and skin/platysma thickening which is nonspecific in the setting of recent surgery and prior radiation.  Superimposed cellulitis can be considered clinically.  There is no evidence of an organized/drainable collection.  "Treatment changes in the larynx without evidence of gross recurrence at the primary site.  He was continued on IV vanco and ID followed and managed ATB's. Blood cultures were negative. Per discharge summary, \"The plan for discharge for abx is for vanco to continue till 10/2.\"  He was evaluated by ST while at [facility] and they recommended Regular/liquid diet (MBS on 09/11/24). While inpatient, Palliative medicine was consulted for pain management assistance. He received inpatient radiation while at [facility] and finished radiation on 9/13 with plan for rad onc to follow up outpatient in 1 wk. Per hospital notes, an Echo was done while he was inpatient and that was normal. Per hospital notes, patient admitted that he had weight loss, had not been eating well at home prior to this hospitalization. He had a PICC line placed on 09/17/24,  was discharged to [facility] on 09/18/24.    Subjective: Seen and examined today. He is lying in bed awake in no acute distress. Offers no new issues or complaints.     ROS:  As above in HPI. Otherwise, all other systems have been reviewed and are negative for complaint.    Medications:  Medications reviewed and verified in NH chart.     Vital Signs: Reviewed in [facility]    Physical Exam  Vitals and nursing note reviewed.   Constitutional:       Appearance: Normal appearance.   HENT:      Head: Normocephalic and atraumatic.   Neck:      Comments: Right neck radiation ulcer  Cardiovascular:      Rate and Rhythm: Normal rate and regular rhythm.      Pulses: Normal pulses.      Heart sounds: Normal heart sounds.   Pulmonary:      Effort: Pulmonary effort is normal.      Breath sounds: Normal breath sounds.   Abdominal:      General: Bowel sounds are normal.      Palpations: Abdomen is soft.   Musculoskeletal:         General: Normal range of motion.      Comments: PICC line in place LUE   Skin:     General: Skin is warm and dry.      Comments: Woundvac in place Left thigh   Neurological:      " General: No focal deficit present.      Mental Status: He is alert and oriented to person, place, and time.   Psychiatric:         Mood and Affect: Mood normal.         Behavior: Behavior normal.     Results/Data:   Lab Results   Component Value Date    WBC 1.9 (L) 09/09/2024    WBC 1.9 (L) 09/09/2024    HGB 9.7 (L) 09/09/2024    HGB 9.7 (L) 09/09/2024    HCT 32.3 (L) 09/09/2024    HCT 32.3 (L) 09/09/2024     (L) 09/09/2024     (L) 09/09/2024    TRIG 256 (H) 02/27/2019    ALT 10 09/08/2024    AST 12 09/08/2024     09/09/2024    K 3.8 09/09/2024     09/09/2024    CREATININE 0.74 09/09/2024    BUN 12 09/09/2024    CO2 27 09/09/2024    INR 1.2 (H) 04/02/2022    HGBA1C 6.1 (H) 05/17/2024       Provider Impression:   Problem List Items Addressed This Visit       Neck mass    Current Assessment & Plan     LN biopsy with general anesthesia on 5/31  Wound/abscess culture negative   Surgical pathology showed: Invasive moderately to poorly differentiated squamous cell carcinoma, involving fibrous tissue.   PET on 5/24 with Right level II-III hypermetabolic katia conglomerate measuring up to 5.9 cm.  At least 5 other FDG avid cervical lymph nodes   Palliative medicine was consulted in hospital  Continue with pain medication, was placed on Subaxone in hospital  Follow up with oncology  He had recent apt with palliative care scheduled but it was rescheduled due to no transportation  He had fall on 09/27/24, hit his head, CTH and cervical spine were negative for any acute abnormalities         COPD (chronic obstructive pulmonary disease) (Multi)    Current Assessment & Plan     Has been stable, he denies any increased difficulty breathing  Continue with Singulair,  inhalers, bronchdilators         Urinary retention    Current Assessment & Plan     Continue with Flomax  Monitor urination         History of ETOH abuse    Current Assessment & Plan     Continue with thiamine and folic acid         History of  pulmonary embolus (PE)    Current Assessment & Plan     Continue with Eliquis  No abnormal bleeding or bruising noted or reported         HTN (hypertension)    Current Assessment & Plan     Not currently on any antihypertensive medications  Continue to monitor vital signs which have been stable         Other hyperlipidemia    Current Assessment & Plan     C/w statin         Gastroesophageal reflux disease without esophagitis    Current Assessment & Plan     Continue with PPI         Generalized weakness    Current Assessment & Plan     Continue with PT/OT         Laryngeal cancer (Multi)    Current Assessment & Plan     SCC of the larynx, s/p R radical neck dissection 6/30/24   He received chemoradiation treatment  Continue with pain medication  Follow up with heme-onc         RLS (restless legs syndrome)    Current Assessment & Plan     Continue with Ropinirole and Topamax         Polysubstance abuse (Multi)    Current Assessment & Plan     Continue with subaxone         Cellulitis of neck    Current Assessment & Plan     Continue with IV Vanco with stop date per ID rec  Monitor vanco trough levels, adjust dose accordingly  He has LUE PICC line in place         Open wound of left thigh    Current Assessment & Plan     Skin graft site(radical neck dissection)  Woundvac in place  Continue with dressing change  Wound team to follow  Follow up with surgeon  Continue with pain medications, frequency of Oxycodone recently decreased to q8 hours prn per CNP          Iron deficiency anemia    Current Assessment & Plan     Continue with iron supplement  Continue to monitor CBC         Constipation    Current Assessment & Plan     Continue with stool softeners and laxatives  Continue to monitor BM's         Protein-calorie malnutrition (Multi)    Current Assessment & Plan     Dietician to follow  Continue to Monitor weight  Continue with nutritional supplements         Tobacco use disorder    Current Assessment & Plan      Continue with Nicotine patches         Other hemorrhoids    Current Assessment & Plan     Continue with anti inflammatory suppositories         Dizziness    Current Assessment & Plan     Continue with Meclizine 25mg 3X daily as needed  monitor                  ----------------  Written by Andreina Atwood LPN, acting as a scribe for Dr. Gurrola. This note accurately reflects the work and decisions made by Dr. Gurrola.     I, Dr. Gurrola, attest all medical record entries made by the scribe were under my direction and were personally dictated by me. I have reviewed the chart and agree that the record accurately reflects my performance of the history, physical exam, and assessment and plan.     Electronically Signed By: Caterina Guthrie MD   10/15/24  1:36 PM

## 2024-10-07 DIAGNOSIS — C32.9 LARYNGEAL CANCER (MULTI): ICD-10-CM

## 2024-10-07 RX ORDER — OXYCODONE HYDROCHLORIDE 5 MG/1
5 TABLET ORAL EVERY 8 HOURS PRN
Qty: 21 TABLET | Refills: 0 | Status: SHIPPED | OUTPATIENT
Start: 2024-10-07 | End: 2024-10-14

## 2024-10-08 PROBLEM — S71.102A OPEN WOUND OF LEFT THIGH: Status: ACTIVE | Noted: 2024-10-08

## 2024-10-08 PROBLEM — E46 PROTEIN-CALORIE MALNUTRITION (MULTI): Status: ACTIVE | Noted: 2024-10-08

## 2024-10-08 PROBLEM — F17.200 TOBACCO USE DISORDER: Status: ACTIVE | Noted: 2024-10-08

## 2024-10-08 PROBLEM — K59.00 CONSTIPATION: Status: ACTIVE | Noted: 2024-10-08

## 2024-10-08 PROBLEM — L03.221 CELLULITIS OF NECK: Status: ACTIVE | Noted: 2024-10-08

## 2024-10-08 PROBLEM — D50.9 IRON DEFICIENCY ANEMIA: Status: ACTIVE | Noted: 2024-10-08

## 2024-10-08 NOTE — PROGRESS NOTES
Name: Gaurav Mckay  : 1970  MRN: [id number]  Visit Date: 2024  Chief Complaint: HISTORY AND PHYSICAL    HPI: Gaurav Mckay is a 54 y.o. gentleman with PMH remarkable for recurrent SCC of the larynx s/p R radical neck dissection 24 on chemoradiation therapy, COPD, hx of PE on Eliquis, R tib-fib fracture s/p ORIF, HTN, methamphetamine abuse, RLS, BPH, chronic pancreatitis who has had multiple hospitalizations over this past year with a previous skilled stay at this facility in  of this year. During hospitalization in -July for radical neck dissection, he had woundvac placed to his left thigh. His most recent hospitalization occurred after he presented to [facility] on 24 with complaint of syncopal episode and fall at home(all imaging was negative in ER), was transferred and admitted to [facility] where his syncope was thought to be secondary to orthostasis per hospital notes. While at [facility], he was given IV fluids and there was concern of neck skin graft cellulitis for which ID was consulted and recommend IV vancomycin. Per documentation patient had not been taking suboxone as outpatient due to the way it made him feel and he had relapsed with methamphetamine just prior to his admission to hospital.  There was concern for COPD exacerbation for which he received steroids and was continued on Singulair and Duoneb PRN, CXR completed and negative. On 09/10/24, he was transferred to [facility] for further care. A CT scan of his neck was completed on 9/10 which revealed: Status post right radical neck dissection without gross residual cervical lymphadenopathy. There is stranding and skin/platysma thickening which is nonspecific in the setting of recent surgery and prior radiation.  Superimposed cellulitis can be considered clinically.  There is no evidence of an organized/drainable collection. Treatment changes in the larynx without evidence of gross recurrence at the  "primary site.  He was continued on IV vanco and ID followed and managed ATB's. Blood cultures were negative. Per discharge summary, \"The plan for discharge for abx is for vanco to continue till 10/2.\"  He was evaluated by ST while at [facility] and they recommended Regular/liquid diet (MBS on 09/11/24). While inpatient, Palliative medicine was consulted for pain management assistance. He received inpatient radiation while at [facility] and finished radiation on 9/13 with plan for rad onc to follow up outpatient in 1 wk. Per hospital notes, an Echo was done while he was inpatient and that was normal. Per hospital notes, patient admitted that he had weight loss, had not been eating well at home prior to this hospitalization. He had a PICC line placed on 09/17/24,  was discharged to [facility] on 09/18/24.    Subjective: Seen and examined today. He is lying in bed awake in no acute distress. He denies any new issues or complaints.     The patient was counseled regarding diagnostic results, instructions for management, risk factor reductions, prognosis, patient and family education, impressions, risks and benefits of treatment options and importance of compliance with treatment. I have reviewed nursing notes since my last visit and document any significant changes Reviewed orders, medications, Labs. Reviewed chart looking at current medications, treatments, labs, x-rays etc.     ROS:  As above in subjective. Otherwise, all other systems have been reviewed and are negative for complaint.    Medications:  Medications reviewed and verified in NH chart.     Past Medical History:   Diagnosis Date    Acute upper respiratory infection, unspecified 03/20/2015    Acute upper respiratory infection    Alcohol abuse, in remission 02/12/2015    History of ETOH abuse    Alcohol dependence, in remission (Multi) 05/07/2015    Alcohol dependence in remission    Allergic contact dermatitis due to plants, except food 05/21/2014    Contact " dermatitis due to poison ivy    Allergy status to unspecified drugs, medicaments and biological substances 08/29/2013    History of allergy    Anxiety disorder, unspecified 03/20/2015    Anxiety    Bipolar disorder, current episode manic without psychotic features, unspecified (Multi)     Bipolar disorder, current episode manic without psychotic features    Cervicalgia     Cervicalgia    Chronic obstructive pulmonary disease, unspecified (Multi) 12/02/2014    Chronic asthmatic bronchitis    Encounter for immunization 09/22/2016    Flu vaccine need    Hypertension     Other cervical disc degeneration, unspecified cervical region     Degeneration of cervical intervertebral disc    Other conditions influencing health status 06/27/2013    Acute Inflammation Of The Orbit    Other long term (current) drug therapy 06/02/2015    High risk medication use    Other specified health status     No pertinent past surgical history    Personal history of nicotine dependence 08/16/2017    History of tobacco abuse    Personal history of nicotine dependence 06/26/2017    History of nicotine dependence    Personal history of other diseases of the digestive system 04/11/2013    History of gastroenteritis    Personal history of other diseases of the digestive system 08/27/2015    History of esophageal reflux    Personal history of other diseases of the respiratory system 03/20/2017    History of sore throat    Personal history of other diseases of the respiratory system 02/05/2014    History of sinusitis    Personal history of other diseases of the respiratory system 01/16/2014    History of allergic rhinitis    Personal history of other diseases of the respiratory system 09/26/2013    History of acute sinusitis    Personal history of other diseases of the respiratory system 06/26/2014    History of allergic rhinitis    Personal history of other infectious and parasitic diseases     History of hepatitis C    Personal history of other  mental and behavioral disorders     History of depression    Personal history of other specified conditions 08/25/2016    History of dizziness    Personal history of other specified conditions 02/16/2017    History of abdominal pain    Unspecified asthma, uncomplicated ([facility]) 02/13/2014    Asthmatic bronchitis       Past Surgical History:   Procedure Laterality Date    CHOLECYSTECTOMY      CT ABDOMEN PELVIS ANGIOGRAM W AND/OR WO IV CONTRAST  04/01/2020    CT ABDOMEN PELVIS ANGIOGRAM W AND/OR WO IV CONTRAST 4/1/2020 [facility]    INVASIVE VASCULAR PROCEDURE N/A 11/08/2023    Procedure: Pulmonary Angiogram;  Surgeon: Surya Templeton MD;  Location: [facility];  Service: Cardiovascular;  Laterality: N/A;    MR HEAD ANGIO WO IV CONTRAST  01/24/2016    MR HEAD ANGIO WO IV CONTRAST 1/24/2016 [facility]    MR NECK ANGIO WO IV CONTRAST  01/24/2016    MR NECK ANGIO WO IV CONTRAST 1/24/2016 [facility]       No family history on file.    Social History     Tobacco Use    Smoking status: Every Day     Current packs/day: 0.50     Types: Cigarettes    Smokeless tobacco: Never   Substance Use Topics    Alcohol use: Not Currently       Allergies   Allergen Reactions    Oxycodone Itching      Vital Signs:   Vital Signs were reviewed in nursing home documentation.    Physical Exam  Vitals and nursing note reviewed.   Constitutional:       Appearance: Normal appearance.   HENT:      Head: Normocephalic and atraumatic.   Neck:      Comments: Right neck radiation ulcer  Cardiovascular:      Rate and Rhythm: Normal rate and regular rhythm.      Pulses: Normal pulses.      Heart sounds: Normal heart sounds.   Pulmonary:      Effort: Pulmonary effort is normal.      Breath sounds: Normal breath sounds.   Abdominal:      General: Bowel sounds are normal.      Palpations: Abdomen is soft.   Musculoskeletal:         General: Normal range of motion.      Comments: PICC line in place LUE   Skin:      General: Skin is warm and dry.      Comments: Woundvac in place Left thigh   Neurological:      General: No focal deficit present.      Mental Status: He is alert and oriented to person, place, and time.   Psychiatric:         Mood and Affect: Mood normal.         Behavior: Behavior normal.         Results/Data:   Lab Results   Component Value Date    WBC 1.9 (L) 09/09/2024    WBC 1.9 (L) 09/09/2024    HGB 9.7 (L) 09/09/2024    HGB 9.7 (L) 09/09/2024    HCT 32.3 (L) 09/09/2024    HCT 32.3 (L) 09/09/2024     (L) 09/09/2024     (L) 09/09/2024    TRIG 256 (H) 02/27/2019    ALT 10 09/08/2024    AST 12 09/08/2024     09/09/2024    K 3.8 09/09/2024     09/09/2024    CREATININE 0.74 09/09/2024    BUN 12 09/09/2024    CO2 27 09/09/2024    INR 1.2 (H) 04/02/2022    HGBA1C 6.1 (H) 05/17/2024     Results were reviewed and addressed accordingly. Lab Results were also reviewed in [facility].     Provider Impression:   Problem List Items Addressed This Visit       COPD (chronic obstructive pulmonary disease) (Multi)    Current Assessment & Plan     Seems to be stable  Continue with Singulair,  inhalers, bronchdilators         Urinary retention    Current Assessment & Plan     Continue with Flomax  Monitor urination         History of ETOH abuse    Current Assessment & Plan     Continue with thiamine and folic acid         History of pulmonary embolus (PE)    Current Assessment & Plan     Continue with Eliquis         HTN (hypertension)    Current Assessment & Plan     Not currently on any antihypertensive medications  Monitor vital signs         Other hyperlipidemia    Current Assessment & Plan     C/w statin         Gastroesophageal reflux disease without esophagitis    Current Assessment & Plan     Continue with PPI         Generalized weakness    Current Assessment & Plan     Consult PT/OT         Laryngeal cancer (Multi)    Current Assessment & Plan     SCC of the larynx, s/p R radical neck dissection  6/30/24   He received chemoradiation treatment  Continue with pain medication  Follow up with heme-onc         RLS (restless legs syndrome)    Current Assessment & Plan     Continue with Ropinirole and Topamax         Polysubstance abuse (Multi)    Current Assessment & Plan     Continue with subaxone         Cellulitis of neck    Current Assessment & Plan     Continue with IV Vanco with stop date per ID rec  He has LUE PICC line in place         Open wound of left thigh    Current Assessment & Plan     Skin graft site(radical neck dissection)  Woundvac in place  Continue with dressing change  Wound team to follow  Follow up with surgeon  Continue with pain medications         Iron deficiency anemia    Current Assessment & Plan     Continue with iron supplement  Monitor CBC         Constipation    Current Assessment & Plan     Continue with stool softeners and laxatives  Monitor BM's         Protein-calorie malnutrition (Multi)    Current Assessment & Plan     Dietician to follow  Monitor weight  Nutritional supplements         Tobacco use disorder    Current Assessment & Plan     Continue with Nicotine patches           ----------------  Written by Andreina Atwood LPN, acting as a scribe for Dr. Gurrola. This note accurately reflects the work and decisions made by Dr. Gurrola.     I, Dr. Gurrola, attest all medical record entries made by the scribe were under my direction and were personally dictated by me. I have reviewed the chart and agree that the record accurately reflects my performance of the history, physical exam, and assessment and plan.

## 2024-10-08 NOTE — ASSESSMENT & PLAN NOTE
Skin graft site(radical neck dissection)  Woundvac in place  Continue with dressing change  Wound team to follow  Follow up with surgeon  Continue with pain medications

## 2024-10-08 NOTE — ASSESSMENT & PLAN NOTE
SCC of the larynx, s/p R radical neck dissection 6/30/24   He received chemoradiation treatment  Continue with pain medication  Follow up with heme-onc

## 2024-10-10 ENCOUNTER — NURSING HOME VISIT (OUTPATIENT)
Dept: POST ACUTE CARE | Facility: EXTERNAL LOCATION | Age: 54
End: 2024-10-10
Payer: COMMERCIAL

## 2024-10-10 DIAGNOSIS — E46 PROTEIN-CALORIE MALNUTRITION, UNSPECIFIED SEVERITY (MULTI): ICD-10-CM

## 2024-10-10 DIAGNOSIS — R33.9 URINARY RETENTION: ICD-10-CM

## 2024-10-10 DIAGNOSIS — E78.49 OTHER HYPERLIPIDEMIA: ICD-10-CM

## 2024-10-10 DIAGNOSIS — K21.9 GASTROESOPHAGEAL REFLUX DISEASE WITHOUT ESOPHAGITIS: ICD-10-CM

## 2024-10-10 DIAGNOSIS — L03.221 CELLULITIS OF NECK: ICD-10-CM

## 2024-10-10 DIAGNOSIS — R22.1 NECK MASS: ICD-10-CM

## 2024-10-10 DIAGNOSIS — J44.9 CHRONIC OBSTRUCTIVE PULMONARY DISEASE, UNSPECIFIED COPD TYPE (MULTI): ICD-10-CM

## 2024-10-10 DIAGNOSIS — F10.11 HISTORY OF ETOH ABUSE: ICD-10-CM

## 2024-10-10 DIAGNOSIS — R42 DIZZINESS: ICD-10-CM

## 2024-10-10 DIAGNOSIS — F17.200 TOBACCO USE DISORDER: ICD-10-CM

## 2024-10-10 DIAGNOSIS — I10 HYPERTENSION, UNSPECIFIED TYPE: ICD-10-CM

## 2024-10-10 DIAGNOSIS — G25.81 RLS (RESTLESS LEGS SYNDROME): ICD-10-CM

## 2024-10-10 DIAGNOSIS — C32.9 LARYNGEAL CANCER (MULTI): ICD-10-CM

## 2024-10-10 DIAGNOSIS — S71.102D OPEN WOUND OF LEFT THIGH, SUBSEQUENT ENCOUNTER: ICD-10-CM

## 2024-10-10 DIAGNOSIS — D50.9 IRON DEFICIENCY ANEMIA, UNSPECIFIED IRON DEFICIENCY ANEMIA TYPE: ICD-10-CM

## 2024-10-10 DIAGNOSIS — R53.1 GENERALIZED WEAKNESS: ICD-10-CM

## 2024-10-10 DIAGNOSIS — K59.00 CONSTIPATION, UNSPECIFIED CONSTIPATION TYPE: ICD-10-CM

## 2024-10-10 DIAGNOSIS — Z86.711 HISTORY OF PULMONARY EMBOLUS (PE): ICD-10-CM

## 2024-10-10 DIAGNOSIS — F19.10 POLYSUBSTANCE ABUSE (MULTI): ICD-10-CM

## 2024-10-10 DIAGNOSIS — K64.8 OTHER HEMORRHOIDS: ICD-10-CM

## 2024-10-10 PROCEDURE — 99309 SBSQ NF CARE MODERATE MDM 30: CPT | Performed by: INTERNAL MEDICINE

## 2024-10-10 NOTE — LETTER
Patient: Gaurav Mckay  : 1970    Encounter Date: 10/10/2024    Name: Gaurav Mckay  : 1970  MRN: [id number]  Visit Date: 10/10/2024  Chief Complaint: WEEKLY SNF PHYSICIAN VISIT    HPI: Gaurav Mckay is a 54 y.o. gentleman with PMH remarkable for recurrent SCC of the larynx s/p R radical neck dissection 24 on chemoradiation therapy, COPD, hx of PE on Eliquis, R tib-fib fracture s/p ORIF, HTN, methamphetamine abuse, RLS, BPH, chronic pancreatitis who has had multiple hospitalizations over this past year with a previous skilled stay at this facility in  of this year. During hospitalization in -July for radical neck dissection, he had woundvac placed to his left thigh. His most recent hospitalization occurred after he presented to [place] [facility] on 24 with complaint of syncopal episode and fall at home(all imaging was negative in ER), was transferred and admitted to [facility] where his syncope was thought to be secondary to orthostasis per hospital notes. While at [facility], he was given IV fluids and there was concern of neck skin graft cellulitis for which ID was consulted and recommend IV vancomycin. Per documentation patient had not been taking suboxone as outpatient due to the way it made him feel and he had relapsed with methamphetamine just prior to his admission to hospital.  There was concern for COPD exacerbation for which he received steroids and was continued on Singulair and Duoneb PRN, CXR completed and negative. On 09/10/24, he was transferred to [facility] for further care. A CT scan of his neck was completed on 9/10 which revealed: Status post right radical neck dissection without gross residual cervical lymphadenopathy. There is stranding and skin/platysma thickening which is nonspecific in the setting of recent surgery and prior radiation.  Superimposed cellulitis can be considered clinically.  There is no evidence of an organized/drainable collection.  "Treatment changes in the larynx without evidence of gross recurrence at the primary site.  He was continued on IV vanco and ID followed and managed ATB's. Blood cultures were negative. Per discharge summary, \"The plan for discharge for abx is for vanco to continue till 10/2.\"  He was evaluated by ST while at [facility] and they recommended Regular/liquid diet (MBS on 09/11/24). While inpatient, Palliative medicine was consulted for pain management assistance. He received inpatient radiation while at [facility] and finished radiation on 9/13 with plan for rad onc to follow up outpatient in 1 wk. Per hospital notes, an Echo was done while he was inpatient and that was normal. Per hospital notes, patient admitted that he had weight loss, had not been eating well at home prior to this hospitalization. He had a PICC line placed on 09/17/24,  was discharged to [facility] on 09/18/24.    Subjective: Seen and examined today. He is sitting up in bed awake on exam in no acute distress. He denies any new issues or complaints.     ROS:  As above in HPI. Otherwise, all other systems have been reviewed and are negative for complaint.    Medications:  Medications reviewed and verified in NH chart.     Vital Signs: Reviewed in [facility]    Physical Exam  Vitals and nursing note reviewed.   Constitutional:       Appearance: Normal appearance.   HENT:      Head: Normocephalic and atraumatic.   Neck:      Comments: Right neck radiation ulcer  Cardiovascular:      Rate and Rhythm: Normal rate and regular rhythm.      Pulses: Normal pulses.      Heart sounds: Normal heart sounds.   Pulmonary:      Effort: Pulmonary effort is normal.      Breath sounds: Normal breath sounds.   Abdominal:      General: Bowel sounds are normal.      Palpations: Abdomen is soft.   Musculoskeletal:         General: Normal range of motion.      Comments: PICC line in place LUE   Skin:     General: Skin is warm and dry.      Comments: Woundvac in place Left thigh "   Neurological:      General: No focal deficit present.      Mental Status: He is alert and oriented to person, place, and time.   Psychiatric:         Mood and Affect: Mood normal.         Behavior: Behavior normal.     Results/Data:   Lab Results   Component Value Date    WBC 5.6 10/25/2024    HGB 11.8 (L) 10/25/2024    HCT 37.7 (L) 10/25/2024     10/25/2024    TRIG 256 (H) 02/27/2019    ALT 9 (L) 10/25/2024    AST 15 10/25/2024     10/25/2024    K 3.2 (L) 10/25/2024     10/25/2024    CREATININE 0.76 10/25/2024    BUN 11 10/25/2024    CO2 25 10/25/2024    INR 1.2 (H) 04/02/2022    HGBA1C 6.1 (H) 05/17/2024     Provider Impression:   Problem List Items Addressed This Visit       Neck mass    Current Assessment & Plan     LN biopsy with general anesthesia on 5/31  Wound/abscess culture negative   Surgical pathology showed: Invasive moderately to poorly differentiated squamous cell carcinoma, involving fibrous tissue.   PET on 5/24 with Right level II-III hypermetabolic katia conglomerate measuring up to 5.9 cm.  At least 5 other FDG avid cervical lymph nodes   Palliative medicine was consulted in hospital  Continue with pain medication, was placed on Subaxone in hospital  Follow up with oncology  He had recent apt with palliative care scheduled but it was rescheduled due to no transportation  He had fall on 09/27/24, hit his head, CTH and cervical spine were negative for any acute abnormalities         COPD (chronic obstructive pulmonary disease) (Multi)    Current Assessment & Plan     Continues to be stable, he denies any increased difficulty breathing  Continue with Singulair,  inhalers, bronchdilators         Urinary retention    Current Assessment & Plan     Continue with Flomax  Continue to monitor urination         History of ETOH abuse    Current Assessment & Plan     Continue with thiamine and folic acid         History of pulmonary embolus (PE)    Current Assessment & Plan     Continue  with Eliquis  No abnormal bleeding or bruising noted or reported         HTN (hypertension)    Current Assessment & Plan     Not currently on any antihypertensive medications  Continue to monitor vital signs which continue to be stable         Other hyperlipidemia    Current Assessment & Plan     C/w statin         Gastroesophageal reflux disease without esophagitis    Current Assessment & Plan     Continue with PPI         Generalized weakness    Current Assessment & Plan     Continue with PT/OT, per progress notes, plan is for patient to discharge to home from here         Laryngeal cancer (Multi)    Current Assessment & Plan     SCC of the larynx, s/p R radical neck dissection 6/30/24   He received chemoradiation treatment  Continue with pain medication  Follow up with heme-onc         RLS (restless legs syndrome)    Current Assessment & Plan     Continue with Ropinirole and Topamax         Polysubstance abuse (Multi)    Current Assessment & Plan     Continue with subaxone         Cellulitis of neck    Current Assessment & Plan     He completed course of IV Vancomycin  PICC line was removed on 10/07/24  monitor         Open wound of left thigh    Current Assessment & Plan     Skin graft site(radical neck dissection)  Woundvac in place  Continue with dressing changes  Wound team to follow  Follow up with surgeon  Continue with pain medications, frequency of Oxycodone recently decreased to q8 hours prn per CNP          Iron deficiency anemia    Current Assessment & Plan     Continue with iron supplement  Continue to monitor CBC         Constipation    Current Assessment & Plan     Continue with stool softeners and laxatives  Continue to monitor BM's         Protein-calorie malnutrition (Multi)    Current Assessment & Plan     Dietician to follow  Continue to Monitor weight  Continue with nutritional supplements         Tobacco use disorder    Current Assessment & Plan     Continue with Nicotine patches          Other hemorrhoids    Current Assessment & Plan     Continue with anti inflammatory suppositories         Dizziness    Current Assessment & Plan     Continue with Meclizine 25mg 3X daily as needed  monitor                     ----------------  Written by Andreina Atwood LPN, acting as a scribe for Dr. Gurrola. This note accurately reflects the work and decisions made by Dr. Gurrola.     I, Dr. Gurrola, attest all medical record entries made by the scribe were under my direction and were personally dictated by me. I have reviewed the chart and agree that the record accurately reflects my performance of the history, physical exam, and assessment and plan.     Electronically Signed By: Caterina Guthrie MD   10/25/24  2:04 PM

## 2024-10-13 PROBLEM — K64.8 OTHER HEMORRHOIDS: Status: ACTIVE | Noted: 2024-10-13

## 2024-10-13 PROBLEM — R42 DIZZINESS: Status: ACTIVE | Noted: 2024-10-13

## 2024-10-13 NOTE — ASSESSMENT & PLAN NOTE
LN biopsy with general anesthesia on 5/31  Wound/abscess culture negative   Surgical pathology showed: Invasive moderately to poorly differentiated squamous cell carcinoma, involving fibrous tissue.   PET on 5/24 with Right level II-III hypermetabolic katia conglomerate measuring up to 5.9 cm.  At least 5 other FDG avid cervical lymph nodes   Palliative medicine was consulted in hospital  Continue with pain medication, was placed on Subaxone in hospital  Follow up with oncology  He had recent apt with palliative care scheduled but it was rescheduled due to no transportation  He had fall on 09/27/24, hit his head, CTH and cervical spine were negative for any acute abnormalities

## 2024-10-13 NOTE — ASSESSMENT & PLAN NOTE
Not currently on any antihypertensive medications  Continue to monitor vital signs which have been stable

## 2024-10-13 NOTE — ASSESSMENT & PLAN NOTE
Has been stable, he denies any increased difficulty breathing  Continue with Singulair,  inhalers, bronchdilators

## 2024-10-13 NOTE — ASSESSMENT & PLAN NOTE
Skin graft site(radical neck dissection)  Woundvac in place  Continue with dressing change  Wound team to follow  Follow up with surgeon  Continue with pain medications, frequency of Oxycodone recently decreased to q8 hours prn per CNP

## 2024-10-13 NOTE — PROGRESS NOTES
Name: Gaurav Mckay  : 1970  MRN: [id number]  Visit Date: 10/03/2024  Chief Complaint: WEEKLY SNF PHYSICIAN VISIT    HPI: Gaurav Mckay is a 54 y.o. gentleman with PMH remarkable for recurrent SCC of the larynx s/p R radical neck dissection 24 on chemoradiation therapy, COPD, hx of PE on Eliquis, R tib-fib fracture s/p ORIF, HTN, methamphetamine abuse, RLS, BPH, chronic pancreatitis who has had multiple hospitalizations over this past year with a previous skilled stay at this facility in  of this year. During hospitalization in -July for radical neck dissection, he had woundvac placed to his left thigh. His most recent hospitalization occurred after he presented to [place] ER on 24 with complaint of syncopal episode and fall at home(all imaging was negative in ER), was transferred and admitted to [facility] where his syncope was thought to be secondary to orthostasis per hospital notes. While at [facility], he was given IV fluids and there was concern of neck skin graft cellulitis for which ID was consulted and recommend IV vancomycin. Per documentation patient had not been taking suboxone as outpatient due to the way it made him feel and he had relapsed with methamphetamine just prior to his admission to hospital.  There was concern for COPD exacerbation for which he received steroids and was continued on Singulair and Duoneb PRN, CXR completed and negative. On 09/10/24, he was transferred to [facility] for further care. A CT scan of his neck was completed on 9/10 which revealed: Status post right radical neck dissection without gross residual cervical lymphadenopathy. There is stranding and skin/platysma thickening which is nonspecific in the setting of recent surgery and prior radiation.  Superimposed cellulitis can be considered clinically.  There is no evidence of an organized/drainable collection. Treatment changes in the larynx without evidence of gross recurrence at the  "primary site.  He was continued on IV vanco and ID followed and managed ATB's. Blood cultures were negative. Per discharge summary, \"The plan for discharge for abx is for vanco to continue till 10/2.\"  He was evaluated by ST while at [facility] and they recommended Regular/liquid diet (MBS on 09/11/24). While inpatient, Palliative medicine was consulted for pain management assistance. He received inpatient radiation while at [facility] and finished radiation on 9/13 with plan for rad onc to follow up outpatient in 1 wk. Per hospital notes, an Echo was done while he was inpatient and that was normal. Per hospital notes, patient admitted that he had weight loss, had not been eating well at home prior to this hospitalization. He had a PICC line placed on 09/17/24,  was discharged to [facility] on 09/18/24.    Subjective: Seen and examined today. He is lying in bed awake in no acute distress. Offers no new issues or complaints.     ROS:  As above in HPI. Otherwise, all other systems have been reviewed and are negative for complaint.    Medications:  Medications reviewed and verified in NH chart.     Vital Signs: Reviewed in [facility]    Physical Exam  Vitals and nursing note reviewed.   Constitutional:       Appearance: Normal appearance.   HENT:      Head: Normocephalic and atraumatic.   Neck:      Comments: Right neck radiation ulcer  Cardiovascular:      Rate and Rhythm: Normal rate and regular rhythm.      Pulses: Normal pulses.      Heart sounds: Normal heart sounds.   Pulmonary:      Effort: Pulmonary effort is normal.      Breath sounds: Normal breath sounds.   Abdominal:      General: Bowel sounds are normal.      Palpations: Abdomen is soft.   Musculoskeletal:         General: Normal range of motion.      Comments: PICC line in place LUE   Skin:     General: Skin is warm and dry.      Comments: Woundvac in place Left thigh   Neurological:      General: No focal deficit present.      Mental Status: He is alert and oriented to " person, place, and time.   Psychiatric:         Mood and Affect: Mood normal.         Behavior: Behavior normal.     Results/Data:   Lab Results   Component Value Date    WBC 1.9 (L) 09/09/2024    WBC 1.9 (L) 09/09/2024    HGB 9.7 (L) 09/09/2024    HGB 9.7 (L) 09/09/2024    HCT 32.3 (L) 09/09/2024    HCT 32.3 (L) 09/09/2024     (L) 09/09/2024     (L) 09/09/2024    TRIG 256 (H) 02/27/2019    ALT 10 09/08/2024    AST 12 09/08/2024     09/09/2024    K 3.8 09/09/2024     09/09/2024    CREATININE 0.74 09/09/2024    BUN 12 09/09/2024    CO2 27 09/09/2024    INR 1.2 (H) 04/02/2022    HGBA1C 6.1 (H) 05/17/2024       Provider Impression:   Problem List Items Addressed This Visit       Neck mass    Current Assessment & Plan     LN biopsy with general anesthesia on 5/31  Wound/abscess culture negative   Surgical pathology showed: Invasive moderately to poorly differentiated squamous cell carcinoma, involving fibrous tissue.   PET on 5/24 with Right level II-III hypermetabolic katia conglomerate measuring up to 5.9 cm.  At least 5 other FDG avid cervical lymph nodes   Palliative medicine was consulted in hospital  Continue with pain medication, was placed on Subaxone in hospital  Follow up with oncology  He had recent apt with palliative care scheduled but it was rescheduled due to no transportation  He had fall on 09/27/24, hit his head, CTH and cervical spine were negative for any acute abnormalities         COPD (chronic obstructive pulmonary disease) (Multi)    Current Assessment & Plan     Has been stable, he denies any increased difficulty breathing  Continue with Singulair,  inhalers, bronchdilators         Urinary retention    Current Assessment & Plan     Continue with Flomax  Monitor urination         History of ETOH abuse    Current Assessment & Plan     Continue with thiamine and folic acid         History of pulmonary embolus (PE)    Current Assessment & Plan     Continue with Eliquis  No  abnormal bleeding or bruising noted or reported         HTN (hypertension)    Current Assessment & Plan     Not currently on any antihypertensive medications  Continue to monitor vital signs which have been stable         Other hyperlipidemia    Current Assessment & Plan     C/w statin         Gastroesophageal reflux disease without esophagitis    Current Assessment & Plan     Continue with PPI         Generalized weakness    Current Assessment & Plan     Continue with PT/OT         Laryngeal cancer (Multi)    Current Assessment & Plan     SCC of the larynx, s/p R radical neck dissection 6/30/24   He received chemoradiation treatment  Continue with pain medication  Follow up with heme-onc         RLS (restless legs syndrome)    Current Assessment & Plan     Continue with Ropinirole and Topamax         Polysubstance abuse (Multi)    Current Assessment & Plan     Continue with subaxone         Cellulitis of neck    Current Assessment & Plan     Continue with IV Vanco with stop date per ID rec  Monitor vanco trough levels, adjust dose accordingly  He has LUE PICC line in place         Open wound of left thigh    Current Assessment & Plan     Skin graft site(radical neck dissection)  Woundvac in place  Continue with dressing change  Wound team to follow  Follow up with surgeon  Continue with pain medications, frequency of Oxycodone recently decreased to q8 hours prn per CNP          Iron deficiency anemia    Current Assessment & Plan     Continue with iron supplement  Continue to monitor CBC         Constipation    Current Assessment & Plan     Continue with stool softeners and laxatives  Continue to monitor BM's         Protein-calorie malnutrition (Multi)    Current Assessment & Plan     Dietician to follow  Continue to Monitor weight  Continue with nutritional supplements         Tobacco use disorder    Current Assessment & Plan     Continue with Nicotine patches         Other hemorrhoids    Current Assessment & Plan      Continue with anti inflammatory suppositories         Dizziness    Current Assessment & Plan     Continue with Meclizine 25mg 3X daily as needed  monitor                  ----------------  Written by Andreina Atwood LPN, acting as a scribe for Dr. Gurrola. This note accurately reflects the work and decisions made by Dr. Gurrola.     I, Dr. Gurrola, attest all medical record entries made by the scribe were under my direction and were personally dictated by me. I have reviewed the chart and agree that the record accurately reflects my performance of the history, physical exam, and assessment and plan.

## 2024-10-13 NOTE — ASSESSMENT & PLAN NOTE
Continue with IV Vanco with stop date per ID rec  Monitor vanco trough levels, adjust dose accordingly  He has LUE PICC line in place

## 2024-10-13 NOTE — PROGRESS NOTES
Name: Gaurav Mckay  : 1970  MRN: [id number]  Visit Date: 2024  Chief Complaint: WEEKLY SNF PHYSICIAN VISIT    HPI: Gaurav Mckay is a 54 y.o. gentleman with PMH remarkable for recurrent SCC of the larynx s/p R radical neck dissection 24 on chemoradiation therapy, COPD, hx of PE on Eliquis, R tib-fib fracture s/p ORIF, HTN, methamphetamine abuse, RLS, BPH, chronic pancreatitis who has had multiple hospitalizations over this past year with a previous skilled stay at this facility in  of this year. During hospitalization in -July for radical neck dissection, he had woundvac placed to his left thigh. His most recent hospitalization occurred after he presented to [place] ER on 24 with complaint of syncopal episode and fall at home(all imaging was negative in ER), was transferred and admitted to [facility] where his syncope was thought to be secondary to orthostasis per hospital notes. While at [facility], he was given IV fluids and there was concern of neck skin graft cellulitis for which ID was consulted and recommend IV vancomycin. Per documentation patient had not been taking suboxone as outpatient due to the way it made him feel and he had relapsed with methamphetamine just prior to his admission to hospital.  There was concern for COPD exacerbation for which he received steroids and was continued on Singulair and Duoneb PRN, CXR completed and negative. On 09/10/24, he was transferred to [facility] for further care. A CT scan of his neck was completed on 9/10 which revealed: Status post right radical neck dissection without gross residual cervical lymphadenopathy. There is stranding and skin/platysma thickening which is nonspecific in the setting of recent surgery and prior radiation.  Superimposed cellulitis can be considered clinically.  There is no evidence of an organized/drainable collection. Treatment changes in the larynx without evidence of gross recurrence at the  "primary site.  He was continued on IV vanco and ID followed and managed ATB's. Blood cultures were negative. Per discharge summary, \"The plan for discharge for abx is for vanco to continue till 10/2.\"  He was evaluated by ST while at [facility] and they recommended Regular/liquid diet (MBS on 09/11/24). While inpatient, Palliative medicine was consulted for pain management assistance. He received inpatient radiation while at [facility] and finished radiation on 9/13 with plan for rad onc to follow up outpatient in 1 wk. Per hospital notes, an Echo was done while he was inpatient and that was normal. Per hospital notes, patient admitted that he had weight loss, had not been eating well at home prior to this hospitalization. He had a PICC line placed on 09/17/24,  was discharged to [facility] on 09/18/24.    Subjective: Seen and examined today. He is sitting up in bed awake on exam in no acute distress. He has complaint of dizziness.     ROS:  As above in HPI. Otherwise, all other systems have been reviewed and are negative for complaint.    Medications:  Medications reviewed and verified in NH chart.     Vital Signs: Reviewed in [facility]    Physical Exam  Vitals and nursing note reviewed.   Constitutional:       Appearance: Normal appearance.   HENT:      Head: Normocephalic and atraumatic.   Neck:      Comments: Right neck radiation ulcer  Cardiovascular:      Rate and Rhythm: Normal rate and regular rhythm.      Pulses: Normal pulses.      Heart sounds: Normal heart sounds.   Pulmonary:      Effort: Pulmonary effort is normal.      Breath sounds: Normal breath sounds.   Abdominal:      General: Bowel sounds are normal.      Palpations: Abdomen is soft.   Musculoskeletal:         General: Normal range of motion.      Comments: PICC line in place LUE   Skin:     General: Skin is warm and dry.      Comments: Woundvac in place Left thigh   Neurological:      General: No focal deficit present.      Mental Status: He is alert and " oriented to person, place, and time.   Psychiatric:         Mood and Affect: Mood normal.         Behavior: Behavior normal.     Results/Data:   Lab Results   Component Value Date    WBC 1.9 (L) 09/09/2024    WBC 1.9 (L) 09/09/2024    HGB 9.7 (L) 09/09/2024    HGB 9.7 (L) 09/09/2024    HCT 32.3 (L) 09/09/2024    HCT 32.3 (L) 09/09/2024     (L) 09/09/2024     (L) 09/09/2024    TRIG 256 (H) 02/27/2019    ALT 10 09/08/2024    AST 12 09/08/2024     09/09/2024    K 3.8 09/09/2024     09/09/2024    CREATININE 0.74 09/09/2024    BUN 12 09/09/2024    CO2 27 09/09/2024    INR 1.2 (H) 04/02/2022    HGBA1C 6.1 (H) 05/17/2024       Provider Impression:   Problem List Items Addressed This Visit       Neck mass    Current Assessment & Plan     LN biopsy with general anesthesia on 5/31  Wound/abscess culture negative   Surgical pathology showed: Invasive moderately to poorly differentiated squamous cell carcinoma, involving fibrous tissue.   PET on 5/24 with Right level II-III hypermetabolic katia conglomerate measuring up to 5.9 cm.  At least 5 other FDG avid cervical lymph nodes   Palliative medicine was consulted in hospital  Continue with pain medication, was placed on Subaxone in hospital  Follow up with oncology         COPD (chronic obstructive pulmonary disease) (Multi)    Current Assessment & Plan     Has been stable, he denies any increased difficulty breathing  Continue with Singulair,  inhalers, bronchdilators         Urinary retention    Current Assessment & Plan     Continue with Flomax  Monitor urination         History of ETOH abuse    Current Assessment & Plan     Continue with thiamine and folic acid         History of pulmonary embolus (PE)    Current Assessment & Plan     Continue with Eliquis         HTN (hypertension)    Current Assessment & Plan     Not currently on any antihypertensive medications  Continue to monitor vital signs         Other hyperlipidemia    Current Assessment &  Plan     C/w statin         Gastroesophageal reflux disease without esophagitis    Current Assessment & Plan     Continue with PPI         Generalized weakness    Current Assessment & Plan     Continue with PT/OT         Laryngeal cancer (Multi)    Current Assessment & Plan     SCC of the larynx, s/p R radical neck dissection 6/30/24   He received chemoradiation treatment  Continue with pain medication  Follow up with heme-onc         RLS (restless legs syndrome)    Current Assessment & Plan     Continue with Ropinirole and Topamax         Polysubstance abuse (Multi)    Current Assessment & Plan     Continue with subaxone         Cellulitis of neck    Current Assessment & Plan     Continue with IV Vanco with stop date per ID rec  Monitor vanco trough levels, adjust dose accordingly  He has LUE PICC line in place         Open wound of left thigh    Current Assessment & Plan     Skin graft site(radical neck dissection)  Woundvac in place  Continue with dressing change  Wound team to follow  Follow up with surgeon  Continue with pain medications         Iron deficiency anemia    Constipation    Current Assessment & Plan     Continue with stool softeners and laxatives  Continue to monitor BM's         Protein-calorie malnutrition (Multi)    Current Assessment & Plan     Dietician to follow  Continue to Monitor weight  Continue with nutritional supplements         Tobacco use disorder    Current Assessment & Plan     Continue with Nicotine patches         Other hemorrhoids    Current Assessment & Plan     Continue with anti inflammatory suppositories         Dizziness    Current Assessment & Plan     Start Meclizine 25mg 3X daily as needed  monitor             ----------------  Written by Andreina Atwood LPN, acting as a scribe for Dr. Gurrola. This note accurately reflects the work and decisions made by Dr. Gurrola.     I, Dr. Gurrola, attest all medical record entries made by the scribe were under my direction and were personally  dictated by me. I have reviewed the chart and agree that the record accurately reflects my performance of the history, physical exam, and assessment and plan.

## 2024-10-16 DIAGNOSIS — C32.9 LARYNGEAL CANCER (MULTI): ICD-10-CM

## 2024-10-16 RX ORDER — OXYCODONE HYDROCHLORIDE 5 MG/1
5 TABLET ORAL EVERY 8 HOURS PRN
Qty: 21 TABLET | Refills: 0 | Status: SHIPPED | OUTPATIENT
Start: 2024-10-16 | End: 2024-10-23

## 2024-10-23 ENCOUNTER — APPOINTMENT (OUTPATIENT)
Dept: RADIOLOGY | Facility: HOSPITAL | Age: 54
End: 2024-10-23
Payer: COMMERCIAL

## 2024-10-23 ENCOUNTER — APPOINTMENT (OUTPATIENT)
Dept: CARDIOLOGY | Facility: HOSPITAL | Age: 54
End: 2024-10-23
Payer: COMMERCIAL

## 2024-10-23 ENCOUNTER — HOSPITAL ENCOUNTER (EMERGENCY)
Facility: HOSPITAL | Age: 54
Discharge: OTHER INSTITUTION | End: 2024-10-24
Attending: STUDENT IN AN ORGANIZED HEALTH CARE EDUCATION/TRAINING PROGRAM
Payer: COMMERCIAL

## 2024-10-23 DIAGNOSIS — E87.6 HYPOKALEMIA: ICD-10-CM

## 2024-10-23 DIAGNOSIS — E83.42 HYPOMAGNESEMIA: ICD-10-CM

## 2024-10-23 DIAGNOSIS — E87.20 LACTIC ACIDOSIS: ICD-10-CM

## 2024-10-23 DIAGNOSIS — R41.82 ALTERED MENTAL STATUS, UNSPECIFIED ALTERED MENTAL STATUS TYPE: Primary | ICD-10-CM

## 2024-10-23 DIAGNOSIS — E83.39 HYPOPHOSPHATEMIA: ICD-10-CM

## 2024-10-23 LAB
ALBUMIN SERPL BCP-MCNC: 3.9 G/DL (ref 3.4–5)
ALP SERPL-CCNC: 167 U/L (ref 33–120)
ALT SERPL W P-5'-P-CCNC: 13 U/L (ref 10–52)
ANION GAP SERPL CALC-SCNC: 18 MMOL/L (ref 10–20)
AST SERPL W P-5'-P-CCNC: 29 U/L (ref 9–39)
BASOPHILS # BLD AUTO: 0.05 X10*3/UL (ref 0–0.1)
BASOPHILS NFR BLD AUTO: 0.8 %
BILIRUB SERPL-MCNC: 0.5 MG/DL (ref 0–1.2)
BNP SERPL-MCNC: 48 PG/ML (ref 0–99)
BUN SERPL-MCNC: 8 MG/DL (ref 6–23)
CALCIUM SERPL-MCNC: 10.3 MG/DL (ref 8.6–10.3)
CARDIAC TROPONIN I PNL SERPL HS: 4 NG/L (ref 0–20)
CARDIAC TROPONIN I PNL SERPL HS: 8 NG/L (ref 0–20)
CHLORIDE SERPL-SCNC: 100 MMOL/L (ref 98–107)
CO2 SERPL-SCNC: 21 MMOL/L (ref 21–32)
CREAT SERPL-MCNC: 1 MG/DL (ref 0.5–1.3)
EGFRCR SERPLBLD CKD-EPI 2021: 89 ML/MIN/1.73M*2
EOSINOPHIL # BLD AUTO: 0.22 X10*3/UL (ref 0–0.7)
EOSINOPHIL NFR BLD AUTO: 3.4 %
ERYTHROCYTE [DISTWIDTH] IN BLOOD BY AUTOMATED COUNT: 19.8 % (ref 11.5–14.5)
GLUCOSE BLD MANUAL STRIP-MCNC: 104 MG/DL (ref 74–99)
GLUCOSE SERPL-MCNC: 87 MG/DL (ref 74–99)
HCT VFR BLD AUTO: 37.4 % (ref 41–52)
HGB BLD-MCNC: 11.9 G/DL (ref 13.5–17.5)
IMM GRANULOCYTES # BLD AUTO: 0.03 X10*3/UL (ref 0–0.7)
IMM GRANULOCYTES NFR BLD AUTO: 0.5 % (ref 0–0.9)
LACTATE SERPL-SCNC: 3.7 MMOL/L (ref 0.4–2)
LYMPHOCYTES # BLD AUTO: 0.62 X10*3/UL (ref 1.2–4.8)
LYMPHOCYTES NFR BLD AUTO: 9.6 %
MAGNESIUM SERPL-MCNC: 1.54 MG/DL (ref 1.6–2.4)
MCH RBC QN AUTO: 25.5 PG (ref 26–34)
MCHC RBC AUTO-ENTMCNC: 31.8 G/DL (ref 32–36)
MCV RBC AUTO: 80 FL (ref 80–100)
MONOCYTES # BLD AUTO: 0.46 X10*3/UL (ref 0.1–1)
MONOCYTES NFR BLD AUTO: 7.1 %
NEUTROPHILS # BLD AUTO: 5.09 X10*3/UL (ref 1.2–7.7)
NEUTROPHILS NFR BLD AUTO: 78.6 %
NRBC BLD-RTO: 0 /100 WBCS (ref 0–0)
PHOSPHATE SERPL-MCNC: 1.9 MG/DL (ref 2.5–4.9)
PLATELET # BLD AUTO: 260 X10*3/UL (ref 150–450)
POTASSIUM SERPL-SCNC: 3 MMOL/L (ref 3.5–5.3)
PROT SERPL-MCNC: 8.2 G/DL (ref 6.4–8.2)
RBC # BLD AUTO: 4.66 X10*6/UL (ref 4.5–5.9)
SODIUM SERPL-SCNC: 136 MMOL/L (ref 136–145)
WBC # BLD AUTO: 6.5 X10*3/UL (ref 4.4–11.3)

## 2024-10-23 PROCEDURE — 74177 CT ABD & PELVIS W/CONTRAST: CPT | Mod: FOREIGN READ | Performed by: RADIOLOGY

## 2024-10-23 PROCEDURE — 82947 ASSAY GLUCOSE BLOOD QUANT: CPT

## 2024-10-23 PROCEDURE — 70491 CT SOFT TISSUE NECK W/DYE: CPT

## 2024-10-23 PROCEDURE — 84484 ASSAY OF TROPONIN QUANT: CPT | Performed by: STUDENT IN AN ORGANIZED HEALTH CARE EDUCATION/TRAINING PROGRAM

## 2024-10-23 PROCEDURE — 71260 CT THORAX DX C+: CPT | Mod: FOREIGN READ | Performed by: RADIOLOGY

## 2024-10-23 PROCEDURE — 83735 ASSAY OF MAGNESIUM: CPT | Performed by: STUDENT IN AN ORGANIZED HEALTH CARE EDUCATION/TRAINING PROGRAM

## 2024-10-23 PROCEDURE — 96361 HYDRATE IV INFUSION ADD-ON: CPT

## 2024-10-23 PROCEDURE — 84100 ASSAY OF PHOSPHORUS: CPT | Performed by: STUDENT IN AN ORGANIZED HEALTH CARE EDUCATION/TRAINING PROGRAM

## 2024-10-23 PROCEDURE — 93005 ELECTROCARDIOGRAM TRACING: CPT

## 2024-10-23 PROCEDURE — 83605 ASSAY OF LACTIC ACID: CPT | Performed by: STUDENT IN AN ORGANIZED HEALTH CARE EDUCATION/TRAINING PROGRAM

## 2024-10-23 PROCEDURE — 96374 THER/PROPH/DIAG INJ IV PUSH: CPT

## 2024-10-23 PROCEDURE — 2500000004 HC RX 250 GENERAL PHARMACY W/ HCPCS (ALT 636 FOR OP/ED): Mod: SE | Performed by: STUDENT IN AN ORGANIZED HEALTH CARE EDUCATION/TRAINING PROGRAM

## 2024-10-23 PROCEDURE — 80320 DRUG SCREEN QUANTALCOHOLS: CPT | Performed by: STUDENT IN AN ORGANIZED HEALTH CARE EDUCATION/TRAINING PROGRAM

## 2024-10-23 PROCEDURE — 36415 COLL VENOUS BLD VENIPUNCTURE: CPT | Performed by: STUDENT IN AN ORGANIZED HEALTH CARE EDUCATION/TRAINING PROGRAM

## 2024-10-23 PROCEDURE — 70491 CT SOFT TISSUE NECK W/DYE: CPT | Performed by: RADIOLOGY

## 2024-10-23 PROCEDURE — 85025 COMPLETE CBC W/AUTO DIFF WBC: CPT | Performed by: STUDENT IN AN ORGANIZED HEALTH CARE EDUCATION/TRAINING PROGRAM

## 2024-10-23 PROCEDURE — 2550000001 HC RX 255 CONTRASTS: Mod: SE | Performed by: STUDENT IN AN ORGANIZED HEALTH CARE EDUCATION/TRAINING PROGRAM

## 2024-10-23 PROCEDURE — 71260 CT THORAX DX C+: CPT

## 2024-10-23 PROCEDURE — 99285 EMERGENCY DEPT VISIT HI MDM: CPT | Mod: 25

## 2024-10-23 PROCEDURE — 83880 ASSAY OF NATRIURETIC PEPTIDE: CPT | Performed by: STUDENT IN AN ORGANIZED HEALTH CARE EDUCATION/TRAINING PROGRAM

## 2024-10-23 PROCEDURE — 80053 COMPREHEN METABOLIC PANEL: CPT | Performed by: STUDENT IN AN ORGANIZED HEALTH CARE EDUCATION/TRAINING PROGRAM

## 2024-10-23 PROCEDURE — 2500000004 HC RX 250 GENERAL PHARMACY W/ HCPCS (ALT 636 FOR OP/ED): Mod: SE

## 2024-10-23 RX ORDER — ONDANSETRON HYDROCHLORIDE 2 MG/ML
INJECTION, SOLUTION INTRAVENOUS
Status: COMPLETED
Start: 2024-10-23 | End: 2024-10-23

## 2024-10-23 RX ORDER — ONDANSETRON HYDROCHLORIDE 2 MG/ML
4 INJECTION, SOLUTION INTRAVENOUS ONCE
Status: COMPLETED | OUTPATIENT
Start: 2024-10-23 | End: 2024-10-23

## 2024-10-23 RX ORDER — TETRACAINE HYDROCHLORIDE 5 MG/ML
2 SOLUTION OPHTHALMIC ONCE
Status: DISCONTINUED | OUTPATIENT
Start: 2024-10-23 | End: 2024-10-24 | Stop reason: HOSPADM

## 2024-10-23 ASSESSMENT — PAIN DESCRIPTION - ORIENTATION: ORIENTATION: LEFT

## 2024-10-23 ASSESSMENT — PAIN - FUNCTIONAL ASSESSMENT: PAIN_FUNCTIONAL_ASSESSMENT: 0-10

## 2024-10-23 ASSESSMENT — PAIN SCALES - GENERAL: PAINLEVEL_OUTOF10: 10 - WORST POSSIBLE PAIN

## 2024-10-23 ASSESSMENT — PAIN DESCRIPTION - PAIN TYPE: TYPE: ACUTE PAIN

## 2024-10-23 ASSESSMENT — PAIN DESCRIPTION - LOCATION: LOCATION: EYE

## 2024-10-23 NOTE — ED TRIAGE NOTES
Pt arrives to [facility] via EMS, presenting with a foreign body in his eye and throat. Patient states he was picking glass out of his finger while sitting in front of a fan when the piece of glass flew into his throat and left eye. Pt denies any CP, SOB, N/V/D, fever and/or chills. Pt A&Ox4, resp easy and unlabored, skin of appropriate color.

## 2024-10-24 ENCOUNTER — HOSPITAL ENCOUNTER (OUTPATIENT)
Facility: HOSPITAL | Age: 54
Setting detail: OBSERVATION
LOS: 1 days | Discharge: HOME HEALTH | End: 2024-10-26
Attending: INTERNAL MEDICINE | Admitting: INTERNAL MEDICINE
Payer: COMMERCIAL

## 2024-10-24 VITALS
HEIGHT: 72 IN | BODY MASS INDEX: 28.04 KG/M2 | SYSTOLIC BLOOD PRESSURE: 158 MMHG | RESPIRATION RATE: 16 BRPM | DIASTOLIC BLOOD PRESSURE: 101 MMHG | OXYGEN SATURATION: 99 % | WEIGHT: 207 LBS | TEMPERATURE: 96.6 F | HEART RATE: 89 BPM

## 2024-10-24 DIAGNOSIS — G47.00 INSOMNIA, UNSPECIFIED TYPE: ICD-10-CM

## 2024-10-24 DIAGNOSIS — R53.1 GENERALIZED WEAKNESS: ICD-10-CM

## 2024-10-24 DIAGNOSIS — N30.90 CYSTITIS: Primary | ICD-10-CM

## 2024-10-24 DIAGNOSIS — N32.89 BLADDER WALL THICKENING: ICD-10-CM

## 2024-10-24 LAB
ALBUMIN SERPL BCP-MCNC: 3.6 G/DL (ref 3.4–5)
ALP SERPL-CCNC: 153 U/L (ref 33–120)
ALT SERPL W P-5'-P-CCNC: 10 U/L (ref 10–52)
AMPHETAMINES UR QL SCN: ABNORMAL
ANION GAP SERPL CALC-SCNC: 15 MMOL/L (ref 10–20)
APAP SERPL-MCNC: <10 UG/ML
APPEARANCE UR: CLEAR
AST SERPL W P-5'-P-CCNC: 16 U/L (ref 9–39)
ATRIAL RATE: 92 BPM
BARBITURATES UR QL SCN: ABNORMAL
BENZODIAZ UR QL SCN: ABNORMAL
BILIRUB SERPL-MCNC: 0.5 MG/DL (ref 0–1.2)
BILIRUB UR STRIP.AUTO-MCNC: NEGATIVE MG/DL
BUN SERPL-MCNC: 8 MG/DL (ref 6–23)
BZE UR QL SCN: ABNORMAL
CALCIUM SERPL-MCNC: 9.6 MG/DL (ref 8.6–10.3)
CANNABINOIDS UR QL SCN: ABNORMAL
CHLORIDE SERPL-SCNC: 103 MMOL/L (ref 98–107)
CO2 SERPL-SCNC: 23 MMOL/L (ref 21–32)
COLOR UR: ABNORMAL
CREAT SERPL-MCNC: 0.72 MG/DL (ref 0.5–1.3)
CRP SERPL-MCNC: 0.99 MG/DL
EGFRCR SERPLBLD CKD-EPI 2021: >90 ML/MIN/1.73M*2
ERYTHROCYTE [DISTWIDTH] IN BLOOD BY AUTOMATED COUNT: 19.8 % (ref 11.5–14.5)
ERYTHROCYTE [SEDIMENTATION RATE] IN BLOOD BY WESTERGREN METHOD: 83 MM/H (ref 0–20)
ETHANOL SERPL-MCNC: <10 MG/DL
FENTANYL+NORFENTANYL UR QL SCN: ABNORMAL
GLUCOSE SERPL-MCNC: 94 MG/DL (ref 74–99)
GLUCOSE UR STRIP.AUTO-MCNC: NORMAL MG/DL
HCT VFR BLD AUTO: 33.7 % (ref 41–52)
HGB BLD-MCNC: 11.1 G/DL (ref 13.5–17.5)
KETONES UR STRIP.AUTO-MCNC: NEGATIVE MG/DL
LACTATE SERPL-SCNC: 1 MMOL/L (ref 0.4–2)
LEUKOCYTE ESTERASE UR QL STRIP.AUTO: NEGATIVE
MAGNESIUM SERPL-MCNC: 1.61 MG/DL (ref 1.6–2.4)
MCH RBC QN AUTO: 25.2 PG (ref 26–34)
MCHC RBC AUTO-ENTMCNC: 32.9 G/DL (ref 32–36)
MCV RBC AUTO: 77 FL (ref 80–100)
METHADONE UR QL SCN: ABNORMAL
MUCOUS THREADS #/AREA URNS AUTO: NORMAL /LPF
NITRITE UR QL STRIP.AUTO: NEGATIVE
NRBC BLD-RTO: 0 /100 WBCS (ref 0–0)
OPIATES UR QL SCN: ABNORMAL
OXYCODONE+OXYMORPHONE UR QL SCN: ABNORMAL
P AXIS: 23 DEGREES
P OFFSET: 206 MS
P ONSET: 161 MS
PCP UR QL SCN: ABNORMAL
PH UR STRIP.AUTO: 7.5 [PH]
PLATELET # BLD AUTO: 260 X10*3/UL (ref 150–450)
POTASSIUM SERPL-SCNC: 3.1 MMOL/L (ref 3.5–5.3)
PR INTERVAL: 122 MS
PROT SERPL-MCNC: 7.1 G/DL (ref 6.4–8.2)
PROT UR STRIP.AUTO-MCNC: ABNORMAL MG/DL
Q ONSET: 222 MS
QRS COUNT: 15 BEATS
QRS DURATION: 88 MS
QT INTERVAL: 394 MS
QTC CALCULATION(BAZETT): 487 MS
QTC FREDERICIA: 454 MS
R AXIS: 21 DEGREES
RBC # BLD AUTO: 4.4 X10*6/UL (ref 4.5–5.9)
RBC # UR STRIP.AUTO: NEGATIVE /UL
RBC #/AREA URNS AUTO: NORMAL /HPF
SALICYLATES SERPL-MCNC: <3 MG/DL
SODIUM SERPL-SCNC: 138 MMOL/L (ref 136–145)
SP GR UR STRIP.AUTO: >1.05
SQUAMOUS #/AREA URNS AUTO: NORMAL /HPF
T AXIS: 46 DEGREES
T OFFSET: 419 MS
UROBILINOGEN UR STRIP.AUTO-MCNC: NORMAL MG/DL
VENTRICULAR RATE: 92 BPM
WBC # BLD AUTO: 5.2 X10*3/UL (ref 4.4–11.3)
WBC #/AREA URNS AUTO: NORMAL /HPF

## 2024-10-24 PROCEDURE — 80053 COMPREHEN METABOLIC PANEL: CPT

## 2024-10-24 PROCEDURE — 94760 N-INVAS EAR/PLS OXIMETRY 1: CPT

## 2024-10-24 PROCEDURE — 85027 COMPLETE CBC AUTOMATED: CPT

## 2024-10-24 PROCEDURE — 2500000004 HC RX 250 GENERAL PHARMACY W/ HCPCS (ALT 636 FOR OP/ED): Mod: SE

## 2024-10-24 PROCEDURE — 80307 DRUG TEST PRSMV CHEM ANLYZR: CPT | Performed by: STUDENT IN AN ORGANIZED HEALTH CARE EDUCATION/TRAINING PROGRAM

## 2024-10-24 PROCEDURE — 83605 ASSAY OF LACTIC ACID: CPT | Performed by: STUDENT IN AN ORGANIZED HEALTH CARE EDUCATION/TRAINING PROGRAM

## 2024-10-24 PROCEDURE — 99222 1ST HOSP IP/OBS MODERATE 55: CPT

## 2024-10-24 PROCEDURE — 36415 COLL VENOUS BLD VENIPUNCTURE: CPT

## 2024-10-24 PROCEDURE — 85652 RBC SED RATE AUTOMATED: CPT

## 2024-10-24 PROCEDURE — 83735 ASSAY OF MAGNESIUM: CPT

## 2024-10-24 PROCEDURE — 2500000002 HC RX 250 W HCPCS SELF ADMINISTERED DRUGS (ALT 637 FOR MEDICARE OP, ALT 636 FOR OP/ED)

## 2024-10-24 PROCEDURE — 36415 COLL VENOUS BLD VENIPUNCTURE: CPT | Performed by: STUDENT IN AN ORGANIZED HEALTH CARE EDUCATION/TRAINING PROGRAM

## 2024-10-24 PROCEDURE — 81001 URINALYSIS AUTO W/SCOPE: CPT | Performed by: STUDENT IN AN ORGANIZED HEALTH CARE EDUCATION/TRAINING PROGRAM

## 2024-10-24 PROCEDURE — 86140 C-REACTIVE PROTEIN: CPT

## 2024-10-24 PROCEDURE — 1210000001 HC SEMI-PRIVATE ROOM DAILY

## 2024-10-24 PROCEDURE — 2500000001 HC RX 250 WO HCPCS SELF ADMINISTERED DRUGS (ALT 637 FOR MEDICARE OP): Mod: SE | Performed by: STUDENT IN AN ORGANIZED HEALTH CARE EDUCATION/TRAINING PROGRAM

## 2024-10-24 PROCEDURE — 2500000004 HC RX 250 GENERAL PHARMACY W/ HCPCS (ALT 636 FOR OP/ED)

## 2024-10-24 PROCEDURE — 2500000001 HC RX 250 WO HCPCS SELF ADMINISTERED DRUGS (ALT 637 FOR MEDICARE OP)

## 2024-10-24 PROCEDURE — 2500000001 HC RX 250 WO HCPCS SELF ADMINISTERED DRUGS (ALT 637 FOR MEDICARE OP): Mod: SE

## 2024-10-24 RX ORDER — ROPINIROLE 0.25 MG/1
0.5 TABLET, FILM COATED ORAL 3 TIMES DAILY
Status: DISCONTINUED | OUTPATIENT
Start: 2024-10-24 | End: 2024-10-26 | Stop reason: HOSPADM

## 2024-10-24 RX ORDER — LANOLIN ALCOHOL/MO/W.PET/CERES
400 CREAM (GRAM) TOPICAL DAILY
Status: DISCONTINUED | OUTPATIENT
Start: 2024-10-24 | End: 2024-10-24

## 2024-10-24 RX ORDER — ALBUTEROL SULFATE 90 UG/1
2 INHALANT RESPIRATORY (INHALATION) EVERY 4 HOURS PRN
Status: DISCONTINUED | OUTPATIENT
Start: 2024-10-24 | End: 2024-10-26 | Stop reason: HOSPADM

## 2024-10-24 RX ORDER — SULFAMETHOXAZOLE AND TRIMETHOPRIM 800; 160 MG/1; MG/1
1 TABLET ORAL EVERY 12 HOURS SCHEDULED
Status: DISCONTINUED | OUTPATIENT
Start: 2024-10-24 | End: 2024-10-24

## 2024-10-24 RX ORDER — ATORVASTATIN CALCIUM 20 MG/1
20 TABLET, FILM COATED ORAL NIGHTLY
Status: DISCONTINUED | OUTPATIENT
Start: 2024-10-24 | End: 2024-10-26 | Stop reason: HOSPADM

## 2024-10-24 RX ORDER — CYCLOBENZAPRINE HCL 10 MG
10 TABLET ORAL 3 TIMES DAILY PRN
Status: DISCONTINUED | OUTPATIENT
Start: 2024-10-24 | End: 2024-10-26 | Stop reason: HOSPADM

## 2024-10-24 RX ORDER — ACETAMINOPHEN 325 MG/1
650 TABLET ORAL EVERY 6 HOURS PRN
Status: DISCONTINUED | OUTPATIENT
Start: 2024-10-24 | End: 2024-10-26 | Stop reason: HOSPADM

## 2024-10-24 RX ORDER — POTASSIUM CHLORIDE 750 MG/1
20 TABLET, FILM COATED, EXTENDED RELEASE ORAL DAILY
COMMUNITY

## 2024-10-24 RX ORDER — NALOXONE HYDROCHLORIDE 0.4 MG/ML
0.4 INJECTION, SOLUTION INTRAMUSCULAR; INTRAVENOUS; SUBCUTANEOUS EVERY 5 MIN PRN
Status: DISCONTINUED | OUTPATIENT
Start: 2024-10-24 | End: 2024-10-26 | Stop reason: HOSPADM

## 2024-10-24 RX ORDER — SERTRALINE HYDROCHLORIDE 50 MG/1
25 TABLET, FILM COATED ORAL DAILY
Status: DISCONTINUED | OUTPATIENT
Start: 2024-10-24 | End: 2024-10-26 | Stop reason: HOSPADM

## 2024-10-24 RX ORDER — MONTELUKAST SODIUM 10 MG/1
10 TABLET ORAL DAILY
Status: DISCONTINUED | OUTPATIENT
Start: 2024-10-24 | End: 2024-10-26 | Stop reason: HOSPADM

## 2024-10-24 RX ORDER — LANOLIN ALCOHOL/MO/W.PET/CERES
CREAM (GRAM) TOPICAL
Status: COMPLETED
Start: 2024-10-24 | End: 2024-10-24

## 2024-10-24 RX ORDER — POTASSIUM CHLORIDE 20 MEQ/1
40 TABLET, EXTENDED RELEASE ORAL ONCE
Status: DISCONTINUED | OUTPATIENT
Start: 2024-10-24 | End: 2024-10-24

## 2024-10-24 RX ORDER — SODIUM,POTASSIUM PHOSPHATES 280-250MG
1 POWDER IN PACKET (EA) ORAL 4 TIMES DAILY
Status: DISCONTINUED | OUTPATIENT
Start: 2024-10-24 | End: 2024-10-24

## 2024-10-24 RX ORDER — POTASSIUM CHLORIDE 1.5 G/1.58G
20 POWDER, FOR SOLUTION ORAL ONCE
Status: COMPLETED | OUTPATIENT
Start: 2024-10-24 | End: 2024-10-24

## 2024-10-24 RX ORDER — LANOLIN ALCOHOL/MO/W.PET/CERES
400 CREAM (GRAM) TOPICAL DAILY
Status: DISCONTINUED | OUTPATIENT
Start: 2024-10-24 | End: 2024-10-24 | Stop reason: HOSPADM

## 2024-10-24 RX ORDER — DOCUSATE SODIUM 100 MG/1
100 CAPSULE, LIQUID FILLED ORAL 2 TIMES DAILY
Status: DISCONTINUED | OUTPATIENT
Start: 2024-10-24 | End: 2024-10-26 | Stop reason: HOSPADM

## 2024-10-24 RX ORDER — POTASSIUM CHLORIDE 1.5 G/1.58G
40 POWDER, FOR SOLUTION ORAL ONCE
Status: COMPLETED | OUTPATIENT
Start: 2024-10-24 | End: 2024-10-24

## 2024-10-24 RX ORDER — POTASSIUM CHLORIDE 1.5 G/1.58G
POWDER, FOR SOLUTION ORAL
Status: COMPLETED
Start: 2024-10-24 | End: 2024-10-24

## 2024-10-24 RX ORDER — SENNOSIDES 8.6 MG/1
2 TABLET ORAL 2 TIMES DAILY
Status: DISCONTINUED | OUTPATIENT
Start: 2024-10-24 | End: 2024-10-24

## 2024-10-24 RX ORDER — OMEPRAZOLE 40 MG/1
40 CAPSULE, DELAYED RELEASE ORAL
COMMUNITY

## 2024-10-24 RX ORDER — PANTOPRAZOLE SODIUM 20 MG/1
20 TABLET, DELAYED RELEASE ORAL
Status: DISCONTINUED | OUTPATIENT
Start: 2024-10-25 | End: 2024-10-26 | Stop reason: HOSPADM

## 2024-10-24 RX ORDER — TOPIRAMATE 25 MG/1
100 TABLET ORAL 2 TIMES DAILY
Status: DISCONTINUED | OUTPATIENT
Start: 2024-10-24 | End: 2024-10-26 | Stop reason: HOSPADM

## 2024-10-24 RX ORDER — LANOLIN ALCOHOL/MO/W.PET/CERES
400 CREAM (GRAM) TOPICAL DAILY
Status: DISCONTINUED | OUTPATIENT
Start: 2024-10-24 | End: 2024-10-26 | Stop reason: HOSPADM

## 2024-10-24 RX ORDER — TAMSULOSIN HYDROCHLORIDE 0.4 MG/1
0.4 CAPSULE ORAL DAILY
Status: DISCONTINUED | OUTPATIENT
Start: 2024-10-24 | End: 2024-10-26 | Stop reason: HOSPADM

## 2024-10-24 RX ORDER — FERROUS SULFATE 325(65) MG
65 TABLET, DELAYED RELEASE (ENTERIC COATED) ORAL
COMMUNITY

## 2024-10-24 RX ORDER — AMOXICILLIN 250 MG
1 CAPSULE ORAL NIGHTLY
Status: DISCONTINUED | OUTPATIENT
Start: 2024-10-24 | End: 2024-10-26 | Stop reason: HOSPADM

## 2024-10-24 RX ORDER — SULFAMETHOXAZOLE AND TRIMETHOPRIM 800; 160 MG/1; MG/1
1 TABLET ORAL EVERY 12 HOURS SCHEDULED
Status: DISCONTINUED | OUTPATIENT
Start: 2024-10-24 | End: 2024-10-26 | Stop reason: HOSPADM

## 2024-10-24 RX ORDER — TRAZODONE HYDROCHLORIDE 50 MG/1
100 TABLET ORAL NIGHTLY
Status: DISCONTINUED | OUTPATIENT
Start: 2024-10-24 | End: 2024-10-26 | Stop reason: HOSPADM

## 2024-10-24 SDOH — SOCIAL STABILITY: SOCIAL INSECURITY: ARE YOU OR HAVE YOU BEEN THREATENED OR ABUSED PHYSICALLY, EMOTIONALLY, OR SEXUALLY BY ANYONE?: NO

## 2024-10-24 SDOH — SOCIAL STABILITY: SOCIAL INSECURITY: WITHIN THE LAST YEAR, HAVE YOU BEEN AFRAID OF YOUR PARTNER OR EX-PARTNER?: NO

## 2024-10-24 SDOH — SOCIAL STABILITY: SOCIAL INSECURITY
WITHIN THE LAST YEAR, HAVE YOU BEEN KICKED, HIT, SLAPPED, OR OTHERWISE PHYSICALLY HURT BY YOUR PARTNER OR EX-PARTNER?: NO

## 2024-10-24 SDOH — ECONOMIC STABILITY: HOUSING INSECURITY: IN THE LAST 12 MONTHS, WAS THERE A TIME WHEN YOU WERE NOT ABLE TO PAY THE MORTGAGE OR RENT ON TIME?: PATIENT DECLINED

## 2024-10-24 SDOH — ECONOMIC STABILITY: INCOME INSECURITY: IN THE PAST 12 MONTHS HAS THE ELECTRIC, GAS, OIL, OR WATER COMPANY THREATENED TO SHUT OFF SERVICES IN YOUR HOME?: NO

## 2024-10-24 SDOH — ECONOMIC STABILITY: HOUSING INSECURITY: IN THE PAST 12 MONTHS, HOW MANY TIMES HAVE YOU MOVED WHERE YOU WERE LIVING?: 1

## 2024-10-24 SDOH — SOCIAL STABILITY: SOCIAL INSECURITY
WITHIN THE LAST YEAR, HAVE YOU BEEN RAPED OR FORCED TO HAVE ANY KIND OF SEXUAL ACTIVITY BY YOUR PARTNER OR EX-PARTNER?: NO

## 2024-10-24 SDOH — SOCIAL STABILITY: SOCIAL INSECURITY: HAVE YOU HAD ANY THOUGHTS OF HARMING ANYONE ELSE?: NO

## 2024-10-24 SDOH — ECONOMIC STABILITY: HOUSING INSECURITY: AT ANY TIME IN THE PAST 12 MONTHS, WERE YOU HOMELESS OR LIVING IN A SHELTER (INCLUDING NOW)?: PATIENT DECLINED

## 2024-10-24 SDOH — ECONOMIC STABILITY: TRANSPORTATION INSECURITY
IN THE PAST 12 MONTHS, HAS LACK OF TRANSPORTATION KEPT YOU FROM MEDICAL APPOINTMENTS OR FROM GETTING MEDICATIONS?: PATIENT DECLINED

## 2024-10-24 SDOH — ECONOMIC STABILITY: FOOD INSECURITY: HOW HARD IS IT FOR YOU TO PAY FOR THE VERY BASICS LIKE FOOD, HOUSING, MEDICAL CARE, AND HEATING?: PATIENT DECLINED

## 2024-10-24 SDOH — SOCIAL STABILITY: SOCIAL INSECURITY: ARE THERE ANY APPARENT SIGNS OF INJURIES/BEHAVIORS THAT COULD BE RELATED TO ABUSE/NEGLECT?: NO

## 2024-10-24 SDOH — SOCIAL STABILITY: SOCIAL INSECURITY: DO YOU FEEL UNSAFE GOING BACK TO THE PLACE WHERE YOU ARE LIVING?: NO

## 2024-10-24 SDOH — SOCIAL STABILITY: SOCIAL INSECURITY: WITHIN THE LAST YEAR, HAVE YOU BEEN HUMILIATED OR EMOTIONALLY ABUSED IN OTHER WAYS BY YOUR PARTNER OR EX-PARTNER?: NO

## 2024-10-24 SDOH — SOCIAL STABILITY: SOCIAL INSECURITY: ABUSE: ADULT

## 2024-10-24 SDOH — SOCIAL STABILITY: SOCIAL INSECURITY: DOES ANYONE TRY TO KEEP YOU FROM HAVING/CONTACTING OTHER FRIENDS OR DOING THINGS OUTSIDE YOUR HOME?: NO

## 2024-10-24 SDOH — SOCIAL STABILITY: SOCIAL INSECURITY: HAS ANYONE EVER THREATENED TO HURT YOUR FAMILY OR YOUR PETS?: NO

## 2024-10-24 SDOH — SOCIAL STABILITY: SOCIAL INSECURITY: HAVE YOU HAD THOUGHTS OF HARMING ANYONE ELSE?: NO

## 2024-10-24 SDOH — SOCIAL STABILITY: SOCIAL INSECURITY: DO YOU FEEL ANYONE HAS EXPLOITED OR TAKEN ADVANTAGE OF YOU FINANCIALLY OR OF YOUR PERSONAL PROPERTY?: NO

## 2024-10-24 ASSESSMENT — COGNITIVE AND FUNCTIONAL STATUS - GENERAL
MOVING FROM LYING ON BACK TO SITTING ON SIDE OF FLAT BED WITH BEDRAILS: A LITTLE
HELP NEEDED FOR BATHING: A LITTLE
STANDING UP FROM CHAIR USING ARMS: A LITTLE
PATIENT BASELINE BEDBOUND: NO
PERSONAL GROOMING: A LITTLE
DAILY ACTIVITIY SCORE: 19
DRESSING REGULAR UPPER BODY CLOTHING: A LITTLE
PERSONAL GROOMING: A LITTLE
MOVING TO AND FROM BED TO CHAIR: A LITTLE
TURNING FROM BACK TO SIDE WHILE IN FLAT BAD: A LITTLE
MOVING TO AND FROM BED TO CHAIR: A LITTLE
WALKING IN HOSPITAL ROOM: A LITTLE
MOVING FROM LYING ON BACK TO SITTING ON SIDE OF FLAT BED WITH BEDRAILS: A LITTLE
STANDING UP FROM CHAIR USING ARMS: A LITTLE
DRESSING REGULAR LOWER BODY CLOTHING: A LITTLE
WALKING IN HOSPITAL ROOM: A LITTLE
MOBILITY SCORE: 18
DRESSING REGULAR LOWER BODY CLOTHING: A LITTLE
HELP NEEDED FOR BATHING: A LITTLE
CLIMB 3 TO 5 STEPS WITH RAILING: A LITTLE
TURNING FROM BACK TO SIDE WHILE IN FLAT BAD: A LITTLE
CLIMB 3 TO 5 STEPS WITH RAILING: A LITTLE
TOILETING: A LITTLE
TOILETING: A LITTLE
DAILY ACTIVITIY SCORE: 19
DRESSING REGULAR UPPER BODY CLOTHING: A LITTLE
MOBILITY SCORE: 18

## 2024-10-24 ASSESSMENT — ACTIVITIES OF DAILY LIVING (ADL)
HEARING - RIGHT EAR: FUNCTIONAL
HEARING - LEFT EAR: FUNCTIONAL
DRESSING YOURSELF: INDEPENDENT
LACK_OF_TRANSPORTATION: PATIENT DECLINED
JUDGMENT_ADEQUATE_SAFELY_COMPLETE_DAILY_ACTIVITIES: YES
WALKS IN HOME: INDEPENDENT
ASSISTIVE_DEVICE: WALKER
BATHING: INDEPENDENT
TOILETING: INDEPENDENT
FEEDING YOURSELF: INDEPENDENT
PATIENT'S MEMORY ADEQUATE TO SAFELY COMPLETE DAILY ACTIVITIES?: YES
GROOMING: INDEPENDENT
ADEQUATE_TO_COMPLETE_ADL: YES

## 2024-10-24 ASSESSMENT — LIFESTYLE VARIABLES
HOW OFTEN DO YOU HAVE 6 OR MORE DRINKS ON ONE OCCASION: NEVER
AUDIT-C TOTAL SCORE: 0
SKIP TO QUESTIONS 9-10: 1
AUDIT-C TOTAL SCORE: 0
HOW MANY STANDARD DRINKS CONTAINING ALCOHOL DO YOU HAVE ON A TYPICAL DAY: PATIENT DOES NOT DRINK
HOW OFTEN DO YOU HAVE A DRINK CONTAINING ALCOHOL: NEVER

## 2024-10-24 ASSESSMENT — PATIENT HEALTH QUESTIONNAIRE - PHQ9
SUM OF ALL RESPONSES TO PHQ9 QUESTIONS 1 & 2: 1
1. LITTLE INTEREST OR PLEASURE IN DOING THINGS: NOT AT ALL
2. FEELING DOWN, DEPRESSED OR HOPELESS: SEVERAL DAYS

## 2024-10-24 ASSESSMENT — PAIN - FUNCTIONAL ASSESSMENT: PAIN_FUNCTIONAL_ASSESSMENT: 0-10

## 2024-10-24 ASSESSMENT — PAIN SCALES - GENERAL: PAINLEVEL_OUTOF10: 2

## 2024-10-24 NOTE — CONSULTS
Wound Care Consult     Visit Date: 10/24/2024      Patient Name: Gaurav Mckay         MRN: [id number]           YOB: 1970     Reason for Consult:   Wound vac left thigh     Wound History:   Patient reports he had right neck wound from his cancer which was grafted with a donor from his left thigh in July 2024.  Since the surgery he has been seen at a [facility] and had wound vac therapy ongoing.       Pertinent Labs:   Albumin   Date Value Ref Range Status   10/24/2024 3.6 3.4 - 5.0 g/dL Final       Wound Assessment:  Wound 09/04/24 Leg Left;Upper;Anterior (Active)   Wound Image   10/24/24 1510   Wound Length (cm) 3 cm 10/24/24 1510   Wound Width (cm) 2.8 cm 10/24/24 1510   Wound Surface Area (cm^2) 8.4 cm^2 10/24/24 1510   Drainage Description None 10/24/24 1510   Drainage Amount None 10/24/24 1422   Dressing Other (Comment) 10/24/24 1510       Wound Team Summary Assessment:   The vac dressing was carefully removed for wound assessment and care.  The wound is as noted above, the bed 95% granular (minimally hypergranular), 5% dusky area distal aspect wound bed.  There is minor separation of wound edge from 9-12 o'clock, otherwise edges are open.  Goal:  continued healing with epithelial edge advancement.  Do not believe negative pressure wound therapy is needed at this point, recommend Aquacel, Mepilex, change daily for now, every 48 hour changes may be appropriate at discharge.  Orders were obtained, care provided.  Patient understands the plan and is agreeable to it.  There is no one available to  his home vac now - LEANN Contreras will label clearly and request family take it home when he is able in case the [facility] wound clinic does still want to continue its use.   The right neck wound is healed and skin is otherwise intact.     Wound Team Plan: Aquacel, Mepilex, change daily for now, every 48 hour changes may be appropriate at discharge.      Nakita Angelo  RN  10/24/2024  3:49 PM

## 2024-10-24 NOTE — H&P
"Patient: Gaurav Mckay   Age: 54 y.o.   Gender: male   Room/Bed: 229/229-A     Attending: Alan Mayers DO  Code Status:  Full Code    Chief Complaint:  Patient: Gaurav Mckay is a 54 y.o. male who presented to the hospital for multiple medical complaints including foreign body in his eyes and throat, nausea, and vomiting.    History of Presenting Illness  Gaurav Mckay is a 54 y.o. male with a PMH of alcohol dependence in remission , anxiety disorder, bipolar disorder, cervicalgia, COPD, hypertension, malignant neoplasm of larynx . Patient initially presented to [place] ED due to foreign body in his eye and throat. Ptient states he went to the [place] ED because he was picking glass out of his finger while sitting in front of a fan and a piece of glass flew into his throat and left eye. He states that the glass in his fingers are from \"years of buildup'. He endorses pain in his fingertips, headache, nausea, SOB. He also endorses bladder pressure. He denies chest pain, heart racing, SOB, stomach pain. Per his wife via phone call, she states that sometimes he is good but sometimes he is out of it. She denies him ever working with glass and states his mental state is sporadic. She states he is convinced there is glass in his fingertips and gets angry when she tells him there is not. Wife states he has not been eating the past few days or taking his medications, he says he is not able to swallow but wife patient can drink pop just fine. Patient's wife mentions that the patient's in \"la la [place]\" sometimes.     Surgical hx: Removal of vocal cord polyps, left ankle surgery  Social hx: smokes 1 pack per day since age 12, has hx of alcohol dependence, last used meth 5 days ago, has smoked marijuana in the past, lives at home with his wife.  Allergies: oxycodone  Code status: Full    ED COURSE  Vitals: T 96.8, HR 77, RR 18, /83, SpO2 99  Labs: UDS positive for amphetamine, Lacate 3.7, K 3, Phos 1.9, Mg " 1.54, WBC 6.5, UA negative  Imaging:  CT soft tissue neck: Postsurgical change of dissection in the right neck with resection of multiple previously seen enlarged lymph nodes and postsurgical change of skin flap. Soft tissue thickening surrounding the right carotid  vasculature with mild narrowing of the right internal carotid artery superiorly. Right internal carotid artery grossly patent. Asymmetric soft tissue thickening at level of right pyriform sinus which may relate to underlying mass  CT chest/abd/pelvis: No acute pulmonary abnormality. No acute inflammatory process or bowel obstruction. Asymmetric anterior bladder wall thickening present, could represent cystitis.  Consults: none  Intervention: Mag ox tablet, potassium and phos replacement, 1L LR, Zofran,       Past Medical History   Past Medical History:   Diagnosis Date    Acute upper respiratory infection, unspecified 03/20/2015    Acute upper respiratory infection    Alcohol abuse, in remission 02/12/2015    History of ETOH abuse    Alcohol dependence, in remission 05/07/2015    Alcohol dependence in remission    Allergic contact dermatitis due to plants, except food 05/21/2014    Contact dermatitis due to poison ivy    Allergy status to unspecified drugs, medicaments and biological substances 08/29/2013    History of allergy    Anxiety disorder, unspecified 03/20/2015    Anxiety    Bipolar disorder, current episode manic without psychotic features, unspecified (Multi)     Bipolar disorder, current episode manic without psychotic features    Cervicalgia     Cervicalgia    Chronic obstructive pulmonary disease, unspecified 12/02/2014    Chronic asthmatic bronchitis    Encounter for immunization 09/22/2016    Flu vaccine need    Hypertension     Other cervical disc degeneration, unspecified cervical region     Degeneration of cervical intervertebral disc    Other conditions influencing health status 06/27/2013    Acute Inflammation Of The Orbit    Other  long term (current) drug therapy 06/02/2015    High risk medication use    Other specified health status     No pertinent past surgical history    Personal history of nicotine dependence 08/16/2017    History of tobacco abuse    Personal history of nicotine dependence 06/26/2017    History of nicotine dependence    Personal history of other diseases of the digestive system 04/11/2013    History of gastroenteritis    Personal history of other diseases of the digestive system 08/27/2015    History of esophageal reflux    Personal history of other diseases of the respiratory system 03/20/2017    History of sore throat    Personal history of other diseases of the respiratory system 02/05/2014    History of sinusitis    Personal history of other diseases of the respiratory system 01/16/2014    History of allergic rhinitis    Personal history of other diseases of the respiratory system 09/26/2013    History of acute sinusitis    Personal history of other diseases of the respiratory system 06/26/2014    History of allergic rhinitis    Personal history of other infectious and parasitic diseases     History of hepatitis C    Personal history of other mental and behavioral disorders     History of depression    Personal history of other specified conditions 08/25/2016    History of dizziness    Personal history of other specified conditions 02/16/2017    History of abdominal pain    Unspecified asthma, uncomplicated ([facility]) 02/13/2014    Asthmatic bronchitis      Past Surgical History:   Past Surgical History:   Procedure Laterality Date    CHOLECYSTECTOMY      CT ABDOMEN PELVIS ANGIOGRAM W AND/OR WO IV CONTRAST  04/01/2020    CT ABDOMEN PELVIS ANGIOGRAM W AND/OR WO IV CONTRAST 4/1/2020 [facility]    INVASIVE VASCULAR PROCEDURE N/A 11/08/2023    Procedure: Pulmonary Angiogram;  Surgeon: Surya Templeton MD;  Location: [facility];  Service: Cardiovascular;  Laterality: N/A;    MR HEAD ANGIO WO IV CONTRAST   01/24/2016    MR HEAD ANGIO WO IV CONTRAST 1/24/2016 [facility]    MR NECK ANGIO WO IV CONTRAST  01/24/2016    MR NECK ANGIO WO IV CONTRAST 1/24/2016 [facility]     Family History:   No family history on file.  Social History:   Tobacco Use: High Risk (10/24/2024)    Patient History     Smoking Tobacco Use: Every Day     Smokeless Tobacco Use: Never     Passive Exposure: Not on file      Social History     Substance and Sexual Activity   Alcohol Use Not Currently       Outpatient Medications:  Current Outpatient Medications   Medication Instructions    acetaminophen (TYLENOL) 650 mg, oral, Every 6 hours PRN    albuterol (Proventil HFA) 90 mcg/actuation inhaler 2 puffs, inhalation, Every 4 hours PRN    apixaban (ELIQUIS) 5 mg, oral, 2 times daily    atorvastatin (LIPITOR) 20 mg, oral, Nightly    benztropine (COGENTIN) 0.5 mg, oral, 2 times daily    calcium carbonate-vitamin D3 500 mg-5 mcg (200 unit) tablet 1 tablet, oral, 2 times daily    cyclobenzaprine (FLEXERIL) 10 mg, oral, 3 times daily PRN    disulfiram (ANTABUSE) 500 mg, oral, Daily    docusate sodium (COLACE) 100 mg, oral, 2 times daily    DULoxetine (CYMBALTA) 90 mg, oral, Every morning, Do not crush or chew.    DULoxetine (CYMBALTA) 30 mg, oral, Nightly, Do not crush or chew.    ipratropium-albuteroL (Duo-Neb) 0.5-2.5 mg/3 mL nebulizer solution 3 mL, nebulization, Every 2 hour PRN    lidocaine (Xylocaine) 2 % solution 15 mL, oral, 4 times daily before meals and nightly    melatonin 10 mg tablet,disintegrating 1 tablet, oral, Nightly PRN    montelukast (SINGULAIR) 10 mg, oral, Daily    naloxone (NARCAN) 0.4 mg, intravenous, Every 5 min PRN    nicotine polacrilex (NICORETTE) 2 mg, Mouth/Throat, As needed    oxygen (O2) 2 L/min, inhalation, Every 24 hours    pantoprazole (PROTONIX) 40 mg, oral, Daily before breakfast, Do not crush, chew, or split.    pregabalin (LYRICA) 200 mg, oral, 3 times daily    QUEtiapine (SEROQUEL) 25 mg, oral,  Nightly PRN, 1-2 tablets    risperiDONE (RISPERDAL) 1.5 mg, oral, Nightly    rOPINIRole (REQUIP) 0.5 mg, oral, 3 times daily    sennosides-docusate sodium (Marya-Colace) 8.6-50 mg tablet 1 tablet, oral, Nightly    simvastatin (ZOCOR) 40 mg, oral, Nightly    sodium chloride 0.9% 100 mL/hr (100 mL/hr), intravenous, Continuous    topiramate (TOPAMAX) 100 mg, oral, 2 times daily    traZODone (DESYREL) 50 mg, oral, Nightly    vancomycin (VANCOCIN) 1,750 mg, intravenous, Every 12 hours    white petrolatum (Aquaphor) 41 % ointment ointment 1 Application, Topical, Every 1 hour PRN        ED Course   Vitals - /83 (Patient Position: Lying)   Pulse 77   Temp 36 °C (96.8 °F) (Temporal)   Resp 18   Wt 93.9 kg (207 lb)   SpO2 99%     Labs:   CBC:  Recent Labs     10/23/24  2101 09/09/24  0626 09/08/24  1317   WBC 6.5 1.9*  1.9* 3.1*   HGB 11.9* 9.7*  9.7* 11.6*   HCT 37.4* 32.3*  32.3* 37.6*    135*  135* 174   MCV 80 83  83 81     CMP:  Recent Labs     10/23/24  2101 09/09/24  0626 09/08/24  1317    140 141   K 3.0* 3.8 3.3*    105 102   CO2 21 27 27   ANIONGAP 18 12 15   BUN 8 12 9   CREATININE 1.00 0.74 0.75   EGFR 89 >90 >90   GLUCOSE 87 109* 112*     Recent Labs     10/23/24  2101 09/08/24  1317 09/04/24  1252 05/08/24  1618 02/08/24  1553 04/05/22  0555 04/04/22  0445 04/03/22  0530   ALBUMIN 3.9 3.8 3.7   < > 4.1   < > 3.4 3.8   ALKPHOS 167* 139* 123*   < > 104   < > 97 99   ALT 13 10 11   < > 30   < > 27 22   AST 29 12 14   < > 35   < > 36 32   BILITOT 0.5 0.3 0.5   < > 0.5   < > 1.0 1.3*   LIPASE  --   --   --   --  17  --  86* 312*    < > = values in this interval not displayed.     Calcium/Phos:   Lab Results   Component Value Date    CALCIUM 10.3 10/23/2024    PHOS 1.9 (L) 10/23/2024      COAG:   Recent Labs     04/02/22  1453 02/25/21  0523 03/29/20  1715   INR 1.2* 1.0 1.1     CRP:   Lab Results   Component Value Date    CRP 0.14 04/02/2022      [unfilled]   ENDO:  Recent Labs  "    05/17/24  2357 08/25/23  0957 01/14/21  1217   HGBA1C 6.1* 6.2 5.9      CARDIAC:   Recent Labs     10/23/24  2221 10/23/24  2101 09/08/24  1441 02/08/24  1553 03/29/20  1715   TROPHS 4 8 3   < >  --    BNP  --  48  --   --  14    < > = values in this interval not displayed.     Recent Labs     02/27/19  0420   TRIG 256*     No data recorded    Micro/ID:   Susceptibility data from last 90 days.  Collected Specimen Info Organism Clindamycin Erythromycin Oxacillin Tetracycline Trimethoprim/Sulfamethoxazole Vancomycin   09/09/24 Swab from Anterior Nares Methicillin Resistant Staphylococcus aureus (MRSA)         09/09/24 Tissue/Biopsy from Wound/Tissue Methicillin Resistant Staphylococcus aureus (MRSA)  S  S  R  S  S  S                    No lab exists for component: \"AGALPCRNB\"   .ID  Lab Results   Component Value Date    BLOODCULT No growth at 4 days -  FINAL REPORT 09/04/2024       Imaging:   No orders to display     Encounter Date: 10/23/24   ECG 12 lead   Result Value    Ventricular Rate 92    Atrial Rate 92    NY Interval 122    QRS Duration 88    QT Interval 394    QTC Calculation(Bazett) 487    P Axis 23    R Axis 21    T Axis 46    QRS Count 15    Q Onset 222    P Onset 161    P Offset 206    T Offset 419    QTC Fredericia 454    Narrative    Normal sinus rhythm  Prolonged QT  Abnormal ECG  When compared with ECG of 27-SEP-2024 06:02,  No significant change was found     No echocardiogram results found for the past 12 months  ECG 12 lead    Result Date: 10/24/2024  Normal sinus rhythm Prolonged QT Abnormal ECG When compared with ECG of 27-SEP-2024 06:02, No significant change was found    CT chest abdomen pelvis w IV contrast    Result Date: 10/23/2024  STUDY: CT Chest, Abdomen, and Pelvis with IV Contrast; 10/23/2024 10:26 pm INDICATION: Elevated lactate.  Shortness of breath.  Vomiting. COMPARISON: XR Chest 09/08/2024. ACCESSION NUMBER(S): [id number] ORDERING CLINICIAN: ALMAZ JACKSON TECHNIQUE: CT " of the chest, abdomen, and pelvis was performed.  Contiguous axial images were obtained at 3 mm slice thickness through the chest, abdomen, and pelvis.  Coronal and sagittal reconstructions at 3 mm slice thickness were performed.  Omnipaque 350, 75 mL was administered intravenously.  FINDINGS: CHEST: MEDIASTINUM: The heart is normal in size without pericardial effusion.  Central vascular structures opacify normally.  LUNGS/PLEURA: There is no pleural effusion, pleural thickening, or pneumothorax. The airways are patent. Lungs are clear without consolidation, interstitial disease, or suspicious nodules. LYMPH NODES: Thoracic lymph nodes are not enlarged. ABDOMEN:  LIVER: No hepatomegaly.  Smooth surface contour.  Normal attenuation.  BILE DUCTS: No intrahepatic or extrahepatic biliary ductal dilatation.  GALLBLADDER: The gallbladder is present. STOMACH: No abnormalities identified.  PANCREAS: No masses or ductal dilatation.  SPLEEN: No splenomegaly or focal splenic lesion.  ADRENAL GLANDS: No thickening or nodules.  KIDNEYS AND URETERS: Kidneys are normal in size and location.  No renal or ureteral calculi.  PELVIS:  BLADDER: Asymmetric anterior bladder wall thickening present.  Clinical and/or endoscopic correlation suggested.  This could represent cystitis. Neoplastic process not excluded.  REPRODUCTIVE ORGANS: No abnormalities identified.  BOWEL: No abnormalities identified.  The appendix is normal.  VESSELS: No abnormalities identified.  Abdominal aorta is normal in caliber.  PERITONEUM/RETROPERITONEUM/LYMPH NODES: No free fluid.  No pneumoperitoneum. No lymphadenopathy.  ABDOMINAL WALL: No abnormalities identified. SOFT TISSUES: No abnormalities identified.  BONES: No acute fracture or aggressive osseous lesion.    1. No acute pulmonary abnormality. 2. No acute inflammatory process or bowel obstruction. 3. Asymmetric anterior bladder wall thickening present. Clinical and endoscopic correlation suggested. This  could represent cystitis. Neoplastic process not excluded. Signed by Chris Mcghee MD    CT soft tissue neck w IV contrast    Result Date: 10/23/2024  Interpreted By:  Rudolph Brenner, STUDY: CT SOFT TISSUE NECK W IV CONTRAST;  10/23/2024 10:07 pm   INDICATION: Signs/Symptoms:difficulty with swallowing, history of ENT cancer s/p surgery and skin graft, recent infection.   COMPARISON: 5/8/2024   ACCESSION NUMBER(S): [id number]   ORDERING CLINICIAN: ALMAZ JACKSON   TECHNIQUE: Contiguous axial images of the neck were obtained after the intravenous administration of contrast. Coronal and sagittal reformatted images were obtained from the axial images.   FINDINGS: The paranasal sinuses are clear and well pneumatized.   There is postsurgical change of dissection in the right neck with resection of multiple previously seen enlarged lymph nodes and skin flap. There is soft tissue thickening surrounding the right carotid vasculature with narrowing of the right internal carotid artery superiorly. The right internal carotid artery is however grossly patent.   There is asymmetric soft tissue thickening at level of right pyriform sinus.   Multilevel degenerative change of the cervical spine.       Postsurgical change of dissection in the right neck with resection of multiple previously seen enlarged lymph nodes and postsurgical change of skin flap. Soft tissue thickening surrounding the right carotid vasculature with mild narrowing of the right internal carotid artery superiorly. The right internal carotid artery is however grossly patent.   Asymmetric soft tissue thickening at level of right pyriform sinus which may relate to underlying mass and correlation with direct visualization is recommended for further evaluation.   MACRO: None   Signed by: Rudolph Brenner 10/23/2024 11:30 PM Dictation workstation:   [id number]     Interventions:  Medications   sennosides (Senokot) tablet 17.2 mg (has no administration in time  range)       Objective Data  Physical Exam  Constitutional:       General: He is not in acute distress.     Appearance: He is not ill-appearing or toxic-appearing.      Comments: Patient alert, confused, had both eyes shut during whole physical exam,    Eyes:      Extraocular Movements: Extraocular movements intact.      Comments: No erythema or abrasions   Neck:      Comments: Skin graft present on right side of anterior neck  Cardiovascular:      Rate and Rhythm: Normal rate and regular rhythm.      Heart sounds: Normal heart sounds.   Pulmonary:      Effort: Pulmonary effort is normal. No respiratory distress.      Breath sounds: Normal breath sounds. No wheezing.   Abdominal:      General: There is no distension.      Palpations: Abdomen is soft.      Tenderness: There is no abdominal tenderness. There is no guarding.   Musculoskeletal:         General: Tenderness (Tenderness of left ankle to palpation, hx of left ankle surgery) present.      Right lower leg: No edema.      Left lower leg: No edema.   Skin:     General: Skin is warm and dry.      Comments: Abdomen contains excoriations - patient states he picks his skin. Has granulated ulcer over his left anterior upper thigh.    Neurological:      Mental Status: He is alert. He is disoriented.   Psychiatric:      Comments: Behavior and thought content abnormal. Patient says there is glass in his finger tips, eyes, and throat, however no physical evidence on exam.                Assessment and Plan   Gaurav Mckay is a 54 y.o. male presenting for PMH of alcohol dependence in remission, anxiety disorder, bipolar disorder, cervicalgia, COPD, malignant neoplasm of larynx, hypertension, hyperlipidemia presenting for cystitis.    Acute Medical Issues:  #Acute metabolic encephalopathy iso possible electrolyte derangement vs underlying psychosis vs polypharmacy  #Cystitis vs possible neoplasm  - CT chest/abd/pelvis 10/23 showed asymmetric anterior bladder wall  thickening present, could represent cystitis  - UA negative  - WBC count 6.5 in ED  PLAN  - Empiric treatment with bactrim for 3 days  - consider cystoscopy? urology consulted.  - PT/OT    #Electrolyte derangement  -  On admission Potassium 3, Phos 1.9. Mg 1.54   - S/P mag ox, potassium phos supplementation in the ED  PLAN  - CTM electrolytes  - Replete as needed    #Left anterior upper thigh wound  - Wound care following  - Wound vac removed    Resolved issues  #Lactic acidosis   - Lactate 3.7 on admission to [place] ED, repeat lactate 1.0     Chronic Medical Issues:   #Insomnia: continue trazadone nighty  #HLD: Continue home lipitor  #HTN: not on any home medications  #Cervicalgia: continue home lyrica, tylenol  #Anxiety disorder: continue home sertraline  #Bipolar disorder; holding home topiramate  #COPD; continue home singulair, albuterol PRN  #BPH: continue flomax  #History of PE: continue home eliquis      Fluids: PRN  Electrolytes: replete as needed  Nutrition: Regular diet  GI Prophylaxis: protonix  DVT Prophylaxis: Eliquis, SCDs    Access: IV  Antibiotics: Bactrim   Oxygenation: Room air    Dispo: [facility] 3 days    Shwetha Horner DO  PGY1

## 2024-10-24 NOTE — ED NOTES
"[facility] \"will be en route shortly\"  [facility] [address] [place] [address]  [phone]     Pam Barbour, EMT  10/24/24 1544    "

## 2024-10-24 NOTE — ED PROVIDER NOTES
Chief Complaint: Multiple medical complaints  HPI: This is a 54-year-old male, presenting to the emergency department for evaluation of multiple medical complaints including concern for foreign body in his eyes, hands, and throat, as well as cough, and nausea and vomiting.  Patient states that he has been picking glass out of his hands for the last several weeks, and today was doing it in front of a fan and is concerned that he got some glass in his eyes.  He is also concerned that he swallowed some glass.  Patient does have a bizarre affect, and is a poor historian, as such history is limited.    Past Medical History:   Diagnosis Date    Acute upper respiratory infection, unspecified 03/20/2015    Acute upper respiratory infection    Alcohol abuse, in remission 02/12/2015    History of ETOH abuse    Alcohol dependence, in remission 05/07/2015    Alcohol dependence in remission    Allergic contact dermatitis due to plants, except food 05/21/2014    Contact dermatitis due to poison ivy    Allergy status to unspecified drugs, medicaments and biological substances 08/29/2013    History of allergy    Anxiety disorder, unspecified 03/20/2015    Anxiety    Bipolar disorder, current episode manic without psychotic features, unspecified (Multi)     Bipolar disorder, current episode manic without psychotic features    Cervicalgia     Cervicalgia    Chronic obstructive pulmonary disease, unspecified 12/02/2014    Chronic asthmatic bronchitis    Encounter for immunization 09/22/2016    Flu vaccine need    Hypertension     Other cervical disc degeneration, unspecified cervical region     Degeneration of cervical intervertebral disc    Other conditions influencing health status 06/27/2013    Acute Inflammation Of The Orbit    Other long term (current) drug therapy 06/02/2015    High risk medication use    Other specified health status     No pertinent past surgical history    Personal history of nicotine dependence 08/16/2017     History of tobacco abuse    Personal history of nicotine dependence 06/26/2017    History of nicotine dependence    Personal history of other diseases of the digestive system 04/11/2013    History of gastroenteritis    Personal history of other diseases of the digestive system 08/27/2015    History of esophageal reflux    Personal history of other diseases of the respiratory system 03/20/2017    History of sore throat    Personal history of other diseases of the respiratory system 02/05/2014    History of sinusitis    Personal history of other diseases of the respiratory system 01/16/2014    History of allergic rhinitis    Personal history of other diseases of the respiratory system 09/26/2013    History of acute sinusitis    Personal history of other diseases of the respiratory system 06/26/2014    History of allergic rhinitis    Personal history of other infectious and parasitic diseases     History of hepatitis C    Personal history of other mental and behavioral disorders     History of depression    Personal history of other specified conditions 08/25/2016    History of dizziness    Personal history of other specified conditions 02/16/2017    History of abdominal pain    Unspecified asthma, uncomplicated ([facility]) 02/13/2014    Asthmatic bronchitis      Past Surgical History:   Procedure Laterality Date    CHOLECYSTECTOMY      CT ABDOMEN PELVIS ANGIOGRAM W AND/OR WO IV CONTRAST  04/01/2020    CT ABDOMEN PELVIS ANGIOGRAM W AND/OR WO IV CONTRAST 4/1/2020 [facility]    INVASIVE VASCULAR PROCEDURE N/A 11/08/2023    Procedure: Pulmonary Angiogram;  Surgeon: Surya Templeton MD;  Location: [facility] Cardiac Cath Lab;  Service: Cardiovascular;  Laterality: N/A;    MR HEAD ANGIO WO IV CONTRAST  01/24/2016    MR HEAD ANGIO WO IV CONTRAST 1/24/2016 [facility]    MR NECK ANGIO WO IV CONTRAST  01/24/2016    MR NECK ANGIO WO IV CONTRAST 1/24/2016 [facility]       Physical Exam  Constitutional:        Appearance: Normal appearance.   HENT:      Head: Normocephalic and atraumatic.      Mouth/Throat:      Mouth: Mucous membranes are moist.   Eyes:      Extraocular Movements: Extraocular movements intact.      Pupils: Pupils are equal, round, and reactive to light.      Comments: No erythema to the eyes bilaterally.  Extraocular movements are intact.  Pupils are equal, round, reactive to light.   Cardiovascular:      Rate and Rhythm: Normal rate and regular rhythm.   Pulmonary:      Effort: Pulmonary effort is normal.   Abdominal:      General: Abdomen is flat.      Palpations: Abdomen is soft.   Skin:     General: Skin is warm.      Comments: No visible cuts, abrasions, erythema to the hands bilaterally.   Neurological:      General: No focal deficit present.      Mental Status: He is alert.   Psychiatric:         Mood and Affect: Mood normal.      Comments: Bizarre affect            ED Course/MDM  Diagnoses as of 10/24/24 0653   Altered mental status, unspecified altered mental status type   Lactic acidosis   Hypokalemia   Hypophosphatemia   Hypomagnesemia     EKG interpreted by myself (ED attending physician): Normal sinus rhythm, rate of 92, normal axis,, slightly prolonged QT interval, no ST segment changes, nonischemic EKG    This is a 54 y.o. male presenting to the ED for evaluation of concern of foreign body in his eyes and throat.  Patient reports picking glass out of his hands for the last several weeks, and today did not in front of a fan and noticed that the glass blew into his face and down his throat.  Patient is otherwise a very poor historian with an odd affect.  On physical exam, patient appears much older than stated age, is unkempt and slightly malodorous.  Heart is regular rate and rhythm, lungs are clear to auscultation bilaterally.  There is no visible lesions, abrasions, lacerations to the hands bilaterally.  No visible erythema of the eyes, and the patient has extraocular movements intact  with pupils being equal round and reactive.  Lab work was concerning for a lactic acidosis with a lactate level of 3.7.  He also had hypophosphatemia, hypomagnesemia, and hypokalemia.  Patient tested positive for methamphetamine.  Patient does have a history of ENT cancer with skin graft, and given his concern for ingested foreign body sensation, CT soft tissue neck was obtained and is nonconcerning for any acute findings.  CT chest/abdomen/pelvis was also negative for any acute findings.  Given the patient's lactic acidosis, and electrolyte abnormalities, I do feel that he will require admission for further evaluation.  At this time [facility] does not have any beds, as such the hospitalist at [facility] was consulted, discussed case over the phone, they accepted the patient in transfer.  Patient was transferred in stable condition.      Final Impression  1.  Altered mental status  2.  Lactic acidosis  3.  Hypomagnesemia  4.  Hypophosphatemia  5.  Hypokalemia  Disposition/Plan: Transfer  Condition at disposition: Stable.     Estela Elias DO  Emergency Medicine Physician     Estela Elias DO  10/28/24 0140

## 2024-10-24 NOTE — ED NOTES
[facility]  [address] [phone]     Pam Barbour, [place]  10/24/24 0817

## 2024-10-24 NOTE — PROGRESS NOTES
10/24/24 1510   Wound 09/04/24 Leg Left;Upper;Anterior   Date First Assessed/Time First Assessed: 09/04/24 2000   Present on Original Admission: Yes  Location: Leg  Wound Location Orientation: Left;Upper;Anterior   Wound Image Images linked   Wound Length (cm) 3 cm   Wound Width (cm) 2.8 cm   Wound Surface Area (cm^2) 8.4 cm^2   Wound Depth (cm)    (Edge  from 9 -12 o clock. 5percent dusky lower wound bed.)   Drainage Description None   Dressing Other (Comment)

## 2024-10-24 NOTE — CARE PLAN
The patient's goals for the shift include   Patient will report a decrease in pain by end of shift    The clinical goals for the shift include to feel better    Over the shift, the patient reported a slight decrease in pain.

## 2024-10-25 ENCOUNTER — HOSPITAL ENCOUNTER (OUTPATIENT)
Facility: HOSPITAL | Age: 54
Setting detail: OUTPATIENT SURGERY
End: 2024-10-25
Attending: STUDENT IN AN ORGANIZED HEALTH CARE EDUCATION/TRAINING PROGRAM | Admitting: STUDENT IN AN ORGANIZED HEALTH CARE EDUCATION/TRAINING PROGRAM
Payer: COMMERCIAL

## 2024-10-25 ENCOUNTER — PHARMACY VISIT (OUTPATIENT)
Dept: PHARMACY | Facility: CLINIC | Age: 54
End: 2024-10-25
Payer: MEDICAID

## 2024-10-25 PROBLEM — N32.89 BLADDER WALL THICKENING: Status: ACTIVE | Noted: 2024-10-25

## 2024-10-25 LAB
ALBUMIN SERPL BCP-MCNC: 3.5 G/DL (ref 3.4–5)
ALP SERPL-CCNC: 142 U/L (ref 33–120)
ALT SERPL W P-5'-P-CCNC: 9 U/L (ref 10–52)
ANION GAP SERPL CALC-SCNC: 13 MMOL/L (ref 10–20)
AST SERPL W P-5'-P-CCNC: 15 U/L (ref 9–39)
BILIRUB SERPL-MCNC: 0.5 MG/DL (ref 0–1.2)
BUN SERPL-MCNC: 11 MG/DL (ref 6–23)
CALCIUM SERPL-MCNC: 9.5 MG/DL (ref 8.6–10.3)
CHLORIDE SERPL-SCNC: 103 MMOL/L (ref 98–107)
CO2 SERPL-SCNC: 25 MMOL/L (ref 21–32)
CREAT SERPL-MCNC: 0.76 MG/DL (ref 0.5–1.3)
EGFRCR SERPLBLD CKD-EPI 2021: >90 ML/MIN/1.73M*2
ERYTHROCYTE [DISTWIDTH] IN BLOOD BY AUTOMATED COUNT: 19.9 % (ref 11.5–14.5)
ERYTHROCYTE [DISTWIDTH] IN BLOOD BY AUTOMATED COUNT: 19.9 % (ref 11.5–14.5)
GLUCOSE BLD MANUAL STRIP-MCNC: 83 MG/DL (ref 74–99)
GLUCOSE SERPL-MCNC: 96 MG/DL (ref 74–99)
HCT VFR BLD AUTO: 37.1 % (ref 41–52)
HCT VFR BLD AUTO: 37.7 % (ref 41–52)
HGB BLD-MCNC: 11.5 G/DL (ref 13.5–17.5)
HGB BLD-MCNC: 11.8 G/DL (ref 13.5–17.5)
MAGNESIUM SERPL-MCNC: 1.73 MG/DL (ref 1.6–2.4)
MCH RBC QN AUTO: 25.2 PG (ref 26–34)
MCH RBC QN AUTO: 25.5 PG (ref 26–34)
MCHC RBC AUTO-ENTMCNC: 31 G/DL (ref 32–36)
MCHC RBC AUTO-ENTMCNC: 31.3 G/DL (ref 32–36)
MCV RBC AUTO: 81 FL (ref 80–100)
MCV RBC AUTO: 81 FL (ref 80–100)
NRBC BLD-RTO: 0 /100 WBCS (ref 0–0)
NRBC BLD-RTO: 0 /100 WBCS (ref 0–0)
PHOSPHATE SERPL-MCNC: 4.6 MG/DL (ref 2.5–4.9)
PLATELET # BLD AUTO: 260 X10*3/UL (ref 150–450)
PLATELET # BLD AUTO: 274 X10*3/UL (ref 150–450)
POTASSIUM SERPL-SCNC: 3.2 MMOL/L (ref 3.5–5.3)
PROT SERPL-MCNC: 6.8 G/DL (ref 6.4–8.2)
RBC # BLD AUTO: 4.56 X10*6/UL (ref 4.5–5.9)
RBC # BLD AUTO: 4.63 X10*6/UL (ref 4.5–5.9)
SODIUM SERPL-SCNC: 138 MMOL/L (ref 136–145)
WBC # BLD AUTO: 4.9 X10*3/UL (ref 4.4–11.3)
WBC # BLD AUTO: 5.6 X10*3/UL (ref 4.4–11.3)

## 2024-10-25 PROCEDURE — 85027 COMPLETE CBC AUTOMATED: CPT

## 2024-10-25 PROCEDURE — 9420000001 HC RT PATIENT EDUCATION 5 MIN

## 2024-10-25 PROCEDURE — RXMED WILLOW AMBULATORY MEDICATION CHARGE

## 2024-10-25 PROCEDURE — 96361 HYDRATE IV INFUSION ADD-ON: CPT

## 2024-10-25 PROCEDURE — 94760 N-INVAS EAR/PLS OXIMETRY 1: CPT

## 2024-10-25 PROCEDURE — G0378 HOSPITAL OBSERVATION PER HR: HCPCS

## 2024-10-25 PROCEDURE — 2500000002 HC RX 250 W HCPCS SELF ADMINISTERED DRUGS (ALT 637 FOR MEDICARE OP, ALT 636 FOR OP/ED)

## 2024-10-25 PROCEDURE — 82947 ASSAY GLUCOSE BLOOD QUANT: CPT

## 2024-10-25 PROCEDURE — 84100 ASSAY OF PHOSPHORUS: CPT

## 2024-10-25 PROCEDURE — 99239 HOSP IP/OBS DSCHRG MGMT >30: CPT

## 2024-10-25 PROCEDURE — 2500000004 HC RX 250 GENERAL PHARMACY W/ HCPCS (ALT 636 FOR OP/ED)

## 2024-10-25 PROCEDURE — 36415 COLL VENOUS BLD VENIPUNCTURE: CPT

## 2024-10-25 PROCEDURE — 97165 OT EVAL LOW COMPLEX 30 MIN: CPT | Mod: GO

## 2024-10-25 PROCEDURE — 96360 HYDRATION IV INFUSION INIT: CPT

## 2024-10-25 PROCEDURE — 2500000001 HC RX 250 WO HCPCS SELF ADMINISTERED DRUGS (ALT 637 FOR MEDICARE OP)

## 2024-10-25 PROCEDURE — 83735 ASSAY OF MAGNESIUM: CPT

## 2024-10-25 PROCEDURE — 80053 COMPREHEN METABOLIC PANEL: CPT

## 2024-10-25 PROCEDURE — 99254 IP/OBS CNSLTJ NEW/EST MOD 60: CPT | Performed by: STUDENT IN AN ORGANIZED HEALTH CARE EDUCATION/TRAINING PROGRAM

## 2024-10-25 PROCEDURE — 97161 PT EVAL LOW COMPLEX 20 MIN: CPT | Mod: GP | Performed by: PHYSICAL THERAPIST

## 2024-10-25 RX ORDER — SULFAMETHOXAZOLE AND TRIMETHOPRIM 800; 160 MG/1; MG/1
1 TABLET ORAL EVERY 12 HOURS SCHEDULED
Qty: 4 TABLET | Refills: 0 | Status: CANCELLED | OUTPATIENT
Start: 2024-10-25 | End: 2024-10-27

## 2024-10-25 RX ORDER — SULFAMETHOXAZOLE AND TRIMETHOPRIM 800; 160 MG/1; MG/1
1 TABLET ORAL EVERY 12 HOURS SCHEDULED
Qty: 4 TABLET | Refills: 0 | Status: SHIPPED | OUTPATIENT
Start: 2024-10-25 | End: 2024-10-27

## 2024-10-25 RX ORDER — POTASSIUM CHLORIDE 1.5 G/1.58G
40 POWDER, FOR SOLUTION ORAL ONCE
Status: COMPLETED | OUTPATIENT
Start: 2024-10-25 | End: 2024-10-25

## 2024-10-25 RX ORDER — TRAZODONE HYDROCHLORIDE 50 MG/1
100 TABLET ORAL NIGHTLY
Qty: 60 TABLET | Refills: 0 | Status: SHIPPED | OUTPATIENT
Start: 2024-10-25 | End: 2024-11-24

## 2024-10-25 ASSESSMENT — COGNITIVE AND FUNCTIONAL STATUS - GENERAL
DAILY ACTIVITIY SCORE: 23
WALKING IN HOSPITAL ROOM: A LITTLE
DAILY ACTIVITIY SCORE: 24
CLIMB 3 TO 5 STEPS WITH RAILING: A LITTLE
TOILETING: A LITTLE
MOBILITY SCORE: 22
DAILY ACTIVITIY SCORE: 24
CLIMB 3 TO 5 STEPS WITH RAILING: A LITTLE
MOBILITY SCORE: 23
MOBILITY SCORE: 24

## 2024-10-25 ASSESSMENT — PAIN DESCRIPTION - LOCATION
LOCATION: EYE
LOCATION: EYE

## 2024-10-25 ASSESSMENT — PAIN DESCRIPTION - ORIENTATION
ORIENTATION: LEFT;RIGHT
ORIENTATION: RIGHT;LEFT

## 2024-10-25 ASSESSMENT — PAIN SCALES - GENERAL
PAINLEVEL_OUTOF10: 2
PAINLEVEL_OUTOF10: 2
PAINLEVEL_OUTOF10: 6
PAINLEVEL_OUTOF10: 5 - MODERATE PAIN

## 2024-10-25 ASSESSMENT — ACTIVITIES OF DAILY LIVING (ADL)
ADL_ASSISTANCE: INDEPENDENT
ADL_ASSISTANCE: INDEPENDENT
BATHING_ASSISTANCE: INDEPENDENT
LACK_OF_TRANSPORTATION: PATIENT DECLINED

## 2024-10-25 ASSESSMENT — PAIN - FUNCTIONAL ASSESSMENT
PAIN_FUNCTIONAL_ASSESSMENT: 0-10
PAIN_FUNCTIONAL_ASSESSMENT: 0-10

## 2024-10-25 NOTE — DISCHARGE SUMMARY
Discharge Diagnosis  Cystitis    Issues Requiring Follow-Up  Cystitis vs possible neoplasm  - Anterior bladder wall thickening found on CT imaging  - Continue Bactrim, one table every 12 hours for 4 doses  - Follow up with urology outpatient regarding elective cystoscopy     Orthostatic hypotension  - Advised patient to keep hydrated at home    - Follow up with PCP in 1 week    Discharge Meds     Medication List      START taking these medications     sulfamethoxazole-trimethoprim 800-160 mg tablet; Commonly known as:   Bactrim DS; Take 1 tablet by mouth every 12 hours for 4 doses.     CONTINUE taking these medications     acetaminophen 325 mg tablet; Commonly known as: Tylenol; Take 2 tablets   (650 mg) by mouth every 6 hours if needed for mild pain (1 - 3).   apixaban 5 mg tablet; Commonly known as: Eliquis; Take 1 tablet (5 mg)   by mouth 2 times a day. Do not start before November 16, 2023.   atorvastatin 20 mg tablet; Commonly known as: Lipitor   cyclobenzaprine 10 mg tablet; Commonly known as: Flexeril   docusate sodium 100 mg capsule; Commonly known as: Colace   ferrous sulfate 325 (65 Fe) MG EC tablet   ipratropium-albuteroL 0.5-2.5 mg/3 mL nebulizer solution; Commonly known   as: Duo-Neb; Take 3 mL by nebulization every 2 hours if needed for   wheezing.   lidocaine 2 % solution; Commonly known as: Xylocaine; Take 15 mL by   mouth 4 times a day before meals.   melatonin 10 mg tablet,disintegrating   montelukast 10 mg tablet; Commonly known as: Singulair   naloxone 0.4 mg/mL injection; Commonly known as: Narcan; Infuse 1 mL   (0.4 mg) into a venous catheter every 5 minutes if needed for respiratory   depression or opioid reversal.   nicotine polacrilex 2 mg gum; Commonly known as: Nicorette; Chew 1 each   (2 mg) if needed for smoking cessation.   omeprazole 40 mg DR capsule; Commonly known as: PriLOSEC   oxygen gas therapy; Commonly known as: O2; Inhale 2 L/min once every 24   hours.   pancrelipase  (Lip-Prot-Amyl) 6,000-19,000 -30,000 unit capsule; Commonly   known as: Creon   pantoprazole 40 mg EC tablet; Commonly known as: ProtoNix; Take 1 tablet   (40 mg) by mouth once daily in the morning. Take before meals. Do not   crush, chew, or split.   potassium chloride CR 10 mEq ER tablet; Commonly known as: Klor-Con   pregabalin 200 mg capsule; Commonly known as: Lyrica; Take 1 capsule   (200 mg) by mouth 3 times a day for 14 days.   Proventil HFA 90 mcg/actuation inhaler; Generic drug: albuterol   rOPINIRole 0.5 mg tablet; Commonly known as: Requip   sennosides-docusate sodium 8.6-50 mg tablet; Commonly known as:   Marya-Colace; Take 1 tablet by mouth once daily at bedtime.   sodium chloride 0.9% solution; Infuse 100 mL/hr at 100 mL/hr into a   venous catheter continuously.   topiramate 100 mg tablet; Commonly known as: Topamax   traZODone 50 mg tablet; Commonly known as: Desyrel; Take 2 tablets (100   mg) by mouth once daily at bedtime.   white petrolatum 41 % ointment ointment; Commonly known as: Aquaphor;   Apply 1 Application topically every 1 hour if needed (neck burn).     STOP taking these medications     oxyCODONE 5 mg immediate release tablet; Commonly known as: Roxicodone   vancomycin 1750 mg/500 mL piggyback IV; Commonly known as: Vancocin       Test Results Pending At Discharge  Pending Labs       No current pending labs.            Hospital Course  Gaurav Mckay is a 54 y.o. male presenting for [facility] of alcohol dependence in remission, anxiety disorder, bipolar disorder, cervicalgia, COPD, malignant neoplasm of larynx, hypertension, hyperlipidemia presenting for potential cystitis.     ED course:  Patient initially presented to [place] ED on 10/24 due to foreign body in his eye and throat. Patient states he went to the [place] ED because he was picking glass out of his finger while sitting in front of a fan and a piece of glass flew into his throat and left eye. Per wife, she denies this ever  happening and his mentation at baseline waxes and wanes. UDS positive for amphetamine, Lacate 3.7, K 3, Phos 1.9, Mg 1.54, WBC 6.5, UA negative. CT chest/abd/pelvis showed asymmetric anterior bladder wall thickening present, could represent cystitis. Given mag ox tablet, potassium and phos replacement, 1L LR, Zofran.     Hospital course: Admitted for concern of cystitis and electrolyte derangement. Started on 3 day course bactrim. Urology consulted - no urgent intervention needed for anterior bladder wall thickening, and can get elective cystoscopy, possible biopsy and resection of thickening in bladder. Electrolytes replaced while admitted. Patient stable for discharge 10/25.    Pertinent Physical Exam At Time of Discharge  Physical Exam  Constitutional:       General: He is not in acute distress.     Appearance: He is not ill-appearing or toxic-appearing.      Comments: Patient alert, had both eyes shut during whole physical exam,  Eyes:      Extraocular Movements: Extraocular movements intact.      Comments: Opened eyes briefly for exam, No erythema or abrasions   Neck:      Comments: Skin graft present on right side of anterior neck  Cardiovascular:      Rate and Rhythm: Normal rate and regular rhythm.      Heart sounds: Normal heart sounds.   Pulmonary:      Effort: Pulmonary effort is normal. No respiratory distress.      Breath sounds: Normal breath sounds. No wheezing.   Abdominal:      General: There is no distension.      Palpations: Abdomen is soft.      Tenderness: There is no abdominal tenderness. There is no guarding.   Musculoskeletal:         General: Tenderness (Tenderness of left ankle to palpation, hx of left ankle surgery) present.      Right lower leg: No edema.      Left lower leg: No edema.   Skin:     General: Skin is warm and dry.      Comments: Abdomen contains excoriations - patient states he picks his skin. Has granulated ulcer over his left anterior upper thigh with dressing.     Neurological:      Mental Status: He is alert.   Psychiatric:      Comments: Mood normal, behavior normal     Outpatient Follow-Up  No future appointments.      Shwetha Horner DO

## 2024-10-25 NOTE — CARE PLAN
Problem: Pain - Adult  Goal: Verbalizes/displays adequate comfort level or baseline comfort level  10/25/2024 1726 by Mike Rich RN  Outcome: Progressing  10/25/2024 0936 by Mike Rich RN  Flowsheets (Taken 10/25/2024 0936)  Verbalizes/displays adequate comfort level or baseline comfort level:   Encourage patient to monitor pain and request assistance   Assess pain using appropriate pain scale   Administer analgesics based on type and severity of pain and evaluate response   Implement non-pharmacological measures as appropriate and evaluate response   Consider cultural and social influences on pain and pain management   Notify Licensed Independent Practitioner if interventions unsuccessful or patient reports new pain     Problem: Safety - Adult  Goal: Free from fall injury  Outcome: Progressing     Problem: Discharge Planning  Goal: Discharge to home or other facility with appropriate resources  Outcome: Progressing     Problem: Chronic Conditions and Co-morbidities  Goal: Patient's chronic conditions and co-morbidity symptoms are monitored and maintained or improved  Outcome: Progressing   The patient's goals for the shift include      The clinical goals for the shift include Pt will remain safe    While pt was being discharged pt passed out while trying to go to the wheel chair. Pt was orthostatic 1 liter of fluid given . Pt still orthostatic Dr SIMPSON informed

## 2024-10-25 NOTE — PROGRESS NOTES
Occupational Therapy    Evaluation    Patient Name: Gaurav Mckay  MRN: [id number]  Department: [address]  [facility]  Today's Date: 10/25/2024  Time Calculation  Start Time: 0910  Stop Time: 0919  Time Calculation (min): 9 min    Assessment  IP OT Assessment  OT Assessment: Pt presents at baseline with ADL and functional mobility. No further skilled OT needs identified.  Prognosis: Good  Barriers to Discharge: None  Evaluation/Treatment Tolerance: Patient tolerated treatment well  Medical Staff Made Aware: Yes  End of Session Communication: Bedside nurse  End of Session Patient Position: Bed, 3 rail up, Alarm on  Plan:  No Skilled OT: No acute OT goals identified  OT Frequency: OT eval only  OT Discharge Recommendations: No further acute OT  Equipment Recommended upon Discharge: Wheeled walker  OT Recommended Transfer Status: Stand by assist  OT - OK to Discharge: Yes (per OT POC)    Subjective   Current Problem:  1. Cystitis          General:  General  Reason for Referral: 53 yo male referred to OT for impaired ADL/mobility  Referred By: Shwetha Horner DO  Past Medical History Relevant to Rehab: PMH: ETOH in remission, anxiety/depression, bipolar, COPD, HTN. wound L thigh  Co-Treatment: PT  Co-Treatment Reason: improve patient outcomes  Prior to Session Communication: Bedside nurse  Patient Position Received: Bed, 3 rail up, Alarm off, not on at start of session  General Comment: pleasant and cooperative for therapy eval  Precautions:  Medical Precautions: Fall precautions    Vital Sign (Past 2hrs)                Pain:  Pain Assessment  Pain Assessment: 0-10  0-10 (Numeric) Pain Score: 2  Pain Type: Acute pain  Pain Location: Foot  Pain Interventions: Repositioned    Objective   Cognition:  Overall Cognitive Status: Within Functional Limits  Orientation Level: Oriented X4           Home Living:  Type of Home: Mobile home  Lives With: Spouse  Home Adaptive Equipment: Walker rolling or standard  Home Layout: One  level  Home Access: Stairs to enter with rails  Entrance Stairs-Rails: Both  Entrance Stairs-Number of Steps: 3  Bathroom Shower/Tub: Tub/shower unit  Bathroom Toilet: Standard  Bathroom Equipment: Shower chair with back   Prior Function:  Level of [place]: Independent with ADLs and functional transfers  Receives Help From: Family  ADL Assistance: Independent  Homemaking Assistance: Needs assistance (spouse completes)  Ambulatory Assistance: Independent (with FWW)     ADL:  Eating Assistance: Independent  Grooming Assistance: Independent  Bathing Assistance: Independent  UE Dressing Assistance: Independent  LE Dressing Assistance: Independent  Toileting Assistance with Device: Independent  Functional Assistance: Independent  ADL Comments: Able to don bilateral socks and shoes independently with figure 4 positioning, other ADL performances anticipated d/t current clinical presentation  Activity Tolerance:  Endurance: Tolerates 10 - 20 min exercise with multiple rests  Bed Mobility/Transfers: Bed Mobility  Bed Mobility: Yes  Bed Mobility 1  Bed Mobility 1: Supine to sitting, Sitting to supine  Level of Assistance 1: Modified independent    Transfers  Transfer: Yes  Transfer 1  Transfer From 1: Sit to  Transfer to 1: Stand  Technique 1: Sit to stand, Stand to sit  Transfer Device 1: Walker  Transfer Level of Assistance 1: Modified independent  Trials/Comments 1: mild dizziness upon standing intially, was able to self correct and sit to recover before continuing with ambulation      Functional Mobility:  Functional Mobility  Functional Mobility Performed: Yes  Functional Mobility 1  Surface 1: Level tile  Device 1: Rolling walker  Assistance 1: Close supervision  Comments 1: Ambulated ~50' with good balance and endurance  Sitting Balance:  Static Sitting Balance  Static Sitting-Balance Support: Feet supported  Static Sitting-Level of Assistance: Independent  Standing Balance:  Static Standing Balance  Static  Standing-Balance Support: Bilateral upper extremity supported  Static Standing-Level of Assistance: Distant supervision    Strength:  Strength Comments: MARIBEL WFL    Hand Function:  Hand Function  Gross Grasp: Functional  Coordination: Functional  Extremities: RUE   RUE : Within Functional Limits and LUE   LUE: Within Functional Limits    Outcome Measures: [facility] Daily Activity  Putting on and taking off regular lower body clothing: None  Bathing (including washing, rinsing, drying): None  Putting on and taking off regular upper body clothing: None  Toileting, which includes using toilet, bedpan or urinal: None  Taking care of personal grooming such as brushing teeth: None  Eating Meals: None  Daily Activity - Total Score: 24      Education Documentation  Body Mechanics, taught by Isauro Handy OT at 10/25/2024 11:56 AM.  Learner: Patient  Readiness: Acceptance  Method: Explanation  Response: Verbalizes Understanding    Precautions, taught by Isauro Handy OT at 10/25/2024 11:56 AM.  Learner: Patient  Readiness: Acceptance  Method: Explanation  Response: Verbalizes Understanding    ADL Training, taught by Isauro Handy OT at 10/25/2024 11:56 AM.  Learner: Patient  Readiness: Acceptance  Method: Explanation  Response: Verbalizes Understanding    Education Comments  No comments found.

## 2024-10-25 NOTE — SIGNIFICANT EVENT
Patient: Gaurav Mckay Age: 54 y.o.   Gender: male Room/bed: 229/229-A     Attending: Alan Mayers DO  Code Status:  Full Code    Rapid response was called around 3:30 pm. Per nursing, patient was standing up and about to walk to the restroom and patient became unstable on his feet. Patient's pupils were also noted to be dilated. Patient states he felt lightheaded and notes that this happens to him at home frequently. Orthostatic blood pressures taken in the room. Systolic blood pressure was 137 with sitting and decreased to 95  with standing.     Give 1L LR fluid bolus, check BP again, if BP stable and patient asymptomatic plan to discharge home.     Orthostats after 1L bolus Supine 144/75, Sitting 95/62, nursing reported patient became dizzy standing and could not stand long enough for BP measurement.  - 1L LR bolus over 10 hours  - Patient will not be discharging home tonight like originally plannde.       Shwetha Horner DO  PGY1 Resident

## 2024-10-25 NOTE — HOSPITAL COURSE
Gaurav Mckay is a 54 y.o. male presenting for PMH of alcohol dependence in remission, anxiety disorder, bipolar disorder, cervicalgia, COPD, malignant neoplasm of larynx, hypertension, hyperlipidemia presenting for potential cystitis.     ED course:  Patient initially presented to [facility] ED on 10/24 due to foreign body in his eye and throat. Patient states he went to the [place] ED because he was picking glass out of his finger while sitting in front of a fan and a piece of glass flew into his throat and left eye. Per wife, she denies this ever happening and his mentation at baseline waxes and wanes. UDS positive for amphetamine, Lacate 3.7, K 3, Phos 1.9, Mg 1.54, WBC 6.5, UA negative. CT chest/abd/pelvis showed asymmetric anterior bladder wall thickening present, could represent cystitis. Given mag ox tablet, potassium and phos replacement, 1L LR, Zofran.     Hospital course: Admitted for concern of cystitis and electrolyte derangement. Started on 3 day course bactrim. Urology consulted - no urgent intervention needed for anterior bladder wall thickening, and can get elective cystoscopy, possible biopsy and resection of thickening in bladder. Electrolytes replaced while admitted. Rapid response was called 10/25, patient felt lightheaded upon standing, had positive orthostats. Given 1L bolus and orthostats still positive 1 hour later. 1L given overnight, asymptomatic with standing/walking 10/26 Patient stable for discharge 10/26.

## 2024-10-25 NOTE — CONSULTS
Inpatient consult to Urology  Consult performed by: Eddie Murcia MD  Consult ordered by: Alan Mayers DO  Assessment/Recommendations: Elective cystoscopy and possible biopsies/resection of bladder tumor as outpatient next week          Reason For Consult  thickened bladder, possible bladder tumor    History Of Present Illness  Gaurav Mckay is a 54 y.o. male presenting with multiple complaints including shortness of breath, nausea and vomiting, and foreign body in his hands and possibly face.  Patient has a weird side effect and has alcohol dependence in remission , anxiety disorder, bipolar disorder, cervicalgia, COPD, hypertension, malignant neoplasm of larynx.  He was admitted after he presented to [place] ED because of foreign body in the eyes and throat that is pieces of glass he was removing in front of a fan.  On CT he was found to have thickened bladder at the dome specifically along with a possible involvement of the urachus.  Patient denies any problems urinating, reports nocturia x 1, denies any dysuria or hematuria.     Past Medical History  He has a past medical history of Acute upper respiratory infection, unspecified (03/20/2015), Alcohol abuse, in remission (02/12/2015), Alcohol dependence, in remission (05/07/2015), Allergic contact dermatitis due to plants, except food (05/21/2014), Allergy status to unspecified drugs, medicaments and biological substances (08/29/2013), Anxiety disorder, unspecified (03/20/2015), Bipolar disorder, current episode manic without psychotic features, unspecified (Multi), Cervicalgia, Chronic obstructive pulmonary disease, unspecified (12/02/2014), Encounter for immunization (09/22/2016), Hypertension, Other cervical disc degeneration, unspecified cervical region, Other conditions influencing health status (06/27/2013), Other long term (current) drug therapy (06/02/2015), Other specified health status, Personal history of nicotine dependence (08/16/2017),  Personal history of nicotine dependence (06/26/2017), Personal history of other diseases of the digestive system (04/11/2013), Personal history of other diseases of the digestive system (08/27/2015), Personal history of other diseases of the respiratory system (03/20/2017), Personal history of other diseases of the respiratory system (02/05/2014), Personal history of other diseases of the respiratory system (01/16/2014), Personal history of other diseases of the respiratory system (09/26/2013), Personal history of other diseases of the respiratory system (06/26/2014), Personal history of other infectious and parasitic diseases, Personal history of other mental and behavioral disorders, Personal history of other specified conditions (08/25/2016), Personal history of other specified conditions (02/16/2017), and Unspecified asthma, uncomplicated ([facility]) (02/13/2014).    Surgical History  He has a past surgical history that includes MR angio head wo IV contrast (01/24/2016); MR angio neck wo IV contrast (01/24/2016); CT angio abdomen pelvis w and or wo IV IV contrast (04/01/2020); Invasive Vascular Procedure (N/A, 11/08/2023); and Cholecystectomy.     Social History  He reports that he has been smoking cigarettes. He has never used smokeless tobacco. He reports that he does not currently use alcohol. He reports current drug use. Drug: Methamphetamines.    Family History  No family history on file.     Allergies  Oxycodone    Review of Systems   A complete review of systems was performed. All systems are noted to be negative unless indicated in the history of present illness, impression, active problem list, or past histories.    Physical Exam  Constitutional:       General: He is not in acute distress.     Appearance: He is normal weight. He is not toxic-appearing.   HENT:      Head: Normocephalic and atraumatic.   Cardiovascular:      Rate and Rhythm: Normal rate.      Pulses: Normal pulses.   Pulmonary:      Effort:  Pulmonary effort is normal.   Abdominal:      General: Abdomen is flat.      Comments: Suprapubic tenderness, mild   Neurological:      Mental Status: He is alert.          Last Recorded Vitals  Blood pressure 150/85, pulse 69, temperature 36.9 °C (98.4 °F), resp. rate 16, height 1.829 m (6'), weight 93.9 kg (207 lb), SpO2 95%.    Relevant Results  Results for orders placed or performed during the hospital encounter of 10/24/24 (from the past 96 hours)   Comprehensive metabolic panel   Result Value Ref Range    Glucose 94 74 - 99 mg/dL    Sodium 138 136 - 145 mmol/L    Potassium 3.1 (L) 3.5 - 5.3 mmol/L    Chloride 103 98 - 107 mmol/L    Bicarbonate 23 21 - 32 mmol/L    Anion Gap 15 10 - 20 mmol/L    Urea Nitrogen 8 6 - 23 mg/dL    Creatinine 0.72 0.50 - 1.30 mg/dL    eGFR >90 >60 mL/min/1.73m*2    Calcium 9.6 8.6 - 10.3 mg/dL    Albumin 3.6 3.4 - 5.0 g/dL    Alkaline Phosphatase 153 (H) 33 - 120 U/L    Total Protein 7.1 6.4 - 8.2 g/dL    AST 16 9 - 39 U/L    Bilirubin, Total 0.5 0.0 - 1.2 mg/dL    ALT 10 10 - 52 U/L   CBC   Result Value Ref Range    WBC 5.2 4.4 - 11.3 x10*3/uL    nRBC 0.0 0.0 - 0.0 /100 WBCs    RBC 4.40 (L) 4.50 - 5.90 x10*6/uL    Hemoglobin 11.1 (L) 13.5 - 17.5 g/dL    Hematocrit 33.7 (L) 41.0 - 52.0 %    MCV 77 (L) 80 - 100 fL    MCH 25.2 (L) 26.0 - 34.0 pg    MCHC 32.9 32.0 - 36.0 g/dL    RDW 19.8 (H) 11.5 - 14.5 %    Platelets 260 150 - 450 x10*3/uL   Magnesium   Result Value Ref Range    Magnesium 1.61 1.60 - 2.40 mg/dL   Sedimentation rate, automated   Result Value Ref Range    Sedimentation Rate 83 (H) 0 - 20 mm/h   C-reactive protein   Result Value Ref Range    C-Reactive Protein 0.99 <1.00 mg/dL   CBC   Result Value Ref Range    WBC 4.9 4.4 - 11.3 x10*3/uL    nRBC 0.0 0.0 - 0.0 /100 WBCs    RBC 4.56 4.50 - 5.90 x10*6/uL    Hemoglobin 11.5 (L) 13.5 - 17.5 g/dL    Hematocrit 37.1 (L) 41.0 - 52.0 %    MCV 81 80 - 100 fL    MCH 25.2 (L) 26.0 - 34.0 pg    MCHC 31.0 (L) 32.0 - 36.0 g/dL    RDW  19.9 (H) 11.5 - 14.5 %    Platelets 260 150 - 450 x10*3/uL   Comprehensive metabolic panel   Result Value Ref Range    Glucose 96 74 - 99 mg/dL    Sodium 138 136 - 145 mmol/L    Potassium 3.2 (L) 3.5 - 5.3 mmol/L    Chloride 103 98 - 107 mmol/L    Bicarbonate 25 21 - 32 mmol/L    Anion Gap 13 10 - 20 mmol/L    Urea Nitrogen 11 6 - 23 mg/dL    Creatinine 0.76 0.50 - 1.30 mg/dL    eGFR >90 >60 mL/min/1.73m*2    Calcium 9.5 8.6 - 10.3 mg/dL    Albumin 3.5 3.4 - 5.0 g/dL    Alkaline Phosphatase 142 (H) 33 - 120 U/L    Total Protein 6.8 6.4 - 8.2 g/dL    AST 15 9 - 39 U/L    Bilirubin, Total 0.5 0.0 - 1.2 mg/dL    ALT 9 (L) 10 - 52 U/L   Magnesium   Result Value Ref Range    Magnesium 1.73 1.60 - 2.40 mg/dL   Phosphorus   Result Value Ref Range    Phosphorus 4.6 2.5 - 4.9 mg/dL   CBC   Result Value Ref Range    WBC 5.6 4.4 - 11.3 x10*3/uL    nRBC 0.0 0.0 - 0.0 /100 WBCs    RBC 4.63 4.50 - 5.90 x10*6/uL    Hemoglobin 11.8 (L) 13.5 - 17.5 g/dL    Hematocrit 37.7 (L) 41.0 - 52.0 %    MCV 81 80 - 100 fL    MCH 25.5 (L) 26.0 - 34.0 pg    MCHC 31.3 (L) 32.0 - 36.0 g/dL    RDW 19.9 (H) 11.5 - 14.5 %    Platelets 274 150 - 450 x10*3/uL       ECG 12 lead    Result Date: 10/24/2024  Normal sinus rhythm Prolonged QT Abnormal ECG When compared with ECG of 27-SEP-2024 06:02, No significant change was found    CT chest abdomen pelvis w IV contrast    Result Date: 10/23/2024  STUDY: CT Chest, Abdomen, and Pelvis with IV Contrast; 10/23/2024 10:26 pm INDICATION: Elevated lactate.  Shortness of breath.  Vomiting. COMPARISON: XR Chest 09/08/2024. ACCESSION NUMBER(S): [id number] ORDERING CLINICIAN: ALMAZ JACKSON TECHNIQUE: CT of the chest, abdomen, and pelvis was performed.  Contiguous axial images were obtained at 3 mm slice thickness through the chest, abdomen, and pelvis.  Coronal and sagittal reconstructions at 3 mm slice thickness were performed.  Omnipaque 350, 75 mL was administered intravenously.  FINDINGS: CHEST:  MEDIASTINUM: The heart is normal in size without pericardial effusion.  Central vascular structures opacify normally.  LUNGS/PLEURA: There is no pleural effusion, pleural thickening, or pneumothorax. The airways are patent. Lungs are clear without consolidation, interstitial disease, or suspicious nodules. LYMPH NODES: Thoracic lymph nodes are not enlarged. ABDOMEN:  LIVER: No hepatomegaly.  Smooth surface contour.  Normal attenuation.  BILE DUCTS: No intrahepatic or extrahepatic biliary ductal dilatation.  GALLBLADDER: The gallbladder is present. STOMACH: No abnormalities identified.  PANCREAS: No masses or ductal dilatation.  SPLEEN: No splenomegaly or focal splenic lesion.  ADRENAL GLANDS: No thickening or nodules.  KIDNEYS AND URETERS: Kidneys are normal in size and location.  No renal or ureteral calculi.  PELVIS:  BLADDER: Asymmetric anterior bladder wall thickening present.  Clinical and/or endoscopic correlation suggested.  This could represent cystitis. Neoplastic process not excluded.  REPRODUCTIVE ORGANS: No abnormalities identified.  BOWEL: No abnormalities identified.  The appendix is normal.  VESSELS: No abnormalities identified.  Abdominal aorta is normal in caliber.  PERITONEUM/RETROPERITONEUM/LYMPH NODES: No free fluid.  No pneumoperitoneum. No lymphadenopathy.  ABDOMINAL WALL: No abnormalities identified. SOFT TISSUES: No abnormalities identified.  BONES: No acute fracture or aggressive osseous lesion.    1. No acute pulmonary abnormality. 2. No acute inflammatory process or bowel obstruction. 3. Asymmetric anterior bladder wall thickening present. Clinical and endoscopic correlation suggested. This could represent cystitis. Neoplastic process not excluded. Signed by Chris Mcghee MD    CT soft tissue neck w IV contrast    Result Date: 10/23/2024  Interpreted By:  Rudolph Brenner, STUDY: CT SOFT TISSUE NECK W IV CONTRAST;  10/23/2024 10:07 pm   INDICATION: Signs/Symptoms:difficulty with swallowing,  history of ENT cancer s/p surgery and skin graft, recent infection.   COMPARISON: 5/8/2024   ACCESSION NUMBER(S): [id number]   ORDERING CLINICIAN: ALMAZ JACKSON   TECHNIQUE: Contiguous axial images of the neck were obtained after the intravenous administration of contrast. Coronal and sagittal reformatted images were obtained from the axial images.   FINDINGS: The paranasal sinuses are clear and well pneumatized.   There is postsurgical change of dissection in the right neck with resection of multiple previously seen enlarged lymph nodes and skin flap. There is soft tissue thickening surrounding the right carotid vasculature with narrowing of the right internal carotid artery superiorly. The right internal carotid artery is however grossly patent.   There is asymmetric soft tissue thickening at level of right pyriform sinus.   Multilevel degenerative change of the cervical spine.       Postsurgical change of dissection in the right neck with resection of multiple previously seen enlarged lymph nodes and postsurgical change of skin flap. Soft tissue thickening surrounding the right carotid vasculature with mild narrowing of the right internal carotid artery superiorly. The right internal carotid artery is however grossly patent.   Asymmetric soft tissue thickening at level of right pyriform sinus which may relate to underlying mass and correlation with direct visualization is recommended for further evaluation.   MACRO: None   Signed by: Rudolph Brenner 10/23/2024 11:30 PM Dictation workstation:   [id number]    ECG 12 lead    Result Date: 9/30/2024  Normal sinus rhythm Normal ECG When compared with ECG of 08-SEP-2024 14:52, No significant change was found See ED provider note for full interpretation and clinical correlation Confirmed by Yelitza Ocampo ([id number]) on 9/30/2024 11:23:08 AM    CT head W O contrast trauma protocol    Result Date: 9/27/2024  Interpreted By:  Finkelstein, Evan, STUDY: CT HEAD W/O CONTRAST  TRAUMA PROTOCOL; CT CERVICAL SPINE WO IV CONTRAST;  9/27/2024 5:19 am   INDICATION: Signs/Symptoms:fall on thinners.     COMPARISON: None. CT brain 09/08/2024e   ACCESSION NUMBER(S): [id number]; [id number]   ORDERING CLINICIAN: SIDNEY TRAN   TECHNIQUE: Noncontrast CT images of the head with coronal and sagittal reconstructions. Axial noncontrast CT images of the cervical spine with coronal and sagittal reconstructed images.   FINDINGS: EXTRACRANIAL SOFT TISSUES: Unremarkable.   CALVARIUM: No depressed skull fracture. No destructive osseous lesion.   PARANASAL SINUSES/MASTOIDS: The visualized paranasal sinuses and mastoid air cells are aerated.   HEMORRHAGE: No acute intracranial hemorrhage.   BRAIN PARENCHYMA: Gray-white matter interfaces are preserved. No mass effect or midline shift.   VENTRICLES and EXTRA-AXIAL SPACES: Normal size.   OTHER FINDINGS: None.   CERVICAL SPINE:   ALIGNMENT: Straightening of the normal cervical lordosis, which may be positional or related to spasm. Grade 1 anterolisthesis C3 on C4. 2 mm retrolisthesis C5 on C6. VERTEBRAE: No acute loss of vertebral body height. DISC SPACE: Disc space narrowing C6/C7. SPINAL CANAL: Multilevel facet and uncovertebral arthropathy with varying degrees of neural foraminal stenosis. No severe central narrowing PREVERTEBRAL SOFT TISSUES: No prevertebral soft tissue swelling. LUNG APICES: Imaged portion of the lung apices are within normal limits.   OTHER FINDINGS: There are surgical clips throughout the right neck soft tissues.       No acute intracranial hemorrhage, mass effect or midline shift.   Cervical spondylosis without acute loss of vertebral body height or traumatic malalignment.     MACRO: None.   Signed by: Evan Finkelstein 9/27/2024 5:43 AM Dictation workstation:   [id number]    CT cervical spine wo IV contrast    Result Date: 9/27/2024  Interpreted By:  Finkelstein, Evan, STUDY: CT HEAD W/O CONTRAST TRAUMA PROTOCOL; CT CERVICAL SPINE WO  IV CONTRAST;  9/27/2024 5:19 am   INDICATION: Signs/Symptoms:fall on thinners.     COMPARISON: None. CT brain 09/08/2024e   ACCESSION NUMBER(S): [id number]; [id number]   ORDERING CLINICIAN: SIDNEY TRAN   TECHNIQUE: Noncontrast CT images of the head with coronal and sagittal reconstructions. Axial noncontrast CT images of the cervical spine with coronal and sagittal reconstructed images.   FINDINGS: EXTRACRANIAL SOFT TISSUES: Unremarkable.   CALVARIUM: No depressed skull fracture. No destructive osseous lesion.   PARANASAL SINUSES/MASTOIDS: The visualized paranasal sinuses and mastoid air cells are aerated.   HEMORRHAGE: No acute intracranial hemorrhage.   BRAIN PARENCHYMA: Gray-white matter interfaces are preserved. No mass effect or midline shift.   VENTRICLES and EXTRA-AXIAL SPACES: Normal size.   OTHER FINDINGS: None.   CERVICAL SPINE:   ALIGNMENT: Straightening of the normal cervical lordosis, which may be positional or related to spasm. Grade 1 anterolisthesis C3 on C4. 2 mm retrolisthesis C5 on C6. VERTEBRAE: No acute loss of vertebral body height. DISC SPACE: Disc space narrowing C6/C7. SPINAL CANAL: Multilevel facet and uncovertebral arthropathy with varying degrees of neural foraminal stenosis. No severe central narrowing PREVERTEBRAL SOFT TISSUES: No prevertebral soft tissue swelling. LUNG APICES: Imaged portion of the lung apices are within normal limits.   OTHER FINDINGS: There are surgical clips throughout the right neck soft tissues.       No acute intracranial hemorrhage, mass effect or midline shift.   Cervical spondylosis without acute loss of vertebral body height or traumatic malalignment.     MACRO: None.   Signed by: Evan Finkelstein 9/27/2024 5:43 AM Dictation workstation:   [id number]        Assessment/Plan   54-year-old male with multiple medical problems, found on CT to have thickened bladder particularly at the urachus.    I personally reviewed the medical records of the patient  including the note of the referring physician including the CT images as well as report focusing on the appearance of the urachus and the thickening at the bladder dome which is asymmetrical.    I discussed with the patient the concern about a bladder tumor, and the need for elective cystoscopy with possible biopsies and possible resection of thickening in the bladder. Since it is not urgent, he can get discharged if cleared medically over the weekend and come back next week for the procedure.    Plan:  Cystoscopy and biopsy/TURBT next week as outpatient

## 2024-10-25 NOTE — NURSING NOTE
Pt was ready to be discharged, Pt got up took a few step started to go down and was caught by Genna Philip and lowered to the ground. When RN got in there pt was hold in to the bed but didn't answer us. Pt came out of it after  about 30 seconds. Pt assisted back to bed. Dr Gold informed and up to pt. Orthostatic bp done and pt was orthostatic. One liter of fluid ordered and hung.

## 2024-10-25 NOTE — CARE PLAN
Increase intake, decrease pain.    Patient found to have full  dinner tray at bedside. When asked if he was hungry, he stated not really and that his throat hurt too bad to eat. The patient stated that drinking fluids usually helped with his pain but they have been making him cough too much, which in return causes pain. This nurse asked if he would try ice chips and a popsicle, which he agreed.  After checking back, patient stated that he kept down both ice chips and popsicle and these interventions improved his  pain.  Plan of care ongoing.

## 2024-10-25 NOTE — SIGNIFICANT EVENT
Respiratory attended the rapid response. Pulse oximetry was 98% on room air at rest. HR was 94. Patient was responsive and  Acknowledged respiratory not needed anymore at this time.

## 2024-10-25 NOTE — PROGRESS NOTES
Physical Therapy    Physical Therapy Evaluation    Patient Name: Gaurav Mckay  MRN: [id number]  Department: [address]  [facility]  Today's Date: 10/25/2024   Time Calculation  Start Time: 0909  Stop Time: 0919  Time Calculation (min): 10 min    Assessment/Plan   PT Assessment  Rehab Prognosis: Good  Evaluation/Treatment Tolerance: Patient tolerated treatment well  Strengths: Support of Caregivers, Housing layout  Barriers to Participation: Comorbidities  End of Session Communication: Bedside nurse  Assessment Comment: Pt is 55 yo male who demonstrates safety with functional mobility, no further acute PT needs during hospitalization.  End of Session Patient Position: Bed, 3 rail up, Alarm off, not on at start of session  IP OR SWING BED PT PLAN  Inpatient or Swing Bed: Inpatient  PT Plan  PT Plan: PT Eval only  PT Eval Only Reason: Safe to return home  PT Discharge Recommendations: Low intensity level of continued care  Equipment Recommended upon Discharge: Wheeled walker (has at home already)  PT Recommended Transfer Status: Stand by assist  PT - OK to Discharge: Yes    Subjective   General Visit Information:  General  Reason for Referral: Pt admitted for foreign body in eye and throat. PT Evaluate for impaired mobility.  Referred By: Dr Horner  Past Medical History Relevant to Rehab: PMH: ETOH in remission, anxiety/depression, bipolar, COPD, HTN. wound L thigh  Co-Treatment: OT  Co-Treatment Reason: improve patient outcomes  Prior to Session Communication: Bedside nurse  Patient Position Received: Bed, 3 rail up, Alarm off, not on at start of session  General Comment: pleasant and cooperative for therapy eval  Home Living:  Home Living  Type of Home: Mobile home  Lives With: Spouse  Home Adaptive Equipment: Walker rolling or standard  Home Layout: One level  Home Access: Stairs to enter with rails  Entrance Stairs-Rails: Both  Entrance Stairs-Number of Steps: 3  Bathroom Shower/Tub: Tub/shower unit  Bathroom  Toilet: Standard  Bathroom Equipment: Shower chair with back  Prior Level of Function:  Prior Function Per Pt/Caregiver Report  Level of [place]: Independent with ADLs and functional transfers  Receives Help From: Family  ADL Assistance: Independent  Homemaking Assistance: Needs assistance  Ambulatory Assistance: Independent (FWW)  Precautions:        Vital Signs (Past 2hrs)                 Objective   Pain:  Pain Assessment  Pain Assessment: 0-10  0-10 (Numeric) Pain Score: 2  Pain Type: Acute pain  Pain Location: Foot  Pain Orientation:  (bilateral when walking)  Cognition:  Cognition  Overall Cognitive Status: Within Functional Limits  Orientation Level: Disoriented X4    General Assessments:     Activity Tolerance  Endurance: Tolerates less than 10 min exercise, no significant change in vital signs     Functional Assessments:  Bed Mobility  Bed Mobility: Yes  Bed Mobility 1  Bed Mobility 1: Supine to sitting, Sitting to supine  Level of Assistance 1: Modified independent  Bed Mobility Comments 1: demo's safety with no concerns    Transfers  Transfer: Yes  Transfer 1  Transfer From 1: Sit to  Transfer to 1: Stand  Technique 1: Sit to stand, Stand to sit  Transfer Device 1: Walker  Transfer Level of Assistance 1: Modified independent  Trials/Comments 1: mild dizziness upon standing intially, was able to self correct and sit to recover  before continuing with ambulation    Ambulation/Gait Training  Ambulation/Gait Training Performed: Yes  Ambulation/Gait Training 1  Surface 1: Level tile  Device 1: Rolling walker  Assistance 1: Close supervision  Quality of Gait 1:  (decreased mckenna, forward flexed posture)  Comments/Distance (ft) 1: x40 feet into hallway, demos safety with all ambulation including turns without concerns.  Extremity/Trunk Assessments:  RLE   RLE : Within Functional Limits  LLE   LLE : Within Functional Limits  Outcome Measures:  [facility] Basic Mobility  Turning from your back to your side while  in a flat bed without using bedrails: None  Moving from lying on your back to sitting on the side of a flat bed without using bedrails: None  Moving to and from bed to chair (including a wheelchair): None  Standing up from a chair using your arms (e.g. wheelchair or bedside chair): None  To walk in hospital room: None  Climbing 3-5 steps with railing: A little  Basic Mobility - Total Score: 23    Encounter Problems       Encounter Problems (Active)       Pain - Adult              Education Documentation  Handouts, taught by Ilene Godfrey PT at 10/25/2024  9:34 AM.  Learner: Patient  Readiness: Acceptance  Method: Explanation  Response: Verbalizes Understanding    Body Mechanics, taught by Ilene Godfrey PT at 10/25/2024  9:34 AM.  Learner: Patient  Readiness: Acceptance  Method: Explanation  Response: Verbalizes Understanding    Home Exercise Program, taught by Ilene Godfrey PT at 10/25/2024  9:34 AM.  Learner: Patient  Readiness: Acceptance  Method: Explanation  Response: Verbalizes Understanding    Precautions, taught by Ilene Godfrey PT at 10/25/2024  9:34 AM.  Learner: Patient  Readiness: Acceptance  Method: Explanation  Response: Verbalizes Understanding    Mobility Training, taught by Ilene Godfrey PT at 10/25/2024  9:34 AM.  Learner: Patient  Readiness: Acceptance  Method: Explanation  Response: Verbalizes Understanding    Education Comments  No comments found.

## 2024-10-25 NOTE — ASSESSMENT & PLAN NOTE
Not currently on any antihypertensive medications  Continue to monitor vital signs which continue to be stable

## 2024-10-25 NOTE — PROGRESS NOTES
10/25/24 1133   Discharge Planning   Living Arrangements Spouse/significant other   Support Systems Spouse/significant other   Assistance Needed Alert and oriented x 3, Independent with ADL's, Doesn't drive(girlfriend drives when a vehicle is available), [place]   Type of Residence [place] residence  [place]   Number of Stairs to Enter Residence 3   Number of Stairs Within Residence 0   Do you have animals or pets at home? Yes   Type of Animals or Pets 1 cat   Who is requesting discharge planning? Provider   Home or Post Acute Services In home services  ([facility] for SN/PT/OT services.)   Type of Home Care Services Home nursing visits;Home OT;Home PT  (Wound Care)   Expected Discharge Disposition Home Health   Does the patient need discharge transport arranged? Yes   RoundTrip coordination needed? Yes   Has discharge transport been arranged? No   Financial Resource Strain   How hard is it for you to pay for the very basics like food, housing, medical care, and heating? Pt Declined   Housing Stability   In the last 12 months, was there a time when you were not able to pay the mortgage or rent on time? Pt Declined   In the past 12 months, how many times have you moved where you were living? 0   At any time in the past 12 months, were you homeless or living in a shelter (including now)? Pt Declined   Transportation Needs   In the past 12 months, has lack of transportation kept you from medical appointments or from getting medications? Pt Declined   In the past 12 months, has lack of transportation kept you from meetings, work, or from getting things needed for daily living? Pt Declined   Patient Choice   Provider Choice list and CMS website (https://medicare.gov/care-compare#search) for post-acute Quality and Resource Measure Data were provided and reviewed with: Patient   Patient / Family choosing to utilize agency / facility established prior to hospitalization  Yes

## 2024-10-25 NOTE — CONSULTS
Nutrition Initial Assessment:   Nutrition Assessment    Reason for Assessment: Admission nursing screening (MST=3; Unintentional weight loss, Poor PO intake, Wounds)    Patient is a 54 y.o. male presenting for cystitis. Plan for outpatient cystoscopy w/ biopsy outpatient.    Pt reports he has glass lodged in his finger, throat & eye. However, wife reports he was never exposed to broken glass. Etiology of acute metabolic encephalopathy possibly 2/2 electrolyte derangement vs underlying psychosis vs polypharmacy.     PMH: EtOH dependence (in remission), Meth use (x5 days PTA), Active smoker, Anxiety disorder, Bipolar disorder, Cervicalgia, COPD, Hx malignant neoplasm of larynx, HTN, & HLD    Nutrition History:  Energy Intake: Poor < 50 %  Food and Nutrient History: Visit attempted, pt receiving care from staff at that time. Per chart review, pts wife reports he hasn't been eating the past few days. Believes there is glass in his throat & swallowing is painful. Has been taking in some liquids. No other information able to be obtained at this time.  Food Allergies/Intolerances:  None    Anthropometrics:  Height: 182.9 cm (6')   Weight: 93.9 kg (207 lb)   BMI (Calculated): 28.07  IBW/kg (Dietitian Calculated): 80.9 kg  Percent of IBW: 116 %     Weight History:   Wt Readings from Last 30 Encounters:   10/24/24   93.9 kg --> Admit wt   09/08/24   94.8 kg    08/26/24 101.9 kg   02/08/24    133 kg    11/08/23    127 kg    10/30/23 131.8 kg   \  Weight Change %:  Weight History / % Weight Change: 10% x 3 months, 29% x 1 year  Significant Weight Loss: Yes  Interpretation of Weight Loss: >20% in 1 year    Nutrition Focused Physical Exam Findings:  defer: Pt unavailable at time of attempted visit    Edema:  Edema: none    Physical Findings:  Skin:  (L upper leg wound)    Nutrition Significant Labs:  BMP Trend:   Results from last 7 days   Lab Units 10/25/24  0655 10/24/24  1504 10/23/24  2101   GLUCOSE mg/dL 96 94 87   CALCIUM  mg/dL 9.5 9.6 10.3   SODIUM mmol/L 138 138 136   POTASSIUM mmol/L 3.2* 3.1* 3.0*   CO2 mmol/L 25 23 21   CHLORIDE mmol/L 103 103 100   BUN mg/dL 11 8 8   CREATININE mg/dL 0.76 0.72 1.00     Nutrition Specific Medications:  Scheduled medications  docusate sodium, 100 mg, oral, BID  lactated Ringer's, 1,000 mL, intravenous, Once  magnesium oxide, 400 mg, oral, Daily  pantoprazole, 20 mg, oral, Daily before breakfast  sennosides-docusate sodium, 1 tablet, oral, Nightly    I/O:   No recorded BM at time of assessment.    Dietary Orders (From admission, onward)       Start     Ordered    10/24/24 1429  Adult diet Regular  Diet effective now        Question:  Diet type  Answer:  Regular    10/24/24 1432    10/24/24 1425  May Participate in Room Service  ( ROOM SERVICE MAY PARTICIPATE)  Once        Question:  .  Answer:  Yes    10/24/24 1424             Estimated Needs:   Total Energy Estimated Needs (kCal):  (2050-2250)  Method for Estimating Needs: 25-28 kcals/kg x IBW  Total Protein Estimated Needs (g):  (95+)  Method for Estimating Needs: ~1.2 g/kg x IBW  Total Fluid Estimated Needs (mL):  (2050-2250)  Method for Estimating Needs: 1mL/kcal or per team        Nutrition Diagnosis   Malnutrition Diagnosis  Patient has Malnutrition Diagnosis: Yes  Diagnosis Status: New  Malnutrition Diagnosis: Severe malnutrition related to chronic disease or condition  As Evidenced by: suspected <75% EENs for >1 month resulting in significant weight loss (10% x 3 months) (29% x 1 year)      Nutrition Interventions/Recommendations        Nutrition Prescription:  Continue liberalized regular diet as tolerated        Nutrition Interventions:   Interventions: Medical food supplement  Medical Food Supplement: Commercial beverage  Goal: Ensure Plus High Protein TID (350 kcals/20g protein per serving)    Nutrition Monitoring and Evaluation   Food/Nutrient Related History Monitoring  Monitoring and Evaluation Plan: Energy intake  Energy Intake:  Estimated energy intake  Criteria: Meet >75% EENs    Time Spent (min): 60 minutes

## 2024-10-25 NOTE — ASSESSMENT & PLAN NOTE
Skin graft site(radical neck dissection)  Woundvac in place  Continue with dressing changes  Wound team to follow  Follow up with surgeon  Continue with pain medications, frequency of Oxycodone recently decreased to q8 hours prn per CNP

## 2024-10-25 NOTE — PROGRESS NOTES
Patient: Gaurav Mckay Age: 54 y.o.   Gender: male Room/bed: 229/229-A     Attending: Alan Mayers DO  Code Status:  Full Code    Overnight Events  No acute overnight events    Subjective  Patient has no new complaints, still endorsing some bladder pressure. Denies chest pain, SOB, abdominal pain    Objective:  Physical Exam   Constitutional:       General: He is not in acute distress.     Appearance: He is not ill-appearing or toxic-appearing.      Comments: Patient alert, had both eyes shut during whole physical exam,  Eyes:      Extraocular Movements: Extraocular movements intact.      Comments: Opened eyes briefly for exam, No erythema or abrasions   Neck:      Comments: Skin graft present on right side of anterior neck  Cardiovascular:      Rate and Rhythm: Normal rate and regular rhythm.      Heart sounds: Normal heart sounds.   Pulmonary:      Effort: Pulmonary effort is normal. No respiratory distress.      Breath sounds: Normal breath sounds. No wheezing.   Abdominal:      General: There is no distension.      Palpations: Abdomen is soft.      Tenderness: There is no abdominal tenderness. There is no guarding.   Musculoskeletal:         General: Tenderness (Tenderness of left ankle to palpation, hx of left ankle surgery) present.      Right lower leg: No edema.      Left lower leg: No edema.   Skin:     General: Skin is warm and dry.      Comments: Abdomen contains excoriations - patient states he picks his skin. Has granulated ulcer over his left anterior upper thigh with dressing.    Neurological:      Mental Status: He is alert.   Psychiatric:      Comments: Mood normal, behavior normal       Temp:  [36 °C (96.8 °F)-36.9 °C (98.4 °F)] 36.9 °C (98.4 °F)  Heart Rate:  [69-89] 69  Resp:  [16-18] 16  BP: (127-158)/() 150/85      Intake/Output Summary (Last 24 hours) at 10/25/2024 1154  Last data filed at 10/25/2024 0421  Gross per 24 hour   Intake 120 ml   Output --   Net 120 ml       Vitals:     10/24/24 1420   Weight: 93.9 kg (207 lb)             I/Os    Intake/Output Summary (Last 24 hours) at 10/25/2024 1154  Last data filed at 10/25/2024 0421  Gross per 24 hour   Intake 120 ml   Output --   Net 120 ml       Labs:   CBC:  Recent Labs     10/25/24  0749 10/25/24  0655 10/24/24  1504   WBC 5.6 4.9 5.2   HGB 11.8* 11.5* 11.1*   HCT 37.7* 37.1* 33.7*    260 260   MCV 81 81 77*     CMP:  Recent Labs     10/25/24  0655 10/24/24  1504 10/23/24  2101    138 136   K 3.2* 3.1* 3.0*    103 100   CO2 25 23 21   ANIONGAP 13 15 18   BUN 11 8 8   CREATININE 0.76 0.72 1.00   EGFR >90 >90 89   GLUCOSE 96 94 87     Recent Labs     10/25/24  0655 10/24/24  1504 10/23/24  2101 05/08/24  1618 02/08/24  1553 04/05/22  0555 04/04/22  0445 04/03/22  0530   ALBUMIN 3.5 3.6 3.9   < > 4.1   < > 3.4 3.8   ALKPHOS 142* 153* 167*   < > 104   < > 97 99   ALT 9* 10 13   < > 30   < > 27 22   AST 15 16 29   < > 35   < > 36 32   BILITOT 0.5 0.5 0.5   < > 0.5   < > 1.0 1.3*   LIPASE  --   --   --   --  17  --  86* 312*    < > = values in this interval not displayed.     Calcium/Phos:   Lab Results   Component Value Date    CALCIUM 9.5 10/25/2024    PHOS 4.6 10/25/2024      COAG:   Recent Labs     04/02/22  1453 02/25/21  0523 03/29/20  1715   INR 1.2* 1.0 1.1     CRP:   Lab Results   Component Value Date    CRP 0.99 10/24/2024      [unfilled]   ENDO:  Recent Labs     05/17/24  2357 08/25/23  0957 01/14/21  1217   HGBA1C 6.1* 6.2 5.9      CARDIAC:   Recent Labs     10/23/24  2221 10/23/24  2101 09/08/24  1441 02/08/24  1553 03/29/20  1715   TROPHS 4 8 3   < >  --    BNP  --  48  --   --  14    < > = values in this interval not displayed.     Recent Labs     02/27/19  0420   TRIG 256*     No data recorded    Micro/ID:   Susceptibility data from last 90 days.  Collected Specimen Info Organism Clindamycin Erythromycin Oxacillin Tetracycline Trimethoprim/Sulfamethoxazole Vancomycin   09/09/24 Swab from Anterior Nares  "Methicillin Resistant Staphylococcus aureus (MRSA)         09/09/24 Tissue/Biopsy from Wound/Tissue Methicillin Resistant Staphylococcus aureus (MRSA)  S  S  R  S  S  S                    No lab exists for component: \"AGALPCRNB\"   .ID  Lab Results   Component Value Date    BLOODCULT No growth at 4 days -  FINAL REPORT 09/04/2024       Images:  ECG 12 lead  Normal sinus rhythm  Prolonged QT  Abnormal ECG  When compared with ECG of 27-SEP-2024 06:02,  No significant change was found       Medications:  Scheduled medications  apixaban, 5 mg, oral, BID  atorvastatin, 20 mg, oral, Nightly  docusate sodium, 100 mg, oral, BID  magnesium oxide, 400 mg, oral, Daily  montelukast, 10 mg, oral, Daily  pantoprazole, 20 mg, oral, Daily before breakfast  pregabalin, 200 mg, oral, TID  [Held by provider] rOPINIRole, 0.5 mg, oral, TID  sennosides-docusate sodium, 1 tablet, oral, Nightly  sertraline, 25 mg, oral, Daily  sulfamethoxazole-trimethoprim, 1 tablet, oral, q12h REKHA  tamsulosin, 0.4 mg, oral, Daily  [Held by provider] topiramate, 100 mg, oral, BID  traZODone, 100 mg, oral, Nightly      Continuous medications     PRN medications  PRN medications: acetaminophen, albuterol, [Held by provider] cyclobenzaprine, naloxone     Assessment and Plan:  Gaurav Mckay is a 54 y.o. male presenting for PMH of alcohol dependence in remission, anxiety disorder, bipolar disorder, cervicalgia, COPD, malignant neoplasm of larynx, hypertension, hyperlipidemia presenting for cystitis.     Acute Medical Issues:  #Acute metabolic encephalopathy iso possible electrolyte derangement vs underlying psychosis vs polypharmacy  - Per patient's wife, patient's mentation at home switches between good and bad. She states the patient has not been eating or drinking water he past 3 days prior to admission. Patient on admission was convinced he had glass in his fingers, wife confirms that patient was never exposed to broken glass.  PLAN  - Held ropinirole, " topiramate, flexeril   - PT eval states safe to return home  - Pending OT eval    #Cystitis vs possible neoplasm  - CT chest/abd/pelvis 10/23 showed asymmetric anterior bladder wall thickening present, could represent cystitis  - UA negative  - WBC count 6.5 in ED  PLAN  - Empiric treatment with bactrim (10/24-10/26)  - Per urology, no urgent intervention needed and can return next week for elective cystoscopy, possible biopsy and resection of thickening in bladder     #Electrolyte derangement  -  On admission Potassium 3, Phos 1.9. Mg 1.54   - S/P mag ox, potassium phos supplementation in the ED  PLAN  - CTM electrolytes  - Replete as needed     #Left anterior upper thigh wound  - Wound care following  - Wound vac removed     Resolved Issues:  #Lactic acidosis   - Lactate 3.7 on admission to [facility], repeat lactate 1.0      Chronic Medical Issues:   #Insomnia: continue trazadone nighty  #HLD: Continue home lipitor  #HTN: not on any home medications  #Cervicalgia: continue home lyrica, tylenol  #Anxiety disorder: continue home sertraline  #Bipolar disorder; holding home topiramate  #COPD; continue home singulair, albuterol PRN  #BPH: continue flomax  #History of PE: continue home eliquis        Fluids: PRN  Electrolytes: replete as needed  Nutrition: Regular diet  GI Prophylaxis: protonix  DVT Prophylaxis: Eliquis, SCDs     Access: IV  Antibiotics: Bactrim   Oxygenation: Room air     Dispo: [facility] 3 days    Dispo:     Shwetha Horner DO  PGY1

## 2024-10-25 NOTE — DISCHARGE INSTRUCTIONS
You are now stable enough to be discharged home.    The following recommendations are made for you at time of hospital discharge:  - Please take your home medications as instructed prior to hospitalization.   - START taking sulfamethoxazole-trimethoprim (Bactrim DS) 800-160 mg tablet 1 tablet by mouth every 12 hours for 4 doses    - Please follow-up with your primary care provider within 5-7 days from time of discharge. Please call your PCP's office to schedule an appointment. Likely will need to bring photo ID and insurance card to your appointment.   - Please follow up with urology in 1 week   -  Replaced by [facility] services has been requested to make an appointment for you however if you do not hear back from them within 2-3 days, please call [phone] to find out about appointments with [facility] services.  - If you experience any worsening symptoms or any new acute concerns arise, please contact your primary care provider to discuss and possibly arrange an appointment. If you cannot get in touch with provider or severe symptoms are present, please return to nearest emergency room/urgent care for evaluation and treatment.

## 2024-10-25 NOTE — CONSULTS
Do you have any home inhalers?    no  Any previous PFTs? no  explain the importance of a PFT. yes  Do you have a pulmonary Dr.? no  Pulmonary cards given. yes  Do you currently smoke or vape or have you ever?   yes  Quit date or planning to quit? no  How long have you smoked for? 35 years  PPD?  1/2 ppd  Smoking education given and class information given   Do you have a Primary Dr.? yes  Name: Caterina Guthrie MD  Do you have any home O2 or CPAP/BiPAP?  NO    This RT to see patient for COPD consult. The patient was given a COPD booklet with educational materials regarding pulmonary issues. Smoking cessation education reviewed, documentation given. I discussed various options for quitting smoking. I talked about different strategies that can be used to support changing habits and reduce the urges of withdraw symptoms.  [employer] support group discussed. Flyer given for next month's meeting. I educated patient about the disease process and how it affected the lungs making it difficult to breathe. We discussed current medications and if their current medications give them relief. Patient given  pulmonary office phone number to make an appt. Patient was very receptive to all information given.

## 2024-10-25 NOTE — CARE PLAN
Problem: Pain - Adult  Goal: Verbalizes/displays adequate comfort level or baseline comfort level  Flowsheets (Taken 10/25/2024 6136)  Verbalizes/displays adequate comfort level or baseline comfort level:   Encourage patient to monitor pain and request assistance   Assess pain using appropriate pain scale   Administer analgesics based on type and severity of pain and evaluate response   Implement non-pharmacological measures as appropriate and evaluate response   Consider cultural and social influences on pain and pain management   Notify Licensed Independent Practitioner if interventions unsuccessful or patient reports new pain   The patient's goals for the shift include      The clinical goals for the shift include pt will have decreased throat pain    Over the shift, the patient did not make progress toward the following goals. Barriers to progression include ***. Recommendations to address these barriers include ***.

## 2024-10-25 NOTE — PROGRESS NOTES
Name: Gaurav Mckay  : 1970  MRN: [id number]  Visit Date: 10/10/2024  Chief Complaint: WEEKLY SNF PHYSICIAN VISIT    HPI: Gaurav Mckay is a 54 y.o. gentleman with PMH remarkable for recurrent SCC of the larynx s/p R radical neck dissection 24 on chemoradiation therapy, COPD, hx of PE on Eliquis, R tib-fib fracture s/p ORIF, HTN, methamphetamine abuse, RLS, BPH, chronic pancreatitis who has had multiple hospitalizations over this past year with a previous skilled stay at this facility in  of this year. During hospitalization in -July for radical neck dissection, he had woundvac placed to his left thigh. His most recent hospitalization occurred after he presented to [facility] on 24 with complaint of syncopal episode and fall at home(all imaging was negative in ER), was transferred and admitted to [facility] where his syncope was thought to be secondary to orthostasis per hospital notes. While at [facility], he was given IV fluids and there was concern of neck skin graft cellulitis for which ID was consulted and recommend IV vancomycin. Per documentation patient had not been taking suboxone as outpatient due to the way it made him feel and he had relapsed with methamphetamine just prior to his admission to hospital.  There was concern for COPD exacerbation for which he received steroids and was continued on Singulair and Duoneb PRN, CXR completed and negative. On 09/10/24, he was transferred to [facility] for further care. A CT scan of his neck was completed on 9/10 which revealed: Status post right radical neck dissection without gross residual cervical lymphadenopathy. There is stranding and skin/platysma thickening which is nonspecific in the setting of recent surgery and prior radiation.  Superimposed cellulitis can be considered clinically.  There is no evidence of an organized/drainable collection. Treatment changes in the larynx without evidence of gross recurrence at the  "primary site.  He was continued on IV vanco and ID followed and managed ATB's. Blood cultures were negative. Per discharge summary, \"The plan for discharge for abx is for vanco to continue till 10/2.\"  He was evaluated by ST while at [facility] and they recommended Regular/liquid diet (MBS on 09/11/24). While inpatient, Palliative medicine was consulted for pain management assistance. He received inpatient radiation while at [facility] and finished radiation on 9/13 with plan for rad onc to follow up outpatient in 1 wk. Per hospital notes, an Echo was done while he was inpatient and that was normal. Per hospital notes, patient admitted that he had weight loss, had not been eating well at home prior to this hospitalization. He had a PICC line placed on 09/17/24,  was discharged to [facility] on 09/18/24.    Subjective: Seen and examined today. He is sitting up in bed awake on exam in no acute distress. He denies any new issues or complaints.     ROS:  As above in HPI. Otherwise, all other systems have been reviewed and are negative for complaint.    Medications:  Medications reviewed and verified in NH chart.     Vital Signs: Reviewed in [facility]    Physical Exam  Vitals and nursing note reviewed.   Constitutional:       Appearance: Normal appearance.   HENT:      Head: Normocephalic and atraumatic.   Neck:      Comments: Right neck radiation ulcer  Cardiovascular:      Rate and Rhythm: Normal rate and regular rhythm.      Pulses: Normal pulses.      Heart sounds: Normal heart sounds.   Pulmonary:      Effort: Pulmonary effort is normal.      Breath sounds: Normal breath sounds.   Abdominal:      General: Bowel sounds are normal.      Palpations: Abdomen is soft.   Musculoskeletal:         General: Normal range of motion.      Comments: PICC line in place LUE   Skin:     General: Skin is warm and dry.      Comments: Woundvac in place Left thigh   Neurological:      General: No focal deficit present.      Mental Status: He is " alert and oriented to person, place, and time.   Psychiatric:         Mood and Affect: Mood normal.         Behavior: Behavior normal.     Results/Data:   Lab Results   Component Value Date    WBC 5.6 10/25/2024    HGB 11.8 (L) 10/25/2024    HCT 37.7 (L) 10/25/2024     10/25/2024    TRIG 256 (H) 02/27/2019    ALT 9 (L) 10/25/2024    AST 15 10/25/2024     10/25/2024    K 3.2 (L) 10/25/2024     10/25/2024    CREATININE 0.76 10/25/2024    BUN 11 10/25/2024    CO2 25 10/25/2024    INR 1.2 (H) 04/02/2022    HGBA1C 6.1 (H) 05/17/2024     Provider Impression:   Problem List Items Addressed This Visit       Neck mass    Current Assessment & Plan     LN biopsy with general anesthesia on 5/31  Wound/abscess culture negative   Surgical pathology showed: Invasive moderately to poorly differentiated squamous cell carcinoma, involving fibrous tissue.   PET on 5/24 with Right level II-III hypermetabolic katia conglomerate measuring up to 5.9 cm.  At least 5 other FDG avid cervical lymph nodes   Palliative medicine was consulted in hospital  Continue with pain medication, was placed on Subaxone in hospital  Follow up with oncology  He had recent apt with palliative care scheduled but it was rescheduled due to no transportation  He had fall on 09/27/24, hit his head, CTH and cervical spine were negative for any acute abnormalities         COPD (chronic obstructive pulmonary disease) (Multi)    Current Assessment & Plan     Continues to be stable, he denies any increased difficulty breathing  Continue with Singulair,  inhalers, bronchdilators         Urinary retention    Current Assessment & Plan     Continue with Flomax  Continue to monitor urination         History of ETOH abuse    Current Assessment & Plan     Continue with thiamine and folic acid         History of pulmonary embolus (PE)    Current Assessment & Plan     Continue with Eliquis  No abnormal bleeding or bruising noted or reported         HTN  (hypertension)    Current Assessment & Plan     Not currently on any antihypertensive medications  Continue to monitor vital signs which continue to be stable         Other hyperlipidemia    Current Assessment & Plan     C/w statin         Gastroesophageal reflux disease without esophagitis    Current Assessment & Plan     Continue with PPI         Generalized weakness    Current Assessment & Plan     Continue with PT/OT, per progress notes, plan is for patient to discharge to home from here         Laryngeal cancer (Multi)    Current Assessment & Plan     SCC of the larynx, s/p R radical neck dissection 6/30/24   He received chemoradiation treatment  Continue with pain medication  Follow up with heme-onc         RLS (restless legs syndrome)    Current Assessment & Plan     Continue with Ropinirole and Topamax         Polysubstance abuse (Multi)    Current Assessment & Plan     Continue with subaxone         Cellulitis of neck    Current Assessment & Plan     He completed course of IV Vancomycin  PICC line was removed on 10/07/24  monitor         Open wound of left thigh    Current Assessment & Plan     Skin graft site(radical neck dissection)  Woundvac in place  Continue with dressing changes  Wound team to follow  Follow up with surgeon  Continue with pain medications, frequency of Oxycodone recently decreased to q8 hours prn per CNP          Iron deficiency anemia    Current Assessment & Plan     Continue with iron supplement  Continue to monitor CBC         Constipation    Current Assessment & Plan     Continue with stool softeners and laxatives  Continue to monitor BM's         Protein-calorie malnutrition (Multi)    Current Assessment & Plan     Dietician to follow  Continue to Monitor weight  Continue with nutritional supplements         Tobacco use disorder    Current Assessment & Plan     Continue with Nicotine patches         Other hemorrhoids    Current Assessment & Plan     Continue with anti  inflammatory suppositories         Dizziness    Current Assessment & Plan     Continue with Meclizine 25mg 3X daily as needed  monitor                     ----------------  Written by Andreina Atwood LPN, acting as a scribe for Dr. Gurrola. This note accurately reflects the work and decisions made by Dr. Gurrola.     I, Dr. Gurrola, attest all medical record entries made by the scribe were under my direction and were personally dictated by me. I have reviewed the chart and agree that the record accurately reflects my performance of the history, physical exam, and assessment and plan.

## 2024-10-25 NOTE — ASSESSMENT & PLAN NOTE
Continues to be stable, he denies any increased difficulty breathing  Continue with Singulair,  inhalers, bronchdilators

## 2024-10-26 VITALS
TEMPERATURE: 97.5 F | SYSTOLIC BLOOD PRESSURE: 116 MMHG | BODY MASS INDEX: 28.04 KG/M2 | OXYGEN SATURATION: 96 % | DIASTOLIC BLOOD PRESSURE: 63 MMHG | HEIGHT: 72 IN | WEIGHT: 207 LBS | RESPIRATION RATE: 18 BRPM | HEART RATE: 83 BPM

## 2024-10-26 PROBLEM — D75.89 BICYTOPENIA: Status: ACTIVE | Noted: 2024-09-10

## 2024-10-26 PROBLEM — G47.33 OBSTRUCTIVE SLEEP APNEA (ADULT) (PEDIATRIC): Status: ACTIVE | Noted: 2024-07-18

## 2024-10-26 PROBLEM — I11.9 HYPERTENSIVE HEART DISEASE WITHOUT HEART FAILURE: Status: ACTIVE | Noted: 2024-07-18

## 2024-10-26 PROBLEM — C77.0 SECONDARY AND UNSPECIFIED MALIGNANT NEOPLASM OF LYMPH NODES OF HEAD, FACE AND NECK: Status: ACTIVE | Noted: 2024-07-18

## 2024-10-26 PROBLEM — F10.188 ALCOHOL ABUSE WITH OTHER ALCOHOL-INDUCED DISORDER: Status: ACTIVE | Noted: 2024-07-18

## 2024-10-26 PROBLEM — G89.3 NEOPLASM RELATED PAIN (ACUTE) (CHRONIC): Status: ACTIVE | Noted: 2024-07-18

## 2024-10-26 PROBLEM — E51.9 THIAMINE DEFICIENCY, UNSPECIFIED: Status: ACTIVE | Noted: 2024-07-18

## 2024-10-26 PROBLEM — F15.10 OTHER STIMULANT ABUSE, UNCOMPLICATED: Status: ACTIVE | Noted: 2024-07-18

## 2024-10-26 PROBLEM — B95.62 MRSA BACTEREMIA: Status: ACTIVE | Noted: 2024-09-16

## 2024-10-26 PROBLEM — D61.810 ANTINEOPLASTIC CHEMOTHERAPY INDUCED PANCYTOPENIA (CMS-HCC): Status: ACTIVE | Noted: 2024-09-18

## 2024-10-26 PROBLEM — J30.9 ALLERGIC RHINITIS, UNSPECIFIED: Status: ACTIVE | Noted: 2024-07-18

## 2024-10-26 PROBLEM — F41.0 PANIC DISORDER (EPISODIC PAROXYSMAL ANXIETY): Status: ACTIVE | Noted: 2024-07-18

## 2024-10-26 PROBLEM — Z86.718 PERSONAL HISTORY OF OTHER VENOUS THROMBOSIS AND EMBOLISM: Status: ACTIVE | Noted: 2024-07-18

## 2024-10-26 PROBLEM — R78.81 MRSA BACTEREMIA: Status: ACTIVE | Noted: 2024-09-16

## 2024-10-26 PROBLEM — G47.00 INSOMNIA, UNSPECIFIED: Status: ACTIVE | Noted: 2024-07-18

## 2024-10-26 PROBLEM — G89.18 OTHER ACUTE POSTPROCEDURAL PAIN: Status: ACTIVE | Noted: 2024-07-18

## 2024-10-26 PROBLEM — G62.1 ALCOHOLIC POLYNEUROPATHY (MULTI): Status: ACTIVE | Noted: 2024-07-18

## 2024-10-26 PROBLEM — K86.1 OTHER CHRONIC PANCREATITIS: Status: ACTIVE | Noted: 2024-09-18

## 2024-10-26 PROBLEM — J45.909 UNSPECIFIED ASTHMA, UNCOMPLICATED (HHS-HCC): Status: ACTIVE | Noted: 2024-07-18

## 2024-10-26 PROBLEM — R29.6 REPEATED FALLS: Status: ACTIVE | Noted: 2024-07-18

## 2024-10-26 PROBLEM — Z98.890: Status: ACTIVE | Noted: 2024-09-10

## 2024-10-26 PROBLEM — F32.A DEPRESSION, UNSPECIFIED: Status: ACTIVE | Noted: 2024-07-18

## 2024-10-26 PROBLEM — H90.5 UNSPECIFIED SENSORINEURAL HEARING LOSS: Status: ACTIVE | Noted: 2024-07-18

## 2024-10-26 PROBLEM — M54.17 RADICULOPATHY, LUMBOSACRAL REGION: Status: ACTIVE | Noted: 2024-07-18

## 2024-10-26 PROBLEM — R13.12 OROPHARYNGEAL DYSPHAGIA: Status: ACTIVE | Noted: 2024-09-11

## 2024-10-26 PROBLEM — H93.19 TINNITUS, UNSPECIFIED EAR: Status: ACTIVE | Noted: 2024-07-18

## 2024-10-26 PROBLEM — T45.1X5A ANTINEOPLASTIC CHEMOTHERAPY INDUCED PANCYTOPENIA (CMS-HCC): Status: ACTIVE | Noted: 2024-09-18

## 2024-10-26 LAB
ALBUMIN SERPL BCP-MCNC: 3 G/DL (ref 3.4–5)
ANION GAP SERPL CALC-SCNC: 11 MMOL/L (ref 10–20)
BUN SERPL-MCNC: 13 MG/DL (ref 6–23)
CALCIUM SERPL-MCNC: 8.8 MG/DL (ref 8.6–10.3)
CHLORIDE SERPL-SCNC: 106 MMOL/L (ref 98–107)
CO2 SERPL-SCNC: 27 MMOL/L (ref 21–32)
CREAT SERPL-MCNC: 0.81 MG/DL (ref 0.5–1.3)
EGFRCR SERPLBLD CKD-EPI 2021: >90 ML/MIN/1.73M*2
ERYTHROCYTE [DISTWIDTH] IN BLOOD BY AUTOMATED COUNT: 19.7 % (ref 11.5–14.5)
GLUCOSE SERPL-MCNC: 86 MG/DL (ref 74–99)
HCT VFR BLD AUTO: 32.2 % (ref 41–52)
HGB BLD-MCNC: 10.1 G/DL (ref 13.5–17.5)
MAGNESIUM SERPL-MCNC: 1.66 MG/DL (ref 1.6–2.4)
MCH RBC QN AUTO: 25.3 PG (ref 26–34)
MCHC RBC AUTO-ENTMCNC: 31.4 G/DL (ref 32–36)
MCV RBC AUTO: 81 FL (ref 80–100)
NRBC BLD-RTO: 0 /100 WBCS (ref 0–0)
PHOSPHATE SERPL-MCNC: 4.4 MG/DL (ref 2.5–4.9)
PLATELET # BLD AUTO: 214 X10*3/UL (ref 150–450)
POTASSIUM SERPL-SCNC: 3.7 MMOL/L (ref 3.5–5.3)
RBC # BLD AUTO: 3.99 X10*6/UL (ref 4.5–5.9)
SODIUM SERPL-SCNC: 140 MMOL/L (ref 136–145)
WBC # BLD AUTO: 4.5 X10*3/UL (ref 4.4–11.3)

## 2024-10-26 PROCEDURE — 99239 HOSP IP/OBS DSCHRG MGMT >30: CPT

## 2024-10-26 PROCEDURE — 2500000002 HC RX 250 W HCPCS SELF ADMINISTERED DRUGS (ALT 637 FOR MEDICARE OP, ALT 636 FOR OP/ED)

## 2024-10-26 PROCEDURE — 85027 COMPLETE CBC AUTOMATED: CPT

## 2024-10-26 PROCEDURE — 36415 COLL VENOUS BLD VENIPUNCTURE: CPT

## 2024-10-26 PROCEDURE — 84100 ASSAY OF PHOSPHORUS: CPT

## 2024-10-26 PROCEDURE — 83735 ASSAY OF MAGNESIUM: CPT

## 2024-10-26 PROCEDURE — 2500000001 HC RX 250 WO HCPCS SELF ADMINISTERED DRUGS (ALT 637 FOR MEDICARE OP)

## 2024-10-26 PROCEDURE — 94760 N-INVAS EAR/PLS OXIMETRY 1: CPT

## 2024-10-26 PROCEDURE — 2500000004 HC RX 250 GENERAL PHARMACY W/ HCPCS (ALT 636 FOR OP/ED)

## 2024-10-26 PROCEDURE — G0378 HOSPITAL OBSERVATION PER HR: HCPCS

## 2024-10-26 RX ORDER — POTASSIUM CHLORIDE 1.5 G/1.58G
20 POWDER, FOR SOLUTION ORAL ONCE
Status: COMPLETED | OUTPATIENT
Start: 2024-10-26 | End: 2024-10-26

## 2024-10-26 RX ORDER — LANOLIN ALCOHOL/MO/W.PET/CERES
400 CREAM (GRAM) TOPICAL DAILY
Status: DISCONTINUED | OUTPATIENT
Start: 2024-10-26 | End: 2024-10-26

## 2024-10-26 ASSESSMENT — COGNITIVE AND FUNCTIONAL STATUS - GENERAL
DAILY ACTIVITIY SCORE: 23
WALKING IN HOSPITAL ROOM: A LITTLE
MOBILITY SCORE: 22
TOILETING: A LITTLE
CLIMB 3 TO 5 STEPS WITH RAILING: A LITTLE

## 2024-10-26 ASSESSMENT — PAIN - FUNCTIONAL ASSESSMENT: PAIN_FUNCTIONAL_ASSESSMENT: 0-10

## 2024-10-26 ASSESSMENT — PAIN SCALES - GENERAL: PAINLEVEL_OUTOF10: 0 - NO PAIN

## 2024-10-26 NOTE — DISCHARGE SUMMARY
Discharge Diagnosis  Cystitis    Issues Requiring Follow-Up  Cystitis vs possible neoplasm  - Anterior bladder wall thickening found on CT imaging  - Continue Bactrim, one table every 12 hours for 4 doses  - Follow up with urology outpatient regarding elective cystoscopy      Orthostatic hypotension  - Advised patient to keep hydrated at home     - Follow up with PCP in 1 week    Discharge Meds     Medication List      START taking these medications     sulfamethoxazole-trimethoprim 800-160 mg tablet; Commonly known as:   Bactrim DS; Take 1 tablet by mouth every 12 hours for 4 doses.     CONTINUE taking these medications     acetaminophen 325 mg tablet; Commonly known as: Tylenol; Take 2 tablets   (650 mg) by mouth every 6 hours if needed for mild pain (1 - 3).   apixaban 5 mg tablet; Commonly known as: Eliquis; Take 1 tablet (5 mg)   by mouth 2 times a day. Do not start before November 16, 2023.   atorvastatin 20 mg tablet; Commonly known as: Lipitor   cyclobenzaprine 10 mg tablet; Commonly known as: Flexeril   docusate sodium 100 mg capsule; Commonly known as: Colace   ferrous sulfate 325 (65 Fe) MG EC tablet   ipratropium-albuteroL 0.5-2.5 mg/3 mL nebulizer solution; Commonly known   as: Duo-Neb; Take 3 mL by nebulization every 2 hours if needed for   wheezing.   lidocaine 2 % solution; Commonly known as: Xylocaine; Take 15 mL by   mouth 4 times a day before meals.   melatonin 10 mg tablet,disintegrating   montelukast 10 mg tablet; Commonly known as: Singulair   naloxone 0.4 mg/mL injection; Commonly known as: Narcan; Infuse 1 mL   (0.4 mg) into a venous catheter every 5 minutes if needed for respiratory   depression or opioid reversal.   nicotine polacrilex 2 mg gum; Commonly known as: Nicorette; Chew 1 each   (2 mg) if needed for smoking cessation.   omeprazole 40 mg DR capsule; Commonly known as: PriLOSEC   oxygen gas therapy; Commonly known as: O2; Inhale 2 L/min once every 24   hours.   pancrelipase  (Lip-Prot-Amyl) 6,000-19,000 -30,000 unit capsule; Commonly   known as: Creon   pantoprazole 40 mg EC tablet; Commonly known as: ProtoNix; Take 1 tablet   (40 mg) by mouth once daily in the morning. Take before meals. Do not   crush, chew, or split.   potassium chloride CR 10 mEq ER tablet; Commonly known as: Klor-Con   pregabalin 200 mg capsule; Commonly known as: Lyrica; Take 1 capsule   (200 mg) by mouth 3 times a day for 14 days.   Proventil HFA 90 mcg/actuation inhaler; Generic drug: albuterol   rOPINIRole 0.5 mg tablet; Commonly known as: Requip   sennosides-docusate sodium 8.6-50 mg tablet; Commonly known as:   Marya-Colace; Take 1 tablet by mouth once daily at bedtime.   sodium chloride 0.9% solution; Infuse 100 mL/hr at 100 mL/hr into a   venous catheter continuously.   topiramate 100 mg tablet; Commonly known as: Topamax   traZODone 50 mg tablet; Commonly known as: Desyrel; Take 2 tablets (100   mg) by mouth once daily at bedtime.   white petrolatum 41 % ointment ointment; Commonly known as: Aquaphor;   Apply 1 Application topically every 1 hour if needed (neck burn).     STOP taking these medications     oxyCODONE 5 mg immediate release tablet; Commonly known as: Roxicodone   vancomycin 1750 mg/500 mL piggyback IV; Commonly known as: Vancocin       Test Results Pending At Discharge  Pending Labs       No current pending labs.            Hospital Course  Gaurav Mckay is a 54 y.o. male presenting for [facility] of alcohol dependence in remission, anxiety disorder, bipolar disorder, cervicalgia, COPD, malignant neoplasm of larynx, hypertension, hyperlipidemia presenting for potential cystitis.     ED course:  Patient initially presented to [facility] on 10/24 due to foreign body in his eye and throat. Patient states he went to the [facility] ED because he was picking glass out of his finger while sitting in front of a fan and a piece of glass flew into his throat and left eye. Per wife, she denies this ever  happening and his mentation at baseline waxes and wanes. UDS positive for amphetamine, Lacate 3.7, K 3, Phos 1.9, Mg 1.54, WBC 6.5, UA negative. CT chest/abd/pelvis showed asymmetric anterior bladder wall thickening present, could represent cystitis. Given mag ox tablet, potassium and phos replacement, 1L LR, Zofran.     Hospital course: Admitted for concern of cystitis and electrolyte derangement. Started on 3 day course bactrim. Urology consulted - no urgent intervention needed for anterior bladder wall thickening, and can get elective cystoscopy, possible biopsy and resection of thickening in bladder. Electrolytes replaced while admitted. Rapid response was called 10/25, patient felt lightheaded upon standing, had positive orthostats. Given 1L bolus and orthostats still positive 1 hour later. 1L given overnight, asymptomatic with standing/walking 10/26 Patient stable for discharge 10/26.    Pertinent Physical Exam At Time of Discharge  Physical Exam  Constitutional:       General: He is not in acute distress.     Appearance: He is not ill-appearing or toxic-appearing.      Comments: Patient alert  Eyes:      Extraocular Movements: Extraocular movements intact.      Comments: No erythema or abrasions   Neck:      Comments: Skin graft present on right side of anterior neck  Cardiovascular:      Rate and Rhythm: Normal rate and regular rhythm.      Heart sounds: Normal heart sounds.   Pulmonary:      Effort: Pulmonary effort is normal. No respiratory distress.      Breath sounds: Normal breath sounds. No wheezing.   Abdominal:      General: There is no distension.      Palpations: Abdomen is soft.      Tenderness: There is no abdominal tenderness. There is no guarding.   Musculoskeletal:         General: Tenderness (Tenderness of left ankle to palpation, hx of left ankle surgery) present.      Right lower leg: No edema.      Left lower leg: No edema.   Skin:     General: Skin is warm and dry.      Comments: Abdomen  contains excoriations - patient states he picks his skin. Has granulated ulcer over his left anterior upper thigh with dressing.    Neurological:      Mental Status: He is alert.   Psychiatric:      Comments: Mood normal, behavior normal     Outpatient Follow-Up  No future appointments.      Shwetha Horner DO

## 2024-10-26 NOTE — PROGRESS NOTES
10/26/24 1200   Discharge Planning   Expected Discharge Disposition Home Health  (Pt dcing today and is fine with dc. Floor has arranged transport for 1pm with 'provide a ride'. [facility] made aware of dc)

## 2024-10-28 ENCOUNTER — APPOINTMENT (OUTPATIENT)
Dept: CARDIOLOGY | Facility: HOSPITAL | Age: 54
End: 2024-10-28
Payer: COMMERCIAL

## 2024-10-28 ENCOUNTER — APPOINTMENT (OUTPATIENT)
Dept: RADIOLOGY | Facility: HOSPITAL | Age: 54
End: 2024-10-28
Payer: COMMERCIAL

## 2024-10-28 ENCOUNTER — HOSPITAL ENCOUNTER (EMERGENCY)
Facility: HOSPITAL | Age: 54
Discharge: HOME | End: 2024-10-28
Attending: EMERGENCY MEDICINE
Payer: COMMERCIAL

## 2024-10-28 ENCOUNTER — ANESTHESIA EVENT (OUTPATIENT)
Dept: OPERATING ROOM | Facility: HOSPITAL | Age: 54
End: 2024-10-28

## 2024-10-28 VITALS
OXYGEN SATURATION: 100 % | BODY MASS INDEX: 29.67 KG/M2 | SYSTOLIC BLOOD PRESSURE: 112 MMHG | WEIGHT: 207.23 LBS | TEMPERATURE: 98.7 F | RESPIRATION RATE: 18 BRPM | HEART RATE: 85 BPM | DIASTOLIC BLOOD PRESSURE: 75 MMHG | HEIGHT: 70 IN
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
## 2024-10-28 DIAGNOSIS — I10 HYPERTENSION, UNSPECIFIED TYPE: ICD-10-CM

## 2024-10-28 DIAGNOSIS — R51.9 NONINTRACTABLE HEADACHE, UNSPECIFIED CHRONICITY PATTERN, UNSPECIFIED HEADACHE TYPE: ICD-10-CM

## 2024-10-28 LAB
ALBUMIN SERPL BCP-MCNC: 3.7 G/DL (ref 3.4–5)
ALP SERPL-CCNC: 126 U/L (ref 33–120)
ALT SERPL W P-5'-P-CCNC: 7 U/L (ref 10–52)
ANION GAP SERPL CALC-SCNC: 13 MMOL/L (ref 10–20)
APPEARANCE UR: CLEAR
AST SERPL W P-5'-P-CCNC: 17 U/L (ref 9–39)
ATRIAL RATE: 73 BPM
BASOPHILS # BLD AUTO: 0.01 X10*3/UL (ref 0–0.1)
BASOPHILS NFR BLD AUTO: 0.2 %
BILIRUB SERPL-MCNC: 0.3 MG/DL (ref 0–1.2)
BILIRUB UR STRIP.AUTO-MCNC: NEGATIVE MG/DL
BUN SERPL-MCNC: 8 MG/DL (ref 6–23)
CALCIUM SERPL-MCNC: 9.6 MG/DL (ref 8.6–10.3)
CARDIAC TROPONIN I PNL SERPL HS: 3 NG/L (ref 0–20)
CARDIAC TROPONIN I PNL SERPL HS: 3 NG/L (ref 0–20)
CHLORIDE SERPL-SCNC: 104 MMOL/L (ref 98–107)
CO2 SERPL-SCNC: 22 MMOL/L (ref 21–32)
COLOR UR: COLORLESS
CREAT SERPL-MCNC: 0.92 MG/DL (ref 0.5–1.3)
EGFRCR SERPLBLD CKD-EPI 2021: >90 ML/MIN/1.73M*2
EOSINOPHIL # BLD AUTO: 0.33 X10*3/UL (ref 0–0.7)
EOSINOPHIL NFR BLD AUTO: 6.2 %
ERYTHROCYTE [DISTWIDTH] IN BLOOD BY AUTOMATED COUNT: 20.5 % (ref 11.5–14.5)
GLUCOSE SERPL-MCNC: 96 MG/DL (ref 74–99)
GLUCOSE UR STRIP.AUTO-MCNC: NORMAL MG/DL
HCT VFR BLD AUTO: 40.9 % (ref 41–52)
HGB BLD-MCNC: 12.1 G/DL (ref 13.5–17.5)
IMM GRANULOCYTES # BLD AUTO: 0.01 X10*3/UL (ref 0–0.7)
IMM GRANULOCYTES NFR BLD AUTO: 0.2 % (ref 0–0.9)
KETONES UR STRIP.AUTO-MCNC: NEGATIVE MG/DL
LACTATE SERPL-SCNC: 1.3 MMOL/L (ref 0.4–2)
LEUKOCYTE ESTERASE UR QL STRIP.AUTO: NEGATIVE
LYMPHOCYTES # BLD AUTO: 1.23 X10*3/UL (ref 1.2–4.8)
LYMPHOCYTES NFR BLD AUTO: 23.1 %
MCH RBC QN AUTO: 26 PG (ref 26–34)
MCHC RBC AUTO-ENTMCNC: 29.6 G/DL (ref 32–36)
MCV RBC AUTO: 88 FL (ref 80–100)
MONOCYTES # BLD AUTO: 0.53 X10*3/UL (ref 0.1–1)
MONOCYTES NFR BLD AUTO: 9.9 %
NEUTROPHILS # BLD AUTO: 3.22 X10*3/UL (ref 1.2–7.7)
NEUTROPHILS NFR BLD AUTO: 60.4 %
NITRITE UR QL STRIP.AUTO: NEGATIVE
NRBC BLD-RTO: ABNORMAL /100{WBCS}
OVALOCYTES BLD QL SMEAR: NORMAL
P AXIS: 51 DEGREES
P OFFSET: 204 MS
P ONSET: 148 MS
PH UR STRIP.AUTO: 7 [PH]
PLATELET # BLD AUTO: 168 X10*3/UL (ref 150–450)
PLATELET CLUMP BLD QL SMEAR: PRESENT
POTASSIUM SERPL-SCNC: 3.8 MMOL/L (ref 3.5–5.3)
PR INTERVAL: 148 MS
PROT SERPL-MCNC: 6.7 G/DL (ref 6.4–8.2)
PROT UR STRIP.AUTO-MCNC: NEGATIVE MG/DL
Q ONSET: 222 MS
QRS COUNT: 12 BEATS
QRS DURATION: 90 MS
QT INTERVAL: 420 MS
QTC CALCULATION(BAZETT): 462 MS
QTC FREDERICIA: 448 MS
R AXIS: 20 DEGREES
RBC # BLD AUTO: 4.66 X10*6/UL (ref 4.5–5.9)
RBC # UR STRIP.AUTO: NEGATIVE /UL
RBC MORPH BLD: NORMAL
SARS-COV-2 RNA RESP QL NAA+PROBE: NOT DETECTED
SCHISTOCYTES BLD QL SMEAR: NORMAL
SODIUM SERPL-SCNC: 135 MMOL/L (ref 136–145)
SP GR UR STRIP.AUTO: 1
T AXIS: 36 DEGREES
T OFFSET: 432 MS
UROBILINOGEN UR STRIP.AUTO-MCNC: NORMAL MG/DL
VENTRICULAR RATE: 73 BPM
WBC # BLD AUTO: 5.3 X10*3/UL (ref 4.4–11.3)

## 2024-10-28 PROCEDURE — 71045 X-RAY EXAM CHEST 1 VIEW: CPT | Mod: FOREIGN READ | Performed by: RADIOLOGY

## 2024-10-28 PROCEDURE — 84484 ASSAY OF TROPONIN QUANT: CPT

## 2024-10-28 PROCEDURE — 87635 SARS-COV-2 COVID-19 AMP PRB: CPT

## 2024-10-28 PROCEDURE — 72125 CT NECK SPINE W/O DYE: CPT

## 2024-10-28 PROCEDURE — 72125 CT NECK SPINE W/O DYE: CPT | Performed by: RADIOLOGY

## 2024-10-28 PROCEDURE — 36415 COLL VENOUS BLD VENIPUNCTURE: CPT

## 2024-10-28 PROCEDURE — 2500000001 HC RX 250 WO HCPCS SELF ADMINISTERED DRUGS (ALT 637 FOR MEDICARE OP): Mod: SE

## 2024-10-28 PROCEDURE — 70450 CT HEAD/BRAIN W/O DYE: CPT

## 2024-10-28 PROCEDURE — 81003 URINALYSIS AUTO W/O SCOPE: CPT

## 2024-10-28 PROCEDURE — 93005 ELECTROCARDIOGRAM TRACING: CPT

## 2024-10-28 PROCEDURE — 83605 ASSAY OF LACTIC ACID: CPT

## 2024-10-28 PROCEDURE — 85025 COMPLETE CBC W/AUTO DIFF WBC: CPT | Performed by: EMERGENCY MEDICINE

## 2024-10-28 PROCEDURE — 71045 X-RAY EXAM CHEST 1 VIEW: CPT

## 2024-10-28 PROCEDURE — 80053 COMPREHEN METABOLIC PANEL: CPT

## 2024-10-28 PROCEDURE — 70450 CT HEAD/BRAIN W/O DYE: CPT | Performed by: RADIOLOGY

## 2024-10-28 PROCEDURE — 99285 EMERGENCY DEPT VISIT HI MDM: CPT | Mod: 25

## 2024-10-28 RX ORDER — ACETAMINOPHEN 325 MG/1
650 TABLET ORAL ONCE
Status: COMPLETED | OUTPATIENT
Start: 2024-10-28 | End: 2024-10-28

## 2024-10-28 RX ORDER — SODIUM CHLORIDE, SODIUM LACTATE, POTASSIUM CHLORIDE, CALCIUM CHLORIDE 600; 310; 30; 20 MG/100ML; MG/100ML; MG/100ML; MG/100ML
100 INJECTION, SOLUTION INTRAVENOUS CONTINUOUS
OUTPATIENT
Start: 2024-10-28 | End: 2024-10-28

## 2024-10-28 RX ORDER — ONDANSETRON HYDROCHLORIDE 2 MG/ML
8 INJECTION, SOLUTION INTRAVENOUS ONCE
OUTPATIENT
Start: 2024-10-28 | End: 2024-10-28

## 2024-10-28 RX ORDER — ACETAMINOPHEN 325 MG/1
650 TABLET ORAL EVERY 4 HOURS PRN
OUTPATIENT
Start: 2024-10-28

## 2024-10-28 RX ORDER — ALBUTEROL SULFATE 0.83 MG/ML
2.5 SOLUTION RESPIRATORY (INHALATION) ONCE AS NEEDED
OUTPATIENT
Start: 2024-10-28

## 2024-10-28 ASSESSMENT — PAIN SCALES - GENERAL
PAINLEVEL_OUTOF10: 4
PAINLEVEL_OUTOF10: 9
PAINLEVEL_OUTOF10: 9

## 2024-10-28 ASSESSMENT — PAIN - FUNCTIONAL ASSESSMENT
PAIN_FUNCTIONAL_ASSESSMENT: 0-10
PAIN_FUNCTIONAL_ASSESSMENT: 0-10

## 2024-10-28 ASSESSMENT — PAIN DESCRIPTION - LOCATION
LOCATION: HEAD

## 2024-10-28 ASSESSMENT — COLUMBIA-SUICIDE SEVERITY RATING SCALE - C-SSRS
2. HAVE YOU ACTUALLY HAD ANY THOUGHTS OF KILLING YOURSELF?: NO
1. IN THE PAST MONTH, HAVE YOU WISHED YOU WERE DEAD OR WISHED YOU COULD GO TO SLEEP AND NOT WAKE UP?: NO
6. HAVE YOU EVER DONE ANYTHING, STARTED TO DO ANYTHING, OR PREPARED TO DO ANYTHING TO END YOUR LIFE?: NO

## 2024-10-28 ASSESSMENT — PAIN DESCRIPTION - PAIN TYPE
TYPE: ACUTE PAIN
TYPE: ACUTE PAIN

## 2024-10-29 ENCOUNTER — PATIENT OUTREACH (OUTPATIENT)
Dept: PRIMARY CARE | Facility: CLINIC | Age: 54
End: 2024-10-29
Payer: COMMERCIAL

## 2024-10-29 ENCOUNTER — ANESTHESIA (OUTPATIENT)
Dept: OPERATING ROOM | Facility: HOSPITAL | Age: 54
End: 2024-10-29
Payer: COMMERCIAL

## 2024-11-03 LAB
ATRIAL RATE: 92 BPM
P AXIS: 23 DEGREES
P OFFSET: 206 MS
P ONSET: 161 MS
PR INTERVAL: 122 MS
Q ONSET: 222 MS
QRS COUNT: 15 BEATS
QRS DURATION: 88 MS
QT INTERVAL: 394 MS
QTC CALCULATION(BAZETT): 487 MS
QTC FREDERICIA: 454 MS
R AXIS: 21 DEGREES
T AXIS: 46 DEGREES
T OFFSET: 419 MS
VENTRICULAR RATE: 92 BPM

## 2024-11-04 DIAGNOSIS — J44.9 CHRONIC OBSTRUCTIVE PULMONARY DISEASE, UNSPECIFIED COPD TYPE (MULTI): Primary | ICD-10-CM

## 2024-11-06 NOTE — PROGRESS NOTES
Chief Complaint:   SNF F/U  -Cellulitis of neck  -Orthostatic hypotension  -Physical deconditioning/weakness  -Malignant neoplasm of larynx  -COPD exacerbation  -Severe protein-calorie malnutrition   -Oropharyngeal dysphagia    HPI:   54 year-old male presenting to [facility] ER on 9/4/24 w/ c/o neck pain. Work-up in ER: T 36.8, , RR 20, /72, SpO2 100% on RA, Glu 104, Na+ 137, K+ 2.5, BUN 12, Cr 0.86, Mag 1.24, Lactate 2.9, WBC 2.5, Hgb 10.1, Hct 33.0, Plt 147. He was admitted to the hospital for further evaluation and treatment. Hospital course:    Hypokalemia/hypomagnesemia-repleted, monitored  Laryngeal cancer/neck pain/radiation burn of the neck-undergoing radiation therapy at [facility], aquaphor prn to neck per [facility] recs, IV solumedrol Q 8 hours for inflammation, RD consult, lidocaine mouth solution for pain, tramadol prn  Pancytopenia-monitor CBC    Pt. was stabilized and discharged home on 9/6/24 w/ oral KCl and oral magnesium x 14 days. On 9/8/24, patient presented to [facility] ER with c/o syncope and RLE pain. [facility] was also concerned about infection of R neck, where patient was receiving radiation. Work-up in ER: K+ 3.3, Mag 1.4, Trop x 2 negative, Flu/COVID neg, CT of the head negative for acute findings, XR of R tib-fib/ankle/foot showed no acute new fracture, post-surgical changes w/ healing distal tibial fibular fracture w/ intact hardware and soft tissue swelling noted in the right ankle and tib-fib, CXR negative for acute findings. Pt. was started on IVF and admitted to the hospital for further evaluation and treatment. Hospital course:    Syncope 2/2 orthostatic hypotension-IVF, orthostatic VS, telemetry monitoring, metoprolol held   Dehydration 2/2 poor oral intake-IVF, electrolytes repleted, RD consult  Laryngeal cancer-on chemo and radiation, neutropenic precautions, palliative care consult, pain mgmt, patient transferred to [facility] on 9/10 for further care, received inpatient radiation and  "finished on 9/13, patient to F/U with radiation oncology as outpatient  MRSA bacteremia 2/2 neck cellulitis-ID consult, IV Vancomycin through 10/2, PICC line placed   COPD exacerbation-Medrol DP, c/w singulair and duonebs prn   Odynophagia-underwent MBS with SLP, tolerating regular diet with thin liquids  Physical deconditioning/weakness-PT/OT evaluations recommending SNF    Pt. was HDS and discharged to [facility] on 9/18/24. Today, patient is c/o hemorrhoids and \"hard\" stools. He denies abdominal pain, nausea, appetite changes, fevers, or chills. He reports improvement in R neck cellulitis pain. He denies SOB or chest pain. Staff report no clinical concerns.     ROS:    As above in HPI. Otherwise, all other systems have been reviewed and are negative for complaint.    Medications reviewed and verified in NH chart.     Patient Active Problem List:  Cellulitis of neck  Orthostatic hypotension  Physical deconditioning/weakness  Malignant neoplasm of larynx  COPD exacerbation  Severe protein-calorie malnutrition   Oropharyngeal dysphagia  Neck mass (May 2024; + for SCC)  Hx bilateral PE  Hx RLE DVT  HTN  HLD  Tinnitus  Constipation  BPH  Right cervical adenopathy  Hypokalemia  Hypomagnesemia  Pancytopenia  GERD  Neuropathy  Folic acid deficiency  Thiamine deficiency  Vit B12 deficiency  HANY (undiagnosed per EMR)  Allergic rhinitis  Anxiety  Arthritis  Hearing loss  Lumbosacral radiculopathy  Chronic pancreatitis  Asthma/COPD  Chronic back pain  Depression  RLS    Past Medical History:  RIJ thrombosis  Hepatitis C  Gastroenteritis  Sepsis  Urinary retention  UTI  Alcohol abuse  Acute respiratory failure  Bronchitis    Past Surgical History:   Procedure Laterality Date    ANKLE SURGERY Left     ANKLE SURGERY  07/08/2024    R ankle spanning ex-fix revision    CHOLECYSTECTOMY      CT ABDOMEN PELVIS ANGIOGRAM W AND/OR WO IV CONTRAST  04/01/2020    CT ABDOMEN PELVIS ANGIOGRAM W AND/OR WO IV CONTRAST 4/1/2020 [facility] [facility]    FRACTURE SURGERY Right 07/15/2024    Open reduction and internal fixation of right distal tibia pilon and fibula fractures, removal of right ankle spanning external fixation under anesthesia, debridement down to including bone external fixator pin sites right leg    INVASIVE VASCULAR PROCEDURE N/A 11/08/2023    Procedure: Pulmonary Angiogram;  Surgeon: Surya Templeton MD;  Location: [facility] Cardiac Cath Lab;  Service: Cardiovascular;  Laterality: N/A;    LARYNGOSCOPY  07/01/2024    R radical neck dissection and L ALT free flap reconstruction    LEG SURGERY  07/06/2024    RLE ex-fix placement    MR HEAD ANGIO WO IV CONTRAST  01/24/2016    MR HEAD ANGIO WO IV CONTRAST 1/24/2016 [facility]    MR NECK ANGIO WO IV CONTRAST  01/24/2016    MR NECK ANGIO WO IV CONTRAST 1/24/2016 [facility]    THROMBECTOMY      WOUND DEBRIDEMENT Left 08/06/2024    L thigh debridement with woud vac placement 2/2 surgical wound dehiscence       Family History   Problem Relation Name Age of Onset    Diabetes Mother      Hypertension Mother         Social History     Tobacco Use   Smoking Status Every Day    Types: Cigarettes   Smokeless Tobacco Former    Types: Snuff   Tobacco Comments    1 ppd since the age of 16       Social History     Substance and Sexual Activity   Alcohol Use Not Currently    Comment: Quit ~ 7/27/23 per EMR       Social History     Substance and Sexual Activity   Drug Use Not Currently    Comment: Hx methamphetamine (last used in March 2024 per EMR) and cocaine use       Allergies   Allergen Reactions    Oxycodone Itching        Vital Signs:   139/83-73-18-97.4-97% on RA     Physical Exam:  General: Sitting up in bed in NAD, alert   Head/Face: NCAT, symmetrical  Eyes: PERRLA, no injection, no discharge  ENT: Hearing impaired, ears without scars or lesions, nasal mucosa and turbinates pink, septum midline, lips pink and moist  Neck: Supple, symmetrical  Respiratory: CTA but diminished  without adventitious sounds, respirations even and nonlabored without use of accessory muscles, good air exchange  Cardio: RRR without murmur or gallops, normal S1S2, no edema, pedal pulses 3+/4 bilaterally  Chest/Breast: Symmetrical  GI: BS x 4, normoactive, non-distended, abd round and soft, no masses or tenderness  : No suprapubic tenderness or distention  MSK: Gait not assessed, joints with full ROM without pain or contractures  Skin: Skin warm and dry, no induration, PICC to LUE, radiation burn to R neck   Neurologic: Cranial nerves II through XII intact, superficial touch and pain sensation intact  Psychiatric: Alert to person, place, and time, calm and cooperative     Results/Data:   9/20/24: Alb 3.3, Phos 3.5, Mag 1.8, Hgb 9.7, Hct 30.1    Assessment/Plan:  Syncope 2/2 orthostatic hypotension-RESOLVED, continue to encourage fluids, monitor VS closely  Dehydration 2/2 poor oral intake-RESOLVED, continue to encourage PO intake, monitor BMP  Constipation/hemorrhoids-encourage fluids, increase mobility as tolerated, c/w senna and miralax, add colace 100 mg BID, monitor closely  COPD with exacerbation-EXACERBATION RESOLVED, c/w singulair, duonebs prn, monitor resp. status closely  Physical deconditioning/weakness-c/w PT/OT  Odynophagia-c/w regular diet, SLP to follow  MRSA bacteremia 2/2 neck cellulitis-c/w local wound care, c/w IV Vancomycin, monitor Vanco trough, PICC line care, monitor labs, monitor VS  Laryngeal cancer-s/p radiation (end date 9/23), c/w pain mgmt (lyrica 200 mg TID, flexeril 5 mg TID, suboxone, and oxycodone prn), F/U with radiation oncology, oncology, and palliative medicine as scheduled  Anemia-c/w thiamine, iron, and Vit B12 supplementation, check iron panel, Vit B12 level, and folic acid level on 9/23, monitor CBC  GERD-c/w PPI  Tobacco abuse-c/w nicotine patch, smoking cessation encouraged  Pancreatic insufficiency-c/w Creon  Hx DVT/PE-c/w eliquis  HTN/HLD-2 gm Na+ diet, c/w ASA and  atorvastatin, monitor BP, monitor lipid panel and LFTs  BPH-c/w flomax  RLS-c/w requip  Depression/anxiety-c/w remeron and topamax,  monitor behaviors, supportive care, consult Psych    Orders:  Colace 100 mg PO BID   Iron panel, Vit B12, and folic acid level on 9/23    Code Status:   Full Code    Time spent reviewing patient's chart on the unit (PMH, PSH, FH, Social Hx AND progress and/or consult notes, labs, and radiology results): 30 minutes  Time spent interviewing and/or examining the patient: 10 minutes  Time spent writing note on the unit: 5 minutes  Time spent reviewing POC w/ patient, family, and/or staff: 5 minutes  Total visit time: 50 minutes. Greater than 50% of time was spent on counseling and/or coordination of care of the patient. Start time: 2:01 PM, End time: 2:51 PM.

## 2024-11-10 LAB
ATRIAL RATE: 73 BPM
P AXIS: 51 DEGREES
P OFFSET: 204 MS
P ONSET: 148 MS
PR INTERVAL: 148 MS
Q ONSET: 222 MS
QRS COUNT: 12 BEATS
QRS DURATION: 90 MS
QT INTERVAL: 420 MS
QTC CALCULATION(BAZETT): 462 MS
QTC FREDERICIA: 448 MS
R AXIS: 20 DEGREES
T AXIS: 36 DEGREES
T OFFSET: 432 MS
VENTRICULAR RATE: 73 BPM

## 2024-11-17 ENCOUNTER — HOSPITAL ENCOUNTER (OUTPATIENT)
Facility: HOSPITAL | Age: 54
Setting detail: OUTPATIENT SURGERY
End: 2024-11-17
Attending: STUDENT IN AN ORGANIZED HEALTH CARE EDUCATION/TRAINING PROGRAM | Admitting: STUDENT IN AN ORGANIZED HEALTH CARE EDUCATION/TRAINING PROGRAM
Payer: COMMERCIAL

## 2024-11-17 ENCOUNTER — PREP FOR PROCEDURE (OUTPATIENT)
Dept: UROLOGY | Facility: HOSPITAL | Age: 54
End: 2024-11-17
Payer: COMMERCIAL

## 2024-11-17 DIAGNOSIS — D49.4 BLADDER TUMOR: Primary | ICD-10-CM

## 2024-11-17 DIAGNOSIS — R39.9 LOWER URINARY TRACT SYMPTOMS (LUTS): ICD-10-CM

## 2024-11-19 ENCOUNTER — APPOINTMENT (OUTPATIENT)
Dept: RADIOLOGY | Facility: HOSPITAL | Age: 54
End: 2024-11-19
Payer: COMMERCIAL

## 2024-11-19 ENCOUNTER — HOSPITAL ENCOUNTER (EMERGENCY)
Facility: HOSPITAL | Age: 54
Discharge: HOME | End: 2024-11-19
Attending: STUDENT IN AN ORGANIZED HEALTH CARE EDUCATION/TRAINING PROGRAM
Payer: COMMERCIAL

## 2024-11-19 ENCOUNTER — APPOINTMENT (OUTPATIENT)
Dept: CARDIOLOGY | Facility: HOSPITAL | Age: 54
End: 2024-11-19
Payer: COMMERCIAL

## 2024-11-19 VITALS
OXYGEN SATURATION: 97 % | WEIGHT: 197 LBS | BODY MASS INDEX: 26.68 KG/M2 | HEART RATE: 82 BPM | DIASTOLIC BLOOD PRESSURE: 95 MMHG | SYSTOLIC BLOOD PRESSURE: 138 MMHG | TEMPERATURE: 97.5 F | RESPIRATION RATE: 18 BRPM | HEIGHT: 72 IN

## 2024-11-19 DIAGNOSIS — R09.A2 GLOBUS SENSATION: Primary | ICD-10-CM

## 2024-11-19 LAB
ALBUMIN SERPL BCP-MCNC: 4.1 G/DL (ref 3.4–5)
ALP SERPL-CCNC: 188 U/L (ref 33–120)
ALT SERPL W P-5'-P-CCNC: 9 U/L (ref 10–52)
ANION GAP SERPL CALC-SCNC: 17 MMOL/L (ref 10–20)
AST SERPL W P-5'-P-CCNC: 14 U/L (ref 9–39)
BASOPHILS # BLD AUTO: 0.04 X10*3/UL (ref 0–0.1)
BASOPHILS NFR BLD AUTO: 0.5 %
BILIRUB SERPL-MCNC: 0.2 MG/DL (ref 0–1.2)
BUN SERPL-MCNC: 9 MG/DL (ref 6–23)
CALCIUM SERPL-MCNC: 9.6 MG/DL (ref 8.6–10.3)
CARDIAC TROPONIN I PNL SERPL HS: 5 NG/L (ref 0–20)
CHLORIDE SERPL-SCNC: 96 MMOL/L (ref 98–107)
CO2 SERPL-SCNC: 26 MMOL/L (ref 21–32)
CREAT SERPL-MCNC: 0.72 MG/DL (ref 0.5–1.3)
EGFRCR SERPLBLD CKD-EPI 2021: >90 ML/MIN/1.73M*2
EOSINOPHIL # BLD AUTO: 0.13 X10*3/UL (ref 0–0.7)
EOSINOPHIL NFR BLD AUTO: 1.8 %
ERYTHROCYTE [DISTWIDTH] IN BLOOD BY AUTOMATED COUNT: 18.6 % (ref 11.5–14.5)
GLUCOSE SERPL-MCNC: 104 MG/DL (ref 74–99)
HCT VFR BLD AUTO: 40.5 % (ref 41–52)
HGB BLD-MCNC: 13.2 G/DL (ref 13.5–17.5)
IMM GRANULOCYTES # BLD AUTO: 0.04 X10*3/UL (ref 0–0.7)
IMM GRANULOCYTES NFR BLD AUTO: 0.5 % (ref 0–0.9)
INR PPP: 1 (ref 0.9–1.1)
LACTATE SERPL-SCNC: 2.9 MMOL/L (ref 0.4–2)
LYMPHOCYTES # BLD AUTO: 1.72 X10*3/UL (ref 1.2–4.8)
LYMPHOCYTES NFR BLD AUTO: 23.5 %
MAGNESIUM SERPL-MCNC: 2.14 MG/DL (ref 1.6–2.4)
MCH RBC QN AUTO: 27 PG (ref 26–34)
MCHC RBC AUTO-ENTMCNC: 32.6 G/DL (ref 32–36)
MCV RBC AUTO: 83 FL (ref 80–100)
MONOCYTES # BLD AUTO: 0.52 X10*3/UL (ref 0.1–1)
MONOCYTES NFR BLD AUTO: 7.1 %
NEUTROPHILS # BLD AUTO: 4.88 X10*3/UL (ref 1.2–7.7)
NEUTROPHILS NFR BLD AUTO: 66.6 %
NRBC BLD-RTO: 0 /100 WBCS (ref 0–0)
PLATELET # BLD AUTO: 317 X10*3/UL (ref 150–450)
POTASSIUM SERPL-SCNC: 2.9 MMOL/L (ref 3.5–5.3)
PROT SERPL-MCNC: 7.6 G/DL (ref 6.4–8.2)
PROTHROMBIN TIME: 11.7 SECONDS (ref 9.8–12.8)
RBC # BLD AUTO: 4.89 X10*6/UL (ref 4.5–5.9)
S PYO DNA THROAT QL NAA+PROBE: NOT DETECTED
SARS-COV-2 RNA RESP QL NAA+PROBE: NOT DETECTED
SODIUM SERPL-SCNC: 136 MMOL/L (ref 136–145)
WBC # BLD AUTO: 7.3 X10*3/UL (ref 4.4–11.3)

## 2024-11-19 PROCEDURE — 71045 X-RAY EXAM CHEST 1 VIEW: CPT

## 2024-11-19 PROCEDURE — 87635 SARS-COV-2 COVID-19 AMP PRB: CPT | Performed by: STUDENT IN AN ORGANIZED HEALTH CARE EDUCATION/TRAINING PROGRAM

## 2024-11-19 PROCEDURE — 2500000005 HC RX 250 GENERAL PHARMACY W/O HCPCS: Mod: SE | Performed by: HEALTH CARE PROVIDER

## 2024-11-19 PROCEDURE — 93005 ELECTROCARDIOGRAM TRACING: CPT

## 2024-11-19 PROCEDURE — 71045 X-RAY EXAM CHEST 1 VIEW: CPT | Performed by: STUDENT IN AN ORGANIZED HEALTH CARE EDUCATION/TRAINING PROGRAM

## 2024-11-19 PROCEDURE — 85610 PROTHROMBIN TIME: CPT | Performed by: HEALTH CARE PROVIDER

## 2024-11-19 PROCEDURE — 80053 COMPREHEN METABOLIC PANEL: CPT | Performed by: HEALTH CARE PROVIDER

## 2024-11-19 PROCEDURE — 84484 ASSAY OF TROPONIN QUANT: CPT | Performed by: HEALTH CARE PROVIDER

## 2024-11-19 PROCEDURE — 2500000001 HC RX 250 WO HCPCS SELF ADMINISTERED DRUGS (ALT 637 FOR MEDICARE OP): Mod: SE | Performed by: HEALTH CARE PROVIDER

## 2024-11-19 PROCEDURE — 99283 EMERGENCY DEPT VISIT LOW MDM: CPT | Mod: 25

## 2024-11-19 PROCEDURE — 83735 ASSAY OF MAGNESIUM: CPT | Performed by: HEALTH CARE PROVIDER

## 2024-11-19 PROCEDURE — 83605 ASSAY OF LACTIC ACID: CPT | Performed by: HEALTH CARE PROVIDER

## 2024-11-19 PROCEDURE — 87651 STREP A DNA AMP PROBE: CPT | Performed by: HEALTH CARE PROVIDER

## 2024-11-19 PROCEDURE — 85025 COMPLETE CBC W/AUTO DIFF WBC: CPT | Performed by: HEALTH CARE PROVIDER

## 2024-11-19 PROCEDURE — 36415 COLL VENOUS BLD VENIPUNCTURE: CPT | Performed by: HEALTH CARE PROVIDER

## 2024-11-19 RX ORDER — ALUMINUM HYDROXIDE, MAGNESIUM HYDROXIDE, AND SIMETHICONE 1200; 120; 1200 MG/30ML; MG/30ML; MG/30ML
30 SUSPENSION ORAL ONCE
Status: COMPLETED | OUTPATIENT
Start: 2024-11-19 | End: 2024-11-19

## 2024-11-19 RX ORDER — LIDOCAINE HYDROCHLORIDE 20 MG/ML
15 SOLUTION OROPHARYNGEAL ONCE
Status: COMPLETED | OUTPATIENT
Start: 2024-11-19 | End: 2024-11-19

## 2024-11-19 ASSESSMENT — PAIN SCALES - GENERAL
PAINLEVEL_OUTOF10: 0 - NO PAIN
PAINLEVEL_OUTOF10: 0 - NO PAIN

## 2024-11-19 ASSESSMENT — PAIN - FUNCTIONAL ASSESSMENT: PAIN_FUNCTIONAL_ASSESSMENT: 0-10

## 2024-11-20 NOTE — ED NOTES
Pt to ED from home via squad. Pt feels like there is something in his throat for the last week. Pt has a hx of throat cancer. Pt able to talk, no sob, respirations even and unlabored.     Yadi Wong RN  11/19/24 2039

## 2024-11-20 NOTE — ED PROVIDER NOTES
HPI   Chief Complaint   Patient presents with    Sore Throat     Pt states that he feels like there is something in his throat x 1 week. Pt has a history of throat cancer.       CC: Dysphagia    HPI:     54-year-old male presents ED complaining of hoarseness, dysphagia.  History of recurrent laryngeal carcinoma, radiation therapy was done 2022, and also patient developed recurrence to the right side neck, followed by right radical neck dissection was done June 24.  History of substance use disorder using alcohol and cocaine amphetamine. Alcohol use disorder. Hypertension COPD restless leg syndrome. Patient has been followed by oncologist. Was hospitalized from 824, for 2 weeks history of worsening neck mass neck pain of cancer recurrence, CT neck was indicated above metastatic disease. Patient was hospitalized June 24 for laryngoscope with a biopsy, dissection neck radical was done.  Patient denies having any fever, chills he denies any hemoptysis hematemesis hematochezia or melanotic stools.  Denies having any chest pain or shortness of breath,     Additional Limitations to History:   External Records Reviewed: I reviewed recent and relevant outside records including   History Obtained From:     Past Medical History: Per HPI  Medications: Reviewed in EMR and with patient  Allergies:  Reviewed in EMR  Past Surgical History:   Social History:     ------------------------------------------------------------------------------------------------------  Physical Exam:  --Vital signs reviewed in nursing triage note, EMR flow sheets, and at patient's bedside  GEN:  A&Ox3, no acute distress, appears comfortable.  Conversational and appropriate.  No confusion or gross mental status changes.  EYES: EOMI, non-injected sclera.  ENT: Moist mucous membranes, no apparent injuries or lesions.   CARDIO: Normal rate and regular rhythm. No murmurs, rubs, or gallops.  2+ equal pulses of the distal extremities.   PULM: Clear to  auscultation bilaterally. No rales, rhonchi, or wheezes. Good symmetric chest expansion.  GI: Soft, non-tender, non-distended. No rebound tenderness or guarding.  SKIN: Warm and dry, no rashes or lesions.  MSK: ROM intact the extremities without contractures.   EXT: No peripheral edema, contusions, or wounds.   NEURO: Cranial nerves II-XII grossly intact. Sensation to light touch intact and equal bilaterally in upper and lower extremities.  Symmetric 5/5 strength in upper and lower extremities.  PSYCH: Appropriate mood and behavior, converses and responds appropriately during exam.  -------------------------------------------------------------------------------------------------------      Differential Diagnoses Considered:   Chronic Medical Conditions Significantly Affecting Care:   Diagnostic testing considered: [PERC, D-Dimer, PECARN, etc.]    EKG interpreted by myself, normal sinus rhythm ventricular rate 88 CO interval 134 normal QRS duration of prolonged QT/QTc no obvious ST elevation, depression or acute ischemic findings.  - I independently interpreted: [CXR, CT, POCUS, etc. including your interpretation]  - Labs notable for     Escalation of Care: Appropriate for   Social Determinants of Health Significantly Affecting Care: [Homelessness, lacking transportation, uninsured, unable to afford medications]  Prescription Drug Consideration: [Antibiotics, antivirals, pain medications, etc.]  Discussion of Management with Other Providers:  I discussed the patient/results with: [admitting team, consultant, radiologist, social work, EPAT, case management, PT/OT, RT, PCP, etc.]      Harsh Duckworth PA-C              Patient History   Past Medical History:   Diagnosis Date    Acute inflammation of orbit 06/27/2013    Acute upper respiratory infection, unspecified 03/20/2015    Acute upper respiratory infection    Alcohol abuse, in remission 02/12/2015    History of ETOH abuse    Alcohol dependence, in remission  05/07/2015    Alcohol dependence in remission    Allergic contact dermatitis due to plants, except food 05/21/2014    Contact dermatitis due to poison ivy    Allergy status to unspecified drugs, medicaments and biological substances 08/29/2013    History of allergy    Anxiety disorder, unspecified 03/20/2015    Anxiety    Asthmatic bronchitis ([facility])     Bipolar disorder, current episode manic without psychotic features, unspecified ([facility])     Bipolar disorder, current episode manic without psychotic features    Cervicalgia     Cervicalgia    Chronic asthmatic bronchitis ([facility]) 12/02/2014    Degeneration of cervical intervertebral disc     Encounter for immunization 09/22/2016    Flu vaccine need    History of abdominal pain 02/16/2017    History of allergic rhinitis 01/16/2014    History of allergic rhinitis 06/26/2014    History of allergic rhinitis    History of depression     History of esophageal reflux 08/27/2015    History of gastroenteritis 04/11/2013    History of hepatitis C     History of sinusitis 02/05/2014    History of sore throat 03/20/2017    Hypertension     Other long term (current) drug therapy 06/02/2015    High risk medication use    Personal history of nicotine dependence 08/16/2017    History of tobacco abuse    Personal history of nicotine dependence 06/26/2017    History of nicotine dependence    Personal history of other diseases of the respiratory system 09/26/2013    History of acute sinusitis    Personal history of other mental and behavioral disorders     Personal history of other specified conditions 08/25/2016    Unspecified asthma, uncomplicated ([facility]) 02/13/2014     Past Surgical History:   Procedure Laterality Date    ANKLE SURGERY Left     ANKLE SURGERY  07/08/2024    R ankle spanning ex-fix revision    CHOLECYSTECTOMY      CT ABDOMEN PELVIS ANGIOGRAM W AND/OR WO IV CONTRAST  04/01/2020    CT ABDOMEN PELVIS ANGIOGRAM W AND/OR WO IV CONTRAST 4/1/2020 [facility]     FRACTURE SURGERY Right 07/15/2024    Open reduction and internal fixation of right distal tibia pilon and fibula fractures, removal of right ankle spanning external fixation under anesthesia, debridement down to including bone external fixator pin sites right leg    INVASIVE VASCULAR PROCEDURE N/A 11/08/2023    Procedure: Pulmonary Angiogram;  Surgeon: Surya Templeton MD;  Location: [facility] Cardiac Cath Lab;  Service: Cardiovascular;  Laterality: N/A;    LARYNGOSCOPY  07/01/2024    R radical neck dissection and L ALT free flap reconstruction    LEG SURGERY  07/06/2024    RLE ex-fix placement    MR HEAD ANGIO WO IV CONTRAST  01/24/2016    MR HEAD ANGIO WO IV CONTRAST 1/24/2016 [facility]    MR NECK ANGIO WO IV CONTRAST  01/24/2016    MR NECK ANGIO WO IV CONTRAST 1/24/2016 [facility]    THROMBECTOMY      WOUND DEBRIDEMENT Left 08/06/2024    L thigh debridement with woud vac placement 2/2 surgical wound dehiscence     Family History   Problem Relation Name Age of Onset    Diabetes Mother      Hypertension Mother       Social History     Tobacco Use    Smoking status: Every Day     Types: Cigarettes    Smokeless tobacco: Former     Types: Snuff    Tobacco comments:     1 ppd since the age of 16   Substance Use Topics    Alcohol use: Not Currently     Comment: Quit ~ 7/27/23 per EMR    Drug use: Yes     Types: Methamphetamines     Comment: Hx cocaine use       Physical Exam   ED Triage Vitals [11/19/24 2035]   Temperature Heart Rate Respirations BP   36.4 °C (97.5 °F) 99 16 124/81      Pulse Ox Temp Source Heart Rate Source Patient Position   100 % Temporal Monitor Sitting      BP Location FiO2 (%)     Right arm --       Physical Exam  HENT:      Head:      Jaw: No trismus, tenderness or pain on movement.      Mouth/Throat:      Mouth: Mucous membranes are dry.      Pharynx: Posterior oropharyngeal erythema present. No oropharyngeal exudate.      Tonsils: No tonsillar exudate or tonsillar abscesses.    Neck:        Comments: Status post radical resection, skin flap reconstruction right anterior neck, submandibular soft tissue appears non erythematous, nontender, no trismus, he has good range of motion in the cervical neck.          ED Course & MDM   Diagnoses as of 11/23/24 1525   Globus sensation                 No data recorded     Bastian Coma Scale Score: 15 (11/19/24 2036 : Yadi Wong RN)                           Medical Decision Making  54-year-old male with history of recurrent laryngeal carcinoma presenting with complaints of dysphagia, hoarseness, patient is a medically stable, nontoxic-appearing, afebrile, he is neurologically intact no acute distress no respiratory distress, findings discussed with the oncoming attending and turned over for final disposition pending laboratory and imaging workup.        Procedure  Procedures     Harsh Duckworth PA-C  11/19/24 2131       Harsh Duckworth PA-C  11/19/24 2131       Harsh Duckworth PA-C  11/23/24 1528

## 2024-11-21 LAB
ATRIAL RATE: 88 BPM
P AXIS: 5 DEGREES
P OFFSET: 199 MS
P ONSET: 147 MS
PR INTERVAL: 134 MS
Q ONSET: 214 MS
QRS COUNT: 14 BEATS
QRS DURATION: 88 MS
QT INTERVAL: 396 MS
QTC CALCULATION(BAZETT): 479 MS
QTC FREDERICIA: 450 MS
R AXIS: 58 DEGREES
T AXIS: 27 DEGREES
T OFFSET: 412 MS
VENTRICULAR RATE: 88 BPM

## 2024-12-10 ENCOUNTER — PRE-ADMISSION TESTING (OUTPATIENT)
Dept: PREADMISSION TESTING | Facility: HOSPITAL | Age: 54
End: 2024-12-10
Payer: COMMERCIAL

## 2024-12-10 DIAGNOSIS — Z01.818 PREOPERATIVE EXAMINATION: Primary | ICD-10-CM

## 2024-12-10 RX ORDER — CHLORHEXIDINE GLUCONATE 40 MG/ML
1 SOLUTION TOPICAL DAILY
Start: 2024-12-10 | End: 2024-12-15

## 2024-12-10 RX ORDER — CHLORHEXIDINE GLUCONATE ORAL RINSE 1.2 MG/ML
15 SOLUTION DENTAL DAILY
Qty: 30 ML | Refills: 0 | OUTPATIENT
Start: 2024-12-10 | End: 2024-12-12

## 2024-12-10 ASSESSMENT — LIFESTYLE VARIABLES: SMOKING_STATUS: NONSMOKER

## 2024-12-12 ENCOUNTER — ANESTHESIA EVENT (OUTPATIENT)
Dept: OPERATING ROOM | Facility: HOSPITAL | Age: 54
End: 2024-12-12

## 2024-12-12 NOTE — CPM/PAT H&P
[facility]/PAT Evaluation       Name: Gaurav Mckay (Gaurav Mckay)  /Age: 1970/54 y.o.     Visit Type:   In-Person       Chief Complaint: pre surgical evaluation    HPI  Patient is a 54 y.o. male scheduled for cystoscopy with ablation of tissue on 24 with Dr. Murcia secondary to Bladder tumor. Pt has a past medical history that includes HTN, hyperlipidemia, orthostatic hypotension, PE, neck mass, tinnitus, malnutrition, thiamine deficiency, GERD, constipation, dysphagia, chronic pancreatitis, hepatitis c, hypokalemia, hypomagnesemia, BPH, cystitis, laryngeal cancer, fe def anemia, bladder tumor, MRSA bacteremia, ETOH abuse, smoking, mood disorder, RLS, insomnia, HANY, generalized weakness, syncope, repeated falls, abnormal gait, lower back pain with radiculopathy. Patient presents today to [facility] for perioperative risk stratification and optimization.        History of bleeding of clotting problems: yes, massive PE 2023. Has been on eliquis since that time  Hx of DVT, PE, MI, CVA: massive PE   Hx of problems with anesthesia: no  Hx of blood transfusion: no  Would accept blood transfusion: yes  Hx of implanted devices: yes, has rods and pins in goldy lower ext  Hx of metal in neck or dec neck ROM: limited ROM in neck  Problems with teeth: gas no teeth, has dentures but does not use.  Recent illness: yes  Open areas on skin: yes, large open area on left thigh. Multiple scratches on arms     He had massive PE 2023 and had embolectomy at that time.     Pt states that he has frequent falls and generalized weakness. He has had multiple ER visits in the last year for falls, confusion, change in mental status.  He admits to visual and sensation hallucinations at times, seeing and feeling bugs crawling on him. States his mother also had hallucinations.    States that he falls anytime he stands up.     States he fell a couple weeks ago and went to the hospital, this was 24. Went to [facility]. Ethanol level was 207 in ER.   While in ER 11/27/24, head CT demonstrated soft tissue density in region of sella and sellar mass could not be excluded. MRI was recommended for further evaluation and this has not happened to date.     He has been using home oxygen 3 L continuous, not able to say when he started using oxygen but states it was within the year.     He states he has been coughing more lately.   He has difficulty swallowing, sore throat, change in voice but this is all the same as baseline.     C/o frequent nausea and vomiting with last vomiting this am.     He states that the skin graft they used to close his neck surgical site came from his left thigh and this has never healed. He does not know if he has been tested for peripheral arterial disease.       Patient smokes a pack a day.   He drinks at least 6 beers a day.   He has a history of cocaine use.     Past Medical History:   Diagnosis Date    Acute inflammation of orbit 06/27/2013    Acute upper respiratory infection, unspecified 03/20/2015    Acute upper respiratory infection    Alcohol abuse, in remission 02/12/2015    History of ETOH abuse    Alcohol dependence, in remission 05/07/2015    Alcohol dependence in remission    Allergic contact dermatitis due to plants, except food 05/21/2014    Contact dermatitis due to poison ivy    Allergy status to unspecified drugs, medicaments and biological substances 08/29/2013    History of allergy    Anxiety disorder, unspecified 03/20/2015    Anxiety    Asthmatic bronchitis ([facility])     Bipolar disorder, current episode manic without psychotic features, unspecified (Multi)     Bipolar disorder, current episode manic without psychotic features    Cancer of larynx (Multi)     Cervicalgia     Cervicalgia    Chronic asthmatic bronchitis (Multi) 12/02/2014    Clotting disorder (Multi)     P.E.    COPD (chronic obstructive pulmonary disease) (Multi)     Degeneration of cervical intervertebral disc      Dysphagia     Encounter for immunization 09/22/2016    Flu vaccine need    GERD (gastroesophageal reflux disease)     History of abdominal pain 02/16/2017    History of allergic rhinitis 01/16/2014    History of allergic rhinitis 06/26/2014    History of allergic rhinitis    History of depression     History of esophageal reflux 08/27/2015    History of gastroenteritis 04/11/2013    History of hepatitis C     History of sinusitis 02/05/2014    History of sore throat 03/20/2017    Hyperlipidemia     Hypertension     Other long term (current) drug therapy 06/02/2015    High risk medication use    Personal history of nicotine dependence 08/16/2017    History of tobacco abuse    Personal history of nicotine dependence 06/26/2017    History of nicotine dependence    Personal history of other diseases of the respiratory system 09/26/2013    History of acute sinusitis    Personal history of other mental and behavioral disorders     Personal history of other specified conditions 08/25/2016    RLS (restless legs syndrome)     Unspecified asthma, uncomplicated ([facility]) 02/13/2014       Past Surgical History:   Procedure Laterality Date    ANKLE SURGERY Left     ANKLE SURGERY  07/08/2024    R ankle spanning ex-fix revision    CHOLECYSTECTOMY      CT ABDOMEN PELVIS ANGIOGRAM W AND/OR WO IV CONTRAST  04/01/2020    CT ABDOMEN PELVIS ANGIOGRAM W AND/OR WO IV CONTRAST 4/1/2020 [facility]    FRACTURE SURGERY Right 07/15/2024    Open reduction and internal fixation of right distal tibia pilon and fibula fractures, removal of right ankle spanning external fixation under anesthesia, debridement down to including bone external fixator pin sites right leg    INVASIVE VASCULAR PROCEDURE N/A 11/08/2023    Procedure: Pulmonary Angiogram;  Surgeon: Surya Templeton MD;  Location: [facility] Cardiac Cath Lab;  Service: Cardiovascular;  Laterality: N/A;    LARYNGOSCOPY  07/01/2024    R radical neck dissection and L ALT free flap  reconstruction    LEG SURGERY  07/06/2024    RLE ex-fix placement    MR HEAD ANGIO WO IV CONTRAST  01/24/2016    MR HEAD ANGIO WO IV CONTRAST 1/24/2016 [facility]    MR NECK ANGIO WO IV CONTRAST  01/24/2016    MR NECK ANGIO WO IV CONTRAST 1/24/2016 [facility]    THROMBECTOMY      WOUND DEBRIDEMENT Left 08/06/2024    L thigh debridement with woud vac placement 2/2 surgical wound dehiscence       Patient  has no history on file for sexual activity.    Family History   Problem Relation Name Age of Onset    Diabetes Mother      Hypertension Mother         Allergies   Allergen Reactions    Oxycodone Itching       Prior to Admission medications    Medication Sig Start Date End Date Taking? Authorizing Provider   0.9 % sodium chloride (sodium chloride 0.9%) solution Infuse 100 mL/hr at 100 mL/hr into a venous catheter continuously. 9/9/24   Fer Ni,    acetaminophen (Tylenol) 325 mg tablet Take 2 tablets (650 mg) by mouth every 6 hours if needed for mild pain (1 - 3). 11/14/23   Gabriel Medina MD   albuterol (Proventil HFA) 90 mcg/actuation inhaler Inhale 2 puffs every 4 hours if needed for wheezing or shortness of breath.    Historical Provider, MD   apixaban (Eliquis) 5 mg tablet Take 1 tablet (5 mg) by mouth 2 times a day. Do not start before November 16, 2023. 11/16/23   Susanne Chaudhari MD   atorvastatin (Lipitor) 20 mg tablet Take 1 tablet (20 mg) by mouth once daily at bedtime.    Historical Provider, MD   cyclobenzaprine (Flexeril) 10 mg tablet Take 1 tablet (10 mg) by mouth 3 times a day as needed for muscle spasms.    Historical Provider, MD   docusate sodium (Colace) 100 mg capsule Take 1 capsule (100 mg) by mouth 2 times a day.    Historical Provider, MD   ferrous sulfate 325 (65 Fe) MG EC tablet Take 65 mg by mouth 3 times daily (morning, midday, late afternoon). Do not crush, chew, or split.    Historical Provider, MD   ipratropium-albuteroL (Duo-Neb) 0.5-2.5 mg/3 mL  nebulizer solution Take 3 mL by nebulization every 2 hours if needed for wheezing. 11/14/23   Gabriel Medina MD   lidocaine (Xylocaine) 2 % solution Take 15 mL by mouth 4 times a day before meals. 9/9/24   Fer Ni DO   melatonin 10 mg tablet,disintegrating Take 1 tablet by mouth as needed at bedtime (for sleep).    Historical Provider, MD   montelukast (Singulair) 10 mg tablet Take 1 tablet (10 mg) by mouth once daily.    Historical Provider, MD   naloxone (Narcan) 0.4 mg/mL injection Infuse 1 mL (0.4 mg) into a venous catheter every 5 minutes if needed for respiratory depression or opioid reversal. 9/9/24   Fer Ni, DO   nicotine polacrilex (Nicorette) 2 mg gum Chew 1 each (2 mg) if needed for smoking cessation. 9/9/24   Fer Ni, DO   omeprazole (PriLOSEC) 40 mg DR capsule Take 1 capsule (40 mg) by mouth once daily in the morning. Take before meals. Do not crush or chew.    Historical Provider, MD   oxygen (O2) gas therapy Inhale 2 L/min once every 24 hours. 9/9/24   Fer Ni, DO   pancrelipase, Lip-Prot-Amyl, (Creon) 6,000-19,000 -30,000 unit capsule Take 1 capsule by mouth 2 times a day.    Historical Provider, MD   pantoprazole (ProtoNix) 40 mg EC tablet Take 1 tablet (40 mg) by mouth once daily in the morning. Take before meals. Do not crush, chew, or split. 9/10/24   Fer iN DO   potassium chloride CR 10 mEq ER tablet Take 2 tablets (20 mEq) by mouth once daily. Do not crush, chew, or split.    Historical Provider, MD   pregabalin (Lyrica) 200 mg capsule Take 1 capsule (200 mg) by mouth 3 times a day for 14 days. 9/24/24 10/8/24  Mattie Juan APRN-CNP   rOPINIRole (Requip) 0.5 mg tablet Take 1 tablet (0.5 mg) by mouth 3 times a day.    Historical Provider, MD   sennosides-docusate sodium (Marya-Colace) 8.6-50 mg tablet Take 1 tablet by mouth once daily at bedtime. 11/14/23   Gabriel Medina MD   topiramate (Topamax) 100 mg tablet Take 1 tablet (100 mg) by mouth  2 times a day.    Historical Provider, MD   traZODone (Desyrel) 50 mg tablet Take 2 tablets (100 mg) by mouth once daily at bedtime. 10/25/24 11/24/24  Shwetha Horner DO   white petrolatum (Aquaphor) 41 % ointment ointment Apply 1 Application topically every 1 hour if needed (neck burn). 9/9/24   Fer HELLER DO Last        PAT ROS:   Constitutional:    fever   chills   unexpected weight change  Neuro/Psych:    numbness   weakness   light-headedness   tremors   agitation   confusion  Eyes:   Ears:   Nose:    nasal discharge   sinus congestion   epistaxis  Mouth:    mouth pain  Throat:    throat pain   dysphagia   voice change  Neck:   Cardio:    no chest pain   no palpitations   peripheral edema   dyspnea   [facility]  Respiratory:    cough   shortness of breath  Endocrine:   GI:    abdominal pain   nausea   vomiting (vomited today x 1)   no blood in stool  :    difficulty urinating   dysuria  Musculoskeletal:    arthralgias   myalgias   decreased ROM  Hematologic:    bruises/bleeds easily   no history of blood transfusion   blood clots  Skin:   skin changes   sores/wound      Physical Exam  Vitals reviewed.   Constitutional:       Comments: Drowsy. Difficulty sitting up strait and keeping eyes open. Clothes are dirty. Right pointer and middle finer stained yellow. Smells of alcohol. Smells of cigarettes.   HENT:      Head:      Comments: Multiple abrasions on face, right ear, bridge of nose.     Mouth/Throat:      Mouth: Mucous membranes are dry.   Eyes:      Comments: Eyes do not track with EOMs.  Nystagmus goldy.   Cardiovascular:      Rate and Rhythm: Normal rate and regular rhythm.      Pulses: Normal pulses.      Heart sounds: Normal heart sounds. No murmur heard.     No friction rub. No gallop.   Pulmonary:      Comments: Dim goldy throughout. Harsh dry cough with deep breathing.   Abdominal:      General: Bowel sounds are normal.      Palpations: Abdomen is soft.   Skin:     Comments: Skin is dusky.  Fingers are yellowed.    Multiple areas of crusts and abrasions on arms, face, legs.   On left anterior thigh there is an area about 4.5 cm diameter that is open, red base, edges red. No drainage. Large area of scarring around this.    Neurological:      Motor: Weakness present.      Gait: Gait abnormal.          PAT AIRWAY:   Airway:     Mallampati::  II    TM distance::  >3 FB    Neck ROM::  Limited      Testing/Diagnostic:     Patient Specialist/PCP:     Visit Vitals  Smoking Status Every Day       DASI Risk Score    No data to display       Caprini DVT Assessment      Flowsheet Row Admission (Discharged) from 10/24/2024 in [facility] with Alan Mayers DO Admission (Discharged) from 9/8/2024 in [facility] with Fer Ni,    DVT Score 2 filed at 10/24/2024 1425 2 filed at 09/09/2024 0023   BMI 30 or less filed at 10/24/2024 1425 30 or less filed at 09/09/2024 0023   RETIRED: Age -- 40-59 years filed at 09/09/2024 0023          Modified Frailty Index    No data to display       CHADS2 Stroke Risk  Current as of yesterday        N/A 3 to 100%: High Risk   2 to < 3%: Medium Risk   0 to < 2%: Low Risk     Last Change: N/A          This score determines the patient's risk of having a stroke if the patient has atrial fibrillation.        This score is not applicable to this patient. Components are not calculated.          Revised Cardiac Risk Index      Flowsheet Row Pre-Admission Testing from 12/10/2024 in [facility]   High-Risk Surgery (Intraperitoneal, Intrathoracic,Suprainguinal vascular) 0 filed at 12/10/2024 0902   History of ischemic heart disease (History of MI, History of positive exercuse test, Current chest paint considered due to myocardial ischemia, Use of nitrate therapy, ECG with pathological Q Waves) 0 filed at 12/10/2024 0902   History of congestive heart failure (pulmonary edemia, bilateral rales or S3 gallop, Paroxysmal nocturnal dyspnea, CXR showing  pulmonary vascular redistribution) 0 filed at 12/10/2024 0902   History of cerebrovascular disease (Prior TIA or stroke) 0 filed at 12/10/2024 0902   Pre-operative insulin treatment 0 filed at 12/10/2024 0902   Pre-operative creatinine>2 mg/dl 0 filed at 12/10/2024 0902   Revised Cardiac Risk Calculator 0 filed at 12/10/2024 0902          Apfel Simplified Score      Flowsheet Row Pre-Admission Testing from 12/10/2024 in [facility]   Smoking status 1 filed at 12/10/2024 0902   History of motion sickness or PONV  0 filed at 12/10/2024 0902   Use of postoperative opioids 1 filed at 12/10/2024 0902   Gender - Female 0=No filed at 12/10/2024 0902   Apfel Simplified Score Calculator 2 filed at 12/10/2024 0902          Risk Analysis Index Results This Encounter    No data found in the last 10 encounters.       Prodigy: High Risk  Total Score: 8              Prodigy Gender Score          ARISCAT Score for Postoperative Pulmonary Complications    No data to display       [facility] Perioperative Risk for Myocardial Infarction or Cardiac Arrest (LESLI)      Flowsheet Row Pre-Admission Testing from 12/10/2024 in [facility]   Age 1.08 filed at 12/10/2024 0903   Functional Status  0 filed at 12/10/2024 0903   ASA Class  -1.92 filed at 12/10/2024 0903   Creatinine 0 filed at 12/10/2024 0903   Type of Procedure  -0.26 filed at 12/10/2024 0903   LESLI Total Score  -6.35 filed at 12/10/2024 0903   LESLI % 0.17 filed at 12/10/2024 0903              Procedures    Pulmonary Angiogram    Indications    Pulmonary embolism [I26.99 (ICD-10-CM)]      Pre Procedure Diagnosis    Pulmonary embolism (CMS/HCC) [I26.99]       PACS Images     Show images for Invasive vascular procedure  Conclusion       [facility], Cath Lab, [address] [address]     Cardiovascular Catheterization Report     Patient Name:     SHONA MCBRIDE   Performing Physician:  [id number] Surya Templeton                                                                MD  Study Date:       11/8/2023           Verifying Physician:   [id number] Sylwia Marley MD  MRN/PID:          [id number]            Cardiologist/Co-scrub:  Accession#:       [id number]        Ordering Physician:    [id number] SYLWIA MARLEY  Date of           1970 / 53      Fellow:  Birth/Age:        years  Gender:           M                   Fellow:  Encounter#:       [id number]        Study:            Pulmonary Intervention  Additional Study: Pulmonary Angiogram        Indications:  SHONA MCBRIDE is a 53 year old male who presents with sub-massive pulmonary embolism.     Procedure Description:  After infiltration of local anesthetic, the right femoral vein was identified with two dimensional ultrasound. Under direct ultrasound visualization, the right femoral vein was cannulated with a micropuncture technique. A 16 Algerian sheath was placed in the vein. Post-procedure, the venous sheath was pulled and pressure was applied to the site.     Pulmonary embolectomy of the right pulmonary artery and right interlobar artery as well as the left pulmonary artery and left interlobar artery was performed using the [employer] Lightning Flash system.     Bilateral pulmonary angiography demonstrated excellent perfusion of the bilateral lungs with minimal, nonocclusive thrombus burden.     Hemo Personnel:  +------------------+------------+  Name              Duty          +------------------+------------+  Sylwia Marley MD, MD 1  +------------------+------------+  Estela Sylvester RNPGONZALES NURSE 1  +------------------+------------+        +----------+  Contrast:   +----------+  Omnipaque:  +----------+           Oxygen Saturation %:  +-----------+------------+  Sample SiteHB (g/100ml)  +-----------+------------+      SYS ART         15.4  +-----------+------------+      SYS MANNY        15.4  +-----------+------------+      PUL ART        15.4  +-----------+------------+      PUL MANNY        15.4  +-----------+------------+        Complications:  No in-lab complications observed.     Cardiac Cath Post Procedure Notes:  Post Procedure Diagnosis: Sub-massive pulmonary embolism.  Blood Loss:               Estimated blood loss during the procedure was 10 mls.  Specimens Removed:        Number of specimen(s) removed: none.     ____________________________________________________________________________________  CONCLUSIONS:   1. Sub-massive pulmonary embolism.   2. Successful bilateral pulmonary embolectomy.   3. Continue therapeutic anticoagulation with heparin gtt.     ICD 10 Codes:  Other pulmonary embolism with acute cor pulmonale-I26.09     CPT Codes:  Extirpation of material, unilateral PA-91890; Extirpation of material, contralateral PA-01440.50; Extirpation of material, unilateral secondary vessel Subsegment 2-88579; Extirpation of material, contralateral secondary vessel-71357.50     [id number] Templeton MD  Performing Physician  Electronically signed by Dania Templeton MD on 11/21/2023 at 9:39:40 AM    Interpretation Summary       [facility], [address] [address]              Tel [phone] and Fax [phone]     TRANSTHORACIC ECHOCARDIOGRAM REPORT        Patient Name:     SHONA MCBRIDE Ordering Physician: Ruperto Post MD  Study Date:       1/25/2016         Reading Physician:  Elliot Sykes MD  MRN/PID:          [id number]          Referring                                      Physician:  Accession/Order#: [id number]         PCP:  YOB: 1970         Department          [facility]                                      Location:           Invasive  Gender:           M                 Fellow:  Admit Date:        1/11/2016         Nurse:  Admission Status: Inpatient -       Sonographer:        Akosua Kinsey  Height:           182.88 cm         Additional Staff:   Bre Marquez  Weight:           72.12 kg          CC Report to:       [facility]  BSA:              1.93 m2           Study Type:         Echocardiogram  Blood Pressure:   103 /73 mmHg     Diagnosis/ICD: I63.8 Other cerebral infarction  Indication:    AMS and central bety lesions and hallucinations  Procedure/CPT: Echo Complete w/Full Doppler (11598)     Patient History:  Smoker:            Current.  Pertinent History: HTN and Hyperlipidemia. ETOH toxicity, acute pancreatitis.     Study Detail: The following Echo studies were performed: 2D, M-Mode, doppler and                color flow. Agitated saline used as a contrast agent for                intraseptal flow evaluation. The patient was awake.        PHYSICIAN INTERPRETATION:  Left Ventricle: The left ventricular systolic function is normal, with an estimated ejection fraction of 60-65%. The left ventricular cavity size is normal. Spectral Doppler shows an impaired relaxation pattern of left ventricular diastolic filling. There is a mid cavity left ventricular obstruction noted with the Valsalva maneuver. The resting gradient is 3 mmHg.The provoked gradient is 6 mmHg.  Left Atrium: The left atrium is normal in size. There is no evidence of a patent foramen ovale.  Right Ventricle: The right ventricle is normal in size. There is normal right ventricular global systolic function.  Right Atrium: The right atrium is normal in size.  Aortic Valve: The aortic valve is trileaflet. There is no evidence of aortic valve stenosis. There is no evidence of aortic valve regurgitation. The peak instantaneous gradient of the aortic valve is 4.7 mmHg. The mean gradient of the aortic valve is 3.0 mmHg.  Mitral Valve: The mitral valve is normal in structure. There is no evidence of mitral valve  regurgitation.  Tricuspid Valve: The tricuspid valve is structurally normal. There is trace tricuspid regurgitation.  Pulmonic Valve: The pulmonic valve is structurally normal. There is trace pulmonic valve regurgitation.  Pericardium: There is no pericardial effusion noted.  Aorta: The aortic root is normal.  Pulmonary Artery: The tricuspid regurgitant velocity is 2.00 m/s, and with an estimated right atrial pressure of 10 mmHg, the estimated pulmonary artery pressure is normal with the RVSP at 26.0 mmHg.  Systemic Veins: The inferior vena cava appears to be of normal size.        CONCLUSIONS:   1. The left ventricular systolic function is normal with a 60-65% ejection fraction.   2. Spectral Doppler shows an impaired relaxation pattern of left ventricular diastolic filling.   3. The left atrium is normal in size.   4. There is no evidence of a patent foramen ovale. There is a borderline atrial septal aneurysm.   5. There is trace tricuspid and pulmonic regurgitation.     QUANTITATIVE DATA SUMMARY:  2D MEASUREMENTS:                           Normal Ranges:  Ao Root:       3.10 cm   (2.0-3.7cm)  LAs:           3.20 cm   (2.7-4.0cm)  IVSd:          1.10 cm   (0.6-1.1cm)  LVPWd:         0.90 cm   (0.6-1.1cm)  LVIDd:         4.50 cm   (3.9-5.9cm)  LVIDs:         2.80 cm  LV Mass Index: 79.3 g/m2  LV % FS        37.8 %     LA VOLUME:                        Normal Ranges:  LA Area A4C: 15.3 cm2  LA Area A2C: 10.9 cm2  LA Vol A4C:  34.0 ml  LA Vol A2C:  23.0 ml     M-MODE MEASUREMENTS:                           Normal Ranges:  Ao Root:       3.10 cm   (2.0-3.7cm)  LAs:           3.90 cm   (2.7-4.0cm)  IVSd:          0.80 cm   (0.6-1.1cm)  LVPWd:         1.20 cm   (0.6-1.1cm)  LVIDd:         4.50 cm   (3.9-5.9cm)  LVIDs:         2.50 cm  LV Mass Index: 79.3 g/m2  LV % FS        44.4 %     LV SYSTOLIC FUNCTION BY 2D PLANIMETRY (MOD):                      Normal Ranges:  EF-A4C View: 60.0 % (>55%)  EF-A2C View: 80.6  %  EF-Biplane:  71.8 %     LV DIASTOLIC FUNCTION:                                Normal Ranges:  MV Peak E:        0.54 m/s    (0.7-1.2 m/s)  MV Peak A:        0.48 m/s    (0.42-0.7 m/s)  E/A Ratio:        1.12        (1.0-2.2)  MV lateral e'     0.07 m/s  MV medial e'      0.07 m/s  MV A Dur:         100.00 msec  E/e' Ratio:       7.60        (<8.0)  PulmV Sys Carmelo:    48.50 cm/s  PulmV Elizondo Carmelo:   33.50 cm/s  PulmV S/D Carmelo:    1.40  PulmV A Revs Carmelo: 23.30 cm/s  PulmV A Revs Dur: 85.00 msec     MITRAL VALVE:                  Normal Ranges:  MV DT: 285 msec (150-240msec)     AORTIC VALVE:                                    Normal Ranges:  AoV Vmax:                1.08 m/s (<1.7m/s)  AoV Peak P.7 mmHg (<20mmHg)  AoV Mean PG:             3.0 mmHg (1.7-11.5mmHg)  LVOT Max Carmelo:            0.82 m/s (<1.1m/s)  AoV VTI:                 20.60 cm (18-25cm)  LVOT VTI:                15.50 cm  LVOT Diameter:           2.10 cm  (1.8-2.4cm)  AoV Area, VTI:           2.61 cm2 (2.5-5.5cm2)  AoV Area,Vmax:           2.64 cm2 (2.5-4.5cm2)  AoV Dimensionless Index: 0.75     RIGHT VENTRICLE:  RV 1   2.74 cm  RV 2   2.68 cm  RV 3   6.73 cm  TAPSE: 20.8 mm  RV s'  0.13 m/s     TRICUSPID VALVE/RVSP:                             Normal Ranges:  Peak TR Velocity: 2.00 m/s  RV Syst Pressure: 26.0     (< 30mmHg)     PULMONIC VALVE:                       Normal Ranges:  PV Max Carmelo: 0.6 m/s  (0.6-0.9m/s)  PV Max P.5 mmHg     Pulmonary Veins:  PulmV A Revs Dur: 85.00 msec  PulmV A Revs Carmelo: 23.30 cm/s  PulmV Elizondo Carmelo:   33.50 cm/s  PulmV S/D Carmelo:    1.40  PulmV Sys Carmelo:    48.50 cm/s        Elliot Sykes MD  Electronically signed on 2016 at 5:45:04 PM     Assessment and Plan:     No results found for this or any previous visit (from the past 24 hours).     Assessment & Plan:    Patient is a 54 y.o. male scheduled for cystoscopy with ablation of tissue on 24 with Dr. Murcia secondary to Bladder tumor. Pt  has a past medical history that includes HTN, hyperlipidemia, orthostatic hypotension, PE, neck mass, tinnitus, malnutrition, thiamine deficiency, GERD, constipation, dysphagia, chronic pancreatitis, hepatitis c, hypokalemia, hypomagnesemia, BPH, cystitis, laryngeal cancer, fe def anemia, bladder tumor, MRSA bacteremia, ETOH abuse, smoking, mood disorder, RLS, insomnia, HANY, generalized weakness, syncope, repeated falls, abnormal gait, lower back pain with radiculopathy. Patient [facility] today to [facility] for perioperative risk stratification and optimization.     Neuro:  No neurologic diagnosis, however, the patient is at increased risk for perioperative delirium secondary to  sensory impairment, decreased functional status, excessive alcohol use, poor nutrition  Patient is at increased risk for perioperative CVA secondary to  perioperative interruption of anticoagulant, HTN    HEENT:  No HEENT diagnosis or significant findings on chart review or clinical presentation and evaluation. No further preoperative testing/intervention indicated at this time.    Cardiovascular:  No CV diagnosis or significant findings on chart review or clinical presentation and evaluation. No further preoperative testing or intervention is indicated at this time.  METS: 3.1  RCRI: 0 points, 3.9%  risk for postoperative MACE   LESLI: 0.17% risk for 30 day postoperative MACE  EKG -       Pulmonary:  No previous pulmonary diagnosis, however patient likely has COPD and requires continuous oxygen. patient is at increased risk of perioperative complications secondary to  COPD, Tobacco abuse, respiratory infection within 4-6 weeks, functional dependence  Stop Bang score is 6 placing patient at high risk for HANY  ARISCAT: <26 points, 1.6% risk of in-hospital postoperative pulmonary complication  PRODIGY: High risk for opioid induced respiratory depression  Smoking cessation discussed with patient, patient also provided cessation  education/hotline handout, [facility] toilet education discussed, patient also provided deep breathing exercises and incentive spirometry educational handout    Renal:   No renal diagnosis, however patient is at increase risk for perioperative renal complications secondary to  HTN, COPD suspect PAD      Endocrine:  No endocrine diagnosis or significant findings on chart review or clinical presentation and evaluation. No further testing or intervention is indicated at this time.    Hematologic:  No hematologic diagnosis, however patient is at an increased risk for DVT  Caprini Score 8, patient at High risk for perioperative DVT.  Patient provided with VTE education/handout.    Antiplatelet management   The patient is not currently receiving antiplatelet therapy.  Anticoagulation management  The patient is currently receiving anticoagulation therapy for history of DVT/PE.    Gastrointestinal:   No GI diagnosis or significant findings on chart review or clinical presentation and evaluation.     Apfel 1    Infectious disease:   Has a cough.  CXR 11/27/24 without pneumonia.        Musculoskeletal:   No diagnosis or significant findings on chart review or clinical presentation and evaluation.     Anesthesia/Airway:  History of radical neck surgery.      Discussion:  Patient presented to [facility] late for appointment after no showing for prior appointment 2 days ago. Patient arrived acutely intoxicated, with asterixis and nystagmus, drowsy, difficulty keeping eyes open and sitting up in chair for exam. Currently he does not have capacity to consent for procedures or sedation, and I am concerned that he will not follow instructions relating to holding his eliquis for his procedure scheduled for next week. Because his procedure is to determine pathology of this new bladder mass I do not want to delay his procedure as this will further delay definitive diagnosis and treatment.   It is recommended that the patient be admitted for 3  days prior to procedure in order to ensure sobriety and capacity for consenting to procedure as well as stopping eliquis for 3 days prior to procedure due to high risk of bleeding with procedure.   Dr Degroot feels that with his history of neck surgery, his airway will be able to be maintained by the anesthesiology team at [facility] and he does not require a higher level of care for maintaining his airway.     I spoke with Dr. Degroot and Dr. Gilbert who also came to evaluate the patient and is in agreement with my assessment and plan.   Discussion was then had with Dr. Murcia who asked that patient be admitted to medicine service at [facility].   After discussions with transfer center, Dr. Smith, patient was brought to [facility] ER in wheelchair on 3 L O2 NC by myself. I helped get him registered and gave report to ER staff.     Total time spent today in chart review, direct patient care, and coordination of care 2 hours.       Lilly Swift, APRN-CNP

## 2024-12-12 NOTE — PREPROCEDURE INSTRUCTIONS
Medication List            Accurate as of December 13, 2024  5:14 PM. Always use your most recent med list.                acetaminophen 325 mg tablet  Commonly known as: Tylenol  Take 2 tablets (650 mg) by mouth every 6 hours if needed for mild pain (1 - 3).  Medication Adjustments for Surgery: Take/Use as prescribed     apixaban 5 mg tablet  Commonly known as: Eliquis  Take 1 tablet (5 mg) by mouth 2 times a day. Do not start before November 16, 2023.  Medication Adjustments for Surgery: Take last dose 3 days before surgery     atorvastatin 20 mg tablet  Commonly known as: Lipitor  Medication Adjustments for Surgery: Take/Use as prescribed     cyclobenzaprine 10 mg tablet  Commonly known as: Flexeril  Medication Adjustments for Surgery: Take/Use as prescribed     docusate sodium 100 mg capsule  Commonly known as: Colace  Medication Adjustments for Surgery: Take/Use as prescribed     ferrous sulfate 325 (65 Fe) MG EC tablet  Medication Adjustments for Surgery: Take/Use as prescribed     ipratropium-albuteroL 0.5-2.5 mg/3 mL nebulizer solution  Commonly known as: Duo-Neb  Take 3 mL by nebulization every 2 hours if needed for wheezing.  Medication Adjustments for Surgery: Take/Use as prescribed     lidocaine 2 % solution  Commonly known as: Xylocaine  Take 15 mL by mouth 4 times a day before meals.  Medication Adjustments for Surgery: Take/Use as prescribed     melatonin 10 mg tablet,disintegrating  Additional Medication Adjustments for Surgery: Take last dose 7 days before surgery     montelukast 10 mg tablet  Commonly known as: Singulair  Medication Adjustments for Surgery: Take/Use as prescribed     naloxone 0.4 mg/mL injection  Commonly known as: Narcan  Infuse 1 mL (0.4 mg) into a venous catheter every 5 minutes if needed for respiratory depression or opioid reversal.  Additional Medication Adjustments for Surgery: Take last dose 7 days before surgery     nicotine polacrilex 2 mg gum  Commonly known as:  Nicorette  Chew 1 each (2 mg) if needed for smoking cessation.  Additional Medication Adjustments for Surgery: Other (Comment)  Notes to patient: None for 8 hours prior to procedure      omeprazole 40 mg DR capsule  Commonly known as: PriLOSEC  Medication Adjustments for Surgery: Take/Use as prescribed     oxygen gas therapy  Commonly known as: O2  Inhale 2 L/min once every 24 hours.  Medication Adjustments for Surgery: Take/Use as prescribed     pancrelipase (Lip-Prot-Amyl) 6,000-19,000 -30,000 unit capsule  Commonly known as: Creon  Medication Adjustments for Surgery: Do Not take on the morning of surgery     pantoprazole 40 mg EC tablet  Commonly known as: ProtoNix  Take 1 tablet (40 mg) by mouth once daily in the morning. Take before meals. Do not crush, chew, or split.  Medication Adjustments for Surgery: Take/Use as prescribed     potassium chloride CR 10 mEq ER tablet  Commonly known as: Klor-Con  Medication Adjustments for Surgery: Take/Use as prescribed     pregabalin 200 mg capsule  Commonly known as: Lyrica  Take 1 capsule (200 mg) by mouth 3 times a day for 14 days.  Medication Adjustments for Surgery: Take/Use as prescribed     Proventil HFA 90 mcg/actuation inhaler  Generic drug: albuterol  Medication Adjustments for Surgery: Take/Use as prescribed     rOPINIRole 0.5 mg tablet  Commonly known as: Requip  Medication Adjustments for Surgery: Take/Use as prescribed     sennosides-docusate sodium 8.6-50 mg tablet  Commonly known as: Marya-Colace  Take 1 tablet by mouth once daily at bedtime.  Medication Adjustments for Surgery: Take/Use as prescribed     sodium chloride 0.9% solution  Infuse 100 mL/hr at 100 mL/hr into a venous catheter continuously.  Medication Adjustments for Surgery: Take/Use as prescribed     topiramate 100 mg tablet  Commonly known as: Topamax  Medication Adjustments for Surgery: Take/Use as prescribed     traZODone 50 mg tablet  Commonly known as: Desyrel  Take 2 tablets (100 mg) by  mouth once daily at bedtime.  Medication Adjustments for Surgery: Take/Use as prescribed     white petrolatum 41 % ointment ointment  Commonly known as: Aquaphor  Apply 1 Application topically every 1 hour if needed (neck burn).  Medication Adjustments for Surgery: Take/Use as prescribed                            Thank you for visiting [facility] today for your pre-procedure evaluation, you were seen by     ANASTACIA Pina  Pre Admission Testing  [facility]  [phone]     This summary includes instructions and information to aid you during your perioperative period.  Please read carefully. If you have any questions about your visit today, please call the number listed above.  If you become ill or have any changes to your health before your surgery, please contact your primary care provider and alert your surgeon.    Preparing for your Surgery       Exercises  Preoperative Deep Breathing Exercises  Why it is important to do deep breathing exercises before my surgery?  Deep breathing exercises strengthen your breathing muscles.  This helps you to recover after your surgery and decreases the chance of breathing complications.  How are the deep breathing exercises done?  Sit straight with your back supported.  Breathe in deeply and slowly through your nose. Your lower rib cage should expand and your abdomen may move forward.  Hold that breath for 3 to 5 seconds.  Breathe out through pursed lips, slowly and completely.  Rest and repeat 10 times every hour while awake.  Rest longer if you become dizzy or lightheaded.       Incentive Spirometer  You may have been provided with an incentive spirometer in [facility], please follow the below instructions.  If you were not provided an incentive spirometer in [facility], please disregard the incentive spirometer instructions for now.   You may receive an incentive spirometer at the hospital/ surgery center after your  surgery.  What is an incentive spirometer?  An incentive spirometer is a device used before and after surgery to “exercise” your lungs.  It helps you to take deeper breaths to expand your lungs.  Below is an example of a basic incentive spirometer.  The device you receive may differ slightly but they all function the same.    Why do I need to use an incentive spirometer?  Using your incentive spirometer prepares your lungs for surgery and helps prevent lung problems after surgery.  How do I use my incentive spirometer?  When you're using your incentive spirometer, make sure to breathe through your mouth. If you breathe through your nose, the incentive spirometer won't work properly. You can hold your nose if you have trouble.  If you feel dizzy at any time, stop and rest. Try again at a later time.  Follow the steps below:  Set up your incentive spirometer, expand the flexible tubing and connect to the outlet.  Sit upright in a chair or bed. Hold the incentive spirometer at eye level.   Put the mouthpiece in your mouth and close your lips tightly around it. Slowly breathe out (exhale) completely.  Breathe in (inhale) slowly through your mouth as deeply as you can. As you take a breath, you will see the piston rise inside the large column. While the piston rises, the indicator should move upwards. It should stay in between the 2 arrows (see Figure).  Try to get the piston as high as you can, while keeping the indicator between the arrows.   If the indicator doesn't stay between the arrows, you're breathing either too fast or too slow.  When you get it as high as you can, hold your breath for 10 seconds, or as long as possible. While you're holding your breath, the piston will slowly fall to the base of the spirometer.  Once the piston reaches the bottom of the spirometer, breathe out slowly through your mouth. Rest for a few seconds.  Repeat 10 times. Try to get the piston to the same level with each breath.  Repeat  every hour while awake  You can carefully clean the outside of the mouthpiece with an alcohol wipe or soap and water.      Preoperative Brain Exercises    What are brain exercises?  A brain exercise is any activity that engages your thinking (cognitive) skills.    What types of activities are considered brain exercises?  Jigsaw puzzles, crossword puzzles, word jumble, memory games, word search, and many more.  Many can be found free online or on your phone via a mobile rony.    Why should I do brain exercises before my surgery?  More recent research has shown brain exercise before surgery can lower the risk of postoperative delirium (confusion) which can be especially important for older adults.  Patients who did brain exercises for 5 to 10 hours the days before surgery, cut their risk of postoperative delirium in half up to 1 week after surgery.    Sit-to-Stand Exercise    What is the sit-to-stand exercise?  The sit-to-stand exercise strengthens the muscles of your lower body and muscles in the center of your body (core muscles for stability) helping to maintain and improve your strength and mobility.  How do I do the sit-to-stand exercise?  The goal is to do this exercise without using your arms or hands.  If this is too difficult, use your arms and hands or a chair with armrests to help slowly push yourself to the standing position and lower yourself back to the sitting position. As the movement becomes easier use your arms and hands less.    Steps to the sit-to-stand exercise  Sit up tall in a sturdy chair, knees bent, feet flat on the floor shoulder-width apart.  Shift your hips/pelvis forward in the chair to correctly position yourself for the next movement.  Lean forward at your hips.  Stand up straight putting equal weight on both feet.  Check to be sure you are properly aligned with the chair, in a slow controlled movement sit back down.  Repeat this exercise 10-15 times.  If needed you can do it fewer times  until your strength improves.  Rest for 1 minute.  Do another 10-15 sit-to-stand exercises.  Try to do this in the morning and evening.        Instructions    Preoperative Fasting Guidelines    Why must I stop eating and drinking near surgery time?  With sedation, food or liquid in your stomach can enter your lungs causing serious complications  Food can increase nausea and vomiting  When do I need to stop eating and drinking before my surgery?      Do not eat any food after midnight the night before your surgery/procedure. You may have up to 13.5 ounces of clear liquid until TWO hours before your instructed arrival time to the hospital.  This includes water, black tea/coffee, (no milk or cream) apple juice, and electrolyte drinks (Gatorade). You may chew gum until TWO hours before your surgery/procedure            Simple things you can do to help prevent blood clots     Blood clots are blockages that can form in the body's veins. When a blood clot forms in your deep veins, it may be called a deep vein thrombosis, or DVT for short. Blood clots can happen in any part of the body where blood flows, but they are most common in the arms and legs. If a piece of a blood clot breaks free and travels to the lungs, it is called a pulmonary embolus (PE). A PE can be a very serious problem.         Being in the hospital or having surgery can raise your chances of getting a blood clot because you may not be well enough to move around as much as you normally do.         Ways you can help prevent blood clots in the hospital       Wearing SCDs  SCDs stands for Sequential Compression Devices.   SCDs are special sleeves that wrap around your legs. They attach to a pump that fills them with air to gently squeeze your legs every few minutes.  This helps return the blood in your legs to your heart.   SCDs should only be taken off when walking or bathing. SCDs may not be comfortable, but they can help save your life.              Pump  SCD leg sleeves  Wearing compression stockings - if your doctor orders them. These special snug-fitting stockings gently squeeze your legs to help blood flow.       Walking. Walking helps move the blood in your legs.   If your doctor says it is ok, try walking the halls at least   5 times a day. Ask us to help you get up, so you don't fall.      Taking any blood-thinning medicines your doctor orders.              Ways you can help prevent blood clots at home         Wearing compression stockings - if your doctor orders them.   Walking - to help move the blood in your legs.    Taking any blood-thinning medicines your doctor orders.      Signs of a blood clot or PE    Tell your doctor or nurse right away if you have any of the problems listed below.         If you are at home, seek medical care right away. Call 911 for chest pain or problems breathing.            Signs of a blood clot (DVT) - such as pain, swelling, redness, or warmth in your arm or legs.  Signs of a pulmonary embolism (PE) - such as chest pain or feeling short of breath      Tobacco and Alcohol;  Do not drink alcohol or smoke within 24 hours of surgery.  It is best to quit smoking for as long as possible before any surgery or procedure.    Quitting Smoking; Recognizing Dangerous Situations:  1-Alcohol use during the first month after quitting, 2-Being around smoke or someone who smokes 3-Times situation routinely smoked  4-Triggers-car, breaks, coffee, when awakening, social events  Coping Skills: 1-Learning new ways to manage stress 2-Exercising 3-Relaxation breathing   4-Change routines 5-Distraction techniques    Websites:  Smoke-Free - offers free text messages and an rony to help you quit. Info includes eating and mood issues that may come with quitting. There is a Live Helpline to talk to an expert. Go to smokefree.gov; Become an Ex-Smoker - the free EX Plan is based on scientific research and useful advice from ex-smokers. It isn't just about  quitting smoking.  It's about re-learning life without cigarettes using a 3-step program.  Go to becomeanex.org   Centers for Disease Control offer many suggestions for helping you quit. Includes a Quit Guide and real-life stories. There are sections for specific groups such as LGBT, , different ethnic groups, and pregnant women.  Go to cdc.gov/tobacco/campaign/tips    Other Resources:  [place] Tobacco Quit Line - call 1-800-QUIT-NOW or 4-240-237-2105.  [facility] 2-1-1 - to find local programs and resources. Call 211 or go to 211.[employer].  [facility] Tobacco Cessation Program - call 513-318-0754.  American Lung Association offers classes for quitting smoking. Some places may charge a fee. For a list of classes, go to lung.org or call 1-800-LUNG-[place].     Some things to think about:; The health benefits of quitting smoking can help most of the major parts of your body. There is no safe amount of cigarette smoke. Quitting smoking can add years to your life. When you quit, you'll also protect your loved ones from dangerous secondhand smoke. Make a plan, join a support group, and talk to your physician to assist in quitting smoking.                                                                                                              , Quitting Alcohol; Things to think about: Alcohol use disorder is a health condition that can improve with treatment. Each person is unique. A treatment that is good for one person may not be a good fit for someone else. Simply knowing the different options can be an important first step. Treatment can be inpatient, where you stay at a facility, or outpatient, where you stay at home. Your insurance plan may cover some treatment costs.   Resources:    NIAAA Alcohol Treatment Navigator - from the National [place] on Alcohol Abuse and  Alcoholism. Offers an online tool to help you find the right treatment for you - near you. Go to alcoholtreatment.niaaa.nih.gov.  [facility]  National Hotline  - from the Substance Abuse and Mental Health Services Administration. A free, confidential 24-hour treatment referral and information service for anyone facing mental and/or substance use disorders. Go to findtreatment.gov or call 4-407-748-HELP (5295).    Alcoholics Anonymous (AA) - offers group meetings and a 12-step program to anyone who has a drinking problem. Go to aa.org or call 283-725-3196    [facility] 2-1-1 - to find local programs and resources. Call 211 or go to 211.org    Other people you can talk to about treatment options include your primary care doctor, health insurance plan, local health department, or employee assistance program. You can also ask to talk to a hospital .          Other Instructions:        The Week before Surgery        Seven days before Surgery  Check your PAT medication instructions  Do the exercises provided to you by PAT   Arrange for a responsible, adult licensed  to take you home after surgery and stay with you for 24 hours.  You will not be permitted to drive yourself home if you have received any anesthetic/sedation  Six days before surgery  Check your PAT medication instructions  Do the exercises provided to you by PAT   Start using Chlorhexidene (CHG) body wash if prescribed  Five days before surgery  Check your PAT medication instructions  Do the exercises provided to you by [facility]   Continue to use CHG body wash if prescribed  Three days before surgery  Check your PAT medication instructions  Do the exercises provided to you by PAT   Continue to use CHG body wash if prescribed  Two days before surgery  Check your PAT medication instructions  Do the exercises provided to you by PAT   Continue to use CHG body wash if prescribed    The Day before Surgery       Check your PAT medication and all other PAT instructions including when to stop eating and drinking  You will be called with your arrival time for surgery in the late afternoon.  If  you do not receive a call please reach out to your surgeon's office.  Do not smoke or drink 24 hours before surgery  Prepare items to bring with you to the hospital  Shower with your chlorhexidine wash if prescribed  Brush your teeth and use your chlorhexidine dental rinse if prescribed    The Day of Surgery       Check your PAT medication instructions  Ensure you follow the instructions for when to stop eating and drinking  Shower, if prescribed use CHG.  Do not apply any lotions, creams, moisturizers, perfume or deodorant  Brush your teeth and use your CHG dental rinse if prescribed  Wear loose comfortable clothing  Avoid make-up  Remove  jewelry and piercings, consider professional piercing removal with a plastic spacer if needed  Bring photo ID and Insurance card  Bring an accurate medication list that includes medication dose, frequency and allergies  Bring a copy of your advanced directives (will, health care power of )  Bring any devices and controllers as well as medical devices you have been provided with for surgery (CPAP, slings, braces, etc.)  Dentures, eyeglasses, and contacts will be removed before surgery, please bring cases for contacts or glasses

## 2024-12-13 ENCOUNTER — HOSPITAL ENCOUNTER (EMERGENCY)
Facility: HOSPITAL | Age: 54
Discharge: OTHER INSTITUTION | End: 2024-12-13
Payer: COMMERCIAL

## 2024-12-13 ENCOUNTER — APPOINTMENT (OUTPATIENT)
Dept: RADIOLOGY | Facility: HOSPITAL | Age: 54
End: 2024-12-13
Payer: COMMERCIAL

## 2024-12-13 ENCOUNTER — HOSPITAL ENCOUNTER (INPATIENT)
Facility: HOSPITAL | Age: 54
LOS: 2 days | Discharge: ANOTHER HOSPITAL | End: 2024-12-15
Attending: INTERNAL MEDICINE | Admitting: INTERNAL MEDICINE
Payer: COMMERCIAL

## 2024-12-13 ENCOUNTER — PRE-ADMISSION TESTING (OUTPATIENT)
Dept: PREADMISSION TESTING | Facility: HOSPITAL | Age: 54
End: 2024-12-13
Payer: COMMERCIAL

## 2024-12-13 VITALS
WEIGHT: 186.51 LBS | HEART RATE: 101 BPM | TEMPERATURE: 99.9 F | RESPIRATION RATE: 22 BRPM | DIASTOLIC BLOOD PRESSURE: 70 MMHG | BODY MASS INDEX: 26.7 KG/M2 | SYSTOLIC BLOOD PRESSURE: 108 MMHG | HEIGHT: 70 IN | OXYGEN SATURATION: 98 %

## 2024-12-13 DIAGNOSIS — D49.4 BLADDER TUMOR: ICD-10-CM

## 2024-12-13 DIAGNOSIS — Z85.21 HISTORY OF LARYNGEAL CANCER: ICD-10-CM

## 2024-12-13 DIAGNOSIS — J96.10 CHRONIC RESPIRATORY FAILURE, UNSPECIFIED WHETHER WITH HYPOXIA OR HYPERCAPNIA: ICD-10-CM

## 2024-12-13 DIAGNOSIS — R90.89 ABNORMAL BRAIN CT: ICD-10-CM

## 2024-12-13 DIAGNOSIS — E87.6 HYPOKALEMIA: Primary | ICD-10-CM

## 2024-12-13 DIAGNOSIS — J45.40 MODERATE PERSISTENT ASTHMA WITHOUT COMPLICATION (HHS-HCC): ICD-10-CM

## 2024-12-13 DIAGNOSIS — C77.0 SECONDARY AND UNSPECIFIED MALIGNANT NEOPLASM OF LYMPH NODES OF HEAD, FACE AND NECK: ICD-10-CM

## 2024-12-13 DIAGNOSIS — Z86.711 HISTORY OF PULMONARY EMBOLUS (PE): ICD-10-CM

## 2024-12-13 DIAGNOSIS — K86.1 OTHER CHRONIC PANCREATITIS: ICD-10-CM

## 2024-12-13 DIAGNOSIS — I10 HYPERTENSION, UNSPECIFIED TYPE: ICD-10-CM

## 2024-12-13 DIAGNOSIS — Z86.711 HISTORY OF PULMONARY EMBOLISM: Primary | ICD-10-CM

## 2024-12-13 DIAGNOSIS — Z86.718 PERSONAL HISTORY OF OTHER VENOUS THROMBOSIS AND EMBOLISM: ICD-10-CM

## 2024-12-13 DIAGNOSIS — F10.10 ALCOHOL ABUSE: ICD-10-CM

## 2024-12-13 DIAGNOSIS — R29.6 FREQUENT FALLS: ICD-10-CM

## 2024-12-13 LAB
ANION GAP SERPL CALC-SCNC: 17 MMOL/L (ref 10–20)
BUN SERPL-MCNC: 6 MG/DL (ref 6–23)
CALCIUM SERPL-MCNC: 9.4 MG/DL (ref 8.6–10.3)
CHLORIDE SERPL-SCNC: 95 MMOL/L (ref 98–107)
CO2 SERPL-SCNC: 26 MMOL/L (ref 21–32)
CREAT SERPL-MCNC: 0.68 MG/DL (ref 0.5–1.3)
EGFRCR SERPLBLD CKD-EPI 2021: >90 ML/MIN/1.73M*2
ERYTHROCYTE [DISTWIDTH] IN BLOOD BY AUTOMATED COUNT: 17.4 % (ref 11.5–14.5)
ETHANOL SERPL-MCNC: 87 MG/DL
GLUCOSE SERPL-MCNC: 92 MG/DL (ref 74–99)
HCT VFR BLD AUTO: 40 % (ref 41–52)
HGB BLD-MCNC: 13.2 G/DL (ref 13.5–17.5)
INR PPP: 1.1 (ref 0.9–1.1)
MAGNESIUM SERPL-MCNC: 1.78 MG/DL (ref 1.6–2.4)
MCH RBC QN AUTO: 28.1 PG (ref 26–34)
MCHC RBC AUTO-ENTMCNC: 33 G/DL (ref 32–36)
MCV RBC AUTO: 85 FL (ref 80–100)
NRBC BLD-RTO: 0 /100 WBCS (ref 0–0)
PLATELET # BLD AUTO: 314 X10*3/UL (ref 150–450)
POTASSIUM SERPL-SCNC: 2.9 MMOL/L (ref 3.5–5.3)
PROTHROMBIN TIME: 12 SECONDS (ref 9.8–12.8)
RBC # BLD AUTO: 4.7 X10*6/UL (ref 4.5–5.9)
SODIUM SERPL-SCNC: 135 MMOL/L (ref 136–145)
WBC # BLD AUTO: 6.8 X10*3/UL (ref 4.4–11.3)

## 2024-12-13 PROCEDURE — 36415 COLL VENOUS BLD VENIPUNCTURE: CPT

## 2024-12-13 PROCEDURE — 99281 EMR DPT VST MAYX REQ PHY/QHP: CPT

## 2024-12-13 PROCEDURE — 85610 PROTHROMBIN TIME: CPT

## 2024-12-13 PROCEDURE — 83735 ASSAY OF MAGNESIUM: CPT | Performed by: NURSE PRACTITIONER

## 2024-12-13 PROCEDURE — 70450 CT HEAD/BRAIN W/O DYE: CPT

## 2024-12-13 PROCEDURE — 99223 1ST HOSP IP/OBS HIGH 75: CPT | Performed by: INTERNAL MEDICINE

## 2024-12-13 PROCEDURE — 1100000001 HC PRIVATE ROOM DAILY

## 2024-12-13 PROCEDURE — 2500000002 HC RX 250 W HCPCS SELF ADMINISTERED DRUGS (ALT 637 FOR MEDICARE OP, ALT 636 FOR OP/ED): Performed by: NURSE PRACTITIONER

## 2024-12-13 PROCEDURE — 94760 N-INVAS EAR/PLS OXIMETRY 1: CPT

## 2024-12-13 PROCEDURE — 2500000004 HC RX 250 GENERAL PHARMACY W/ HCPCS (ALT 636 FOR OP/ED): Performed by: NURSE PRACTITIONER

## 2024-12-13 PROCEDURE — 82077 ASSAY SPEC XCP UR&BREATH IA: CPT | Performed by: NURSE PRACTITIONER

## 2024-12-13 PROCEDURE — 2500000001 HC RX 250 WO HCPCS SELF ADMINISTERED DRUGS (ALT 637 FOR MEDICARE OP): Performed by: NURSE PRACTITIONER

## 2024-12-13 PROCEDURE — 70450 CT HEAD/BRAIN W/O DYE: CPT | Performed by: STUDENT IN AN ORGANIZED HEALTH CARE EDUCATION/TRAINING PROGRAM

## 2024-12-13 PROCEDURE — S4991 NICOTINE PATCH NONLEGEND: HCPCS | Performed by: NURSE PRACTITIONER

## 2024-12-13 PROCEDURE — 4500999001 HC ED NO CHARGE

## 2024-12-13 PROCEDURE — 94667 MNPJ CHEST WALL 1ST: CPT

## 2024-12-13 PROCEDURE — 85027 COMPLETE CBC AUTOMATED: CPT

## 2024-12-13 PROCEDURE — 82435 ASSAY OF BLOOD CHLORIDE: CPT

## 2024-12-13 PROCEDURE — 2500000005 HC RX 250 GENERAL PHARMACY W/O HCPCS: Performed by: INTERNAL MEDICINE

## 2024-12-13 PROCEDURE — 9420000001 HC RT PATIENT EDUCATION 5 MIN

## 2024-12-13 RX ORDER — LIDOCAINE 560 MG/1
1 PATCH PERCUTANEOUS; TOPICAL; TRANSDERMAL DAILY
Status: DISCONTINUED | OUTPATIENT
Start: 2024-12-13 | End: 2024-12-15 | Stop reason: HOSPADM

## 2024-12-13 RX ORDER — CYCLOBENZAPRINE HCL 10 MG
10 TABLET ORAL 3 TIMES DAILY PRN
Status: DISCONTINUED | OUTPATIENT
Start: 2024-12-13 | End: 2024-12-15 | Stop reason: HOSPADM

## 2024-12-13 RX ORDER — POTASSIUM CHLORIDE 1.5 G/1.58G
40 POWDER, FOR SOLUTION ORAL ONCE
Status: COMPLETED | OUTPATIENT
Start: 2024-12-13 | End: 2024-12-14

## 2024-12-13 RX ORDER — LANOLIN ALCOHOL/MO/W.PET/CERES
100 CREAM (GRAM) TOPICAL DAILY
COMMUNITY

## 2024-12-13 RX ORDER — ASPIRIN 81 MG/1
81 TABLET ORAL DAILY
COMMUNITY
End: 2024-12-15 | Stop reason: HOSPADM

## 2024-12-13 RX ORDER — POTASSIUM CHLORIDE 20 MEQ/1
40 TABLET, EXTENDED RELEASE ORAL 2 TIMES DAILY
Status: DISCONTINUED | OUTPATIENT
Start: 2024-12-13 | End: 2024-12-13

## 2024-12-13 RX ORDER — MIRTAZAPINE 15 MG/1
15 TABLET, FILM COATED ORAL NIGHTLY
Status: DISCONTINUED | OUTPATIENT
Start: 2024-12-13 | End: 2024-12-15 | Stop reason: HOSPADM

## 2024-12-13 RX ORDER — POLYETHYLENE GLYCOL 3350 17 G/17G
17 POWDER, FOR SOLUTION ORAL 3 TIMES DAILY PRN
COMMUNITY

## 2024-12-13 RX ORDER — LORAZEPAM 2 MG/ML
0.5 INJECTION INTRAMUSCULAR EVERY 2 HOUR PRN
Status: DISCONTINUED | OUTPATIENT
Start: 2024-12-13 | End: 2024-12-15 | Stop reason: HOSPADM

## 2024-12-13 RX ORDER — LIDOCAINE 50 MG/G
1 PATCH TOPICAL DAILY
COMMUNITY

## 2024-12-13 RX ORDER — IPRATROPIUM BROMIDE AND ALBUTEROL SULFATE 2.5; .5 MG/3ML; MG/3ML
3 SOLUTION RESPIRATORY (INHALATION) EVERY 6 HOURS PRN
Status: DISCONTINUED | OUTPATIENT
Start: 2024-12-13 | End: 2024-12-13

## 2024-12-13 RX ORDER — AMMONIUM LACTATE 12 G/100G
LOTION TOPICAL AS NEEDED
COMMUNITY
End: 2024-12-15 | Stop reason: HOSPADM

## 2024-12-13 RX ORDER — LIDOCAINE HYDROCHLORIDE 20 MG/ML
15 SOLUTION OROPHARYNGEAL
Status: DISCONTINUED | OUTPATIENT
Start: 2024-12-13 | End: 2024-12-15 | Stop reason: HOSPADM

## 2024-12-13 RX ORDER — POTASSIUM CHLORIDE 1.5 G/1.58G
40 POWDER, FOR SOLUTION ORAL ONCE
Status: COMPLETED | OUTPATIENT
Start: 2024-12-13 | End: 2024-12-13

## 2024-12-13 RX ORDER — POLYETHYLENE GLYCOL 3350 17 G/17G
17 POWDER, FOR SOLUTION ORAL DAILY PRN
Status: DISCONTINUED | OUTPATIENT
Start: 2024-12-13 | End: 2024-12-15 | Stop reason: HOSPADM

## 2024-12-13 RX ORDER — ONDANSETRON HYDROCHLORIDE 8 MG/1
8 TABLET, FILM COATED ORAL EVERY 8 HOURS PRN
COMMUNITY
End: 2024-12-15 | Stop reason: HOSPADM

## 2024-12-13 RX ORDER — LORAZEPAM 2 MG/ML
2 INJECTION INTRAMUSCULAR EVERY 2 HOUR PRN
Status: DISCONTINUED | OUTPATIENT
Start: 2024-12-13 | End: 2024-12-15 | Stop reason: HOSPADM

## 2024-12-13 RX ORDER — PANTOPRAZOLE SODIUM 40 MG/1
40 TABLET, DELAYED RELEASE ORAL
Status: DISCONTINUED | OUTPATIENT
Start: 2024-12-14 | End: 2024-12-15 | Stop reason: HOSPADM

## 2024-12-13 RX ORDER — HEPARIN SODIUM 10000 [USP'U]/100ML
0-4000 INJECTION, SOLUTION INTRAVENOUS CONTINUOUS
Status: DISCONTINUED | OUTPATIENT
Start: 2024-12-13 | End: 2024-12-15 | Stop reason: HOSPADM

## 2024-12-13 RX ORDER — PSYLLIUM HUSK 0.4 G
1 CAPSULE ORAL 2 TIMES DAILY
COMMUNITY
End: 2024-12-15 | Stop reason: HOSPADM

## 2024-12-13 RX ORDER — ONDANSETRON HYDROCHLORIDE 2 MG/ML
4 INJECTION, SOLUTION INTRAVENOUS EVERY 8 HOURS PRN
Status: DISCONTINUED | OUTPATIENT
Start: 2024-12-13 | End: 2024-12-15 | Stop reason: HOSPADM

## 2024-12-13 RX ORDER — HYDROCODONE BITARTRATE AND ACETAMINOPHEN 5; 325 MG/1; MG/1
1 TABLET ORAL ONCE
Status: COMPLETED | OUTPATIENT
Start: 2024-12-13 | End: 2024-12-13

## 2024-12-13 RX ORDER — MULTIVIT-MIN/IRON FUM/FOLIC AC 7.5 MG-4
1 TABLET ORAL DAILY
Status: DISCONTINUED | OUTPATIENT
Start: 2024-12-13 | End: 2024-12-15 | Stop reason: HOSPADM

## 2024-12-13 RX ORDER — TOPIRAMATE 25 MG/1
100 TABLET ORAL 2 TIMES DAILY
Status: DISCONTINUED | OUTPATIENT
Start: 2024-12-13 | End: 2024-12-15 | Stop reason: HOSPADM

## 2024-12-13 RX ORDER — TAMSULOSIN HYDROCHLORIDE 0.4 MG/1
0.4 CAPSULE ORAL DAILY
COMMUNITY

## 2024-12-13 RX ORDER — CALCIUM CARBONATE 200(500)MG
1 TABLET,CHEWABLE ORAL DAILY
COMMUNITY

## 2024-12-13 RX ORDER — LANOLIN ALCOHOL/MO/W.PET/CERES
100 CREAM (GRAM) TOPICAL DAILY
Status: DISCONTINUED | OUTPATIENT
Start: 2024-12-13 | End: 2024-12-15 | Stop reason: HOSPADM

## 2024-12-13 RX ORDER — GUAIFENESIN 600 MG/1
600 TABLET, EXTENDED RELEASE ORAL 2 TIMES DAILY
Status: DISCONTINUED | OUTPATIENT
Start: 2024-12-13 | End: 2024-12-15 | Stop reason: HOSPADM

## 2024-12-13 RX ORDER — TAMSULOSIN HYDROCHLORIDE 0.4 MG/1
0.4 CAPSULE ORAL DAILY
Status: DISCONTINUED | OUTPATIENT
Start: 2024-12-13 | End: 2024-12-15 | Stop reason: HOSPADM

## 2024-12-13 RX ORDER — IBUPROFEN 200 MG
1 TABLET ORAL DAILY
Status: DISCONTINUED | OUTPATIENT
Start: 2024-12-13 | End: 2024-12-15 | Stop reason: HOSPADM

## 2024-12-13 RX ORDER — DOCUSATE SODIUM 100 MG/1
100 CAPSULE, LIQUID FILLED ORAL 2 TIMES DAILY
Status: DISCONTINUED | OUTPATIENT
Start: 2024-12-13 | End: 2024-12-15 | Stop reason: HOSPADM

## 2024-12-13 RX ORDER — ATORVASTATIN CALCIUM 20 MG/1
20 TABLET, FILM COATED ORAL NIGHTLY
Status: DISCONTINUED | OUTPATIENT
Start: 2024-12-13 | End: 2024-12-15 | Stop reason: HOSPADM

## 2024-12-13 RX ORDER — MIRTAZAPINE 15 MG/1
15 TABLET, FILM COATED ORAL NIGHTLY
COMMUNITY

## 2024-12-13 RX ORDER — MONTELUKAST SODIUM 10 MG/1
10 TABLET ORAL DAILY
Status: DISCONTINUED | OUTPATIENT
Start: 2024-12-13 | End: 2024-12-15 | Stop reason: HOSPADM

## 2024-12-13 RX ORDER — IPRATROPIUM BROMIDE AND ALBUTEROL SULFATE 2.5; .5 MG/3ML; MG/3ML
3 SOLUTION RESPIRATORY (INHALATION) EVERY 2 HOUR PRN
Status: DISCONTINUED | OUTPATIENT
Start: 2024-12-13 | End: 2024-12-15 | Stop reason: HOSPADM

## 2024-12-13 RX ORDER — ONDANSETRON 4 MG/1
4 TABLET, ORALLY DISINTEGRATING ORAL EVERY 8 HOURS PRN
Status: DISCONTINUED | OUTPATIENT
Start: 2024-12-13 | End: 2024-12-15 | Stop reason: HOSPADM

## 2024-12-13 RX ORDER — ROPINIROLE 0.25 MG/1
0.5 TABLET, FILM COATED ORAL 3 TIMES DAILY
Status: DISCONTINUED | OUTPATIENT
Start: 2024-12-13 | End: 2024-12-15 | Stop reason: HOSPADM

## 2024-12-13 RX ORDER — ACETAMINOPHEN 325 MG/1
650 TABLET ORAL EVERY 6 HOURS PRN
Status: DISCONTINUED | OUTPATIENT
Start: 2024-12-13 | End: 2024-12-15 | Stop reason: HOSPADM

## 2024-12-13 RX ORDER — IPRATROPIUM BROMIDE AND ALBUTEROL SULFATE 2.5; .5 MG/3ML; MG/3ML
3 SOLUTION RESPIRATORY (INHALATION)
Status: DISCONTINUED | OUTPATIENT
Start: 2024-12-13 | End: 2024-12-15 | Stop reason: HOSPADM

## 2024-12-13 RX ORDER — LORAZEPAM 2 MG/ML
1 INJECTION INTRAMUSCULAR EVERY 2 HOUR PRN
Status: DISCONTINUED | OUTPATIENT
Start: 2024-12-13 | End: 2024-12-15 | Stop reason: HOSPADM

## 2024-12-13 RX ORDER — FOLIC ACID 1 MG/1
1 TABLET ORAL DAILY
Status: DISCONTINUED | OUTPATIENT
Start: 2024-12-13 | End: 2024-12-15 | Stop reason: HOSPADM

## 2024-12-13 RX ORDER — FERROUS SULFATE 325(65) MG
325 TABLET ORAL 2 TIMES DAILY
Status: DISCONTINUED | OUTPATIENT
Start: 2024-12-13 | End: 2024-12-15 | Stop reason: HOSPADM

## 2024-12-13 RX ORDER — TALC
3 POWDER (GRAM) TOPICAL NIGHTLY PRN
Status: DISCONTINUED | OUTPATIENT
Start: 2024-12-13 | End: 2024-12-15 | Stop reason: HOSPADM

## 2024-12-13 RX ORDER — NALOXONE HYDROCHLORIDE 0.4 MG/ML
0.4 INJECTION, SOLUTION INTRAMUSCULAR; INTRAVENOUS; SUBCUTANEOUS EVERY 5 MIN PRN
Status: DISCONTINUED | OUTPATIENT
Start: 2024-12-13 | End: 2024-12-15 | Stop reason: HOSPADM

## 2024-12-13 RX ORDER — IBUPROFEN 200 MG
1 TABLET ORAL EVERY 24 HOURS
COMMUNITY

## 2024-12-13 RX ADMIN — Medication 1 TABLET: at 18:51

## 2024-12-13 RX ADMIN — MONTELUKAST 10 MG: 10 TABLET, FILM COATED ORAL at 20:58

## 2024-12-13 RX ADMIN — LORAZEPAM 2 MG: 2 INJECTION INTRAMUSCULAR; INTRAVENOUS at 18:50

## 2024-12-13 RX ADMIN — NICOTINE 1 PATCH: 14 PATCH, EXTENDED RELEASE TRANSDERMAL at 18:51

## 2024-12-13 RX ADMIN — HYDROCODONE BITARTRATE AND ACETAMINOPHEN 1 TABLET: 5; 325 TABLET ORAL at 20:58

## 2024-12-13 RX ADMIN — PREGABALIN 200 MG: 75 CAPSULE ORAL at 20:58

## 2024-12-13 RX ADMIN — Medication 3 L/MIN: at 19:50

## 2024-12-13 RX ADMIN — POTASSIUM CHLORIDE 40 MEQ: 1.5 POWDER, FOR SOLUTION ORAL at 18:51

## 2024-12-13 RX ADMIN — HEPARIN SODIUM 1000 UNITS/HR: 10000 INJECTION, SOLUTION INTRAVENOUS at 21:01

## 2024-12-13 RX ADMIN — TAMSULOSIN HYDROCHLORIDE 0.4 MG: 0.4 CAPSULE ORAL at 20:58

## 2024-12-13 RX ADMIN — DOCUSATE SODIUM 100 MG: 100 CAPSULE, LIQUID FILLED ORAL at 20:58

## 2024-12-13 RX ADMIN — LORAZEPAM 2 MG: 2 INJECTION INTRAMUSCULAR; INTRAVENOUS at 21:15

## 2024-12-13 RX ADMIN — MIRTAZAPINE 15 MG: 15 TABLET, FILM COATED ORAL at 20:58

## 2024-12-13 RX ADMIN — ROPINIROLE HYDROCHLORIDE 0.5 MG: 0.25 TABLET, FILM COATED ORAL at 20:59

## 2024-12-13 RX ADMIN — ACETAMINOPHEN 650 MG: 325 TABLET ORAL at 18:51

## 2024-12-13 RX ADMIN — PANCRELIPASE 2 CAPSULE: 15000; 3000; 9500 CAPSULE, DELAYED RELEASE ORAL at 20:59

## 2024-12-13 RX ADMIN — Medication 3 L/MIN: at 22:46

## 2024-12-13 RX ADMIN — FERROUS SULFATE TAB 325 MG (65 MG ELEMENTAL FE) 325 MG: 325 (65 FE) TAB at 20:58

## 2024-12-13 RX ADMIN — TOPIRAMATE 100 MG: 25 TABLET, FILM COATED ORAL at 20:57

## 2024-12-13 RX ADMIN — THIAMINE HCL TAB 100 MG 100 MG: 100 TAB at 18:51

## 2024-12-13 RX ADMIN — ATORVASTATIN CALCIUM 20 MG: 20 TABLET, FILM COATED ORAL at 20:59

## 2024-12-13 RX ADMIN — FOLIC ACID 1 MG: 1 TABLET ORAL at 18:51

## 2024-12-13 SDOH — SOCIAL STABILITY: SOCIAL INSECURITY: ARE THERE ANY APPARENT SIGNS OF INJURIES/BEHAVIORS THAT COULD BE RELATED TO ABUSE/NEGLECT?: NO

## 2024-12-13 SDOH — SOCIAL STABILITY: SOCIAL INSECURITY: DO YOU FEEL ANYONE HAS EXPLOITED OR TAKEN ADVANTAGE OF YOU FINANCIALLY OR OF YOUR PERSONAL PROPERTY?: NO

## 2024-12-13 SDOH — ECONOMIC STABILITY: HOUSING INSECURITY: AT ANY TIME IN THE PAST 12 MONTHS, WERE YOU HOMELESS OR LIVING IN A SHELTER (INCLUDING NOW)?: NO

## 2024-12-13 SDOH — SOCIAL STABILITY: SOCIAL INSECURITY: ABUSE: ADULT

## 2024-12-13 SDOH — HEALTH STABILITY: MENTAL HEALTH: HOW OFTEN DO YOU HAVE SIX OR MORE DRINKS ON ONE OCCASION?: DAILY OR ALMOST DAILY

## 2024-12-13 SDOH — ECONOMIC STABILITY: INCOME INSECURITY: IN THE PAST 12 MONTHS HAS THE ELECTRIC, GAS, OIL, OR WATER COMPANY THREATENED TO SHUT OFF SERVICES IN YOUR HOME?: NO

## 2024-12-13 SDOH — ECONOMIC STABILITY: FOOD INSECURITY: WITHIN THE PAST 12 MONTHS, THE FOOD YOU BOUGHT JUST DIDN'T LAST AND YOU DIDN'T HAVE MONEY TO GET MORE.: SOMETIMES TRUE

## 2024-12-13 SDOH — HEALTH STABILITY: MENTAL HEALTH: HOW OFTEN DO YOU HAVE A DRINK CONTAINING ALCOHOL?: 4 OR MORE TIMES A WEEK

## 2024-12-13 SDOH — SOCIAL STABILITY: SOCIAL INSECURITY: WITHIN THE LAST YEAR, HAVE YOU BEEN HUMILIATED OR EMOTIONALLY ABUSED IN OTHER WAYS BY YOUR PARTNER OR EX-PARTNER?: NO

## 2024-12-13 SDOH — ECONOMIC STABILITY: FOOD INSECURITY: HOW HARD IS IT FOR YOU TO PAY FOR THE VERY BASICS LIKE FOOD, HOUSING, MEDICAL CARE, AND HEATING?: SOMEWHAT HARD

## 2024-12-13 SDOH — SOCIAL STABILITY: SOCIAL INSECURITY: WITHIN THE LAST YEAR, HAVE YOU BEEN AFRAID OF YOUR PARTNER OR EX-PARTNER?: NO

## 2024-12-13 SDOH — ECONOMIC STABILITY: FOOD INSECURITY
WITHIN THE PAST 12 MONTHS, YOU WORRIED THAT YOUR FOOD WOULD RUN OUT BEFORE YOU GOT THE MONEY TO BUY MORE.: SOMETIMES TRUE

## 2024-12-13 SDOH — HEALTH STABILITY: MENTAL HEALTH: HOW MANY DRINKS CONTAINING ALCOHOL DO YOU HAVE ON A TYPICAL DAY WHEN YOU ARE DRINKING?: 5 OR 6

## 2024-12-13 SDOH — ECONOMIC STABILITY: HOUSING INSECURITY: IN THE LAST 12 MONTHS, WAS THERE A TIME WHEN YOU WERE NOT ABLE TO PAY THE MORTGAGE OR RENT ON TIME?: NO

## 2024-12-13 SDOH — SOCIAL STABILITY: SOCIAL INSECURITY: HAVE YOU HAD ANY THOUGHTS OF HARMING ANYONE ELSE?: NO

## 2024-12-13 SDOH — ECONOMIC STABILITY: HOUSING INSECURITY: IN THE PAST 12 MONTHS, HOW MANY TIMES HAVE YOU MOVED WHERE YOU WERE LIVING?: 0

## 2024-12-13 SDOH — SOCIAL STABILITY: SOCIAL INSECURITY: WERE YOU ABLE TO COMPLETE ALL THE BEHAVIORAL HEALTH SCREENINGS?: YES

## 2024-12-13 SDOH — SOCIAL STABILITY: SOCIAL INSECURITY: ARE YOU OR HAVE YOU BEEN THREATENED OR ABUSED PHYSICALLY, EMOTIONALLY, OR SEXUALLY BY ANYONE?: NO

## 2024-12-13 SDOH — SOCIAL STABILITY: SOCIAL INSECURITY: HAVE YOU HAD THOUGHTS OF HARMING ANYONE ELSE?: NO

## 2024-12-13 SDOH — SOCIAL STABILITY: SOCIAL INSECURITY: DO YOU FEEL UNSAFE GOING BACK TO THE PLACE WHERE YOU ARE LIVING?: NO

## 2024-12-13 SDOH — SOCIAL STABILITY: SOCIAL INSECURITY: HAS ANYONE EVER THREATENED TO HURT YOUR FAMILY OR YOUR PETS?: NO

## 2024-12-13 SDOH — ECONOMIC STABILITY: TRANSPORTATION INSECURITY: IN THE PAST 12 MONTHS, HAS LACK OF TRANSPORTATION KEPT YOU FROM MEDICAL APPOINTMENTS OR FROM GETTING MEDICATIONS?: YES

## 2024-12-13 SDOH — SOCIAL STABILITY: SOCIAL INSECURITY: DOES ANYONE TRY TO KEEP YOU FROM HAVING/CONTACTING OTHER FRIENDS OR DOING THINGS OUTSIDE YOUR HOME?: NO

## 2024-12-13 ASSESSMENT — ENCOUNTER SYMPTOMS
LIMITED RANGE OF MOTION: 1
ARTHRALGIAS: 1
SHORTNESS OF BREATH: 1
LIGHT-HEADEDNESS: 1
FEVER: 1
DYSPNEA WITH EXERTION: 1
NAUSEA: 1
WOUND: 1
DIFFICULTY URINATING: 1
COUGH: 1
DYSPNEA AT REST: 1
TREMORS: 1
SINUS CONGESTION: 1
SKIN CHANGES: 1
CHILLS: 1
UNEXPECTED WEIGHT CHANGE: 1
BRUISES/BLEEDS EASILY: 1
BLOOD IN STOOL: 0
CONFUSION: 1
ABDOMINAL PAIN: 1
NUMBNESS: 1
WEAKNESS: 1
AGITATION: 1
VOICE CHANGE: 1
PALPITATIONS: 0
DYSURIA: 1
TROUBLE SWALLOWING: 1
VOMITING: 1
RHINORRHEA: 1
MYALGIAS: 1

## 2024-12-13 ASSESSMENT — PAIN SCALES - GENERAL
PAINLEVEL_OUTOF10: 4
PAINLEVEL_OUTOF10: 8
PAINLEVEL_OUTOF10: 5 - MODERATE PAIN
PAINLEVEL_OUTOF10: 8

## 2024-12-13 ASSESSMENT — COGNITIVE AND FUNCTIONAL STATUS - GENERAL
MOVING TO AND FROM BED TO CHAIR: A LOT
TOILETING: A LITTLE
DRESSING REGULAR UPPER BODY CLOTHING: A LITTLE
PERSONAL GROOMING: A LITTLE
WALKING IN HOSPITAL ROOM: A LOT
MOBILITY SCORE: 14
DRESSING REGULAR LOWER BODY CLOTHING: A LITTLE
MOVING FROM LYING ON BACK TO SITTING ON SIDE OF FLAT BED WITH BEDRAILS: A LITTLE
TOILETING: A LITTLE
DRESSING REGULAR UPPER BODY CLOTHING: A LITTLE
PATIENT BASELINE BEDBOUND: NO
WALKING IN HOSPITAL ROOM: A LOT
DAILY ACTIVITIY SCORE: 19
CLIMB 3 TO 5 STEPS WITH RAILING: A LOT
TURNING FROM BACK TO SIDE WHILE IN FLAT BAD: A LITTLE
DRESSING REGULAR LOWER BODY CLOTHING: A LITTLE
PERSONAL GROOMING: A LITTLE
MOBILITY SCORE: 14
HELP NEEDED FOR BATHING: A LITTLE
WALKING IN HOSPITAL ROOM: A LOT
HELP NEEDED FOR BATHING: A LITTLE
MOVING FROM LYING ON BACK TO SITTING ON SIDE OF FLAT BED WITH BEDRAILS: A LITTLE
TOILETING: A LITTLE
CLIMB 3 TO 5 STEPS WITH RAILING: TOTAL
PATIENT BASELINE BEDBOUND: NO
DAILY ACTIVITIY SCORE: 19
MOVING TO AND FROM BED TO CHAIR: A LOT
CLIMB 3 TO 5 STEPS WITH RAILING: A LOT
CLIMB 3 TO 5 STEPS WITH RAILING: TOTAL
MOVING TO AND FROM BED TO CHAIR: A LOT
MOVING TO AND FROM BED TO CHAIR: A LOT
TURNING FROM BACK TO SIDE WHILE IN FLAT BAD: A LITTLE
DRESSING REGULAR LOWER BODY CLOTHING: A LITTLE
DRESSING REGULAR UPPER BODY CLOTHING: A LITTLE
PATIENT BASELINE BEDBOUND: NO
STANDING UP FROM CHAIR USING ARMS: A LOT
TOILETING: A LITTLE
STANDING UP FROM CHAIR USING ARMS: A LOT
PATIENT BASELINE BEDBOUND: NO
STANDING UP FROM CHAIR USING ARMS: A LOT
DAILY ACTIVITIY SCORE: 19
WALKING IN HOSPITAL ROOM: A LOT
PERSONAL GROOMING: A LITTLE
HELP NEEDED FOR BATHING: A LITTLE
TURNING FROM BACK TO SIDE WHILE IN FLAT BAD: A LITTLE
DAILY ACTIVITIY SCORE: 19
TURNING FROM BACK TO SIDE WHILE IN FLAT BAD: A LITTLE
PERSONAL GROOMING: A LITTLE
HELP NEEDED FOR BATHING: A LITTLE
DRESSING REGULAR UPPER BODY CLOTHING: A LITTLE
MOBILITY SCORE: 14
DRESSING REGULAR LOWER BODY CLOTHING: A LITTLE
STANDING UP FROM CHAIR USING ARMS: A LOT

## 2024-12-13 ASSESSMENT — LIFESTYLE VARIABLES
SMOKING_STATUS: SMOKER
NAUSEA AND VOMITING: MILD NAUSEA WITH NO VOMITING
PAROXYSMAL SWEATS: NO SWEAT VISIBLE
TOTAL SCORE: 10
AUDITORY DISTURBANCES: NOT PRESENT
ORIENTATION AND CLOUDING OF SENSORIUM: ORIENTED AND CAN DO SERIAL ADDITIONS
TREMOR: 2
VISUAL DISTURBANCES: MODERATELY SEVERE HALLUCINATIONS
HEADACHE, FULLNESS IN HEAD: VERY MILD
TREMOR: NO TREMOR
HEADACHE, FULLNESS IN HEAD: MODERATE
AUDIT-C TOTAL SCORE: 10
AGITATION: 2
PAROXYSMAL SWEATS: BARELY PERCEPTIBLE SWEATING, PALMS MOIST
ANXIETY: MODERATELY ANXIOUS, OR GUARDED, SO ANXIETY IS INFERRED
ORIENTATION AND CLOUDING OF SENSORIUM: ORIENTED AND CAN DO SERIAL ADDITIONS
NAUSEA AND VOMITING: MILD NAUSEA WITH NO VOMITING
AGITATION: NORMAL ACTIVITY
ANXIETY: 3
AUDITORY DISTURBANCES: NOT PRESENT
TOTAL SCORE: 13
VISUAL DISTURBANCES: VERY MILD SENSITIVITY
SKIP TO QUESTIONS 9-10: 0

## 2024-12-13 ASSESSMENT — DUKE ACTIVITY SCORE INDEX (DASI)
CAN YOU PARTICIPATE IN MODERATE RECREATIONAL ACTIVITIES LIKE GOLF, BOWLING, DANCING, DOUBLES TENNIS OR THROWING A BASEBALL OR FOOTBALL: NO
CAN YOU WALK A BLOCK OR TWO ON LEVEL GROUND: NO
DASI METS SCORE: 3.1
CAN YOU CLIMB A FLIGHT OF STAIRS OR WALK UP A HILL: NO
CAN YOU PARTICIPATE IN STRENOUS SPORTS LIKE SWIMMING, SINGLES TENNIS, FOOTBALL, BASKETBALL, OR SKIING: NO
CAN YOU DO HEAVY WORK AROUND THE HOUSE LIKE SCRUBBING FLOORS OR LIFTING AND MOVING HEAVY FURNITURE: NO
CAN YOU DO MODERATE WORK AROUND THE HOUSE LIKE VACUUMING, SWEEPING FLOORS OR CARRYING GROCERIES: NO
CAN YOU WALK INDOORS, SUCH AS AROUND YOUR HOUSE: NO
CAN YOU HAVE SEXUAL RELATIONS: NO
CAN YOU DO LIGHT WORK AROUND THE HOUSE LIKE DUSTING OR WASHING DISHES: NO
TOTAL_SCORE: 2.75
CAN YOU RUN A SHORT DISTANCE: NO
CAN YOU DO YARD WORK LIKE RAKING LEAVES, WEEDING OR PUSHING A MOWER: NO
CAN YOU TAKE CARE OF YOURSELF (EAT, DRESS, BATHE, OR USE TOILET): YES

## 2024-12-13 ASSESSMENT — ACTIVITIES OF DAILY LIVING (ADL)
JUDGMENT_ADEQUATE_SAFELY_COMPLETE_DAILY_ACTIVITIES: NO
BATHING: NEEDS ASSISTANCE
ASSISTIVE_DEVICE: WALKER
PATIENT'S MEMORY ADEQUATE TO SAFELY COMPLETE DAILY ACTIVITIES?: YES
TOILETING: NEEDS ASSISTANCE
FEEDING YOURSELF: NEEDS ASSISTANCE
ADEQUATE_TO_COMPLETE_ADL: YES
ADL_SCORE: 8
GROOMING: NEEDS ASSISTANCE
WALKS IN HOME: NEEDS ASSISTANCE
HEARING - LEFT EAR: FUNCTIONAL
LACK_OF_TRANSPORTATION: YES
HEARING - RIGHT EAR: FUNCTIONAL
DRESSING YOURSELF: NEEDS ASSISTANCE

## 2024-12-13 ASSESSMENT — PAIN - FUNCTIONAL ASSESSMENT
PAIN_FUNCTIONAL_ASSESSMENT: 0-10

## 2024-12-13 ASSESSMENT — PATIENT HEALTH QUESTIONNAIRE - PHQ9
SUM OF ALL RESPONSES TO PHQ9 QUESTIONS 1 & 2: 2
2. FEELING DOWN, DEPRESSED OR HOPELESS: SEVERAL DAYS
1. LITTLE INTEREST OR PLEASURE IN DOING THINGS: SEVERAL DAYS

## 2024-12-13 NOTE — H&P
History Of Present Illness  Mr. Mckay is a 55 y/o male with a medical history of laryngeal cancer, s/p radical neck dissection, PE on Apixaban, and HTN, who presented to [facility] today for surgical clearance for a scheduled cystoscopy with ablation of tissue on 12/17/24 secondary to a bladder tumor. He presented late and intoxicated to his appointment and there was concern of compliance with preop instructions, holding Apixaban, etc., causing further delay in treatment and case was discussed with surgeon, anesthesia, and advised admission to ensure sobriety, medication compliance, and surgical readiness. Direct admission to nursing floor for management. Seen and examined in his room. Sitting up in bed with hand over his right eye and reporting headache and blurred vision since his fall this AM. Feels dizzy and lightheaded. Eye deviation reports is not new. He states he has some burning when he begins to void, frequency, and gets up >1 time per night to urinate. He has scabs on his face from his fall this AM. States falls frequently from his BP going too low. His last alcoholic beverage was round 3 AM today.      Past Medical History  Hypertension  Hyperlipidemia  Orthostatic Hypotension  Pulmonary Embolism on apixaban (11/2023)  Neck Mass/Laryngeal Cancer  GERD  Chronic Pancreatitis  Hepatitis C  Benign Prostate Hypertrophy  Iron Deficiency Anemia  Tobacco Use Disorder  Alcohol Dependence  MRSA Bacteremia  Obstructive Sleep Apnea   Chronic Obstructive Pulmonary Disease  Dysphagia  Low Back Pain with Radiculopathy  Cervicalgia  Anxiety  Restless Leg Syndrome  Tinnitus   Malnutrition    Surgical History  Embolectomy  Left Ankle Surgery  ORIF Right Tib-Fib  Right Ankle Fixation with Revision  Cholecystectomy  Right Radical Neck Dissection  Left Thigh Open Wound Debridement     Social History  He reports that he has been smoking cigarettes. He has quit using smokeless tobacco.  His smokeless tobacco use included  snuff. He reports current alcohol use. He reports current drug use. Drug: Methamphetamines.  Drinks 6 pack of [employer] daily.    Family History  Family History   Problem Relation Name Age of Onset    Diabetes Mother      Hypertension Mother      Heart attack Father          Allergies  Oxycodone    Review of Systems  Constitutional: Denies fever, chills, fatigue, weight loss/gain  HEENT: Denies ear ache, + sore throat, nasal drainage - chronic  Eyes: + blurred vision  Respiratory: + cough, shortness or breath  Cardiovascular: Denies chest pain, palpitations, shortness of breath with exertion, edema  GI: Denies abdominal pain, nausea, vomiting, diarrhea, constipation, bloody stools  : See HPI  Musculoskeletal: Denies joint pain; joint redness, swelling, or tenderness; + neck and back pain  Endocrine: Denies cold/heat intolerance, excessive thirst, excessive hunger  Neuro: See HPI  Psychiatric: Denies anxiety, depression, self-harm  Skin: See HPI  Hematologic: Denies easy bruising, easy bleeding, clotting disorder      Physical Exam  Constitutional: A&O x 3; anxious but cooperative  Eyes: Disconjugated gaze with right eye deviation  HEENT: Normocephalic, Oral mucosa dry.   Neck: Supple. No JVD, lymphadenopathy. Right side surgical changes with graft - no open areas or erythema.   Lungs: Fair air movement with course rhonchi throughout. Respirations even and unlabored on 3 liters.   Heart: RRR  Abdomen: Soft, non-tender, non-distended, +BS  MS/Extremities: [place] equally x 4. No edema. Peripheral pulses intact bilaterally. Healed ankle incisions.   Neuro: A&O x3; no focal deficits except for eye deviation as noted above; gross motor and sensation intact.   Skin: Warm and dry. Nicotine stained fingers. Scabs above/around right eye. Left anterior thigh scabbed area from graft site.  Psych: Normal affect.  Anxious.      Last Recorded Vitals  Blood pressure 129/84, pulse 98, temperature 36.1 °C (97 °F), temperature source  "Temporal, resp. rate 18, height 1.778 m (5' 10\"), weight 83.1 kg (183 lb 1.6 oz), SpO2 97%.    Relevant Results  Results for orders placed or performed during the hospital encounter of 12/13/24 (from the past 24 hours)   Magnesium   Result Value Ref Range    Magnesium 1.78 1.60 - 2.40 mg/dL   Alcohol   Result Value Ref Range    Alcohol 87 (H) <=10 mg/dL      Scheduled medications  atorvastatin, 20 mg, oral, Nightly  docusate sodium, 100 mg, oral, BID  ferrous sulfate, 325 mg, oral, BID  folic acid, 1 mg, oral, Daily  guaiFENesin, 600 mg, oral, BID  heparin, 4,000 Units, intravenous, Once  lidocaine, 15 mL, oral, Before meals & nightly  lidocaine, 1 patch, transdermal, Daily  mirtazapine, 15 mg, oral, Nightly  montelukast, 10 mg, oral, Daily  multivitamin with minerals, 1 tablet, oral, Daily  nicotine, 1 patch, transdermal, Daily  pancrelipase (Lip-Prot-Amyl), 1 capsule, oral, BID  [START ON 12/14/2024] pantoprazole, 40 mg, oral, Daily before breakfast  potassium chloride, 40 mEq, oral, Once  pregabalin, 200 mg, oral, TID  rOPINIRole, 0.5 mg, oral, TID  tamsulosin, 0.4 mg, oral, Daily  thiamine, 100 mg, oral, Daily  topiramate, 100 mg, oral, BID      Continuous medications  heparin, 0-4,000 Units/hr      PRN medications  PRN medications: acetaminophen, cyclobenzaprine, heparin, ipratropium-albuteroL, LORazepam **OR** LORazepam **OR** LORazepam, melatonin, naloxone, ondansetron ODT **OR** ondansetron, polyethylene glycol        Assessment/Plan   55 y/o male with a medical history of laryngeal cancer, s/p radical neck dissection, PE on Apixaban, and HTN, who presented to [facility] for surgical clearance for a scheduled cystoscopy with ablation of tissue on 12/17/24. He presented intoxicated and there was concern of compliance with preop instructions and was admitted to ensure sobriety, medication compliance, and surgical readiness.   Assessment & Plan  Hypokalemia      Electrolyte Abnormalities with Hypokalemia  -Likely " provoked by malnutrition and alcohol use disorder  -Check magnesium and replace if needed  -Will give 80 mEq today (40 mEq in 2 divided doses) and recheck in AM  -Monitor on telemetry    Headache with Visual Changes  -Reports fall this AM hitting his head  -Will get CT Head STAT to r/o intracranial process, bleed, as is on Apixaban  -No other focal deficits except for right eye deviation which he states is not new   -Will consult neurology if needed  -Will give one time dose of Norco for pain (oxycodone allergy)    Bladder Tumor  -Scheduled for cystoscopy with tissue ablation on 12/17/24 by Dr. Murcia  -Preop clearance labs, UA ordered    Alcohol Use Disorder  -Cessation advised  -Last drink was approximately 3AM  -CIWA protocol with Ativan  -On Thiamine, Folate, MVI    Chronic Hypoxic Respiratory Failure  -H/O COPD/Tobacco Use, HANY  -On baseline 3 liters oxygen  -Add bronchial hygiene, duonebs, mucinex    CV: HTN/Orthostatic Hypotension  -Will get Echocardiogram     Pulmonary Embolism  -S/p embolization 11/2023  -Provoked by surgery  -On Apixaban  -Will need to hold Apixaban for upcoming procedure and will likely need heparin gtt to bridge given his hypercoaguable state if ok with Dr. Murcia and no evidence of hemorrhage on CT Head    Tobacco Use Disorder  -Added Nicotine Patch  -Smoking Cessation advised    GERD  -On PPI    DVT Prophylaxis  -See above.     Disposition  -Plan of care discussed with attending, Dr. Smith.  -Requests to be full code.   -Requires inpatient acute care for management of electrolyte abnormalities, alcohol intoxication. ELOS>48 hours.      -> 75 minutes spent in coordination of care, including physical examination, review of chart, and discussion with pertinent staff.         Kathryn Zelaya, APRN-CNP

## 2024-12-14 ENCOUNTER — APPOINTMENT (OUTPATIENT)
Dept: RADIOLOGY | Facility: HOSPITAL | Age: 54
End: 2024-12-14
Payer: COMMERCIAL

## 2024-12-14 LAB
ALBUMIN SERPL BCP-MCNC: 3.5 G/DL (ref 3.4–5)
ALP SERPL-CCNC: 102 U/L (ref 33–120)
ALT SERPL W P-5'-P-CCNC: 20 U/L (ref 10–52)
ANION GAP SERPL CALC-SCNC: 15 MMOL/L (ref 10–20)
APPEARANCE UR: CLEAR
AST SERPL W P-5'-P-CCNC: 17 U/L (ref 9–39)
BILIRUB SERPL-MCNC: 0.5 MG/DL (ref 0–1.2)
BILIRUB UR STRIP.AUTO-MCNC: NEGATIVE MG/DL
BUN SERPL-MCNC: 8 MG/DL (ref 6–23)
CALCIUM SERPL-MCNC: 9.6 MG/DL (ref 8.6–10.3)
CHLORIDE SERPL-SCNC: 98 MMOL/L (ref 98–107)
CO2 SERPL-SCNC: 29 MMOL/L (ref 21–32)
COLOR UR: NORMAL
CREAT SERPL-MCNC: 0.62 MG/DL (ref 0.5–1.3)
EGFRCR SERPLBLD CKD-EPI 2021: >90 ML/MIN/1.73M*2
ERYTHROCYTE [DISTWIDTH] IN BLOOD BY AUTOMATED COUNT: 17.7 % (ref 11.5–14.5)
GLUCOSE SERPL-MCNC: 111 MG/DL (ref 74–99)
GLUCOSE UR STRIP.AUTO-MCNC: NORMAL MG/DL
HCT VFR BLD AUTO: 43.3 % (ref 41–52)
HGB BLD-MCNC: 13.9 G/DL (ref 13.5–17.5)
HOLD SPECIMEN: NORMAL
KETONES UR STRIP.AUTO-MCNC: NEGATIVE MG/DL
LEUKOCYTE ESTERASE UR QL STRIP.AUTO: NEGATIVE
MAGNESIUM SERPL-MCNC: 1.91 MG/DL (ref 1.6–2.4)
MCH RBC QN AUTO: 28.1 PG (ref 26–34)
MCHC RBC AUTO-ENTMCNC: 32.1 G/DL (ref 32–36)
MCV RBC AUTO: 88 FL (ref 80–100)
NITRITE UR QL STRIP.AUTO: NEGATIVE
NRBC BLD-RTO: 0 /100 WBCS (ref 0–0)
PH UR STRIP.AUTO: 7 [PH]
PLATELET # BLD AUTO: 307 X10*3/UL (ref 150–450)
POTASSIUM SERPL-SCNC: 3.7 MMOL/L (ref 3.5–5.3)
PROT SERPL-MCNC: 6.8 G/DL (ref 6.4–8.2)
PROT UR STRIP.AUTO-MCNC: NEGATIVE MG/DL
RBC # BLD AUTO: 4.95 X10*6/UL (ref 4.5–5.9)
RBC # UR STRIP.AUTO: NEGATIVE /UL
SODIUM SERPL-SCNC: 138 MMOL/L (ref 136–145)
SP GR UR STRIP.AUTO: 1.01
UFH PPP CHRO-ACNC: 0.3 IU/ML
UFH PPP CHRO-ACNC: 0.4 IU/ML
UROBILINOGEN UR STRIP.AUTO-MCNC: NORMAL MG/DL
WBC # BLD AUTO: 6.3 X10*3/UL (ref 4.4–11.3)

## 2024-12-14 PROCEDURE — A9575 INJ GADOTERATE MEGLUMI 0.1ML: HCPCS | Performed by: INTERNAL MEDICINE

## 2024-12-14 PROCEDURE — 85027 COMPLETE CBC AUTOMATED: CPT | Performed by: NURSE PRACTITIONER

## 2024-12-14 PROCEDURE — 1100000001 HC PRIVATE ROOM DAILY

## 2024-12-14 PROCEDURE — 99233 SBSQ HOSP IP/OBS HIGH 50: CPT | Performed by: INTERNAL MEDICINE

## 2024-12-14 PROCEDURE — 2500000004 HC RX 250 GENERAL PHARMACY W/ HCPCS (ALT 636 FOR OP/ED): Performed by: NURSE PRACTITIONER

## 2024-12-14 PROCEDURE — 2500000005 HC RX 250 GENERAL PHARMACY W/O HCPCS: Performed by: NURSE PRACTITIONER

## 2024-12-14 PROCEDURE — 94669 MECHANICAL CHEST WALL OSCILL: CPT

## 2024-12-14 PROCEDURE — 94668 MNPJ CHEST WALL SBSQ: CPT

## 2024-12-14 PROCEDURE — 2500000002 HC RX 250 W HCPCS SELF ADMINISTERED DRUGS (ALT 637 FOR MEDICARE OP, ALT 636 FOR OP/ED): Performed by: NURSE PRACTITIONER

## 2024-12-14 PROCEDURE — 36415 COLL VENOUS BLD VENIPUNCTURE: CPT | Performed by: NURSE PRACTITIONER

## 2024-12-14 PROCEDURE — 2500000001 HC RX 250 WO HCPCS SELF ADMINISTERED DRUGS (ALT 637 FOR MEDICARE OP): Performed by: NURSE PRACTITIONER

## 2024-12-14 PROCEDURE — 80053 COMPREHEN METABOLIC PANEL: CPT | Performed by: NURSE PRACTITIONER

## 2024-12-14 PROCEDURE — 70553 MRI BRAIN STEM W/O & W/DYE: CPT | Performed by: RADIOLOGY

## 2024-12-14 PROCEDURE — 70553 MRI BRAIN STEM W/O & W/DYE: CPT

## 2024-12-14 PROCEDURE — 2500000002 HC RX 250 W HCPCS SELF ADMINISTERED DRUGS (ALT 637 FOR MEDICARE OP, ALT 636 FOR OP/ED): Performed by: INTERNAL MEDICINE

## 2024-12-14 PROCEDURE — 81003 URINALYSIS AUTO W/O SCOPE: CPT | Performed by: NURSE PRACTITIONER

## 2024-12-14 PROCEDURE — 94640 AIRWAY INHALATION TREATMENT: CPT

## 2024-12-14 PROCEDURE — 2550000001 HC RX 255 CONTRASTS: Performed by: INTERNAL MEDICINE

## 2024-12-14 PROCEDURE — 94760 N-INVAS EAR/PLS OXIMETRY 1: CPT

## 2024-12-14 PROCEDURE — S4991 NICOTINE PATCH NONLEGEND: HCPCS | Performed by: NURSE PRACTITIONER

## 2024-12-14 PROCEDURE — 85520 HEPARIN ASSAY: CPT | Performed by: NURSE PRACTITIONER

## 2024-12-14 PROCEDURE — 99223 1ST HOSP IP/OBS HIGH 75: CPT | Performed by: STUDENT IN AN ORGANIZED HEALTH CARE EDUCATION/TRAINING PROGRAM

## 2024-12-14 PROCEDURE — 2500000004 HC RX 250 GENERAL PHARMACY W/ HCPCS (ALT 636 FOR OP/ED): Performed by: INTERNAL MEDICINE

## 2024-12-14 PROCEDURE — 83735 ASSAY OF MAGNESIUM: CPT | Performed by: NURSE PRACTITIONER

## 2024-12-14 RX ORDER — GADOTERATE MEGLUMINE 376.9 MG/ML
0.2 INJECTION INTRAVENOUS
Status: COMPLETED | OUTPATIENT
Start: 2024-12-14 | End: 2024-12-14

## 2024-12-14 RX ORDER — HYDROMORPHONE HYDROCHLORIDE 1 MG/ML
1 INJECTION, SOLUTION INTRAMUSCULAR; INTRAVENOUS; SUBCUTANEOUS EVERY 4 HOURS PRN
Status: DISCONTINUED | OUTPATIENT
Start: 2024-12-14 | End: 2024-12-15 | Stop reason: HOSPADM

## 2024-12-14 RX ORDER — MORPHINE SULFATE 2 MG/ML
2 INJECTION, SOLUTION INTRAMUSCULAR; INTRAVENOUS ONCE
Status: COMPLETED | OUTPATIENT
Start: 2024-12-14 | End: 2024-12-14

## 2024-12-14 RX ADMIN — TOPIRAMATE 100 MG: 25 TABLET, FILM COATED ORAL at 21:11

## 2024-12-14 RX ADMIN — NICOTINE 1 PATCH: 14 PATCH, EXTENDED RELEASE TRANSDERMAL at 08:28

## 2024-12-14 RX ADMIN — ROPINIROLE HYDROCHLORIDE 0.5 MG: 0.25 TABLET, FILM COATED ORAL at 08:29

## 2024-12-14 RX ADMIN — PREGABALIN 200 MG: 75 CAPSULE ORAL at 21:11

## 2024-12-14 RX ADMIN — HEPARIN SODIUM 1000 UNITS/HR: 10000 INJECTION, SOLUTION INTRAVENOUS at 21:58

## 2024-12-14 RX ADMIN — GUAIFENESIN 600 MG: 600 TABLET ORAL at 21:11

## 2024-12-14 RX ADMIN — DOCUSATE SODIUM 100 MG: 100 CAPSULE, LIQUID FILLED ORAL at 08:30

## 2024-12-14 RX ADMIN — LORAZEPAM 1 MG: 2 INJECTION INTRAMUSCULAR; INTRAVENOUS at 21:25

## 2024-12-14 RX ADMIN — FERROUS SULFATE TAB 325 MG (65 MG ELEMENTAL FE) 325 MG: 325 (65 FE) TAB at 08:29

## 2024-12-14 RX ADMIN — LIDOCAINE HYDROCHLORIDE 15 ML: 20 SOLUTION ORAL at 21:11

## 2024-12-14 RX ADMIN — ATORVASTATIN CALCIUM 20 MG: 20 TABLET, FILM COATED ORAL at 21:11

## 2024-12-14 RX ADMIN — LORAZEPAM 2 MG: 2 INJECTION INTRAMUSCULAR; INTRAVENOUS at 06:14

## 2024-12-14 RX ADMIN — IPRATROPIUM BROMIDE AND ALBUTEROL SULFATE 3 ML: 2.5; .5 SOLUTION RESPIRATORY (INHALATION) at 14:24

## 2024-12-14 RX ADMIN — ACETAMINOPHEN 650 MG: 325 TABLET ORAL at 16:06

## 2024-12-14 RX ADMIN — ROPINIROLE HYDROCHLORIDE 0.5 MG: 0.25 TABLET, FILM COATED ORAL at 16:06

## 2024-12-14 RX ADMIN — MONTELUKAST 10 MG: 10 TABLET, FILM COATED ORAL at 08:29

## 2024-12-14 RX ADMIN — TAMSULOSIN HYDROCHLORIDE 0.4 MG: 0.4 CAPSULE ORAL at 08:29

## 2024-12-14 RX ADMIN — POTASSIUM CHLORIDE 40 MEQ: 1.5 POWDER, FOR SOLUTION ORAL at 02:54

## 2024-12-14 RX ADMIN — PREGABALIN 200 MG: 75 CAPSULE ORAL at 16:06

## 2024-12-14 RX ADMIN — MORPHINE SULFATE 2 MG: 2 INJECTION, SOLUTION INTRAMUSCULAR; INTRAVENOUS at 02:58

## 2024-12-14 RX ADMIN — FERROUS SULFATE TAB 325 MG (65 MG ELEMENTAL FE) 325 MG: 325 (65 FE) TAB at 21:11

## 2024-12-14 RX ADMIN — TOPIRAMATE 100 MG: 25 TABLET, FILM COATED ORAL at 08:29

## 2024-12-14 RX ADMIN — MIRTAZAPINE 15 MG: 15 TABLET, FILM COATED ORAL at 21:11

## 2024-12-14 RX ADMIN — Medication 1 TABLET: at 08:29

## 2024-12-14 RX ADMIN — PREGABALIN 200 MG: 75 CAPSULE ORAL at 08:29

## 2024-12-14 RX ADMIN — LORAZEPAM 0.5 MG: 2 INJECTION INTRAMUSCULAR; INTRAVENOUS at 10:55

## 2024-12-14 RX ADMIN — LIDOCAINE HYDROCHLORIDE 15 ML: 20 SOLUTION ORAL at 16:07

## 2024-12-14 RX ADMIN — IPRATROPIUM BROMIDE AND ALBUTEROL SULFATE 3 ML: 2.5; .5 SOLUTION RESPIRATORY (INHALATION) at 08:40

## 2024-12-14 RX ADMIN — GUAIFENESIN 600 MG: 600 TABLET ORAL at 08:29

## 2024-12-14 RX ADMIN — HYDROMORPHONE HYDROCHLORIDE 1 MG: 1 INJECTION, SOLUTION INTRAMUSCULAR; INTRAVENOUS; SUBCUTANEOUS at 21:03

## 2024-12-14 RX ADMIN — HYDROMORPHONE HYDROCHLORIDE 0.5 MG: 1 INJECTION, SOLUTION INTRAMUSCULAR; INTRAVENOUS; SUBCUTANEOUS at 17:17

## 2024-12-14 RX ADMIN — LIDOCAINE 4% 1 PATCH: 40 PATCH TOPICAL at 08:28

## 2024-12-14 RX ADMIN — ROPINIROLE HYDROCHLORIDE 0.5 MG: 0.25 TABLET, FILM COATED ORAL at 21:11

## 2024-12-14 RX ADMIN — ONDANSETRON 4 MG: 2 INJECTION, SOLUTION INTRAMUSCULAR; INTRAVENOUS at 21:03

## 2024-12-14 RX ADMIN — PANCRELIPASE 2 CAPSULE: 15000; 3000; 9500 CAPSULE, DELAYED RELEASE ORAL at 08:29

## 2024-12-14 RX ADMIN — GADOTERATE MEGLUMINE 16 ML: 376.9 INJECTION INTRAVENOUS at 11:42

## 2024-12-14 RX ADMIN — FOLIC ACID 1 MG: 1 TABLET ORAL at 08:30

## 2024-12-14 RX ADMIN — ACETAMINOPHEN 650 MG: 325 TABLET ORAL at 08:30

## 2024-12-14 RX ADMIN — DOCUSATE SODIUM 100 MG: 100 CAPSULE, LIQUID FILLED ORAL at 21:11

## 2024-12-14 RX ADMIN — PANCRELIPASE 2 CAPSULE: 15000; 3000; 9500 CAPSULE, DELAYED RELEASE ORAL at 21:12

## 2024-12-14 RX ADMIN — THIAMINE HCL TAB 100 MG 100 MG: 100 TAB at 08:29

## 2024-12-14 ASSESSMENT — LIFESTYLE VARIABLES
AUDITORY DISTURBANCES: NOT PRESENT
AUDITORY DISTURBANCES: NOT PRESENT
NAUSEA AND VOMITING: NO NAUSEA AND NO VOMITING
TACTILE DISTURBANCES: VERY MILD ITCHING, PINS AND NEEDLES, BURNING OR NUMBNESS
PAROXYSMAL SWEATS: NO SWEAT VISIBLE
AUDITORY DISTURBANCES: NOT PRESENT
TOTAL SCORE: 1
TREMOR: NO TREMOR
TOTAL SCORE: 1
TOTAL SCORE: 5
ANXIETY: NO ANXIETY, AT EASE
BLOOD PRESSURE: 147/94
PAROXYSMAL SWEATS: NO SWEAT VISIBLE
NAUSEA AND VOMITING: MILD NAUSEA WITH NO VOMITING
ORIENTATION AND CLOUDING OF SENSORIUM: ORIENTED AND CAN DO SERIAL ADDITIONS
VISUAL DISTURBANCES: NOT PRESENT
PAROXYSMAL SWEATS: NO SWEAT VISIBLE
AGITATION: NORMAL ACTIVITY
HEADACHE, FULLNESS IN HEAD: NOT PRESENT
AUDITORY DISTURBANCES: NOT PRESENT
PAROXYSMAL SWEATS: NO SWEAT VISIBLE
TREMOR: NO TREMOR
AGITATION: 2
TREMOR: NOT VISIBLE, BUT CAN BE FELT FINGERTIP TO FINGERTIP
TREMOR: 3
TOTAL SCORE: 2
VISUAL DISTURBANCES: NOT PRESENT
TREMOR: NOT VISIBLE, BUT CAN BE FELT FINGERTIP TO FINGERTIP
ORIENTATION AND CLOUDING OF SENSORIUM: ORIENTED AND CAN DO SERIAL ADDITIONS
AUDITORY DISTURBANCES: NOT PRESENT
VISUAL DISTURBANCES: NOT PRESENT
HEADACHE, FULLNESS IN HEAD: VERY MILD
VISUAL DISTURBANCES: NOT PRESENT
AUDITORY DISTURBANCES: NOT PRESENT
HEADACHE, FULLNESS IN HEAD: NOT PRESENT
NAUSEA AND VOMITING: NO NAUSEA AND NO VOMITING
ORIENTATION AND CLOUDING OF SENSORIUM: ORIENTED AND CAN DO SERIAL ADDITIONS
AUDITORY DISTURBANCES: NOT PRESENT
AGITATION: NORMAL ACTIVITY
ANXIETY: MILDLY ANXIOUS
NAUSEA AND VOMITING: NO NAUSEA AND NO VOMITING
ORIENTATION AND CLOUDING OF SENSORIUM: ORIENTED AND CAN DO SERIAL ADDITIONS
TOTAL SCORE: 0
AGITATION: NORMAL ACTIVITY
AGITATION: NORMAL ACTIVITY
ANXIETY: 3
HEADACHE, FULLNESS IN HEAD: MODERATELY SEVERE
TREMOR: NO TREMOR
ORIENTATION AND CLOUDING OF SENSORIUM: ORIENTED AND CAN DO SERIAL ADDITIONS
VISUAL DISTURBANCES: NOT PRESENT
NAUSEA AND VOMITING: NO NAUSEA AND NO VOMITING
ORIENTATION AND CLOUDING OF SENSORIUM: ORIENTED AND CAN DO SERIAL ADDITIONS
HEADACHE, FULLNESS IN HEAD: NOT PRESENT
AGITATION: NORMAL ACTIVITY
ANXIETY: NO ANXIETY, AT EASE
NAUSEA AND VOMITING: NO NAUSEA AND NO VOMITING
PAROXYSMAL SWEATS: BARELY PERCEPTIBLE SWEATING, PALMS MOIST
ANXIETY: NO ANXIETY, AT EASE
NAUSEA AND VOMITING: NO NAUSEA AND NO VOMITING
AGITATION: NORMAL ACTIVITY
AUDITORY DISTURBANCES: NOT PRESENT
ORIENTATION AND CLOUDING OF SENSORIUM: ORIENTED AND CAN DO SERIAL ADDITIONS
TOTAL SCORE: 0
NAUSEA AND VOMITING: MILD NAUSEA WITH NO VOMITING
TOTAL SCORE: 7
ANXIETY: NO ANXIETY, AT EASE
NAUSEA AND VOMITING: NO NAUSEA AND NO VOMITING
AUDITORY DISTURBANCES: NOT PRESENT
VISUAL DISTURBANCES: NOT PRESENT
TREMOR: NO TREMOR
TREMOR: NO TREMOR
TOTAL SCORE: 12
ORIENTATION AND CLOUDING OF SENSORIUM: ORIENTED AND CAN DO SERIAL ADDITIONS
NAUSEA AND VOMITING: NO NAUSEA AND NO VOMITING
VISUAL DISTURBANCES: NOT PRESENT
VISUAL DISTURBANCES: VERY MILD SENSITIVITY
HEADACHE, FULLNESS IN HEAD: NOT PRESENT
TOTAL SCORE: 0
ANXIETY: NO ANXIETY, AT EASE
ORIENTATION AND CLOUDING OF SENSORIUM: ORIENTED AND CAN DO SERIAL ADDITIONS
TREMOR: NO TREMOR
ORIENTATION AND CLOUDING OF SENSORIUM: ORIENTED AND CAN DO SERIAL ADDITIONS
AGITATION: NORMAL ACTIVITY
ANXIETY: MILDLY ANXIOUS
ANXIETY: NO ANXIETY, AT EASE
HEADACHE, FULLNESS IN HEAD: NOT PRESENT
AUDITORY DISTURBANCES: NOT PRESENT
PAROXYSMAL SWEATS: NO SWEAT VISIBLE
AGITATION: NORMAL ACTIVITY
HEADACHE, FULLNESS IN HEAD: NOT PRESENT
AGITATION: NORMAL ACTIVITY
PAROXYSMAL SWEATS: NO SWEAT VISIBLE
PULSE: 93
TREMOR: NOT VISIBLE, BUT CAN BE FELT FINGERTIP TO FINGERTIP
ANXIETY: MILDLY ANXIOUS
HEADACHE, FULLNESS IN HEAD: MODERATELY SEVERE
PAROXYSMAL SWEATS: NO SWEAT VISIBLE
HEADACHE, FULLNESS IN HEAD: NOT PRESENT
TOTAL SCORE: 0

## 2024-12-14 ASSESSMENT — COGNITIVE AND FUNCTIONAL STATUS - GENERAL
TOILETING: A LITTLE
DRESSING REGULAR UPPER BODY CLOTHING: A LITTLE
CLIMB 3 TO 5 STEPS WITH RAILING: A LOT
MOBILITY SCORE: 18
DRESSING REGULAR LOWER BODY CLOTHING: A LITTLE
WALKING IN HOSPITAL ROOM: A LITTLE
DRESSING REGULAR LOWER BODY CLOTHING: A LITTLE
MOVING TO AND FROM BED TO CHAIR: A LITTLE
HELP NEEDED FOR BATHING: A LITTLE
TOILETING: A LITTLE
TURNING FROM BACK TO SIDE WHILE IN FLAT BAD: A LITTLE
TURNING FROM BACK TO SIDE WHILE IN FLAT BAD: A LITTLE
STANDING UP FROM CHAIR USING ARMS: A LITTLE
MOVING TO AND FROM BED TO CHAIR: A LITTLE
DAILY ACTIVITIY SCORE: 19
DAILY ACTIVITIY SCORE: 19
MOBILITY SCORE: 18
STANDING UP FROM CHAIR USING ARMS: A LITTLE
PERSONAL GROOMING: A LITTLE
PERSONAL GROOMING: A LITTLE
WALKING IN HOSPITAL ROOM: A LITTLE
DRESSING REGULAR UPPER BODY CLOTHING: A LITTLE
CLIMB 3 TO 5 STEPS WITH RAILING: A LOT
HELP NEEDED FOR BATHING: A LITTLE

## 2024-12-14 ASSESSMENT — PAIN - FUNCTIONAL ASSESSMENT
PAIN_FUNCTIONAL_ASSESSMENT: 0-10

## 2024-12-14 ASSESSMENT — PAIN DESCRIPTION - LOCATION
LOCATION: HAND
LOCATION: HEAD

## 2024-12-14 ASSESSMENT — PAIN SCALES - GENERAL
PAINLEVEL_OUTOF10: 1
PAINLEVEL_OUTOF10: 3
PAINLEVEL_OUTOF10: 8
PAINLEVEL_OUTOF10: 6
PAINLEVEL_OUTOF10: 0 - NO PAIN
PAINLEVEL_OUTOF10: 3
PAINLEVEL_OUTOF10: 3
PAINLEVEL_OUTOF10: 9

## 2024-12-14 ASSESSMENT — PAIN DESCRIPTION - ORIENTATION: ORIENTATION: RIGHT;LEFT

## 2024-12-14 NOTE — CARE PLAN
Problem: Pain - Adult  Goal: Verbalizes/displays adequate comfort level or baseline comfort level  Outcome: Progressing     Problem: Safety - Adult  Goal: Free from fall injury  Outcome: Progressing     Problem: Discharge Planning  Goal: Discharge to home or other facility with appropriate resources  Outcome: Progressing     Problem: Chronic Conditions and Co-morbidities  Goal: Patient's chronic conditions and co-morbidity symptoms are monitored and maintained or improved  Outcome: Progressing     Problem: Skin  Goal: Promote skin healing  Outcome: Progressing   The patient's goals for the shift include      The clinical goals for the shift include patient will have one meal in the chair today    Patient had lunch in the chair today

## 2024-12-14 NOTE — CONSULTS
"Date of Service: 12/14/24  Patient: Gaurav Mckay  MRN: [id number]    History of Present Illness:   Mr. Mckay is a 54 y.o. male who presented to [facility] for surgical clearance for a cystoscopy planned for 12/17. Gaurav Mckay's past medical history is pertinent for laryngeal cancer, s/p radical neck dissection, PE on Apixaban, and HTN. Neurology is consulted for mass found on CTH.    The patient is admitted for medical clearance for cystoscopy planned for 12/17 as he initially presented intoxicated to his previous appointment. He reports frequent falls due to his longstanding peripheral polyneuropathy and orthostatic hypotension. He has a history of alcohol abuse (\"6 tall boys night for many years\") with last drink yesterday morning. He fell prior to admission and reported headache. He also reports binocular diplopia for the past month. CTH was obtained and showed a large destructive lytic soft tissue mass in the upper clivus. The mass protrudes into the posterior sphenoid sinuses and extends into the sella and erodes the bilateral petrous carotid canals. This was not seen on CTH 10/28/2024; however, on CTH 11/27/2024 completed at [facility] (images not available for review) there is report of a small \"soft tissue density present in the region of the sella.\"    Review of Systems:  The systems were reviewed with pertinent positives and negatives documented in the HPI.      Past Medical & Surgical History  Past Medical History:   Diagnosis Date    Acute inflammation of orbit 06/27/2013    Acute upper respiratory infection, unspecified 03/20/2015    Acute upper respiratory infection    Alcohol abuse, in remission 02/12/2015    History of ETOH abuse    Alcohol dependence, in remission 05/07/2015    Alcohol dependence in remission    Allergic contact dermatitis due to plants, except food 05/21/2014    Contact dermatitis due to poison ivy    Allergy status to unspecified drugs, medicaments and biological substances " 08/29/2013    History of allergy    Anxiety disorder, unspecified 03/20/2015    Anxiety    Asthmatic bronchitis ([facility])     Bipolar disorder, current episode manic without psychotic features, unspecified (Multi)     Bipolar disorder, current episode manic without psychotic features    Cancer of larynx (Multi)     Cervicalgia     Cervicalgia    Chronic asthmatic bronchitis (Multi) 12/02/2014    Clotting disorder (Multi)     P.E.    COPD (chronic obstructive pulmonary disease) (Multi)     Degeneration of cervical intervertebral disc     Dysphagia     Edentulous     Encounter for immunization 09/22/2016    Flu vaccine need    Frequent falls     pt states he falls every time he gets up.    GERD (gastroesophageal reflux disease)     Hearing aid worn     currently missing    History of abdominal pain 02/16/2017    History of allergic rhinitis 01/16/2014    History of allergic rhinitis 06/26/2014    History of allergic rhinitis    History of depression     History of esophageal reflux 08/27/2015    History of gastroenteritis 04/11/2013    History of hepatitis C     History of sinusitis 02/05/2014    History of sore throat 03/20/2017    Hyperlipidemia     Hypertension     Other long term (current) drug therapy 06/02/2015    High risk medication use    Personal history of nicotine dependence 08/16/2017    History of tobacco abuse    Personal history of nicotine dependence 06/26/2017    History of nicotine dependence    Personal history of other diseases of the respiratory system 09/26/2013    History of acute sinusitis    Personal history of other mental and behavioral disorders     Personal history of other specified conditions 08/25/2016    RLS (restless legs syndrome)     Unspecified asthma, uncomplicated ([facility]) 02/13/2014    Uses walker     and wheelchair    Wears glasses     currently broken     Past Surgical History:   Procedure Laterality Date    ANKLE SURGERY Left     ANKLE SURGERY  07/08/2024    R ankle  spanning ex-fix revision    CHOLECYSTECTOMY      CT ABDOMEN PELVIS ANGIOGRAM W AND/OR WO IV CONTRAST  04/01/2020    CT ABDOMEN PELVIS ANGIOGRAM W AND/OR WO IV CONTRAST 4/1/2020 [facility]    FRACTURE SURGERY Right 07/15/2024    Open reduction and internal fixation of right distal tibia pilon and fibula fractures, removal of right ankle spanning external fixation under anesthesia, debridement down to including bone external fixator pin sites right leg    INVASIVE VASCULAR PROCEDURE N/A 11/08/2023    Procedure: Pulmonary Angiogram;  Surgeon: Surya Templeton MD;  Location: [facility] Cardiac Cath Lab;  Service: Cardiovascular;  Laterality: N/A;    LARYNGOSCOPY  07/01/2024    R radical neck dissection and L ALT free flap reconstruction    LEG SURGERY  07/06/2024    RLE ex-fix placement    MR HEAD ANGIO WO IV CONTRAST  01/24/2016    MR HEAD ANGIO WO IV CONTRAST 1/24/2016 [facility] INPATIENT LEGACY    MR NECK ANGIO WO IV CONTRAST  01/24/2016    MR NECK ANGIO WO IV CONTRAST 1/24/2016 [facility] INPATIENT LEGACY    THROMBECTOMY      WOUND DEBRIDEMENT Left 08/06/2024    L thigh debridement with woud vac placement 2/2 surgical wound dehiscence     Social History:   Social History     Tobacco Use    Smoking status: Every Day     Current packs/day: 1.00     Types: Cigarettes    Smokeless tobacco: Former     Types: Snuff    Tobacco comments:     1 ppd since the age of 16   Substance Use Topics    Alcohol use: Yes     Comment: 6 pack beer a day     Family History:   Not pertinent to chief complaint     Medications:     Current Facility-Administered Medications:     acetaminophen (Tylenol) tablet 650 mg, 650 mg, oral, q6h PRN, Kathryn Zelaya, APRN-CNP, 650 mg at 12/14/24 0830    atorvastatin (Lipitor) tablet 20 mg, 20 mg, oral, Nightly, Kathryn N Nathalie, APRN-CNP, 20 mg at 12/13/24 2059    cyclobenzaprine (Flexeril) tablet 10 mg, 10 mg, oral, TID PRN, Kathryn Zelaya, APRN-CNP    docusate sodium (Colace) capsule 100 mg, 100 mg, oral, BID, Kathryn N  Mullet, APRN-CNP, 100 mg at 12/14/24 0830    ferrous sulfate (325 mg ferrous sulfate) tablet 325 mg, 325 mg, oral, BID, Kathryn N Mullet, APRN-CNP, 325 mg at 12/14/24 0829    folic acid (Folvite) tablet 1 mg, 1 mg, oral, Daily, Kathryn N Mullet, APRN-CNP, 1 mg at 12/14/24 0830    guaiFENesin (Mucinex) 12 hr tablet 600 mg, 600 mg, oral, BID, Kathryn N Mullet, APRN-CNP, 600 mg at 12/14/24 0829    heparin 25,000 Units in dextrose 5% 250 mL (100 Units/mL) infusion (premix), 0-4,000 Units/hr, intravenous, Continuous, Kathryn N Mullet, APRN-CNP, Last Rate: 10 mL/hr at 12/14/24 0700, 1,000 Units/hr at 12/14/24 0700    heparin bolus from bag 2,000-4,000 Units, 2,000-4,000 Units, intravenous, q4h PRN, Kathryn N Mullet, APRN-CNP    ipratropium-albuteroL (Duo-Neb) 0.5-2.5 mg/3 mL nebulizer solution 3 mL, 3 mL, nebulization, q2h PRN, Chayito Smith MD    ipratropium-albuteroL (Duo-Neb) 0.5-2.5 mg/3 mL nebulizer solution 3 mL, 3 mL, nebulization, TID, Chayito Smith MD, 3 mL at 12/14/24 0840    lidocaine (Xylocaine) 2 % mouth solution 15 mL, 15 mL, oral, Before meals & nightly, Kathryn N Mullet, APRN-CNP    lidocaine 4 % patch 1 patch, 1 patch, transdermal, Daily, Kathryn N Mullet, APRN-CNP, 1 patch at 12/14/24 0828    lipase-protease-amylase (Creon) 3,000-9,500- 15,000 unit per capsule 2 capsule, 2 capsule, oral, BID, Kathryn N Mullet, APRN-CNP, 2 capsule at 12/14/24 0829    LORazepam (Ativan) injection 0.5 mg, 0.5 mg, intravenous, q2h PRN **OR** LORazepam (Ativan) injection 1 mg, 1 mg, intravenous, q2h PRN **OR** LORazepam (Ativan) injection 2 mg, 2 mg, intravenous, q2h PRN, Kathryn N Mullet, APRN-CNP, 2 mg at 12/14/24 0614    melatonin tablet 3 mg, 3 mg, oral, Nightly PRN, Kathryn N Mullet, APRN-CNP    mirtazapine (Remeron) tablet 15 mg, 15 mg, oral, Nightly, Kathryn N Mullet, APRN-CNP, 15 mg at 12/13/24 2058    montelukast (Singulair) tablet 10 mg, 10 mg, oral, Daily, Kathryn N Mullet, APRN-CNP, 10 mg at 12/14/24 0829    multivitamin with minerals 1  "tablet, 1 tablet, oral, Daily, Kathryn N Mullet, APRN-CNP, 1 tablet at 12/14/24 0829    naloxone (Narcan) injection 0.4 mg, 0.4 mg, intravenous, q5 min PRN, Kathryn N Mullet, APRN-CNP    nicotine (Nicoderm CQ) 14 mg/24 hr patch 1 patch, 1 patch, transdermal, Daily, Kathryn N Mullet, APRN-CNP, 1 patch at 12/14/24 0828    ondansetron ODT (Zofran-ODT) disintegrating tablet 4 mg, 4 mg, oral, q8h PRN **OR** ondansetron (Zofran) injection 4 mg, 4 mg, intravenous, q8h PRN, Kathryn N Mullet, APRN-CNP    oxygen (O2) therapy, , inhalation, Continuous - Inhalation, Chayito Smith MD, 3 L/min at 12/13/24 2246    pantoprazole (ProtoNix) EC tablet 40 mg, 40 mg, oral, Daily before breakfast, Kathryn N Mullet, APRN-CNP    polyethylene glycol (Glycolax, Miralax) packet 17 g, 17 g, oral, Daily PRN, Kathryn N Mullet, APRN-CNP    pregabalin (Lyrica) capsule 200 mg, 200 mg, oral, TID, Kathryn N Mullet, APRN-CNP, 200 mg at 12/14/24 0829    rOPINIRole (Requip) tablet 0.5 mg, 0.5 mg, oral, TID, Kathryn N Mullet, APRN-CNP, 0.5 mg at 12/14/24 0829    tamsulosin (Flomax) 24 hr capsule 0.4 mg, 0.4 mg, oral, Daily, Kathryn N Mullet, APRN-CNP, 0.4 mg at 12/14/24 0829    thiamine (Vitamin B-1) tablet 100 mg, 100 mg, oral, Daily, Kathryn N Mullet, APRN-CNP, 100 mg at 12/14/24 0829    topiramate (Topamax) tablet 100 mg, 100 mg, oral, BID, Kathryn N Mullet, APRN-CNP, 100 mg at 12/14/24 0829     General Physical Exam:  BP (!) 147/94   Pulse 93   Temp 36.2 °C (97.2 °F) (Temporal)   Resp 16   Ht 1.778 m (5' 10\")   Wt 83.1 kg (183 lb 1.6 oz)   SpO2 91%   BMI 26.27 kg/m²      He looks well and is not in any acute distress. Breathing comfortably on room air.     Neurological Exam:   Mental status reveals him to be alert and oriented to person, place, and date. Speech is intact to expression and comprehension.     Cranial nerves:  CN 2   Visual fields full to confrontation.   CN 3, 4, 6   Pupils round, 3 mm in diameter, equally reactive to light. Lids symmetric; no ptosis. On " primary gaze adduction of right eye. Impaired abduction of right eye on rightward gaze.  No nystagmus.   CN 5   Facial sensation intact bilaterally.   CN 7   Normal and symmetric facial strength. Nasolabial folds symmetric.   CN 8   Hearing intact to conversation.   CN 9   Palate elevates symmetrically.   CN 11   Normal strength of shoulder shrug and neck turning.   CN 12   Tongue midline, with normal bulk and strength; no fasciculations.      Motor:  Movements: bilateral upper extremity action tremor    R L   5 5 Shoulder abduction  5 5 Elbow flexion  5 5 Elbow extension  5 5 Finger abduction  5 5 Hip flexion  5 5 Knee flexion  5 5 Knee extension  5 5 Ankle dorsiflexion  5 5 Ankle plantarflexion     Reflexes                         R     L  Triceps          1      1  Biceps           2      2  Brachiorad    2      2  Patellar         0      0   Achilles         0      0    Babinski: toes downgoing to plantar stimulation. No clonus or other pathologic reflexes present.      Sensory:   Light Touch: Diminished in distal lower extremities to mid shin  Vibration: Impaired up to ankles bilaterally in the lower extremities     Coordination:  In both upper extremities, finger-nose-finger was intact without dysmetria or overshoot.     Gait:  Deferred for patient safety given frequent falls    Results:    Lab Results   Component Value Date    HGBA1C 6.1 (H) 05/17/2024     CBC:   Lab Results   Component Value Date    WBC 6.3 12/14/2024    HGB 13.9 12/14/2024    HCT 43.3 12/14/2024     12/14/2024     BMP:   Lab Results   Component Value Date     12/14/2024    K 3.7 12/14/2024    CL 98 12/14/2024    CO2 29 12/14/2024    BUN 8 12/14/2024    CREATININE 0.62 12/14/2024    CALCIUM 9.6 12/14/2024    MG 1.91 12/14/2024    PHOS 4.4 10/26/2024     LFT:   Lab Results   Component Value Date    ALKPHOS 102 12/14/2024    BILITOT 0.5 12/14/2024    BILIDIR 0.1 04/02/2022    PROT 6.8 12/14/2024    ALBUMIN 3.5 12/14/2024    ALT 20  12/14/2024    AST 17 12/14/2024     Imaging:  CT[facility] 12/13/2024    IMPRESSION:  1. Large destructive lytic soft tissue mass upon the upper clivus that measures ~ 3.2 cm x 4.8 cm x 3.3 cm. It completely destroys the floor of the sella, dorsum sella, upper clivus, posterior wall of the sphenoid sinus, and the roof of the nasopharynx. The mass  protrudes into the posterior sphenoid sinuses, extends into the sella turcica, erodes into the bilateral petrous carotid canals (right > left), protrudes into the bilateral cavernous sinuses, destroys portions of the right petrous apex, and protrudes posteriorly causing  narrowing of the prepontine cistern. This did not originate from a nasopharyngeal mass but does slightly invade into the right nasopharyngeal mucosa.      Given absence of metastatic disease elsewhere in the chest/abdomen/pelvis, the differential diagnosis includes but not limited to chordoma (favored), chondrosarcoma, and plasmacytoma. However, a biopsy is recommended for definitive diagnosis.      2. No acute intracranial hemorrhage.    CT 11/27/2024 ([facility], images not available for review)    IMPRESSION:   Soft tissue density present in the region of the sella and sellar mass  cannot be excluded.  Further evaluation/characterization with MRI of the   brain compared performed.  Cortical thinning of the posterior wall of the  left sphenoid sinus.  Mucosal thickening of the sphenoid sinuses   bilaterally.  No other CT evidence of acute intracranial process.     Impression:  Gaurav Mckay is a 54 y.o. who presents surgical clearance for cystoscopy. 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 CTH shows a large destructive lytic soft tissue mass upon the clivus that protrudes into the sella turcica and bilateral cavernous sinuses and erodes bilateral petrous carotid canals. Differential includes chordoma, chondrosarcoma, plasmacytoma. MRI ordered for  further evaluation and recommend neurosurgical evaluation.    On exam the patient also has evidence of a length dependent peripheral neuropathy in the setting of chronic alcohol use. Falls are likely due to sensory deficits in feet along with orthostatic hypotension from alcoholic neuropathy.    Plan:  #Clival mass  #R abducens palsy  - MRI brain with and without contrast ordered  - Recommend neurosurgical consult    #Alcohol abuse complicated by peripheral neuropathy  - Agree with [facility] protocol  - Continue thiamine and folate     Reviewed and approved by TEDDY GRIFFITH on 12/14/24 at 9:03 AM.    Medical decision making is high. Patient has acute illness that poses a threat to bodily function. I performed independent interpretation of neuroimaging, discussed management with another health professional and reviewed multiple notes from other providers. There is a high risk of morbidity.

## 2024-12-14 NOTE — PROGRESS NOTES
Patient: Gaurav Mckay  Room/bed: 118/118-A  Admitted on: 12/13/2024    Age: 54 y.o.   Gender: male  Code Status:  Full Code   Admitting Dx: Hypokalemia [E87.6]    MRN: [id number]  PCP: No Assigned PCP Generic Provider, MD       Subjective   Seen and examined in his room this AM. Resting in bed. States vision is still double and at times blurry. Having headaches. States the right eye deviation has had for about 3 months. He denies chest pain, breathing difficulties, abdominal pain, N/V/D/C, fever, or chills.       Objective    Physical Exam   Constitutional: A&O x 3; calm and cooperative; NAD  Eyes: Disconjugated gaze with right eye deviation  HEENT: Normocephalic, Oral mucosa dry.   Neck: Supple. No JVD, lymphadenopathy. Right side surgical changes with graft - no open areas or erythema.   Lungs: Fair air movement with course rhonchi throughout. Respirations even and unlabored on 3 liters.   Heart: RRR  Abdomen: Soft, non-tender, non-distended, +BS  MS/Extremities: [place] equally x 4. No edema. Peripheral pulses intact bilaterally. Healed ankle incisions.   Neuro: A&O x3; no focal deficits except for eye deviation as noted above; gross motor and sensation intact.   Skin: Warm and dry. Nicotine stained fingers. Scabs above/around right eye. Left anterior thigh scabbed area from graft site.  Psych: Normal affect.       Temp:  [36.1 °C (97 °F)-37.7 °C (99.9 °F)] 36.2 °C (97.2 °F)  Heart Rate:  [] 85  Resp:  [16-22] 18  BP: ()/(65-94) 125/77    Vitals:    12/13/24 1814   Weight: 83.1 kg (183 lb 1.6 oz)       I/Os    Intake/Output Summary (Last 24 hours) at 12/14/2024 1332  Last data filed at 12/14/2024 0900  Gross per 24 hour   Intake 500 ml   Output --   Net 500 ml       Labs:   Results from last 72 hours   Lab Units 12/14/24  0520 12/13/24  1505   SODIUM mmol/L 138 135*   POTASSIUM mmol/L 3.7 2.9*   CHLORIDE mmol/L 98 95*   CO2 mmol/L 29 26   BUN mg/dL 8 6   CREATININE mg/dL 0.62 0.68   GLUCOSE mg/dL 111*  92   CALCIUM mg/dL 9.6 9.4   ANION GAP mmol/L 15 17   EGFR mL/min/1.73m*2 >90 >90      Results from last 72 hours   Lab Units 12/14/24  0520 12/13/24  1505   WBC AUTO x10*3/uL 6.3 6.8   HEMOGLOBIN g/dL 13.9 13.2*   HEMATOCRIT % 43.3 40.0*   PLATELETS AUTO x10*3/uL 307 314      Lab Results   Component Value Date    CALCIUM 9.6 12/14/2024    PHOS 4.4 10/26/2024      Lab Results   Component Value Date    CRP 0.99 10/24/2024      Micro/ID:   No results found for the last 90 days.        .ID  Lab Results   Component Value Date    BLOODCULT No growth at 4 days -  FINAL REPORT 09/04/2024       Images:  CT Head:  Impression: 1. Large destructive lytic soft tissue mass upon the upper clivus  that measures ~ 3.2 cm x 4.8 cm x 3.3 cm. It completely destroys  the floor of the sella, dorsum sella, upper clivus, posterior wall of  the sphenoid sinus, and the roof of the nasopharynx. The mass  protrudes into the posterior sphenoid sinuses, extends into the sella  turcica, erodes into the bilateral petrous carotid canals (right >  left), protrudes into the bilateral cavernous sinuses, destroys  portions of the right petrous apex, and protrudes posteriorly causing  narrowing of the prepontine cistern. This did not originate from a  nasopharyngeal mass but does slightly invade into the right  nasopharyngeal mucosa.      Given absence of metastatic disease elsewhere in the  chest/abdomen/pelvis, the differential diagnosis includes but not  limited to chordoma (favored), chondrosarcoma, and plasmacytoma.  However, a biopsy is recommended for definitive diagnosis.      2. No acute intracranial hemorrhage.     Meds    Scheduled medications  atorvastatin, 20 mg, oral, Nightly  docusate sodium, 100 mg, oral, BID  ferrous sulfate (325 mg ferrous sulfate), 325 mg, oral, BID  folic acid, 1 mg, oral, Daily  guaiFENesin, 600 mg, oral, BID  ipratropium-albuteroL, 3 mL, nebulization, TID  lidocaine, 15 mL, oral, Before meals & nightly  lidocaine,  1 patch, transdermal, Daily  lipase-protease-amylase, 2 capsule, oral, BID  mirtazapine, 15 mg, oral, Nightly  montelukast, 10 mg, oral, Daily  multivitamin with minerals, 1 tablet, oral, Daily  nicotine, 1 patch, transdermal, Daily  oxygen, , inhalation, Continuous - Inhalation  pantoprazole, 40 mg, oral, Daily before breakfast  pregabalin, 200 mg, oral, TID  rOPINIRole, 0.5 mg, oral, TID  tamsulosin, 0.4 mg, oral, Daily  thiamine, 100 mg, oral, Daily  topiramate, 100 mg, oral, BID      Continuous medications  heparin, 0-4,000 Units/hr, Last Rate: 1,000 Units/hr (12/14/24 0700)      PRN medications  PRN medications: acetaminophen, cyclobenzaprine, heparin, ipratropium-albuteroL, LORazepam **OR** LORazepam **OR** LORazepam, melatonin, naloxone, ondansetron ODT **OR** ondansetron, polyethylene glycol     Assessment and Plan    53 y/o male with a medical history of laryngeal cancer, s/p radical neck dissection, PE on Apixaban, and HTN, who presented to [facility] for surgical clearance for a scheduled cystoscopy with ablation of tissue on 12/17/24. He presented intoxicated and there was concern of compliance with preop instructions and was admitted to ensure sobriety, medication compliance, and surgical readiness.     Headache with Visual Changes  -CT Head concerning for a large destructive lytic soft tissue mass upon the clivus that protrudes into the sella turcica and bilateral cavernous sinuses and erodes bilateral petrous carotid canals.   -Differential includes chordoma, chondrosarcoma, plasmacytoma.    -No other focal deficits except for right eye deviation which he states is not new   -Consulted to neurology to assess - recommend MRI of Brain w & w/o contrast - pending interpretation  -Await MRI findings to determine next step of treatment. May require transfer to [facility] for neurosurgery intervention  -Await further recommendations from neurology.     Bladder Tumor  -Scheduled for cystoscopy with tissue ablation on  12/17/24 by Dr. Murcia  -Preop clearance labs, UA ordered - stable.   -Neuro workup pending as noted above as this may delay bladder procedure     Alcohol Use Disorder  -Cessation advised  -Last drink was approximately 3AM on 12/13/24.   -CIWA protocol with Ativan  -On Thiamine, Folate, MVI  -LSW following to address outpatient resources.      Chronic Hypoxic Respiratory Failure  -H/O COPD/Tobacco Use, HANY  -On baseline 3 liters oxygen  -Continue bronchial hygiene, duonebs, mucinex     CV: HTN/Orthostatic Hypotension  -Will get Echocardiogram  -BP has been stable.      Pulmonary Embolism  -S/p embolization 11/2023  -Provoked by surgery  -On Apixaban but held for upcoming procedure  -Heparin gtt initiated to bridge     Tobacco Use Disorder  -Added Nicotine Patch  -Smoking Cessation advised     GERD  -On PPI     DVT Prophylaxis  -See above.     Fluids/Electrolytes/Nutrition  -Laboratory data reviewed.   -Electrolytes stable.   -No nutritional concerns at this time.       Disposition  -Plan of care discussed with attending, Dr. Smith.  -Await neuro workup to determine next phase of treatment.     ANASTACIA Man-CNP

## 2024-12-14 NOTE — CONSULTS
"  Wound Care Consult     Visit Date: 12/13/2024      Patient Name: Gaurav Mckay         MRN: [id number]           YOB: 1970     Reason for Consult: wound        Wound History: Patient had falls at home, graft site on leg, patient leaves EM     Pertinent Labs:   Albumin   Date Value Ref Range Status   11/19/2024 4.1 3.4 - 5.0 g/dL Final       Wound Assessment:  Wound 12/13/24 Knee Left;Anterior (Active)   Wound Image   12/13/24 1755   Shape oval 12/13/24 1800   Drainage Description None 12/13/24 2125   Drainage Amount None 12/13/24 2125   Dressing Xeroform 12/13/24 2125   Dressing Changed Changed 12/13/24 2125   Dressing Status Clean;Dry 12/13/24 1800       Wound 12/13/24 Face Right;Upper (Active)   Wound Image   12/13/24 1757   Shape round 12/13/24 1800   Drainage Description None 12/13/24 2125   Drainage Amount None 12/13/24 2125   Dressing Xeroform 12/13/24 2125   Dressing Changed Changed 12/13/24 2125   Dressing Status Clean;Dry 12/13/24 1800       Wound Team Summary Assessment: Patient seen in bed, picking at face, head, shoulder, states, \"I scratch, can't help it\". No obvious bites noted.  Skin is very dry, unkempt. Face has 2 small 0.5 x 1 cm partial thickness wounds from falls or picking, unsure etiology.  Eye deviation not new per medical record.  Left leg 3 x 2 cm depressed dry dark red, graft site wound per Kathryn NP.  Patient leaves EM. Patient on NC O2, left distal nare scab patient seen scratching.     Wound Team Plan: Goal is to cleanse, moisturize and promote moist wound healing. Xeroform to maintain moist wound bed, cover dressing to protect from scratching.  Daily bath and moisturizing.  Maintain clean nare with proper alignment of NC as not appropriate to use petrolatum based protection with oxygen or safe to place dressing near airway, staff please monitor.      Please message with questions.     Clary Long RN, [facility]  12/13/2024  9:41 PM        "

## 2024-12-14 NOTE — PROGRESS NOTES
12/14/24 0940   Discharge Planning   Assistance Needed Received referral for etoh use and AD. SW met with pt at bedside to discuss. Pt states that he is considering going to inpatient drug treatment for etoh cessation, he has been to inpatient before in the past. SW encouraged pt to make decision regarding this as soon as able in order to begin necessary discharge planning. Also discussed referal for AD, pt is declining this needs at this time. Will follow.   Intensity of Service   Intensity of Service 0-30 min

## 2024-12-14 NOTE — PROGRESS NOTES
"   12/14/24 1119   Discharge Planning   Living Arrangements Spouse/significant other   Support Systems Spouse/significant other   Assistance Needed Uses walker at home.   Type of Residence Private residence   Who is requesting discharge planning? Provider   Home or Post Acute Services Other (Comment)  (TBD)   Expected Discharge Disposition Othe   Does the patient need discharge transport arranged? Yes   RoundTrip coordination needed? Yes   Has discharge transport been arranged? No   Patient Choice   Provider Choice list and CMS website (https://medicare.gov/care-compare#search) for post-acute Quality and Resource Measure Data were provided and reviewed with: Patient   Patient / Family choosing to utilize agency / facility established prior to hospitalization No   Intensity of Service   Intensity of Service 0-30 min     12/14/2024: Patient admits to multiple falls at home. Patient was arranged with [facility] last admission, but states that they \"pissed him off\" and he discontinued services. Patient hx of EtOH, and is considering inpatient treatment. SW following.   "

## 2024-12-15 ENCOUNTER — HOSPITAL ENCOUNTER (INPATIENT)
Facility: HOSPITAL | Age: 54
End: 2024-12-15
Attending: STUDENT IN AN ORGANIZED HEALTH CARE EDUCATION/TRAINING PROGRAM | Admitting: NURSE PRACTITIONER
Payer: COMMERCIAL

## 2024-12-15 ENCOUNTER — APPOINTMENT (OUTPATIENT)
Dept: CARDIOLOGY | Facility: HOSPITAL | Age: 54
End: 2024-12-15
Payer: COMMERCIAL

## 2024-12-15 ENCOUNTER — APPOINTMENT (OUTPATIENT)
Dept: RADIOLOGY | Facility: HOSPITAL | Age: 54
End: 2024-12-15
Payer: COMMERCIAL

## 2024-12-15 VITALS
HEART RATE: 99 BPM | RESPIRATION RATE: 18 BRPM | HEIGHT: 70 IN | WEIGHT: 183.1 LBS | SYSTOLIC BLOOD PRESSURE: 126 MMHG | TEMPERATURE: 96.6 F | DIASTOLIC BLOOD PRESSURE: 75 MMHG | BODY MASS INDEX: 26.21 KG/M2 | OXYGEN SATURATION: 95 %

## 2024-12-15 VITALS
OXYGEN SATURATION: 97 % | DIASTOLIC BLOOD PRESSURE: 84 MMHG | WEIGHT: 183 LBS | SYSTOLIC BLOOD PRESSURE: 131 MMHG | TEMPERATURE: 98.4 F | HEART RATE: 97 BPM | BODY MASS INDEX: 26.2 KG/M2 | HEIGHT: 70 IN | RESPIRATION RATE: 17 BRPM

## 2024-12-15 DIAGNOSIS — K59.03 DRUG-INDUCED CONSTIPATION: ICD-10-CM

## 2024-12-15 DIAGNOSIS — G93.89 BRAIN MASS: Primary | ICD-10-CM

## 2024-12-15 DIAGNOSIS — R45.1 AGITATION: ICD-10-CM

## 2024-12-15 DIAGNOSIS — Z86.711 HISTORY OF PULMONARY EMBOLUS (PE): ICD-10-CM

## 2024-12-15 DIAGNOSIS — G52.9 CRANIAL NERVE PALSY: ICD-10-CM

## 2024-12-15 DIAGNOSIS — D49.4 BLADDER TUMOR: ICD-10-CM

## 2024-12-15 DIAGNOSIS — G89.3 CANCER ASSOCIATED PAIN: ICD-10-CM

## 2024-12-15 PROBLEM — J96.01 ACUTE HYPOXIC RESPIRATORY FAILURE (MULTI): Status: RESOLVED | Noted: 2024-09-05 | Resolved: 2024-12-15

## 2024-12-15 PROBLEM — E87.6 HYPOKALEMIA: Status: RESOLVED | Noted: 2024-09-04 | Resolved: 2024-12-15

## 2024-12-15 PROBLEM — C41.2: Status: ACTIVE | Noted: 2024-12-15

## 2024-12-15 LAB
ABO GROUP (TYPE) IN BLOOD: NORMAL
ALBUMIN SERPL BCP-MCNC: 3.2 G/DL (ref 3.4–5)
ALP SERPL-CCNC: 95 U/L (ref 33–120)
ALT SERPL W P-5'-P-CCNC: 11 U/L (ref 10–52)
ANION GAP SERPL CALC-SCNC: 11 MMOL/L (ref 10–20)
ANION GAP SERPL CALC-SCNC: 15 MMOL/L (ref 10–20)
ANTIBODY SCREEN: NORMAL
APTT PPP: >200 SECONDS (ref 27–38)
AST SERPL W P-5'-P-CCNC: 12 U/L (ref 9–39)
BASOPHILS # BLD AUTO: 0.06 X10*3/UL (ref 0–0.1)
BASOPHILS NFR BLD AUTO: 1.2 %
BILIRUB SERPL-MCNC: 0.3 MG/DL (ref 0–1.2)
BUN SERPL-MCNC: 6 MG/DL (ref 6–23)
BUN SERPL-MCNC: 7 MG/DL (ref 6–23)
CALCIUM SERPL-MCNC: 9.2 MG/DL (ref 8.6–10.3)
CALCIUM SERPL-MCNC: 9.3 MG/DL (ref 8.6–10.6)
CHLORIDE SERPL-SCNC: 100 MMOL/L (ref 98–107)
CHLORIDE SERPL-SCNC: 97 MMOL/L (ref 98–107)
CO2 SERPL-SCNC: 27 MMOL/L (ref 21–32)
CO2 SERPL-SCNC: 28 MMOL/L (ref 21–32)
CREAT SERPL-MCNC: 0.63 MG/DL (ref 0.5–1.3)
CREAT SERPL-MCNC: 0.68 MG/DL (ref 0.5–1.3)
EGFRCR SERPLBLD CKD-EPI 2021: >90 ML/MIN/1.73M*2
EGFRCR SERPLBLD CKD-EPI 2021: >90 ML/MIN/1.73M*2
EOSINOPHIL # BLD AUTO: 0.05 X10*3/UL (ref 0–0.7)
EOSINOPHIL NFR BLD AUTO: 1 %
ERYTHROCYTE [DISTWIDTH] IN BLOOD BY AUTOMATED COUNT: 17.3 % (ref 11.5–14.5)
ERYTHROCYTE [DISTWIDTH] IN BLOOD BY AUTOMATED COUNT: 17.6 % (ref 11.5–14.5)
GLUCOSE SERPL-MCNC: 121 MG/DL (ref 74–99)
GLUCOSE SERPL-MCNC: 122 MG/DL (ref 74–99)
HCT VFR BLD AUTO: 37 % (ref 41–52)
HCT VFR BLD AUTO: 39.3 % (ref 41–52)
HGB BLD-MCNC: 12.4 G/DL (ref 13.5–17.5)
HGB BLD-MCNC: 12.4 G/DL (ref 13.5–17.5)
IMM GRANULOCYTES # BLD AUTO: 0.08 X10*3/UL (ref 0–0.7)
IMM GRANULOCYTES NFR BLD AUTO: 1.6 % (ref 0–0.9)
INR PPP: 1.8 (ref 0.9–1.1)
LYMPHOCYTES # BLD AUTO: 0.61 X10*3/UL (ref 1.2–4.8)
LYMPHOCYTES NFR BLD AUTO: 12.3 %
MAGNESIUM SERPL-MCNC: 1.6 MG/DL (ref 1.6–2.4)
MCH RBC QN AUTO: 28.1 PG (ref 26–34)
MCH RBC QN AUTO: 28.2 PG (ref 26–34)
MCHC RBC AUTO-ENTMCNC: 31.6 G/DL (ref 32–36)
MCHC RBC AUTO-ENTMCNC: 33.5 G/DL (ref 32–36)
MCV RBC AUTO: 84 FL (ref 80–100)
MCV RBC AUTO: 90 FL (ref 80–100)
MONOCYTES # BLD AUTO: 0.42 X10*3/UL (ref 0.1–1)
MONOCYTES NFR BLD AUTO: 8.5 %
NEUTROPHILS # BLD AUTO: 3.73 X10*3/UL (ref 1.2–7.7)
NEUTROPHILS NFR BLD AUTO: 75.4 %
NRBC BLD-RTO: 0 /100 WBCS (ref 0–0)
NRBC BLD-RTO: 0 /100 WBCS (ref 0–0)
PLATELET # BLD AUTO: 274 X10*3/UL (ref 150–450)
PLATELET # BLD AUTO: 290 X10*3/UL (ref 150–450)
POTASSIUM SERPL-SCNC: 3.2 MMOL/L (ref 3.5–5.3)
POTASSIUM SERPL-SCNC: 3.4 MMOL/L (ref 3.5–5.3)
PROT SERPL-MCNC: 6.2 G/DL (ref 6.4–8.2)
PROTHROMBIN TIME: 20.8 SECONDS (ref 9.8–12.8)
RBC # BLD AUTO: 4.39 X10*6/UL (ref 4.5–5.9)
RBC # BLD AUTO: 4.42 X10*6/UL (ref 4.5–5.9)
RH FACTOR (ANTIGEN D): NORMAL
SODIUM SERPL-SCNC: 136 MMOL/L (ref 136–145)
SODIUM SERPL-SCNC: 136 MMOL/L (ref 136–145)
UFH PPP CHRO-ACNC: 0.2 IU/ML
UFH PPP CHRO-ACNC: 0.3 IU/ML
UFH PPP CHRO-ACNC: >2 IU/ML
WBC # BLD AUTO: 5 X10*3/UL (ref 4.4–11.3)
WBC # BLD AUTO: 6.4 X10*3/UL (ref 4.4–11.3)

## 2024-12-15 PROCEDURE — 85730 THROMBOPLASTIN TIME PARTIAL: CPT | Performed by: NURSE PRACTITIONER

## 2024-12-15 PROCEDURE — 2500000004 HC RX 250 GENERAL PHARMACY W/ HCPCS (ALT 636 FOR OP/ED): Performed by: NURSE PRACTITIONER

## 2024-12-15 PROCEDURE — 71260 CT THORAX DX C+: CPT | Performed by: STUDENT IN AN ORGANIZED HEALTH CARE EDUCATION/TRAINING PROGRAM

## 2024-12-15 PROCEDURE — 2550000001 HC RX 255 CONTRASTS: Performed by: INTERNAL MEDICINE

## 2024-12-15 PROCEDURE — 86850 RBC ANTIBODY SCREEN: CPT | Performed by: NURSE PRACTITIONER

## 2024-12-15 PROCEDURE — 74177 CT ABD & PELVIS W/CONTRAST: CPT | Performed by: STUDENT IN AN ORGANIZED HEALTH CARE EDUCATION/TRAINING PROGRAM

## 2024-12-15 PROCEDURE — 85520 HEPARIN ASSAY: CPT | Performed by: NURSE PRACTITIONER

## 2024-12-15 PROCEDURE — 85610 PROTHROMBIN TIME: CPT | Performed by: NURSE PRACTITIONER

## 2024-12-15 PROCEDURE — 85025 COMPLETE CBC W/AUTO DIFF WBC: CPT | Performed by: NURSE PRACTITIONER

## 2024-12-15 PROCEDURE — 99233 SBSQ HOSP IP/OBS HIGH 50: CPT | Performed by: STUDENT IN AN ORGANIZED HEALTH CARE EDUCATION/TRAINING PROGRAM

## 2024-12-15 PROCEDURE — 2500000001 HC RX 250 WO HCPCS SELF ADMINISTERED DRUGS (ALT 637 FOR MEDICARE OP): Performed by: NURSE PRACTITIONER

## 2024-12-15 PROCEDURE — 85027 COMPLETE CBC AUTOMATED: CPT | Performed by: NURSE PRACTITIONER

## 2024-12-15 PROCEDURE — 93010 ELECTROCARDIOGRAM REPORT: CPT | Performed by: INTERNAL MEDICINE

## 2024-12-15 PROCEDURE — 2500000002 HC RX 250 W HCPCS SELF ADMINISTERED DRUGS (ALT 637 FOR MEDICARE OP, ALT 636 FOR OP/ED): Performed by: INTERNAL MEDICINE

## 2024-12-15 PROCEDURE — 2500000002 HC RX 250 W HCPCS SELF ADMINISTERED DRUGS (ALT 637 FOR MEDICARE OP, ALT 636 FOR OP/ED): Performed by: NURSE PRACTITIONER

## 2024-12-15 PROCEDURE — 71045 X-RAY EXAM CHEST 1 VIEW: CPT | Performed by: RADIOLOGY

## 2024-12-15 PROCEDURE — 74177 CT ABD & PELVIS W/CONTRAST: CPT

## 2024-12-15 PROCEDURE — 2500000005 HC RX 250 GENERAL PHARMACY W/O HCPCS: Performed by: NURSE PRACTITIONER

## 2024-12-15 PROCEDURE — 99239 HOSP IP/OBS DSCHRG MGMT >30: CPT | Performed by: INTERNAL MEDICINE

## 2024-12-15 PROCEDURE — 85520 HEPARIN ASSAY: CPT | Performed by: INTERNAL MEDICINE

## 2024-12-15 PROCEDURE — 81003 URINALYSIS AUTO W/O SCOPE: CPT | Performed by: NURSE PRACTITIONER

## 2024-12-15 PROCEDURE — 1100000001 HC PRIVATE ROOM DAILY

## 2024-12-15 PROCEDURE — 99223 1ST HOSP IP/OBS HIGH 75: CPT | Performed by: NURSE PRACTITIONER

## 2024-12-15 PROCEDURE — 71045 X-RAY EXAM CHEST 1 VIEW: CPT

## 2024-12-15 PROCEDURE — 36415 COLL VENOUS BLD VENIPUNCTURE: CPT | Performed by: NURSE PRACTITIONER

## 2024-12-15 PROCEDURE — 80048 BASIC METABOLIC PNL TOTAL CA: CPT | Performed by: NURSE PRACTITIONER

## 2024-12-15 PROCEDURE — 80307 DRUG TEST PRSMV CHEM ANLYZR: CPT | Performed by: NURSE PRACTITIONER

## 2024-12-15 PROCEDURE — 93005 ELECTROCARDIOGRAM TRACING: CPT

## 2024-12-15 PROCEDURE — 82310 ASSAY OF CALCIUM: CPT | Performed by: NURSE PRACTITIONER

## 2024-12-15 PROCEDURE — 83735 ASSAY OF MAGNESIUM: CPT | Performed by: NURSE PRACTITIONER

## 2024-12-15 PROCEDURE — S4991 NICOTINE PATCH NONLEGEND: HCPCS | Performed by: NURSE PRACTITIONER

## 2024-12-15 PROCEDURE — 86901 BLOOD TYPING SEROLOGIC RH(D): CPT | Performed by: NURSE PRACTITIONER

## 2024-12-15 RX ORDER — LORAZEPAM 2 MG/ML
2 INJECTION INTRAMUSCULAR EVERY 2 HOUR PRN
Status: DISCONTINUED | OUTPATIENT
Start: 2024-12-15 | End: 2024-12-16

## 2024-12-15 RX ORDER — IBUPROFEN 200 MG
1 TABLET ORAL DAILY
Status: DISCONTINUED | OUTPATIENT
Start: 2024-12-16 | End: 2024-12-26 | Stop reason: HOSPADM

## 2024-12-15 RX ORDER — GUAIFENESIN 600 MG/1
600 TABLET, EXTENDED RELEASE ORAL 2 TIMES DAILY
Start: 2024-12-15

## 2024-12-15 RX ORDER — MULTIVIT-MIN/IRON FUM/FOLIC AC 7.5 MG-4
1 TABLET ORAL DAILY
Start: 2024-12-16

## 2024-12-15 RX ORDER — PANTOPRAZOLE SODIUM 40 MG/1
40 TABLET, DELAYED RELEASE ORAL
Status: DISCONTINUED | OUTPATIENT
Start: 2024-12-16 | End: 2024-12-26 | Stop reason: HOSPADM

## 2024-12-15 RX ORDER — POLYETHYLENE GLYCOL 3350 17 G/17G
17 POWDER, FOR SOLUTION ORAL DAILY PRN
Status: DISCONTINUED | OUTPATIENT
Start: 2024-12-15 | End: 2024-12-18

## 2024-12-15 RX ORDER — LIDOCAINE HYDROCHLORIDE 20 MG/ML
15 SOLUTION OROPHARYNGEAL
Status: DISCONTINUED | OUTPATIENT
Start: 2024-12-15 | End: 2024-12-26 | Stop reason: HOSPADM

## 2024-12-15 RX ORDER — LANOLIN ALCOHOL/MO/W.PET/CERES
100 CREAM (GRAM) TOPICAL DAILY
Status: DISCONTINUED | OUTPATIENT
Start: 2024-12-16 | End: 2024-12-26 | Stop reason: HOSPADM

## 2024-12-15 RX ORDER — ACETAMINOPHEN 325 MG/1
650 TABLET ORAL EVERY 6 HOURS PRN
Start: 2024-12-15

## 2024-12-15 RX ORDER — MIRTAZAPINE 15 MG/1
15 TABLET, FILM COATED ORAL NIGHTLY
Status: DISCONTINUED | OUTPATIENT
Start: 2024-12-15 | End: 2024-12-26 | Stop reason: HOSPADM

## 2024-12-15 RX ORDER — IPRATROPIUM BROMIDE AND ALBUTEROL SULFATE 2.5; .5 MG/3ML; MG/3ML
3 SOLUTION RESPIRATORY (INHALATION) EVERY 2 HOUR PRN
Status: DISCONTINUED | OUTPATIENT
Start: 2024-12-15 | End: 2024-12-26 | Stop reason: HOSPADM

## 2024-12-15 RX ORDER — HYDROMORPHONE HYDROCHLORIDE 1 MG/ML
0.5 INJECTION, SOLUTION INTRAMUSCULAR; INTRAVENOUS; SUBCUTANEOUS EVERY 4 HOURS PRN
Status: DISCONTINUED | OUTPATIENT
Start: 2024-12-15 | End: 2024-12-21

## 2024-12-15 RX ORDER — POTASSIUM CHLORIDE 20 MEQ/1
40 TABLET, EXTENDED RELEASE ORAL ONCE
Status: COMPLETED | OUTPATIENT
Start: 2024-12-15 | End: 2024-12-15

## 2024-12-15 RX ORDER — LIDOCAINE 560 MG/1
1 PATCH PERCUTANEOUS; TOPICAL; TRANSDERMAL DAILY
Status: DISCONTINUED | OUTPATIENT
Start: 2024-12-16 | End: 2024-12-26 | Stop reason: HOSPADM

## 2024-12-15 RX ORDER — PREGABALIN 100 MG/1
200 CAPSULE ORAL 3 TIMES DAILY
Status: DISCONTINUED | OUTPATIENT
Start: 2024-12-15 | End: 2024-12-26 | Stop reason: HOSPADM

## 2024-12-15 RX ORDER — FERROUS SULFATE 325(65) MG
65 TABLET ORAL 2 TIMES DAILY
Status: DISCONTINUED | OUTPATIENT
Start: 2024-12-15 | End: 2024-12-21

## 2024-12-15 RX ORDER — FOLIC ACID 1 MG/1
1 TABLET ORAL DAILY
Start: 2024-12-16

## 2024-12-15 RX ORDER — TAMSULOSIN HYDROCHLORIDE 0.4 MG/1
0.4 CAPSULE ORAL DAILY
Status: DISCONTINUED | OUTPATIENT
Start: 2024-12-16 | End: 2024-12-26 | Stop reason: HOSPADM

## 2024-12-15 RX ORDER — IPRATROPIUM BROMIDE AND ALBUTEROL SULFATE 2.5; .5 MG/3ML; MG/3ML
3 SOLUTION RESPIRATORY (INHALATION)
Status: DISCONTINUED | OUTPATIENT
Start: 2024-12-15 | End: 2024-12-15

## 2024-12-15 RX ORDER — TOPIRAMATE 100 MG/1
100 TABLET, FILM COATED ORAL 2 TIMES DAILY
Status: DISCONTINUED | OUTPATIENT
Start: 2024-12-15 | End: 2024-12-26 | Stop reason: HOSPADM

## 2024-12-15 RX ORDER — ROPINIROLE 0.5 MG/1
0.5 TABLET, FILM COATED ORAL 3 TIMES DAILY
Status: DISCONTINUED | OUTPATIENT
Start: 2024-12-15 | End: 2024-12-26 | Stop reason: HOSPADM

## 2024-12-15 RX ORDER — ATORVASTATIN CALCIUM 20 MG/1
20 TABLET, FILM COATED ORAL NIGHTLY
Status: DISCONTINUED | OUTPATIENT
Start: 2024-12-15 | End: 2024-12-26 | Stop reason: HOSPADM

## 2024-12-15 RX ORDER — TALC
3 POWDER (GRAM) TOPICAL NIGHTLY PRN
Status: DISCONTINUED | OUTPATIENT
Start: 2024-12-15 | End: 2024-12-26 | Stop reason: HOSPADM

## 2024-12-15 RX ORDER — HEPARIN SODIUM 10000 [USP'U]/100ML
0-4000 INJECTION, SOLUTION INTRAVENOUS CONTINUOUS
Status: ON HOLD
Start: 2024-12-15 | End: 2024-12-22 | Stop reason: ALTCHOICE

## 2024-12-15 RX ORDER — ONDANSETRON HYDROCHLORIDE 2 MG/ML
4 INJECTION, SOLUTION INTRAVENOUS EVERY 6 HOURS PRN
Status: DISCONTINUED | OUTPATIENT
Start: 2024-12-15 | End: 2024-12-26 | Stop reason: HOSPADM

## 2024-12-15 RX ORDER — CYCLOBENZAPRINE HCL 10 MG
10 TABLET ORAL 3 TIMES DAILY PRN
Status: DISCONTINUED | OUTPATIENT
Start: 2024-12-15 | End: 2024-12-26 | Stop reason: HOSPADM

## 2024-12-15 RX ORDER — HYDROMORPHONE HYDROCHLORIDE 1 MG/ML
1 INJECTION, SOLUTION INTRAMUSCULAR; INTRAVENOUS; SUBCUTANEOUS EVERY 4 HOURS PRN
Status: DISCONTINUED | OUTPATIENT
Start: 2024-12-15 | End: 2024-12-22

## 2024-12-15 RX ORDER — LORAZEPAM 2 MG/ML
0.5 INJECTION INTRAMUSCULAR EVERY 2 HOUR PRN
Start: 2024-12-15

## 2024-12-15 RX ORDER — GUAIFENESIN 600 MG/1
600 TABLET, EXTENDED RELEASE ORAL 2 TIMES DAILY
Status: DISCONTINUED | OUTPATIENT
Start: 2024-12-15 | End: 2024-12-26 | Stop reason: HOSPADM

## 2024-12-15 RX ORDER — NALOXONE HYDROCHLORIDE 0.4 MG/ML
0.4 INJECTION, SOLUTION INTRAMUSCULAR; INTRAVENOUS; SUBCUTANEOUS EVERY 5 MIN PRN
Status: DISCONTINUED | OUTPATIENT
Start: 2024-12-15 | End: 2024-12-22

## 2024-12-15 RX ORDER — MONTELUKAST SODIUM 10 MG/1
10 TABLET ORAL DAILY
Status: DISCONTINUED | OUTPATIENT
Start: 2024-12-16 | End: 2024-12-26 | Stop reason: HOSPADM

## 2024-12-15 RX ORDER — LORAZEPAM 2 MG/ML
1 INJECTION INTRAMUSCULAR EVERY 2 HOUR PRN
Status: DISCONTINUED | OUTPATIENT
Start: 2024-12-15 | End: 2024-12-16

## 2024-12-15 RX ORDER — MULTIVIT-MIN/IRON FUM/FOLIC AC 7.5 MG-4
1 TABLET ORAL DAILY
Status: DISCONTINUED | OUTPATIENT
Start: 2024-12-16 | End: 2024-12-16

## 2024-12-15 RX ORDER — HEPARIN SODIUM 10000 [USP'U]/100ML
0-4000 INJECTION, SOLUTION INTRAVENOUS CONTINUOUS
Status: DISCONTINUED | OUTPATIENT
Start: 2024-12-15 | End: 2024-12-20

## 2024-12-15 RX ORDER — ONDANSETRON 4 MG/1
4 TABLET, ORALLY DISINTEGRATING ORAL EVERY 8 HOURS PRN
Start: 2024-12-15

## 2024-12-15 RX ORDER — ALBUTEROL SULFATE 90 UG/1
2 INHALANT RESPIRATORY (INHALATION) EVERY 6 HOURS PRN
Status: DISCONTINUED | OUTPATIENT
Start: 2024-12-15 | End: 2024-12-26 | Stop reason: HOSPADM

## 2024-12-15 RX ORDER — FOLIC ACID 1 MG/1
1 TABLET ORAL DAILY
Status: DISCONTINUED | OUTPATIENT
Start: 2024-12-16 | End: 2024-12-16

## 2024-12-15 RX ORDER — HYDROMORPHONE HYDROCHLORIDE 1 MG/ML
1 INJECTION, SOLUTION INTRAMUSCULAR; INTRAVENOUS; SUBCUTANEOUS EVERY 4 HOURS PRN
Start: 2024-12-15 | End: 2024-12-27 | Stop reason: WASHOUT

## 2024-12-15 RX ORDER — DOCUSATE SODIUM 100 MG/1
100 CAPSULE, LIQUID FILLED ORAL 2 TIMES DAILY
Status: DISCONTINUED | OUTPATIENT
Start: 2024-12-15 | End: 2024-12-21

## 2024-12-15 RX ORDER — LORAZEPAM 2 MG/ML
0.5 INJECTION INTRAMUSCULAR EVERY 2 HOUR PRN
Status: DISCONTINUED | OUTPATIENT
Start: 2024-12-15 | End: 2024-12-16

## 2024-12-15 RX ORDER — ONDANSETRON 4 MG/1
4 TABLET, ORALLY DISINTEGRATING ORAL EVERY 6 HOURS PRN
Status: DISCONTINUED | OUTPATIENT
Start: 2024-12-15 | End: 2024-12-26 | Stop reason: HOSPADM

## 2024-12-15 RX ADMIN — ATORVASTATIN CALCIUM 20 MG: 20 TABLET, FILM COATED ORAL at 20:51

## 2024-12-15 RX ADMIN — PANCRELIPASE 2 CAPSULE: 15000; 3000; 9500 CAPSULE, DELAYED RELEASE ORAL at 09:06

## 2024-12-15 RX ADMIN — ROPINIROLE HYDROCHLORIDE 0.5 MG: 0.25 TABLET, FILM COATED ORAL at 08:55

## 2024-12-15 RX ADMIN — MONTELUKAST 10 MG: 10 TABLET, FILM COATED ORAL at 08:55

## 2024-12-15 RX ADMIN — DOCUSATE SODIUM 100 MG: 100 CAPSULE, LIQUID FILLED ORAL at 08:55

## 2024-12-15 RX ADMIN — HYDROMORPHONE HYDROCHLORIDE 1 MG: 1 INJECTION, SOLUTION INTRAMUSCULAR; INTRAVENOUS; SUBCUTANEOUS at 13:20

## 2024-12-15 RX ADMIN — POTASSIUM CHLORIDE 40 MEQ: 1500 TABLET, EXTENDED RELEASE ORAL at 10:19

## 2024-12-15 RX ADMIN — GUAIFENESIN 600 MG: 600 TABLET ORAL at 20:50

## 2024-12-15 RX ADMIN — PREGABALIN 200 MG: 100 CAPSULE ORAL at 17:44

## 2024-12-15 RX ADMIN — MIRTAZAPINE 15 MG: 15 TABLET, FILM COATED ORAL at 20:51

## 2024-12-15 RX ADMIN — LIDOCAINE 4% 1 PATCH: 40 PATCH TOPICAL at 08:55

## 2024-12-15 RX ADMIN — ACETAMINOPHEN 650 MG: 325 TABLET ORAL at 07:05

## 2024-12-15 RX ADMIN — FERROUS SULFATE TAB 325 MG (65 MG ELEMENTAL FE) 325 MG: 325 (65 FE) TAB at 08:55

## 2024-12-15 RX ADMIN — TAMSULOSIN HYDROCHLORIDE 0.4 MG: 0.4 CAPSULE ORAL at 08:55

## 2024-12-15 RX ADMIN — IPRATROPIUM BROMIDE AND ALBUTEROL SULFATE 3 ML: .5; 3 SOLUTION RESPIRATORY (INHALATION) at 20:57

## 2024-12-15 RX ADMIN — PANTOPRAZOLE SODIUM 40 MG: 40 TABLET, DELAYED RELEASE ORAL at 06:01

## 2024-12-15 RX ADMIN — LIDOCAINE HYDROCHLORIDE 15 ML: 20 SOLUTION ORAL at 10:22

## 2024-12-15 RX ADMIN — TOPIRAMATE 100 MG: 100 TABLET, FILM COATED ORAL at 20:50

## 2024-12-15 RX ADMIN — HEPARIN SODIUM 1200 UNITS/HR: 10000 INJECTION, SOLUTION INTRAVENOUS at 20:08

## 2024-12-15 RX ADMIN — Medication 3 L/MIN: at 21:49

## 2024-12-15 RX ADMIN — IOHEXOL 80 ML: 350 INJECTION, SOLUTION INTRAVENOUS at 22:27

## 2024-12-15 RX ADMIN — HYDROMORPHONE HYDROCHLORIDE 1 MG: 1 INJECTION, SOLUTION INTRAMUSCULAR; INTRAVENOUS; SUBCUTANEOUS at 09:08

## 2024-12-15 RX ADMIN — POTASSIUM CHLORIDE 40 MEQ: 1500 TABLET, EXTENDED RELEASE ORAL at 22:03

## 2024-12-15 RX ADMIN — HYDROMORPHONE HYDROCHLORIDE 1 MG: 1 INJECTION, SOLUTION INTRAMUSCULAR; INTRAVENOUS; SUBCUTANEOUS at 05:26

## 2024-12-15 RX ADMIN — HYDROMORPHONE HYDROCHLORIDE 1 MG: 1 INJECTION, SOLUTION INTRAMUSCULAR; INTRAVENOUS; SUBCUTANEOUS at 17:44

## 2024-12-15 RX ADMIN — HEPARIN SODIUM 1200 UNITS/HR: 10000 INJECTION, SOLUTION INTRAVENOUS at 17:45

## 2024-12-15 RX ADMIN — PREGABALIN 200 MG: 75 CAPSULE ORAL at 08:55

## 2024-12-15 RX ADMIN — ONDANSETRON 4 MG: 2 INJECTION, SOLUTION INTRAMUSCULAR; INTRAVENOUS at 05:26

## 2024-12-15 RX ADMIN — NICOTINE 1 PATCH: 14 PATCH, EXTENDED RELEASE TRANSDERMAL at 08:55

## 2024-12-15 RX ADMIN — THIAMINE HCL TAB 100 MG 100 MG: 100 TAB at 08:55

## 2024-12-15 RX ADMIN — TOPIRAMATE 100 MG: 25 TABLET, FILM COATED ORAL at 08:55

## 2024-12-15 RX ADMIN — ROPINIROLE 0.5 MG: 0.5 TABLET, FILM COATED ORAL at 20:50

## 2024-12-15 RX ADMIN — FERROUS SULFATE TAB 325 MG (65 MG ELEMENTAL FE) 325 MG: 325 (65 FE) TAB at 20:51

## 2024-12-15 RX ADMIN — LIDOCAINE HYDROCHLORIDE 15 ML: 20 SOLUTION ORAL at 06:01

## 2024-12-15 RX ADMIN — HYDROMORPHONE HYDROCHLORIDE 1 MG: 1 INJECTION, SOLUTION INTRAMUSCULAR; INTRAVENOUS; SUBCUTANEOUS at 22:03

## 2024-12-15 RX ADMIN — DOCUSATE SODIUM 100 MG: 100 CAPSULE, LIQUID FILLED ORAL at 20:50

## 2024-12-15 RX ADMIN — HYDROMORPHONE HYDROCHLORIDE 1 MG: 1 INJECTION, SOLUTION INTRAMUSCULAR; INTRAVENOUS; SUBCUTANEOUS at 01:22

## 2024-12-15 RX ADMIN — FOLIC ACID 1 MG: 1 TABLET ORAL at 08:55

## 2024-12-15 ASSESSMENT — LIFESTYLE VARIABLES
TREMOR: NO TREMOR
TOTAL SCORE: 0
TREMOR: NOT VISIBLE, BUT CAN BE FELT FINGERTIP TO FINGERTIP
AGITATION: NORMAL ACTIVITY
HEADACHE, FULLNESS IN HEAD: NOT PRESENT
TOTAL SCORE: 4
TREMOR: NO TREMOR
TOTAL SCORE: 2
VISUAL DISTURBANCES: NOT PRESENT
NAUSEA AND VOMITING: NO NAUSEA AND NO VOMITING
PAROXYSMAL SWEATS: NO SWEAT VISIBLE
VISUAL DISTURBANCES: NOT PRESENT
AUDITORY DISTURBANCES: NOT PRESENT
AGITATION: NORMAL ACTIVITY
HEADACHE, FULLNESS IN HEAD: MODERATE
NAUSEA AND VOMITING: NO NAUSEA AND NO VOMITING
ANXIETY: NO ANXIETY, AT EASE
PAROXYSMAL SWEATS: NO SWEAT VISIBLE
AUDITORY DISTURBANCES: NOT PRESENT
AGITATION: NORMAL ACTIVITY
ORIENTATION AND CLOUDING OF SENSORIUM: ORIENTED AND CAN DO SERIAL ADDITIONS
HEADACHE, FULLNESS IN HEAD: NOT PRESENT
TREMOR: NO TREMOR
HEADACHE, FULLNESS IN HEAD: NOT PRESENT
TOTAL SCORE: 0
AGITATION: NORMAL ACTIVITY
NAUSEA AND VOMITING: MILD NAUSEA WITH NO VOMITING
NAUSEA AND VOMITING: NO NAUSEA AND NO VOMITING
VISUAL DISTURBANCES: NOT PRESENT
NAUSEA AND VOMITING: NO NAUSEA AND NO VOMITING
ORIENTATION AND CLOUDING OF SENSORIUM: ORIENTED AND CAN DO SERIAL ADDITIONS
VISUAL DISTURBANCES: NOT PRESENT
PAROXYSMAL SWEATS: NO SWEAT VISIBLE
ORIENTATION AND CLOUDING OF SENSORIUM: ORIENTED AND CAN DO SERIAL ADDITIONS
TREMOR: NO TREMOR
HEADACHE, FULLNESS IN HEAD: NOT PRESENT
PULSE: 97
NAUSEA AND VOMITING: NO NAUSEA AND NO VOMITING
ANXIETY: NO ANXIETY, AT EASE
AGITATION: NORMAL ACTIVITY
TOTAL SCORE: 0
TOTAL SCORE: 1
PAROXYSMAL SWEATS: NO SWEAT VISIBLE
ORIENTATION AND CLOUDING OF SENSORIUM: ORIENTED AND CAN DO SERIAL ADDITIONS
PAROXYSMAL SWEATS: NO SWEAT VISIBLE
TREMOR: NOT VISIBLE, BUT CAN BE FELT FINGERTIP TO FINGERTIP
ANXIETY: NO ANXIETY, AT EASE
VISUAL DISTURBANCES: NOT PRESENT
HEADACHE, FULLNESS IN HEAD: NOT PRESENT
ANXIETY: NO ANXIETY, AT EASE
AUDITORY DISTURBANCES: NOT PRESENT
ANXIETY: NO ANXIETY, AT EASE
VISUAL DISTURBANCES: NOT PRESENT
AUDITORY DISTURBANCES: NOT PRESENT
AUDITORY DISTURBANCES: NOT PRESENT
PAROXYSMAL SWEATS: NO SWEAT VISIBLE
AUDITORY DISTURBANCES: NOT PRESENT
ORIENTATION AND CLOUDING OF SENSORIUM: ORIENTED AND CAN DO SERIAL ADDITIONS
ANXIETY: MILDLY ANXIOUS
AGITATION: NORMAL ACTIVITY
ORIENTATION AND CLOUDING OF SENSORIUM: ORIENTED AND CAN DO SERIAL ADDITIONS

## 2024-12-15 ASSESSMENT — PAIN SCALES - GENERAL
PAINLEVEL_OUTOF10: 7
PAINLEVEL_OUTOF10: 0 - NO PAIN
PAINLEVEL_OUTOF10: 0 - NO PAIN
PAINLEVEL_OUTOF10: 1
PAINLEVEL_OUTOF10: 6
PAINLEVEL_OUTOF10: 7
PAINLEVEL_OUTOF10: 8
PAINLEVEL_OUTOF10: 9
PAINLEVEL_OUTOF10: 8

## 2024-12-15 ASSESSMENT — COGNITIVE AND FUNCTIONAL STATUS - GENERAL
PATIENT BASELINE BEDBOUND: NO
STANDING UP FROM CHAIR USING ARMS: A LITTLE
DAILY ACTIVITIY SCORE: 19
WALKING IN HOSPITAL ROOM: A LITTLE
WALKING IN HOSPITAL ROOM: A LITTLE
PERSONAL GROOMING: A LITTLE
MOVING TO AND FROM BED TO CHAIR: A LITTLE
TURNING FROM BACK TO SIDE WHILE IN FLAT BAD: A LITTLE
DRESSING REGULAR UPPER BODY CLOTHING: A LITTLE
DAILY ACTIVITIY SCORE: 19
CLIMB 3 TO 5 STEPS WITH RAILING: A LOT
TOILETING: A LITTLE
STANDING UP FROM CHAIR USING ARMS: A LITTLE
HELP NEEDED FOR BATHING: A LITTLE
PERSONAL GROOMING: A LITTLE
CLIMB 3 TO 5 STEPS WITH RAILING: A LOT
MOVING TO AND FROM BED TO CHAIR: A LITTLE
TOILETING: A LITTLE
DRESSING REGULAR LOWER BODY CLOTHING: A LITTLE
DRESSING REGULAR UPPER BODY CLOTHING: A LITTLE
MOBILITY SCORE: 19
HELP NEEDED FOR BATHING: A LITTLE
DRESSING REGULAR LOWER BODY CLOTHING: A LITTLE
MOBILITY SCORE: 18

## 2024-12-15 ASSESSMENT — ENCOUNTER SYMPTOMS
HEADACHES: 1
NAUSEA: 1
VOMITING: 0
ABDOMINAL PAIN: 0
CHILLS: 0
CONSTIPATION: 0
FEVER: 0
SHORTNESS OF BREATH: 1
WOUND: 1
CONFUSION: 0
WEAKNESS: 1
COUGH: 0
MUSCULOSKELETAL NEGATIVE: 1
DIARRHEA: 0
DIZZINESS: 1
DIFFICULTY URINATING: 1

## 2024-12-15 ASSESSMENT — ACTIVITIES OF DAILY LIVING (ADL)
DRESSING YOURSELF: NEEDS ASSISTANCE
HEARING - LEFT EAR: FUNCTIONAL
JUDGMENT_ADEQUATE_SAFELY_COMPLETE_DAILY_ACTIVITIES: NO
GROOMING: NEEDS ASSISTANCE
ASSISTIVE_DEVICE: WALKER
HEARING - RIGHT EAR: FUNCTIONAL
TOILETING: NEEDS ASSISTANCE
BATHING: NEEDS ASSISTANCE
PATIENT'S MEMORY ADEQUATE TO SAFELY COMPLETE DAILY ACTIVITIES?: YES
LACK_OF_TRANSPORTATION: YES
ADEQUATE_TO_COMPLETE_ADL: YES
WALKS IN HOME: NEEDS ASSISTANCE
FEEDING YOURSELF: NEEDS ASSISTANCE

## 2024-12-15 ASSESSMENT — PAIN DESCRIPTION - DESCRIPTORS: DESCRIPTORS: BURNING

## 2024-12-15 ASSESSMENT — PAIN - FUNCTIONAL ASSESSMENT
PAIN_FUNCTIONAL_ASSESSMENT: 0-10

## 2024-12-15 ASSESSMENT — PAIN DESCRIPTION - LOCATION
LOCATION: HEAD
LOCATION: HEAD
LOCATION: GENERALIZED

## 2024-12-15 ASSESSMENT — PAIN DESCRIPTION - ORIENTATION: ORIENTATION: RIGHT;LEFT

## 2024-12-15 NOTE — PROGRESS NOTES
"Gaurav Mckay is a 54 y.o. male on day 2 of admission presenting with Hypokalemia.    Subjective   The patient continues to complain of a headache today. He is rubbing his right forehead where there is an abrasion from a recent fall. No change in diplopia. No new complaints.    Objective     Last Recorded Vitals  Blood pressure 114/75, pulse 81, temperature 35.9 °C (96.6 °F), resp. rate 20, height 1.778 m (5' 10\"), weight 83.1 kg (183 lb 1.6 oz), SpO2 97%.    Mental status: A&Ox3. Follows simple commands  CN: PERRL 3 mm. Lids symmetric; no ptosis. On primary gaze adduction of right eye. Impaired abduction of right eye on rightward gaze. Visual field full. Muscles of facial expression intact. Facial sensation intact bilaterally  Motor: LUE with 5/5 strength. RUE 5/5 strength. RLE 5/5  and LLE 5/5  Sensory: Diminished to light touch in distal lower extremities to mid shin and fingers.  Coordination: In both upper extremities, finger-nose-finger was intact without dysmetria or overshoot.     Scheduled medications  atorvastatin, 20 mg, oral, Nightly  docusate sodium, 100 mg, oral, BID  ferrous sulfate (325 mg ferrous sulfate), 325 mg, oral, BID  folic acid, 1 mg, oral, Daily  guaiFENesin, 600 mg, oral, BID  ipratropium-albuteroL, 3 mL, nebulization, TID  lidocaine, 15 mL, oral, Before meals & nightly  lidocaine, 1 patch, transdermal, Daily  lipase-protease-amylase, 2 capsule, oral, BID  mirtazapine, 15 mg, oral, Nightly  montelukast, 10 mg, oral, Daily  multivitamin with minerals, 1 tablet, oral, Daily  nicotine, 1 patch, transdermal, Daily  oxygen, , inhalation, Continuous - Inhalation  pantoprazole, 40 mg, oral, Daily before breakfast  potassium chloride CR, 40 mEq, oral, Once  pregabalin, 200 mg, oral, TID  rOPINIRole, 0.5 mg, oral, TID  tamsulosin, 0.4 mg, oral, Daily  thiamine, 100 mg, oral, Daily  topiramate, 100 mg, oral, BID      Continuous medications  heparin, 0-4,000 Units/hr, Last Rate: 1,200 Units/hr " (12/15/24 7077)      PRN medications  PRN medications: acetaminophen, cyclobenzaprine, heparin, HYDROmorphone, HYDROmorphone, ipratropium-albuteroL, LORazepam **OR** LORazepam **OR** LORazepam, melatonin, naloxone, ondansetron ODT **OR** ondansetron, polyethylene glycol    MRI Brain  IMPRESSION:  1. The central right clivus has an enlarging tumor as noted above.      2. The central bety has a triangular focus of encephalomalacia  corresponding to the triangle shaped abnormality with restricted  diffusion on the 2016 brain MR consistent with an old area of osmotic  demyelination.      3. The parenchymal hyperintensities elsewhere are nonspecific and may  be secondary to degenerative, chronic microvascular ischemic or other  etiologies.     Impression:  Gaurav Mckay is a 54 y.o. who presents surgical clearance for cystoscopy. 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 MRI brain completed yesterday shows an enlarging tumor originating from the right clivus that extends into the sella turcica, left sphenoid sinus and partially surrounds the distal cavernous segments of the right internal carotid. The mass was not seen on CTH from [id number].  Findings favor a chordoma; however, chondrosarcoma, plasmacytoma are on the differential.     On exam the patient also has evidence of a length dependent peripheral neuropathy in the setting of chronic alcohol use. He reports that symptoms are stable. Falls are likely due to sensory deficits in feet along with orthostatic hypotension from alcoholic neuropathy.     Plan:  #Enlarging clival mass, concerning for chordoma.   #R abducens palsy  - Case should be discussed with neurosurgery for biopsy and further treatment of intracranial mass.     #Alcohol abuse complicated by peripheral neuropathy  - Agree with continued [facility] protocol  - Continue thiamine and folate  - On home lyrica for neuropathy    #Restless Legs  -  On home ropinirole 0.5mg TID    I spent 50 minutes in the professional and overall care of this patient.    Joon Walker MD

## 2024-12-15 NOTE — PROGRESS NOTES
Patient: Gaurav Mckay  Room/bed: 118/118-A  Admitted on: 12/13/2024    Age: 54 y.o.   Gender: male  Code Status:  Full Code   Admitting Dx: Hypokalemia [E87.6]    MRN: [id number]  PCP: No Assigned PCP Generic Provider, MD       Subjective   Continues to have headache, but pain meds are helping some. Eating. Able to sleep    Objective    Physical Exam  Constitutional:       Comments: Alert, ill appearing male   HENT:      Nose: Nose normal.      Mouth/Throat:      Mouth: Mucous membranes are dry.   Eyes:      Comments: Dysconjugate gaze   Neck:      Comments: No jvd  Cardiovascular:      Rate and Rhythm: Normal rate and regular rhythm.      Pulses: Normal pulses.   Pulmonary:      Comments: Equal air exchange. Few crackles in right base  Abdominal:      Comments: Soft but distended  Decreased bowel sounds  Mild generalized tenderness to palpation   Musculoskeletal:      Comments: No edema.   No distal hair growth  Faint pulses   Skin:     General: Skin is dry.      Coloration: Skin is pale.   Neurological:      Comments: Slight facial droop present  Dysconjugate gaze  Speech is clear  Not tremulous at this time   Psychiatric:         Mood and Affect: Mood normal.         Behavior: Behavior normal.        Temp:  [35.8 °C (96.4 °F)-36.2 °C (97.2 °F)] 35.8 °C (96.4 °F)  Heart Rate:  [] 94  Resp:  [18-20] 20  BP: ()/(65-88) 126/84    Vitals:    12/13/24 1814   Weight: 83.1 kg (183 lb 1.6 oz)           I/Os    Intake/Output Summary (Last 24 hours) at 12/15/2024 0919  Last data filed at 12/15/2024 0848  Gross per 24 hour   Intake 2100 ml   Output --   Net 2100 ml       Labs:   Results from last 72 hours   Lab Units 12/15/24  0559 12/14/24  0520 12/13/24  1505   SODIUM mmol/L 136 138 135*   POTASSIUM mmol/L 3.2* 3.7 2.9*   CHLORIDE mmol/L 97* 98 95*   CO2 mmol/L 27 29 26   BUN mg/dL 6 8 6   CREATININE mg/dL 0.63 0.62 0.68   GLUCOSE mg/dL 122* 111* 92   CALCIUM mg/dL 9.2 9.6 9.4   ANION GAP mmol/L 15 15 17  "  EGFR mL/min/1.73m*2 >90 >90 >90      Results from last 72 hours   Lab Units 12/15/24  0559 12/14/24  0520 12/13/24  1505   WBC AUTO x10*3/uL 6.4 6.3 6.8   HEMOGLOBIN g/dL 12.4* 13.9 13.2*   HEMATOCRIT % 37.0* 43.3 40.0*   PLATELETS AUTO x10*3/uL 290 307 314      Lab Results   Component Value Date    CALCIUM 9.2 12/15/2024    PHOS 4.4 10/26/2024      Lab Results   Component Value Date    CRP 0.99 10/24/2024      [unfilled]     Micro/ID:   No results found for the last 90 days.                   No lab exists for component: \"AGALPCRNB\"   .ID  Lab Results   Component Value Date    BLOODCULT No growth at 4 days -  FINAL REPORT 09/04/2024       Images:  MR brain w and wo IV contrast  Narrative: Interpreted By:  Enrique Mcclain,   STUDY:  MR BRAIN W AND WO IV CONTRAST;  12/14/2024 11:42 am      INDICATION:  Signs/Symptoms:large soft tissue mass from clivus on CTH.   Personal  medical history of laryngeal cancer with right neck dissection.          COMPARISON:  10/23/2024 neck CT and 12/13/2024 brain CT, 01/23/2016 brain MR      ACCESSION NUMBER(S):  [id number]      ORDERING CLINICIAN:  TEDDY GRIFFITH      TECHNIQUE:  Axial T2, FLAIR, DWI, gradient echo T2 and sagittal and coronal T1  weighted images of brain were acquired. Post contrast T1 weighted  images were acquired after administration of 16 ML Dotarem gadolinium  based intravenous contrast.      FINDINGS:  The images are degraded by motion artifact, especially the enhanced  image sets.      The central right clivus has an enlarging mass 2.6 x 3 cm transverse  dimension, increased in size from the neck CT.      Laterally to the right the mass partially surround the distal  horizontal and cavernous segments of the right internal carotid  artery, extending into the right Meckel's cave.      Laterally to the left the mass abuts the junction of the horizontal  and cavernous segment of the left internal carotid.      Posteriorly a thin band of tissue extends through " the posterior  clival cortex abutting the basilar artery.      Anteriorly, the tumor extends into the sella turcica partially  encasing the pituitary. The tumor extends into the larger left  sphenoid sinus. (The sphenoid septum deviates to the right  posteriorly creating a small right sinus.)      Inferiorly the lesion extends nearly to the most inferior aspect of  the clivus with cortical breakthrough of the inferior central right  clival cortex.          CSF Spaces: Sulci and ventricles are normal, unchanged.      Parenchyma: No acute infarct, hemorrhage or intraparenchymal mass is  noted.      The white matter has scattered foci of nonspecific FLAIR  hyperintensity.      The bety has a triangular focus of hyperintensity in questionable  punctate cystic encephalomalacia centrally.      The brain parenchyma enhances normally.      Paranasal Sinuses and Mastoids: Visualized paranasal sinuses and  mastoid air cells are unremarkable.      Impression: 1. The central right clivus has an enlarging tumor as noted above.      2. The central bety has a triangular focus of encephalomalacia  corresponding to the triangle shaped abnormality with restricted  diffusion on the 2016 brain MR consistent with an old area of osmotic  demyelination.      3. The parenchymal hyperintensities elsewhere are nonspecific and may  be secondary to degenerative, chronic microvascular ischemic or other  etiologies.      MACRO:  None      Signed by: Enrique Mcclain 12/14/2024 2:54 PM  Dictation workstation:   [id number]       Meds    Scheduled medications  atorvastatin, 20 mg, oral, Nightly  docusate sodium, 100 mg, oral, BID  ferrous sulfate (325 mg ferrous sulfate), 325 mg, oral, BID  folic acid, 1 mg, oral, Daily  guaiFENesin, 600 mg, oral, BID  ipratropium-albuteroL, 3 mL, nebulization, TID  lidocaine, 15 mL, oral, Before meals & nightly  lidocaine, 1 patch, transdermal, Daily  lipase-protease-amylase, 2 capsule, oral, BID  mirtazapine, 15 mg,  oral, Nightly  montelukast, 10 mg, oral, Daily  multivitamin with minerals, 1 tablet, oral, Daily  nicotine, 1 patch, transdermal, Daily  oxygen, , inhalation, Continuous - Inhalation  pantoprazole, 40 mg, oral, Daily before breakfast  pregabalin, 200 mg, oral, TID  rOPINIRole, 0.5 mg, oral, TID  tamsulosin, 0.4 mg, oral, Daily  thiamine, 100 mg, oral, Daily  topiramate, 100 mg, oral, BID      Continuous medications  heparin, 0-4,000 Units/hr, Last Rate: 1,200 Units/hr (12/15/24 4301)      PRN medications  PRN medications: acetaminophen, cyclobenzaprine, heparin, HYDROmorphone, HYDROmorphone, ipratropium-albuteroL, LORazepam **OR** LORazepam **OR** LORazepam, melatonin, naloxone, ondansetron ODT **OR** ondansetron, polyethylene glycol     Assessment and Plan    Gaurav Mckay is a 54 y.o. 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 Large, invasive. Will be contacting [facility] neuro[place] for transfer. Patient is agreeable.   Peripheral neuropathy, falls. Continue meds  Alcohol withdrawal syndrome. He is actually doing well, most recent ciwa of 2.   Hypokalemia, will supplement and recheck  Bladder mass/tumor. Plan was for cysto/biopsy on Tuesday. Will need to hold off on this at this time.   Chronic hypoxic respiratory failure, on 3 liters chronically. Hx of submassive PE, with embolectomy last November. On chronic anticoagulation, eliquis on hold, but bridging.   RONAK Smith MD

## 2024-12-15 NOTE — CARE PLAN
The patient's goals for the shift include  pain control    The clinical goals for the shift include  adequate pain management

## 2024-12-15 NOTE — PROGRESS NOTES
12/15/24 1100   Discharge Planning   Expected Discharge Disposition Short Term A  (Made aware that patient is a neuro transfer to [facility].)   Does the patient need discharge transport arranged? Yes   RoundTrip coordination needed? Yes   Has discharge transport been arranged? No

## 2024-12-15 NOTE — H&P
History Of Present Illness  Gaurav Mckay is a 54 y.o. male with a PMH of laryngeal cancer s/p radical neck dissection, PE on Apixaban, alcohol abuse, and HTN who presented to [facility] as a transfer from [facility] due to need for urology consult as well as neurosurgery consult due to incidental finding of brain mass. Pt was a direct admit to OSH on 12/13 after he presented to [facility] for surgical clearance for a scheduled cystoscopy with ablation of tissue on 12/17/24 secondary to a bladder tumor. He presented late and intoxicated to his appointment and there was concern of compliance with preop instructions which could further delay treatment. His case was discussed with the surgeon as well as anesthesia and admission was recommended to ensure sobriety, medication compliance, and surgical readiness. During admission and evaluation, Mr. Mckay was found to have dysconjugate gaze and he was complaining of a severe headache which he felt was related to a recent fall. 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 Neurology was consulted at OSH and consulting team felt that this was the cause of this diplopia. This mass is a new finding since it was not seen on CT imaging of brain done at the end of October. Heparin bridge started on admit to OSH. Pt had massive PE last November at which time he underwent embolectomy that was complicated by chronic respiratory failure and he has been on 3LNC since this occurred. Stephen cath placed due to urinary retention at OSH. Pt arrived in stable condition from OSH. Pt resting comfortably and eating when seen on LK55. He endorses pain on right side of head as well as SOB. Pt placed on 3LNC. Neurosurgery and Urology consulted and pt will be seen by both teams today. Pt admitted to medicine service for further treatment and management of brain mass  and bladder tumor.    Past Medical History  Past Medical History:   Diagnosis Date    Acute inflammation of orbit 06/27/2013    Acute upper respiratory infection, unspecified 03/20/2015    Acute upper respiratory infection    Alcohol abuse, in remission 02/12/2015    History of ETOH abuse    Alcohol dependence, in remission 05/07/2015    Alcohol dependence in remission    Allergic contact dermatitis due to plants, except food 05/21/2014    Contact dermatitis due to poison ivy    Allergy status to unspecified drugs, medicaments and biological substances 08/29/2013    History of allergy    Anxiety disorder, unspecified 03/20/2015    Anxiety    Asthmatic bronchitis ([facility])     Bipolar disorder, current episode manic without psychotic features, unspecified (Multi)     Bipolar disorder, current episode manic without psychotic features    Cancer of larynx (Multi)     Cervicalgia     Cervicalgia    Chronic asthmatic bronchitis (Multi) 12/02/2014    Clotting disorder (Multi)     P.E.    COPD (chronic obstructive pulmonary disease) (Multi)     Degeneration of cervical intervertebral disc     Dysphagia     Edentulous     Encounter for immunization 09/22/2016    Flu vaccine need    Frequent falls     pt states he falls every time he gets up.    GERD (gastroesophageal reflux disease)     Hearing aid worn     currently missing    History of abdominal pain 02/16/2017    History of allergic rhinitis 01/16/2014    History of allergic rhinitis 06/26/2014    History of allergic rhinitis    History of depression     History of esophageal reflux 08/27/2015    History of gastroenteritis 04/11/2013    History of hepatitis C     History of sinusitis 02/05/2014    History of sore throat 03/20/2017    Hyperlipidemia     Hypertension     Other long term (current) drug therapy 06/02/2015    High risk medication use    Personal history of nicotine dependence 08/16/2017    History of tobacco abuse    Personal history of nicotine dependence  06/26/2017    History of nicotine dependence    Personal history of other diseases of the respiratory system 09/26/2013    History of acute sinusitis    Personal history of other mental and behavioral disorders     Personal history of other specified conditions 08/25/2016    RLS (restless legs syndrome)     Unspecified asthma, uncomplicated ([facility]) 02/13/2014    Uses walker     and wheelchair    Wears glasses     currently broken       Surgical History  Past Surgical History:   Procedure Laterality Date    ANKLE SURGERY Left     ANKLE SURGERY  07/08/2024    R ankle spanning ex-fix revision    CHOLECYSTECTOMY      CT ABDOMEN PELVIS ANGIOGRAM W AND/OR WO IV CONTRAST  04/01/2020    CT ABDOMEN PELVIS ANGIOGRAM W AND/OR WO IV CONTRAST 4/1/2020 [facility]    FRACTURE SURGERY Right 07/15/2024    Open reduction and internal fixation of right distal tibia pilon and fibula fractures, removal of right ankle spanning external fixation under anesthesia, debridement down to including bone external fixator pin sites right leg    INVASIVE VASCULAR PROCEDURE N/A 11/08/2023    Procedure: Pulmonary Angiogram;  Surgeon: Surya Templeton MD;  Location: [facility] Cardiac Cath Lab;  Service: Cardiovascular;  Laterality: N/A;    LARYNGOSCOPY  07/01/2024    R radical neck dissection and L ALT free flap reconstruction    LEG SURGERY  07/06/2024    RLE ex-fix placement    MR HEAD ANGIO WO IV CONTRAST  01/24/2016    MR HEAD ANGIO WO IV CONTRAST 1/24/2016 [facility]    MR NECK ANGIO WO IV CONTRAST  01/24/2016    MR NECK ANGIO WO IV CONTRAST 1/24/2016 [facility]    THROMBECTOMY      WOUND DEBRIDEMENT Left 08/06/2024    L thigh debridement with woud vac placement 2/2 surgical wound dehiscence        Social History  He reports that he has been smoking cigarettes. He has quit using smokeless tobacco.  His smokeless tobacco use included snuff. He reports current alcohol use. He reports current drug use. Drug:  Methamphetamines.    Family History  Family History   Problem Relation Name Age of Onset    Diabetes Mother      Hypertension Mother      Heart attack Father          Allergies  Oxycodone    Review of Systems   Constitutional:  Negative for chills and fever.   HENT:  Negative for congestion.    Eyes:  Positive for visual disturbance.   Respiratory:  Positive for shortness of breath. Negative for cough.    Cardiovascular:  Negative for chest pain.   Gastrointestinal:  Positive for nausea. Negative for abdominal pain, constipation, diarrhea and vomiting.   Genitourinary:  Positive for difficulty urinating.   Musculoskeletal: Negative.    Skin:  Positive for wound.   Neurological:  Positive for dizziness, weakness and headaches.   Psychiatric/Behavioral:  Negative for confusion.         Physical Exam  Vitals reviewed.   Constitutional:       General: He is not in acute distress.     Appearance: He is ill-appearing.   HENT:      Head: Normocephalic.      Nose: Nose normal.      Mouth/Throat:      Mouth: Mucous membranes are dry.   Eyes:      Comments: R eye deviation with disconjugate gaze   Cardiovascular:      Rate and Rhythm: Normal rate.      Pulses: Normal pulses.      Heart sounds: Normal heart sounds.   Pulmonary:      Effort: Pulmonary effort is normal. No respiratory distress.      Breath sounds: Rhonchi present.      Comments: Coarse lung sounds throughout  Abdominal:      Palpations: Abdomen is soft.   Musculoskeletal:         General: Normal range of motion.      Cervical back: Normal range of motion.   Skin:     General: Skin is warm and dry.      Comments: Scab on left knee   Neurological:      Mental Status: He is alert and oriented to person, place, and time.          Last Recorded Vitals  Blood pressure 118/81, pulse 88, temperature 36.4 °C (97.5 °F), resp. rate 10, SpO2 98%.    Relevant Results  MR brain w and wo IV contrast    Result Date: 12/14/2024  Interpreted By:  Enrique Mcclain, STUDY: MR BRAIN W  AND WO IV CONTRAST;  12/14/2024 11:42 am   INDICATION: Signs/Symptoms:large soft tissue mass from clivus on CTH.   Personal medical history of laryngeal cancer with right neck dissection.     COMPARISON: 10/23/2024 neck CT and 12/13/2024 brain CT, 01/23/2016 brain MR   ACCESSION NUMBER(S): [id number]   ORDERING CLINICIAN: TEDDY GRIFFITH   TECHNIQUE: Axial T2, FLAIR, DWI, gradient echo T2 and sagittal and coronal T1 weighted images of brain were acquired. Post contrast T1 weighted images were acquired after administration of 16 ML Dotarem gadolinium based intravenous contrast.   FINDINGS: The images are degraded by motion artifact, especially the enhanced image sets.   The central right clivus has an enlarging mass 2.6 x 3 cm transverse dimension, increased in size from the neck CT.   Laterally to the right the mass partially surround the distal horizontal and cavernous segments of the right internal carotid artery, extending into the right Meckel's cave.   Laterally to the left the mass abuts the junction of the horizontal and cavernous segment of the left internal carotid.   Posteriorly a thin band of tissue extends through the posterior clival cortex abutting the basilar artery.   Anteriorly, the tumor extends into the sella turcica partially encasing the pituitary. The tumor extends into the larger left sphenoid sinus. (The sphenoid septum deviates to the right posteriorly creating a small right sinus.)   Inferiorly the lesion extends nearly to the most inferior aspect of the clivus with cortical breakthrough of the inferior central right clival cortex.     CSF Spaces: Sulci and ventricles are normal, unchanged.   Parenchyma: No acute infarct, hemorrhage or intraparenchymal mass is noted.   The white matter has scattered foci of nonspecific FLAIR hyperintensity.   The bety has a triangular focus of hyperintensity in questionable punctate cystic encephalomalacia centrally.   The brain parenchyma enhances  normally.   Paranasal Sinuses and Mastoids: Visualized paranasal sinuses and mastoid air cells are unremarkable.       1. The central right clivus has an enlarging tumor as noted above.   2. 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   3. The parenchymal hyperintensities elsewhere are nonspecific and may be secondary to degenerative, chronic microvascular ischemic or other etiologies.   MACRO: None   Signed by: Enrique Mcclain 12/14/2024 2:54 PM Dictation workstation:   [id number]    CT head wo IV contrast    Result Date: 12/13/2024  Interpreted By:  Marc Deras, STUDY: CT HEAD WO IV CONTRAST;  12/13/2024 7:11 pm   INDICATION: Signs/Symptoms:headache with recent fall on apixaban.     COMPARISON: CT head/cervical spine 10/28/2024   ACCESSION NUMBER(S): [id number]   ORDERING CLINICIAN: ANTIONETTE LOBO   TECHNIQUE: Noncontrast axial CT images of head were obtained with coronal and sagittal reconstructed images.   FINDINGS: BRAIN PARENCHYMA:  No acute intraparenchymal hemorrhage or parenchymal evidence of acute large territory ischemic infarct. Gray-white matter distinction is preserved. No mass-effect.   VENTRICLES and EXTRA-AXIAL SPACES:  No acute extra-axial or intraventricular hemorrhage. No effacement of cerebral sulci. The ventricles and sulci are age-concordant.   PARANASAL SINUSES/MASTOIDS: Invasion of the seen sinuses by clival based mass described below. There is retention of secretions in the sphenoid sinus which is new from prior study. There is partial opacification of the right mastoid air cells. The mastoids are well aerated.   CALVARIUM/ORBITS: There is a large destructive lytic soft tissue mass upon the upper clivus that measures ~ 3.2 cm x 4.8 cm x 3.3 cm. It completely destroys the floor of the sella, dorsum sella, upper clivus,  posterior wall of the sphenoid sinus, and the roof of the nasopharynx. The mass protrudes into the posterior sphenoid sinuses, extends into the sella turcica, erodes into the bilateral petrous carotid canals (right > left), protrudes into the bilateral cavernous sinuses, destroys portions of the right petrous apex, and protrudes posteriorly causing narrowing of the prepontine cistern. There are internal calcifications/bone fragments.   EXTRACRANIAL SOFT TISSUES: No discernible abnormality.       1. Large destructive lytic soft tissue mass upon the upper clivus that measures ~ 3.2 cm x 4.8 cm x 3.3 cm. It completely destroys the floor of the sella, dorsum sella, upper clivus, posterior wall of the sphenoid sinus, and the roof of the nasopharynx. The mass protrudes into the posterior sphenoid sinuses, extends into the sella turcica, erodes into the bilateral petrous carotid canals (right > left), protrudes into the bilateral cavernous sinuses, destroys portions of the right petrous apex, and protrudes posteriorly causing narrowing of the prepontine cistern. This did not originate from a nasopharyngeal mass but does slightly invade into the right nasopharyngeal mucosa.   Given absence of metastatic disease elsewhere in the chest/abdomen/pelvis, the differential diagnosis includes but not limited to chordoma (favored), chondrosarcoma, and plasmacytoma. However, a biopsy is recommended for definitive diagnosis.   2. No acute intracranial hemorrhage.   MACRO: Marc Deras discussed the significance and urgency of this critical finding via [facility] with  ANTIONETTE LOBO on 12/13/2024 at 8:11 pm.  (**-RCF-**) Findings:  See findings.   Signed by: Marc Deras 12/13/2024 8:11 PM Dictation workstation:   [id number]    CT cervical spine wo IV contrast    Result Date: 11/27/2024  * * *Final Report* * * DATE OF EXAM: Nov 27 2024 12:27PM   ASC   [id number]  -  CT CERVICAL SPINE WO IVCON  / ACCESSION #  [id number] PROCEDURE  REASON: Spine fracture, cervical, traumatic      * * * * Physician Interpretation * * * *  EXAMINATION:  CT BRAIN WO IVCON, CT FACIAL BONE/SHEA WO IVCON, CT CERVICAL SPINE WO IVCON CLINICAL HISTORY:  Trauma.  Status post fall.  Head neck and facial pain. TECHNIQUE:  Serial axial unenhanced images were obtained from the  vertex to the foramen magnum. Coronal reconstructions of the brain were performed. Spiral, high resolution axial unenhanced images were obtained from the skull base to the cervicothoracic junction with sagittal and coronal planar reconstructions.  Spiral high resolution axial unenhanced images were also obtained through the facial bones with sagittal and coronal planar reconstructions. MQ:  CTBCSFBWO_3 Dose-Length Product (DLP): 1747 mGy*cm. CT Dose Reduction Employed: Automated exposure control(AEC) and iterative recon COMPARISON:  CT of the brain compared to 07/06/2024. RESULT: BRAIN: Acute change:   No evidence of an acute contusion or other acute parenchymal process. Hemorrhage:    No evidence of acute intracranial hemorrhage. Mass lesion / Mass effect: Soft tissue density present in the region of the sella and sellar mass cannot be excluded.  Cortical thinning of the posterior wall of the left sphenoid sinus.  There is no evidence of an intracranial mass or extraaxial fluid collection.  No significant mass effect. Chronic change:   None apparent. Parenchyma:  There is no significant volume loss.  The brain parenchyma is otherwise within normal limits for age. Ventricles:   The ventricles are within normal limits of size and configuration for age. Paranasal sinuses and skull base:  The visualized paranasal sinuses are grossly clear.  The skull base and visualized extracranial soft tissues are grossly normal.  (topogram) images: No additional findings. CERVICAL: Counting reference:  Craniocervical junction.   Anatomic Variant:  None.   Assume 7 cervical vertebrae with counting from the  craniocervical junction. Alignment:    Alignment is anatomic.   No acute traumatic subluxation of the cervical spine. Craniocervical junction:    Craniocervical junction is normal. Osseous structures/fracture:    No evidence of a lytic or blastic process in the visualized spine.  No evidence of acute or chronic fracture. Cervical soft tissues:    The paraspinal soft tissues planes are maintained.  Postsurgical changes and surgical clips present throughout the soft tissues of the right neck. Degenerative changes: Discogenic degenerative changes of the cervical spine are present.  No osseous central canal stenosis.  (topogram) images: No additional findings. FACIAL BONES: Soft Tissues:  No significant superficial soft tissue swelling. Facial bones:  No evidence of an acute fracture in the visualized facial bones. Orbits:  No evidence of an acute fracture.  The globes are intact.  The soft tissue planes of the orbits are maintained. Paranasal Sinuses: Mucosal thickening and opacification of the left sphenoid sinus.  The paranasal sinuses are otherwise clear. Foreign Bodies:  No evidence of radio opaque foreign bodies. Other:  No evidence of a remote fracture.  No lytic or blastic process seen in the facial bones.  (topogram) images: No additional findings.    IMPRESSION: Soft tissue density present in the region of the sella and sellar mass cannot be excluded.  Further evaluation/characterization with MRI of the brain compared performed.  Cortical thinning of the posterior wall of the left sphenoid sinus.  Mucosal thickening of the sphenoid sinuses bilaterally. No other CT evidence of acute intracranial process. No evidence of an acute fracture or acute traumatic subluxation of the cervical spine. No CT evidence of acute facial bone fracture. ACTIONABLE RESULT: FOLLOW-UP Acuity: Actionable Findings: Neurological System-BRAIN Routing Code:  NI_1 Recommendation: MRI PITUITARY WO/W IVCON Time Frame: Non-urgent,  but prompt follow-up COMMUNICATION: Results will be communicated with the ordering provider via Epic staff message or phone message by Imaging Support Services within 2 business days of report finalization. --END OF FINDING-- : JAIME   Transcribe Date/Time: Nov 27 2024 12:15P Dictated by : SHAYLEE BERUMEN MD This examination was interpreted and the report reviewed and electronically signed by: SHAYLEE BERUMEN MD on Nov 27 2024  1:55PM  EST    CT maxillofacial bones wo IV contrast    Result Date: 11/27/2024  * * *Final Report* * * DATE OF EXAM: Nov 27 2024 12:15PM   ASC   0507  -  CT FACIAL BONE/SHEA WO IVCON  / ACCESSION #  [id number] PROCEDURE REASON: Facial fracture, follow up      * * * * Physician Interpretation * * * *  EXAMINATION:  CT BRAIN WO IVCON, CT FACIAL BONE/SHEA WO IVCON, CT CERVICAL SPINE WO IVCON CLINICAL HISTORY:  Trauma.  Status post fall.  Head neck and facial pain. TECHNIQUE:  Serial axial unenhanced images were obtained from the  vertex to the foramen magnum. Coronal reconstructions of the brain were performed. Spiral, high resolution axial unenhanced images were obtained from the skull base to the cervicothoracic junction with sagittal and coronal planar reconstructions.  Spiral high resolution axial unenhanced images were also obtained through the facial bones with sagittal and coronal planar reconstructions. MQ:  CTBCSFBWO_3 Dose-Length Product (DLP): 1747 mGy*cm. CT Dose Reduction Employed: Automated exposure control(AEC) and iterative recon COMPARISON:  CT of the brain compared to 07/06/2024. RESULT: BRAIN: Acute change:   No evidence of an acute contusion or other acute parenchymal process. Hemorrhage:    No evidence of acute intracranial hemorrhage. Mass lesion / Mass effect: Soft tissue density present in the region of the sella and sellar mass cannot be excluded.  Cortical thinning of the posterior wall of the left sphenoid sinus.  There is no evidence of an intracranial  mass or extraaxial fluid collection.  No significant mass effect. Chronic change:   None apparent. Parenchyma:  There is no significant volume loss.  The brain parenchyma is otherwise within normal limits for age. Ventricles:   The ventricles are within normal limits of size and configuration for age. Paranasal sinuses and skull base:  The visualized paranasal sinuses are grossly clear.  The skull base and visualized extracranial soft tissues are grossly normal.  (topogram) images: No additional findings. CERVICAL: Counting reference:  Craniocervical junction.   Anatomic Variant:  None.   Assume 7 cervical vertebrae with counting from the craniocervical junction. Alignment:    Alignment is anatomic.   No acute traumatic subluxation of the cervical spine. Craniocervical junction:    Craniocervical junction is normal. Osseous structures/fracture:    No evidence of a lytic or blastic process in the visualized spine.  No evidence of acute or chronic fracture. Cervical soft tissues:    The paraspinal soft tissues planes are maintained.  Postsurgical changes and surgical clips present throughout the soft tissues of the right neck. Degenerative changes: Discogenic degenerative changes of the cervical spine are present.  No osseous central canal stenosis.  (topogram) images: No additional findings. FACIAL BONES: Soft Tissues:  No significant superficial soft tissue swelling. Facial bones:  No evidence of an acute fracture in the visualized facial bones. Orbits:  No evidence of an acute fracture.  The globes are intact.  The soft tissue planes of the orbits are maintained. Paranasal Sinuses: Mucosal thickening and opacification of the left sphenoid sinus.  The paranasal sinuses are otherwise clear. Foreign Bodies:  No evidence of radio opaque foreign bodies. Other:  No evidence of a remote fracture.  No lytic or blastic process seen in the facial bones.  (topogram) images: No additional  findings.    IMPRESSION: Soft tissue density present in the region of the sella and sellar mass cannot be excluded.  Further evaluation/characterization with MRI of the brain compared performed.  Cortical thinning of the posterior wall of the left sphenoid sinus.  Mucosal thickening of the sphenoid sinuses bilaterally. No other CT evidence of acute intracranial process. No evidence of an acute fracture or acute traumatic subluxation of the cervical spine. No CT evidence of acute facial bone fracture. ACTIONABLE RESULT: FOLLOW-UP Acuity: Actionable Findings: Neurological System-BRAIN Routing Code:  NI_1 Recommendation: MRI PITUITARY WO/W IVCON Time Frame: Non-urgent, but prompt follow-up COMMUNICATION: Results will be communicated with the ordering provider via Epic staff message or phone message by Imaging Support Services within 2 business days of report finalization. --END OF FINDING-- : JAIME   Transcribe Date/Time: Nov 27 2024 12:15P Dictated by : SHAYLEE BERUMEN MD This examination was interpreted and the report reviewed and electronically signed by: SHAYLEE BERUMEN MD on Nov 27 2024  1:55PM  EST    CT head wo IV contrast    Result Date: 11/27/2024  * * *Final Report* * * DATE OF EXAM: Nov 27 2024 12:14PM   ASC   0504  -  CT BRAIN WO IVCON   / ACCESSION #  [id number] PROCEDURE REASON: Head trauma, moderate-severe      * * * * Physician Interpretation * * * *  EXAMINATION:  CT BRAIN WO IVCON, CT FACIAL BONE/SHEA WO IVCON, CT CERVICAL SPINE WO IVCON CLINICAL HISTORY:  Trauma.  Status post fall.  Head neck and facial pain. TECHNIQUE:  Serial axial unenhanced images were obtained from the  vertex to the foramen magnum. Coronal reconstructions of the brain were performed. Spiral, high resolution axial unenhanced images were obtained from the skull base to the cervicothoracic junction with sagittal and coronal planar reconstructions.  Spiral high resolution axial unenhanced images were also obtained  through the facial bones with sagittal and coronal planar reconstructions. MQ:  CTBCSFBWO_3 Dose-Length Product (DLP): 1747 mGy*cm. CT Dose Reduction Employed: Automated exposure control(AEC) and iterative recon COMPARISON:  CT of the brain compared to 07/06/2024. RESULT: BRAIN: Acute change:   No evidence of an acute contusion or other acute parenchymal process. Hemorrhage:    No evidence of acute intracranial hemorrhage. Mass lesion / Mass effect: Soft tissue density present in the region of the sella and sellar mass cannot be excluded.  Cortical thinning of the posterior wall of the left sphenoid sinus.  There is no evidence of an intracranial mass or extraaxial fluid collection.  No significant mass effect. Chronic change:   None apparent. Parenchyma:  There is no significant volume loss.  The brain parenchyma is otherwise within normal limits for age. Ventricles:   The ventricles are within normal limits of size and configuration for age. Paranasal sinuses and skull base:  The visualized paranasal sinuses are grossly clear.  The skull base and visualized extracranial soft tissues are grossly normal.  (topogram) images: No additional findings. CERVICAL: Counting reference:  Craniocervical junction.   Anatomic Variant:  None.   Assume 7 cervical vertebrae with counting from the craniocervical junction. Alignment:    Alignment is anatomic.   No acute traumatic subluxation of the cervical spine. Craniocervical junction:    Craniocervical junction is normal. Osseous structures/fracture:    No evidence of a lytic or blastic process in the visualized spine.  No evidence of acute or chronic fracture. Cervical soft tissues:    The paraspinal soft tissues planes are maintained.  Postsurgical changes and surgical clips present throughout the soft tissues of the right neck. Degenerative changes: Discogenic degenerative changes of the cervical spine are present.  No osseous central canal stenosis.  (topogram)  images: No additional findings. FACIAL BONES: Soft Tissues:  No significant superficial soft tissue swelling. Facial bones:  No evidence of an acute fracture in the visualized facial bones. Orbits:  No evidence of an acute fracture.  The globes are intact.  The soft tissue planes of the orbits are maintained. Paranasal Sinuses: Mucosal thickening and opacification of the left sphenoid sinus.  The paranasal sinuses are otherwise clear. Foreign Bodies:  No evidence of radio opaque foreign bodies. Other:  No evidence of a remote fracture.  No lytic or blastic process seen in the facial bones.  (topogram) images: No additional findings.    IMPRESSION: Soft tissue density present in the region of the sella and sellar mass cannot be excluded.  Further evaluation/characterization with MRI of the brain compared performed.  Cortical thinning of the posterior wall of the left sphenoid sinus.  Mucosal thickening of the sphenoid sinuses bilaterally. No other CT evidence of acute intracranial process. No evidence of an acute fracture or acute traumatic subluxation of the cervical spine. No CT evidence of acute facial bone fracture. ACTIONABLE RESULT: FOLLOW-UP Acuity: Actionable Findings: Neurological System-BRAIN Routing Code:  NI_1 Recommendation: MRI PITUITARY WO/W IVCON Time Frame: Non-urgent, but prompt follow-up COMMUNICATION: Results will be communicated with the ordering provider via Epic staff message or phone message by Imaging Support Services within 2 business days of report finalization. --END OF FINDING-- : JAIME   Transcribe Date/Time: Nov 27 2024 12:15P Dictated by : SHAYLEE BERUMEN MD This examination was interpreted and the report reviewed and electronically signed by: SHAYLEE BERUMEN MD on Nov 27 2024  1:55PM  EST    Results for orders placed or performed during the hospital encounter of 12/13/24 (from the past 24 hours)   CBC   Result Value Ref Range    WBC 6.4 4.4 - 11.3 x10*3/uL    nRBC  0.0 0.0 - 0.0 /100 WBCs    RBC 4.42 (L) 4.50 - 5.90 x10*6/uL    Hemoglobin 12.4 (L) 13.5 - 17.5 g/dL    Hematocrit 37.0 (L) 41.0 - 52.0 %    MCV 84 80 - 100 fL    MCH 28.1 26.0 - 34.0 pg    MCHC 33.5 32.0 - 36.0 g/dL    RDW 17.3 (H) 11.5 - 14.5 %    Platelets 290 150 - 450 x10*3/uL   Basic Metabolic Panel   Result Value Ref Range    Glucose 122 (H) 74 - 99 mg/dL    Sodium 136 136 - 145 mmol/L    Potassium 3.2 (L) 3.5 - 5.3 mmol/L    Chloride 97 (L) 98 - 107 mmol/L    Bicarbonate 27 21 - 32 mmol/L    Anion Gap 15 10 - 20 mmol/L    Urea Nitrogen 6 6 - 23 mg/dL    Creatinine 0.63 0.50 - 1.30 mg/dL    eGFR >90 >60 mL/min/1.73m*2    Calcium 9.2 8.6 - 10.3 mg/dL   Heparin Assay, [facility]   Result Value Ref Range    Heparin Unfractionated 0.2 See Comment Below for Therapeutic Ranges IU/mL   Heparin Assay, [facility]   Result Value Ref Range    Heparin Unfractionated 0.3 See Comment Below for Therapeutic Ranges IU/mL      Scheduled medications  atorvastatin, 20 mg, oral, Nightly  docusate sodium, 100 mg, oral, BID  ferrous sulfate (325 mg ferrous sulfate), 65 mg of iron, oral, BID  [START ON 12/16/2024] folic acid, 1 mg, oral, Daily  guaiFENesin, 600 mg, oral, BID  ipratropium-albuteroL, 3 mL, nebulization, TID  lidocaine, 15 mL, oral, Before meals & nightly  [START ON 12/16/2024] lidocaine, 1 patch, transdermal, Daily  mirtazapine, 15 mg, oral, Nightly  [START ON 12/16/2024] montelukast, 10 mg, oral, Daily  [START ON 12/16/2024] multivitamin with minerals, 1 tablet, oral, Daily  [START ON 12/16/2024] nicotine, 1 patch, transdermal, Daily  oxygen, , inhalation, Continuous - Inhalation  pancrelipase (Lip-Prot-Amyl), 1 capsule, oral, BID  [START ON 12/16/2024] pantoprazole, 40 mg, oral, Daily before breakfast  pregabalin, 200 mg, oral, TID  rOPINIRole, 0.5 mg, oral, TID  [START ON 12/16/2024] tamsulosin, 0.4 mg, oral, Daily  [START ON 12/16/2024] thiamine, 100 mg, oral, Daily  topiramate, 100 mg, oral, BID      Continuous  medications  heparin, 0-4,000 Units/hr      PRN medications  PRN medications: cyclobenzaprine, HYDROmorphone, HYDROmorphone, ipratropium-albuteroL, LORazepam **OR** LORazepam **OR** LORazepam, melatonin, naloxone, ondansetron ODT **OR** ondansetron, polyethylene glycol       Assessment/Plan     #brain mass  #Headache with Visual Changes  - neurosurgery consulted, appreciate recs   - CTH: concerning for a large destructive lytic soft tissue mass upon the clivus that protrudes into the sella turcica and bilateral cavernous sinuses and erodes bilateral petrous carotid canals.   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   -Ddx to include chordoma, chondrosarcoma, plasmacytoma.    - No other focal deficits except for right eye deviation which he states is not new   - pt seen by neurology at OSH and MRI of Brain w & w/o contrast was recommended    # Bladder Tumor  #urinary retention  -Scheduled for cystoscopy with tissue ablation on 12/17/24 by Dr. Murcia  - urology consulted, appreciate recs  - anders cath placed at OSH d/t urinary retention  - c/w Flomax 0.4 mg PO QD     # Alcohol Use Disorder  -Cessation advised  -Last drink was approximately 3AM on 12/13/24.   - c/w CIWA protocol using Ativan  - c/w Thiamine, Folate, and MVI PO QD  - pt interested in attending rehab on discharge     #Chronic Hypoxic Respiratory Failure  #COPD  #HANY  - c/w baseline 3 liters oxygen  - Continue bronchial hygiene, duonebs, mucinex     #HTN  #orthostatic hypotension  - BP on admit 118/81  - continue to monitor BP  - no home meds  - orthostatic BP/P  ordered x1      Hx of Pulmonary Embolism  -S/p embolization 11/2023  -Provoked by surgery  -holding home dose of Eliquis d/tupcoming procedure  -Heparin gtt initiated to bridge     # Tobacco Use Disorder  - nicotine patch ordered     Dispo: admit to RNF. Plan per above. Estimated LOS: 5 days    I spent 75 minutes in the professional and overall care of this patient.      Lana Keen, APRN-CNP

## 2024-12-15 NOTE — CONSULTS
[facility]  Consult Note    Gaurav Mckay  [id number]  1970 (54 y.o.)  Reason for Consult:  Bladder mass    HPI: Gaurav Mckay is a 54 year-old male with a history of AUD c/b peripheral neuropathy, restless leg syndrome, laryngeal cancer (s/p radical neck dissection and radiation), saddle PE (on eliquis), HTN, GERD, COPD, hematuria and bladder mass who was admitted to [facility] for medical optimization in light of upcoming cystoscopy with bladder biopsy/TURBT on 12/17/24 with DR. Murcia. Patient was found to have right new brain tumor concerning for chondroma on MRI and is being transferred to [facility] for further neuro workup. Urology is consulted for evaluation and management recommendations for bladder mass given upcoming procedure scheduled.    Patient appeared intoxicated at this PAT appointment on 12/13 prompting admission for medical optimization to ensure proper discontinuation of eliquis as further workup for surgical readiness ahead of cystoscopy/TURBT versus bladder biopsy with Dr. Murcia scheduled for 12/17.    Patient reports intermittent hematuria but cannot recall when it first began. Last saw blood in the urine a week ago. Patient does report intermittent suprapubic pain and that he could not urinate when he presented to [facility].  Denies any prior history of urinary retention. Patient has difficulty recalling his medical history.     He denies any history of urinary complaints including dysuria, hematuria, incomplete emptying, hesitancy, urgency but does report x1-2 episodes nocturia. On 10/23/24, CT C/A/P with contrast for abdominal pain was significant for asymmetric bladder wall thickening at the dome/urachus.  Prior CT of the abdomen on 2/8/24 did not note any abnormalities of the bladder or prostate.  Blood was first noted in the urine on 5/25/2024 with > 20 RBCs on UA.  Patient was seen on 10/25/2024 by Urology while he was admitted for unrelated issues.  He denied any  hematuria or  complaints at that time.  It was determined that he should follow-up outpatient for cystoscopy/TURBT versus bladder biopsy which was scheduled for 12/17 at [facility] with Dr. Murcia.     On arrival to , patients labs are unremarkable except for hypokalemia to 3.2. Vital signs are stable and within normal limits. UA on 12/14 was bland.       ROS:  Pertinent positives/negatives per HPI, 10 point comprehensive review of systems reviewed and otherwise negative.    Allergies   Allergen Reactions    Oxycodone Itching       Past Medical History:   Diagnosis Date    Acute inflammation of orbit 06/27/2013    Acute upper respiratory infection, unspecified 03/20/2015    Acute upper respiratory infection    Alcohol abuse, in remission 02/12/2015    History of ETOH abuse    Alcohol dependence, in remission 05/07/2015    Alcohol dependence in remission    Allergic contact dermatitis due to plants, except food 05/21/2014    Contact dermatitis due to poison ivy    Allergy status to unspecified drugs, medicaments and biological substances 08/29/2013    History of allergy    Anxiety disorder, unspecified 03/20/2015    Anxiety    Asthmatic bronchitis ([facility])     Bipolar disorder, current episode manic without psychotic features, unspecified (Multi)     Bipolar disorder, current episode manic without psychotic features    Cancer of larynx (Multi)     Cervicalgia     Cervicalgia    Chronic asthmatic bronchitis (Multi) 12/02/2014    Clotting disorder (Multi)     P.E.    COPD (chronic obstructive pulmonary disease) (Multi)     Degeneration of cervical intervertebral disc     Dysphagia     Edentulous     Encounter for immunization 09/22/2016    Flu vaccine need    Frequent falls     pt states he falls every time he gets up.    GERD (gastroesophageal reflux disease)     Hearing aid worn     currently missing    History of abdominal pain 02/16/2017    History of allergic rhinitis 01/16/2014    History of allergic rhinitis  06/26/2014    History of allergic rhinitis    History of depression     History of esophageal reflux 08/27/2015    History of gastroenteritis 04/11/2013    History of hepatitis C     History of sinusitis 02/05/2014    History of sore throat 03/20/2017    Hyperlipidemia     Hypertension     Other long term (current) drug therapy 06/02/2015    High risk medication use    Personal history of nicotine dependence 08/16/2017    History of tobacco abuse    Personal history of nicotine dependence 06/26/2017    History of nicotine dependence    Personal history of other diseases of the respiratory system 09/26/2013    History of acute sinusitis    Personal history of other mental and behavioral disorders     Personal history of other specified conditions 08/25/2016    RLS (restless legs syndrome)     Unspecified asthma, uncomplicated ([facility]) 02/13/2014    Uses walker     and wheelchair    Wears glasses     currently broken     Past Surgical History:   Procedure Laterality Date    ANKLE SURGERY Left     ANKLE SURGERY  07/08/2024    R ankle spanning ex-fix revision    CHOLECYSTECTOMY      CT ABDOMEN PELVIS ANGIOGRAM W AND/OR WO IV CONTRAST  04/01/2020    CT ABDOMEN PELVIS ANGIOGRAM W AND/OR WO IV CONTRAST 4/1/2020 [facility]    FRACTURE SURGERY Right 07/15/2024    Open reduction and internal fixation of right distal tibia pilon and fibula fractures, removal of right ankle spanning external fixation under anesthesia, debridement down to including bone external fixator pin sites right leg    INVASIVE VASCULAR PROCEDURE N/A 11/08/2023    Procedure: Pulmonary Angiogram;  Surgeon: Surya Templeton MD;  Location: [facility] Cardiac Cath Lab;  Service: Cardiovascular;  Laterality: N/A;    LARYNGOSCOPY  07/01/2024    R radical neck dissection and L ALT free flap reconstruction    LEG SURGERY  07/06/2024    RLE ex-fix placement    MR HEAD ANGIO WO IV CONTRAST  01/24/2016    MR HEAD ANGIO WO IV CONTRAST 1/24/2016 [facility]    MR NECK ANGIO WO IV CONTRAST  01/24/2016    MR NECK ANGIO WO IV CONTRAST 1/24/2016 [facility]    THROMBECTOMY      WOUND DEBRIDEMENT Left 08/06/2024    L thigh debridement with woud vac placement 2/2 surgical wound dehiscence     Social History     Tobacco Use    Smoking status: Every Day     Current packs/day: 1.00     Types: Cigarettes    Smokeless tobacco: Former     Types: Snuff    Tobacco comments:     1 ppd since the age of 16   Substance Use Topics    Alcohol use: Yes     Comment: 6 pack beer a day    Drug use: Yes     Types: Methamphetamines     Comment: Hx cocaine use      Family History   Problem Relation Name Age of Onset    Diabetes Mother      Hypertension Mother      Heart attack Father       Current Facility-Administered Medications   Medication Dose Route Frequency Provider Last Rate Last Admin    atorvastatin (Lipitor) tablet 20 mg  20 mg oral Nightly JUSTINO Ledesma        cyclobenzaprine (Flexeril) tablet 10 mg  10 mg oral TID PRN JUSTINO Ledesma        docusate sodium (Colace) capsule 100 mg  100 mg oral BID JUSTINO Ledesma        ferrous sulfate (325 mg ferrous sulfate) tablet 325 mg  65 mg of iron oral BID JUSTINO Ledesma        [START ON 12/16/2024] folic acid (Folvite) tablet 1 mg  1 mg oral Daily JUSTINO Ledesma        guaiFENesin (Mucinex) 12 hr tablet 600 mg  600 mg oral BID JUSTINO Ledesma        heparin 25,000 Units in dextrose 5% 250 mL (100 Units/mL) infusion (premix)  0-4,000 Units/hr intravenous Continuous JUSTINO Ledesma        HYDROmorphone (Dilaudid) injection 0.5 mg  0.5 mg intravenous q4h PRN JUSTINO Ledesma        HYDROmorphone (Dilaudid) injection 1 mg  1 mg intravenous q4h PRN JUSTINO Ledesma        ipratropium-albuteroL (Duo-Neb) 0.5-2.5 mg/3 mL nebulizer solution 3 mL  3 mL nebulization TID Lana Keen  APRN-CNP        ipratropium-albuteroL (Duo-Neb) 0.5-2.5 mg/3 mL nebulizer solution 3 mL  3 mL nebulization q2h PRN JUSTINO Ledesma        lidocaine (Xylocaine) 2 % mouth solution 15 mL  15 mL oral Before meals & nightly Lana Erika JUSTINO Manley        [START ON 12/16/2024] lidocaine 4 % patch 1 patch  1 patch transdermal Daily Lana JUSTINO Powers        lipase-protease-amylase (Creon) 3,000-9,500- 15,000 unit per capsule 2 capsule  2 capsule oral BID Lana JUSTINO Powers        LORazepam (Ativan) injection 0.5 mg  0.5 mg intravenous q2h PRN JUSTINO Ledesma        Or    LORazepam (Ativan) injection 1 mg  1 mg intravenous q2h PRN JUSTINO Ledesma        Or    LORazepam (Ativan) injection 2 mg  2 mg intravenous q2h PRN JUSTINO Ledesma        melatonin tablet 3 mg  3 mg oral Nightly PRN JUSTINO Ledesma        mirtazapine (Remeron) tablet 15 mg  15 mg oral Nightly Lana JUSTINO Powers        [START ON 12/16/2024] montelukast (Singulair) tablet 10 mg  10 mg oral Daily Lana JUSTINO Powers        [START ON 12/16/2024] multivitamin with minerals 1 tablet  1 tablet oral Daily Lana JUSTINO Powers        naloxone (Narcan) injection 0.4 mg  0.4 mg intravenous q5 min PRN Lana JUSTINO Powers        [START ON 12/16/2024] nicotine (Nicoderm CQ) 14 mg/24 hr patch 1 patch  1 patch transdermal Daily Lana Erika JUSTINO Manley        ondansetron ODT (Zofran-ODT) disintegrating tablet 4 mg  4 mg oral q6h PRN Lana JUSTINO Powers        Or    ondansetron (Zofran) injection 4 mg  4 mg intravenous q6h PRN JUSTINO Ledesma        oxygen (O2) therapy   inhalation Continuous - Inhalation JUSTINO Ledesma        [START ON 12/16/2024] pantoprazole (ProtoNix) EC tablet 40 mg  40 mg oral Daily before breakfast JUSTINO Ledesma         polyethylene glycol (Glycolax, Miralax) packet 17 g  17 g oral Daily PRN JUSTINO Ledesma        pregabalin (Lyrica) capsule 200 mg  200 mg oral TID JUSTINO Ledesma        rOPINIRole (Requip) tablet 0.5 mg  0.5 mg oral TID JUSTINO Ledesma        [START ON 12/16/2024] tamsulosin (Flomax) 24 hr capsule 0.4 mg  0.4 mg oral Daily JUSTINO Ledesma        [START ON 12/16/2024] thiamine (Vitamin B-1) tablet 100 mg  100 mg oral Daily JUSTINO Ledesma        topiramate (Topamax) tablet 100 mg  100 mg oral BID JUSTINO Ledesma           OBJECTIVE:  Labs & Test Results  No intake/output data recorded.    Data Review:  Lab Results   Component Value Date    WBC 6.4 12/15/2024    HGB 12.4 (L) 12/15/2024    HCT 37.0 (L) 12/15/2024    MCV 84 12/15/2024     12/15/2024      Lab Results   Component Value Date    GLUCOSE 122 (H) 12/15/2024    CALCIUM 9.2 12/15/2024     12/15/2024    K 3.2 (L) 12/15/2024    CO2 27 12/15/2024    CL 97 (L) 12/15/2024    BUN 6 12/15/2024    CREATININE 0.63 12/15/2024        Physical Exam  General: adult male, sitting comfortably in bed  Neuro:  no obvious focal deficit, gaze palsy present, diffusely tremulous  Head/Neck: normocephalic, atraumatic, hypophonic voice  ENT: moist mucous membranes  CV: regular rate  Pulm: nonlabored breathing on room air, no conversational dyspnea  Abd: soft, nondistended, nontender   : No suprapubic tenderness, no CVA tenderness, anders with clear yellow urine   MSK: [place], no gross deformities  Psych: mood and behavior normal    Imaging  MR brain w and wo IV contrast  Narrative: Interpreted By:  Enrique Mcclain,   STUDY:  MR BRAIN W AND WO IV CONTRAST;  12/14/2024 11:42 am      INDICATION:  Signs/Symptoms:large soft tissue mass from clivus on CTH.   Personal  medical history of laryngeal cancer with right neck dissection.          COMPARISON:  10/23/2024 neck CT and 12/13/2024  brain CT, 01/23/2016 brain MR      ACCESSION NUMBER(S):  [id number]      ORDERING CLINICIAN:  TEDDY GRIFFITH      TECHNIQUE:  Axial T2, FLAIR, DWI, gradient echo T2 and sagittal and coronal T1  weighted images of brain were acquired. Post contrast T1 weighted  images were acquired after administration of 16 ML Dotarem gadolinium  based intravenous contrast.      FINDINGS:  The images are degraded by motion artifact, especially the enhanced  image sets.      The central right clivus has an enlarging mass 2.6 x 3 cm transverse  dimension, increased in size from the neck CT.      Laterally to the right the mass partially surround the distal  horizontal and cavernous segments of the right internal carotid  artery, extending into the right Meckel's cave.      Laterally to the left the mass abuts the junction of the horizontal  and cavernous segment of the left internal carotid.      Posteriorly a thin band of tissue extends through the posterior  clival cortex abutting the basilar artery.      Anteriorly, the tumor extends into the sella turcica partially  encasing the pituitary. The tumor extends into the larger left  sphenoid sinus. (The sphenoid septum deviates to the right  posteriorly creating a small right sinus.)      Inferiorly the lesion extends nearly to the most inferior aspect of  the clivus with cortical breakthrough of the inferior central right  clival cortex.          CSF Spaces: Sulci and ventricles are normal, unchanged.      Parenchyma: No acute infarct, hemorrhage or intraparenchymal mass is  noted.      The white matter has scattered foci of nonspecific FLAIR  hyperintensity.      The bety has a triangular focus of hyperintensity in questionable  punctate cystic encephalomalacia centrally.      The brain parenchyma enhances normally.      Paranasal Sinuses and Mastoids: Visualized paranasal sinuses and  mastoid air cells are unremarkable.      Impression: 1. The central right clivus has an enlarging  tumor as noted above.      2. The central bety has a triangular focus of encephalomalacia  corresponding to the triangle shaped abnormality with restricted  diffusion on the 2016 brain MR consistent with an old area of osmotic  demyelination.      3. The parenchymal hyperintensities elsewhere are nonspecific and may  be secondary to degenerative, chronic microvascular ischemic or other  etiologies.      MACRO:  None      Signed by: Enrique Mcclain 12/14/2024 2:54 PM  Dictation workstation:   [id number]    10/23/24 CT C/A/P   IMPRESSION:  1. No acute pulmonary abnormality.  2. No acute inflammatory process or bowel obstruction.  3. Asymmetric anterior bladder wall thickening present. Clinical and  endoscopic correlation suggested. This could represent cystitis.  Neoplastic process not excluded.    Assessment & Recommendations  Gaurav Mckay is a 54 year-old male with a history of AUD c/b peripheral neuropathy, restless leg syndrome, laryngeal cancer (s/p radical neck dissection and radiation), saddle PE (on eliquis), HTN, GERD, COPD, hematuria and bladder mass who was admitted to [facility] for medical optimization in light of upcoming cystoscopy with bladder biopsy/TURBT on 12/17/24 with DR. Murcia. Patient was found to have right new brain tumor concerning for chondroma on MRI and is being transferred to [facility] for further workup. Urology is consulted for evaluation and management recommendations for bladder mass given upcoming procedure scheduled.    Patient had no current urologic complaints and is voiding clear yellow urine through anders. Given his medical comorbidities and difficulty with transportation, it would be ideal to complete his cystoscopic procedure during this admission. However, newly diagnosed brain mass will take precedence.    - Recommend maintaining anders for now  - Agree with continuing to hold eliquis and anticoagulating with heparin gtt given potential operative intervention   - Will  discuss potential surgical plan for this admission with Dr. Murcia once plan in place per neuro vs. neurosurgery   - Urology will continue to follow    Discussed with chief resident Dr. Forman and discussed with attending Dr. Jared Le MD  Urologic Surgery  Adult Urology Pager: [phone]  Pediatric Urology Pager: [phone]

## 2024-12-15 NOTE — CARE PLAN
Problem: Skin  Goal: Prevent/manage excess moisture  Flowsheets (Taken 12/15/2024 1535)  Prevent/manage excess moisture: Monitor for/manage infection if present

## 2024-12-15 NOTE — DISCHARGE SUMMARY
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     Issues Requiring Follow-Up  Mass in brain.   Possible mass in bladder, hematuria.     Discharge Meds     Medication List      START taking these medications     folic acid 1 mg tablet; Commonly known as: Folvite; Take 1 tablet (1 mg)   by mouth once daily.; Start taking on: December 16, 2024   guaiFENesin 600 mg 12 hr tablet; Commonly known as: Mucinex; Take 1   tablet (600 mg) by mouth 2 times a day. Do not crush, chew, or split.   heparin (porcine) 25,000 unit/250 mL(100 unit/mL) parenteral solution in   D5W infusion; Infuse 0-4,000 Units/hr at 0-40 mL/hr into a venous catheter   continuously.   * HYDROmorphone 0.5 mg/0.5 mL solution injection; Commonly known as:   Dilaudid; Infuse 0.5 mL (0.5 mg) into a venous catheter every 4 hours if   needed (moderate pain).   * HYDROmorphone 1 mg/mL injection; Commonly known as: Dilaudid; Infuse 1   mL (1 mg) into a venous catheter every 4 hours if needed for severe pain   (7 - 10).   LORazepam 2 mg/mL injection; Commonly known as: Ativan; Infuse 0.25 mL   (0.5 mg) into a venous catheter every 2 hours if needed (CIWA score 6-7 or   HR greater than 100).   multivitamin with minerals tablet; Take 1 tablet by mouth once daily.;   Start taking on: December 16, 2024   ondansetron ODT 4 mg disintegrating tablet; Commonly known as:   Zofran-ODT; Dissolve 1 tablet (4 mg) in the mouth every 8 hours if needed   for nausea.  * This list has 2 medication(s) that are the same as other medications   prescribed for you. Read the directions carefully, and ask your doctor or   other care provider to review them with you.     CONTINUE taking these medications     acetaminophen 325 mg tablet; Commonly known as: Tylenol; Take 2 tablets   (650 mg) by mouth every 6 hours if needed for mild pain (1 - 3).   atorvastatin 20 mg tablet; Commonly known as: Lipitor   calcium carbonate 200 mg calcium chewable  tablet; Commonly known as:   Tums   cyclobenzaprine 10 mg tablet; Commonly known as: Flexeril   docusate sodium 100 mg capsule; Commonly known as: Colace   ferrous sulfate 325 (65 Fe) MG EC tablet   Flomax 0.4 mg 24 hr capsule; Generic drug: tamsulosin   ipratropium-albuteroL 0.5-2.5 mg/3 mL nebulizer solution; Commonly known   as: Duo-Neb; Take 3 mL by nebulization every 2 hours if needed for   wheezing.   lidocaine 5 % patch; Commonly known as: Lidoderm   mirtazapine 15 mg tablet; Commonly known as: Remeron   montelukast 10 mg tablet; Commonly known as: Singulair   nicotine 14 mg/24 hr patch; Commonly known as: Nicoderm CQ   oxygen gas therapy; Commonly known as: O2; Inhale 2 L/min once every 24   hours.   pancrelipase (Lip-Prot-Amyl) 6,000-19,000 -30,000 unit capsule; Commonly   known as: Creon   pantoprazole 40 mg EC tablet; Commonly known as: ProtoNix; Take 1 tablet   (40 mg) by mouth once daily in the morning. Take before meals. Do not   crush, chew, or split.   polyethylene glycol 17 gram packet; Commonly known as: Glycolax, Miralax   pregabalin 200 mg capsule; Commonly known as: Lyrica; Take 1 capsule   (200 mg) by mouth 3 times a day for 14 days.   rOPINIRole 0.5 mg tablet; Commonly known as: Requip   sennosides-docusate sodium 8.6-50 mg tablet; Commonly known as:   Marya-Colace; Take 1 tablet by mouth once daily at bedtime.   thiamine 100 mg tablet; Commonly known as: Vitamin B-1   topiramate 100 mg tablet; Commonly known as: Topamax     STOP taking these medications     ammonium lactate 12 % lotion; Commonly known as: Lac-Hydrin   apixaban 5 mg tablet; Commonly known as: Eliquis   aspirin 81 mg EC tablet   calcium carbonate-vitamin D3 500 mg-5 mcg (200 unit) tablet   lidocaine 2 % solution; Commonly known as: Xylocaine   melatonin 10 mg tablet,disintegrating   naloxone 0.4 mg/mL injection; Commonly known as: Narcan   nicotine polacrilex 2 mg gum; Commonly known as: Nicorette   omeprazole 40 mg DR capsule;  Commonly known as: PriLOSEC   ondansetron 8 mg tablet; Commonly known as: Zofran   potassium chloride CR 10 mEq ER tablet; Commonly known as: Klor-Con   Proventil HFA 90 mcg/actuation inhaler; Generic drug: albuterol   sodium chloride 0.9% solution   traZODone 50 mg tablet; Commonly known as: Desyrel   white petrolatum 41 % ointment ointment; Commonly known as: Aquaphor       Test Results Pending At Discharge  Pending Labs       No current pending labs.            Hospital Course   Mr. Mckay was admitted at request of Dr Murcia for preop care, for potential cysto and bladder biopsy. Abnormality was seen on ct, he also had been having some hematuria, concern for malignancy. During admission and evaluation, he was found to have dysconjugate gaze, and was complaining of a severe headache, which he states was relatively acute and he felt related to a recent fall. Stat ct was ordered, as he is on eliquis, and we had concern for a bleed. Instead he was found to have what appears to be a tumor, extending from the clivus, into the sphenoid area, sellar area, near carotids as well. Neuro feels this is cause of this diplopia. This mass appears new from October, so is rapidly growing. After discussion with Dr Walker, and the patient who is agreeable he will be transferred to [facility] for management of his complicated situation. He is currently on a heparin bridge. He has history of massive PE last November, at which time he underwent embolectomy. This was the etiology of his chronic respiratory failure, on 3 liters since then. He has developed acute urinary retention, thus anders will be placed. He is medically stable for transfer and will be moved as soon as a bed is available.     Pertinent Physical Exam At Time of Discharge  See note from earlier    Outpatient Follow-Up  Future Appointments   Date Time Provider Department Center   1/2/2025  1:00 PM Eddie Murcia MD [id number]       40 minutes was spent on discharge,  speaking with consultants, patient, etc.   Chayito Smith MD

## 2024-12-15 NOTE — H&P (VIEW-ONLY)
[facility]  Consult Note    Gaurav Mckay  [id number]  1970 (54 y.o.)  Reason for Consult:  Bladder mass    HPI: Gaurav Mckay is a 54 year-old male with a history of AUD c/b peripheral neuropathy, restless leg syndrome, laryngeal cancer (s/p radical neck dissection and radiation), saddle PE (on eliquis), HTN, GERD, COPD, hematuria and bladder mass who was admitted to [facility] for medical optimization in light of upcoming cystoscopy with bladder biopsy/TURBT on 12/17/24 with DR. Murcia. 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 Urology is consulted for evaluation and management recommendations for bladder mass given upcoming procedure scheduled.    Patient appeared intoxicated at this PAT appointment on 12/13 prompting admission for medical optimization to ensure proper discontinuation of eliquis as further workup for surgical readiness ahead of cystoscopy/TURBT versus bladder biopsy with Dr. Murcia scheduled for 12/17.    Patient reports intermittent hematuria but cannot recall when it first began. Last saw blood in the urine a week ago. Patient does report intermittent suprapubic pain and that he could not urinate when he presented to [facility].  Denies any prior history of urinary retention. Patient has difficulty recalling his medical history.     He denies any history of urinary complaints including dysuria, hematuria, incomplete emptying, hesitancy, urgency but does report x1-2 episodes nocturia. On 10/23/24, CT C/A/P with contrast for abdominal pain was significant for asymmetric bladder wall thickening at the dome/urachus.  Prior CT of the abdomen on 2/8/24 did not note any abnormalities of the bladder or prostate.  Blood was first noted in the urine on 5/25/2024 with > 20 RBCs on UA.  Patient was seen on 10/25/2024 by Urology while he was admitted for unrelated issues.  He denied any  hematuria or  complaints at that time.  It was determined that he should follow-up outpatient for cystoscopy/TURBT versus bladder biopsy which was scheduled for 12/17 at [facility] with Dr. Murcia.     On arrival to , patients labs are unremarkable except for hypokalemia to 3.2. Vital signs are stable and within normal limits. UA on 12/14 was bland.       ROS:  Pertinent positives/negatives per HPI, 10 point comprehensive review of systems reviewed and otherwise negative.    Allergies   Allergen Reactions    Oxycodone Itching       Past Medical History:   Diagnosis Date    Acute inflammation of orbit 06/27/2013    Acute upper respiratory infection, unspecified 03/20/2015    Acute upper respiratory infection    Alcohol abuse, in remission 02/12/2015    History of ETOH abuse    Alcohol dependence, in remission 05/07/2015    Alcohol dependence in remission    Allergic contact dermatitis due to plants, except food 05/21/2014    Contact dermatitis due to poison ivy    Allergy status to unspecified drugs, medicaments and biological substances 08/29/2013    History of allergy    Anxiety disorder, unspecified 03/20/2015    Anxiety    Asthmatic bronchitis ([facility])     Bipolar disorder, current episode manic without psychotic features, unspecified (Multi)     Bipolar disorder, current episode manic without psychotic features    Cancer of larynx (Multi)     Cervicalgia     Cervicalgia    Chronic asthmatic bronchitis (Multi) 12/02/2014    Clotting disorder (Multi)     P.E.    COPD (chronic obstructive pulmonary disease) (Multi)     Degeneration of cervical intervertebral disc     Dysphagia     Edentulous     Encounter for immunization 09/22/2016    Flu vaccine need    Frequent falls     pt states he falls every time he gets up.    GERD (gastroesophageal reflux disease)     Hearing aid worn     currently missing    History of abdominal pain 02/16/2017    History of allergic rhinitis 01/16/2014    History of allergic rhinitis  06/26/2014    History of allergic rhinitis    History of depression     History of esophageal reflux 08/27/2015    History of gastroenteritis 04/11/2013    History of hepatitis C     History of sinusitis 02/05/2014    History of sore throat 03/20/2017    Hyperlipidemia     Hypertension     Other long term (current) drug therapy 06/02/2015    High risk medication use    Personal history of nicotine dependence 08/16/2017    History of tobacco abuse    Personal history of nicotine dependence 06/26/2017    History of nicotine dependence    Personal history of other diseases of the respiratory system 09/26/2013    History of acute sinusitis    Personal history of other mental and behavioral disorders     Personal history of other specified conditions 08/25/2016    RLS (restless legs syndrome)     Unspecified asthma, uncomplicated ([facility]) 02/13/2014    Uses walker     and wheelchair    Wears glasses     currently broken     Past Surgical History:   Procedure Laterality Date    ANKLE SURGERY Left     ANKLE SURGERY  07/08/2024    R ankle spanning ex-fix revision    CHOLECYSTECTOMY      CT ABDOMEN PELVIS ANGIOGRAM W AND/OR WO IV CONTRAST  04/01/2020    CT ABDOMEN PELVIS ANGIOGRAM W AND/OR WO IV CONTRAST 4/1/2020 [facility]    FRACTURE SURGERY Right 07/15/2024    Open reduction and internal fixation of right distal tibia pilon and fibula fractures, removal of right ankle spanning external fixation under anesthesia, debridement down to including bone external fixator pin sites right leg    INVASIVE VASCULAR PROCEDURE N/A 11/08/2023    Procedure: Pulmonary Angiogram;  Surgeon: Surya Templeton MD;  Location: [facility];  Service: Cardiovascular;  Laterality: N/A;    LARYNGOSCOPY  07/01/2024    R radical neck dissection and L ALT free flap reconstruction    LEG SURGERY  07/06/2024    RLE ex-fix placement    MR HEAD ANGIO WO IV CONTRAST  01/24/2016    MR HEAD ANGIO WO IV CONTRAST 1/24/2016 [facility]    MR NECK ANGIO WO IV CONTRAST  01/24/2016    MR NECK ANGIO WO IV CONTRAST 1/24/2016 [facility]    THROMBECTOMY      WOUND DEBRIDEMENT Left 08/06/2024    L thigh debridement with woud vac placement 2/2 surgical wound dehiscence     Social History     Tobacco Use    Smoking status: Every Day     Current packs/day: 1.00     Types: Cigarettes    Smokeless tobacco: Former     Types: Snuff    Tobacco comments:     1 ppd since the age of 16   Substance Use Topics    Alcohol use: Yes     Comment: 6 pack beer a day    Drug use: Yes     Types: Methamphetamines     Comment: Hx cocaine use      Family History   Problem Relation Name Age of Onset    Diabetes Mother      Hypertension Mother      Heart attack Father       Current Facility-Administered Medications   Medication Dose Route Frequency Provider Last Rate Last Admin    atorvastatin (Lipitor) tablet 20 mg  20 mg oral Nightly JUSTINO Ledesma        cyclobenzaprine (Flexeril) tablet 10 mg  10 mg oral TID PRN JUSTINO Ledesma        docusate sodium (Colace) capsule 100 mg  100 mg oral BID JUSTINO Ledesma        ferrous sulfate (325 mg ferrous sulfate) tablet 325 mg  65 mg of iron oral BID JUSTINO Ledesma        [START ON 12/16/2024] folic acid (Folvite) tablet 1 mg  1 mg oral Daily JUSTINO Ledesma        guaiFENesin (Mucinex) 12 hr tablet 600 mg  600 mg oral BID JUSTINO Ledesma        heparin 25,000 Units in dextrose 5% 250 mL (100 Units/mL) infusion (premix)  0-4,000 Units/hr intravenous Continuous JUSTINO Ledesma        HYDROmorphone (Dilaudid) injection 0.5 mg  0.5 mg intravenous q4h PRN JUSTINO Ledesma        HYDROmorphone (Dilaudid) injection 1 mg  1 mg intravenous q4h PRN JUSTINO Ledesma        ipratropium-albuteroL (Duo-Neb) 0.5-2.5 mg/3 mL nebulizer solution 3 mL  3 mL nebulization TID Lana Keen  APRN-CNP        ipratropium-albuteroL (Duo-Neb) 0.5-2.5 mg/3 mL nebulizer solution 3 mL  3 mL nebulization q2h PRN JUSTINO Ledesma        lidocaine (Xylocaine) 2 % mouth solution 15 mL  15 mL oral Before meals & nightly Lana Erika JUSTINO Manley        [START ON 12/16/2024] lidocaine 4 % patch 1 patch  1 patch transdermal Daily Lana JUSTINO Powers        lipase-protease-amylase (Creon) 3,000-9,500- 15,000 unit per capsule 2 capsule  2 capsule oral BID Lana JUSTINO Powers        LORazepam (Ativan) injection 0.5 mg  0.5 mg intravenous q2h PRN JUTSINO Ledesma        Or    LORazepam (Ativan) injection 1 mg  1 mg intravenous q2h PRN JUSTINO Ledesma        Or    LORazepam (Ativan) injection 2 mg  2 mg intravenous q2h PRN JUSTINO Ledesma        melatonin tablet 3 mg  3 mg oral Nightly PRN JUSTINO Ledesma        mirtazapine (Remeron) tablet 15 mg  15 mg oral Nightly Lana JUSTINO Powers        [START ON 12/16/2024] montelukast (Singulair) tablet 10 mg  10 mg oral Daily Lana JUSTINO Powers        [START ON 12/16/2024] multivitamin with minerals 1 tablet  1 tablet oral Daily Lana JUSTINO Powers        naloxone (Narcan) injection 0.4 mg  0.4 mg intravenous q5 min PRN Lana JUSTINO Powers        [START ON 12/16/2024] nicotine (Nicoderm CQ) 14 mg/24 hr patch 1 patch  1 patch transdermal Daily Lana Erika JUSTINO Manley        ondansetron ODT (Zofran-ODT) disintegrating tablet 4 mg  4 mg oral q6h PRN Lana JUSTINO Powers        Or    ondansetron (Zofran) injection 4 mg  4 mg intravenous q6h PRN JUSTINO Ledesma        oxygen (O2) therapy   inhalation Continuous - Inhalation JUSTINO Ledesma        [START ON 12/16/2024] pantoprazole (ProtoNix) EC tablet 40 mg  40 mg oral Daily before breakfast JUSTINO Ledesma         polyethylene glycol (Glycolax, Miralax) packet 17 g  17 g oral Daily PRN JUSTINO Ledesma        pregabalin (Lyrica) capsule 200 mg  200 mg oral TID JUSTINO Ledesma        rOPINIRole (Requip) tablet 0.5 mg  0.5 mg oral TID JUSTINO Ledesma        [START ON 12/16/2024] tamsulosin (Flomax) 24 hr capsule 0.4 mg  0.4 mg oral Daily JUSTINO Ledesma        [START ON 12/16/2024] thiamine (Vitamin B-1) tablet 100 mg  100 mg oral Daily JUSTINO Ledesma        topiramate (Topamax) tablet 100 mg  100 mg oral BID JUSTINO Ledesma           OBJECTIVE:  Labs & Test Results  No intake/output data recorded.    Data Review:  Lab Results   Component Value Date    WBC 6.4 12/15/2024    HGB 12.4 (L) 12/15/2024    HCT 37.0 (L) 12/15/2024    MCV 84 12/15/2024     12/15/2024      Lab Results   Component Value Date    GLUCOSE 122 (H) 12/15/2024    CALCIUM 9.2 12/15/2024     12/15/2024    K 3.2 (L) 12/15/2024    CO2 27 12/15/2024    CL 97 (L) 12/15/2024    BUN 6 12/15/2024    CREATININE 0.63 12/15/2024        Physical Exam  General: adult male, sitting comfortably in bed  Neuro:  no obvious focal deficit, gaze palsy present, diffusely tremulous  Head/Neck: normocephalic, atraumatic, hypophonic voice  ENT: moist mucous membranes  CV: regular rate  Pulm: nonlabored breathing on room air, no conversational dyspnea  Abd: soft, nondistended, nontender   : No suprapubic tenderness, no CVA tenderness, anders with clear yellow urine   MSK: [place], no gross deformities  Psych: mood and behavior normal    Imaging  MR brain w and wo IV contrast  Narrative: Interpreted By:  Enrique Mcclain,   STUDY:  MR BRAIN W AND WO IV CONTRAST;  12/14/2024 11:42 am      INDICATION:  Signs/Symptoms:large soft tissue mass from clivus on CTH.   Personal  medical history of laryngeal cancer with right neck dissection.          COMPARISON:  10/23/2024 neck CT and 12/13/2024  brain CT, 01/23/2016 brain MR      ACCESSION NUMBER(S):  [id number]      ORDERING CLINICIAN:  TEDDY GRIFFITH      TECHNIQUE:  Axial T2, FLAIR, DWI, gradient echo T2 and sagittal and coronal T1  weighted images of brain were acquired. Post contrast T1 weighted  images were acquired after administration of 16 ML Dotarem gadolinium  based intravenous contrast.      FINDINGS:  The images are degraded by motion artifact, especially the enhanced  image sets.      The central right clivus has an enlarging mass 2.6 x 3 cm transverse  dimension, increased in size from the neck CT.      Laterally to the right the mass partially surround the distal  horizontal and cavernous segments of the right internal carotid  artery, extending into the right Meckel's cave.      Laterally to the left the mass abuts the junction of the horizontal  and cavernous segment of the left internal carotid.      Posteriorly a thin band of tissue extends through the posterior  clival cortex abutting the basilar artery.      Anteriorly, the tumor extends into the sella turcica partially  encasing the pituitary. The tumor extends into the larger left  sphenoid sinus. (The sphenoid septum deviates to the right  posteriorly creating a small right sinus.)      Inferiorly the lesion extends nearly to the most inferior aspect of  the clivus with cortical breakthrough of the inferior central right  clival cortex.          CSF Spaces: Sulci and ventricles are normal, unchanged.      Parenchyma: No acute infarct, hemorrhage or intraparenchymal mass is  noted.      The white matter has scattered foci of nonspecific FLAIR  hyperintensity.      The bety has a triangular focus of hyperintensity in questionable  punctate cystic encephalomalacia centrally.      The brain parenchyma enhances normally.      Paranasal Sinuses and Mastoids: Visualized paranasal sinuses and  mastoid air cells are unremarkable.      Impression: 1. The central right clivus has an enlarging  tumor as noted above.      2. The central bety has a triangular focus of encephalomalacia  corresponding to the triangle shaped abnormality with restricted  diffusion on the 2016 brain MR consistent with an old area of osmotic  demyelination.      3. The parenchymal hyperintensities elsewhere are nonspecific and may  be secondary to degenerative, chronic microvascular ischemic or other  etiologies.      MACRO:  None      Signed by: Enrique Mcclain 12/14/2024 2:54 PM  Dictation workstation:   [id number]    10/23/24 CT C/A/P   IMPRESSION:  1. No acute pulmonary abnormality.  2. No acute inflammatory process or bowel obstruction.  3. Asymmetric anterior bladder wall thickening present. Clinical and  endoscopic correlation suggested. This could represent cystitis.  Neoplastic process not excluded.    Assessment & Recommendations  Gaurav Mckay is a 54 year-old male with a history of AUD c/b peripheral neuropathy, restless leg syndrome, laryngeal cancer (s/p radical neck dissection and radiation), saddle PE (on eliquis), HTN, GERD, COPD, hematuria and bladder mass who was admitted to [facility] for medical optimization in light of upcoming cystoscopy with bladder biopsy/TURBT on 12/17/24 with DR. Murcia. 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 Urology is consulted for evaluation and management recommendations for bladder mass given upcoming procedure scheduled.    Patient had no current urologic complaints and is voiding clear yellow urine through anders. Given his medical comorbidities and difficulty with transportation, it would be ideal to complete his cystoscopic procedure during this admission. However, newly diagnosed brain mass will take precedence.    - Recommend maintaining anders for now  - Agree with continuing to hold eliquis and anticoagulating with heparin gtt given potential operative intervention   - Will  discuss potential surgical plan for this admission with Dr. Murcia once plan in place per neuro vs. neurosurgery   - Urology will continue to follow    Discussed with chief resident Dr. Forman and discussed with attending Dr. Jared Le MD  Urologic Surgery  Adult Urology Pager: [phone]  Pediatric Urology Pager: [phone]

## 2024-12-16 ENCOUNTER — APPOINTMENT (OUTPATIENT)
Dept: RADIOLOGY | Facility: HOSPITAL | Age: 54
End: 2024-12-16
Payer: COMMERCIAL

## 2024-12-16 LAB
ALBUMIN SERPL BCP-MCNC: 3.2 G/DL (ref 3.4–5)
AMPHETAMINES UR QL SCN: NORMAL
ANION GAP SERPL CALC-SCNC: 16 MMOL/L (ref 10–20)
APPEARANCE UR: CLEAR
BARBITURATES UR QL SCN: NORMAL
BENZODIAZ UR QL SCN: NORMAL
BILIRUB UR STRIP.AUTO-MCNC: NEGATIVE MG/DL
BUN SERPL-MCNC: 5 MG/DL (ref 6–23)
BZE UR QL SCN: NORMAL
CALCIUM SERPL-MCNC: 9.5 MG/DL (ref 8.6–10.6)
CANNABINOIDS UR QL SCN: NORMAL
CHLORIDE SERPL-SCNC: 102 MMOL/L (ref 98–107)
CO2 SERPL-SCNC: 24 MMOL/L (ref 21–32)
COLOR UR: COLORLESS
CREAT SERPL-MCNC: 0.66 MG/DL (ref 0.5–1.3)
EGFRCR SERPLBLD CKD-EPI 2021: >90 ML/MIN/1.73M*2
ERYTHROCYTE [DISTWIDTH] IN BLOOD BY AUTOMATED COUNT: 18 % (ref 11.5–14.5)
FENTANYL+NORFENTANYL UR QL SCN: NORMAL
GLUCOSE SERPL-MCNC: 135 MG/DL (ref 74–99)
GLUCOSE UR STRIP.AUTO-MCNC: NORMAL MG/DL
HCT VFR BLD AUTO: 36.2 % (ref 41–52)
HGB BLD-MCNC: 11.5 G/DL (ref 13.5–17.5)
HOLD SPECIMEN: NORMAL
KETONES UR STRIP.AUTO-MCNC: NEGATIVE MG/DL
LEUKOCYTE ESTERASE UR QL STRIP.AUTO: NEGATIVE
MAGNESIUM SERPL-MCNC: 1.59 MG/DL (ref 1.6–2.4)
MCH RBC QN AUTO: 28.2 PG (ref 26–34)
MCHC RBC AUTO-ENTMCNC: 31.8 G/DL (ref 32–36)
MCV RBC AUTO: 89 FL (ref 80–100)
METHADONE UR QL SCN: NORMAL
NITRITE UR QL STRIP.AUTO: NEGATIVE
NRBC BLD-RTO: 0.4 /100 WBCS (ref 0–0)
OPIATES UR QL SCN: NORMAL
OXYCODONE+OXYMORPHONE UR QL SCN: NORMAL
PCP UR QL SCN: NORMAL
PH UR STRIP.AUTO: 7.5 [PH]
PHOSPHATE SERPL-MCNC: 3.1 MG/DL (ref 2.5–4.9)
PLATELET # BLD AUTO: 300 X10*3/UL (ref 150–450)
POTASSIUM SERPL-SCNC: 3.5 MMOL/L (ref 3.5–5.3)
PROT UR STRIP.AUTO-MCNC: NEGATIVE MG/DL
RBC # BLD AUTO: 4.08 X10*6/UL (ref 4.5–5.9)
RBC # UR STRIP.AUTO: NEGATIVE /UL
SODIUM SERPL-SCNC: 138 MMOL/L (ref 136–145)
SP GR UR STRIP.AUTO: 1.01
UFH PPP CHRO-ACNC: 0.2 IU/ML
UFH PPP CHRO-ACNC: 0.6 IU/ML
UFH PPP CHRO-ACNC: 0.6 IU/ML
UFH PPP CHRO-ACNC: 0.8 IU/ML
UFH PPP CHRO-ACNC: <0.1 IU/ML
UROBILINOGEN UR STRIP.AUTO-MCNC: NORMAL MG/DL
WBC # BLD AUTO: 4.8 X10*3/UL (ref 4.4–11.3)

## 2024-12-16 PROCEDURE — 36415 COLL VENOUS BLD VENIPUNCTURE: CPT | Performed by: NURSE PRACTITIONER

## 2024-12-16 PROCEDURE — 31231 NASAL ENDOSCOPY DX: CPT | Performed by: OTOLARYNGOLOGY

## 2024-12-16 PROCEDURE — 2550000001 HC RX 255 CONTRASTS: Performed by: INTERNAL MEDICINE

## 2024-12-16 PROCEDURE — 72156 MRI NECK SPINE W/O & W/DYE: CPT

## 2024-12-16 PROCEDURE — 94640 AIRWAY INHALATION TREATMENT: CPT

## 2024-12-16 PROCEDURE — 2500000004 HC RX 250 GENERAL PHARMACY W/ HCPCS (ALT 636 FOR OP/ED): Performed by: INTERNAL MEDICINE

## 2024-12-16 PROCEDURE — 2500000001 HC RX 250 WO HCPCS SELF ADMINISTERED DRUGS (ALT 637 FOR MEDICARE OP): Performed by: STUDENT IN AN ORGANIZED HEALTH CARE EDUCATION/TRAINING PROGRAM

## 2024-12-16 PROCEDURE — 2500000004 HC RX 250 GENERAL PHARMACY W/ HCPCS (ALT 636 FOR OP/ED): Performed by: PODIATRIST

## 2024-12-16 PROCEDURE — 72157 MRI CHEST SPINE W/O & W/DYE: CPT

## 2024-12-16 PROCEDURE — 1100000001 HC PRIVATE ROOM DAILY

## 2024-12-16 PROCEDURE — 72158 MRI LUMBAR SPINE W/O & W/DYE: CPT | Performed by: RADIOLOGY

## 2024-12-16 PROCEDURE — 2500000001 HC RX 250 WO HCPCS SELF ADMINISTERED DRUGS (ALT 637 FOR MEDICARE OP): Performed by: NURSE PRACTITIONER

## 2024-12-16 PROCEDURE — 80069 RENAL FUNCTION PANEL: CPT | Performed by: NURSE PRACTITIONER

## 2024-12-16 PROCEDURE — 85027 COMPLETE CBC AUTOMATED: CPT | Performed by: NURSE PRACTITIONER

## 2024-12-16 PROCEDURE — 2500000004 HC RX 250 GENERAL PHARMACY W/ HCPCS (ALT 636 FOR OP/ED): Performed by: NURSE PRACTITIONER

## 2024-12-16 PROCEDURE — S4991 NICOTINE PATCH NONLEGEND: HCPCS | Performed by: NURSE PRACTITIONER

## 2024-12-16 PROCEDURE — 83735 ASSAY OF MAGNESIUM: CPT | Performed by: NURSE PRACTITIONER

## 2024-12-16 PROCEDURE — 2500000005 HC RX 250 GENERAL PHARMACY W/O HCPCS: Performed by: NURSE PRACTITIONER

## 2024-12-16 PROCEDURE — 99232 SBSQ HOSP IP/OBS MODERATE 35: CPT | Performed by: PODIATRIST

## 2024-12-16 PROCEDURE — 85520 HEPARIN ASSAY: CPT | Performed by: NURSE PRACTITIONER

## 2024-12-16 PROCEDURE — 72156 MRI NECK SPINE W/O & W/DYE: CPT | Performed by: RADIOLOGY

## 2024-12-16 PROCEDURE — 72158 MRI LUMBAR SPINE W/O & W/DYE: CPT

## 2024-12-16 PROCEDURE — 2500000002 HC RX 250 W HCPCS SELF ADMINISTERED DRUGS (ALT 637 FOR MEDICARE OP, ALT 636 FOR OP/ED): Performed by: NURSE PRACTITIONER

## 2024-12-16 PROCEDURE — 94667 MNPJ CHEST WALL 1ST: CPT

## 2024-12-16 PROCEDURE — 72157 MRI CHEST SPINE W/O & W/DYE: CPT | Performed by: RADIOLOGY

## 2024-12-16 PROCEDURE — A9575 INJ GADOTERATE MEGLUMI 0.1ML: HCPCS | Performed by: INTERNAL MEDICINE

## 2024-12-16 PROCEDURE — 99253 IP/OBS CNSLTJ NEW/EST LOW 45: CPT | Performed by: OTOLARYNGOLOGY

## 2024-12-16 RX ORDER — LORAZEPAM 2 MG/ML
0.5 INJECTION INTRAMUSCULAR EVERY 2 HOUR PRN
Status: DISCONTINUED | OUTPATIENT
Start: 2024-12-16 | End: 2024-12-21

## 2024-12-16 RX ORDER — LORAZEPAM 0.5 MG/1
0.5 TABLET ORAL EVERY 2 HOUR PRN
Status: DISCONTINUED | OUTPATIENT
Start: 2024-12-16 | End: 2024-12-16

## 2024-12-16 RX ORDER — DIPHENHYDRAMINE HYDROCHLORIDE 50 MG/ML
12.5 INJECTION INTRAMUSCULAR; INTRAVENOUS ONCE AS NEEDED
Status: CANCELLED | OUTPATIENT
Start: 2024-12-16

## 2024-12-16 RX ORDER — KETOROLAC TROMETHAMINE 15 MG/ML
15 INJECTION, SOLUTION INTRAMUSCULAR; INTRAVENOUS EVERY 8 HOURS PRN
Status: DISCONTINUED | OUTPATIENT
Start: 2024-12-16 | End: 2024-12-18

## 2024-12-16 RX ORDER — POTASSIUM CHLORIDE 14.9 MG/ML
20 INJECTION INTRAVENOUS ONCE
Status: COMPLETED | OUTPATIENT
Start: 2024-12-16 | End: 2024-12-16

## 2024-12-16 RX ORDER — FOLIC ACID 1 MG/1
1 TABLET ORAL DAILY
Status: DISCONTINUED | OUTPATIENT
Start: 2024-12-16 | End: 2024-12-26 | Stop reason: HOSPADM

## 2024-12-16 RX ORDER — MEPERIDINE HYDROCHLORIDE 25 MG/ML
12.5 INJECTION INTRAMUSCULAR; INTRAVENOUS; SUBCUTANEOUS EVERY 10 MIN PRN
Status: CANCELLED | OUTPATIENT
Start: 2024-12-16

## 2024-12-16 RX ORDER — ONDANSETRON HYDROCHLORIDE 2 MG/ML
4 INJECTION, SOLUTION INTRAVENOUS ONCE AS NEEDED
Status: CANCELLED | OUTPATIENT
Start: 2024-12-16

## 2024-12-16 RX ORDER — LORAZEPAM 2 MG/ML
1 INJECTION INTRAMUSCULAR EVERY 2 HOUR PRN
Status: DISCONTINUED | OUTPATIENT
Start: 2024-12-16 | End: 2024-12-21

## 2024-12-16 RX ORDER — LORAZEPAM 2 MG/ML
2 INJECTION INTRAMUSCULAR EVERY 2 HOUR PRN
Status: DISCONTINUED | OUTPATIENT
Start: 2024-12-16 | End: 2024-12-21

## 2024-12-16 RX ORDER — DROPERIDOL 2.5 MG/ML
0.62 INJECTION, SOLUTION INTRAMUSCULAR; INTRAVENOUS ONCE AS NEEDED
Status: CANCELLED | OUTPATIENT
Start: 2024-12-16

## 2024-12-16 RX ORDER — LORAZEPAM 2 MG/ML
2 INJECTION INTRAMUSCULAR ONCE
Status: DISCONTINUED | OUTPATIENT
Start: 2024-12-16 | End: 2024-12-16

## 2024-12-16 RX ORDER — MULTIVIT-MIN/IRON FUM/FOLIC AC 7.5 MG-4
1 TABLET ORAL DAILY
Status: DISCONTINUED | OUTPATIENT
Start: 2024-12-16 | End: 2024-12-26 | Stop reason: HOSPADM

## 2024-12-16 RX ORDER — ALBUTEROL SULFATE 0.83 MG/ML
2.5 SOLUTION RESPIRATORY (INHALATION) ONCE AS NEEDED
Status: CANCELLED | OUTPATIENT
Start: 2024-12-16

## 2024-12-16 RX ORDER — SODIUM CHLORIDE, SODIUM LACTATE, POTASSIUM CHLORIDE, CALCIUM CHLORIDE 600; 310; 30; 20 MG/100ML; MG/100ML; MG/100ML; MG/100ML
100 INJECTION, SOLUTION INTRAVENOUS CONTINUOUS
Status: CANCELLED | OUTPATIENT
Start: 2024-12-16 | End: 2024-12-17

## 2024-12-16 RX ORDER — OXYCODONE HYDROCHLORIDE 5 MG/1
5 TABLET ORAL EVERY 4 HOURS PRN
Status: CANCELLED | OUTPATIENT
Start: 2024-12-16

## 2024-12-16 RX ORDER — HYDROMORPHONE HYDROCHLORIDE 1 MG/ML
0.2 INJECTION, SOLUTION INTRAMUSCULAR; INTRAVENOUS; SUBCUTANEOUS ONCE
Status: COMPLETED | OUTPATIENT
Start: 2024-12-17 | End: 2024-12-17

## 2024-12-16 RX ORDER — MAGNESIUM SULFATE HEPTAHYDRATE 40 MG/ML
2 INJECTION, SOLUTION INTRAVENOUS ONCE
Status: COMPLETED | OUTPATIENT
Start: 2024-12-16 | End: 2024-12-16

## 2024-12-16 RX ORDER — ACETAMINOPHEN 325 MG/1
975 TABLET ORAL EVERY 6 HOURS
Status: DISCONTINUED | OUTPATIENT
Start: 2024-12-16 | End: 2024-12-16

## 2024-12-16 RX ORDER — LORAZEPAM 1 MG/1
1 TABLET ORAL EVERY 2 HOUR PRN
Status: DISCONTINUED | OUTPATIENT
Start: 2024-12-16 | End: 2024-12-16

## 2024-12-16 RX ORDER — SODIUM CHLORIDE, SODIUM LACTATE, POTASSIUM CHLORIDE, CALCIUM CHLORIDE 600; 310; 30; 20 MG/100ML; MG/100ML; MG/100ML; MG/100ML
20 INJECTION, SOLUTION INTRAVENOUS CONTINUOUS
OUTPATIENT
Start: 2024-12-16 | End: 2024-12-17

## 2024-12-16 RX ORDER — GADOTERATE MEGLUMINE 376.9 MG/ML
20 INJECTION INTRAVENOUS
Status: COMPLETED | OUTPATIENT
Start: 2024-12-16 | End: 2024-12-16

## 2024-12-16 RX ORDER — LORAZEPAM 1 MG/1
2 TABLET ORAL EVERY 2 HOUR PRN
Status: DISCONTINUED | OUTPATIENT
Start: 2024-12-16 | End: 2024-12-16

## 2024-12-16 RX ADMIN — HYDROMORPHONE HYDROCHLORIDE 1 MG: 1 INJECTION, SOLUTION INTRAMUSCULAR; INTRAVENOUS; SUBCUTANEOUS at 13:24

## 2024-12-16 RX ADMIN — Medication 1 PATCH: at 09:09

## 2024-12-16 RX ADMIN — ONDANSETRON 4 MG: 2 INJECTION INTRAMUSCULAR; INTRAVENOUS at 09:33

## 2024-12-16 RX ADMIN — HYDROMORPHONE HYDROCHLORIDE 0.5 MG: 1 INJECTION, SOLUTION INTRAMUSCULAR; INTRAVENOUS; SUBCUTANEOUS at 03:25

## 2024-12-16 RX ADMIN — GADOTERATE MEGLUMINE 17 ML: 376.9 INJECTION INTRAVENOUS at 14:35

## 2024-12-16 RX ADMIN — ROPINIROLE 0.5 MG: 0.5 TABLET, FILM COATED ORAL at 01:24

## 2024-12-16 RX ADMIN — LIDOCAINE 4% 1 PATCH: 40 PATCH TOPICAL at 09:09

## 2024-12-16 RX ADMIN — ALBUTEROL SULFATE 2 PUFF: 90 AEROSOL, METERED RESPIRATORY (INHALATION) at 11:27

## 2024-12-16 RX ADMIN — POTASSIUM CHLORIDE 20 MEQ: 14.9 INJECTION, SOLUTION INTRAVENOUS at 16:23

## 2024-12-16 RX ADMIN — LORAZEPAM 2 MG: 2 INJECTION INTRAMUSCULAR; INTRAVENOUS at 09:33

## 2024-12-16 RX ADMIN — KETOROLAC TROMETHAMINE 15 MG: 15 INJECTION, SOLUTION INTRAMUSCULAR; INTRAVENOUS at 12:00

## 2024-12-16 RX ADMIN — HEPARIN SODIUM 1500 UNITS/HR: 10000 INJECTION, SOLUTION INTRAVENOUS at 17:28

## 2024-12-16 RX ADMIN — HEPARIN SODIUM 1700 UNITS/HR: 10000 INJECTION, SOLUTION INTRAVENOUS at 12:11

## 2024-12-16 RX ADMIN — HYDROMORPHONE HYDROCHLORIDE 1 MG: 1 INJECTION, SOLUTION INTRAMUSCULAR; INTRAVENOUS; SUBCUTANEOUS at 21:34

## 2024-12-16 RX ADMIN — PREGABALIN 200 MG: 100 CAPSULE ORAL at 01:23

## 2024-12-16 RX ADMIN — HYDROMORPHONE HYDROCHLORIDE 1 MG: 1 INJECTION, SOLUTION INTRAMUSCULAR; INTRAVENOUS; SUBCUTANEOUS at 17:28

## 2024-12-16 RX ADMIN — MAGNESIUM SULFATE HEPTAHYDRATE 2 G: 40 INJECTION, SOLUTION INTRAVENOUS at 16:23

## 2024-12-16 RX ADMIN — IPRATROPIUM BROMIDE AND ALBUTEROL SULFATE 3 ML: 2.5; .5 SOLUTION RESPIRATORY (INHALATION) at 04:15

## 2024-12-16 RX ADMIN — LORAZEPAM 0.5 MG: 2 INJECTION INTRAMUSCULAR; INTRAVENOUS at 12:00

## 2024-12-16 RX ADMIN — PANTOPRAZOLE SODIUM 40 MG: 40 TABLET, DELAYED RELEASE ORAL at 06:11

## 2024-12-16 RX ADMIN — HYDROMORPHONE HYDROCHLORIDE 1 MG: 1 INJECTION, SOLUTION INTRAMUSCULAR; INTRAVENOUS; SUBCUTANEOUS at 09:09

## 2024-12-16 ASSESSMENT — LIFESTYLE VARIABLES
ANXIETY: NO ANXIETY, AT EASE
HEADACHE, FULLNESS IN HEAD: MODERATELY SEVERE
ANXIETY: MILDLY ANXIOUS
TREMOR: NOT VISIBLE, BUT CAN BE FELT FINGERTIP TO FINGERTIP
BLOOD PRESSURE: 133/67
PAROXYSMAL SWEATS: NO SWEAT VISIBLE
VISUAL DISTURBANCES: NOT PRESENT
VISUAL DISTURBANCES: NOT PRESENT
TREMOR: NOT VISIBLE, BUT CAN BE FELT FINGERTIP TO FINGERTIP
ORIENTATION AND CLOUDING OF SENSORIUM: ORIENTED AND CAN DO SERIAL ADDITIONS
AUDITORY DISTURBANCES: NOT PRESENT
AGITATION: NORMAL ACTIVITY
TREMOR: NO TREMOR
AGITATION: NORMAL ACTIVITY
TREMOR: NOT VISIBLE, BUT CAN BE FELT FINGERTIP TO FINGERTIP
VISUAL DISTURBANCES: NOT PRESENT
AUDITORY DISTURBANCES: NOT PRESENT
AGITATION: NORMAL ACTIVITY
TOTAL SCORE: 0
HEADACHE, FULLNESS IN HEAD: NOT PRESENT
ORIENTATION AND CLOUDING OF SENSORIUM: ORIENTED AND CAN DO SERIAL ADDITIONS
PAROXYSMAL SWEATS: NO SWEAT VISIBLE
TOTAL SCORE: 5
PAROXYSMAL SWEATS: NO SWEAT VISIBLE
NAUSEA AND VOMITING: NO NAUSEA AND NO VOMITING
ANXIETY: NO ANXIETY, AT EASE
TOTAL SCORE: 5
PAROXYSMAL SWEATS: NO SWEAT VISIBLE
NAUSEA AND VOMITING: NO NAUSEA AND NO VOMITING
HEADACHE, FULLNESS IN HEAD: SEVERE
ORIENTATION AND CLOUDING OF SENSORIUM: ORIENTED AND CAN DO SERIAL ADDITIONS
PAROXYSMAL SWEATS: NO SWEAT VISIBLE
HEADACHE, FULLNESS IN HEAD: MILD
AUDITORY DISTURBANCES: NOT PRESENT
TREMOR: NO TREMOR
TOTAL SCORE: 3
TOTAL SCORE: 1
ANXIETY: NO ANXIETY, AT EASE
TOTAL SCORE: 10
AGITATION: NORMAL ACTIVITY
NAUSEA AND VOMITING: NO NAUSEA AND NO VOMITING
NAUSEA AND VOMITING: NO NAUSEA AND NO VOMITING
ORIENTATION AND CLOUDING OF SENSORIUM: ORIENTED AND CAN DO SERIAL ADDITIONS
NAUSEA AND VOMITING: 2
PAROXYSMAL SWEATS: NO SWEAT VISIBLE
AGITATION: NORMAL ACTIVITY
ANXIETY: MILDLY ANXIOUS
HEADACHE, FULLNESS IN HEAD: NOT PRESENT
ORIENTATION AND CLOUDING OF SENSORIUM: ORIENTED AND CAN DO SERIAL ADDITIONS
PULSE: 99
AUDITORY DISTURBANCES: NOT PRESENT
ANXIETY: NO ANXIETY, AT EASE
VISUAL DISTURBANCES: NOT PRESENT
HEADACHE, FULLNESS IN HEAD: MODERATE
NAUSEA AND VOMITING: NO NAUSEA AND NO VOMITING
VISUAL DISTURBANCES: NOT PRESENT
AUDITORY DISTURBANCES: NOT PRESENT
AGITATION: 2
AUDITORY DISTURBANCES: NOT PRESENT
ORIENTATION AND CLOUDING OF SENSORIUM: ORIENTED AND CAN DO SERIAL ADDITIONS
VISUAL DISTURBANCES: NOT PRESENT
TREMOR: NOT VISIBLE, BUT CAN BE FELT FINGERTIP TO FINGERTIP

## 2024-12-16 ASSESSMENT — COGNITIVE AND FUNCTIONAL STATUS - GENERAL
MOVING FROM LYING ON BACK TO SITTING ON SIDE OF FLAT BED WITH BEDRAILS: A LITTLE
STANDING UP FROM CHAIR USING ARMS: A LOT
DAILY ACTIVITIY SCORE: 18
MOVING TO AND FROM BED TO CHAIR: A LOT
CLIMB 3 TO 5 STEPS WITH RAILING: TOTAL
MOBILITY SCORE: 13
WALKING IN HOSPITAL ROOM: A LOT
TOILETING: A LITTLE
HELP NEEDED FOR BATHING: A LITTLE
DRESSING REGULAR LOWER BODY CLOTHING: A LOT
TURNING FROM BACK TO SIDE WHILE IN FLAT BAD: A LITTLE
DRESSING REGULAR UPPER BODY CLOTHING: A LITTLE
PERSONAL GROOMING: A LITTLE

## 2024-12-16 ASSESSMENT — PAIN SCALES - GENERAL
PAINLEVEL_OUTOF10: 8
PAINLEVEL_OUTOF10: 8
PAINLEVEL_OUTOF10: 3
PAINLEVEL_OUTOF10: 8
PAINLEVEL_OUTOF10: 9
PAINLEVEL_OUTOF10: 10 - WORST POSSIBLE PAIN
PAINLEVEL_OUTOF10: 8

## 2024-12-16 ASSESSMENT — PAIN - FUNCTIONAL ASSESSMENT
PAIN_FUNCTIONAL_ASSESSMENT: 0-10

## 2024-12-16 ASSESSMENT — PAIN DESCRIPTION - LOCATION
LOCATION: HEAD
LOCATION: GENERALIZED
LOCATION: BACK

## 2024-12-16 ASSESSMENT — PAIN DESCRIPTION - DESCRIPTORS
DESCRIPTORS: ACHING;DISCOMFORT
DESCRIPTORS: DISCOMFORT
DESCRIPTORS: ACHING;DISCOMFORT
DESCRIPTORS: ACHING;DISCOMFORT

## 2024-12-16 NOTE — CONSULTS
Date of Service:  12/15/2024 Attending Provider:  Tato Rodríguez MD     Reason for Consultation:  Gaurav Mckay is being seen today for a consult requested by Tato Rodríguez MD for brain mass.      Subjective   History of Present Illness:  Gaurav is a 54 y.o. 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     Patient was originally admitted at [facility] for medical clearance for cystoscopy planned with Urology for 12/17 due to intoxication at his previous appointment. Per chart review, patient was prompted admission for medical optimization for cystoscopy/TURBT vs bladder biopsy with Dr. Murcia scheduled for 12/17.     Patient reports frequent falls due to longstanding peripheral neuropathy and orthostatic hypotension. He has a history of alcohol abuse and reports drinking 6 tall boys a night for several years. During admission at OSH he was found to have dysconjugate gaze with binocular diplopia for the past month. A CTH was obtained which showed a large destructive lytic mass in the upper clivus not seen on CTH 10/28/2024. Patient is currently on Eliquis for PE for which he underwent embolectomy and was c/b chronic respiratory failure and has been on 3L NC since. Patient also has urinary retention which anders was placed at OSH. Patient reports weakness in his RUE for the past several weeks.       Review of Systems   10 point ROS is obtained and negative except the ones mentioned in the HPI    Social History  He reports that he has been smoking cigarettes. He has quit using smokeless tobacco.  His smokeless tobacco use included snuff. He reports current alcohol use. He  reports current drug use. Drug: Methamphetamines.    Medical History  Past Medical History:   Diagnosis Date    Acute inflammation of orbit 06/27/2013    Acute upper respiratory infection, unspecified 03/20/2015    Acute upper respiratory infection    Alcohol abuse, in remission 02/12/2015    History of ETOH abuse    Alcohol dependence, in remission 05/07/2015    Alcohol dependence in remission    Allergic contact dermatitis due to plants, except food 05/21/2014    Contact dermatitis due to poison ivy    Allergy status to unspecified drugs, medicaments and biological substances 08/29/2013    History of allergy    Anxiety disorder, unspecified 03/20/2015    Anxiety    Asthmatic bronchitis ([facility])     Bipolar disorder, current episode manic without psychotic features, unspecified (Multi)     Bipolar disorder, current episode manic without psychotic features    Cancer of larynx (Multi)     Cervicalgia     Cervicalgia    Chronic asthmatic bronchitis (Multi) 12/02/2014    Clotting disorder (Multi)     P.E.    COPD (chronic obstructive pulmonary disease) (Multi)     Degeneration of cervical intervertebral disc     Dysphagia     Edentulous     Encounter for immunization 09/22/2016    Flu vaccine need    Frequent falls     pt states he falls every time he gets up.    GERD (gastroesophageal reflux disease)     Hearing aid worn     currently missing    History of abdominal pain 02/16/2017    History of allergic rhinitis 01/16/2014    History of allergic rhinitis 06/26/2014    History of allergic rhinitis    History of depression     History of esophageal reflux 08/27/2015    History of gastroenteritis 04/11/2013    History of hepatitis C     History of sinusitis 02/05/2014    History of sore throat 03/20/2017    Hyperlipidemia     Hypertension     Other long term (current) drug therapy 06/02/2015    High risk medication use    Personal history of nicotine dependence 08/16/2017    History of tobacco abuse    Personal history  of nicotine dependence 06/26/2017    History of nicotine dependence    Personal history of other diseases of the respiratory system 09/26/2013    History of acute sinusitis    Personal history of other mental and behavioral disorders     Personal history of other specified conditions 08/25/2016    RLS (restless legs syndrome)     Unspecified asthma, uncomplicated ([facility]) 02/13/2014    Uses walker     and wheelchair    Wears glasses     currently broken       Surgical History  Past Surgical History:   Procedure Laterality Date    ANKLE SURGERY Left     ANKLE SURGERY  07/08/2024    R ankle spanning ex-fix revision    CHOLECYSTECTOMY      CT ABDOMEN PELVIS ANGIOGRAM W AND/OR WO IV CONTRAST  04/01/2020    CT ABDOMEN PELVIS ANGIOGRAM W AND/OR WO IV CONTRAST 4/1/2020 [facility]    FRACTURE SURGERY Right 07/15/2024    Open reduction and internal fixation of right distal tibia pilon and fibula fractures, removal of right ankle spanning external fixation under anesthesia, debridement down to including bone external fixator pin sites right leg    INVASIVE VASCULAR PROCEDURE N/A 11/08/2023    Procedure: Pulmonary Angiogram;  Surgeon: Surya Templeton MD;  Location: [facility];  Service: Cardiovascular;  Laterality: N/A;    LARYNGOSCOPY  07/01/2024    R radical neck dissection and L ALT free flap reconstruction    LEG SURGERY  07/06/2024    RLE ex-fix placement    MR HEAD ANGIO WO IV CONTRAST  01/24/2016    MR HEAD ANGIO WO IV CONTRAST 1/24/2016 [facility]    MR NECK ANGIO WO IV CONTRAST  01/24/2016    MR NECK ANGIO WO IV CONTRAST 1/24/2016 [facility]    THROMBECTOMY      WOUND DEBRIDEMENT Left 08/06/2024    L thigh debridement with woud vac placement 2/2 surgical wound dehiscence        Objective     Vitals:  Vitals:    12/15/24 1716   BP:    Pulse:    Resp:    Temp:    SpO2: 97%         Exam:  Constitutional: No acute distress  Resp: breathing comfortably  Cardio: well perfused  GI:  nondistended  MSK: full range of motion  Neuro: Awake, A&Ox3  OU3R  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)  No nystagmus   Cranial Nerves: Face symmetric, Facial SILT, Palate/Tongue midline and symmetric, hearing intact to finger rubs bilaterally  Motor:   RUE 4/5   LUE 5/5   BLE 5/5  no dysmetria on finger to nose, no pronator drift  Sensation: SILT throughout all extremities  DTRS: 2+ Throughout, No Hoffmans or Clonus  Psych: appropriate  Skin: no obvious lesions      Medications  Current Outpatient Medications   Medication Instructions    acetaminophen (TYLENOL) 650 mg, oral, Every 6 hours PRN    atorvastatin (LIPITOR) 20 mg, Nightly    calcium carbonate (Tums) 200 mg calcium chewable tablet 1 tablet, oral, Daily    cyclobenzaprine (FLEXERIL) 10 mg, 3 times daily PRN    docusate sodium (COLACE) 100 mg, oral, 2 times daily    ferrous sulfate 65 mg, 3 times daily (morning, midday, late afternoon)    [START ON 12/16/2024] folic acid (FOLVITE) 1 mg, oral, Daily    guaiFENesin (MUCINEX) 600 mg, oral, 2 times daily, Do not crush, chew, or split.    heparin (porcine) 0-4,000 Units/hr (0-4,000 Units/hr), intravenous, Continuous    HYDROmorphone (DILAUDID) 0.5 mg, intravenous, Every 4 hours PRN    HYDROmorphone (DILAUDID) 1 mg, intravenous, Every 4 hours PRN    ipratropium-albuteroL (Duo-Neb) 0.5-2.5 mg/3 mL nebulizer solution 3 mL, nebulization, Every 2 hour PRN    lidocaine (Lidoderm) 5 % patch 1 patch, transdermal, Daily, Remove & discard patch within 12 hours or as directed by MD.    LORazepam (ATIVAN) 0.5 mg, intravenous, Every 2 hour PRN    mirtazapine (REMERON) 15 mg, oral, Nightly    montelukast (SINGULAIR) 10 mg, Daily    [START ON 12/16/2024] multivitamin with minerals tablet 1 tablet, oral, Daily    nicotine (Nicoderm CQ) 14 mg/24 hr patch 1 patch, transdermal, Every 24 hours    ondansetron ODT (ZOFRAN-ODT) 4 mg, oral, Every 8 hours PRN    oxygen (O2) 2 L/min, inhalation, Every 24 hours     pancrelipase, Lip-Prot-Amyl, (Creon) 6,000-19,000 -30,000 unit capsule 1 capsule, 2 times daily    pantoprazole (PROTONIX) 40 mg, oral, Daily before breakfast, Do not crush, chew, or split.    polyethylene glycol (GLYCOLAX, MIRALAX) 17 g, oral, 3 times daily PRN    pregabalin (LYRICA) 200 mg, oral, 3 times daily    rOPINIRole (REQUIP) 0.5 mg, 3 times daily    sennosides-docusate sodium (Marya-Colace) 8.6-50 mg tablet 1 tablet, oral, Nightly    tamsulosin (FLOMAX) 0.4 mg, oral, Daily    thiamine (VITAMIN B-1) 100 mg, oral, Daily    topiramate (TOPAMAX) 100 mg, 2 times daily        Diagnostic Results:    Lab Results   Component Value Date    WBC 6.4 12/15/2024    HGB 12.4 (L) 12/15/2024    HCT 37.0 (L) 12/15/2024    MCV 84 12/15/2024     12/15/2024     Lab Results   Component Value Date    CREATININE 0.63 12/15/2024    BUN 6 12/15/2024     12/15/2024    K 3.2 (L) 12/15/2024    CL 97 (L) 12/15/2024    CO2 27 12/15/2024     Lab Results   Component Value Date    INR 1.1 12/13/2024    INR 1.0 11/19/2024    INR 1.2 (H) 04/02/2022    PROTIME 12.0 12/13/2024    PROTIME 11.7 11/19/2024    PROTIME 13.5 (H) 04/02/2022       Imaging Results:    No orders to display             Assessment/Plan   Assessment:  Gaurav is a 54 y.o. 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     Plan:  - Recommend continued metastatic workup, patient needs new CT CAP, MRI C/T/L spine with and without contrast   - Will discuss potential surgical planning for biopsy this admission  - Please obtain perioperative risk assessment and stratification for possible surgical biopsy  - Appreciate primary teams  evaluation and documentation of patients prognosis  - Agree with holding eliquis/AC w hep gtt given potential intervention  - Recommend ENT consultation for assistance with possible endoscopic endonasal approach for clival mass biopsy   - Please obtain preop labs: CBC/RFP/coags/T&S/UA/EKG/CXR  - Neurosurgery will continue to follow     Nadeem Lui MD  Department of Neurosurgery    Note authored by resident on neurosurgery team, with all questions or to contact team please page at [phone]  Plan not finalized until note signed by attending

## 2024-12-16 NOTE — H&P (VIEW-ONLY)
Otolaryngology - Head and Neck Surgery Consultation Note      Reason For Consult  Clival mass.    History Of Present Illness  Gaurav Mckay is a 54 y.o. male with history of laryngeal cancer status post radical neck dissection and free flap reconstruction, PEs on apixaban, alcohol abuse, hypertension who presented as a transfer from [facility] due to need for urology consult.  At [facility] was found to have a clival mass incidentally requiring neurosurgery intervention while he was in clinic for preanesthesia testing.  The patient is currently being worked up for potential extraction of his clival mass.  For his symptoms he reports headache, diplopia, and neck bump behind his right ear, right forehead lump, nosebleeds.  He reports that hisrelated most likely from him picking repeatedly in his nose and that is not itchy at this time.  He reports the headache is been present for a few days and is bifrontal/mostly on the right and bothersome.  It is not getting better and is in fact getting worse as is all of his symptoms.  His right neck masses postauricular, fixed, nontender, nonpurulent.    ENT is consulted for help with access to new clival mass.    Patient denies throat pain, dysphonia, dysphagia, otalgia, hemoptysis, odynophagia, fevers, chills, weight loss.      Previous Head and Neck Oncologic History:   Diagnosis: Laryngeal squamous cell carcinoma  Initial Staging: T2 N0 M0-stage II overall  Treatment History:   Radiation: 6300 Gray in 20 fractions ending on 6/1/2022  PET in 2024 with increased uptake  7/1/2024-right radical neck dissection plus ALT from left thigh.  Adjuvant therapy with IMRT and cisplatin started on 7/29/2024    Post-treatment Imaging:   Initial with uptake, pending repeat after current treatment cycle     Past Medical History  He has a past medical history of Acute inflammation of orbit (06/27/2013), Acute upper respiratory infection, unspecified (03/20/2015), Alcohol abuse, in remission  (02/12/2015), Alcohol dependence, in remission (05/07/2015), Allergic contact dermatitis due to plants, except food (05/21/2014), Allergy status to unspecified drugs, medicaments and biological substances (08/29/2013), Anxiety disorder, unspecified (03/20/2015), Asthmatic bronchitis ([facility]), Bipolar disorder, current episode manic without psychotic features, unspecified (Multi), Cancer of larynx (Multi), Cervicalgia, Chronic asthmatic bronchitis (Multi) (12/02/2014), Clotting disorder (Multi), COPD (chronic obstructive pulmonary disease) (Multi), Degeneration of cervical intervertebral disc, Dysphagia, Edentulous, Encounter for immunization (09/22/2016), Frequent falls, GERD (gastroesophageal reflux disease), Hearing aid worn, History of abdominal pain (02/16/2017), History of allergic rhinitis (01/16/2014), History of allergic rhinitis (06/26/2014), History of depression, History of esophageal reflux (08/27/2015), History of gastroenteritis (04/11/2013), History of hepatitis C, History of sinusitis (02/05/2014), History of sore throat (03/20/2017), Hyperlipidemia, Hypertension, Other long term (current) drug therapy (06/02/2015), Personal history of nicotine dependence (08/16/2017), Personal history of nicotine dependence (06/26/2017), Personal history of other diseases of the respiratory system (09/26/2013), Personal history of other mental and behavioral disorders, Personal history of other specified conditions (08/25/2016), RLS (restless legs syndrome), Unspecified asthma, uncomplicated ([facility]) (02/13/2014), Uses walker, and Wears glasses.  Patient Active Problem List   Diagnosis    Chronic saddle pulmonary embolism 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 (Multi)    Lymphadenitis    Neck mass    Acute cervical lymphadenitis    COPD (chronic obstructive pulmonary disease) (Multi)    Urinary retention    History of ETOH abuse    History of pulmonary embolus (PE)    HTN (hypertension)    Other hyperlipidemia     Gastroesophageal reflux disease without esophagitis    Generalized weakness    Hypomagnesemia    Laryngeal cancer (Multi)    Pain after radiation therapy    Neck pain    Radiation burn    Pancytopenia    BPH (benign prostatic hyperplasia)    RLS (restless legs syndrome)    Substance induced mood disorder (Multi)    Polysubstance abuse (Multi)    Syncope    Pain of right lower extremity    Gait abnormality    Impaired mobility and activities of daily living    Syncope and collapse    Cellulitis of neck    Open wound of left thigh    Iron deficiency anemia    Constipation    Protein-calorie malnutrition (Multi)    Tobacco use disorder    Other hemorrhoids    Dizziness    Cystitis    Bladder wall thickening    Alcohol abuse with other alcohol-induced disorder    Alcoholic polyneuropathy (Multi)    Allergic rhinitis, unspecified    Antineoplastic chemotherapy induced pancytopenia (CMS-HCC)    Bicytopenia    Depression, unspecified    Panic disorder (episodic paroxysmal anxiety)    History of radical neck dissection    Hypertensive heart disease without heart failure    Insomnia, unspecified    MRSA bacteremia    Neoplasm related pain (acute) (chronic)    Obstructive sleep apnea (adult) (pediatric)    Oropharyngeal dysphagia    Orthostatic hypotension    Other acute postprocedural pain    Other chronic pancreatitis    Other stimulant abuse, uncomplicated    Personal history of other venous thrombosis and embolism    Radiculopathy, lumbosacral region    Repeated falls    Secondary and unspecified malignant neoplasm of lymph nodes of head, face and neck    Thiamine deficiency, unspecified    Tinnitus, unspecified ear    Unspecified asthma, uncomplicated (HHS-HCC)    Unspecified sensorineural hearing loss    Bladder tumor    Chordoma (Multi)    Brain mass       Surgical History  He has a past surgical history that includes MR angio head wo IV contrast (01/24/2016); MR angio neck wo IV contrast (01/24/2016); CT angio abdomen  "pelvis w and or wo IV IV contrast (04/01/2020); Invasive Vascular Procedure (N/A, 11/08/2023); Cholecystectomy; Ankle surgery (Left); Thrombectomy; Wound debridement (Left, 08/06/2024); Fracture surgery (Right, 07/15/2024); Ankle surgery (07/08/2024); Leg Surgery (07/06/2024); and Laryngoscopy (07/01/2024).     Social History  He reports that he has been smoking cigarettes. He has quit using smokeless tobacco.  His smokeless tobacco use included snuff. He reports current alcohol use. He reports current drug use. Drugs: Methamphetamines and Cocaine.    Family History  Family History   Problem Relation Name Age of Onset    Diabetes Mother      Hypertension Mother      Heart attack Father          Allergies  Oxycodone    Review of Systems  A 12-point review of systems was performed and noted be negative except for that which was mentioned in the history of present illness     Last Recorded Vitals  Blood pressure 120/77, pulse 104, temperature 36.4 °C (97.5 °F), resp. rate 19, height 1.778 m (5' 10\"), weight 83 kg (183 lb), SpO2 97%.     Physical Exam:  Constitutional:  No acute distress  Voice:  No hoarseness or other abnormality  Respiration:  Breathing comfortably, no stridor  Cardiovascular:  No clubbing/cyanosis/edema in hands  Eyes: Right lateral gaze palsy in the right eye, unable to ABduct past midline intact, sclera normal  Neuro:  Alert and oriented times 3, Cranial nerves II-XII grossly intact and symmetric bilaterally except for cranial nerve IV on the right  Head and Face:  Symmetric facial features, no masses, sinuses non-tender to palpation.  Picking/scratch marks present at the crook of the nose and above the right eyebrow  Salivary Glands:  Parotid and submandibular glands normal bilaterally  Right Ear:  Normal external ear, external auditory canal,   Left Ear: Normal external ear, external auditory canal,   Nose:  External nose midline, anterior rhinoscopy is normal with limited visualization to the " anterior aspect of the interior turbinates, n scratches noted along the septum and middle portion of nares bilaterally with dried crusting blood.    Oral Cavity/Oropharynx/Lips:  Normal mucous membranes, normal floor of mouth/tongue/OP, no masses or lesions  Pharynx: no masses or lesions  Neck/Lymph:  no thyroid masses, trachea midline.  Right postauricular mass in the area of the right postauricular lymph nodes.  Approximately 3 cm in diameter.  Nontender nonpurulent nonbleeding  Skin:  Neck skin is without scar or injury  Psych:  Alert and oriented with appropriate mood and affect    Medications:  Scheduled medications  acetaminophen, 975 mg, oral, q6h  atorvastatin, 20 mg, oral, Nightly  docusate sodium, 100 mg, oral, BID  ferrous sulfate (325 mg ferrous sulfate), 65 mg of iron, oral, BID  folic acid, 1 mg, oral, Daily  guaiFENesin, 600 mg, oral, BID  lidocaine, 15 mL, oral, Before meals & nightly  lidocaine, 1 patch, transdermal, Daily  mirtazapine, 15 mg, oral, Nightly  montelukast, 10 mg, oral, Daily  multivitamin with minerals, 1 tablet, oral, Daily  nicotine, 1 patch, transdermal, Daily  oxygen, , inhalation, Continuous - Inhalation  pancrelipase (Lip-Prot-Amyl), 1 capsule, oral, BID  pantoprazole, 40 mg, oral, Daily before breakfast  pregabalin, 200 mg, oral, TID  rOPINIRole, 0.5 mg, oral, TID  tamsulosin, 0.4 mg, oral, Daily  thiamine, 100 mg, oral, Daily  topiramate, 100 mg, oral, BID      Continuous medications  heparin, 0-4,000 Units/hr, Last Rate: 1,700 Units/hr (12/16/24 1211)      PRN medications  PRN medications: albuterol, cyclobenzaprine, HYDROmorphone, HYDROmorphone, ipratropium-albuteroL, ketorolac, LORazepam **OR** LORazepam **OR** LORazepam, melatonin, naloxone, ondansetron ODT **OR** ondansetron, polyethylene glycol    Recent Labs:  Results for orders placed or performed during the hospital encounter of 12/15/24 (from the past 24 hours)   ECG 12 Lead   Result Value Ref Range    Ventricular  Rate 91 BPM    Atrial Rate 91 BPM    NH Interval 140 ms    QRS Duration 88 ms    QT Interval 386 ms    QTC Calculation(Bazett) 474 ms    P Axis 42 degrees    R Axis 17 degrees    T Axis 50 degrees    QRS Count 15 beats    Q Onset 211 ms    P Onset 141 ms    P Offset 192 ms    T Offset 404 ms    QTC Fredericia 443 ms   CBC and Auto Differential   Result Value Ref Range    WBC 5.0 4.4 - 11.3 x10*3/uL    nRBC 0.0 0.0 - 0.0 /100 WBCs    RBC 4.39 (L) 4.50 - 5.90 x10*6/uL    Hemoglobin 12.4 (L) 13.5 - 17.5 g/dL    Hematocrit 39.3 (L) 41.0 - 52.0 %    MCV 90 80 - 100 fL    MCH 28.2 26.0 - 34.0 pg    MCHC 31.6 (L) 32.0 - 36.0 g/dL    RDW 17.6 (H) 11.5 - 14.5 %    Platelets 274 150 - 450 x10*3/uL    Neutrophils % 75.4 40.0 - 80.0 %    Immature Granulocytes %, Automated 1.6 (H) 0.0 - 0.9 %    Lymphocytes % 12.3 13.0 - 44.0 %    Monocytes % 8.5 2.0 - 10.0 %    Eosinophils % 1.0 0.0 - 6.0 %    Basophils % 1.2 0.0 - 2.0 %    Neutrophils Absolute 3.73 1.20 - 7.70 x10*3/uL    Immature Granulocytes Absolute, Automated 0.08 0.00 - 0.70 x10*3/uL    Lymphocytes Absolute 0.61 (L) 1.20 - 4.80 x10*3/uL    Monocytes Absolute 0.42 0.10 - 1.00 x10*3/uL    Eosinophils Absolute 0.05 0.00 - 0.70 x10*3/uL    Basophils Absolute 0.06 0.00 - 0.10 x10*3/uL   Comprehensive metabolic panel   Result Value Ref Range    Glucose 121 (H) 74 - 99 mg/dL    Sodium 136 136 - 145 mmol/L    Potassium 3.4 (L) 3.5 - 5.3 mmol/L    Chloride 100 98 - 107 mmol/L    Bicarbonate 28 21 - 32 mmol/L    Anion Gap 11 10 - 20 mmol/L    Urea Nitrogen 7 6 - 23 mg/dL    Creatinine 0.68 0.50 - 1.30 mg/dL    eGFR >90 >60 mL/min/1.73m*2    Calcium 9.3 8.6 - 10.6 mg/dL    Albumin 3.2 (L) 3.4 - 5.0 g/dL    Alkaline Phosphatase 95 33 - 120 U/L    Total Protein 6.2 (L) 6.4 - 8.2 g/dL    AST 12 9 - 39 U/L    Bilirubin, Total 0.3 0.0 - 1.2 mg/dL    ALT 11 10 - 52 U/L   Coagulation Screen   Result Value Ref Range    Protime 20.8 (H) 9.8 - 12.8 seconds    INR 1.8 (H) 0.9 - 1.1    aPTT  >200 (HH) 27 - 38 seconds   Magnesium   Result Value Ref Range    Magnesium 1.60 1.60 - 2.40 mg/dL   Type and screen   Result Value Ref Range    ABO TYPE A     Rh TYPE POS     ANTIBODY SCREEN NEG    Heparin Assay, UFH   Result Value Ref Range    Heparin Unfractionated >2.0 (HH) See Comment Below for Therapeutic Ranges IU/mL   Urinalysis with Reflex Culture and Microscopic   Result Value Ref Range    Color, Urine Colorless (N) Light-Yellow, Yellow, Dark-Yellow    Appearance, Urine Clear Clear    Specific Gravity, Urine 1.011 1.005 - 1.035    pH, Urine 7.5 5.0, 5.5, 6.0, 6.5, 7.0, 7.5, 8.0    Protein, Urine NEGATIVE NEGATIVE, 10 (TRACE), 20 (TRACE) mg/dL    Glucose, Urine Normal Normal mg/dL    Blood, Urine NEGATIVE NEGATIVE    Ketones, Urine NEGATIVE NEGATIVE mg/dL    Bilirubin, Urine NEGATIVE NEGATIVE    Urobilinogen, Urine Normal Normal mg/dL    Nitrite, Urine NEGATIVE NEGATIVE    Leukocyte Esterase, Urine NEGATIVE NEGATIVE   Extra Urine Gray Tube   Result Value Ref Range    Extra Tube Hold for add-ons.    Drug Screen, Urine   Result Value Ref Range    Amphetamine Screen, Urine Presumptive Negative Presumptive Negative    Barbiturate Screen, Urine Presumptive Negative Presumptive Negative    Benzodiazepines Screen, Urine Presumptive Negative Presumptive Negative    Cannabinoid Screen, Urine Presumptive Negative Presumptive Negative    Cocaine Metabolite Screen, Urine Presumptive Negative Presumptive Negative    Fentanyl Screen, Urine Presumptive Negative Presumptive Negative    Opiate Screen, Urine Presumptive Negative Presumptive Negative    Oxycodone Screen, Urine Presumptive Negative Presumptive Negative    PCP Screen, Urine Presumptive Negative Presumptive Negative    Methadone Screen, Urine Presumptive Negative Presumptive Negative   Heparin Assay, UFH   Result Value Ref Range    Heparin Unfractionated <0.1 See Comment Below for Therapeutic Ranges IU/mL   Heparin Assay, UFH   Result Value Ref Range    Heparin  Unfractionated 0.2 See Comment Below for Therapeutic Ranges IU/mL   CBC   Result Value Ref Range    WBC 4.8 4.4 - 11.3 x10*3/uL    nRBC 0.4 (H) 0.0 - 0.0 /100 WBCs    RBC 4.08 (L) 4.50 - 5.90 x10*6/uL    Hemoglobin 11.5 (L) 13.5 - 17.5 g/dL    Hematocrit 36.2 (L) 41.0 - 52.0 %    MCV 89 80 - 100 fL    MCH 28.2 26.0 - 34.0 pg    MCHC 31.8 (L) 32.0 - 36.0 g/dL    RDW 18.0 (H) 11.5 - 14.5 %    Platelets 300 150 - 450 x10*3/uL   Renal Function Panel   Result Value Ref Range    Glucose 135 (H) 74 - 99 mg/dL    Sodium 138 136 - 145 mmol/L    Potassium 3.5 3.5 - 5.3 mmol/L    Chloride 102 98 - 107 mmol/L    Bicarbonate 24 21 - 32 mmol/L    Anion Gap 16 10 - 20 mmol/L    Urea Nitrogen 5 (L) 6 - 23 mg/dL    Creatinine 0.66 0.50 - 1.30 mg/dL    eGFR >90 >60 mL/min/1.73m*2    Calcium 9.5 8.6 - 10.6 mg/dL    Phosphorus 3.1 2.5 - 4.9 mg/dL    Albumin 3.2 (L) 3.4 - 5.0 g/dL   Magnesium   Result Value Ref Range    Magnesium 1.59 (L) 1.60 - 2.40 mg/dL   Heparin Assay, UFH   Result Value Ref Range    Heparin Unfractionated 0.6 See Comment Below for Therapeutic Ranges IU/mL       Imaging:   I personally reviewed the CT head and MRI head from 12/14 and 12/13 and this is my impression:  There is a mass within the region of the sella turcica which measures approximately 2.6 x 3 cm.  There is likely some compression of the optic nerve or some other cranial nerves.  Unable to visualize the postauricular mass on the scan as it does not extend low enough    Procedure Note: Flexible Nasolaryngoscopy  Verbal informed consent was obtained from the patient/patient's guardian. 4% lidocaine mixed with phenylephrine was prepared and dripped into the nose. It was placed in the right naris. Following an appropriate amount of time to allow for adequate anesthesia, a flexible fiberoptic nasolaryngoscope was placed into the patient's right naris. The nasal cavity, nasopharynx, oropharynx, hypopharynx, and all endolaryngeal structures were visualized  and were normal except as listed below. Significant findings included:  -Restriction of the laryngeal inlet, right AE edema with bowing of the epiglottis,  -Right hemilarynx hypomobility versus paralysis  - No pooling of secretions     Assessment/Plan   Gaurav Mckay is a 54-year-old male with a past medical history of laryngeal cancer status post chemoradiation plus right neck dissection and flap reconstruction presenting for evaluation of clival mass that was found incidentally on preoperative testing.  ENT is consulted for help with access to the patient's clival mass.  We will be available to assist with exposure and access should neurosurgery decide to pursue this.  We would also recommend a biopsy of the patient's right postauricular mass and involving ophthalmology at this time given patient's eye findings.  Could also consider neck CT scan to further evaluate the postauricular mass.     Plan:  > ENT will assist with exposure, will reach out to the Rhinologist Department's  > Could consider biopsy versus neck scan of the postauricular mass to further characterize it   > Recommend ophthalmology evaluation as well to assess for patient's new onset eye findings.  > Anesthesia should be aware of patient's scope findings so that they can appropriately plan for his intubation  > ENT will continue to peripherally follow the patient, please reach out to coordinate or planning should neurosurgery decide to pursue resection.    The patient was seen and evaluated with Dr. Felipe Smith MD [facility] - PGY1  Otolaryngology - Head and Neck Surgery    ENT Consult pager: [phone]  ENT Peds pager: [phone]  ENT Head & Neck Surgery Phone: [phone]  ENT subspecialty team: [place] individual resident who wrote today's note  ENT Outpatient scheduling number: [phone]  Please Page [phone] or call [phone] if Urgent

## 2024-12-16 NOTE — PROGRESS NOTES
Speech-Language Pathology                 Therapy Communication Note    Patient Name: Gaurav Mckay  MRN: [id number]  Department: [facility] MRI  Room: 5568/5568-A  Today's Date: 12/16/2024     Discipline: Speech Language Pathology          Missed Visit Reason:  Pt off floor for a procedure. SLP will continue to follow and evaluate as time permits.     Missed Time: Attempt

## 2024-12-16 NOTE — PROGRESS NOTES
Occupational Therapy                 Therapy Communication Note    Patient Name: Gaurav Mckay  MRN: [id number]  Department: [facility]  Room: 5568/5568-A  Today's Date: 12/16/2024     Discipline: Occupational Therapy    OT Missed Visit: Yes     Missed Visit Reason: Missed Visit Reason: Patient in a medical procedure    Missed Time: Attempt    Sanchez Carney OTR/L

## 2024-12-16 NOTE — CONSULTS
Otolaryngology - Head and Neck Surgery Consultation Note      Reason For Consult  Clival mass.    History Of Present Illness  Gaurav Mckay is a 54 y.o. male with history of laryngeal cancer status post radical neck dissection and free flap reconstruction, PEs on apixaban, alcohol abuse, hypertension who presented as a transfer from [facility] due to need for urology consult.  At [facility] was found to have a clival mass incidentally requiring neurosurgery intervention while he was in clinic for preanesthesia testing.  The patient is currently being worked up for potential extraction of his clival mass.  For his symptoms he reports headache, diplopia, and neck bump behind his right ear, right forehead lump, nosebleeds.  He reports that hisrelated most likely from him picking repeatedly in his nose and that is not itchy at this time.  He reports the headache is been present for a few days and is bifrontal/mostly on the right and bothersome.  It is not getting better and is in fact getting worse as is all of his symptoms.  His right neck masses postauricular, fixed, nontender, nonpurulent.    ENT is consulted for help with access to new clival mass.    Patient denies throat pain, dysphonia, dysphagia, otalgia, hemoptysis, odynophagia, fevers, chills, weight loss.      Previous Head and Neck Oncologic History:   Diagnosis: Laryngeal squamous cell carcinoma  Initial Staging: T2 N0 M0-stage II overall  Treatment History:   Radiation: 6300 Gray in 20 fractions ending on 6/1/2022  PET in 2024 with increased uptake  7/1/2024-right radical neck dissection plus ALT from left thigh.  Adjuvant therapy with IMRT and cisplatin started on 7/29/2024    Post-treatment Imaging:   Initial with uptake, pending repeat after current treatment cycle     Past Medical History  He has a past medical history of Acute inflammation of orbit (06/27/2013), Acute upper respiratory infection, unspecified (03/20/2015), Alcohol abuse, in remission  (02/12/2015), Alcohol dependence, in remission (05/07/2015), Allergic contact dermatitis due to plants, except food (05/21/2014), Allergy status to unspecified drugs, medicaments and biological substances (08/29/2013), Anxiety disorder, unspecified (03/20/2015), Asthmatic bronchitis ([facility]), Bipolar disorder, current episode manic without psychotic features, unspecified (Multi), Cancer of larynx (Multi), Cervicalgia, Chronic asthmatic bronchitis (Multi) (12/02/2014), Clotting disorder (Multi), COPD (chronic obstructive pulmonary disease) (Multi), Degeneration of cervical intervertebral disc, Dysphagia, Edentulous, Encounter for immunization (09/22/2016), Frequent falls, GERD (gastroesophageal reflux disease), Hearing aid worn, History of abdominal pain (02/16/2017), History of allergic rhinitis (01/16/2014), History of allergic rhinitis (06/26/2014), History of depression, History of esophageal reflux (08/27/2015), History of gastroenteritis (04/11/2013), History of hepatitis C, History of sinusitis (02/05/2014), History of sore throat (03/20/2017), Hyperlipidemia, Hypertension, Other long term (current) drug therapy (06/02/2015), Personal history of nicotine dependence (08/16/2017), Personal history of nicotine dependence (06/26/2017), Personal history of other diseases of the respiratory system (09/26/2013), Personal history of other mental and behavioral disorders, Personal history of other specified conditions (08/25/2016), RLS (restless legs syndrome), Unspecified asthma, uncomplicated ([facility]) (02/13/2014), Uses walker, and Wears glasses.  Patient Active Problem List   Diagnosis    Chronic saddle pulmonary embolism 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 (Multi)    Lymphadenitis    Neck mass    Acute cervical lymphadenitis    COPD (chronic obstructive pulmonary disease) (Multi)    Urinary retention    History of ETOH abuse    History of pulmonary embolus (PE)    HTN (hypertension)    Other hyperlipidemia     Gastroesophageal reflux disease without esophagitis    Generalized weakness    Hypomagnesemia    Laryngeal cancer (Multi)    Pain after radiation therapy    Neck pain    Radiation burn    Pancytopenia    BPH (benign prostatic hyperplasia)    RLS (restless legs syndrome)    Substance induced mood disorder (Multi)    Polysubstance abuse (Multi)    Syncope    Pain of right lower extremity    Gait abnormality    Impaired mobility and activities of daily living    Syncope and collapse    Cellulitis of neck    Open wound of left thigh    Iron deficiency anemia    Constipation    Protein-calorie malnutrition (Multi)    Tobacco use disorder    Other hemorrhoids    Dizziness    Cystitis    Bladder wall thickening    Alcohol abuse with other alcohol-induced disorder    Alcoholic polyneuropathy (Multi)    Allergic rhinitis, unspecified    Antineoplastic chemotherapy induced pancytopenia (CMS-HCC)    Bicytopenia    Depression, unspecified    Panic disorder (episodic paroxysmal anxiety)    History of radical neck dissection    Hypertensive heart disease without heart failure    Insomnia, unspecified    MRSA bacteremia    Neoplasm related pain (acute) (chronic)    Obstructive sleep apnea (adult) (pediatric)    Oropharyngeal dysphagia    Orthostatic hypotension    Other acute postprocedural pain    Other chronic pancreatitis    Other stimulant abuse, uncomplicated    Personal history of other venous thrombosis and embolism    Radiculopathy, lumbosacral region    Repeated falls    Secondary and unspecified malignant neoplasm of lymph nodes of head, face and neck    Thiamine deficiency, unspecified    Tinnitus, unspecified ear    Unspecified asthma, uncomplicated (HHS-HCC)    Unspecified sensorineural hearing loss    Bladder tumor    Chordoma (Multi)    Brain mass       Surgical History  He has a past surgical history that includes MR angio head wo IV contrast (01/24/2016); MR angio neck wo IV contrast (01/24/2016); CT angio abdomen  "pelvis w and or wo IV IV contrast (04/01/2020); Invasive Vascular Procedure (N/A, 11/08/2023); Cholecystectomy; Ankle surgery (Left); Thrombectomy; Wound debridement (Left, 08/06/2024); Fracture surgery (Right, 07/15/2024); Ankle surgery (07/08/2024); Leg Surgery (07/06/2024); and Laryngoscopy (07/01/2024).     Social History  He reports that he has been smoking cigarettes. He has quit using smokeless tobacco.  His smokeless tobacco use included snuff. He reports current alcohol use. He reports current drug use. Drugs: Methamphetamines and Cocaine.    Family History  Family History   Problem Relation Name Age of Onset    Diabetes Mother      Hypertension Mother      Heart attack Father          Allergies  Oxycodone    Review of Systems  A 12-point review of systems was performed and noted be negative except for that which was mentioned in the history of present illness     Last Recorded Vitals  Blood pressure 120/77, pulse 104, temperature 36.4 °C (97.5 °F), resp. rate 19, height 1.778 m (5' 10\"), weight 83 kg (183 lb), SpO2 97%.     Physical Exam:  Constitutional:  No acute distress  Voice:  No hoarseness or other abnormality  Respiration:  Breathing comfortably, no stridor  Cardiovascular:  No clubbing/cyanosis/edema in hands  Eyes: Right lateral gaze palsy in the right eye, unable to ABduct past midline intact, sclera normal  Neuro:  Alert and oriented times 3, Cranial nerves II-XII grossly intact and symmetric bilaterally except for cranial nerve IV on the right  Head and Face:  Symmetric facial features, no masses, sinuses non-tender to palpation.  Picking/scratch marks present at the crook of the nose and above the right eyebrow  Salivary Glands:  Parotid and submandibular glands normal bilaterally  Right Ear:  Normal external ear, external auditory canal,   Left Ear: Normal external ear, external auditory canal,   Nose:  External nose midline, anterior rhinoscopy is normal with limited visualization to the " anterior aspect of the interior turbinates, n scratches noted along the septum and middle portion of nares bilaterally with dried crusting blood.    Oral Cavity/Oropharynx/Lips:  Normal mucous membranes, normal floor of mouth/tongue/OP, no masses or lesions  Pharynx: no masses or lesions  Neck/Lymph:  no thyroid masses, trachea midline.  Right postauricular mass in the area of the right postauricular lymph nodes.  Approximately 3 cm in diameter.  Nontender nonpurulent nonbleeding  Skin:  Neck skin is without scar or injury  Psych:  Alert and oriented with appropriate mood and affect    Medications:  Scheduled medications  acetaminophen, 975 mg, oral, q6h  atorvastatin, 20 mg, oral, Nightly  docusate sodium, 100 mg, oral, BID  ferrous sulfate (325 mg ferrous sulfate), 65 mg of iron, oral, BID  folic acid, 1 mg, oral, Daily  guaiFENesin, 600 mg, oral, BID  lidocaine, 15 mL, oral, Before meals & nightly  lidocaine, 1 patch, transdermal, Daily  mirtazapine, 15 mg, oral, Nightly  montelukast, 10 mg, oral, Daily  multivitamin with minerals, 1 tablet, oral, Daily  nicotine, 1 patch, transdermal, Daily  oxygen, , inhalation, Continuous - Inhalation  pancrelipase (Lip-Prot-Amyl), 1 capsule, oral, BID  pantoprazole, 40 mg, oral, Daily before breakfast  pregabalin, 200 mg, oral, TID  rOPINIRole, 0.5 mg, oral, TID  tamsulosin, 0.4 mg, oral, Daily  thiamine, 100 mg, oral, Daily  topiramate, 100 mg, oral, BID      Continuous medications  heparin, 0-4,000 Units/hr, Last Rate: 1,700 Units/hr (12/16/24 1211)      PRN medications  PRN medications: albuterol, cyclobenzaprine, HYDROmorphone, HYDROmorphone, ipratropium-albuteroL, ketorolac, LORazepam **OR** LORazepam **OR** LORazepam, melatonin, naloxone, ondansetron ODT **OR** ondansetron, polyethylene glycol    Recent Labs:  Results for orders placed or performed during the hospital encounter of 12/15/24 (from the past 24 hours)   ECG 12 Lead   Result Value Ref Range    Ventricular  Rate 91 BPM    Atrial Rate 91 BPM    [place] Interval 140 ms    QRS Duration 88 ms    QT Interval 386 ms    QTC Calculation(Bazett) 474 ms    P Axis 42 degrees    R Axis 17 degrees    T Axis 50 degrees    QRS Count 15 beats    Q Onset 211 ms    P Onset 141 ms    P Offset 192 ms    T Offset 404 ms    QTC Fredericia 443 ms   CBC and Auto Differential   Result Value Ref Range    WBC 5.0 4.4 - 11.3 x10*3/uL    nRBC 0.0 0.0 - 0.0 /100 WBCs    RBC 4.39 (L) 4.50 - 5.90 x10*6/uL    Hemoglobin 12.4 (L) 13.5 - 17.5 g/dL    Hematocrit 39.3 (L) 41.0 - 52.0 %    MCV 90 80 - 100 fL    MCH 28.2 26.0 - 34.0 pg    MCHC 31.6 (L) 32.0 - 36.0 g/dL    RDW 17.6 (H) 11.5 - 14.5 %    Platelets 274 150 - 450 x10*3/uL    Neutrophils % 75.4 40.0 - 80.0 %    Immature Granulocytes %, Automated 1.6 (H) 0.0 - 0.9 %    Lymphocytes % 12.3 13.0 - 44.0 %    Monocytes % 8.5 2.0 - 10.0 %    Eosinophils % 1.0 0.0 - 6.0 %    Basophils % 1.2 0.0 - 2.0 %    Neutrophils Absolute 3.73 1.20 - 7.70 x10*3/uL    Immature Granulocytes Absolute, Automated 0.08 0.00 - 0.70 x10*3/uL    Lymphocytes Absolute 0.61 (L) 1.20 - 4.80 x10*3/uL    Monocytes Absolute 0.42 0.10 - 1.00 x10*3/uL    Eosinophils Absolute 0.05 0.00 - 0.70 x10*3/uL    Basophils Absolute 0.06 0.00 - 0.10 x10*3/uL   Comprehensive metabolic panel   Result Value Ref Range    Glucose 121 (H) 74 - 99 mg/dL    Sodium 136 136 - 145 mmol/L    Potassium 3.4 (L) 3.5 - 5.3 mmol/L    Chloride 100 98 - 107 mmol/L    Bicarbonate 28 21 - 32 mmol/L    Anion Gap 11 10 - 20 mmol/L    Urea Nitrogen 7 6 - 23 mg/dL    Creatinine 0.68 0.50 - 1.30 mg/dL    eGFR >90 >60 mL/min/1.73m*2    Calcium 9.3 8.6 - 10.6 mg/dL    Albumin 3.2 (L) 3.4 - 5.0 g/dL    Alkaline Phosphatase 95 33 - 120 U/L    Total Protein 6.2 (L) 6.4 - 8.2 g/dL    AST 12 9 - 39 U/L    Bilirubin, Total 0.3 0.0 - 1.2 mg/dL    ALT 11 10 - 52 U/L   Coagulation Screen   Result Value Ref Range    Protime 20.8 (H) 9.8 - 12.8 seconds    INR 1.8 (H) 0.9 - 1.1    aPTT  >200 (HH) 27 - 38 seconds   Magnesium   Result Value Ref Range    Magnesium 1.60 1.60 - 2.40 mg/dL   Type and screen   Result Value Ref Range    ABO TYPE A     Rh TYPE POS     ANTIBODY SCREEN NEG    Heparin Assay, UFH   Result Value Ref Range    Heparin Unfractionated >2.0 (HH) See Comment Below for Therapeutic Ranges IU/mL   Urinalysis with Reflex Culture and Microscopic   Result Value Ref Range    Color, Urine Colorless (N) Light-Yellow, Yellow, Dark-Yellow    Appearance, Urine Clear Clear    Specific Gravity, Urine 1.011 1.005 - 1.035    pH, Urine 7.5 5.0, 5.5, 6.0, 6.5, 7.0, 7.5, 8.0    Protein, Urine NEGATIVE NEGATIVE, 10 (TRACE), 20 (TRACE) mg/dL    Glucose, Urine Normal Normal mg/dL    Blood, Urine NEGATIVE NEGATIVE    Ketones, Urine NEGATIVE NEGATIVE mg/dL    Bilirubin, Urine NEGATIVE NEGATIVE    Urobilinogen, Urine Normal Normal mg/dL    Nitrite, Urine NEGATIVE NEGATIVE    Leukocyte Esterase, Urine NEGATIVE NEGATIVE   Extra Urine Gray Tube   Result Value Ref Range    Extra Tube Hold for add-ons.    Drug Screen, Urine   Result Value Ref Range    Amphetamine Screen, Urine Presumptive Negative Presumptive Negative    Barbiturate Screen, Urine Presumptive Negative Presumptive Negative    Benzodiazepines Screen, Urine Presumptive Negative Presumptive Negative    Cannabinoid Screen, Urine Presumptive Negative Presumptive Negative    Cocaine Metabolite Screen, Urine Presumptive Negative Presumptive Negative    Fentanyl Screen, Urine Presumptive Negative Presumptive Negative    Opiate Screen, Urine Presumptive Negative Presumptive Negative    Oxycodone Screen, Urine Presumptive Negative Presumptive Negative    PCP Screen, Urine Presumptive Negative Presumptive Negative    Methadone Screen, Urine Presumptive Negative Presumptive Negative   Heparin Assay, UFH   Result Value Ref Range    Heparin Unfractionated <0.1 See Comment Below for Therapeutic Ranges IU/mL   Heparin Assay, UFH   Result Value Ref Range    Heparin  Unfractionated 0.2 See Comment Below for Therapeutic Ranges IU/mL   CBC   Result Value Ref Range    WBC 4.8 4.4 - 11.3 x10*3/uL    nRBC 0.4 (H) 0.0 - 0.0 /100 WBCs    RBC 4.08 (L) 4.50 - 5.90 x10*6/uL    Hemoglobin 11.5 (L) 13.5 - 17.5 g/dL    Hematocrit 36.2 (L) 41.0 - 52.0 %    MCV 89 80 - 100 fL    MCH 28.2 26.0 - 34.0 pg    MCHC 31.8 (L) 32.0 - 36.0 g/dL    RDW 18.0 (H) 11.5 - 14.5 %    Platelets 300 150 - 450 x10*3/uL   Renal Function Panel   Result Value Ref Range    Glucose 135 (H) 74 - 99 mg/dL    Sodium 138 136 - 145 mmol/L    Potassium 3.5 3.5 - 5.3 mmol/L    Chloride 102 98 - 107 mmol/L    Bicarbonate 24 21 - 32 mmol/L    Anion Gap 16 10 - 20 mmol/L    Urea Nitrogen 5 (L) 6 - 23 mg/dL    Creatinine 0.66 0.50 - 1.30 mg/dL    eGFR >90 >60 mL/min/1.73m*2    Calcium 9.5 8.6 - 10.6 mg/dL    Phosphorus 3.1 2.5 - 4.9 mg/dL    Albumin 3.2 (L) 3.4 - 5.0 g/dL   Magnesium   Result Value Ref Range    Magnesium 1.59 (L) 1.60 - 2.40 mg/dL   Heparin Assay, UFH   Result Value Ref Range    Heparin Unfractionated 0.6 See Comment Below for Therapeutic Ranges IU/mL       Imaging:   I personally reviewed the CT head and MRI head from 12/14 and 12/13 and this is my impression:  There is a mass within the region of the sella turcica which measures approximately 2.6 x 3 cm.  There is likely some compression of the optic nerve or some other cranial nerves.  Unable to visualize the postauricular mass on the scan as it does not extend low enough    Procedure Note: Flexible Nasolaryngoscopy  Verbal informed consent was obtained from the patient/patient's guardian. 4% lidocaine mixed with phenylephrine was prepared and dripped into the nose. It was placed in the right naris. Following an appropriate amount of time to allow for adequate anesthesia, a flexible fiberoptic nasolaryngoscope was placed into the patient's right naris. The nasal cavity, nasopharynx, oropharynx, hypopharynx, and all endolaryngeal structures were visualized  and were normal except as listed below. Significant findings included:  -Restriction of the laryngeal inlet, right AE edema with bowing of the epiglottis,  -Right hemilarynx hypomobility versus paralysis  - No pooling of secretions     Assessment/Plan   Gaurav Mckay is a 54-year-old male with a past medical history of laryngeal cancer status post chemoradiation plus right neck dissection and flap reconstruction presenting for evaluation of clival mass that was found incidentally on preoperative testing.  ENT is consulted for help with access to the patient's clival mass.  We will be available to assist with exposure and access should neurosurgery decide to pursue this.  We would also recommend a biopsy of the patient's right postauricular mass and involving ophthalmology at this time given patient's eye findings.  Could also consider neck CT scan to further evaluate the postauricular mass.     Plan:  > ENT will assist with exposure, will reach out to the Rhinologist Department's  > Could consider biopsy versus neck scan of the postauricular mass to further characterize it   > Recommend ophthalmology evaluation as well to assess for patient's new onset eye findings.  > Anesthesia should be aware of patient's scope findings so that they can appropriately plan for his intubation  > ENT will continue to peripherally follow the patient, please reach out to coordinate or planning should neurosurgery decide to pursue resection.    The patient was seen and evaluated with Dr. Felipe Smith MD [facility] - PGY1  Otolaryngology - Head and Neck Surgery    ENT Consult pager: [phone]  ENT Peds pager: [phone]  ENT Head & Neck Surgery Phone: [phone]  ENT subspecialty team: [place] individual resident who wrote today's note  ENT Outpatient scheduling number: [phone]  Please Page [phone] or call [phone] if Urgent

## 2024-12-16 NOTE — PROGRESS NOTES
"Gaurav Mckay is a 54 y.o. male on day 1 of admission presenting with Brain mass.    Subjective   No acute events overnight. He complains of a headache and neck pain. Denies fever, chills, nausea, vomiting, chest pain, and shortness of breath.      Objective     Physical Exam  Vitals reviewed.   Constitutional:       Appearance: Normal appearance. He is ill-appearing.   HENT:      Head: Normocephalic and atraumatic.      Comments:  R eye deviation with disconjugate gaze      Nose: Nose normal.      Mouth/Throat:      Mouth: Mucous membranes are moist.   Eyes:      Conjunctiva/sclera: Conjunctivae normal.   Cardiovascular:      Rate and Rhythm: Normal rate and regular rhythm.   Pulmonary:      Effort: Pulmonary effort is normal.      Breath sounds: Normal breath sounds.      Comments: Course lungs sounds throughout.   Abdominal:      General: Bowel sounds are normal.      Palpations: Abdomen is soft.   Musculoskeletal:         General: No swelling. Normal range of motion.      Cervical back: Normal range of motion and neck supple.   Skin:     General: Skin is warm.   Neurological:      General: No focal deficit present.      Mental Status: He is alert and oriented to person, place, and time. Mental status is at baseline.   Psychiatric:         Mood and Affect: Mood normal.         Behavior: Behavior normal.         Thought Content: Thought content normal.         Judgment: Judgment normal.         Last Recorded Vitals  Blood pressure 122/73, pulse 96, temperature 36.9 °C (98.4 °F), resp. rate 17, height 1.778 m (5' 10\"), weight 83 kg (183 lb), SpO2 99%.  Intake/Output last 3 Shifts:  I/O last 3 completed shifts:  In: 88.8 (1.1 mL/kg) [I.V.:88.8 (1.1 mL/kg)]  Out: 2750 (33.1 mL/kg) [Urine:2750 (0.9 mL/kg/hr)]  Weight: 83 kg     Relevant Results  Scheduled medications  acetaminophen, 975 mg, oral, q6h  atorvastatin, 20 mg, oral, Nightly  docusate sodium, 100 mg, oral, BID  ferrous sulfate (325 mg ferrous sulfate), " 65 mg of iron, oral, BID  folic acid, 1 mg, oral, Daily  guaiFENesin, 600 mg, oral, BID  lidocaine, 15 mL, oral, Before meals & nightly  lidocaine, 1 patch, transdermal, Daily  magnesium sulfate, 2 g, intravenous, Once  mirtazapine, 15 mg, oral, Nightly  montelukast, 10 mg, oral, Daily  multivitamin with minerals, 1 tablet, oral, Daily  nicotine, 1 patch, transdermal, Daily  oxygen, , inhalation, Continuous - Inhalation  pancrelipase (Lip-Prot-Amyl), 1 capsule, oral, BID  pantoprazole, 40 mg, oral, Daily before breakfast  potassium chloride, 20 mEq, intravenous, Once  pregabalin, 200 mg, oral, TID  rOPINIRole, 0.5 mg, oral, TID  tamsulosin, 0.4 mg, oral, Daily  thiamine, 100 mg, oral, Daily  topiramate, 100 mg, oral, BID      Continuous medications  heparin, 0-4,000 Units/hr, Last Rate: 1,700 Units/hr (12/16/24 1211)      PRN medications  PRN medications: albuterol, cyclobenzaprine, HYDROmorphone, HYDROmorphone, ipratropium-albuteroL, ketorolac, LORazepam **OR** LORazepam **OR** LORazepam, melatonin, naloxone, ondansetron ODT **OR** ondansetron, polyethylene glycol     Results for orders placed or performed during the hospital encounter of 12/15/24 (from the past 24 hours)   ECG 12 Lead   Result Value Ref Range    Ventricular Rate 91 BPM    Atrial Rate 91 BPM    MN Interval 140 ms    QRS Duration 88 ms    QT Interval 386 ms    QTC Calculation(Bazett) 474 ms    P Axis 42 degrees    R Axis 17 degrees    T Axis 50 degrees    QRS Count 15 beats    Q Onset 211 ms    P Onset 141 ms    P Offset 192 ms    T Offset 404 ms    QTC Fredericia 443 ms   CBC and Auto Differential   Result Value Ref Range    WBC 5.0 4.4 - 11.3 x10*3/uL    nRBC 0.0 0.0 - 0.0 /100 WBCs    RBC 4.39 (L) 4.50 - 5.90 x10*6/uL    Hemoglobin 12.4 (L) 13.5 - 17.5 g/dL    Hematocrit 39.3 (L) 41.0 - 52.0 %    MCV 90 80 - 100 fL    MCH 28.2 26.0 - 34.0 pg    MCHC 31.6 (L) 32.0 - 36.0 g/dL    RDW 17.6 (H) 11.5 - 14.5 %    Platelets 274 150 - 450 x10*3/uL     Neutrophils % 75.4 40.0 - 80.0 %    Immature Granulocytes %, Automated 1.6 (H) 0.0 - 0.9 %    Lymphocytes % 12.3 13.0 - 44.0 %    Monocytes % 8.5 2.0 - 10.0 %    Eosinophils % 1.0 0.0 - 6.0 %    Basophils % 1.2 0.0 - 2.0 %    Neutrophils Absolute 3.73 1.20 - 7.70 x10*3/uL    Immature Granulocytes Absolute, Automated 0.08 0.00 - 0.70 x10*3/uL    Lymphocytes Absolute 0.61 (L) 1.20 - 4.80 x10*3/uL    Monocytes Absolute 0.42 0.10 - 1.00 x10*3/uL    Eosinophils Absolute 0.05 0.00 - 0.70 x10*3/uL    Basophils Absolute 0.06 0.00 - 0.10 x10*3/uL   Comprehensive metabolic panel   Result Value Ref Range    Glucose 121 (H) 74 - 99 mg/dL    Sodium 136 136 - 145 mmol/L    Potassium 3.4 (L) 3.5 - 5.3 mmol/L    Chloride 100 98 - 107 mmol/L    Bicarbonate 28 21 - 32 mmol/L    Anion Gap 11 10 - 20 mmol/L    Urea Nitrogen 7 6 - 23 mg/dL    Creatinine 0.68 0.50 - 1.30 mg/dL    eGFR >90 >60 mL/min/1.73m*2    Calcium 9.3 8.6 - 10.6 mg/dL    Albumin 3.2 (L) 3.4 - 5.0 g/dL    Alkaline Phosphatase 95 33 - 120 U/L    Total Protein 6.2 (L) 6.4 - 8.2 g/dL    AST 12 9 - 39 U/L    Bilirubin, Total 0.3 0.0 - 1.2 mg/dL    ALT 11 10 - 52 U/L   Coagulation Screen   Result Value Ref Range    Protime 20.8 (H) 9.8 - 12.8 seconds    INR 1.8 (H) 0.9 - 1.1    aPTT >200 (HH) 27 - 38 seconds   Magnesium   Result Value Ref Range    Magnesium 1.60 1.60 - 2.40 mg/dL   Type and screen   Result Value Ref Range    ABO TYPE A     Rh TYPE POS     ANTIBODY SCREEN NEG    Heparin Assay, UFH   Result Value Ref Range    Heparin Unfractionated >2.0 (HH) See Comment Below for Therapeutic Ranges IU/mL   Urinalysis with Reflex Culture and Microscopic   Result Value Ref Range    Color, Urine Colorless (N) Light-Yellow, Yellow, Dark-Yellow    Appearance, Urine Clear Clear    Specific Gravity, Urine 1.011 1.005 - 1.035    pH, Urine 7.5 5.0, 5.5, 6.0, 6.5, 7.0, 7.5, 8.0    Protein, Urine NEGATIVE NEGATIVE, 10 (TRACE), 20 (TRACE) mg/dL    Glucose, Urine Normal Normal mg/dL     Blood, Urine NEGATIVE NEGATIVE    Ketones, Urine NEGATIVE NEGATIVE mg/dL    Bilirubin, Urine NEGATIVE NEGATIVE    Urobilinogen, Urine Normal Normal mg/dL    Nitrite, Urine NEGATIVE NEGATIVE    Leukocyte Esterase, Urine NEGATIVE NEGATIVE   Extra Urine Gray Tube   Result Value Ref Range    Extra Tube Hold for add-ons.    Drug Screen, Urine   Result Value Ref Range    Amphetamine Screen, Urine Presumptive Negative Presumptive Negative    Barbiturate Screen, Urine Presumptive Negative Presumptive Negative    Benzodiazepines Screen, Urine Presumptive Negative Presumptive Negative    Cannabinoid Screen, Urine Presumptive Negative Presumptive Negative    Cocaine Metabolite Screen, Urine Presumptive Negative Presumptive Negative    Fentanyl Screen, Urine Presumptive Negative Presumptive Negative    Opiate Screen, Urine Presumptive Negative Presumptive Negative    Oxycodone Screen, Urine Presumptive Negative Presumptive Negative    PCP Screen, Urine Presumptive Negative Presumptive Negative    Methadone Screen, Urine Presumptive Negative Presumptive Negative   Heparin Assay, UFH   Result Value Ref Range    Heparin Unfractionated <0.1 See Comment Below for Therapeutic Ranges IU/mL   Heparin Assay, UFH   Result Value Ref Range    Heparin Unfractionated 0.2 See Comment Below for Therapeutic Ranges IU/mL   CBC   Result Value Ref Range    WBC 4.8 4.4 - 11.3 x10*3/uL    nRBC 0.4 (H) 0.0 - 0.0 /100 WBCs    RBC 4.08 (L) 4.50 - 5.90 x10*6/uL    Hemoglobin 11.5 (L) 13.5 - 17.5 g/dL    Hematocrit 36.2 (L) 41.0 - 52.0 %    MCV 89 80 - 100 fL    MCH 28.2 26.0 - 34.0 pg    MCHC 31.8 (L) 32.0 - 36.0 g/dL    RDW 18.0 (H) 11.5 - 14.5 %    Platelets 300 150 - 450 x10*3/uL   Renal Function Panel   Result Value Ref Range    Glucose 135 (H) 74 - 99 mg/dL    Sodium 138 136 - 145 mmol/L    Potassium 3.5 3.5 - 5.3 mmol/L    Chloride 102 98 - 107 mmol/L    Bicarbonate 24 21 - 32 mmol/L    Anion Gap 16 10 - 20 mmol/L    Urea Nitrogen 5 (L) 6 - 23  mg/dL    Creatinine 0.66 0.50 - 1.30 mg/dL    eGFR >90 >60 mL/min/1.73m*2    Calcium 9.5 8.6 - 10.6 mg/dL    Phosphorus 3.1 2.5 - 4.9 mg/dL    Albumin 3.2 (L) 3.4 - 5.0 g/dL   Magnesium   Result Value Ref Range    Magnesium 1.59 (L) 1.60 - 2.40 mg/dL   Heparin Assay, UFH   Result Value Ref Range    Heparin Unfractionated 0.6 See Comment Below for Therapeutic Ranges IU/mL                Assessment/Plan   Assessment & Plan  Brain mass    Gaurav Mckay is a 54 y.o. male with a PMH of laryngeal cancer s/p radical neck dissection, PE on Apixaban, alcohol abuse, and HTN who presented to [facility] as a transfer from [facility] due to need for urology consult as well as neurosurgery consult due to incidental finding of brain mass. Pt was a direct admit to OSH on 12/13 after he presented to [facility] at [facility] for surgical clearance for a scheduled cystoscopy with ablation of tissue on 12/17/24 secondary to a bladder tumor. Stephen cath placed due to urinary retention at OSH. Pt arrived in stable condition from OSH. Neurosurgery and Urology consulted and following. Pt admitted to medicine service for further treatment and management of brain mass and bladder tumor. ENT and SLP consulted.      #brain mass  #Headache with Visual Changes  - neurosurgery consulted, appreciate recs   - CTH: concerning for a large destructive lytic soft tissue mass upon the clivus that protrudes into the sella turcica and bilateral cavernous sinuses and erodes bilateral petrous carotid canals.   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   -Ddx to include chordoma,  chondrosarcoma, plasmacytoma.    - No other focal deficits except for right eye deviation which he states is not new   - pt seen by neurology at OSH and MRI of Brain w & w/o contrast was recommended  -MRI cervical/lumbar/thoracic pending  -CT CAP on 12/15: Interval development of new centrilobular ground-glass nodular  densities in both lungs, predominantly in the right upper and middle lobes. Small amount of retained secretions noted in the trachea. These findings in the setting of a patulous and fluid-filled esophagus favors sequela of reflux/aspiration.  -Continue heparin gtt   -NPO until SLP evaluates patient.     # Bladder Tumor  #urinary retention  -Scheduled for cystoscopy with tissue ablation on 12/17/24 by Dr. Murcia  - urology consulted, appreciate recs.     :: Will coordinate with NSGY possible bladder biopsy/TURBT at same time as brain biopsy with NSGY.   - anders cath placed at OSH d/t urinary retention  - c/w Flomax 0.4 mg PO QD     # Alcohol Use Disorder  -Cessation advised  -Last drink was approximately 3AM on 12/13/24.   - c/w CIWA protocol using Ativan IV  - c/w Thiamine, Folate, and MVI PO QD  - pt interested in attending rehab on discharge     #Chronic Hypoxic Respiratory Failure  #COPD  #HANY  - c/w baseline 3 liters oxygen  - Continue bronchial hygiene, duonebs, mucinex     #HTN  #orthostatic hypotension  - BP on admit 118/81  - continue to monitor BP  - no home meds  - orthostatic BP/P ordered x1      #Hx of Pulmonary Embolism  -S/p embolization 11/2023  -Provoked by surgery  -holding home dose of Eliquis d/tupcoming procedure  -Heparin gtt initiated to bridge     # Tobacco Use Disorder  - nicotine patch ordered      Fluids: PRN  Electrolytes: Keep K>4, Mg>3, Phos>3  Nutrition: NPO until SLP evaluates  Abx: N/A  DVT: Heparin gtt    Patient discussed with attending physician, Dr. Rodríguez.     Plan preliminary until cosigned by attending physician.     Bre Chu MD   PGY2, Family  Medicine   [facility]  Available by Thounds

## 2024-12-16 NOTE — HOSPITAL COURSE
Gaurav Mckay is a 54 y.o. male with a PMH of laryngeal cancer s/p radical neck dissection, PE on Apixaban, alcohol abuse, and HTN who presented to [facility] as a transfer from [facility] due to need for urology consult as well as neurosurgery consult due to incidental finding of brain mass. Pt was a direct admit to OSH on 12/13 after he presented to [facility] at [facility] for surgical clearance for a scheduled cystoscopy with ablation of tissue on 12/17/24 secondary to a bladder tumor. He presented late and intoxicated to his appointment and there was concern of compliance with preop instructions which could further delay treatment. His case was discussed with the surgeon as well as anesthesia and admission was recommended to ensure sobriety, medication compliance, and surgical readiness. During admission and evaluation, Mr. Mckay was found to have dysconjugate gaze and he was complaining of a severe headache which he felt was related to a recent fall. 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 Neurology was consulted at OSH and consulting team felt that this was the cause of this diplopia. This mass is a new finding since it was not seen on CT imaging of brain done at the end of October. Heparin bridge started on admit to OSH. Pt had massive PE last November at which time he underwent embolectomy that was complicated by chronic respiratory failure and he has been on 3LNC since this occurred. Stephen cath placed due to urinary retention at OSH. Pt arrived in stable condition from OSH. Pt resting comfortably and eating when seen on LK55. He endorses pain on right side of head as well as SOB. Pt placed on 3LNC. Neurosurgery and Urology consulted. Pt admitted to medicine service for further treatment and management of brain mass and bladder tumor.     SLP saw patient and recommended MBSS to  further evaluate swallow function. CT thoracic  on 12/15: No thoracic spine metastatic disease. CT lumbar on  12/15 No metastatic disease to the lumbar spine. CT cervical on 12/15 Abnormal enhancement involving the spinous process of C6 and the interspinous ligaments at C5-6 and C6-7. This has an appearance more favorable for inflammatory process over malignancy.  SLP recommended Pureed and Mildly/nectar thick liquids.  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     CT sinus and CT angio Head/Neck 12/17: Large 3.4 cm mass located within the clivus and sphenoid bone with surrounding extension including into the sphenoid sinus posteriorly, causing destruction of the petrous segments of the bilateral temporal bones medially, and protruding into the cavernous sinuses and circumferentially surrounding the right internal carotid artery causing mild luminal narrowing.    ENT and Urology took patient to surgery on 12/20 for clival mass biopsy and cystoscopy with bladder biopsy respectively. Stephen catheter to remain in until outpatient urology appointment.     PT recommending Moderate intensity level of continued care 4x /week and OT recommending moderate intensity level of continued care 3x/week.     His headache pain was not well controlled. Supportive oncology was consulted. They recommended starting a PCA pump with dilaudid. That improved his pain. He was then transitioned to   Dilaudid 1mg IV q4hr PRN for breakthrough pain, morphine IR 30 mg q3hrs PRN  for moderate and severe pain, methadone 5mg po q8hrs scheduled. He tolerated it well. Decadron 4 mg daily for 7 days was started on 12/26 with stop date 1/2.     He had some diarrhea on 12/25 and C. Diff culture was ordered and came back negative. Loose stool also resolved.     Radiation oncology saw patient and said no plans for  emergent RT. We can review his case at tumor board, and have him seen in follow up at  with Dr. Machado.   He was on room air several days prior to discharge.     Medications started for his headache:  -morphine IR 30 mg q3hrs PRN  for moderate and severe pain  -methadone 5mg po q8hrs scheduled  -Decadron 4 mg daily for 7 days; stop date 1/2/25  -butalbital-acetaminophen-caff -40 mg per tablet 1 tablet every 4 hours PRN headache    He was discharged to [facility]. He has extensive follow up appointments as listed above.   KEEP DE [place] [facility] IN UNTIL SEES UROLOGY on 1/2

## 2024-12-16 NOTE — PROGRESS NOTES
Gaurav Mckay is a 54 y.o. male on day 1 of admission presenting with Brain mass.    Transitional Care Coordination Progress Note:  Patient discussed during interdisciplinary rounds.   Team members present: MD & TCC  Plan per Medical/Surgical team: Patient here for brain mass with Mets   Payor: [facility]  Discharge disposition: TBD  Potential Barriers: None  ADOD: 12/20    TCC will continue to follow and update the plan as warranted.    ASHWIN CorreaN-RN  Transitional Care Coordinator (TCC)  [phone] ext [phone]

## 2024-12-16 NOTE — PROGRESS NOTES
[facility]  Progress Note      Patient: Gauarv Mckay  Age/Sex: 54 y.o., male  MRN: [id number]  Date of surgery:   Admit Date: 12/15/2024   Code Status: Full Code  Length of Stay: 1      Interval History/Overnight Events:   NAOEN, in bed sleeping   Stephen draining clear yellow urine, Cr: 0.68 WNL  On heparin gtt     Objective  12/14 1900 - 12/16 0659  In: 88.8 [I.V.:88.8]  Out: 2750 [Urine:2750]    Medications:  Current Facility-Administered Medications   Medication Dose Route Frequency Provider Last Rate Last Admin    albuterol 90 mcg/actuation inhaler 2 puff  2 puff inhalation q6h PRN Jorge L Miguel MD        atorvastatin (Lipitor) tablet 20 mg  20 mg oral Nightly ANASTACIA Ledesma-CNP   20 mg at 12/15/24 2051    cyclobenzaprine (Flexeril) tablet 10 mg  10 mg oral TID PRN Lana Keen APRN-CNP        docusate sodium (Colace) capsule 100 mg  100 mg oral BID Lana Keen APRN-CNP   100 mg at 12/15/24 2050    ferrous sulfate (325 mg ferrous sulfate) tablet 325 mg  65 mg of iron oral BID ANASTACIA Ledesma-CNP   325 mg at 12/15/24 2051    folic acid (Folvite) tablet 1 mg  1 mg oral Daily ANASTACIA Ledesma-ZEYNEP        guaiFENesin (Mucinex) 12 hr tablet 600 mg  600 mg oral BID Lana Keen APRN-CNP   600 mg at 12/15/24 2050    heparin 25,000 Units in dextrose 5% 250 mL (100 Units/mL) infusion (premix)  0-4,000 Units/hr intravenous Continuous JUSTINO Ledesma 17 mL/hr at 12/16/24 0706 1,700 Units/hr at 12/16/24 0706    HYDROmorphone (Dilaudid) injection 0.5 mg  0.5 mg intravenous q4h PRN ANASTACIA Ledesma-CNP   0.5 mg at 12/16/24 0325    HYDROmorphone (Dilaudid) injection 1 mg  1 mg intravenous q4h PRN JUSTINO Ledesma   1 mg at 12/15/24 8840    ipratropium-albuteroL (Duo-Neb) 0.5-2.5 mg/3 mL nebulizer solution 3 mL  3 mL nebulization q2h PRN JUSTINO Ledesma   3 mL at 12/16/24 4727     lidocaine (Xylocaine) 2 % mouth solution 15 mL  15 mL oral Before meals & nightly Lana JUSTINO Powers        lidocaine 4 % patch 1 patch  1 patch transdermal Daily Lana JUSTINO Powers        LORazepam (Ativan) injection 2 mg  2 mg intravenous Once Lana JUSTINO Powers        LORazepam (Ativan) tablet 0.5 mg  0.5 mg oral q2h PRN JUSTINO Ledesma        Or    LORazepam (Ativan) tablet 1 mg  1 mg oral q2h PRN Lana JUSTINO Powers        Or    LORazepam (Ativan) tablet 2 mg  2 mg oral q2h PRN JUSTINO Ledesma        melatonin tablet 3 mg  3 mg oral Nightly PRN JUSTINO Ledesma        mirtazapine (Remeron) tablet 15 mg  15 mg oral Nightly Lana JUSTINO Powers   15 mg at 12/15/24 2051    montelukast (Singulair) tablet 10 mg  10 mg oral Daily JUSTINO Ledesma        multivitamin with minerals 1 tablet  1 tablet oral Daily JUSTINO Ledesma        naloxone (Narcan) injection 0.4 mg  0.4 mg intravenous q5 min PRN JUSTINO Ledesma        nicotine (Nicoderm CQ) 14 mg/24 hr patch 1 patch  1 patch transdermal Daily JUSTINO Ledesma        ondansetron ODT (Zofran-ODT) disintegrating tablet 4 mg  4 mg oral q6h PRN JUSTINO Ledesma        Or    ondansetron (Zofran) injection 4 mg  4 mg intravenous q6h PRN JUSTINO Ledesma        oxygen (O2) therapy   inhalation Continuous - Inhalation JUSTINO Ledesma   3 L/min at 12/15/24 2149    pancrelipase (Lip-Prot-Amyl) (Creon) 6,000-19,000 -30,000 unit per capsule 1 capsule  1 capsule oral BID JUSTINO Ledesma        pantoprazole (ProtoNix) EC tablet 40 mg  40 mg oral Daily before breakfast JUSTINO Ledesma   40 mg at 12/16/24 0611    polyethylene glycol (Glycolax, Miralax) packet 17 g  17 g oral Daily PRN JUSTINO Ledesma        pregabalin  "(Lyrica) capsule 200 mg  200 mg oral TID Lana JUSTINO Powers   200 mg at 12/16/24 0123    rOPINIRole (Requip) tablet 0.5 mg  0.5 mg oral TID Lana ANASTACIA Powers-CNP   0.5 mg at 12/16/24 0124    tamsulosin (Flomax) 24 hr capsule 0.4 mg  0.4 mg oral Daily JUSTINO Ledesma        thiamine (Vitamin B-1) tablet 100 mg  100 mg oral Daily Lana ANASTACIA Powers-CNP        topiramate (Topamax) tablet 100 mg  100 mg oral BID JUSTINO Ledesma   100 mg at 12/15/24 2050     Vitals:    12/15/24 1717 12/15/24 1938 12/16/24 0100 12/16/24 0415   BP:  131/84 133/67 122/73   BP Location:    Left arm   Patient Position:    Lying   Pulse:  97 99 96   Resp:  17  17   Temp:  36.9 °C (98.4 °F)     TempSrc:       SpO2:  97%  99%   Weight:       Height: 1.778 m (5' 10\")                     12.4     5.0>-----<274              39.3   136  100  7                  ----------------<121     3.4  28  0.68          Ca 9.3 Phos 4.4 Mg 1.60       ALT 11 AST 12 AlkPhos 95 tBili 0.3          Physical Exam                                                                                                                      General: adult male, comfortable in bed  Neuro:  no obvious focal deficit, gaze palsy present, diffusely tremulous  Head/Neck: normocephalic, atraumatic, hypophonic voice  ENT: moist mucous membranes  CV: regular rate, normotensive   Pulm: nonlabored breathing on NC, no conversational dyspnea  Abd: soft, nondistended, nontender   : No suprapubic tenderness, no CVA tenderness, anders with clear yellow urine   MSK: [place], no gross deformities  Psych: mood and behavior normal      Imaging  CT chest abdomen pelvis w IV contrast    Result Date: 12/15/2024  STUDY: CT CHEST ABDOMEN PELVIS W IV CONTRAST;  12/15/2024 10:43 pm   INDICATION: Signs/Symptoms:brain mass, bladder tumor, needed for surgical planning.       Per EMR: 54-year-old male history of laryngeal cancer status post radical " neck dissection, PE on AC, transferred from OSH due to incidental finding of new brain mass within the clivus. CT CAP for initial staging.   COMPARISON: CT chest abdomen pelvis and CT soft tissue neck 10/23/2024, CT abdomen pelvis 02/08/2024   ACCESSION NUMBER(S): [id number]   ORDERING CLINICIAN: LOCO HAYWARD   TECHNIQUE: CT of the chest, abdomen, and pelvis was performed.  Contiguous axial images were obtained at 3 mm slice thickness through the chest, abdomen and pelvis. Coronal and sagittal reconstructions at 3 mm slice thickness were performed. 80 ml of contrast Omnipaque 350 were administered intravenously without immediate complication.   FINDINGS: CHEST:   LUNG/PLEURA/LARGE AIRWAYS: There are new peribronchial ground-glass opacities throughout the apical and posterior segments of right upper lobe, medial segment of right middle lobe and posterior basal segments of bilateral lower lobes favored to represent recent aspiration event versus mucous plugging. Linear atelectasis in the bilateral lung bases.   No lung masses or focal consolidations are present. There is no pleural effusion or pneumothorax.   VESSELS: Aorta and pulmonary arteries are normal caliber. Mild atherosclerotic changes are noted of the aorta and branching vessels. Minimal coronary artery calcifications are present. Anatomic variant of two-vessel aortic arch with the left common carotid artery arising from the brachiocephalic trunk.   HEART: The heart is normal in size. There is no pericardial effusion.   MEDIASTINUM AND AYAAN: No mediastinal, hilar or axillary lymphadenopathy is present by CT criteria. The esophagus is patulous and fluid-filled which increases patient's risk for aspiration.   CHEST WALL AND LOWER NECK: Partially imaged right neck dissection with fat attenuating free flap reconstruction. The visualized thyroid gland appears within normal limits.   ABDOMEN:   LIVER: The liver is mildly enlarged in size measuring 21.4 cm in  craniocaudal dimensions without evidence of focal liver lesions. Geographic low-attenuation of the liver is consistent with hepatic steatosis.   BILE DUCTS: The intrahepatic and extrahepatic ducts are not dilated.   GALLBLADDER: The gallbladder is non-distended and without evidence of radiopaque stones.   PANCREAS: The pancreas appears unremarkable without evidence of ductal dilatation or masses.   SPLEEN: The spleen is normal in size measuring 10.3 cm in maximum coronal oblique axis without focal lesions.   ADRENAL GLANDS: Bilateral adrenal glands appear normal.   KIDNEYS AND URETERS: The kidneys are normal in size and enhance symmetrically. No hydroureteronephrosis or nephroureterolithiasis is identified.   PELVIS:   BLADDER: The urinary bladder appears normal without abnormal wall thickening. A Stephen catheter is present within the urinary bladder lumen however the balloon does not appear inflated (series 206, image 68). Intraluminal gas foci is likely secondary to instrumentation.   REPRODUCTIVE ORGANS: The prostate is not enlarged.   BOWEL: Radiopaque contents opacifying the gastric findings likely represent residual oral contrast versus medication tablets. The small and large bowel are normal in appearance without wall thickening or obstruction. The small and large bowel are normal in caliber. Minimal colonic stool burden. The appendix appears normal.   VESSELS: There is no aneurysmal dilatation of the abdominal aorta. The IVC appears normal.   PERITONEUM/RETROPERITONEUM/LYMPH NODES: There is no free or loculated fluid collection, no free intraperitoneal air. The retroperitoneum appears normal. No abdominopelvic lymphadenopathy is present by CT criteria.   BONE AND SOFT TISSUE: No suspicious osseous lesions are identified. Moderate degenerative changes are present throughout the thoracolumbar spine. Stable mild anterior wedging of T10 and T11. The abdominal wall soft tissues appear normal.       1. New  peribronchial ground-glass opacities diffusely throughout the apical and posterior segments of right upper lobe, medial segment of right middle lobe, posterior basal segments of bilateral lower lobes. These findings in the setting of a patulous and fluid-filled esophagus favors recent aspiration event with likely mucous plugging. No discrete solitary pulmonary nodules. 2. Partially imaged right neck dissection with fat attenuating free flap reconstruction. 3. Stephen catheter is present within the urinary bladder lumen however the balloon does not appear inflated. Balloon inflation is recommended to prevent dislodgement. 4. Mild hepatomegaly with geographic low-attenuation consistent with hepatic steatosis. 5. Stable mild anterior wedging of T10 and T11. 6. Additional incidental non-acute findings as detailed above.     I personally reviewed the images/study and I agree with the findings as stated by Dr. Ab Leigh. This study was interpreted at [facility], [place].   MACRO: Critical Finding:  See findings. Notification was initiated on 12/15/2024 at 11:12 pm by  Ab Leigh.  (**-OCF-**) Instructions: .     Dictation workstation:   [id number]    XR chest 1 view    Result Date: 12/15/2024  STUDY: XR CHEST 1 VIEW;  12/15/2024 9:50 pm   INDICATION: Signs/Symptoms:pre-op clearance needed for biopsy of brain mass.     COMPARISON: Chest radiograph dated 11/19/2024, CT chest 12/15/2024   ACCESSION NUMBER(S): [id number]   ORDERING CLINICIAN: LOCO HAYWARD   FINDINGS: AP radiograph of the chest:     CARDIOMEDIASTINAL SILHOUETTE: The cardiomediastinal silhouette is normal in size and configuration.   LUNGS: No consolidation, pleural effusion, or pneumothorax.   ABDOMEN: No remarkable upper abdominal findings.   BONES: No acute osseous abnormality. Surgical clips project over the right lower neck.       1. No acute cardiopulmonary process.     I personally reviewed the images/study and  I agree with the findings as stated by Dr. Ab Leigh. This study was interpreted at [facility], [place], [place].   MACRO: None     Dictation workstation:   [id number]    MR brain w and wo IV contrast    Result Date: 12/14/2024  Interpreted By:  Enrique Mcclain, STUDY: MR BRAIN W AND WO IV CONTRAST;  12/14/2024 11:42 am   INDICATION: Signs/Symptoms:large soft tissue mass from clivus on CTH.   Personal medical history of laryngeal cancer with right neck dissection.     COMPARISON: 10/23/2024 neck CT and 12/13/2024 brain CT, 01/23/2016 brain MR   ACCESSION NUMBER(S): [id number]   ORDERING CLINICIAN: TEDDY GRIFFITH   TECHNIQUE: Axial T2, FLAIR, DWI, gradient echo T2 and sagittal and coronal T1 weighted images of brain were acquired. Post contrast T1 weighted images were acquired after administration of 16 ML Dotarem gadolinium based intravenous contrast.   FINDINGS: The images are degraded by motion artifact, especially the enhanced image sets.   The central right clivus has an enlarging mass 2.6 x 3 cm transverse dimension, increased in size from the neck CT.   Laterally to the right the mass partially surround the distal horizontal and cavernous segments of the right internal carotid artery, extending into the right Meckel's cave.   Laterally to the left the mass abuts the junction of the horizontal and cavernous segment of the left internal carotid.   Posteriorly a thin band of tissue extends through the posterior clival cortex abutting the basilar artery.   Anteriorly, the tumor extends into the sella turcica partially encasing the pituitary. The tumor extends into the larger left sphenoid sinus. (The sphenoid septum deviates to the right posteriorly creating a small right sinus.)   Inferiorly the lesion extends nearly to the most inferior aspect of the clivus with cortical breakthrough of the inferior central right clival cortex.     CSF Spaces: Sulci and ventricles are normal,  unchanged.   Parenchyma: No acute infarct, hemorrhage or intraparenchymal mass is noted.   The white matter has scattered foci of nonspecific FLAIR hyperintensity.   The bety has a triangular focus of hyperintensity in questionable punctate cystic encephalomalacia centrally.   The brain parenchyma enhances normally.   Paranasal Sinuses and Mastoids: Visualized paranasal sinuses and mastoid air cells are unremarkable.       1. The central right clivus has an enlarging tumor as noted above.   2. 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   3. The parenchymal hyperintensities elsewhere are nonspecific and may be secondary to degenerative, chronic microvascular ischemic or other etiologies.   MACRO: None   Signed by: Enrique Mcclain 12/14/2024 2:54 PM Dictation workstation:   [id number]      Assessment & Plan  Gaurav Mckay is a 54 year-old male with a history of AUD c/b peripheral neuropathy, restless leg syndrome, laryngeal cancer (s/p radical neck dissection and radiation), saddle PE (on eliquis), HTN, GERD, COPD, hematuria and bladder mass who was admitted to [facility] for medical optimization in light of upcoming cystoscopy with bladder biopsy/TURBT on 12/17/24 with DR. Murcia. Patient was found to have right new brain tumor concerning for chondroma on MRI and is being transferred to [facility] for further workup. Urology is consulted for evaluation and management recommendations for bladder mass given upcoming procedure scheduled.     Patient had no current urologic complaints and is voiding clear yellow urine through anders. Given his medical comorbidities and difficulty with transportation, it would be ideal to complete his cystoscopic procedure during this admission. However, newly diagnosed brain mass will take precedence.      Recommendations:  -Will coordinate with NSGY for possible bladder biopsy/TURBT at same time as brain biopsy with NSGY.   - Recommend maintaining anders for now  - Agree with continuing to hold eliquis and anticoagulating with heparin gtt given potential operative intervention   - Urology will continue to follow  -Please page urology for any questions or concerns     Assessment and plan discussed with chief resident Dr. Damon and attending Dr Murcia.     Nishi Arango MD, [facility]   [facility]  Adult Urology Pager: [phone]  Pediatric Urology Pager: [phone]

## 2024-12-16 NOTE — PROGRESS NOTES
Physical Therapy                 Therapy Communication Note    Patient Name: Gaurav Mckay  MRN: [id number]  Department: [facility] MRI  Room: 5568/5568-A  Today's Date: 12/16/2024     Discipline: Physical Therapy    PT Missed Visit: Yes     Missed Visit Reason: Missed Visit Reason: Patient in a medical procedure (at MRI. Will follow)    Missed Time: Attempt

## 2024-12-16 NOTE — CONSULTS
Wound Care Consult     Visit Date: 12/16/2024      Patient Name: Gaurav Mckay         MRN: [id number]           YOB: 1970     Reason for Consult: Wounds to face and LL leg        Wound History: Gaurav Mckay is a 54 y.o. male with history of laryngeal cancer status post radical neck dissection and free flap reconstruction, PEs on apixaban, alcohol abuse, hypertension who presented as a transfer from [facility] due to need for urology consult.       Pertinent Labs:   Albumin   Date Value Ref Range Status   12/16/2024 3.2 (L) 3.4 - 5.0 g/dL Final       Wound Assessment:  Wound 12/13/24 Knee Left;Anterior (Active)   Wound Image   12/16/24 1720   Shape Irregular  12/16/24 1720   Wound Length (cm) 2.5 cm 12/16/24 1720   Wound Width (cm) 2 cm 12/16/24 1720   Wound Surface Area (cm^2) 5 cm^2 12/16/24 1720   Wound Depth (cm) 0.2 cm 12/16/24 1720   Wound Volume (cm^3) 1 cm^3 12/16/24 1720   State of Healing Early/partial granulation 12/16/24 1720   Margins Well-defined edges 12/16/24 1720   Drainage Description Serous;Yellow 12/16/24 1720   Drainage Amount Moderate 12/16/24 1720   Dressing Alginate;Silicone border dressing 12/16/24 1720   Dressing Changed New 12/16/24 1720   Dressing Status Clean;Dry 12/16/24 1720       Wound Team Summary Assessment: Wound to Lateral thigh. Patient states this is from a skin graft. Wound bed pink with macerated edges. Wound was very moist due to zeroform gauze. Wound cleansed with Vashe.   Recommendations:  - Left lateral thigh, Clean with Vashe, Apply Aquacel and cover with Mepilex. Change daily.     Wound Team Plan: Wound team will not continue to follow. Please re consult for worsening of wounds.     SUZANNE GERHARDT, RN, BSN, CWOCN  12/16/2024  5:57 PM

## 2024-12-17 ENCOUNTER — APPOINTMENT (OUTPATIENT)
Dept: RADIOLOGY | Facility: HOSPITAL | Age: 54
End: 2024-12-17
Payer: COMMERCIAL

## 2024-12-17 ENCOUNTER — ANESTHESIA (OUTPATIENT)
Dept: OPERATING ROOM | Facility: HOSPITAL | Age: 54
End: 2024-12-17
Payer: COMMERCIAL

## 2024-12-17 LAB
ALBUMIN SERPL BCP-MCNC: 3.4 G/DL (ref 3.4–5)
ANION GAP SERPL CALC-SCNC: 13 MMOL/L (ref 10–20)
BUN SERPL-MCNC: 6 MG/DL (ref 6–23)
CALCIUM SERPL-MCNC: 9.7 MG/DL (ref 8.6–10.6)
CHLORIDE SERPL-SCNC: 100 MMOL/L (ref 98–107)
CO2 SERPL-SCNC: 27 MMOL/L (ref 21–32)
CREAT SERPL-MCNC: 0.58 MG/DL (ref 0.5–1.3)
EGFRCR SERPLBLD CKD-EPI 2021: >90 ML/MIN/1.73M*2
ERYTHROCYTE [DISTWIDTH] IN BLOOD BY AUTOMATED COUNT: 18.1 % (ref 11.5–14.5)
GLUCOSE BLD MANUAL STRIP-MCNC: 121 MG/DL (ref 74–99)
GLUCOSE BLD MANUAL STRIP-MCNC: 125 MG/DL (ref 74–99)
GLUCOSE SERPL-MCNC: 113 MG/DL (ref 74–99)
HCT VFR BLD AUTO: 37.2 % (ref 41–52)
HGB BLD-MCNC: 11.7 G/DL (ref 13.5–17.5)
MAGNESIUM SERPL-MCNC: 2.02 MG/DL (ref 1.6–2.4)
MCH RBC QN AUTO: 28.1 PG (ref 26–34)
MCHC RBC AUTO-ENTMCNC: 31.5 G/DL (ref 32–36)
MCV RBC AUTO: 89 FL (ref 80–100)
NRBC BLD-RTO: 0 /100 WBCS (ref 0–0)
PHOSPHATE SERPL-MCNC: 3.7 MG/DL (ref 2.5–4.9)
PLATELET # BLD AUTO: 321 X10*3/UL (ref 150–450)
POTASSIUM SERPL-SCNC: 3.8 MMOL/L (ref 3.5–5.3)
RBC # BLD AUTO: 4.16 X10*6/UL (ref 4.5–5.9)
SODIUM SERPL-SCNC: 136 MMOL/L (ref 136–145)
UFH PPP CHRO-ACNC: 0.4 IU/ML
UFH PPP CHRO-ACNC: 0.6 IU/ML
WBC # BLD AUTO: 5.7 X10*3/UL (ref 4.4–11.3)

## 2024-12-17 PROCEDURE — S4991 NICOTINE PATCH NONLEGEND: HCPCS | Performed by: NURSE PRACTITIONER

## 2024-12-17 PROCEDURE — 74230 X-RAY XM SWLNG FUNCJ C+: CPT | Performed by: RADIOLOGY

## 2024-12-17 PROCEDURE — 36415 COLL VENOUS BLD VENIPUNCTURE: CPT | Performed by: NURSE PRACTITIONER

## 2024-12-17 PROCEDURE — 70498 CT ANGIOGRAPHY NECK: CPT | Performed by: RADIOLOGY

## 2024-12-17 PROCEDURE — 70486 CT MAXILLOFACIAL W/O DYE: CPT | Performed by: RADIOLOGY

## 2024-12-17 PROCEDURE — 2500000005 HC RX 250 GENERAL PHARMACY W/O HCPCS: Performed by: INTERNAL MEDICINE

## 2024-12-17 PROCEDURE — 2500000001 HC RX 250 WO HCPCS SELF ADMINISTERED DRUGS (ALT 637 FOR MEDICARE OP): Performed by: PODIATRIST

## 2024-12-17 PROCEDURE — 1100000001 HC PRIVATE ROOM DAILY

## 2024-12-17 PROCEDURE — 74230 X-RAY XM SWLNG FUNCJ C+: CPT

## 2024-12-17 PROCEDURE — 97162 PT EVAL MOD COMPLEX 30 MIN: CPT | Mod: GP | Performed by: PHYSICAL THERAPIST

## 2024-12-17 PROCEDURE — 97165 OT EVAL LOW COMPLEX 30 MIN: CPT | Mod: GO

## 2024-12-17 PROCEDURE — 99233 SBSQ HOSP IP/OBS HIGH 50: CPT | Performed by: PODIATRIST

## 2024-12-17 PROCEDURE — 92611 MOTION FLUOROSCOPY/SWALLOW: CPT | Mod: GN | Performed by: SPEECH-LANGUAGE PATHOLOGIST

## 2024-12-17 PROCEDURE — 92610 EVALUATE SWALLOWING FUNCTION: CPT | Mod: GN | Performed by: SPEECH-LANGUAGE PATHOLOGIST

## 2024-12-17 PROCEDURE — 70486 CT MAXILLOFACIAL W/O DYE: CPT

## 2024-12-17 PROCEDURE — 2500000004 HC RX 250 GENERAL PHARMACY W/ HCPCS (ALT 636 FOR OP/ED): Performed by: STUDENT IN AN ORGANIZED HEALTH CARE EDUCATION/TRAINING PROGRAM

## 2024-12-17 PROCEDURE — 2500000004 HC RX 250 GENERAL PHARMACY W/ HCPCS (ALT 636 FOR OP/ED): Performed by: INTERNAL MEDICINE

## 2024-12-17 PROCEDURE — 92526 ORAL FUNCTION THERAPY: CPT | Mod: GN | Performed by: SPEECH-LANGUAGE PATHOLOGIST

## 2024-12-17 PROCEDURE — 82947 ASSAY GLUCOSE BLOOD QUANT: CPT

## 2024-12-17 PROCEDURE — 70498 CT ANGIOGRAPHY NECK: CPT

## 2024-12-17 PROCEDURE — 2500000005 HC RX 250 GENERAL PHARMACY W/O HCPCS: Performed by: NURSE PRACTITIONER

## 2024-12-17 PROCEDURE — 80069 RENAL FUNCTION PANEL: CPT | Performed by: PODIATRIST

## 2024-12-17 PROCEDURE — 85520 HEPARIN ASSAY: CPT | Performed by: NURSE PRACTITIONER

## 2024-12-17 PROCEDURE — 2500000004 HC RX 250 GENERAL PHARMACY W/ HCPCS (ALT 636 FOR OP/ED): Performed by: NURSE PRACTITIONER

## 2024-12-17 PROCEDURE — 85027 COMPLETE CBC AUTOMATED: CPT | Performed by: PODIATRIST

## 2024-12-17 PROCEDURE — 2550000001 HC RX 255 CONTRASTS: Performed by: INTERNAL MEDICINE

## 2024-12-17 PROCEDURE — 83735 ASSAY OF MAGNESIUM: CPT | Performed by: PODIATRIST

## 2024-12-17 PROCEDURE — 2500000001 HC RX 250 WO HCPCS SELF ADMINISTERED DRUGS (ALT 637 FOR MEDICARE OP): Performed by: NURSE PRACTITIONER

## 2024-12-17 PROCEDURE — 2500000002 HC RX 250 W HCPCS SELF ADMINISTERED DRUGS (ALT 637 FOR MEDICARE OP, ALT 636 FOR OP/ED): Performed by: NURSE PRACTITIONER

## 2024-12-17 PROCEDURE — 70496 CT ANGIOGRAPHY HEAD: CPT | Performed by: RADIOLOGY

## 2024-12-17 RX ORDER — DEXTROSE 50 % IN WATER (D50W) INTRAVENOUS SYRINGE
12.5
Status: DISCONTINUED | OUTPATIENT
Start: 2024-12-17 | End: 2024-12-26 | Stop reason: HOSPADM

## 2024-12-17 RX ORDER — DEXTROSE 50 % IN WATER (D50W) INTRAVENOUS SYRINGE
25
Status: DISCONTINUED | OUTPATIENT
Start: 2024-12-17 | End: 2024-12-26 | Stop reason: HOSPADM

## 2024-12-17 RX ADMIN — BARIUM SULFATE 15 ML: 400 PASTE ORAL at 13:22

## 2024-12-17 RX ADMIN — HYDROMORPHONE HYDROCHLORIDE 1 MG: 1 INJECTION, SOLUTION INTRAMUSCULAR; INTRAVENOUS; SUBCUTANEOUS at 13:33

## 2024-12-17 RX ADMIN — ROPINIROLE 0.5 MG: 0.5 TABLET, FILM COATED ORAL at 21:22

## 2024-12-17 RX ADMIN — ATORVASTATIN CALCIUM 20 MG: 20 TABLET, FILM COATED ORAL at 21:22

## 2024-12-17 RX ADMIN — HYDROMORPHONE HYDROCHLORIDE 0.2 MG: 1 INJECTION, SOLUTION INTRAMUSCULAR; INTRAVENOUS; SUBCUTANEOUS at 00:11

## 2024-12-17 RX ADMIN — BARIUM SULFATE 25 ML: 400 SUSPENSION ORAL at 13:22

## 2024-12-17 RX ADMIN — HYDROMORPHONE HYDROCHLORIDE 1 MG: 1 INJECTION, SOLUTION INTRAMUSCULAR; INTRAVENOUS; SUBCUTANEOUS at 09:05

## 2024-12-17 RX ADMIN — FERROUS SULFATE TAB 325 MG (65 MG ELEMENTAL FE) 325 MG: 325 (65 FE) TAB at 21:22

## 2024-12-17 RX ADMIN — HEPARIN SODIUM 1500 UNITS/HR: 10000 INJECTION, SOLUTION INTRAVENOUS at 21:27

## 2024-12-17 RX ADMIN — KETOROLAC TROMETHAMINE 15 MG: 15 INJECTION, SOLUTION INTRAMUSCULAR; INTRAVENOUS at 07:08

## 2024-12-17 RX ADMIN — IOHEXOL 90 ML: 350 INJECTION, SOLUTION INTRAVENOUS at 20:04

## 2024-12-17 RX ADMIN — BARIUM SULFATE 10 ML: 400 SUSPENSION ORAL at 13:21

## 2024-12-17 RX ADMIN — KETOROLAC TROMETHAMINE 15 MG: 15 INJECTION, SOLUTION INTRAMUSCULAR; INTRAVENOUS at 17:00

## 2024-12-17 RX ADMIN — MIRTAZAPINE 15 MG: 15 TABLET, FILM COATED ORAL at 21:22

## 2024-12-17 RX ADMIN — Medication 3 L/MIN: at 21:55

## 2024-12-17 RX ADMIN — HYDROMORPHONE HYDROCHLORIDE 1 MG: 1 INJECTION, SOLUTION INTRAMUSCULAR; INTRAVENOUS; SUBCUTANEOUS at 04:09

## 2024-12-17 RX ADMIN — BARIUM SULFATE 20 ML: 400 SUSPENSION ORAL at 13:21

## 2024-12-17 RX ADMIN — HEPARIN SODIUM 1500 UNITS/HR: 10000 INJECTION, SOLUTION INTRAVENOUS at 04:12

## 2024-12-17 RX ADMIN — TOPIRAMATE 100 MG: 100 TABLET, FILM COATED ORAL at 21:22

## 2024-12-17 RX ADMIN — PREGABALIN 200 MG: 100 CAPSULE ORAL at 21:22

## 2024-12-17 RX ADMIN — LIDOCAINE 4% 1 PATCH: 40 PATCH TOPICAL at 09:06

## 2024-12-17 RX ADMIN — HYDROMORPHONE HYDROCHLORIDE 1 MG: 1 INJECTION, SOLUTION INTRAMUSCULAR; INTRAVENOUS; SUBCUTANEOUS at 21:08

## 2024-12-17 RX ADMIN — Medication 1 PATCH: at 09:29

## 2024-12-17 ASSESSMENT — COGNITIVE AND FUNCTIONAL STATUS - GENERAL
TOILETING: A LOT
MOVING TO AND FROM BED TO CHAIR: A LOT
PERSONAL GROOMING: A LITTLE
WALKING IN HOSPITAL ROOM: A LOT
MOVING TO AND FROM BED TO CHAIR: A LOT
CLIMB 3 TO 5 STEPS WITH RAILING: TOTAL
CLIMB 3 TO 5 STEPS WITH RAILING: TOTAL
TOILETING: A LITTLE
MOVING FROM LYING ON BACK TO SITTING ON SIDE OF FLAT BED WITH BEDRAILS: A LITTLE
TURNING FROM BACK TO SIDE WHILE IN FLAT BAD: A LITTLE
PERSONAL GROOMING: A LITTLE
MOBILITY SCORE: 15
DAILY ACTIVITIY SCORE: 18
DAILY ACTIVITIY SCORE: 18
DAILY ACTIVITIY SCORE: 16
WALKING IN HOSPITAL ROOM: A LOT
CLIMB 3 TO 5 STEPS WITH RAILING: TOTAL
WALKING IN HOSPITAL ROOM: A LOT
DRESSING REGULAR LOWER BODY CLOTHING: A LOT
MOBILITY SCORE: 13
MOBILITY SCORE: 13
DRESSING REGULAR UPPER BODY CLOTHING: A LITTLE
DRESSING REGULAR LOWER BODY CLOTHING: A LOT
HELP NEEDED FOR BATHING: A LOT
MOVING TO AND FROM BED TO CHAIR: A LOT
STANDING UP FROM CHAIR USING ARMS: A LOT
HELP NEEDED FOR BATHING: A LITTLE
HELP NEEDED FOR BATHING: A LITTLE
MOVING FROM LYING ON BACK TO SITTING ON SIDE OF FLAT BED WITH BEDRAILS: A LITTLE
DRESSING REGULAR UPPER BODY CLOTHING: A LITTLE
TURNING FROM BACK TO SIDE WHILE IN FLAT BAD: A LITTLE
DRESSING REGULAR LOWER BODY CLOTHING: A LOT
TOILETING: A LITTLE
STANDING UP FROM CHAIR USING ARMS: A LOT
PERSONAL GROOMING: A LITTLE
DRESSING REGULAR UPPER BODY CLOTHING: A LITTLE
STANDING UP FROM CHAIR USING ARMS: A LOT

## 2024-12-17 ASSESSMENT — LIFESTYLE VARIABLES
TOTAL SCORE: 4
AGITATION: NORMAL ACTIVITY
PAROXYSMAL SWEATS: NO SWEAT VISIBLE
ANXIETY: NO ANXIETY, AT EASE
TREMOR: NOT VISIBLE, BUT CAN BE FELT FINGERTIP TO FINGERTIP
NAUSEA AND VOMITING: NO NAUSEA AND NO VOMITING
PAROXYSMAL SWEATS: NO SWEAT VISIBLE
NAUSEA AND VOMITING: NO NAUSEA AND NO VOMITING
AUDITORY DISTURBANCES: NOT PRESENT
VISUAL DISTURBANCES: NOT PRESENT
AUDITORY DISTURBANCES: NOT PRESENT
ANXIETY: NO ANXIETY, AT EASE
ANXIETY: NO ANXIETY, AT EASE
VISUAL DISTURBANCES: NOT PRESENT
AGITATION: NORMAL ACTIVITY
TREMOR: NOT VISIBLE, BUT CAN BE FELT FINGERTIP TO FINGERTIP
ORIENTATION AND CLOUDING OF SENSORIUM: ORIENTED AND CAN DO SERIAL ADDITIONS
HEADACHE, FULLNESS IN HEAD: MODERATE
HEADACHE, FULLNESS IN HEAD: VERY MILD
ORIENTATION AND CLOUDING OF SENSORIUM: ORIENTED AND CAN DO SERIAL ADDITIONS
VISUAL DISTURBANCES: NOT PRESENT
TOTAL SCORE: 2
PAROXYSMAL SWEATS: NO SWEAT VISIBLE
NAUSEA AND VOMITING: NO NAUSEA AND NO VOMITING
AGITATION: NORMAL ACTIVITY
TOTAL SCORE: 1
TREMOR: NOT VISIBLE, BUT CAN BE FELT FINGERTIP TO FINGERTIP
AUDITORY DISTURBANCES: NOT PRESENT
ORIENTATION AND CLOUDING OF SENSORIUM: ORIENTED AND CAN DO SERIAL ADDITIONS
HEADACHE, FULLNESS IN HEAD: NOT PRESENT

## 2024-12-17 ASSESSMENT — ACTIVITIES OF DAILY LIVING (ADL)
ADL_ASSISTANCE: INDEPENDENT
ADL_ASSISTANCE: INDEPENDENT
BATHING_ASSISTANCE: MODERATE

## 2024-12-17 ASSESSMENT — PAIN SCALES - GENERAL
PAINLEVEL_OUTOF10: 10 - WORST POSSIBLE PAIN
PAINLEVEL_OUTOF10: 8
PAINLEVEL_OUTOF10: 10 - WORST POSSIBLE PAIN
PAINLEVEL_OUTOF10: 8
PAINLEVEL_OUTOF10: 9
PAINLEVEL_OUTOF10: 10 - WORST POSSIBLE PAIN
PAINLEVEL_OUTOF10: 10 - WORST POSSIBLE PAIN

## 2024-12-17 ASSESSMENT — PAIN - FUNCTIONAL ASSESSMENT
PAIN_FUNCTIONAL_ASSESSMENT: 0-10

## 2024-12-17 ASSESSMENT — PAIN DESCRIPTION - ORIENTATION: ORIENTATION: POSTERIOR

## 2024-12-17 ASSESSMENT — PAIN DESCRIPTION - LOCATION
LOCATION: HEAD

## 2024-12-17 NOTE — CARE PLAN
The patient's goals for the shift include      The clinical goals for the shift include Pt will remain safe and free from falls and injury through shift    Over the shift, the patient did not make progress toward the following goals. Barriers to progression include weakness and dizziness. Recommendations to address these barriers include bed alarm.

## 2024-12-17 NOTE — PROGRESS NOTES
Occupational Therapy    Evaluation    Patient Name: Gaurav Mckay  MRN: [id number]  Today's Date: 12/17/2024  Room: 55[facility]5568-A  Time Calculation  Start Time: 1009  Stop Time: 1023  Time Calculation (min): 14 min    Assessment  IP OT Assessment  OT Assessment: pt demo good tolerance to engage in therapy session. Pt required Min-Mod A for ADL tasks and functional transfers/mobility this date. Pt limited by pain, dizziness, fatigue, decreased endurance, strength, activity tolerance, and balance. Pt would benefit from continued OT to address these deficits.  Prognosis: Good  Barriers to Discharge Home: Physical needs  Physical Needs: Stair navigation into home limited by function/safety, Ambulating household distances limited by function/safety, Intermittent ADL assistance needed  Evaluation/Treatment Tolerance: Patient limited by pain, Patient limited by fatigue  Medical Staff Made Aware: Yes  End of Session Communication: Bedside nurse  End of Session Patient Position: Bed, 3 rail up, Alarm on  Plan:  Inpatient Plan  Treatment Interventions: ADL retraining, Functional transfer training, Endurance training, Patient/family training, Equipment evaluation/education, Compensatory technique education  OT Frequency: 3 times per week  OT Discharge Recommendations: Moderate intensity level of continued care  Equipment Recommended upon Discharge:  (TBD at next level of care)  OT Recommended Transfer Status: Moderate assist, Assist of 2  OT - OK to Discharge: Yes  OT Assessment  OT Assessment Results: Decreased ADL status, Decreased endurance, Decreased functional mobility, Decreased gross motor control, Decreased IADLs  Prognosis: Good  Evaluation/Treatment Tolerance: Patient limited by pain, Patient limited by fatigue  Medical Staff Made Aware: Yes  Strengths: Attitude of self, Ability to acquire knowledge  Barriers to Participation: Comorbidities, Coping skills    Subjective   Current Problem:  1. Brain mass  Case Request  "Operating Room: Nasal Endoscopy with Navigation    Case Request Operating Room: Nasal Endoscopy with Navigation        General:  Reason for Referral: urology consult as well as neurosurgery consult due to incidental finding of brain mass. Initial plan was  scheduled cystoscopy with ablation of tissue on 12/17/24 secondary to a bladder tumor.  Past Medical History Relevant to Rehab: PMH of laryngeal cancer s/p radical neck dissection, PE on Apixaban, alcohol abuse, and HTN  Co-Treatment: PT  Co-Treatment Reason: to maximize safety and functional mobility while focusing on discipline specific goals  Prior to Session Communication: Bedside nurse  Patient Position Received: Bed, 3 rail up, Alarm on  Family/Caregiver Present: No  General Comment: Pt supine in bed, endorses HA, diplopia but pleasant and agreeable to participate in session. Becoming tearful at end of session stating \"why are they torturing me.\"   Precautions:  Medical Precautions: Fall precautions (CIWA)    Pain:  Pain Assessment  Pain Assessment: 0-10  0-10 (Numeric) Pain Score: 10 - Worst possible pain  Pain Type: Acute pain  Pain Location: Head  Pain Interventions: Repositioned  Response to Interventions:  (best at rest)      Objective   Cognition:  Overall Cognitive Status: Within Functional Limits  Arousal/Alertness: Appropriate responses to stimuli  Orientation Level: Oriented X4  Following Commands: Follows all commands and directions without difficulty  Safety/Judgement: Exceptions to WFL  Insight: Mild  Impulsive: Mildly (cues for safety)  Processing Speed: Within funtional limits           Home Living:  Type of Home: Mobile home  Lives With: Spouse  Home Adaptive Equipment: Walker rolling or standard  Home Layout: One level  Home Access: Stairs to enter with rails  Entrance Stairs-Number of Steps: 3  Bathroom Shower/Tub: Tub/shower unit  Bathroom Toilet: Standard  Bathroom Equipment: None   Prior Function:  Level of [place]: Independent with " ADLs and functional transfers, Independent with homemaking with ambulation  ADL Assistance: Independent  Homemaking Assistance: Independent (shared w spouse)  Ambulatory Assistance: Independent (w rollator)  Vocational: Unemployed  Leisure: appbackr  Hand Dominance: Right  Prior Function Comments: - driving, endorses daily falls  IADL History:  Homemaking Responsibilities: Yes (shares w spouse)  Meal Prep Responsibility: Primary  Laundry Responsibility: Primary  Cleaning Responsibility: Primary  Bill Paying/Finance Responsibility: Primary  Shopping Responsibility: Primary  Current License: No  Occupation: Unemployed  Leisure and Hobbies: appbackr  ADL:  Eating Assistance: Independent  Grooming Assistance: Stand by (anticipated)  Grooming Deficit: Supervision/safety  Bathing Assistance: Moderate (anticipated)  Bathing Deficit: Supervision/safety, Increased time to complete , Steadying  UE Dressing Assistance: Minimal (anticipated)  UE Dressing Deficit: Supervision/safety, Increased time to complete, Pull around back  LE Dressing Assistance: Moderate (Mod A sock mgmt seated at EOB)  LE Dressing Deficit: Supervision/safety, Increased time to complete  Toileting Assistance with Device: Moderate (anticipated)  Toileting Deficit: Supervison/safety, Increased time to complete  Activity Tolerance:  Endurance: Decreased tolerance for upright activites    Bed Mobility/Transfers: Bed Mobility  Bed Mobility: Yes  Bed Mobility 1  Bed Mobility 1: Supine to sitting  Level of Assistance 1: Minimum assistance  Bed Mobility Comments 1: HOB elevated  Bed Mobility 2  Bed Mobility  2: Sitting to supine  Level of Assistance 2: Moderate assistance  Bed Mobility Comments 2: Mod A to swing BLEs over EOB  Functional Mobility  Functional Mobility Performed:  (pt able to take 1 side step w FWW w Mod A for balance and safety)   and Transfers  Transfer: Yes  Transfer 1  Transfer From 1: Sit to, Stand to  Transfer to 1: Stand,  Sit  Technique 1: Sit to stand, Stand to sit  Transfer Device 1: Walker  Transfer Level of Assistance 1: Moderate assistance, +2  Trials/Comments 1: Mod Ax2 sit<>Stand w FWW; assist for balance and safety  IADL's:   Homemaking Responsibilities: Yes (shares w spouse)  Meal Prep Responsibility: Primary  Laundry Responsibility: Primary  Cleaning Responsibility: Primary  Bill Paying/Finance Responsibility: Primary  Shopping Responsibility: Primary  Current License: No  Occupation: Unemployed  Leisure and Hobbies: builds houses  Vision: Vision - Basic Assessment  Current Vision:  (pt c/o blurry vision, double vision throughout session. Pt favored having eyes closed, Min verbal cues to open eyes to attend to tasks.)    Sensation:  Light Touch:  (c/o numbness in R foot/ankle)  Strength:  Strength Comments: LUE 5/5; RUE 4/5  Perception:  Inattention/Neglect: Appears intact  Coordination:  Movements are Fluid and Coordinated: Yes   Hand Function:  Hand Function  Gross Grasp: Functional  Coordination: Functional  Extremities: RUE   RUE : Within Functional Limits (4/5 when formally assessed), LUE   LUE: Within Functional Limits (5/5 when formally assessed)     Outcome Measures: [facility] Daily Activity  Putting on and taking off regular lower body clothing: A lot  Bathing (including washing, rinsing, drying): A lot  Putting on and taking off regular upper body clothing: A little  Toileting, which includes using toilet, bedpan or urinal: A lot  Taking care of personal grooming such as brushing teeth: A little  Eating Meals: None  Daily Activity - Total Score: 16    OT Adult Other Outcome Measures  4AT: negative    Education Documentation  Body Mechanics, taught by Nga Coto OT at 12/17/2024  1:56 PM.  Learner: Patient  Readiness: Acceptance  Method: Explanation  Response: Verbalizes Understanding    Precautions, taught by Nga Coto OT at 12/17/2024  1:56 PM.  Learner: Patient  Readiness: Acceptance  Method:  Explanation  Response: Verbalizes Understanding    ADL Training, taught by Jorje Coto OT at 12/17/2024  1:56 PM.  Learner: Patient  Readiness: Acceptance  Method: Explanation  Response: Verbalizes Understanding    Education Comments  No comments found.        Goals:     Encounter Problems       Encounter Problems (Active)       ADLs       Patient with complete upper body dressing with stand by assist level of assistance donning and doffing all UE clothes w PRN adaptive equipment while edge of bed. (Progressing)       Start:  12/17/24    Expected End:  01/07/25            Patient with complete lower body dressing with stand by assist level of assistance donning and doffing all LE clothes  with PRN adaptive equipment while edge of bed. (Progressing)       Start:  12/17/24    Expected End:  01/07/25            Patient will complete daily grooming tasks brushing teeth and washing face/hair with supervision level of assistance and PRN adaptive equipment while edge of bed and/or standing at sink. (Progressing)       Start:  12/17/24    Expected End:  01/07/25               BALANCE       Patient will tolerate standing for 8 minutes to contact guard assist level of assistance with least restrictive device in order to improve functional activity tolerance for ADL tasks. (Progressing)       Start:  12/17/24    Expected End:  01/07/25               MOBILITY       Patient will perform Functional mobility mod  Household distances/Community Distances with contact guard assist level of assistance and least restrictive device in order to improve safety and functional mobility. (Progressing)       Start:  12/17/24    Expected End:  01/07/25 12/17/24 at 1:58 PM   JORJE COTO OT   Rehab Office: [phone]

## 2024-12-17 NOTE — PROGRESS NOTES
Physical Therapy    Physical Therapy Evaluation    Patient Name: Gaurav Mckay  MRN: [id number]  Today's Date: 12/17/2024   Time Calculation  Start Time: 1005  Stop Time: 1023  Time Calculation (min): 18 min  5568/5568-A    Assessment/Plan   PT Assessment  PT Assessment Results: Decreased strength, Decreased endurance, Impaired balance, Decreased mobility, Decreased coordination, Pain  Rehab Prognosis: Fair  Barriers to Discharge Home: Physical needs  Evaluation/Treatment Tolerance: Patient limited by fatigue (limited by dizziness/ diplopia)  Medical Staff Made Aware: Yes  Barriers to Participation:  (medical acuity)  End of Session Communication: Bedside nurse  Assessment Comment: Pt. is a 53 y/o M admitted for workup of brain/bladder mass. Pt. presents with dizziness, weakness, impaired coordination, decreased endurance, and difficulty with functional mobility compared to baseline. Pt. would benefit from skilled PT while IP to address these deficits.  End of Session Patient Position: Bed, 3 rail up, Alarm on  IP OR SWING BED PT PLAN  Inpatient or Swing Bed: Inpatient  PT Plan  Treatment/Interventions: Bed mobility, Transfer training, Gait training, Stair training, Balance training, Endurance training, Therapeutic exercise, Therapeutic activity, Home exercise program  PT Plan: Ongoing PT  PT Frequency: 4 times per week  PT Discharge Recommendations: Moderate intensity level of continued care  PT Recommended Transfer Status: Assist x2  PT - OK to Discharge: Yes (PT evaluation complete and rehab recommendations made)    Subjective     General Visit Information:  General  Reason for Referral: urology consult as well as neurosurgery consult due to incidental finding of brain mass. Initial plan was  scheduled cystoscopy with ablation of tissue on 12/17/24 secondary to a bladder tumor.  Past Medical History Relevant to Rehab: PMH of laryngeal cancer s/p radical neck dissection, PE on Apixaban, alcohol abuse, and  "HTN  Family/Caregiver Present: No  Co-Treatment: OT  Co-Treatment Reason: to maximize safety and functional mobility while focusing on discipline specific goals  Prior to Session Communication: Bedside nurse  Patient Position Received: Bed, 3 rail up, Alarm on  General Comment: Pt. supine in bed, endorses HA, diplopia but pleasant and agreeable to participate in session. Becoming tearful at times stating \"how are the doctors going to help me\"    Home Living:  Home Living  Type of Home: Mobile home  Lives With: Spouse  Home Adaptive Equipment: Walker rolling or standard  Home Layout: One level  Home Access: Stairs to enter with rails  Entrance Stairs-Number of Steps: 3    Prior Level of Function:  Prior Function Per Pt/Caregiver Report  Level of [place]: Independent with ADLs and functional transfers, Independent with homemaking with ambulation  ADL Assistance: Independent  Homemaking Assistance:  (spouse shares household chores)  Ambulatory Assistance: Independent (with RW)  Prior Function Comments: - driving, endorses daily falls    Precautions:  Precautions  Medical Precautions: Fall precautions (CIWA)    Vital Signs:  Vital Signs  Vitals Session: During PT  Heart Rate: 79 (standingL 87)  Heart Rate Source: Monitor  SpO2: 98 %  BP:  (supine: 137/65 sittin/83 standin/73 +dizziness in standing,)  BP Location: Right arm  BP Method: Automatic  Patient Position:  (orthostatics)  Objective     Pain:  Pain Assessment  Pain Assessment: 0-10  0-10 (Numeric) Pain Score: 10 - Worst possible pain  Pain Type: Acute pain  Pain Location: Head  Pain Interventions: Repositioned    Cognition:  Cognition  Overall Cognitive Status: Within Functional Limits  Orientation Level: Oriented X4  Safety/Judgement: Exceptions to WFL  Insight: Mild  Impulsive: Mildly (required increased cues for safety)    General Assessments:      Activity Tolerance  Endurance: Decreased tolerance for upright activites  Sensation  Light " Touch:  (endorses numbness R foot/ankle secondary to previous orthopedic injury)  Strength  Strength Comments: grossly 4/5 throughout BLE's with exception of hip flexion 4-/5        Postural Control  Postural Control: Impaired  Static Sitting Balance  Static Sitting-Balance Support: Feet supported, Bilateral upper extremity supported  Static Sitting-Level of Assistance: Close supervision  Dynamic Sitting Balance  Dynamic Sitting-Balance Support: Feet supported  Dynamic Sitting-Level of Assistance: Contact guard  Static Standing Balance  Static Standing-Balance Support: Bilateral upper extremity supported (with RW)  Static Standing-Level of Assistance: Minimum assistance  Static Standing-Comment/Number of Minutes: limited standing tolerance secondary to dizziness/diplopia  Dynamic Standing Balance  Dynamic Standing-Balance Support: Bilateral upper extremity supported (with RW)  Dynamic Standing-Level of Assistance: Minimum assistance    Functional Assessments:     Bed Mobility  Bed Mobility: Yes  Bed Mobility 1  Bed Mobility 1: Supine to sitting  Level of Assistance 1: Minimum assistance  Bed Mobility Comments 1: HOB elevated  Bed Mobility 2  Bed Mobility  2: Sitting to supine  Level of Assistance 2: Moderate assistance  Bed Mobility Comments 2: increased A to clear BLE's back into bed secondary to fatigue.  Transfers  Transfer: Yes  Transfer 1  Technique 1: Sit to stand, Stand to sit  Transfer Device 1: Walker  Transfer Level of Assistance 1: Moderate assistance, +2  Trials/Comments 1: increased trunk flexion, cues for upright posture.  Ambulation/Gait Training  Ambulation/Gait Training Performed: Yes  Ambulation/Gait Training 1  Surface 1: Level tile  Device 1: Rolling walker  Assistance 1: Minimum assistance  Quality of Gait 1: Inconsistent stride length, Shuffling gait, Forward flexed posture (Pt. took 2 short shuffling steps toward HOB. +unsteadiness. Further ambulation deferred secondary to dizziness, pain and  poor functional endurance.)  Comments/Distance (ft) 1: 2 side steps  Stairs  Stairs: No       Extremity/Trunk Assessments:        RLE   RLE : Within Functional Limits  LLE   LLE : Within Functional Limits    Outcome Measures:     [facility] Basic Mobility  Turning from your back to your side while in a flat bed without using bedrails: A little  Moving from lying on your back to sitting on the side of a flat bed without using bedrails: A little  Moving to and from bed to chair (including a wheelchair): A lot  Standing up from a chair using your arms (e.g. wheelchair or bedside chair): A lot  To walk in hospital room: A lot  Climbing 3-5 steps with railing: Total  Basic Mobility - Total Score: 13                                                             Goals:  Encounter Problems       Encounter Problems (Active)       PT Problem       Pt. will perform transfers with CGA with LRD        Start:  12/17/24    Expected End:  12/31/24            Pt. will ambulate > 50 ft. with CGA with LRD to safely perform short household ambulation        Start:  12/17/24    Expected End:  12/31/24            Pt will ascend/descend 3 steps with B handrails with min A to safely enter/exit the home set up        Start:  12/17/24    Expected End:  12/31/24            Pt. will require CGA for static.dynamic standing balance to safely participate in ADLs        Start:  12/17/24    Expected End:  12/31/24               Pain - Adult            Education Documentation  Body Mechanics, taught by Denise Brooks, PT at 12/17/2024 11:54 AM.  Learner: Patient  Readiness: Acceptance  Method: Explanation  Response: Verbalizes Understanding, Needs Reinforcement    Precautions, taught by Denise Brooks PT at 12/17/2024 11:54 AM.  Learner: Patient  Readiness: Acceptance  Method: Explanation  Response: Verbalizes Understanding, Needs Reinforcement    Mobility Training, taught by Denise Brooks, PT at 12/17/2024 11:54 AM.  Learner:  Patient  Readiness: Acceptance  Method: Explanation  Response: Verbalizes Understanding, Needs Reinforcement    Education Comments  No comments found.

## 2024-12-17 NOTE — PROCEDURES
Speech-Language Pathology    Inpatient Modified Barium Swallow Study    Patient Name: Gaurav Mckay  MRN: [id number]  : 1970  Today's Date: 24  Time Calculation  Start Time: 1135  Stop Time: 1202  Time Calculation (min): 27 min        Modified Barium Swallow Study completed. Informed verbal consent obtained prior to completion of exam. Trials of thin, nectar/mildly thick liquid, honey/moderately thick liquid, puree, were given. Pt declined solid trials    SLP: Melinda Washington MS, CCC-SLP   Contact info: [email or web address] chat; phone: [phone]      Reason for Referral: To ascertain readiness for a safe and efficient PO diet.   Patient Hx: Gaurav Mckay is a 54 y.o. male with a PMH of laryngeal cancer s/p radical neck dissection, PE on Apixaban, alcohol abuse, and HTN who presented to [facility] as a transfer from [facility] due to need for urology consult as well as neurosurgery consult due to incidental finding of brain mass. Pt was a direct admit to OSH on  after he presented to [facility] for surgical clearance for a scheduled cystoscopy with ablation of tissue on 24 secondary to a bladder tumor. He presented late and intoxicated to his appointment and there was concern of compliance with preop instructions which could further delay treatment. His case was discussed with the surgeon as well as anesthesia and admission was recommended to ensure sobriety, medication compliance, and surgical readiness. During admission and evaluation, Mr. Mckay was found to have dysconjugate gaze and he was complaining of a severe headache which he felt was related to a recent fall. 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 Neurology was consulted at OSH and consulting team felt that this was the cause of this diplopia. This mass is a new finding since it was not seen on  CT imaging of brain done at the end of October   Respiratory Status: Room air  Current diet: NPO    Pain:  Pain Scale: 0-10  Ratin    DIET RECOMMENDATIONS:   - Pureed (IDDSI Level 4)  - Mildly/nectar thick liquids (IDDSI Level 2)    STRATEGIES:  - Small bites  - Small, single sips  - Upright for all PO intake  - Medications whole in puree or as best tolerated  - Remain upright 45-60 min after meals.       SLP PLAN:  Skilled SLP Services: Skilled SLP intervention for dysphagia is warranted.  SLP Frequency: 2x per week  Duration:  Treatment/Interventions:   - Oropharyngeal exercises  - Bolus trials  - Diet tolerance/advancement  - Patient/caregiver education    Discussed POC: Patient  Discussed Risks/Benefits: Yes  Patient/Caregiver Agreeable: Yes    Short term goals established 24:   Pt will tolerate least restrictive diet without s/s aspiration, respiratory compromise, or overt difficulty throughout admission.  Start: 24, End: 2 weeks  Status: goal initiated  Pt will complete x30 trials of effortful swallows with cues as needed for accurate completion.    Start: 24, End: 2 weeks              Status: goal initiated         Long term goals 24:   Patient will tolerate the least restrictive diet without overt difficulty or further pulmonary compromise by time of discharge.      Education Provided: Results and recommendations per MBSS, with video review; recommendations and POC at this time. Verbal understanding and agreement given on all accounts.     Treatment Provided Today: ST provided extensive education and training to pt/pt family regarding anatomy/physiology of swallow function, risk factors of aspiration/aspiration pna & how to mitigate factors, diet modifications, and the use of compensatory swallow strategies to promote pt safety upon PO intake including small bites/sips, positioned upright during and 45-60 after meals. .     Additional Medical Consults Suggested:   - No new  disciplines indicated    Repeat Study: 2-3 weeks or upon completion of treatment.     Mechanics of the Swallow Summary:  ORAL PHASE:  Lip Closure - Intact  Tongue Control During Bolus Hold - Intact  Bolus prep/mastication - Intact  Bolus transport/lingual motion - Intact  Oral residue - Intact    PHARYNGEAL PHASE:  Initiation of pharyngeal swallow - Intacte  Soft palate elevation - Intact  Laryngeal elevation - Min impaired  Anterior hyoid excursion - Min impaired  Epiglottic movement - Intact  Laryngeal vestibule closure - Inconsistently impaired  Pharyngeal stripping wave - Intact  Pharyngoesophageal segment opening - Intact  Tongue base retraction - Intact  Pharyngeal residue - Intact    ESOPHAGEAL PHASE:  Esophageal clearance - Not evaluated       SLP Impressions with Severity Rating:   Pt presents with functional oral phase swallow, mild pharyngeal dysphagia upon completion of modified barium swallow study this date. Swallowing physiology is detailed above. Adequate manipulation and maneuvering of each bolus trial posteriorly. Declined solids trials therefore mastication was not able to evaluated. Functional bolus containment evident.     Timely initiation of pharyngeal swallow. Functional base of tongue retraction, laryngeal elevation/anterior shift. Trace amount of post swallow residue within the pharyngeal lumen. Mistiming of laryngeal vestibular closure occurred with thin liquids resulted in aspiration. Penetration to the level of the TVF with mildly thick in 1/4 trials which occurred when consuming a larger sip than previously consumed.  No further penetration or aspiration during this study. Pt ready for a modified diet. Discussed results and recommendations with RN and MD.      OUTCOME MEASURES:  Functional Oral Intake Scale  Functional Oral Intake Scale: Level 5        total oral diet with multiple consistencies, but requires special preparations and compensations       Alexik's Penetration Aspiration  Scale  Thin Liquids: 8. SILENT ASPIRATION - contrast passes glottis, visible residue, NO pt response]   During the Swallow  Nectar Thick Liquids: 2. PENETRATION that CLEARS - contrast enter airway, above vocal cords, no residue  Honey Thick Liquids: 1. NO ASPIRATION & NO PENETRATION - no aspiration, contrast does not enter airway  Puree: 1. NO ASPIRATION & NO PENETRATION - no aspiration, contrast does not enter airway  Solids: N/A

## 2024-12-17 NOTE — PROGRESS NOTES
"Gaurav Mckay is a 54 y.o. male on day 2 of admission presenting with Brain mass.    Subjective   No acute events overnight. Hailee fell out accidentally. He still endorses headache. Denies chest pain, shortness of breath, fever, chills, nausea and vomiting.        Objective     Physical Exam  Constitutional:       Appearance: Normal appearance.   HENT:      Head: Normocephalic and atraumatic.      Nose: Nose normal.      Mouth/Throat:      Mouth: Mucous membranes are moist.   Eyes:      Conjunctiva/sclera: Conjunctivae normal.      Comments: R lateral gaze palsy R eye; not able to abduct R eye past midline   Cardiovascular:      Rate and Rhythm: Normal rate and regular rhythm.      Heart sounds: Normal heart sounds.   Pulmonary:      Effort: Pulmonary effort is normal.      Breath sounds: Normal breath sounds. No wheezing.   Abdominal:      General: Bowel sounds are normal.      Palpations: Abdomen is soft.   Musculoskeletal:         General: No swelling. Normal range of motion.      Cervical back: Normal range of motion and neck supple.      Right lower leg: No edema.      Left lower leg: No edema.   Skin:     General: Skin is warm.   Neurological:      General: No focal deficit present.      Mental Status: He is alert and oriented to person, place, and time. Mental status is at baseline.   Psychiatric:         Mood and Affect: Mood normal.         Behavior: Behavior normal.         Thought Content: Thought content normal.         Judgment: Judgment normal.         Last Recorded Vitals  Blood pressure (!) 148/95, pulse 86, temperature 36.4 °C (97.5 °F), temperature source Temporal, resp. rate 15, height 1.778 m (5' 10\"), weight 83 kg (183 lb), SpO2 96%.  Intake/Output last 3 Shifts:  I/O last 3 completed shifts:  In: 460.6 (5.5 mL/kg) [I.V.:360.6 (4.3 mL/kg); IV Piggyback:100]  Out: 6825 (82.2 mL/kg) [Urine:6825 (2.3 mL/kg/hr)]  Weight: 83 kg     Relevant Results  Scheduled medications  [Held by provider] " atorvastatin, 20 mg, oral, Nightly  docusate sodium, 100 mg, oral, BID  ferrous sulfate (325 mg ferrous sulfate), 65 mg of iron, oral, BID  folic acid, 1 mg, oral, Daily  guaiFENesin, 600 mg, oral, BID  lidocaine, 15 mL, oral, Before meals & nightly  lidocaine, 1 patch, transdermal, Daily  mirtazapine, 15 mg, oral, Nightly  [Held by provider] montelukast, 10 mg, oral, Daily  multivitamin with minerals, 1 tablet, oral, Daily  nicotine, 1 patch, transdermal, Daily  oxygen, , inhalation, Continuous - Inhalation  pancrelipase (Lip-Prot-Amyl), 1 capsule, oral, BID  pantoprazole, 40 mg, oral, Daily before breakfast  [Held by provider] pregabalin, 200 mg, oral, TID  rOPINIRole, 0.5 mg, oral, TID  tamsulosin, 0.4 mg, oral, Daily  thiamine, 100 mg, oral, Daily  topiramate, 100 mg, oral, BID      Continuous medications  heparin, 0-4,000 Units/hr, Last Rate: 1,500 Units/hr (12/17/24 0412)      PRN medications  PRN medications: albuterol, cyclobenzaprine, dextrose, dextrose, glucagon, glucagon, HYDROmorphone, HYDROmorphone, ipratropium-albuteroL, ketorolac, LORazepam **OR** LORazepam **OR** LORazepam, melatonin, naloxone, ondansetron ODT **OR** ondansetron, polyethylene glycol     Results for orders placed or performed during the hospital encounter of 12/15/24 (from the past 24 hours)   Heparin Assay, UFH   Result Value Ref Range    Heparin Unfractionated 0.8 See Comment Below for Therapeutic Ranges IU/mL   Heparin Assay, UFH   Result Value Ref Range    Heparin Unfractionated 0.6 See Comment Below for Therapeutic Ranges IU/mL   Heparin Assay, UFH   Result Value Ref Range    Heparin Unfractionated 0.6 See Comment Below for Therapeutic Ranges IU/mL   CBC   Result Value Ref Range    WBC 5.7 4.4 - 11.3 x10*3/uL    nRBC 0.0 0.0 - 0.0 /100 WBCs    RBC 4.16 (L) 4.50 - 5.90 x10*6/uL    Hemoglobin 11.7 (L) 13.5 - 17.5 g/dL    Hematocrit 37.2 (L) 41.0 - 52.0 %    MCV 89 80 - 100 fL    MCH 28.1 26.0 - 34.0 pg    MCHC 31.5 (L) 32.0 - 36.0  g/dL    RDW 18.1 (H) 11.5 - 14.5 %    Platelets 321 150 - 450 x10*3/uL   Magnesium   Result Value Ref Range    Magnesium 2.02 1.60 - 2.40 mg/dL   Renal Function Panel   Result Value Ref Range    Glucose 113 (H) 74 - 99 mg/dL    Sodium 136 136 - 145 mmol/L    Potassium 3.8 3.5 - 5.3 mmol/L    Chloride 100 98 - 107 mmol/L    Bicarbonate 27 21 - 32 mmol/L    Anion Gap 13 10 - 20 mmol/L    Urea Nitrogen 6 6 - 23 mg/dL    Creatinine 0.58 0.50 - 1.30 mg/dL    eGFR >90 >60 mL/min/1.73m*2    Calcium 9.7 8.6 - 10.6 mg/dL    Phosphorus 3.7 2.5 - 4.9 mg/dL    Albumin 3.4 3.4 - 5.0 g/dL   Heparin Assay, UFH   Result Value Ref Range    Heparin Unfractionated 0.4 See Comment Below for Therapeutic Ranges IU/mL          Assessment/Plan   Assessment & Plan  Brain mass      Gaurav Mckay is a 54 y.o. male with a PMH of laryngeal cancer s/p radical neck dissection, PE on Apixaban, alcohol abuse, and HTN who presented to [facility] as a transfer from [facility] due to need for urology consult as well as neurosurgery consult due to incidental finding of brain mass. Pt was a direct admit to OSH on 12/13 after he presented to [facility] for surgical clearance for a scheduled cystoscopy with ablation of tissue on 12/17/24 secondary to a bladder tumor. Stephen cath placed due to urinary retention at OSH. Pt arrived in stable condition from OSH. Neurosurgery and Urology consulted and following. Pt admitted to medicine service for further treatment and management of brain mass and bladder tumor. SLP evaluation pending for swallow evaluation. ENT recommending further imaging for surgical planning.      #Brain mass  #Headache with Visual Changes  #R eye palsy  #Pharyngeal dysphagia   - CTH: concerning for a large destructive lytic soft tissue mass upon the clivus that protrudes into the sella turcica and bilateral cavernous sinuses and erodes bilateral petrous carotid canals.   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   -Ddx to include chordoma, chondrosarcoma, plasmacytoma.    - No other focal deficits except for right eye deviation which he states is not new   - Neurosurgery consulted, appreciate recs.       :: Pre-op labs: CBC/RFP/coags/T&S/UA/EKG/CXR       :: Planning potential biopsy   [ ] Ophthalmology consulted for further evaluation R eye palsy  - ENT consulted. Appreciate recs.       :: CT sinus without contrast ordered       :: CT angio Head/Neck ordered   -CT CAP on 12/15: Interval development of new centrilobular ground-glass nodular  densities in both lungs, predominantly in the right upper and middle lobes. Small amount of retained secretions noted in the trachea. These findings in the setting of a patulous and fluid-filled esophagus favors sequela of reflux/aspiration.  -CT thoracic 12/15: No thoracic spine metastatic disease   -CT lumbar 12/15: No metastatic disease to the lumbar spine.   -CT cervical 12/15: Abnormal enhancement involving the spinous process of C6 and the interspinous ligaments at C5-6 and C6-7. This has an appearance more favorable for inflammatory process over malignancy  -Continue heparin gtt   -NPO until SLP evaluates patient  -Hypoglycemic protocol with POCT glucose every 6 hrs    #Bladder Tumor  #Urinary retention  -Scheduled for cystoscopy with tissue ablation on 12/17/24 by Dr. Murcia  -Urology consulted, appreciate recs.     :: Will coordinate with NSGY possible bladder biopsy/at same time as brain biopsy with NSGY.   - Stephen cath placed at OSH d/t urinary retention. Stephen catheter accidentally fell out; replaced 12/17  -  C/w Flomax 0.4 mg PO QD     #Alcohol Use Disorder  -Cessation advised  -Last drink was approximately 3AM on 12/13/24.   -CIWA discontinued   - c/w Thiamine, Folate, and MVI PO QD  - pt interested in attending rehab on discharge     #Chronic Hypoxic Respiratory Failure  #COPD  #HANY  - c/w baseline 3 liters oxygen  - Continue bronchial hygiene, duonebs, mucinex     #HTN  #orthostatic hypotension  - BP on admit 118/81  - continue to monitor BP  - no home meds  - orthostatic BP/P ordered x1      #Hx of Pulmonary Embolism  -S/p embolization 11/2023  -Provoked by surgery  -holding home dose of Eliquis d/tupcoming procedure  -Heparin gtt initiated to bridge     # Tobacco Use Disorder  - nicotine patch ordered      Fluids: PRN  Electrolytes: Keep K>4, Mg>3, Phos>3  Nutrition: NPO until SLP evaluates  Abx: N/A  DVT: Heparin gtt     Patient discussed with attending physician, Dr. Wasserman.      Bre Chu MD   PGY2, Family Medicine   [facility]  Available by [employer]

## 2024-12-17 NOTE — CARE PLAN
Problem: Pain - Adult  Goal: Verbalizes/displays adequate comfort level or baseline comfort level  Outcome: Progressing     Problem: Safety - Adult  Goal: Free from fall injury  Outcome: Progressing   The patient's goals for the shift include      The clinical goals for the shift include Patient will remain safe and free from falls and injury by 1900 on 12/16/24.    Over the shift, the patient did not make progress toward the following goals.

## 2024-12-17 NOTE — PROGRESS NOTES
Speech-Language Pathology  Adult Inpatient Clinical Bedside Swallow Evaluation    Patient Name: Gaurav Mckay  MRN: [id number]  Today's Date: 12/17/2024   Start Time: 835  Stop Time: 857  Time Calculation (min): 22    History of Present Illness:   Gaurav Mckay is a 54 y.o. male with a PMH of laryngeal cancer s/p radical neck dissection, PE on Apixaban, alcohol abuse, and HTN who presented to [facility] as a transfer from [facility] due to need for urology consult as well as neurosurgery consult due to incidental finding of brain mass. Pt was a direct admit to OSH on 12/13 after he presented to [facility] for surgical clearance for a scheduled cystoscopy with ablation of tissue on 12/17/24 secondary to a bladder tumor. He presented late and intoxicated to his appointment and there was concern of compliance with preop instructions which could further delay treatment. His case was discussed with the surgeon as well as anesthesia and admission was recommended to ensure sobriety, medication compliance, and surgical readiness. During admission and evaluation, Mr. Mckay was found to have dysconjugate gaze and he was complaining of a severe headache which he felt was related to a recent fall. 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 Neurology was consulted at OSH and consulting team felt that this was the cause of this diplopia. This mass is a new finding since it was not seen on CT imaging of brain done at the end of October     Assessment:   Clinical bedside swallow evaluation completed. Pt alert and oriented x3 with functional oral musculature. Edentulous state, doesn't wear dentures. Pt positioned upright in bed. Trials of ice chips and single sips of water were tolerated well. Proceeded with 3 oz water challenge resulted in strong post prandial cough upon completion of  approximately 2 oz. Deferred further PO trials as this would place pt at high risk for aspiration. Pt would benefit from MBSS to further evaluate swallow function prior to any PO recommendations. Discussed results and recommendations with RN and MD.      Recommendations:  NPO    Frequent, aggressive oral care is strongly recommended to improve infection control as well as reduce dental plaque and bacteria on oropharyngeal surfaces which may increase the risk nosocomial infections, including pneumonia.    OK for small amounts of ice chips (one at a time, 5-7x/hour) for pleasure/swallow stimulation, but only after aggressive oral care to avoid colonization of bacteria within oral cavity.    Goal:   Pt. will tolerate least restrictive diet without overt s/s of aspiration with 100% accuracy.                  Start: 12/17/24, End: 2weeks                  Status: goal initiated       Plan:  SLP Services Indicated: Yes  Frequency: 2x week  Discussed POC with patient  SLP - OK to Discharge    Pain:   0-10  0 = No pain.     Inpatient Education:  Extensive education provided to patient regarding current swallow function, recommendations/results, and POC.      Consultations/Referrals/Coordination of Services:   N/A

## 2024-12-18 LAB
ALBUMIN SERPL BCP-MCNC: 3.5 G/DL (ref 3.4–5)
ALP SERPL-CCNC: 105 U/L (ref 33–120)
ALT SERPL W P-5'-P-CCNC: 19 U/L (ref 10–52)
AST SERPL W P-5'-P-CCNC: 25 U/L (ref 9–39)
BILIRUB DIRECT SERPL-MCNC: 0.1 MG/DL (ref 0–0.3)
BILIRUB SERPL-MCNC: 0.4 MG/DL (ref 0–1.2)
ERYTHROCYTE [DISTWIDTH] IN BLOOD BY AUTOMATED COUNT: 17.4 % (ref 11.5–14.5)
GLUCOSE BLD MANUAL STRIP-MCNC: 115 MG/DL (ref 74–99)
GLUCOSE BLD MANUAL STRIP-MCNC: 121 MG/DL (ref 74–99)
GLUCOSE BLD MANUAL STRIP-MCNC: 135 MG/DL (ref 74–99)
GLUCOSE BLD MANUAL STRIP-MCNC: 140 MG/DL (ref 74–99)
HCT VFR BLD AUTO: 38.9 % (ref 41–52)
HGB BLD-MCNC: 12.7 G/DL (ref 13.5–17.5)
MAGNESIUM SERPL-MCNC: 2.19 MG/DL (ref 1.6–2.4)
MCH RBC QN AUTO: 28 PG (ref 26–34)
MCHC RBC AUTO-ENTMCNC: 32.6 G/DL (ref 32–36)
MCV RBC AUTO: 86 FL (ref 80–100)
NRBC BLD-RTO: 0 /100 WBCS (ref 0–0)
PLATELET # BLD AUTO: 376 X10*3/UL (ref 150–450)
PROT SERPL-MCNC: 6.9 G/DL (ref 6.4–8.2)
RBC # BLD AUTO: 4.54 X10*6/UL (ref 4.5–5.9)
UFH PPP CHRO-ACNC: 0.5 IU/ML
WBC # BLD AUTO: 4.7 X10*3/UL (ref 4.4–11.3)

## 2024-12-18 PROCEDURE — 2500000002 HC RX 250 W HCPCS SELF ADMINISTERED DRUGS (ALT 637 FOR MEDICARE OP, ALT 636 FOR OP/ED): Performed by: NURSE PRACTITIONER

## 2024-12-18 PROCEDURE — 82947 ASSAY GLUCOSE BLOOD QUANT: CPT

## 2024-12-18 PROCEDURE — 80076 HEPATIC FUNCTION PANEL: CPT

## 2024-12-18 PROCEDURE — 2500000001 HC RX 250 WO HCPCS SELF ADMINISTERED DRUGS (ALT 637 FOR MEDICARE OP): Performed by: NURSE PRACTITIONER

## 2024-12-18 PROCEDURE — 2500000001 HC RX 250 WO HCPCS SELF ADMINISTERED DRUGS (ALT 637 FOR MEDICARE OP): Performed by: PODIATRIST

## 2024-12-18 PROCEDURE — 2500000004 HC RX 250 GENERAL PHARMACY W/ HCPCS (ALT 636 FOR OP/ED): Performed by: INTERNAL MEDICINE

## 2024-12-18 PROCEDURE — S4991 NICOTINE PATCH NONLEGEND: HCPCS | Performed by: NURSE PRACTITIONER

## 2024-12-18 PROCEDURE — 83735 ASSAY OF MAGNESIUM: CPT | Performed by: PODIATRIST

## 2024-12-18 PROCEDURE — 85027 COMPLETE CBC AUTOMATED: CPT | Performed by: PODIATRIST

## 2024-12-18 PROCEDURE — 1100000001 HC PRIVATE ROOM DAILY

## 2024-12-18 PROCEDURE — 2500000001 HC RX 250 WO HCPCS SELF ADMINISTERED DRUGS (ALT 637 FOR MEDICARE OP): Performed by: INTERNAL MEDICINE

## 2024-12-18 PROCEDURE — 2500000004 HC RX 250 GENERAL PHARMACY W/ HCPCS (ALT 636 FOR OP/ED): Performed by: PODIATRIST

## 2024-12-18 PROCEDURE — 85520 HEPARIN ASSAY: CPT | Performed by: NURSE PRACTITIONER

## 2024-12-18 PROCEDURE — 99232 SBSQ HOSP IP/OBS MODERATE 35: CPT | Performed by: PODIATRIST

## 2024-12-18 PROCEDURE — 36415 COLL VENOUS BLD VENIPUNCTURE: CPT | Performed by: NURSE PRACTITIONER

## 2024-12-18 PROCEDURE — 2500000004 HC RX 250 GENERAL PHARMACY W/ HCPCS (ALT 636 FOR OP/ED): Performed by: NURSE PRACTITIONER

## 2024-12-18 PROCEDURE — 2500000005 HC RX 250 GENERAL PHARMACY W/O HCPCS: Performed by: NURSE PRACTITIONER

## 2024-12-18 RX ORDER — KETOROLAC TROMETHAMINE 30 MG/ML
30 INJECTION, SOLUTION INTRAMUSCULAR; INTRAVENOUS EVERY 8 HOURS PRN
Status: DISCONTINUED | OUTPATIENT
Start: 2024-12-18 | End: 2024-12-18

## 2024-12-18 RX ORDER — KETOROLAC TROMETHAMINE 15 MG/ML
15 INJECTION, SOLUTION INTRAMUSCULAR; INTRAVENOUS EVERY 8 HOURS PRN
Status: DISCONTINUED | OUTPATIENT
Start: 2024-12-18 | End: 2024-12-18

## 2024-12-18 RX ORDER — KETOROLAC TROMETHAMINE 15 MG/ML
15 INJECTION, SOLUTION INTRAMUSCULAR; INTRAVENOUS EVERY 8 HOURS PRN
Status: DISPENSED | OUTPATIENT
Start: 2024-12-18 | End: 2024-12-21

## 2024-12-18 RX ORDER — BUTALBITAL, ACETAMINOPHEN AND CAFFEINE 50; 325; 40 MG/1; MG/1; MG/1
2 TABLET ORAL ONCE
Status: COMPLETED | OUTPATIENT
Start: 2024-12-18 | End: 2024-12-18

## 2024-12-18 RX ORDER — POLYETHYLENE GLYCOL 3350 17 G/17G
17 POWDER, FOR SOLUTION ORAL 2 TIMES DAILY
Status: DISCONTINUED | OUTPATIENT
Start: 2024-12-18 | End: 2024-12-21

## 2024-12-18 RX ADMIN — LIDOCAINE HYDROCHLORIDE 15 ML: 20 SOLUTION ORAL at 21:57

## 2024-12-18 RX ADMIN — ROPINIROLE 0.5 MG: 0.5 TABLET, FILM COATED ORAL at 21:57

## 2024-12-18 RX ADMIN — PREGABALIN 200 MG: 100 CAPSULE ORAL at 09:11

## 2024-12-18 RX ADMIN — FERROUS SULFATE TAB 325 MG (65 MG ELEMENTAL FE) 325 MG: 325 (65 FE) TAB at 21:29

## 2024-12-18 RX ADMIN — Medication 1 TABLET: at 09:11

## 2024-12-18 RX ADMIN — HYDROMORPHONE HYDROCHLORIDE 1 MG: 1 INJECTION, SOLUTION INTRAMUSCULAR; INTRAVENOUS; SUBCUTANEOUS at 21:29

## 2024-12-18 RX ADMIN — Medication 3 L/MIN: at 20:00

## 2024-12-18 RX ADMIN — TOPIRAMATE 100 MG: 100 TABLET, FILM COATED ORAL at 09:11

## 2024-12-18 RX ADMIN — THIAMINE HCL TAB 100 MG 100 MG: 100 TAB at 09:11

## 2024-12-18 RX ADMIN — Medication 3 L/MIN: at 10:54

## 2024-12-18 RX ADMIN — ROPINIROLE 0.5 MG: 0.5 TABLET, FILM COATED ORAL at 15:14

## 2024-12-18 RX ADMIN — PANCRELIPASE 1 CAPSULE: 30000; 6000; 19000 CAPSULE, DELAYED RELEASE PELLETS ORAL at 17:38

## 2024-12-18 RX ADMIN — MIRTAZAPINE 15 MG: 15 TABLET, FILM COATED ORAL at 21:29

## 2024-12-18 RX ADMIN — POLYETHYLENE GLYCOL 3350 17 G: 17 POWDER, FOR SOLUTION ORAL at 11:48

## 2024-12-18 RX ADMIN — LIDOCAINE HYDROCHLORIDE 15 ML: 20 SOLUTION ORAL at 06:27

## 2024-12-18 RX ADMIN — LIDOCAINE HYDROCHLORIDE 15 ML: 20 SOLUTION ORAL at 11:48

## 2024-12-18 RX ADMIN — KETOROLAC TROMETHAMINE 15 MG: 15 INJECTION, SOLUTION INTRAMUSCULAR; INTRAVENOUS at 01:11

## 2024-12-18 RX ADMIN — PANCRELIPASE 1 CAPSULE: 30000; 6000; 19000 CAPSULE, DELAYED RELEASE PELLETS ORAL at 09:11

## 2024-12-18 RX ADMIN — FOLIC ACID 1 MG: 1 TABLET ORAL at 09:11

## 2024-12-18 RX ADMIN — PREGABALIN 200 MG: 100 CAPSULE ORAL at 21:57

## 2024-12-18 RX ADMIN — TOPIRAMATE 100 MG: 100 TABLET, FILM COATED ORAL at 21:57

## 2024-12-18 RX ADMIN — HEPARIN SODIUM 1500 UNITS/HR: 10000 INJECTION, SOLUTION INTRAVENOUS at 15:14

## 2024-12-18 RX ADMIN — MONTELUKAST 10 MG: 10 TABLET, FILM COATED ORAL at 09:11

## 2024-12-18 RX ADMIN — HYDROMORPHONE HYDROCHLORIDE 1 MG: 1 INJECTION, SOLUTION INTRAMUSCULAR; INTRAVENOUS; SUBCUTANEOUS at 05:03

## 2024-12-18 RX ADMIN — KETOROLAC TROMETHAMINE 15 MG: 15 INJECTION, SOLUTION INTRAMUSCULAR; INTRAVENOUS at 11:48

## 2024-12-18 RX ADMIN — GUAIFENESIN 600 MG: 600 TABLET ORAL at 21:29

## 2024-12-18 RX ADMIN — DOCUSATE SODIUM 100 MG: 100 CAPSULE, LIQUID FILLED ORAL at 21:29

## 2024-12-18 RX ADMIN — BUTALBITAL, ACETAMINOPHEN, AND CAFFEINE 2 TABLET: 50; 325; 40 TABLET ORAL at 15:59

## 2024-12-18 RX ADMIN — GUAIFENESIN 600 MG: 600 TABLET ORAL at 09:11

## 2024-12-18 RX ADMIN — Medication 1 PATCH: at 09:14

## 2024-12-18 RX ADMIN — HYDROMORPHONE HYDROCHLORIDE 1 MG: 1 INJECTION, SOLUTION INTRAMUSCULAR; INTRAVENOUS; SUBCUTANEOUS at 13:24

## 2024-12-18 RX ADMIN — Medication 3 MG: at 21:58

## 2024-12-18 RX ADMIN — PREGABALIN 200 MG: 100 CAPSULE ORAL at 15:13

## 2024-12-18 RX ADMIN — ATORVASTATIN CALCIUM 20 MG: 20 TABLET, FILM COATED ORAL at 21:29

## 2024-12-18 RX ADMIN — HYDROMORPHONE HYDROCHLORIDE 1 MG: 1 INJECTION, SOLUTION INTRAMUSCULAR; INTRAVENOUS; SUBCUTANEOUS at 09:06

## 2024-12-18 RX ADMIN — DOCUSATE SODIUM 100 MG: 100 CAPSULE, LIQUID FILLED ORAL at 09:11

## 2024-12-18 RX ADMIN — POLYETHYLENE GLYCOL 3350 17 G: 17 POWDER, FOR SOLUTION ORAL at 21:29

## 2024-12-18 RX ADMIN — LIDOCAINE 4% 1 PATCH: 40 PATCH TOPICAL at 09:14

## 2024-12-18 RX ADMIN — KETOROLAC TROMETHAMINE 15 MG: 15 INJECTION, SOLUTION INTRAMUSCULAR; INTRAVENOUS at 20:11

## 2024-12-18 RX ADMIN — HYDROMORPHONE HYDROCHLORIDE 1 MG: 1 INJECTION, SOLUTION INTRAMUSCULAR; INTRAVENOUS; SUBCUTANEOUS at 17:38

## 2024-12-18 RX ADMIN — PANTOPRAZOLE SODIUM 40 MG: 40 TABLET, DELAYED RELEASE ORAL at 06:27

## 2024-12-18 RX ADMIN — ROPINIROLE 0.5 MG: 0.5 TABLET, FILM COATED ORAL at 09:11

## 2024-12-18 RX ADMIN — TAMSULOSIN HYDROCHLORIDE 0.4 MG: 0.4 CAPSULE ORAL at 09:11

## 2024-12-18 RX ADMIN — FERROUS SULFATE TAB 325 MG (65 MG ELEMENTAL FE) 325 MG: 325 (65 FE) TAB at 09:11

## 2024-12-18 RX ADMIN — LIDOCAINE HYDROCHLORIDE 15 ML: 20 SOLUTION ORAL at 15:13

## 2024-12-18 SDOH — ECONOMIC STABILITY: HOUSING INSECURITY: AT ANY TIME IN THE PAST 12 MONTHS, WERE YOU HOMELESS OR LIVING IN A SHELTER (INCLUDING NOW)?: NO

## 2024-12-18 SDOH — SOCIAL STABILITY: SOCIAL INSECURITY: WITHIN THE LAST YEAR, HAVE YOU BEEN HUMILIATED OR EMOTIONALLY ABUSED IN OTHER WAYS BY YOUR PARTNER OR EX-PARTNER?: NO

## 2024-12-18 SDOH — SOCIAL STABILITY: SOCIAL INSECURITY: ABUSE: ADULT

## 2024-12-18 SDOH — HEALTH STABILITY: MENTAL HEALTH
DO YOU FEEL STRESS - TENSE, RESTLESS, NERVOUS, OR ANXIOUS, OR UNABLE TO SLEEP AT NIGHT BECAUSE YOUR MIND IS TROUBLED ALL THE TIME - THESE DAYS?: ONLY A LITTLE

## 2024-12-18 SDOH — ECONOMIC STABILITY: FOOD INSECURITY: HOW HARD IS IT FOR YOU TO PAY FOR THE VERY BASICS LIKE FOOD, HOUSING, MEDICAL CARE, AND HEATING?: SOMEWHAT HARD

## 2024-12-18 SDOH — SOCIAL STABILITY: SOCIAL INSECURITY: HAVE YOU HAD THOUGHTS OF HARMING ANYONE ELSE?: NO

## 2024-12-18 SDOH — ECONOMIC STABILITY: INCOME INSECURITY: IN THE PAST 12 MONTHS HAS THE ELECTRIC, GAS, OIL, OR WATER COMPANY THREATENED TO SHUT OFF SERVICES IN YOUR HOME?: NO

## 2024-12-18 SDOH — SOCIAL STABILITY: SOCIAL INSECURITY: DO YOU FEEL UNSAFE GOING BACK TO THE PLACE WHERE YOU ARE LIVING?: NO

## 2024-12-18 SDOH — SOCIAL STABILITY: SOCIAL INSECURITY: WITHIN THE LAST YEAR, HAVE YOU BEEN AFRAID OF YOUR PARTNER OR EX-PARTNER?: NO

## 2024-12-18 SDOH — SOCIAL STABILITY: SOCIAL INSECURITY: DOES ANYONE TRY TO KEEP YOU FROM HAVING/CONTACTING OTHER FRIENDS OR DOING THINGS OUTSIDE YOUR HOME?: NO

## 2024-12-18 SDOH — SOCIAL STABILITY: SOCIAL INSECURITY: ARE THERE ANY APPARENT SIGNS OF INJURIES/BEHAVIORS THAT COULD BE RELATED TO ABUSE/NEGLECT?: NO

## 2024-12-18 SDOH — ECONOMIC STABILITY: FOOD INSECURITY: WITHIN THE PAST 12 MONTHS, THE FOOD YOU BOUGHT JUST DIDN'T LAST AND YOU DIDN'T HAVE MONEY TO GET MORE.: SOMETIMES TRUE

## 2024-12-18 SDOH — ECONOMIC STABILITY: HOUSING INSECURITY: IN THE PAST 12 MONTHS, HOW MANY TIMES HAVE YOU MOVED WHERE YOU WERE LIVING?: 0

## 2024-12-18 SDOH — ECONOMIC STABILITY: HOUSING INSECURITY: IN THE LAST 12 MONTHS, WAS THERE A TIME WHEN YOU WERE NOT ABLE TO PAY THE MORTGAGE OR RENT ON TIME?: NO

## 2024-12-18 SDOH — ECONOMIC STABILITY: TRANSPORTATION INSECURITY: IN THE PAST 12 MONTHS, HAS LACK OF TRANSPORTATION KEPT YOU FROM MEDICAL APPOINTMENTS OR FROM GETTING MEDICATIONS?: YES

## 2024-12-18 SDOH — SOCIAL STABILITY: SOCIAL INSECURITY: ARE YOU OR HAVE YOU BEEN THREATENED OR ABUSED PHYSICALLY, EMOTIONALLY, OR SEXUALLY BY ANYONE?: NO

## 2024-12-18 SDOH — SOCIAL STABILITY: SOCIAL INSECURITY: DO YOU FEEL ANYONE HAS EXPLOITED OR TAKEN ADVANTAGE OF YOU FINANCIALLY OR OF YOUR PERSONAL PROPERTY?: NO

## 2024-12-18 SDOH — SOCIAL STABILITY: SOCIAL INSECURITY: HAS ANYONE EVER THREATENED TO HURT YOUR FAMILY OR YOUR PETS?: NO

## 2024-12-18 SDOH — SOCIAL STABILITY: SOCIAL INSECURITY: HAVE YOU HAD ANY THOUGHTS OF HARMING ANYONE ELSE?: NO

## 2024-12-18 ASSESSMENT — PAIN - FUNCTIONAL ASSESSMENT
PAIN_FUNCTIONAL_ASSESSMENT: 0-10

## 2024-12-18 ASSESSMENT — PATIENT HEALTH QUESTIONNAIRE - PHQ9
SUM OF ALL RESPONSES TO PHQ9 QUESTIONS 1 & 2: 2
1. LITTLE INTEREST OR PLEASURE IN DOING THINGS: SEVERAL DAYS
2. FEELING DOWN, DEPRESSED OR HOPELESS: SEVERAL DAYS

## 2024-12-18 ASSESSMENT — PAIN SCALES - GENERAL
PAINLEVEL_OUTOF10: 10 - WORST POSSIBLE PAIN
PAINLEVEL_OUTOF10: 9
PAINLEVEL_OUTOF10: 9
PAINLEVEL_OUTOF10: 10 - WORST POSSIBLE PAIN
PAINLEVEL_OUTOF10: 8
PAINLEVEL_OUTOF10: 9
PAINLEVEL_OUTOF10: 9
PAINLEVEL_OUTOF10: 10 - WORST POSSIBLE PAIN

## 2024-12-18 ASSESSMENT — PAIN DESCRIPTION - DESCRIPTORS
DESCRIPTORS: ACHING;DISCOMFORT

## 2024-12-18 ASSESSMENT — COGNITIVE AND FUNCTIONAL STATUS - GENERAL
STANDING UP FROM CHAIR USING ARMS: A LOT
MOVING TO AND FROM BED TO CHAIR: A LITTLE
MOBILITY SCORE: 17
CLIMB 3 TO 5 STEPS WITH RAILING: TOTAL
HELP NEEDED FOR BATHING: A LITTLE
WALKING IN HOSPITAL ROOM: A LOT
DRESSING REGULAR LOWER BODY CLOTHING: A LOT
MOBILITY SCORE: 13
TOILETING: A LITTLE
TOILETING: A LOT
HELP NEEDED FOR BATHING: A LITTLE
CLIMB 3 TO 5 STEPS WITH RAILING: A LOT
DRESSING REGULAR UPPER BODY CLOTHING: A LITTLE
MOVING FROM LYING ON BACK TO SITTING ON SIDE OF FLAT BED WITH BEDRAILS: A LITTLE
DRESSING REGULAR UPPER BODY CLOTHING: A LITTLE
MOVING TO AND FROM BED TO CHAIR: A LOT
DAILY ACTIVITIY SCORE: 19
PERSONAL GROOMING: A LITTLE
TURNING FROM BACK TO SIDE WHILE IN FLAT BAD: A LITTLE
DAILY ACTIVITIY SCORE: 17
WALKING IN HOSPITAL ROOM: A LOT
PERSONAL GROOMING: A LITTLE
STANDING UP FROM CHAIR USING ARMS: A LOT
DRESSING REGULAR LOWER BODY CLOTHING: A LITTLE

## 2024-12-18 ASSESSMENT — LIFESTYLE VARIABLES
AUDIT-C TOTAL SCORE: 10
SKIP TO QUESTIONS 9-10: 0
HOW MANY STANDARD DRINKS CONTAINING ALCOHOL DO YOU HAVE ON A TYPICAL DAY: 5 OR 6
HOW OFTEN DO YOU HAVE A DRINK CONTAINING ALCOHOL: 4 OR MORE TIMES A WEEK
AUDIT-C TOTAL SCORE: 10
HOW OFTEN DO YOU HAVE 6 OR MORE DRINKS ON ONE OCCASION: DAILY OR ALMOST DAILY

## 2024-12-18 ASSESSMENT — ACTIVITIES OF DAILY LIVING (ADL)
LACK_OF_TRANSPORTATION: YES
LACK_OF_TRANSPORTATION: YES

## 2024-12-18 NOTE — SIGNIFICANT EVENT
Patient's Revised Cardiac Risk Index is a 3.9% with a 30 day risk of death, MI, or cardiac arrest. Patient is at least a moderate risk for surgery due to age and comorbidities. From medicine point of view, patient is medically optimized, and chronic conditions controlled. As per all surgeries, there are always inherent risks. Final risks verses benefits of all surgical procedures is based on the surgical and the anesthesia teams' assessments.

## 2024-12-18 NOTE — PROGRESS NOTES
"Gaurav Mckay is a 54 y.o. male on day 3 of admission presenting with Brain mass.    Subjective   No acute events overnights. He just ordered breakfast. He endorses chronic headache and constipation. No fever, chills, nausea, vomiting, diarrhea.       Objective     Physical Exam  Vitals reviewed.   Constitutional:       Appearance: Normal appearance.   HENT:      Head: Normocephalic.      Right Ear: Tympanic membrane normal.      Left Ear: Tympanic membrane normal.      Nose: Nose normal.      Mouth/Throat:      Mouth: Mucous membranes are moist.   Eyes:      Conjunctiva/sclera: Conjunctivae normal.   Cardiovascular:      Rate and Rhythm: Normal rate and regular rhythm.   Pulmonary:      Effort: Pulmonary effort is normal.      Breath sounds: Normal breath sounds.      Comments: On 3L NC  Abdominal:      General: Bowel sounds are normal.      Palpations: Abdomen is soft.   Genitourinary:     Comments: Stephen in place  Musculoskeletal:         General: No swelling. Normal range of motion.      Cervical back: Normal range of motion and neck supple.   Skin:     General: Skin is warm.   Neurological:      General: No focal deficit present.      Mental Status: He is alert and oriented to person, place, and time. Mental status is at baseline.   Psychiatric:         Mood and Affect: Mood normal.         Behavior: Behavior normal.         Thought Content: Thought content normal.         Judgment: Judgment normal.       Last Recorded Vitals  Blood pressure (!) 148/101, pulse 81, temperature 36.6 °C (97.9 °F), resp. rate 17, height 1.778 m (5' 10\"), weight 83 kg (183 lb), SpO2 98%.  Intake/Output last 3 Shifts:  I/O last 3 completed shifts:  In: 383.2 (4.6 mL/kg) [I.V.:283.2 (3.4 mL/kg); IV Piggyback:100]  Out: 4075 (49.1 mL/kg) [Urine:4075 (1.4 mL/kg/hr)]  Weight: 83 kg     Relevant Results  Scheduled medications  atorvastatin, 20 mg, oral, Nightly  docusate sodium, 100 mg, oral, BID  ferrous sulfate (325 mg ferrous " sulfate), 65 mg of iron, oral, BID  folic acid, 1 mg, oral, Daily  guaiFENesin, 600 mg, oral, BID  lidocaine, 15 mL, oral, Before meals & nightly  lidocaine, 1 patch, transdermal, Daily  mirtazapine, 15 mg, oral, Nightly  montelukast, 10 mg, oral, Daily  multivitamin with minerals, 1 tablet, oral, Daily  nicotine, 1 patch, transdermal, Daily  oxygen, , inhalation, Continuous - Inhalation  pancrelipase (Lip-Prot-Amyl), 1 capsule, oral, BID  pantoprazole, 40 mg, oral, Daily before breakfast  polyethylene glycol, 17 g, oral, BID  pregabalin, 200 mg, oral, TID  rOPINIRole, 0.5 mg, oral, TID  tamsulosin, 0.4 mg, oral, Daily  thiamine, 100 mg, oral, Daily  topiramate, 100 mg, oral, BID      Continuous medications  heparin, 0-4,000 Units/hr, Last Rate: 1,500 Units/hr (12/18/24 1121)      PRN medications  PRN medications: albuterol, cyclobenzaprine, dextrose, dextrose, glucagon, glucagon, HYDROmorphone, HYDROmorphone, ipratropium-albuteroL, ketorolac, LORazepam **OR** LORazepam **OR** LORazepam, melatonin, naloxone, ondansetron ODT **OR** ondansetron     Results for orders placed or performed during the hospital encounter of 12/15/24 (from the past 24 hours)   POCT GLUCOSE   Result Value Ref Range    POCT Glucose 121 (H) 74 - 99 mg/dL   POCT GLUCOSE   Result Value Ref Range    POCT Glucose 125 (H) 74 - 99 mg/dL   POCT GLUCOSE   Result Value Ref Range    POCT Glucose 140 (H) 74 - 99 mg/dL   CBC   Result Value Ref Range    WBC 4.7 4.4 - 11.3 x10*3/uL    nRBC 0.0 0.0 - 0.0 /100 WBCs    RBC 4.54 4.50 - 5.90 x10*6/uL    Hemoglobin 12.7 (L) 13.5 - 17.5 g/dL    Hematocrit 38.9 (L) 41.0 - 52.0 %    MCV 86 80 - 100 fL    MCH 28.0 26.0 - 34.0 pg    MCHC 32.6 32.0 - 36.0 g/dL    RDW 17.4 (H) 11.5 - 14.5 %    Platelets 376 150 - 450 x10*3/uL   Magnesium   Result Value Ref Range    Magnesium 2.19 1.60 - 2.40 mg/dL   Hepatic function panel   Result Value Ref Range    Albumin 3.5 3.4 - 5.0 g/dL    Bilirubin, Total 0.4 0.0 - 1.2 mg/dL     Bilirubin, Direct 0.1 0.0 - 0.3 mg/dL    Alkaline Phosphatase 105 33 - 120 U/L    ALT 19 10 - 52 U/L    AST 25 9 - 39 U/L    Total Protein 6.9 6.4 - 8.2 g/dL   Heparin Assay, UFH   Result Value Ref Range    Heparin Unfractionated 0.5 See Comment Below for Therapeutic Ranges IU/mL   POCT GLUCOSE   Result Value Ref Range    POCT Glucose 115 (H) 74 - 99 mg/dL       Assessment/Plan   Assessment & Plan  Brain mass    Gaurav Mckay is a 54 y.o. male with a PMH of laryngeal cancer s/p radical neck dissection, PE on Apixaban, alcohol abuse, and HTN who presented to [facility] as a transfer from [facility] due to need for urology consult as well as neurosurgery consult due to incidental finding of brain mass. Pt was a direct admit to OSH on 12/13 after he presented to [facility] for surgical clearance for a scheduled cystoscopy with ablation of tissue on 12/17/24 secondary to a bladder tumor. Stephen cath placed due to urinary retention at OSH. Pt arrived in stable condition from OSH. Neurosurgery and Urology consulted and following. Pt admitted to medicine service for further treatment and management of brain mass and bladder tumor. Surgical planning pending ENT, urology and neurosurgery.      #Brain mass  #Headache with Visual Changes  #Right sixth nerve palsy   #Pharyngeal dysphagia   - CTH: concerning for a large destructive lytic soft tissue mass upon the clivus that protrudes into the sella turcica and bilateral cavernous sinuses and erodes bilateral petrous carotid canals.   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   -Ddx to  include chordoma, chondrosarcoma, plasmacytoma.    - No other focal deficits except for right eye deviation which he states is not new   - Neurosurgery consulted, appreciate recs.       :: Pre-op labs: CBC/RFP/coags/T&S/UA/EKG/CXR       :: Planning potential biopsy   - Ophthalmology consulted. Appreciate recs.       :: Recommend monocular occlusion as needed for symptomatic relief. Can consider strabismus surgery outpatient in the long-term   - ENT consulted. Appreciate recs.   - CT sinus and CT angio Head/Neck 12/17: Large 3.4 cm mass located within the clivus and sphenoid bone with surrounding extension including into the sphenoid sinus posteriorly, causing destruction of the petrous segments of the bilateral temporal bones medially, and protruding into the cavernous sinuses and circumferentially surrounding the right internal carotid artery causing mild luminal narrowing  -CT CAP on 12/15: Interval development of new centrilobular ground-glass nodular  densities in both lungs, predominantly in the right upper and middle lobes. Small amount of retained secretions noted in the trachea. These findings in the setting of a patulous and fluid-filled esophagus favors sequela of reflux/aspiration.  -CT thoracic 12/15: No thoracic spine metastatic disease   -CT lumbar 12/15: No metastatic disease to the lumbar spine.   -CT cervical 12/15: Abnormal enhancement involving the spinous process of C6 and the interspinous ligaments at C5-6 and C6-7. This has an appearance more favorable for inflammatory process over malignancy  -Continue heparin gtt   -Hypoglycemic protocol with POCT glucose every 6 hrs until patient tolerates Pureed diet   -SLP consulted. Appreciate recs.       :: Pureed along with Mildly/nectar thick liquids   -Pain control: Dilaudid 0.5 mg every 4 hrs PRN moderate pain; Dilaudid 1.0 mg every 4 hrs PRN severe pain first line; Toradol 15 mg every 8 hrs PRN severe pain second line     #Bladder Tumor  #Urinary  retention  -Scheduled for cystoscopy with tissue ablation on 12/17/24 by Dr. Murcia  -Urology consulted, appreciate recs.     :: Will coordinate with NSGY possible bladder biopsy/at same time as brain biopsy with NSGY.   - Stephen cath in place  - C/w Flomax 0.4 mg PO QD     #Alcohol Use Disorder  -Cessation advised  -Last drink was approximately 3AM on 12/13/24.   -CIWA discontinued 12/17  - c/w Thiamine, Folate, and MVI PO QD  - pt interested in attending rehab on discharge     #Chronic Hypoxic Respiratory Failure  #COPD  #HANY  - c/w baseline 3 liters oxygen  - Continue bronchial hygiene, duonebs, mucinex     #HTN  #orthostatic hypotension  - BP on admit 118/81  - continue to monitor BP  - no home meds  - orthostatic BP/P ordered x1      #Hx of Pulmonary Embolism  -S/p embolization 11/2023  -Provoked by surgery  -holding home dose of Eliquis d/tupcoming procedure  -Heparin gtt initiated to bridge     #Tobacco Use Disorder  - nicotine patch ordered     #Decreased strength  - PT rec: Moderate intensity level of continued care 4x /week   - OT rec: Moderate intensity level of continued care 3x/week       Fluids: PRN  Electrolytes: Keep K>4, Mg>3, Phos>3  Nutrition: Pureed with Mildly/nectar thick liquids   Abx: N/A  DVT: Heparin gtt     Patient discussed with attending physician, Dr. Wasserman.      Bre Chu MD   PGY2, Family Medicine   [facility]  Available by [employer]

## 2024-12-18 NOTE — CONSULTS
Reason For Consult  Diplopia    History Of Present Illness  Gaurav Mckay is a 54 y.o. 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    The patient reports weeks of binocular diplopia, which he states does resolve when he occludes either eye. He states the images are side by side. He reports headache above the R brow. He reports chronic blurry vision. He denies eye pain, flashes, and floaters.      Past Medical History  He has a past medical history of Acute inflammation of orbit (06/27/2013), Acute upper respiratory infection, unspecified (03/20/2015), Alcohol abuse, in remission (02/12/2015), Alcohol dependence, in remission (05/07/2015), Allergic contact dermatitis due to plants, except food (05/21/2014), Allergy status to unspecified drugs, medicaments and biological substances (08/29/2013), Anxiety disorder, unspecified (03/20/2015), Asthmatic bronchitis ([facility]), Bipolar disorder, current episode manic without psychotic features, unspecified (Multi), Cancer of larynx (Multi), Cervicalgia, Chronic asthmatic bronchitis (Multi) (12/02/2014), Clotting disorder (Multi), COPD (chronic obstructive pulmonary disease) (Multi), Degeneration of cervical intervertebral disc, Dysphagia, Edentulous, Encounter for immunization (09/22/2016), Frequent falls, GERD (gastroesophageal reflux disease), Hearing aid worn, History of abdominal pain (02/16/2017), History of allergic rhinitis (01/16/2014), History of allergic rhinitis (06/26/2014), History of depression, History of esophageal reflux (08/27/2015), History of gastroenteritis (04/11/2013), History of hepatitis C, History of sinusitis (02/05/2014), History of sore throat (03/20/2017), Hyperlipidemia, Hypertension, Other long term (current) drug therapy (06/02/2015),  Personal history of nicotine dependence (08/16/2017), Personal history of nicotine dependence (06/26/2017), Personal history of other diseases of the respiratory system (09/26/2013), Personal history of other mental and behavioral disorders, Personal history of other specified conditions (08/25/2016), RLS (restless legs syndrome), Unspecified asthma, uncomplicated ([facility]) (02/13/2014), Uses walker, and Wears glasses.    Surgical History  He has a past surgical history that includes MR angio head wo IV contrast (01/24/2016); MR angio neck wo IV contrast (01/24/2016); CT angio abdomen pelvis w and or wo IV IV contrast (04/01/2020); Invasive Vascular Procedure (N/A, 11/08/2023); Cholecystectomy; Ankle surgery (Left); Thrombectomy; Wound debridement (Left, 08/06/2024); Fracture surgery (Right, 07/15/2024); Ankle surgery (07/08/2024); Leg Surgery (07/06/2024); and Laryngoscopy (07/01/2024).    Social History  He reports that he has been smoking cigarettes. He has quit using smokeless tobacco.  His smokeless tobacco use included snuff. He reports current alcohol use. He reports current drug use. Drugs: Methamphetamines and Cocaine.    Family History  Family History   Problem Relation Name Age of Onset    Diabetes Mother      Hypertension Mother      Heart attack Father          Allergies  Oxycodone    Review of Systems  As above     Physical Exam  Base Eye Exam       Visual Acuity (+2.50 readers)         Right Left    Near cc 20/20 20/20      Correction: Glasses              Tonometry (Tonopen, 10:37 PM)         Right Left    Pressure 14 14              Pupils         Dark Light Shape React APD    Right 4 2 Round Brisk None    Left 4 2 Round Brisk None              Visual Fields         Left Right     Full Full              Extraocular Movement         Right Left     -- 0 --   -4  0   -- 0 --    -- 0 --   0  0   -- 0 --                     Additional Tests       Color         Right Left    Ishihara 13/14 11.5/14          "         Slit Lamp and Fundus Exam       External Exam         Right Left    External Normal Normal              Slit Lamp Exam         Right Left    Lids/Lashes Normal Normal    Conjunctiva/Sclera White and quiet White and quiet    Cornea Clear Clear    Anterior Chamber Deep and quiet Deep and quiet    Iris Round and reactive Round and reactive    Lens Clear Clear    Anterior Vitreous Normal Normal              Fundus Exam         Right Left    Disc Normal Normal    C/D Ratio 0.3 0.3    Macula Normal Normal    Vessels Normal Normal    Periphery Normal Normal                     Last Recorded Vitals  Blood pressure (!) 149/91, pulse 74, temperature 36.6 °C (97.9 °F), temperature source Temporal, resp. rate 18, height 1.778 m (5' 10\"), weight 83 kg (183 lb), SpO2 98%.    Relevant Results    CT Sinus 12/17/24  Surgical planning scan.      1. Large 3.4 cm mass located within the clivus and sphenoid bone with  surrounding extension including into the sphenoid sinus posteriorly,  causing destruction of the petrous segments of the bilateral temporal  bones medially, and protruding into the cavernous sinuses and  circumferentially surrounding the right internal carotid artery  causing mild luminal narrowing.      2. Otherwise, there is no striking narrowing of the visualized  arteries in the head.      3. Postoperative changes in the right neck with mild narrowing of the  right common carotid artery, similar to the CT neck from 10/23/2024.      4. Otherwise, no narrowing of the visualized arteries in the neck.      5. Treatment related changes in the neck as detailed above. There are  also findings which may reflect right vocal cord paralysis/paresis.  There is nonspecific soft tissue in the region of the right piriform  sinus/right lateral oropharyngeal wall which could be treatment  related, correlate clinically and with direct visualization as  clinically indicated.      6. Mild mucosal thickening within the paranasal " "sinuses as detailed  above.    MRI brain with IV contrast from 12/14/24 which I personally reviewed demonstrates large right clivus tumor. Radiology read as follows, \"The central right clivus has an enlarging mass 2.6 x 3 cm transverse dimension, increased in size from the neck CT.  Laterally to the right the mass partially surround the distal horizontal and cavernous segments of the right internal carotid  artery, extending into the right Meckel's cave.  Laterally to the left the mass abuts the junction of the horizontal and cavernous segment of the left internal carotid.  Posteriorly a thin band of tissue extends through the posterior clival cortex abutting the basilar artery.  Anteriorly, the tumor extends into the sella turcica partially encasing the pituitary. The tumor extends into the larger left  sphenoid sinus. 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 Ophthalmology consulted for R eye abduction deficit.   - Exam today with significant R eye abduction deficit and resultant R esotropia. Otherwise, exam is is reassuring with excellent visual acuity, normal pupillary exam, normal IOP, full color vision, full visual fields, and no anterior segment changes or optic disc edema.   - MRI brain with IV contrast from 12/14/24 which I personally reviewed demonstrates large right clivus tumor. Full read above.   - This patient's diplopia is due to right sixth nerve palsy which is caused by compression from his large clival mass.     Recommendations:   - Recommend " monocular occlusion as needed for symptomatic relief.   - Continued management per neurosurgery, ENT, and primary  - Ophthalmology will continue to follow peripherally and arrange outpatient follow-up  - Can consider strabismus surgery outpatient in the long-term     Discussed with Dr. Harmeet Matias, neuro-ophthalmology attending.      Skyla Jasmine MD  Ophthalmology, PGY3    Ophthalmology Adult Pager - [phone]  Ophthalmology Pediatrics Pager (M-F 8:00am-5:00pm) - [phone]    For adult follow-up appointments, call: [phone]  For pediatric follow-up appointments, call: [phone]

## 2024-12-18 NOTE — PROGRESS NOTES
12/18/24 1528   Discharge Planning   Living Arrangements Spouse/significant other   Support Systems Spouse/significant other   Assistance Needed SNF   Type of Residence Private residence   Who is requesting discharge planning? Provider   Home or Post Acute Services Post acute facilities (Rehab/SNF/etc)   Type of Post Acute Facility Services Skilled nursing   Expected Discharge Disposition SNF   Does the patient need discharge transport arranged? Yes   RoundTrip coordination needed? Yes   Financial Resource Strain   How hard is it for you to pay for the very basics like food, housing, medical care, and heating? Hard   Housing Stability   In the last 12 months, was there a time when you were not able to pay the mortgage or rent on time? Y   At any time in the past 12 months, were you homeless or living in a shelter (including now)? N   Transportation Needs   In the past 12 months, has lack of transportation kept you from medical appointments or from getting medications? yes   In the past 12 months, has lack of transportation kept you from meetings, work, or from getting things needed for daily living? Yes     SW ASSESSMENT   SW called pt's number, and significant other (s/o), Jacqui Hernandez , picked up due to it being a home number. Jacqui stated she is agreeable for pt to go to SNF and that he has been to one before. Jacqui could not remember the name of previous SNF, but remembers it was in [place].     SW met at bedside with pt and completed SW assessment. Pt stated he lives with his girlfriend, who he refers to as his wife, Jacqui, in a trailer. Pt states he has problems paying for basic needs, and that transportation is a barrier for him at times.   Pt stated he is agreeable for SNF and cannot remember the name. He was agreeable for referral to be sent to [place] SNFs.    SW sent referrals via [employer].    SW will follow.    Pt adod: 12/23  Current dc plan: SNF    Sherrill Clarke MSW LSW  Care Transitions   2  [phone] or Epic Secure Chat

## 2024-12-18 NOTE — SIGNIFICANT EVENT
Otolaryngology significant event note:    The patient was seen today and his biopsy surgery on 12/20 was discussed.  The risk benefits and alternatives were discussed with the patient he was elected to proceed with the biopsy on Friday.  He has many questions about his tumor and his prognosis, and we are unable to provide this information.  We did tell him that there could be many possibilities based on the results of the biopsy, and that the neurosurgeons would know more about this.  He understands this but would still like to speak with them about what the possibilities are based on the biopsy.  We let him know he they were the primary team with discussed with him.    Recommendations:   > Please have him cleared for surgery on Friday  > Primary team to discussion patient's prognosis     Please page ENT with further questions or concerns, otherwise we will see him on Friday for surgery    Joon Smith MD MSCI - PGY1  Otolaryngology - Head and Neck Surgery    ENT Consult pager: [phone]  ENT Peds pager: [phone]  ENT Head & Neck Surgery Phone: [phone]  ENT subspecialty team: [place] individual resident who wrote today's note  ENT Outpatient scheduling number: [phone]  Please Page [phone] or call [phone] if Urgent

## 2024-12-19 ENCOUNTER — ANESTHESIA EVENT (OUTPATIENT)
Dept: OPERATING ROOM | Facility: HOSPITAL | Age: 54
End: 2024-12-19
Payer: COMMERCIAL

## 2024-12-19 LAB
ALBUMIN SERPL BCP-MCNC: 3.5 G/DL (ref 3.4–5)
ANION GAP SERPL CALC-SCNC: 15 MMOL/L (ref 10–20)
ATRIAL RATE: 91 BPM
BUN SERPL-MCNC: 9 MG/DL (ref 6–23)
CALCIUM SERPL-MCNC: 9.8 MG/DL (ref 8.6–10.6)
CHLORIDE SERPL-SCNC: 99 MMOL/L (ref 98–107)
CO2 SERPL-SCNC: 26 MMOL/L (ref 21–32)
CREAT SERPL-MCNC: 0.74 MG/DL (ref 0.5–1.3)
EGFRCR SERPLBLD CKD-EPI 2021: >90 ML/MIN/1.73M*2
ERYTHROCYTE [DISTWIDTH] IN BLOOD BY AUTOMATED COUNT: 17.4 % (ref 11.5–14.5)
GLUCOSE BLD MANUAL STRIP-MCNC: 127 MG/DL (ref 74–99)
GLUCOSE BLD MANUAL STRIP-MCNC: 161 MG/DL (ref 74–99)
GLUCOSE BLD MANUAL STRIP-MCNC: 164 MG/DL (ref 74–99)
GLUCOSE SERPL-MCNC: 108 MG/DL (ref 74–99)
HCT VFR BLD AUTO: 38.6 % (ref 41–52)
HGB BLD-MCNC: 12.1 G/DL (ref 13.5–17.5)
MAGNESIUM SERPL-MCNC: 2.09 MG/DL (ref 1.6–2.4)
MCH RBC QN AUTO: 27.9 PG (ref 26–34)
MCHC RBC AUTO-ENTMCNC: 31.3 G/DL (ref 32–36)
MCV RBC AUTO: 89 FL (ref 80–100)
NRBC BLD-RTO: 0 /100 WBCS (ref 0–0)
P AXIS: 42 DEGREES
P OFFSET: 192 MS
P ONSET: 141 MS
PHOSPHATE SERPL-MCNC: 4.7 MG/DL (ref 2.5–4.9)
PLATELET # BLD AUTO: 355 X10*3/UL (ref 150–450)
POTASSIUM SERPL-SCNC: 3.8 MMOL/L (ref 3.5–5.3)
PR INTERVAL: 140 MS
Q ONSET: 211 MS
QRS COUNT: 15 BEATS
QRS DURATION: 88 MS
QT INTERVAL: 386 MS
QTC CALCULATION(BAZETT): 474 MS
QTC FREDERICIA: 443 MS
R AXIS: 17 DEGREES
RBC # BLD AUTO: 4.34 X10*6/UL (ref 4.5–5.9)
SODIUM SERPL-SCNC: 136 MMOL/L (ref 136–145)
T AXIS: 50 DEGREES
T OFFSET: 404 MS
UFH PPP CHRO-ACNC: 0.5 IU/ML
VENTRICULAR RATE: 91 BPM
WBC # BLD AUTO: 6.4 X10*3/UL (ref 4.4–11.3)

## 2024-12-19 PROCEDURE — 83735 ASSAY OF MAGNESIUM: CPT | Performed by: PODIATRIST

## 2024-12-19 PROCEDURE — 2500000004 HC RX 250 GENERAL PHARMACY W/ HCPCS (ALT 636 FOR OP/ED): Performed by: INTERNAL MEDICINE

## 2024-12-19 PROCEDURE — 85027 COMPLETE CBC AUTOMATED: CPT | Performed by: PODIATRIST

## 2024-12-19 PROCEDURE — 2500000005 HC RX 250 GENERAL PHARMACY W/O HCPCS: Performed by: NURSE PRACTITIONER

## 2024-12-19 PROCEDURE — 85520 HEPARIN ASSAY: CPT | Performed by: NURSE PRACTITIONER

## 2024-12-19 PROCEDURE — 36415 COLL VENOUS BLD VENIPUNCTURE: CPT | Performed by: NURSE PRACTITIONER

## 2024-12-19 PROCEDURE — 2500000004 HC RX 250 GENERAL PHARMACY W/ HCPCS (ALT 636 FOR OP/ED): Performed by: NURSE PRACTITIONER

## 2024-12-19 PROCEDURE — 2500000001 HC RX 250 WO HCPCS SELF ADMINISTERED DRUGS (ALT 637 FOR MEDICARE OP): Performed by: NURSE PRACTITIONER

## 2024-12-19 PROCEDURE — S4991 NICOTINE PATCH NONLEGEND: HCPCS | Performed by: NURSE PRACTITIONER

## 2024-12-19 PROCEDURE — 2500000002 HC RX 250 W HCPCS SELF ADMINISTERED DRUGS (ALT 637 FOR MEDICARE OP, ALT 636 FOR OP/ED): Performed by: NURSE PRACTITIONER

## 2024-12-19 PROCEDURE — 97110 THERAPEUTIC EXERCISES: CPT | Mod: GP,CQ

## 2024-12-19 PROCEDURE — 99233 SBSQ HOSP IP/OBS HIGH 50: CPT | Performed by: PODIATRIST

## 2024-12-19 PROCEDURE — 82947 ASSAY GLUCOSE BLOOD QUANT: CPT

## 2024-12-19 PROCEDURE — 2500000001 HC RX 250 WO HCPCS SELF ADMINISTERED DRUGS (ALT 637 FOR MEDICARE OP): Performed by: INTERNAL MEDICINE

## 2024-12-19 PROCEDURE — 1100000001 HC PRIVATE ROOM DAILY

## 2024-12-19 PROCEDURE — 2500000004 HC RX 250 GENERAL PHARMACY W/ HCPCS (ALT 636 FOR OP/ED): Performed by: PODIATRIST

## 2024-12-19 PROCEDURE — 2500000001 HC RX 250 WO HCPCS SELF ADMINISTERED DRUGS (ALT 637 FOR MEDICARE OP): Performed by: PODIATRIST

## 2024-12-19 PROCEDURE — 99233 SBSQ HOSP IP/OBS HIGH 50: CPT | Performed by: INTERNAL MEDICINE

## 2024-12-19 PROCEDURE — 80069 RENAL FUNCTION PANEL: CPT | Performed by: PODIATRIST

## 2024-12-19 RX ADMIN — HYDROMORPHONE HYDROCHLORIDE 1 MG: 1 INJECTION, SOLUTION INTRAMUSCULAR; INTRAVENOUS; SUBCUTANEOUS at 14:18

## 2024-12-19 RX ADMIN — PREGABALIN 200 MG: 100 CAPSULE ORAL at 14:18

## 2024-12-19 RX ADMIN — TOPIRAMATE 100 MG: 100 TABLET, FILM COATED ORAL at 20:17

## 2024-12-19 RX ADMIN — PREGABALIN 200 MG: 100 CAPSULE ORAL at 20:16

## 2024-12-19 RX ADMIN — FOLIC ACID 1 MG: 1 TABLET ORAL at 09:44

## 2024-12-19 RX ADMIN — PREGABALIN 200 MG: 100 CAPSULE ORAL at 09:44

## 2024-12-19 RX ADMIN — POLYETHYLENE GLYCOL 3350 17 G: 17 POWDER, FOR SOLUTION ORAL at 09:42

## 2024-12-19 RX ADMIN — LIDOCAINE HYDROCHLORIDE 15 ML: 20 SOLUTION ORAL at 12:15

## 2024-12-19 RX ADMIN — HYDROMORPHONE HYDROCHLORIDE 1 MG: 1 INJECTION, SOLUTION INTRAMUSCULAR; INTRAVENOUS; SUBCUTANEOUS at 09:47

## 2024-12-19 RX ADMIN — KETOROLAC TROMETHAMINE 15 MG: 15 INJECTION, SOLUTION INTRAMUSCULAR; INTRAVENOUS at 12:15

## 2024-12-19 RX ADMIN — HYDROMORPHONE HYDROCHLORIDE 1 MG: 1 INJECTION, SOLUTION INTRAMUSCULAR; INTRAVENOUS; SUBCUTANEOUS at 18:47

## 2024-12-19 RX ADMIN — PANCRELIPASE 1 CAPSULE: 30000; 6000; 19000 CAPSULE, DELAYED RELEASE PELLETS ORAL at 18:47

## 2024-12-19 RX ADMIN — DOCUSATE SODIUM 100 MG: 100 CAPSULE, LIQUID FILLED ORAL at 09:44

## 2024-12-19 RX ADMIN — PANCRELIPASE 1 CAPSULE: 30000; 6000; 19000 CAPSULE, DELAYED RELEASE PELLETS ORAL at 09:44

## 2024-12-19 RX ADMIN — HYDROMORPHONE HYDROCHLORIDE 1 MG: 1 INJECTION, SOLUTION INTRAMUSCULAR; INTRAVENOUS; SUBCUTANEOUS at 01:20

## 2024-12-19 RX ADMIN — LIDOCAINE HYDROCHLORIDE 15 ML: 20 SOLUTION ORAL at 20:15

## 2024-12-19 RX ADMIN — GUAIFENESIN 600 MG: 600 TABLET ORAL at 09:44

## 2024-12-19 RX ADMIN — ATORVASTATIN CALCIUM 20 MG: 20 TABLET, FILM COATED ORAL at 20:16

## 2024-12-19 RX ADMIN — DOCUSATE SODIUM 100 MG: 100 CAPSULE, LIQUID FILLED ORAL at 20:16

## 2024-12-19 RX ADMIN — PANTOPRAZOLE SODIUM 40 MG: 40 TABLET, DELAYED RELEASE ORAL at 05:46

## 2024-12-19 RX ADMIN — Medication 3 L/MIN: at 10:13

## 2024-12-19 RX ADMIN — MIRTAZAPINE 15 MG: 15 TABLET, FILM COATED ORAL at 20:16

## 2024-12-19 RX ADMIN — HYDROMORPHONE HYDROCHLORIDE 1 MG: 1 INJECTION, SOLUTION INTRAMUSCULAR; INTRAVENOUS; SUBCUTANEOUS at 05:46

## 2024-12-19 RX ADMIN — KETOROLAC TROMETHAMINE 15 MG: 15 INJECTION, SOLUTION INTRAMUSCULAR; INTRAVENOUS at 20:15

## 2024-12-19 RX ADMIN — TAMSULOSIN HYDROCHLORIDE 0.4 MG: 0.4 CAPSULE ORAL at 09:44

## 2024-12-19 RX ADMIN — GUAIFENESIN 600 MG: 600 TABLET ORAL at 20:15

## 2024-12-19 RX ADMIN — LIDOCAINE 4% 1 PATCH: 40 PATCH TOPICAL at 09:47

## 2024-12-19 RX ADMIN — LIDOCAINE HYDROCHLORIDE 15 ML: 20 SOLUTION ORAL at 05:46

## 2024-12-19 RX ADMIN — HEPARIN SODIUM 1500 UNITS/HR: 10000 INJECTION, SOLUTION INTRAVENOUS at 09:48

## 2024-12-19 RX ADMIN — MONTELUKAST 10 MG: 10 TABLET, FILM COATED ORAL at 09:44

## 2024-12-19 RX ADMIN — TOPIRAMATE 100 MG: 100 TABLET, FILM COATED ORAL at 09:44

## 2024-12-19 RX ADMIN — THIAMINE HCL TAB 100 MG 100 MG: 100 TAB at 09:44

## 2024-12-19 RX ADMIN — LIDOCAINE HYDROCHLORIDE 15 ML: 20 SOLUTION ORAL at 18:48

## 2024-12-19 RX ADMIN — Medication 1 PATCH: at 09:41

## 2024-12-19 RX ADMIN — ROPINIROLE 0.5 MG: 0.5 TABLET, FILM COATED ORAL at 09:44

## 2024-12-19 RX ADMIN — FERROUS SULFATE TAB 325 MG (65 MG ELEMENTAL FE) 325 MG: 325 (65 FE) TAB at 09:44

## 2024-12-19 RX ADMIN — Medication 1 TABLET: at 09:44

## 2024-12-19 RX ADMIN — ROPINIROLE 0.5 MG: 0.5 TABLET, FILM COATED ORAL at 18:47

## 2024-12-19 RX ADMIN — HYDROMORPHONE HYDROCHLORIDE 1 MG: 1 INJECTION, SOLUTION INTRAMUSCULAR; INTRAVENOUS; SUBCUTANEOUS at 22:56

## 2024-12-19 RX ADMIN — ROPINIROLE 0.5 MG: 0.5 TABLET, FILM COATED ORAL at 20:16

## 2024-12-19 RX ADMIN — FERROUS SULFATE TAB 325 MG (65 MG ELEMENTAL FE) 325 MG: 325 (65 FE) TAB at 20:16

## 2024-12-19 RX ADMIN — POLYETHYLENE GLYCOL 3350 17 G: 17 POWDER, FOR SOLUTION ORAL at 20:16

## 2024-12-19 ASSESSMENT — COGNITIVE AND FUNCTIONAL STATUS - GENERAL
CLIMB 3 TO 5 STEPS WITH RAILING: A LOT
STANDING UP FROM CHAIR USING ARMS: A LOT
DAILY ACTIVITIY SCORE: 16
WALKING IN HOSPITAL ROOM: A LOT
STANDING UP FROM CHAIR USING ARMS: A LOT
EATING MEALS: A LITTLE
MOVING TO AND FROM BED TO CHAIR: A LOT
TURNING FROM BACK TO SIDE WHILE IN FLAT BAD: A LITTLE
MOVING FROM LYING ON BACK TO SITTING ON SIDE OF FLAT BED WITH BEDRAILS: A LITTLE
DAILY ACTIVITIY SCORE: 17
DRESSING REGULAR LOWER BODY CLOTHING: A LOT
HELP NEEDED FOR BATHING: A LITTLE
WALKING IN HOSPITAL ROOM: A LOT
TOILETING: A LOT
STANDING UP FROM CHAIR USING ARMS: A LOT
HELP NEEDED FOR BATHING: A LITTLE
CLIMB 3 TO 5 STEPS WITH RAILING: A LOT
MOVING TO AND FROM BED TO CHAIR: A LITTLE
PERSONAL GROOMING: A LITTLE
MOBILITY SCORE: 13
DRESSING REGULAR UPPER BODY CLOTHING: A LITTLE
WALKING IN HOSPITAL ROOM: A LOT
MOBILITY SCORE: 16
MOVING TO AND FROM BED TO CHAIR: A LITTLE
TURNING FROM BACK TO SIDE WHILE IN FLAT BAD: A LITTLE
MOBILITY SCORE: 16
TOILETING: A LOT
CLIMB 3 TO 5 STEPS WITH RAILING: TOTAL
DRESSING REGULAR LOWER BODY CLOTHING: A LOT
DRESSING REGULAR UPPER BODY CLOTHING: A LITTLE
TURNING FROM BACK TO SIDE WHILE IN FLAT BAD: A LITTLE
PERSONAL GROOMING: A LITTLE

## 2024-12-19 ASSESSMENT — PAIN DESCRIPTION - DESCRIPTORS
DESCRIPTORS: ACHING
DESCRIPTORS: ACHING;DISCOMFORT
DESCRIPTORS: ACHING
DESCRIPTORS: ACHING;DISCOMFORT
DESCRIPTORS: ACHING;DISCOMFORT

## 2024-12-19 ASSESSMENT — PAIN - FUNCTIONAL ASSESSMENT
PAIN_FUNCTIONAL_ASSESSMENT: 0-10

## 2024-12-19 ASSESSMENT — PAIN SCALES - GENERAL
PAINLEVEL_OUTOF10: 5 - MODERATE PAIN
PAINLEVEL_OUTOF10: 9
PAINLEVEL_OUTOF10: 8
PAINLEVEL_OUTOF10: 7
PAINLEVEL_OUTOF10: 8
PAINLEVEL_OUTOF10: 7

## 2024-12-19 ASSESSMENT — PAIN DESCRIPTION - ORIENTATION: ORIENTATION: MID

## 2024-12-19 ASSESSMENT — PAIN DESCRIPTION - LOCATION
LOCATION: HEAD
LOCATION: HEAD

## 2024-12-19 NOTE — PROGRESS NOTES
"Gaurav Mckay is a 54 y.o. male on day 4 of admission presenting with Brain mass.    Subjective   No acute events overnight. Continues to endorse a headache, but pain is mostly well controlled. Eating his pureed food. Denies fever, chills, nausea, vomiting, chest pain, shortness of breath.       Objective     Physical Exam  Constitutional:       Appearance: Normal appearance.   HENT:      Head: Normocephalic.      Nose: Nose normal.      Mouth/Throat:      Mouth: Mucous membranes are moist.   Eyes:      Conjunctiva/sclera: Conjunctivae normal.   Cardiovascular:      Rate and Rhythm: Normal rate and regular rhythm.   Pulmonary:      Effort: Pulmonary effort is normal.      Breath sounds: Normal breath sounds. No wheezing.      Comments: On 3L NC  Abdominal:      General: Bowel sounds are normal.      Palpations: Abdomen is soft.      Tenderness: There is no abdominal tenderness. There is no guarding.   Musculoskeletal:         General: No swelling. Normal range of motion.      Cervical back: Normal range of motion and neck supple.   Skin:     General: Skin is warm.   Neurological:      General: No focal deficit present.      Mental Status: He is alert and oriented to person, place, and time. Mental status is at baseline.   Psychiatric:         Mood and Affect: Mood normal.         Behavior: Behavior normal.         Thought Content: Thought content normal.         Judgment: Judgment normal.         Last Recorded Vitals  Blood pressure 119/80, pulse 85, temperature 36.9 °C (98.4 °F), resp. rate 19, height 1.778 m (5' 10\"), weight 83 kg (183 lb), SpO2 95%.  Intake/Output last 3 Shifts:  I/O last 3 completed shifts:  In: 2296.4 (27.7 mL/kg) [P.O.:1560; I.V.:736.4 (8.9 mL/kg)]  Out: 2500 (30.1 mL/kg) [Urine:2500 (0.8 mL/kg/hr)]  Weight: 83 kg     Relevant Results  Scheduled medications  atorvastatin, 20 mg, oral, Nightly  docusate sodium, 100 mg, oral, BID  ferrous sulfate (325 mg ferrous sulfate), 65 mg of iron, " oral, BID  folic acid, 1 mg, oral, Daily  guaiFENesin, 600 mg, oral, BID  lidocaine, 15 mL, oral, Before meals & nightly  lidocaine, 1 patch, transdermal, Daily  mirtazapine, 15 mg, oral, Nightly  montelukast, 10 mg, oral, Daily  multivitamin with minerals, 1 tablet, oral, Daily  nicotine, 1 patch, transdermal, Daily  oxygen, , inhalation, Continuous - Inhalation  pancrelipase (Lip-Prot-Amyl), 1 capsule, oral, BID  pantoprazole, 40 mg, oral, Daily before breakfast  polyethylene glycol, 17 g, oral, BID  pregabalin, 200 mg, oral, TID  rOPINIRole, 0.5 mg, oral, TID  tamsulosin, 0.4 mg, oral, Daily  thiamine, 100 mg, oral, Daily  topiramate, 100 mg, oral, BID      Continuous medications  heparin, 0-4,000 Units/hr, Last Rate: 1,500 Units/hr (12/19/24 0948)      PRN medications  PRN medications: albuterol, cyclobenzaprine, dextrose, dextrose, glucagon, glucagon, HYDROmorphone, HYDROmorphone, ipratropium-albuteroL, ketorolac, LORazepam **OR** LORazepam **OR** LORazepam, melatonin, naloxone, ondansetron ODT **OR** ondansetron     Results for orders placed or performed during the hospital encounter of 12/15/24 (from the past 24 hours)   POCT GLUCOSE   Result Value Ref Range    POCT Glucose 135 (H) 74 - 99 mg/dL   POCT GLUCOSE   Result Value Ref Range    POCT Glucose 121 (H) 74 - 99 mg/dL   CBC   Result Value Ref Range    WBC 6.4 4.4 - 11.3 x10*3/uL    nRBC 0.0 0.0 - 0.0 /100 WBCs    RBC 4.34 (L) 4.50 - 5.90 x10*6/uL    Hemoglobin 12.1 (L) 13.5 - 17.5 g/dL    Hematocrit 38.6 (L) 41.0 - 52.0 %    MCV 89 80 - 100 fL    MCH 27.9 26.0 - 34.0 pg    MCHC 31.3 (L) 32.0 - 36.0 g/dL    RDW 17.4 (H) 11.5 - 14.5 %    Platelets 355 150 - 450 x10*3/uL   Magnesium   Result Value Ref Range    Magnesium 2.09 1.60 - 2.40 mg/dL   Renal Function Panel   Result Value Ref Range    Glucose 108 (H) 74 - 99 mg/dL    Sodium 136 136 - 145 mmol/L    Potassium 3.8 3.5 - 5.3 mmol/L    Chloride 99 98 - 107 mmol/L    Bicarbonate 26 21 - 32 mmol/L    Anion  Gap 15 10 - 20 mmol/L    Urea Nitrogen 9 6 - 23 mg/dL    Creatinine 0.74 0.50 - 1.30 mg/dL    eGFR >90 >60 mL/min/1.73m*2    Calcium 9.8 8.6 - 10.6 mg/dL    Phosphorus 4.7 2.5 - 4.9 mg/dL    Albumin 3.5 3.4 - 5.0 g/dL   Heparin Assay, UFH   Result Value Ref Range    Heparin Unfractionated 0.5 See Comment Below for Therapeutic Ranges IU/mL   POCT GLUCOSE   Result Value Ref Range    POCT Glucose 161 (H) 74 - 99 mg/dL          Assessment/Plan   Assessment & Plan  Brain mass      Gaurav Mckay is a 54 y.o. male with a PMH of laryngeal cancer s/p radical neck dissection, PE on Apixaban, alcohol abuse, and HTN who presented to [facility] as a transfer from [facility] due to need for urology consult as well as neurosurgery consult due to incidental finding of brain mass. Pt was a direct admit to OSH on 12/13 after he presented to [facility] for surgical clearance for a scheduled cystoscopy with ablation of tissue on 12/17/24 secondary to a bladder tumor. Stephen cath placed due to urinary retention at OSH. Pt arrived in stable condition from OSH. Neurosurgery and Urology consulted and following. Pt admitted to medicine service for further treatment and management of brain mass and bladder tumor. Plan for ENT to take patient for Brain mass biopsy tomorrow 12/20.      #Brain mass  #Headache with Visual Changes  #Right sixth nerve palsy   #Pharyngeal dysphagia   - CTH: concerning for a large destructive lytic soft tissue mass upon the clivus that protrudes into the sella turcica and bilateral cavernous sinuses and erodes bilateral petrous carotid canals.   - MRI of brain:The central right clivus has an enlarging tumor as noted above.The central bety has a triangular focus of encephalomalacia corresponding to the triangle shaped abnormality with restricted diffusion on the 2016 brain MR consistent with an old area of osmotic demyelination.The parenchymal hyperintensities elsewhere are nonspecific and may be secondary to  degenerative, chronic microvascular ischemic or other etiologies.   -Ddx to include chordoma, chondrosarcoma, plasmacytoma.    - No other focal deficits except for right eye deviation which he states is not new   - Neurosurgery consulted, appreciate recs.       :: Planning potential biopsy   - Ophthalmology consulted. Appreciate recs.        :: Recommend monocular occlusion as needed for symptomatic relief. Can consider strabismus surgery outpatient in the long-term   - ENT consulted. Appreciate recs.      :: Plan for biopsy brain on 12/20        :: NPO at midnight   - CT sinus and CT angio Head/Neck 12/17: Large 3.4 cm mass located within the clivus and sphenoid bone with surrounding extension including into the sphenoid sinus posteriorly, causing destruction of the petrous segments of the bilateral temporal bones medially, and protruding into the cavernous sinuses and circumferentially surrounding the right internal carotid artery causing mild luminal narrowing  -CT CAP on 12/15: Interval development of new centrilobular ground-glass nodular  densities in both lungs, predominantly in the right upper and middle lobes. Small amount of retained secretions noted in the trachea. These findings in the setting of a patulous and fluid-filled esophagus favors sequela of reflux/aspiration.  -CT thoracic 12/15: No thoracic spine metastatic disease   -CT lumbar 12/15: No metastatic disease to the lumbar spine.   -CT cervical 12/15: Abnormal enhancement involving the spinous process of C6 and the interspinous ligaments at C5-6 and C6-7. This has an appearance more favorable for inflammatory process over malignancy  -Continue heparin gtt   -Hypoglycemic protocol with POCT glucose every 6 hrs until patient tolerates Pureed diet   -SLP consulted. Appreciate recs.       :: Pureed along with Mildly/nectar thick liquids   -Pain control: Dilaudid 0.5 mg every 4 hrs PRN moderate pain; Dilaudid 1.0 mg every 4 hrs PRN severe pain first  line; Toradol 15 mg every 8 hrs PRN severe pain second line     #Bladder Tumor  #Urinary retention  -Was scheduled for cystoscopy with tissue ablation on 12/17/24 by Dr. Murcia  -Urology consulted, appreciate recs.     :: Will coordinate with NSGY possible bladder biopsy/at same time as brain biopsy with NSGY.   - Stephen cath in place  - C/w Flomax 0.4 mg PO QD     #Alcohol Use Disorder  -Cessation advised  -Last drink was approximately 3AM on 12/13/24.   -CIWA discontinued 12/17  - c/w Thiamine, Folate, and MVI PO QD  - pt interested in attending rehab on discharge     #Chronic Hypoxic Respiratory Failure  #COPD  #HANY  - c/w baseline 3 liters oxygen  - Continue bronchial hygiene, duonebs, mucinex     #HTN  #orthostatic hypotension  - BP on admit 118/81  - continue to monitor BP  - no home meds     #Hx of Pulmonary Embolism  -S/p embolization 11/2023  -Provoked by surgery  -holding home dose of Eliquis d/t upcoming procedure  -Heparin gtt initiated to bridge     #Tobacco Use Disorder  - nicotine patch ordered      #Decreased strength  - PT rec: Moderate intensity level of continued care 4x /week   - OT rec: Moderate intensity level of continued care 3x/week        Fluids: PRN  Electrolytes: Keep K>4, Mg>3, Phos>3  Nutrition: Pureed with Mildly/nectar thick liquids   Abx: N/A  DVT: Heparin gtt     Patient discussed with attending physician, Dr. Wasserman.      Bre Chu MD   PGY2, Family Medicine   [facility]  Available by [employer]

## 2024-12-19 NOTE — ANESTHESIA PREPROCEDURE EVALUATION
Patient: Gaurav Mckay    Procedure Information       Date: 12/20/24    Procedures:       Nasal Endoscopy with Navigation      Cystoscopy w/Bladder Biopsy (Bladder)      Cystoscopy with Ablation Tissue (Bladder)    Location: [facility] OR    Surgeons: Kellee MURILLO MD; Wiley Rushing MD        ALLERGIES:  Allergies   Allergen Reactions    Oxycodone Itching        MEDICAL HISTORY:  Past Medical History:   Diagnosis Date    Acute inflammation of orbit 06/27/2013    Acute upper respiratory infection, unspecified 03/20/2015    Acute upper respiratory infection    Alcohol abuse, in remission 02/12/2015    History of ETOH abuse    Alcohol dependence, in remission 05/07/2015    Alcohol dependence in remission    Allergic contact dermatitis due to plants, except food 05/21/2014    Contact dermatitis due to poison ivy    Allergy status to unspecified drugs, medicaments and biological substances 08/29/2013    History of allergy    Anxiety disorder, unspecified 03/20/2015    Anxiety    Asthmatic bronchitis ([facility])     Bipolar disorder, current episode manic without psychotic features, unspecified (Multi)     Bipolar disorder, current episode manic without psychotic features    Cancer of larynx (Multi)     Cervicalgia     Cervicalgia    Chronic asthmatic bronchitis (Multi) 12/02/2014    Clotting disorder (Multi)     P.E.    COPD (chronic obstructive pulmonary disease) (Multi)     Degeneration of cervical intervertebral disc     Dysphagia     Edentulous     Encounter for immunization 09/22/2016    Flu vaccine need    Frequent falls     pt states he falls every time he gets up.    GERD (gastroesophageal reflux disease)     Hearing aid worn     currently missing    History of abdominal pain 02/16/2017    History of allergic rhinitis 01/16/2014    History of allergic rhinitis 06/26/2014    History of allergic rhinitis    History of depression     History of esophageal reflux 08/27/2015    History of  gastroenteritis 04/11/2013    History of hepatitis C     History of sinusitis 02/05/2014    History of sore throat 03/20/2017    Hyperlipidemia     Hypertension     Other long term (current) drug therapy 06/02/2015    High risk medication use    Personal history of nicotine dependence 08/16/2017    History of tobacco abuse    Personal history of nicotine dependence 06/26/2017    History of nicotine dependence    Personal history of other diseases of the respiratory system 09/26/2013    History of acute sinusitis    Personal history of other mental and behavioral disorders     Personal history of other specified conditions 08/25/2016    RLS (restless legs syndrome)     Unspecified asthma, uncomplicated (HHS-HCC) 02/13/2014    Uses walker     and wheelchair    Wears glasses     currently broken        Relevant Problems   Cardiac   (+) HTN (hypertension)   (+) Other hyperlipidemia      Pulmonary   (+) COPD (chronic obstructive pulmonary disease) (Multi)   (+) Chronic saddle pulmonary embolism 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 (Multi)   (+) Unspecified asthma, uncomplicated (HHS-HCC)      Neuro   (+) Alcoholic polyneuropathy (Multi)   (+) Depression, unspecified   (+) Panic disorder (episodic paroxysmal anxiety)   (+) Radiculopathy, lumbosacral region      GI   (+) Gastroesophageal reflux disease without esophagitis   (+) Oropharyngeal dysphagia      /Renal   (+) BPH (benign prostatic hyperplasia)      Liver   (+) Other chronic pancreatitis      Hematology   (+) Antineoplastic chemotherapy induced pancytopenia (CMS-HCC)   (+) Iron deficiency anemia   (+) Pancytopenia      HEENT   (+) Unspecified sensorineural hearing loss      ID   (+) MRSA bacteremia        SURGICAL HISTORY:  Past Surgical History:   Procedure Laterality Date    ANKLE SURGERY Left     ANKLE SURGERY  07/08/2024    R ankle spanning ex-fix revision    CHOLECYSTECTOMY      CT ABDOMEN PELVIS ANGIOGRAM W AND/OR WO IV CONTRAST  04/01/2020    CT ABDOMEN PELVIS  ANGIOGRAM W AND/OR WO IV CONTRAST 4/1/2020 [facility]    FRACTURE SURGERY Right 07/15/2024    Open reduction and internal fixation of right distal tibia pilon and fibula fractures, removal of right ankle spanning external fixation under anesthesia, debridement down to including bone external fixator pin sites right leg    INVASIVE VASCULAR PROCEDURE N/A 11/08/2023    Procedure: Pulmonary Angiogram;  Surgeon: Surya Templeton MD;  Location: [facility];  Service: Cardiovascular;  Laterality: N/A;    LARYNGOSCOPY  07/01/2024    R radical neck dissection and L ALT free flap reconstruction    LEG SURGERY  07/06/2024    RLE ex-fix placement    MR HEAD ANGIO WO IV CONTRAST  01/24/2016    MR HEAD ANGIO WO IV CONTRAST 1/24/2016 [facility]    MR NECK ANGIO WO IV CONTRAST  01/24/2016    MR NECK ANGIO WO IV CONTRAST 1/24/2016 [facility]    THROMBECTOMY      WOUND DEBRIDEMENT Left 08/06/2024    L thigh debridement with woud vac placement 2/2 surgical wound dehiscence        VITALS:      12/19/2024     5:02 AM 12/18/2024     9:07 PM 12/18/2024     2:10 PM   Vitals   Systolic 119 114 118   Diastolic 80 72 79   Heart Rate 85 77 79   Temp 36.9 °C (98.4 °F) 36.1 °C (97 °F) 36.6 °C (97.9 °F)   Resp 19 16 17       LABS:   [facility]   Lab Results   Component Value Date    GLUCOSE 108 (H) 12/19/2024    CALCIUM 9.8 12/19/2024     12/19/2024    K 3.8 12/19/2024    CO2 26 12/19/2024    CL 99 12/19/2024    BUN 9 12/19/2024    CREATININE 0.74 12/19/2024       IMAGES:    SOCIAL:  Social History     Tobacco Use   Smoking Status Every Day    Current packs/day: 1.00    Types: Cigarettes   Smokeless Tobacco Former    Types: Snuff   Tobacco Comments    1 ppd since the age of 16      Social History     Substance and Sexual Activity   Alcohol Use Yes    Comment: 6 pack beer a day      Social History     Substance and Sexual Activity   Drug Use Yes    Types: Methamphetamines, Cocaine    Comment: Hx cocaine use         NPO STATUS:  No data recorded    Clinical Areas Reviewed:   Tobacco  Allergies  Meds  Problems  Med Hx  Surg Hx   Fam Hx  Soc   Hx      Physical Exam    Airway  Mallampati: I  TM distance: >3 FB  Neck ROM: full     Cardiovascular - normal exam  Rhythm: regular     Dental   (+) upper dentures, lower dentures  Comments: Edentulous    Pulmonary - normal exam     Abdominal - normal exam       Other findings: Neck has evidence of previous radiation           Anesthesia Plan    History of general anesthesia?: yes  History of complications of general anesthesia?: no    ASA 3     general     The patient is not a current smoker.    intravenous induction   Postoperative administration of opioids is intended.  Trial extubation is planned.  Anesthetic plan and risks discussed with patient.  Use of blood products discussed with patient who consented to blood products.    Plan discussed with attending.

## 2024-12-19 NOTE — PROGRESS NOTES
Physical Therapy    Physical Therapy Treatment    Patient Name: Gaurav Mckay  MRN: [id number]  Department: [facility] 55  Room: 5568/5568-A  Today's Date: 12/19/2024  Time Calculation  Start Time: 0852  Stop Time: 0910  Time Calculation (min): 18 min         Assessment/Plan   PT Assessment  Barriers to Discharge Home: Physical needs  End of Session Communication: Bedside nurse  Assessment Comment: Pt tolerated PT session fair, able to sit EOB this date and scoot towards HOB however declined OOB activity d/t head and abdominal pain. Pt continues to remain appropriate for Moderate intensity PT upon D/C from hospital.  End of Session Patient Position: Bed, 3 rail up, Alarm on (Sitter present)  PT Plan  Inpatient/Swing Bed or Outpatient: Inpatient  PT Plan  Treatment/Interventions: Bed mobility, Transfer training, Gait training, Stair training, Balance training, Endurance training, Therapeutic exercise, Therapeutic activity, Home exercise program  PT Plan: Ongoing PT  PT Frequency: 4 times per week  PT Discharge Recommendations: Moderate intensity level of continued care  PT Recommended Transfer Status: Assist x1  PT - OK to Discharge: Yes (PT evaluation complete and rehab recommendations made)      General Visit Information:   PT  Visit  PT Received On: 12/19/24  General  Missed Visit Reason:  (n/a)  Family/Caregiver Present: No  Prior to Session Communication: Bedside nurse  Patient Position Received: Bed, 3 rail up, Alarm on (Sitter present)  General Comment: Pt supine in bed, agreeable to work with PT. Reports 9/10 head pain and 8/10 abdominal pain.    Subjective   Precautions:  Precautions  Medical Precautions: Fall precautions (CIWA)    Vital Signs (Past 2hrs)                 Objective   Pain:  Pain Assessment  Pain Assessment: 0-10  0-10 (Numeric) Pain Score: 9  Pain Location: Head  Cognition:  Cognition  Overall Cognitive Status: Within Functional Limits    Activity Tolerance:  Activity Tolerance  Endurance:  Decreased tolerance for upright activites  Treatments:  Therapeutic Exercise  Therapeutic Exercise Performed: Yes  Therapeutic Exercise Activity 1: Supine: AP, QS, HS, SAQ, Hip ABD, SLR x10 BLE    Therapeutic Activity  Therapeutic Activity Performed: Yes  Therapeutic Activity 1: Pt declined OOB activity however agreeable to sitting EOB ~3 minutes however reports head pain and slight lightheadedness.  Therapeutic Activity 2: Pt performed x3-4 lateral scoots towards HOB with CGA and cues for sequencing.    Bed Mobility  Bed Mobility: Yes  Bed Mobility 1  Bed Mobility 1: Supine to sitting  Level of Assistance 1: Contact guard  Bed Mobility Comments 1: HOB elevated  Bed Mobility 2  Bed Mobility  2: Sitting to supine  Level of Assistance 2: Minimum assistance, Minimal verbal cues  Bed Mobility Comments 2: Assist with LEs    Ambulation/Gait Training  Ambulation/Gait Training Performed: No    Transfers  Transfer: No (pt declined this date)    Stairs  Stairs: No    Outcome Measures:  [facility] Basic Mobility  Turning from your back to your side while in a flat bed without using bedrails: A little  Moving from lying on your back to sitting on the side of a flat bed without using bedrails: A little  Moving to and from bed to chair (including a wheelchair): A lot  Standing up from a chair using your arms (e.g. wheelchair or bedside chair): A lot  To walk in hospital room: A lot  Climbing 3-5 steps with railing: Total  Basic Mobility - Total Score: 13    Education Documentation  Body Mechanics, taught by Amanda Whiting PTA at 12/19/2024  9:30 AM.  Learner: Patient  Readiness: Acceptance  Method: Explanation  Response: Verbalizes Understanding  Comment: HEP    Precautions, taught by Amanda Whiting PTA at 12/19/2024  9:30 AM.  Learner: Patient  Readiness: Acceptance  Method: Explanation  Response: Verbalizes Understanding  Comment: HEP    Mobility Training, taught by Amanda Whiting PTA at 12/19/2024  9:30 AM.  Learner:  Patient  Readiness: Acceptance  Method: Explanation  Response: Verbalizes Understanding  Comment: HEP    Education Comments  No comments found.        OP EDUCATION:       Encounter Problems       Encounter Problems (Active)       PT Problem       Pt. will perform transfers with CGA with LRD  (Progressing)       Start:  12/17/24    Expected End:  12/31/24            Pt. will ambulate > 50 ft. with CGA with LRD to safely perform short household ambulation  (Progressing)       Start:  12/17/24    Expected End:  12/31/24            Pt will ascend/descend 3 steps with B handrails with min A to safely enter/exit the home set up  (Progressing)       Start:  12/17/24    Expected End:  12/31/24            Pt. will require CGA for static.dynamic standing balance to safely participate in ADLs  (Progressing)       Start:  12/17/24    Expected End:  12/31/24               Pain - Adult            12/19/24 at 9:31 AM   Amanda Whiting PTA   Rehab Office: [phone]

## 2024-12-19 NOTE — CARE PLAN
The patient's goals for the shift include      The clinical goals for the shift include patient will remain safe and free from injury throughout shift      Problem: Pain - Adult  Goal: Verbalizes/displays adequate comfort level or baseline comfort level  Outcome: Progressing     Problem: Safety - Adult  Goal: Free from fall injury  Outcome: Progressing     Problem: Discharge Planning  Goal: Discharge to home or other facility with appropriate resources  Outcome: Progressing     Problem: Chronic Conditions and Co-morbidities  Goal: Patient's chronic conditions and co-morbidity symptoms are monitored and maintained or improved  Outcome: Progressing     Problem: Pain  Goal: Takes deep breaths with improved pain control throughout the shift  Outcome: Progressing

## 2024-12-20 ENCOUNTER — ANESTHESIA (OUTPATIENT)
Dept: OPERATING ROOM | Facility: HOSPITAL | Age: 54
End: 2024-12-20
Payer: COMMERCIAL

## 2024-12-20 LAB
ALBUMIN SERPL BCP-MCNC: 3.3 G/DL (ref 3.4–5)
ANION GAP SERPL CALC-SCNC: 14 MMOL/L (ref 10–20)
BUN SERPL-MCNC: 7 MG/DL (ref 6–23)
CALCIUM SERPL-MCNC: 9.8 MG/DL (ref 8.6–10.6)
CHLORIDE SERPL-SCNC: 99 MMOL/L (ref 98–107)
CO2 SERPL-SCNC: 28 MMOL/L (ref 21–32)
CREAT SERPL-MCNC: 0.76 MG/DL (ref 0.5–1.3)
EGFRCR SERPLBLD CKD-EPI 2021: >90 ML/MIN/1.73M*2
ERYTHROCYTE [DISTWIDTH] IN BLOOD BY AUTOMATED COUNT: 17 % (ref 11.5–14.5)
GLUCOSE BLD MANUAL STRIP-MCNC: 114 MG/DL (ref 74–99)
GLUCOSE BLD MANUAL STRIP-MCNC: 118 MG/DL (ref 74–99)
GLUCOSE BLD MANUAL STRIP-MCNC: 159 MG/DL (ref 74–99)
GLUCOSE BLD MANUAL STRIP-MCNC: 96 MG/DL (ref 74–99)
GLUCOSE SERPL-MCNC: 104 MG/DL (ref 74–99)
HCT VFR BLD AUTO: 37.5 % (ref 41–52)
HGB BLD-MCNC: 12.4 G/DL (ref 13.5–17.5)
MAGNESIUM SERPL-MCNC: 2.07 MG/DL (ref 1.6–2.4)
MCH RBC QN AUTO: 28.2 PG (ref 26–34)
MCHC RBC AUTO-ENTMCNC: 33.1 G/DL (ref 32–36)
MCV RBC AUTO: 85 FL (ref 80–100)
NRBC BLD-RTO: 0 /100 WBCS (ref 0–0)
PHOSPHATE SERPL-MCNC: 4.8 MG/DL (ref 2.5–4.9)
PLATELET # BLD AUTO: 358 X10*3/UL (ref 150–450)
POTASSIUM SERPL-SCNC: 3.6 MMOL/L (ref 3.5–5.3)
RBC # BLD AUTO: 4.39 X10*6/UL (ref 4.5–5.9)
SODIUM SERPL-SCNC: 137 MMOL/L (ref 136–145)
WBC # BLD AUTO: 5.5 X10*3/UL (ref 4.4–11.3)

## 2024-12-20 PROCEDURE — 7100000001 HC RECOVERY ROOM TIME - INITIAL BASE CHARGE: Performed by: OTOLARYNGOLOGY

## 2024-12-20 PROCEDURE — 36415 COLL VENOUS BLD VENIPUNCTURE: CPT | Performed by: PODIATRIST

## 2024-12-20 PROCEDURE — 3600000017 HC OR TIME - EACH INCREMENTAL 1 MINUTE - PROCEDURE LEVEL SIX: Performed by: OTOLARYNGOLOGY

## 2024-12-20 PROCEDURE — 2500000004 HC RX 250 GENERAL PHARMACY W/ HCPCS (ALT 636 FOR OP/ED): Performed by: NURSE PRACTITIONER

## 2024-12-20 PROCEDURE — 3700000001 HC GENERAL ANESTHESIA TIME - INITIAL BASE CHARGE: Performed by: OTOLARYNGOLOGY

## 2024-12-20 PROCEDURE — 2500000004 HC RX 250 GENERAL PHARMACY W/ HCPCS (ALT 636 FOR OP/ED): Performed by: ANESTHESIOLOGY

## 2024-12-20 PROCEDURE — 09BX8ZX EXCISION OF LEFT SPHENOID SINUS, VIA NATURAL OR ARTIFICIAL OPENING ENDOSCOPIC, DIAGNOSTIC: ICD-10-PCS | Performed by: OTOLARYNGOLOGY

## 2024-12-20 PROCEDURE — 2780000003 HC OR 278 NO HCPCS: Performed by: OTOLARYNGOLOGY

## 2024-12-20 PROCEDURE — 80069 RENAL FUNCTION PANEL: CPT | Performed by: PODIATRIST

## 2024-12-20 PROCEDURE — 2500000001 HC RX 250 WO HCPCS SELF ADMINISTERED DRUGS (ALT 637 FOR MEDICARE OP): Performed by: PODIATRIST

## 2024-12-20 PROCEDURE — 2500000004 HC RX 250 GENERAL PHARMACY W/ HCPCS (ALT 636 FOR OP/ED): Performed by: STUDENT IN AN ORGANIZED HEALTH CARE EDUCATION/TRAINING PROGRAM

## 2024-12-20 PROCEDURE — 82947 ASSAY GLUCOSE BLOOD QUANT: CPT

## 2024-12-20 PROCEDURE — 88342 IMHCHEM/IMCYTCHM 1ST ANTB: CPT | Performed by: PATHOLOGY

## 2024-12-20 PROCEDURE — 2500000002 HC RX 250 W HCPCS SELF ADMINISTERED DRUGS (ALT 637 FOR MEDICARE OP, ALT 636 FOR OP/ED): Performed by: NURSE PRACTITIONER

## 2024-12-20 PROCEDURE — 3700000002 HC GENERAL ANESTHESIA TIME - EACH INCREMENTAL 1 MINUTE: Performed by: OTOLARYNGOLOGY

## 2024-12-20 PROCEDURE — 88331 PATH CONSLTJ SURG 1 BLK 1SPC: CPT | Performed by: PATHOLOGY

## 2024-12-20 PROCEDURE — 52204 CYSTOSCOPY W/BIOPSY(S): CPT | Performed by: STUDENT IN AN ORGANIZED HEALTH CARE EDUCATION/TRAINING PROGRAM

## 2024-12-20 PROCEDURE — 1100000001 HC PRIVATE ROOM DAILY

## 2024-12-20 PROCEDURE — 88341 IMHCHEM/IMCYTCHM EA ADD ANTB: CPT | Performed by: PATHOLOGY

## 2024-12-20 PROCEDURE — 2500000001 HC RX 250 WO HCPCS SELF ADMINISTERED DRUGS (ALT 637 FOR MEDICARE OP): Performed by: NURSE PRACTITIONER

## 2024-12-20 PROCEDURE — 2720000007 HC OR 272 NO HCPCS: Performed by: OTOLARYNGOLOGY

## 2024-12-20 PROCEDURE — 2500000001 HC RX 250 WO HCPCS SELF ADMINISTERED DRUGS (ALT 637 FOR MEDICARE OP): Performed by: INTERNAL MEDICINE

## 2024-12-20 PROCEDURE — 2500000004 HC RX 250 GENERAL PHARMACY W/ HCPCS (ALT 636 FOR OP/ED)

## 2024-12-20 PROCEDURE — 0TBB8ZX EXCISION OF BLADDER, VIA NATURAL OR ARTIFICIAL OPENING ENDOSCOPIC, DIAGNOSTIC: ICD-10-PCS | Performed by: STUDENT IN AN ORGANIZED HEALTH CARE EDUCATION/TRAINING PROGRAM

## 2024-12-20 PROCEDURE — 88331 PATH CONSLTJ SURG 1 BLK 1SPC: CPT | Mod: TC,SUR | Performed by: OTOLARYNGOLOGY

## 2024-12-20 PROCEDURE — 3600000018 HC OR TIME - INITIAL BASE CHARGE - PROCEDURE LEVEL SIX: Performed by: OTOLARYNGOLOGY

## 2024-12-20 PROCEDURE — 61782 SCAN PROC CRANIAL EXTRA: CPT | Performed by: OTOLARYNGOLOGY

## 2024-12-20 PROCEDURE — 99233 SBSQ HOSP IP/OBS HIGH 50: CPT | Performed by: PODIATRIST

## 2024-12-20 PROCEDURE — 83735 ASSAY OF MAGNESIUM: CPT | Performed by: PODIATRIST

## 2024-12-20 PROCEDURE — 2500000004 HC RX 250 GENERAL PHARMACY W/ HCPCS (ALT 636 FOR OP/ED): Performed by: INTERNAL MEDICINE

## 2024-12-20 PROCEDURE — 88305 TISSUE EXAM BY PATHOLOGIST: CPT | Performed by: PATHOLOGY

## 2024-12-20 PROCEDURE — 31288 NASAL/SINUS ENDOSCOPY SURG: CPT | Performed by: OTOLARYNGOLOGY

## 2024-12-20 PROCEDURE — 2500000004 HC RX 250 GENERAL PHARMACY W/ HCPCS (ALT 636 FOR OP/ED): Performed by: OTOLARYNGOLOGY

## 2024-12-20 PROCEDURE — 09SL8ZZ REPOSITION NASAL TURBINATE, VIA NATURAL OR ARTIFICIAL OPENING ENDOSCOPIC: ICD-10-PCS | Performed by: OTOLARYNGOLOGY

## 2024-12-20 PROCEDURE — 2500000001 HC RX 250 WO HCPCS SELF ADMINISTERED DRUGS (ALT 637 FOR MEDICARE OP): Performed by: STUDENT IN AN ORGANIZED HEALTH CARE EDUCATION/TRAINING PROGRAM

## 2024-12-20 PROCEDURE — 2500000005 HC RX 250 GENERAL PHARMACY W/O HCPCS: Performed by: OTOLARYNGOLOGY

## 2024-12-20 PROCEDURE — 7100000002 HC RECOVERY ROOM TIME - EACH INCREMENTAL 1 MINUTE: Performed by: OTOLARYNGOLOGY

## 2024-12-20 PROCEDURE — 2500000001 HC RX 250 WO HCPCS SELF ADMINISTERED DRUGS (ALT 637 FOR MEDICARE OP): Performed by: OTOLARYNGOLOGY

## 2024-12-20 PROCEDURE — 85027 COMPLETE CBC AUTOMATED: CPT | Performed by: PODIATRIST

## 2024-12-20 RX ORDER — SODIUM CHLORIDE, SODIUM LACTATE, POTASSIUM CHLORIDE, CALCIUM CHLORIDE 600; 310; 30; 20 MG/100ML; MG/100ML; MG/100ML; MG/100ML
INJECTION, SOLUTION INTRAVENOUS CONTINUOUS PRN
Status: DISCONTINUED | OUTPATIENT
Start: 2024-12-20 | End: 2024-12-20

## 2024-12-20 RX ORDER — OXYCODONE HYDROCHLORIDE 5 MG/1
10 TABLET ORAL EVERY 4 HOURS PRN
Status: DISCONTINUED | OUTPATIENT
Start: 2024-12-20 | End: 2024-12-20 | Stop reason: HOSPADM

## 2024-12-20 RX ORDER — DIPHENHYDRAMINE HYDROCHLORIDE 50 MG/ML
12.5 INJECTION INTRAMUSCULAR; INTRAVENOUS ONCE AS NEEDED
Status: DISCONTINUED | OUTPATIENT
Start: 2024-12-20 | End: 2024-12-20 | Stop reason: HOSPADM

## 2024-12-20 RX ORDER — WATER 1 ML/ML
IRRIGANT IRRIGATION AS NEEDED
Status: DISCONTINUED | OUTPATIENT
Start: 2024-12-20 | End: 2024-12-20 | Stop reason: HOSPADM

## 2024-12-20 RX ORDER — OXYMETAZOLINE HCL 0.05 %
2 SPRAY, NON-AEROSOL (ML) NASAL EVERY 12 HOURS PRN
Status: DISPENSED | OUTPATIENT
Start: 2024-12-20 | End: 2024-12-23

## 2024-12-20 RX ORDER — LIDOCAINE HYDROCHLORIDE 20 MG/ML
INJECTION, SOLUTION INFILTRATION; PERINEURAL AS NEEDED
Status: DISCONTINUED | OUTPATIENT
Start: 2024-12-20 | End: 2024-12-20

## 2024-12-20 RX ORDER — LIDOCAINE HYDROCHLORIDE 10 MG/ML
0.1 INJECTION, SOLUTION INFILTRATION; PERINEURAL ONCE
Status: DISCONTINUED | OUTPATIENT
Start: 2024-12-20 | End: 2024-12-20 | Stop reason: HOSPADM

## 2024-12-20 RX ORDER — DROPERIDOL 2.5 MG/ML
0.62 INJECTION, SOLUTION INTRAMUSCULAR; INTRAVENOUS ONCE AS NEEDED
Status: DISCONTINUED | OUTPATIENT
Start: 2024-12-20 | End: 2024-12-20 | Stop reason: HOSPADM

## 2024-12-20 RX ORDER — HYDROMORPHONE HYDROCHLORIDE 0.2 MG/ML
0.2 INJECTION INTRAMUSCULAR; INTRAVENOUS; SUBCUTANEOUS EVERY 5 MIN PRN
Status: DISCONTINUED | OUTPATIENT
Start: 2024-12-20 | End: 2024-12-20 | Stop reason: HOSPADM

## 2024-12-20 RX ORDER — OXYCODONE HYDROCHLORIDE 5 MG/1
5 TABLET ORAL EVERY 4 HOURS PRN
Status: DISCONTINUED | OUTPATIENT
Start: 2024-12-20 | End: 2024-12-20 | Stop reason: HOSPADM

## 2024-12-20 RX ORDER — ALBUTEROL SULFATE 0.83 MG/ML
2.5 SOLUTION RESPIRATORY (INHALATION) ONCE AS NEEDED
Status: DISCONTINUED | OUTPATIENT
Start: 2024-12-20 | End: 2024-12-20 | Stop reason: HOSPADM

## 2024-12-20 RX ORDER — ONDANSETRON HYDROCHLORIDE 2 MG/ML
4 INJECTION, SOLUTION INTRAVENOUS ONCE AS NEEDED
Status: DISCONTINUED | OUTPATIENT
Start: 2024-12-20 | End: 2024-12-20 | Stop reason: HOSPADM

## 2024-12-20 RX ORDER — SODIUM CHLORIDE, SODIUM LACTATE, POTASSIUM CHLORIDE, CALCIUM CHLORIDE 600; 310; 30; 20 MG/100ML; MG/100ML; MG/100ML; MG/100ML
100 INJECTION, SOLUTION INTRAVENOUS CONTINUOUS
Status: DISCONTINUED | OUTPATIENT
Start: 2024-12-20 | End: 2024-12-20 | Stop reason: HOSPADM

## 2024-12-20 RX ORDER — MIDAZOLAM HYDROCHLORIDE 1 MG/ML
INJECTION INTRAMUSCULAR; INTRAVENOUS AS NEEDED
Status: DISCONTINUED | OUTPATIENT
Start: 2024-12-20 | End: 2024-12-20

## 2024-12-20 RX ORDER — LIDOCAINE HYDROCHLORIDE AND EPINEPHRINE 10; 10 UG/ML; MG/ML
INJECTION, SOLUTION INFILTRATION; PERINEURAL AS NEEDED
Status: DISCONTINUED | OUTPATIENT
Start: 2024-12-20 | End: 2024-12-20 | Stop reason: HOSPADM

## 2024-12-20 RX ORDER — MEPERIDINE HYDROCHLORIDE 25 MG/ML
12.5 INJECTION INTRAMUSCULAR; INTRAVENOUS; SUBCUTANEOUS EVERY 10 MIN PRN
Status: DISCONTINUED | OUTPATIENT
Start: 2024-12-20 | End: 2024-12-20 | Stop reason: HOSPADM

## 2024-12-20 RX ORDER — OXYMETAZOLINE HCL 0.05 %
SPRAY, NON-AEROSOL (ML) NASAL AS NEEDED
Status: DISCONTINUED | OUTPATIENT
Start: 2024-12-20 | End: 2024-12-20 | Stop reason: HOSPADM

## 2024-12-20 RX ORDER — CEFAZOLIN 1 G/1
INJECTION, POWDER, FOR SOLUTION INTRAVENOUS AS NEEDED
Status: DISCONTINUED | OUTPATIENT
Start: 2024-12-20 | End: 2024-12-20

## 2024-12-20 RX ORDER — ROCURONIUM BROMIDE 10 MG/ML
INJECTION, SOLUTION INTRAVENOUS AS NEEDED
Status: DISCONTINUED | OUTPATIENT
Start: 2024-12-20 | End: 2024-12-20

## 2024-12-20 RX ORDER — PROPOFOL 10 MG/ML
INJECTION, EMULSION INTRAVENOUS AS NEEDED
Status: DISCONTINUED | OUTPATIENT
Start: 2024-12-20 | End: 2024-12-20

## 2024-12-20 RX ORDER — ONDANSETRON HYDROCHLORIDE 2 MG/ML
INJECTION, SOLUTION INTRAVENOUS AS NEEDED
Status: DISCONTINUED | OUTPATIENT
Start: 2024-12-20 | End: 2024-12-20

## 2024-12-20 RX ORDER — FENTANYL CITRATE 50 UG/ML
INJECTION, SOLUTION INTRAMUSCULAR; INTRAVENOUS AS NEEDED
Status: DISCONTINUED | OUTPATIENT
Start: 2024-12-20 | End: 2024-12-20

## 2024-12-20 RX ADMIN — OXYMETAZOLINE HYDROCHLORIDE 2 SPRAY: 0.5 SPRAY NASAL at 21:37

## 2024-12-20 RX ADMIN — THIAMINE HCL TAB 100 MG 100 MG: 100 TAB at 08:05

## 2024-12-20 RX ADMIN — MIRTAZAPINE 15 MG: 15 TABLET, FILM COATED ORAL at 22:57

## 2024-12-20 RX ADMIN — PANTOPRAZOLE SODIUM 40 MG: 40 TABLET, DELAYED RELEASE ORAL at 06:12

## 2024-12-20 RX ADMIN — ROPINIROLE 0.5 MG: 0.5 TABLET, FILM COATED ORAL at 14:07

## 2024-12-20 RX ADMIN — ROPINIROLE 0.5 MG: 0.5 TABLET, FILM COATED ORAL at 22:58

## 2024-12-20 RX ADMIN — MEPERIDINE HYDROCHLORIDE 12.5 MG: 25 INJECTION INTRAMUSCULAR; INTRAVENOUS; SUBCUTANEOUS at 18:57

## 2024-12-20 RX ADMIN — DOCUSATE SODIUM 100 MG: 100 CAPSULE, LIQUID FILLED ORAL at 22:57

## 2024-12-20 RX ADMIN — HEPARIN SODIUM 1500 UNITS/HR: 10000 INJECTION, SOLUTION INTRAVENOUS at 01:25

## 2024-12-20 RX ADMIN — GUAIFENESIN 600 MG: 600 TABLET ORAL at 22:57

## 2024-12-20 RX ADMIN — HYDROMORPHONE HYDROCHLORIDE 1 MG: 1 INJECTION, SOLUTION INTRAMUSCULAR; INTRAVENOUS; SUBCUTANEOUS at 03:54

## 2024-12-20 RX ADMIN — PREGABALIN 200 MG: 100 CAPSULE ORAL at 14:07

## 2024-12-20 RX ADMIN — PREGABALIN 200 MG: 100 CAPSULE ORAL at 08:05

## 2024-12-20 RX ADMIN — HYDROMORPHONE HYDROCHLORIDE 1 MG: 1 INJECTION, SOLUTION INTRAMUSCULAR; INTRAVENOUS; SUBCUTANEOUS at 07:59

## 2024-12-20 RX ADMIN — TAMSULOSIN HYDROCHLORIDE 0.4 MG: 0.4 CAPSULE ORAL at 08:05

## 2024-12-20 RX ADMIN — HYDROMORPHONE HYDROCHLORIDE 1 MG: 1 INJECTION, SOLUTION INTRAMUSCULAR; INTRAVENOUS; SUBCUTANEOUS at 12:56

## 2024-12-20 RX ADMIN — DOCUSATE SODIUM 100 MG: 100 CAPSULE, LIQUID FILLED ORAL at 08:05

## 2024-12-20 RX ADMIN — POLYETHYLENE GLYCOL 3350 17 G: 17 POWDER, FOR SOLUTION ORAL at 23:04

## 2024-12-20 RX ADMIN — HYDROMORPHONE HYDROCHLORIDE 0.5 MG: 1 INJECTION, SOLUTION INTRAMUSCULAR; INTRAVENOUS; SUBCUTANEOUS at 18:39

## 2024-12-20 RX ADMIN — FOLIC ACID 1 MG: 1 TABLET ORAL at 08:05

## 2024-12-20 RX ADMIN — FERROUS SULFATE TAB 325 MG (65 MG ELEMENTAL FE) 325 MG: 325 (65 FE) TAB at 08:05

## 2024-12-20 RX ADMIN — Medication 1 TABLET: at 08:05

## 2024-12-20 RX ADMIN — ATORVASTATIN CALCIUM 20 MG: 20 TABLET, FILM COATED ORAL at 22:57

## 2024-12-20 RX ADMIN — GUAIFENESIN 600 MG: 600 TABLET ORAL at 08:05

## 2024-12-20 RX ADMIN — FERROUS SULFATE TAB 325 MG (65 MG ELEMENTAL FE) 325 MG: 325 (65 FE) TAB at 22:57

## 2024-12-20 RX ADMIN — KETOROLAC TROMETHAMINE 15 MG: 15 INJECTION, SOLUTION INTRAMUSCULAR; INTRAVENOUS at 10:34

## 2024-12-20 RX ADMIN — MONTELUKAST 10 MG: 10 TABLET, FILM COATED ORAL at 08:05

## 2024-12-20 RX ADMIN — HYDROMORPHONE HYDROCHLORIDE 1 MG: 1 INJECTION, SOLUTION INTRAMUSCULAR; INTRAVENOUS; SUBCUTANEOUS at 22:37

## 2024-12-20 SDOH — HEALTH STABILITY: MENTAL HEALTH: CURRENT SMOKER: 0

## 2024-12-20 ASSESSMENT — PAIN DESCRIPTION - LOCATION
LOCATION: HEAD
LOCATION: OTHER (COMMENT)
LOCATION: GENERALIZED
LOCATION: HEAD
LOCATION: OTHER (COMMENT)
LOCATION: HEAD

## 2024-12-20 ASSESSMENT — PAIN - FUNCTIONAL ASSESSMENT

## 2024-12-20 ASSESSMENT — PAIN SCALES - GENERAL
PAINLEVEL_OUTOF10: 5 - MODERATE PAIN
PAINLEVEL_OUTOF10: 0 - NO PAIN
PAINLEVEL_OUTOF10: 9
PAIN_LEVEL: 0
PAINLEVEL_OUTOF10: 9
PAINLEVEL_OUTOF10: 9
PAINLEVEL_OUTOF10: 5 - MODERATE PAIN
PAINLEVEL_OUTOF10: 0 - NO PAIN
PAINLEVEL_OUTOF10: 8
PAINLEVEL_OUTOF10: 5 - MODERATE PAIN
PAINLEVEL_OUTOF10: 0 - NO PAIN
PAINLEVEL_OUTOF10: 8
PAINLEVEL_OUTOF10: 9
PAINLEVEL_OUTOF10: 0 - NO PAIN
PAINLEVEL_OUTOF10: 9

## 2024-12-20 ASSESSMENT — COGNITIVE AND FUNCTIONAL STATUS - GENERAL
TOILETING: A LITTLE
DRESSING REGULAR LOWER BODY CLOTHING: A LITTLE
MOVING TO AND FROM BED TO CHAIR: A LOT
STANDING UP FROM CHAIR USING ARMS: A LOT
DAILY ACTIVITIY SCORE: 19
TURNING FROM BACK TO SIDE WHILE IN FLAT BAD: A LITTLE
WALKING IN HOSPITAL ROOM: A LOT
DRESSING REGULAR UPPER BODY CLOTHING: A LITTLE
PERSONAL GROOMING: A LITTLE
HELP NEEDED FOR BATHING: A LITTLE
MOBILITY SCORE: 15
CLIMB 3 TO 5 STEPS WITH RAILING: A LOT

## 2024-12-20 ASSESSMENT — PAIN DESCRIPTION - ORIENTATION: ORIENTATION: MID

## 2024-12-20 NOTE — PROGRESS NOTES
Physical Therapy                 Therapy Communication Note    Patient Name: Gaurav Mckay  MRN: [id number]  Department: [facility] 55  Room: 5568/5568-A  Today's Date: 12/20/2024     Discipline: Physical Therapy    PT Missed Visit: Yes     Missed Visit Reason: Missed Visit Reason: Patient refused (pt awaiting pain medicine. Will reattempt as schedule permits.)    Missed Time: Attempt    Comment:

## 2024-12-20 NOTE — SIGNIFICANT EVENT
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 We will plan to consider him for a biopsy as an outpatient due to patient having a biopsy from bladder source this admission. We will follow the pathology from the biopsy and arrange to see the patient in clinic in 3-4 weeks to discuss next steps.    Recommend oncology evaluation and follow-up as well.    Neurosurgery will sign off inpatient. Please page with questions or concerns.

## 2024-12-20 NOTE — OP NOTE
"Cystoscopy bladder biopsy with fulguration operative Note     Date: 2024  OR Location: [facility] OR    Name: Gaurav Mckay \"Lemuel\", : 1970, Age: 54 y.o., MRN: [id number], Sex: male    Diagnosis  Pre-op Diagnosis      * Brain mass [G93.89] Post-op Diagnosis     * Brain mass [G93.89]     Procedures  Cystoscopy with bladder biopsy and fulguration    Surgeons   Panel 1:     * Kellee Hamilton V - Primary  Panel 2:     * Javan Hamilton - Primary    Resident/Fellow/Other Assistant:  Marc Damon MD urology resident    Staff:   Scrub Person: Inés  Circulator: Bre  Scrub Person: Ирина  Circulator: Gely Lizarraga Scrub: Bre    Anesthesia Staff: Anesthesiologist: Sascha Rizo DO; Linn Menendez MD  Anesthesia Resident: Reggie Swain MD  Frontline Breaker: ANASTACIA Amin-CARMEN    Procedure Summary  Anesthesia: General  ASA: III  Estimated Blood Loss: 1mL  Intra-op Medications:   Administrations occurring from 1409 to 1704 on 24:   Medication Name Total Dose   lidocaine-epinephrine (Xylocaine W/EPI) 1 %-1:100,000 injection 10 mL   oxymetazoline (Afrin) 0.05 % nasal spray 30 mL   sterile water irrigation solution 500 mL   albuterol 90 mcg/actuation inhaler 2 puff Cannot be calculated   atorvastatin (Lipitor) tablet 20 mg Cannot be calculated   cyclobenzaprine (Flexeril) tablet 10 mg Cannot be calculated   dextrose 50 % injection 12.5 g Cannot be calculated   dextrose 50 % injection 25 g Cannot be calculated   docusate sodium (Colace) capsule 100 mg Cannot be calculated   ferrous sulfate (325 mg ferrous sulfate) tablet 325 mg Cannot be calculated   folic acid (Folvite) tablet 1 mg Cannot be calculated   glucagon (Glucagen) injection 1 mg Cannot be calculated   glucagon (Glucagen) injection 1 mg Cannot be calculated   guaiFENesin (Mucinex) 12 hr tablet 600 mg Cannot be calculated   HYDROmorphone (Dilaudid) injection 0.5 mg Cannot be calculated   HYDROmorphone " (Dilaudid) injection 1 mg Cannot be calculated   ipratropium-albuteroL (Duo-Neb) 0.5-2.5 mg/3 mL nebulizer solution 3 mL Cannot be calculated   ketorolac (Toradol) injection 15 mg Cannot be calculated   lidocaine (Xylocaine) 2 % mouth solution 15 mL Cannot be calculated   lidocaine 4 % patch 1 patch Cannot be calculated   melatonin tablet 3 mg Cannot be calculated   mirtazapine (Remeron) tablet 15 mg Cannot be calculated   montelukast (Singulair) tablet 10 mg Cannot be calculated   naloxone (Narcan) injection 0.4 mg Cannot be calculated   nicotine (Nicoderm CQ) 14 mg/24 hr patch 1 patch Cannot be calculated   pancrelipase (Lip-Prot-Amyl) (Creon) 6,000-19,000 -30,000 unit per capsule 1 capsule Cannot be calculated   pantoprazole (ProtoNix) EC tablet 40 mg Cannot be calculated   polyethylene glycol (Glycolax, Miralax) packet 17 g Cannot be calculated   pregabalin (Lyrica) capsule 200 mg Cannot be calculated   rOPINIRole (Requip) tablet 0.5 mg Cannot be calculated   tamsulosin (Flomax) 24 hr capsule 0.4 mg Cannot be calculated   thiamine (Vitamin B-1) tablet 100 mg Cannot be calculated   topiramate (Topamax) tablet 100 mg Cannot be calculated   ceFAZolin (Ancef) vial 1 g 2 g   fentaNYL (Sublimaze) injection 50 mcg/mL 100 mcg   LR infusion Cannot be calculated   lidocaine (Xylocaine) injection 2 % 30 mg   midazolam PF (Versed) injection 1 mg/mL 2 mg   propofol (Diprivan) injection 10 mg/mL 160 mg   rocuronium (ZeMuron) 50 mg/5 mL injection 70 mg              Anesthesia Record               Intraprocedure I/O Totals          Intake    LR infusion 200.00 mL    Total Intake 200 mL       Output    Urine 240 mL    Est. Blood Loss 76 mL    Total Output 316 mL       Net    Net Volume -116 mL          Specimen:   ID Type Source Tests Collected by Time   1 : LEFT SPHENOID MASS BIOPSY #1 Tissue NASAL MASS LEFT BIOPSY SURGICAL PATHOLOGY EXAM Kellee MURILLO MD 12/20/2024 7187   2 : LEFT SPHENOID MASS BIOPSY #2 Tissue NASAL  "MASS LEFT BIOPSY SURGICAL PATHOLOGY EXAM Kellee MURILLO MD 12/20/2024 1643   3 : SKULL BASE MASS Tissue SOFT TISSUE MASS RESECTION SURGICAL PATHOLOGY EXAM Kellee MURILLO MD 12/20/2024 1649   4 : Bladder biopsy Tissue BLADDER BIOPSY SURGICAL PATHOLOGY EXAM Marc Damon MD 12/20/2024 1735         Drains and/or Catheters:   Urethral Catheter Non-latex 16 Fr. (Active)     Findings: Inflammatory changes associated with catheter on posterior bladder wall with small areas of hematoma.  This area was biopsied.  No other lesions or abnormalities.    Indications: Gaurav Mckay \"Lemuel\" is an 54 y.o. male who is having surgery for Brain mass [G93.89].     The patient was seen in the preoperative area. The risks, benefits, complications, treatment options, non-operative alternatives, expected recovery and outcomes were discussed with the patient. The possibilities of reaction to medication, pulmonary aspiration, injury to surrounding structures, bleeding, recurrent infection, the need for additional procedures, failure to diagnose a condition, and creating a complication requiring transfusion or operation were discussed with the patient. The patient concurred with the proposed plan, giving informed consent.  The site of surgery was properly noted/marked if necessary per policy. The patient has been actively warmed in preoperative area. Preoperative antibiotics have been ordered and given within 1 hours of incision. Venous thrombosis prophylaxis have been ordered including bilateral sequential compression devices    Procedure Details:     Patient underwent operative procedure with ENT for clival mass biopsy.  Following conclusion of their procedure patient was turned over to urology service for our indicated procedure.    Patient had already undergone anesthesia, and was in supine position on OR table.  An institutional timeout was performed confirming patient and indicated procedure.  Patient was then " positioned in dorsal lithotomy and prepped and draped in typical sterile fashion.     A 21 Omani rigid cystoscope was used to perform cystourethroscopy.  Urethra was normal.  Prostate was mildly enlarged with mild obstruction, noted small median bar.  Upon entry into the bladder no stones were noted.  A thorough cystoscopy was performed.  No overt bladder masses were identified.  Along the posterior wall of the bladder there was inflammatory changes with small areas of hematoma that appeared similar to catheter inflammatory changes.  This area was biopsied using rigid biopsy forceps.  Biopsy samples were sent off the field for permanent pathologic review.  There was no concern for perforation.  Muscle was seen at the base of the biopsy site.  Using Bugbee electrocautery the biopsy site was fulgurated.  Meticulous hemostasis was obtained confirmed with decompressed bladder.  Bladder was slightly distended and scope was removed.  16 Omani Stephen catheter was placed with ease.  Bladder was drained.  This concluded the procedure.  Patient tolerated it well.  Patient awoken from anesthesia and taken to PACU in stable condition.      Complications:  None; patient tolerated the procedure well.    Disposition: PACU - hemodynamically stable.  Condition: stable       Additional Details: Patient to follow-up with Dr. Murcia for pathology review in ~2wks.     Attending Attestation:   I was present during all critical and key portions of the procedure(s) and immediately available to furnish services the entire duration.  See resident note for details.     MD Javan Green MD

## 2024-12-20 NOTE — ANESTHESIA POSTPROCEDURE EVALUATION
"Patient: Gaurav Mckay \"Lemuel\"    Procedure Summary       Date: 12/20/24 Room / Location: [facility] OR 03 / [facility]    Anesthesia Start: 1535 Anesthesia Stop: 1805    Procedures:       Nasal Endoscopy with Navigation      Cystoscopy w/Bladder Biopsy (Bladder)      Cystoscopy with Fulgeration (Bladder) Diagnosis:       Brain mass      (Brain mass [G93.89])    Surgeons: Kellee MURILLO MD; Javan Hamilton MD Responsible Provider: Sascha Rizo DO    Anesthesia Type: general ASA Status: 3            Anesthesia Type: general    Vitals Value Taken Time   /87 12/20/24 1817   Temp 36.0 12/20/24 1817   Pulse 77 12/20/24 1817   Resp 16 12/20/24 1817   SpO2 98 12/20/24 1817       Anesthesia Post Evaluation    Patient location during evaluation: PACU  Patient participation: complete - patient participated  Level of consciousness: awake  Pain score: 0  Pain management: adequate  Airway patency: patent  Cardiovascular status: acceptable and hemodynamically stable  Respiratory status: acceptable and face mask  Hydration status: acceptable  Postoperative Nausea and Vomiting: none        No notable events documented.    "

## 2024-12-20 NOTE — SIGNIFICANT EVENT
Patient OR s/p nasal endoscopy, Left transnasal biopsy of clival mass. 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     Post-op recs:  -Consult med-onc and rad-onc to establish care and arrange outpatient follow-up  -ENT to arrange outpatient follow-up  -Patient may use ocean spray q2h and as needed  -May use Afrin nasal sprays in the event of epistaxis; Call ENT for any significant bleeding  -May resume heparin drip tomorrow morning

## 2024-12-20 NOTE — PROGRESS NOTES
"Gaurav Mckay is a 54 y.o. male on day 5 of admission presenting with Brain mass.    Subjective   No acute overnight events. Is currently NPO for biopsy today. Continues to endorse headache. Denies chest pain, fever, chills, nausea and vomiting.       Objective     Physical Exam  Constitutional:       Appearance: Normal appearance.   HENT:      Head: Normocephalic and atraumatic.      Nose: Nose normal.      Mouth/Throat:      Mouth: Mucous membranes are moist.   Eyes:      Conjunctiva/sclera: Conjunctivae normal.   Cardiovascular:      Rate and Rhythm: Normal rate and regular rhythm.   Pulmonary:      Effort: Pulmonary effort is normal. No respiratory distress.      Breath sounds: Normal breath sounds. No wheezing.      Comments: On 3L NC  Abdominal:      General: Bowel sounds are normal.      Palpations: Abdomen is soft.      Tenderness: There is no abdominal tenderness. There is no guarding.   Genitourinary:     Comments: Stephen in place draining clear yellow   Musculoskeletal:         General: No swelling. Normal range of motion.      Cervical back: Normal range of motion and neck supple.   Skin:     General: Skin is warm.   Neurological:      General: No focal deficit present.      Mental Status: He is alert. Mental status is at baseline.   Psychiatric:         Mood and Affect: Mood normal.         Behavior: Behavior normal.         Thought Content: Thought content normal.         Judgment: Judgment normal.         Last Recorded Vitals  Blood pressure 124/73, pulse 64, temperature 36.4 °C (97.5 °F), resp. rate 17, height 1.778 m (5' 10\"), weight 83 kg (183 lb), SpO2 97%.  Intake/Output last 3 Shifts:  I/O last 3 completed shifts:  In: 3616.4 (43.6 mL/kg) [P.O.:2880; I.V.:736.4 (8.9 mL/kg)]  Out: 1350 (16.3 mL/kg) [Urine:1350 (0.5 mL/kg/hr)]  Weight: 83 kg     Relevant Results  Scheduled medications  atorvastatin, 20 mg, oral, Nightly  docusate sodium, 100 mg, oral, BID  ferrous sulfate (325 mg ferrous " sulfate), 65 mg of iron, oral, BID  folic acid, 1 mg, oral, Daily  guaiFENesin, 600 mg, oral, BID  lidocaine, 15 mL, oral, Before meals & nightly  lidocaine, 1 patch, transdermal, Daily  mirtazapine, 15 mg, oral, Nightly  montelukast, 10 mg, oral, Daily  multivitamin with minerals, 1 tablet, oral, Daily  nicotine, 1 patch, transdermal, Daily  oxygen, , inhalation, Continuous - Inhalation  pancrelipase (Lip-Prot-Amyl), 1 capsule, oral, BID  pantoprazole, 40 mg, oral, Daily before breakfast  polyethylene glycol, 17 g, oral, BID  pregabalin, 200 mg, oral, TID  rOPINIRole, 0.5 mg, oral, TID  tamsulosin, 0.4 mg, oral, Daily  thiamine, 100 mg, oral, Daily  topiramate, 100 mg, oral, BID      Continuous medications     PRN medications  PRN medications: albuterol, cyclobenzaprine, dextrose, dextrose, glucagon, glucagon, HYDROmorphone, HYDROmorphone, ipratropium-albuteroL, ketorolac, LORazepam **OR** LORazepam **OR** LORazepam, melatonin, naloxone, ondansetron ODT **OR** ondansetron     Results for orders placed or performed during the hospital encounter of 12/15/24 (from the past 24 hours)   POCT GLUCOSE   Result Value Ref Range    POCT Glucose 164 (H) 74 - 99 mg/dL   POCT GLUCOSE   Result Value Ref Range    POCT Glucose 127 (H) 74 - 99 mg/dL   CBC   Result Value Ref Range    WBC 5.5 4.4 - 11.3 x10*3/uL    nRBC 0.0 0.0 - 0.0 /100 WBCs    RBC 4.39 (L) 4.50 - 5.90 x10*6/uL    Hemoglobin 12.4 (L) 13.5 - 17.5 g/dL    Hematocrit 37.5 (L) 41.0 - 52.0 %    MCV 85 80 - 100 fL    MCH 28.2 26.0 - 34.0 pg    MCHC 33.1 32.0 - 36.0 g/dL    RDW 17.0 (H) 11.5 - 14.5 %    Platelets 358 150 - 450 x10*3/uL   Magnesium   Result Value Ref Range    Magnesium 2.07 1.60 - 2.40 mg/dL   Renal Function Panel   Result Value Ref Range    Glucose 104 (H) 74 - 99 mg/dL    Sodium 137 136 - 145 mmol/L    Potassium 3.6 3.5 - 5.3 mmol/L    Chloride 99 98 - 107 mmol/L    Bicarbonate 28 21 - 32 mmol/L    Anion Gap 14 10 - 20 mmol/L    Urea Nitrogen 7 6 - 23  mg/dL    Creatinine 0.76 0.50 - 1.30 mg/dL    eGFR >90 >60 mL/min/1.73m*2    Calcium 9.8 8.6 - 10.6 mg/dL    Phosphorus 4.8 2.5 - 4.9 mg/dL    Albumin 3.3 (L) 3.4 - 5.0 g/dL   POCT GLUCOSE   Result Value Ref Range    POCT Glucose 114 (H) 74 - 99 mg/dL   POCT GLUCOSE   Result Value Ref Range    POCT Glucose 118 (H) 74 - 99 mg/dL        Assessment/Plan   Assessment & Plan  Brain mass      Gaurav Mckay is a 54 y.o. male with a PMH of laryngeal cancer s/p radical neck dissection, PE on Apixaban, alcohol abuse, and HTN who presented to [facility] as a transfer from [facility] due to need for urology consult as well as neurosurgery consult due to incidental finding of brain mass. Pt was a direct admit to OSH on 12/13 after he presented to [facility] for surgical clearance for a scheduled cystoscopy with ablation of tissue on 12/17/24 secondary to a bladder tumor. Stephen cath placed due to urinary retention at OSH. Pt arrived in stable condition from OSH. Neurosurgery and Urology consulted and following. Pt admitted to medicine service for further treatment and management of brain mass and bladder tumor. Plan for ENT to take patient for Brain mass biopsy and Urology for bladder biopsy today 12/20.      #Brain mass  #Headache with Visual Changes  #Right sixth nerve palsy   #Pharyngeal dysphagia   - CTH: concerning for a large destructive lytic soft tissue mass upon the clivus that protrudes into the sella turcica and bilateral cavernous sinuses and erodes bilateral petrous carotid canals.   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    -Ddx to include chordoma, chondrosarcoma, plasmacytoma.    - No other focal deficits except for right eye deviation which he states is not new   - Neurosurgery consulted, appreciate recs.       :: Planning potential biopsy   - Ophthalmology consulted. Appreciate recs.        :: Recommend monocular occlusion as needed for symptomatic relief. Can consider strabismus surgery outpatient in the long-term   - ENT consulted. Appreciate recs.      :: Plan for biopsy brain and bladder mass biopsy on 12/20    - CT sinus and CT angio Head/Neck 12/17: Large 3.4 cm mass located within the clivus and sphenoid bone with surrounding extension including into the sphenoid sinus posteriorly, causing destruction of the petrous segments of the bilateral temporal bones medially, and protruding into the cavernous sinuses and circumferentially surrounding the right internal carotid artery causing mild luminal narrowing  -CT CAP on 12/15: Interval development of new centrilobular ground-glass nodular  densities in both lungs, predominantly in the right upper and middle lobes. Small amount of retained secretions noted in the trachea. These findings in the setting of a patulous and fluid-filled esophagus favors sequela of reflux/aspiration.  -CT thoracic 12/15: No thoracic spine metastatic disease   -CT lumbar 12/15: No metastatic disease to the lumbar spine.   -CT cervical 12/15: Abnormal enhancement involving the spinous process of C6 and the interspinous ligaments at C5-6 and C6-7. This has an appearance more favorable for inflammatory process over malignancy  -Stopped heparin gtt in anticipation of surgery   -Hypoglycemic protocol with POCT glucose every 6 hrs until patient tolerates Pureed diet   -SLP consulted. Appreciate recs.       :: Pureed along with Mildly/nectar thick liquids   -Pain control: Dilaudid 0.5 mg every 4 hrs PRN moderate pain; Dilaudid 1.0 mg every 4 hrs PRN severe pain first line; Toradol 15 mg every 8 hrs PRN  severe pain second line     #Bladder Tumor  #Urinary retention  -Was scheduled for cystoscopy with tissue ablation on 12/17/24 by Dr. Murcia  -Urology consulted, appreciate recs.     :: Will coordinate with ENT for bladder biopsy/at same time as brain biopsy   - Stephen cath in place  - C/w Flomax 0.4 mg PO QD     #Alcohol Use Disorder  -Cessation advised  -Last drink was approximately 3AM on 12/13/24.   -CIWA discontinued 12/17  - c/w Thiamine, Folate, and MVI PO QD  - pt interested in attending rehab on discharge     #Chronic Hypoxic Respiratory Failure  #COPD  #HANY  - Continue bronchial hygiene, duonebs, mucinex  - Wean O2 as tolerated (per chart review, does not seem like he was on O2 at home)     #HTN  #orthostatic hypotension  - BP on admit 118/81  - continue to monitor BP  - no home meds     #Hx of Pulmonary Embolism (?requiring 3L NC since although unclear per chart review)  -S/p embolization 11/2023  -Provoked by surgery  -holding home dose of Eliquis d/t upcoming procedure  -Heparin gtt stopped in anticipation of surgery today      #Tobacco Use Disorder  - nicotine patch ordered      #Decreased strength  - PT rec: Moderate intensity level of continued care 4x /week   - OT rec: Moderate intensity level of continued care 3x/week    #Dispo  - Pending brain and bladder biopsy  - Pending SNF placement         Fluids: PRN  Electrolytes: Keep K>4, Mg>3, Phos>3  Nutrition: Pureed with Mildly/nectar thick liquids   Abx: N/A  DVT: Stopped Heparin gtt in anticipation for surgery      Patient discussed with attending physician, Dr. Wasserman.      Bre Chu MD   PGY2, Family Medicine   [facility]  Available by Lorena Gaxiola

## 2024-12-20 NOTE — INTERVAL H&P NOTE
H&P reviewed. The patient was examined and there are no changes to the H&P.    Marc Damon MD  Urology Resident (PGY-5)  Adult Pager [phone]  Pediatric Pager [phone]

## 2024-12-20 NOTE — PROGRESS NOTES
Gaurav Mckay is a 54 y.o. male on day 5 of admission presenting with Brain mass.      SW consulted to assist with dc planning on 12/18/24.    SW informed by [facility] that they are able to accept pt for SNF.  SW awaiting acceptance from [facility] for Rehab, which pt has gone to before.    SW informed by med team that pt will have a biopsy today, which will impact his adod.    UPDATE: SW met at bedside with pt to inform him of [facility] accepting him, and [facility] deciding. Odell also suggested [facility] and [facility] SNFs, as they are sister facilities that allow smoking.  Pt stated he would like to go to [facility].  SW informed med team.    SW will follow.    Pt adod: TBD  Current dc plan: SNF    Sherrill Clarke MSW LSW  Care Transitions  2  [phone] or Epic Secure Chat

## 2024-12-20 NOTE — OP NOTE
"Bilateral Nasal Endoscopy with Navigation, Left Sphenoidotomy Operative Note     Date: 12/15/2024 - 2024  OR Location: [facility] OR    Name: Gaurav Mckay \"Lemuel\", : 1970, Age: 54 y.o., MRN: [id number], Sex: male    Diagnosis  Pre-op Diagnosis      * Brain mass [G93.89] Post-op Diagnosis     * Brain mass [G93.89]     Procedures  Bilateral Nasal Endoscopy with Navigation  Bilateral inferior turbinate outfracture  Left Transnasal Sphenoidotomy    Surgeons   Panel 1:     * Kellee Hamilton V - Primary  Panel 2:     * Javan Hamilton - Primary    Resident/Fellow/Other Assistant:  Surgeons and Role:  * No surgeons found with a matching role *  Carol Ceja    Staff:   Scrub Person: Inés  Circulator: Bre  Scrub Person: Ирина  Circulator: Gely Lizarraga Scrub: Bre    Anesthesia Staff: Anesthesiologist: Sascha Rizo DO; Linn Menendez MD  Anesthesia Resident: Reggie Swain MD  Frontline Breaker: ANASTACIA Amin-CARMEN    Procedure Summary  Anesthesia: General  ASA: III  Estimated Blood Loss: 75mL  Intra-op Medications:   Administrations occurring from 1409 to 1704 on 24:   Medication Name Total Dose   lidocaine-epinephrine (Xylocaine W/EPI) 1 %-1:100,000 injection 10 mL   oxymetazoline (Afrin) 0.05 % nasal spray 30 mL   sterile water irrigation solution 500 mL   albuterol 90 mcg/actuation inhaler 2 puff Cannot be calculated   atorvastatin (Lipitor) tablet 20 mg Cannot be calculated   ceFAZolin (Ancef) vial 1 g 2 g   cyclobenzaprine (Flexeril) tablet 10 mg Cannot be calculated   dextrose 50 % injection 12.5 g Cannot be calculated   dextrose 50 % injection 25 g Cannot be calculated   docusate sodium (Colace) capsule 100 mg Cannot be calculated   fentaNYL (Sublimaze) injection 50 mcg/mL 100 mcg   ferrous sulfate (325 mg ferrous sulfate) tablet 325 mg Cannot be calculated   folic acid (Folvite) tablet 1 mg Cannot be calculated   glucagon (Glucagen) injection 1 mg " Cannot be calculated   glucagon (Glucagen) injection 1 mg Cannot be calculated   guaiFENesin (Mucinex) 12 hr tablet 600 mg Cannot be calculated   HYDROmorphone (Dilaudid) injection 0.5 mg Cannot be calculated   HYDROmorphone (Dilaudid) injection 1 mg Cannot be calculated   ipratropium-albuteroL (Duo-Neb) 0.5-2.5 mg/3 mL nebulizer solution 3 mL Cannot be calculated   ketorolac (Toradol) injection 15 mg Cannot be calculated   lidocaine (Xylocaine) 2 % mouth solution 15 mL Cannot be calculated   lidocaine (Xylocaine) injection 2 % 30 mg   lidocaine 4 % patch 1 patch Cannot be calculated   melatonin tablet 3 mg Cannot be calculated   midazolam PF (Versed) injection 1 mg/mL 2 mg   mirtazapine (Remeron) tablet 15 mg Cannot be calculated   montelukast (Singulair) tablet 10 mg Cannot be calculated   naloxone (Narcan) injection 0.4 mg Cannot be calculated   nicotine (Nicoderm CQ) 14 mg/24 hr patch 1 patch Cannot be calculated   pancrelipase (Lip-Prot-Amyl) (Creon) 6,000-19,000 -30,000 unit per capsule 1 capsule Cannot be calculated   pantoprazole (ProtoNix) EC tablet 40 mg Cannot be calculated   polyethylene glycol (Glycolax, Miralax) packet 17 g Cannot be calculated   pregabalin (Lyrica) capsule 200 mg Cannot be calculated   propofol (Diprivan) injection 10 mg/mL 160 mg   rocuronium (ZeMuron) 50 mg/5 mL injection 70 mg   rOPINIRole (Requip) tablet 0.5 mg Cannot be calculated   tamsulosin (Flomax) 24 hr capsule 0.4 mg Cannot be calculated   thiamine (Vitamin B-1) tablet 100 mg Cannot be calculated   topiramate (Topamax) tablet 100 mg Cannot be calculated              Anesthesia Record               Intraprocedure I/O Totals          Output    Urine 240 mL    Total Output 240 mL          Specimen:   ID Type Source Tests Collected by Time   1 : LEFT SPHENOID MASS BIOPSY #1 Tissue NASAL MASS LEFT BIOPSY SURGICAL PATHOLOGY EXAM Kellee MURILLO MD 12/20/2024 4082   2 : LEFT SPHENOID MASS BIOPSY #2 Tissue NASAL MASS LEFT  "BIOPSY SURGICAL PATHOLOGY EXAM Kellee MURILLO MD 12/20/2024 1643   3 : SKULL BASE MASS Tissue SOFT TISSUE MASS RESECTION SURGICAL PATHOLOGY EXAM Kellee MURILLO MD 12/20/2024 1649       Drains and/or Catheters:   Urethral Catheter Straight-tip 16 Fr. (Active)   Site Assessment Clean;Skin intact 12/20/24 1158   Collection Container Standard drainage bag 12/19/24 1000   Securement Method Securing device (Describe) 12/19/24 1000   Reason for Continuing Urinary Catheterization chronic urinary retention 12/19/24 1000   Output (mL) 1050 mL 12/20/24 1308       Findings: Significant inflammation of the left sphenoid sinus with tumor seen deep to it; Biopsies were taken and on frozen analysis, were positive for malignancy, possibly squamous cell carcinoma vs sinonasal carcinoma.     Indications: Gaurav Mckay \"Lucy" is an 54 y.o. male who is having surgery for Brain mass [G93.89].     The patient was seen in the preoperative area. The risks, benefits, complications, treatment options, non-operative alternatives, expected recovery and outcomes were discussed with the patient. The possibilities of reaction to medication, pulmonary aspiration, injury to surrounding structures, bleeding, recurrent infection, the need for additional procedures, failure to diagnose a condition, and creating a complication requiring transfusion or operation were discussed with the patient. The patient concurred with the proposed plan, giving informed consent.  The site of surgery was properly noted/marked if necessary per policy. The patient has been actively warmed in preoperative area. Preoperative antibiotics have been ordered and given within 1 hours of incision. Venous thrombosis prophylaxis have been ordered including bilateral sequential compression devices    Procedure Details:   The patient was brought into the operating room and laid in a supine position on the operating room table. A surgical huddle was conducted to " verify the patient and the procedure to be performed. General anesthesia was induced and the patient's airway was secured with an ET tube. A time out was called. The nasal cavities were sprayed with 0.5% Afrin. The patient was prepped and draped in the usual sterile fashion. The right and left nasal cavities were then injected with lidocaine 1% with 1:100,000 epinephrine. Next, image guidance was attached and registered. The image guidance was used through the procedure for identification of critical landmarks. Once the image guidance was calibrated, the nasal cavities were examined with a 0 degree endoscope.    Using a zero degree endoscope for visualization and a [place] elevator, the inferior turbinates were out fractured bilaterally. Next, using the [place] elevator on the left, the middle turbinate was lateralized until we could visualize the superior turbinate. The superior turbinate was lateralized as well. A sphenoid sinus seeker was used to identify the sphenoid sinus os. The sphenoid sinus punch and kerrison rongeurs were used to widen the os. At this point there was bleeding noted to be coming through the sphenoid sinus. We also noted significant inflammation and hypertrophy of the mucosa. Biopsies of this were taken, which were negative for malignancy. Deep to this, we noted more friable tissue along the backwall of the sphenoid sinus. Biopsies were taken and on frozen analysis, were positive for malignancy, possibly squamous cell carcinoma vs sinonasal carcinoma. More deeper biopsies were taken. The middle turbinate was medialized.    The nasopharynx was then suctioned and the nose was copiously irrigated with normal saline. It was inspected for any bleeding and none was noted. Floseal and surgicell were applied to ensure hemostasis. This completed the surgical procedure. The patient was turned over to the Urology team for their portion of the procedure.     Dr. Hamilton was present for the entirety of  the procedure.    Complications:  None; patient tolerated the procedure well.    Disposition: PACU - hemodynamically stable.  Condition: stable

## 2024-12-20 NOTE — CARE PLAN
The clinical goals for the shift include patient will have procedure by end of shift 12/20/24 1900    Over the shift, the patient did make progress toward the following goals. Patient currently in [place] OR awaiting report from post-op.

## 2024-12-20 NOTE — ANESTHESIA PROCEDURE NOTES
Airway  Date/Time: 12/20/2024 3:48 PM  Urgency: elective    Airway not difficult    Staffing  Performed: resident   Authorized by: Reggie Swain MD    Performed by: Reggie Swain MD  Patient location during procedure: OR    Indications and Patient Condition  Indications for airway management: anesthesia  Spontaneous Ventilation: absent  Sedation level: deep  Preoxygenated: yes  Patient position: sniffing  Mask difficulty assessment: 3 - difficult mask (inadequate, unstable or two providers) +/- NMBA  Planned trial extubation    Final Airway Details  Final airway type: endotracheal airway      Successful airway: ETT  Cuffed: yes   Successful intubation technique: video laryngoscopy  Facilitating devices/methods: intubating stylet  Endotracheal tube insertion site: oral  Blade: Abiola  Blade size: #4  ETT size (mm): 7.0  Cormack-Lehane Classification: grade I - full view of glottis  Placement verified by: chest auscultation and capnometry   Measured from: lips  ETT to lips (cm): 21  Number of attempts at approach: 1

## 2024-12-20 NOTE — CARE PLAN
The patient's goals for the shift include      The clinical goals for the shift include Patient will remain safe and free from falls and injury by 1900 on 12/19/24.    Over the shift, the patient did not make progress toward the following goals. Barriers to progression include encouraging and reminding patient to use call light before attempting to get out of bed and wait for staff assisstance . Recommendations to address these barriers include Q2H purposeful rounding.

## 2024-12-20 NOTE — PROGRESS NOTES
Endoscopic transsphenoidal biopsy performed this afternoon-- frozen pathology consistent with malignancy, favor squamous cell carcinoma vs. SNUC. Permanent evaluation to follow. Spoke with significant other about results.    Recommend close follow up with med/onc and rad/onc.

## 2024-12-21 LAB
ALBUMIN SERPL BCP-MCNC: 3.3 G/DL (ref 3.4–5)
ANION GAP SERPL CALC-SCNC: 13 MMOL/L (ref 10–20)
BUN SERPL-MCNC: 8 MG/DL (ref 6–23)
CALCIUM SERPL-MCNC: 9.4 MG/DL (ref 8.6–10.6)
CHLORIDE SERPL-SCNC: 101 MMOL/L (ref 98–107)
CO2 SERPL-SCNC: 26 MMOL/L (ref 21–32)
CREAT SERPL-MCNC: 0.71 MG/DL (ref 0.5–1.3)
EGFRCR SERPLBLD CKD-EPI 2021: >90 ML/MIN/1.73M*2
ERYTHROCYTE [DISTWIDTH] IN BLOOD BY AUTOMATED COUNT: 17.2 % (ref 11.5–14.5)
GLUCOSE BLD MANUAL STRIP-MCNC: 117 MG/DL (ref 74–99)
GLUCOSE BLD MANUAL STRIP-MCNC: 133 MG/DL (ref 74–99)
GLUCOSE BLD MANUAL STRIP-MCNC: 187 MG/DL (ref 74–99)
GLUCOSE SERPL-MCNC: 109 MG/DL (ref 74–99)
HCT VFR BLD AUTO: 35.8 % (ref 41–52)
HGB BLD-MCNC: 11 G/DL (ref 13.5–17.5)
MAGNESIUM SERPL-MCNC: 2.18 MG/DL (ref 1.6–2.4)
MCH RBC QN AUTO: 27.6 PG (ref 26–34)
MCHC RBC AUTO-ENTMCNC: 30.7 G/DL (ref 32–36)
MCV RBC AUTO: 90 FL (ref 80–100)
NRBC BLD-RTO: 0 /100 WBCS (ref 0–0)
PHOSPHATE SERPL-MCNC: 4.4 MG/DL (ref 2.5–4.9)
PLATELET # BLD AUTO: 332 X10*3/UL (ref 150–450)
POTASSIUM SERPL-SCNC: 3.7 MMOL/L (ref 3.5–5.3)
RBC # BLD AUTO: 3.98 X10*6/UL (ref 4.5–5.9)
SODIUM SERPL-SCNC: 136 MMOL/L (ref 136–145)
WBC # BLD AUTO: 5.9 X10*3/UL (ref 4.4–11.3)

## 2024-12-21 PROCEDURE — 2500000004 HC RX 250 GENERAL PHARMACY W/ HCPCS (ALT 636 FOR OP/ED): Performed by: STUDENT IN AN ORGANIZED HEALTH CARE EDUCATION/TRAINING PROGRAM

## 2024-12-21 PROCEDURE — 2500000001 HC RX 250 WO HCPCS SELF ADMINISTERED DRUGS (ALT 637 FOR MEDICARE OP): Performed by: INTERNAL MEDICINE

## 2024-12-21 PROCEDURE — 2500000001 HC RX 250 WO HCPCS SELF ADMINISTERED DRUGS (ALT 637 FOR MEDICARE OP): Performed by: STUDENT IN AN ORGANIZED HEALTH CARE EDUCATION/TRAINING PROGRAM

## 2024-12-21 PROCEDURE — 36415 COLL VENOUS BLD VENIPUNCTURE: CPT | Performed by: PODIATRIST

## 2024-12-21 PROCEDURE — 2500000002 HC RX 250 W HCPCS SELF ADMINISTERED DRUGS (ALT 637 FOR MEDICARE OP, ALT 636 FOR OP/ED): Performed by: STUDENT IN AN ORGANIZED HEALTH CARE EDUCATION/TRAINING PROGRAM

## 2024-12-21 PROCEDURE — 1100000001 HC PRIVATE ROOM DAILY

## 2024-12-21 PROCEDURE — 83735 ASSAY OF MAGNESIUM: CPT | Performed by: PODIATRIST

## 2024-12-21 PROCEDURE — 82947 ASSAY GLUCOSE BLOOD QUANT: CPT

## 2024-12-21 PROCEDURE — 2500000001 HC RX 250 WO HCPCS SELF ADMINISTERED DRUGS (ALT 637 FOR MEDICARE OP): Performed by: PODIATRIST

## 2024-12-21 PROCEDURE — 2500000005 HC RX 250 GENERAL PHARMACY W/O HCPCS: Performed by: NURSE PRACTITIONER

## 2024-12-21 PROCEDURE — 80069 RENAL FUNCTION PANEL: CPT | Performed by: PODIATRIST

## 2024-12-21 PROCEDURE — 2500000005 HC RX 250 GENERAL PHARMACY W/O HCPCS: Performed by: STUDENT IN AN ORGANIZED HEALTH CARE EDUCATION/TRAINING PROGRAM

## 2024-12-21 PROCEDURE — 85027 COMPLETE CBC AUTOMATED: CPT | Performed by: PODIATRIST

## 2024-12-21 PROCEDURE — 99233 SBSQ HOSP IP/OBS HIGH 50: CPT | Performed by: INTERNAL MEDICINE

## 2024-12-21 PROCEDURE — S4991 NICOTINE PATCH NONLEGEND: HCPCS | Performed by: STUDENT IN AN ORGANIZED HEALTH CARE EDUCATION/TRAINING PROGRAM

## 2024-12-21 PROCEDURE — 2500000004 HC RX 250 GENERAL PHARMACY W/ HCPCS (ALT 636 FOR OP/ED): Performed by: INTERNAL MEDICINE

## 2024-12-21 RX ORDER — POLYETHYLENE GLYCOL 3350 17 G/17G
17 POWDER, FOR SOLUTION ORAL EVERY 8 HOURS
Status: DISCONTINUED | OUTPATIENT
Start: 2024-12-21 | End: 2024-12-26 | Stop reason: HOSPADM

## 2024-12-21 RX ORDER — BISACODYL 5 MG
10 TABLET, DELAYED RELEASE (ENTERIC COATED) ORAL DAILY
Status: DISCONTINUED | OUTPATIENT
Start: 2024-12-21 | End: 2024-12-26 | Stop reason: HOSPADM

## 2024-12-21 RX ORDER — BUTALBITAL, ACETAMINOPHEN AND CAFFEINE 50; 325; 40 MG/1; MG/1; MG/1
1 TABLET ORAL ONCE
Status: COMPLETED | OUTPATIENT
Start: 2024-12-21 | End: 2024-12-21

## 2024-12-21 RX ORDER — FERROUS SULFATE 325(65) MG
65 TABLET ORAL EVERY OTHER DAY
Status: DISCONTINUED | OUTPATIENT
Start: 2024-12-22 | End: 2024-12-26 | Stop reason: HOSPADM

## 2024-12-21 RX ORDER — SENNOSIDES 8.6 MG/1
2 TABLET ORAL 2 TIMES DAILY
Status: DISCONTINUED | OUTPATIENT
Start: 2024-12-21 | End: 2024-12-26 | Stop reason: HOSPADM

## 2024-12-21 RX ORDER — OXYCODONE HYDROCHLORIDE 5 MG/1
5 TABLET ORAL EVERY 4 HOURS PRN
Status: DISCONTINUED | OUTPATIENT
Start: 2024-12-21 | End: 2024-12-22

## 2024-12-21 RX ADMIN — SENNOSIDES 17.2 MG: 8.6 TABLET, FILM COATED ORAL at 12:02

## 2024-12-21 RX ADMIN — SALINE NASAL SPRAY 2 SPRAY: 1.5 SOLUTION NASAL at 15:53

## 2024-12-21 RX ADMIN — MONTELUKAST 10 MG: 10 TABLET, FILM COATED ORAL at 09:04

## 2024-12-21 RX ADMIN — ROPINIROLE 0.5 MG: 0.5 TABLET, FILM COATED ORAL at 20:33

## 2024-12-21 RX ADMIN — Medication 3 L/MIN: at 09:31

## 2024-12-21 RX ADMIN — HYDROMORPHONE HYDROCHLORIDE 1 MG: 1 INJECTION, SOLUTION INTRAMUSCULAR; INTRAVENOUS; SUBCUTANEOUS at 20:32

## 2024-12-21 RX ADMIN — HYDROMORPHONE HYDROCHLORIDE 1 MG: 1 INJECTION, SOLUTION INTRAMUSCULAR; INTRAVENOUS; SUBCUTANEOUS at 03:57

## 2024-12-21 RX ADMIN — ROPINIROLE 0.5 MG: 0.5 TABLET, FILM COATED ORAL at 15:53

## 2024-12-21 RX ADMIN — MIRTAZAPINE 15 MG: 15 TABLET, FILM COATED ORAL at 20:33

## 2024-12-21 RX ADMIN — LIDOCAINE HYDROCHLORIDE 15 ML: 20 SOLUTION ORAL at 05:13

## 2024-12-21 RX ADMIN — BUTALBITAL, ACETAMINOPHEN, AND CAFFEINE 1 TABLET: 325; 50; 40 TABLET ORAL at 17:27

## 2024-12-21 RX ADMIN — PREGABALIN 200 MG: 100 CAPSULE ORAL at 15:53

## 2024-12-21 RX ADMIN — TAMSULOSIN HYDROCHLORIDE 0.4 MG: 0.4 CAPSULE ORAL at 09:05

## 2024-12-21 RX ADMIN — SENNOSIDES 17.2 MG: 8.6 TABLET, FILM COATED ORAL at 20:33

## 2024-12-21 RX ADMIN — ATORVASTATIN CALCIUM 20 MG: 20 TABLET, FILM COATED ORAL at 20:34

## 2024-12-21 RX ADMIN — FOLIC ACID 1 MG: 1 TABLET ORAL at 09:05

## 2024-12-21 RX ADMIN — KETOROLAC TROMETHAMINE 15 MG: 15 INJECTION, SOLUTION INTRAMUSCULAR; INTRAVENOUS at 00:41

## 2024-12-21 RX ADMIN — FERROUS SULFATE TAB 325 MG (65 MG ELEMENTAL FE) 325 MG: 325 (65 FE) TAB at 09:05

## 2024-12-21 RX ADMIN — DOCUSATE SODIUM 100 MG: 100 CAPSULE, LIQUID FILLED ORAL at 09:06

## 2024-12-21 RX ADMIN — PREGABALIN 200 MG: 100 CAPSULE ORAL at 09:04

## 2024-12-21 RX ADMIN — SALINE NASAL SPRAY 2 SPRAY: 1.5 SOLUTION NASAL at 20:50

## 2024-12-21 RX ADMIN — SALINE NASAL SPRAY 2 SPRAY: 1.5 SOLUTION NASAL at 09:06

## 2024-12-21 RX ADMIN — GUAIFENESIN 600 MG: 600 TABLET ORAL at 09:05

## 2024-12-21 RX ADMIN — OXYMETAZOLINE HYDROCHLORIDE 2 SPRAY: 0.5 SPRAY NASAL at 09:06

## 2024-12-21 RX ADMIN — ROPINIROLE 0.5 MG: 0.5 TABLET, FILM COATED ORAL at 09:05

## 2024-12-21 RX ADMIN — POLYETHYLENE GLYCOL 3350 17 G: 17 POWDER, FOR SOLUTION ORAL at 12:03

## 2024-12-21 RX ADMIN — PANCRELIPASE 1 CAPSULE: 30000; 6000; 19000 CAPSULE, DELAYED RELEASE PELLETS ORAL at 09:05

## 2024-12-21 RX ADMIN — Medication 1 PATCH: at 09:26

## 2024-12-21 RX ADMIN — SALINE NASAL SPRAY 2 SPRAY: 1.5 SOLUTION NASAL at 17:27

## 2024-12-21 RX ADMIN — LIDOCAINE HYDROCHLORIDE 15 ML: 20 SOLUTION ORAL at 15:53

## 2024-12-21 RX ADMIN — HYDROMORPHONE HYDROCHLORIDE 1 MG: 1 INJECTION, SOLUTION INTRAMUSCULAR; INTRAVENOUS; SUBCUTANEOUS at 09:01

## 2024-12-21 RX ADMIN — LIDOCAINE HYDROCHLORIDE 15 ML: 20 SOLUTION ORAL at 12:03

## 2024-12-21 RX ADMIN — PANTOPRAZOLE SODIUM 40 MG: 40 TABLET, DELAYED RELEASE ORAL at 05:13

## 2024-12-21 RX ADMIN — TOPIRAMATE 100 MG: 100 TABLET, FILM COATED ORAL at 20:33

## 2024-12-21 RX ADMIN — TOPIRAMATE 100 MG: 100 TABLET, FILM COATED ORAL at 09:05

## 2024-12-21 RX ADMIN — Medication 1 TABLET: at 09:04

## 2024-12-21 RX ADMIN — PREGABALIN 200 MG: 100 CAPSULE ORAL at 20:33

## 2024-12-21 RX ADMIN — HYDROMORPHONE HYDROCHLORIDE 1 MG: 1 INJECTION, SOLUTION INTRAMUSCULAR; INTRAVENOUS; SUBCUTANEOUS at 15:21

## 2024-12-21 RX ADMIN — LIDOCAINE 4% 1 PATCH: 40 PATCH TOPICAL at 09:06

## 2024-12-21 RX ADMIN — GUAIFENESIN 600 MG: 600 TABLET ORAL at 20:34

## 2024-12-21 RX ADMIN — POLYETHYLENE GLYCOL 3350 17 G: 17 POWDER, FOR SOLUTION ORAL at 20:34

## 2024-12-21 RX ADMIN — POLYETHYLENE GLYCOL 3350 17 G: 17 POWDER, FOR SOLUTION ORAL at 09:06

## 2024-12-21 RX ADMIN — BISACODYL 10 MG: 5 TABLET, COATED ORAL at 12:02

## 2024-12-21 RX ADMIN — PANCRELIPASE 1 CAPSULE: 30000; 6000; 19000 CAPSULE, DELAYED RELEASE PELLETS ORAL at 17:27

## 2024-12-21 RX ADMIN — KETOROLAC TROMETHAMINE 15 MG: 15 INJECTION, SOLUTION INTRAMUSCULAR; INTRAVENOUS at 12:03

## 2024-12-21 RX ADMIN — THIAMINE HCL TAB 100 MG 100 MG: 100 TAB at 09:04

## 2024-12-21 RX ADMIN — OXYCODONE HYDROCHLORIDE 5 MG: 5 TABLET ORAL at 22:45

## 2024-12-21 ASSESSMENT — PAIN - FUNCTIONAL ASSESSMENT
PAIN_FUNCTIONAL_ASSESSMENT: 0-10

## 2024-12-21 ASSESSMENT — COGNITIVE AND FUNCTIONAL STATUS - GENERAL
STANDING UP FROM CHAIR USING ARMS: A LOT
DRESSING REGULAR LOWER BODY CLOTHING: A LITTLE
PERSONAL GROOMING: A LITTLE
DRESSING REGULAR UPPER BODY CLOTHING: A LITTLE
CLIMB 3 TO 5 STEPS WITH RAILING: A LOT
DAILY ACTIVITIY SCORE: 18
HELP NEEDED FOR BATHING: A LITTLE
MOVING TO AND FROM BED TO CHAIR: A LITTLE
WALKING IN HOSPITAL ROOM: A LOT
MOBILITY SCORE: 17
TOILETING: A LITTLE
EATING MEALS: A LITTLE

## 2024-12-21 ASSESSMENT — PAIN DESCRIPTION - LOCATION
LOCATION: HEAD
LOCATION: GENERALIZED

## 2024-12-21 ASSESSMENT — PAIN SCALES - GENERAL
PAINLEVEL_OUTOF10: 10 - WORST POSSIBLE PAIN
PAINLEVEL_OUTOF10: 10 - WORST POSSIBLE PAIN
PAINLEVEL_OUTOF10: 8
PAINLEVEL_OUTOF10: 2
PAINLEVEL_OUTOF10: 9
PAINLEVEL_OUTOF10: 9
PAINLEVEL_OUTOF10: 5 - MODERATE PAIN
PAINLEVEL_OUTOF10: 9
PAINLEVEL_OUTOF10: 8
PAINLEVEL_OUTOF10: 9

## 2024-12-21 ASSESSMENT — PAIN DESCRIPTION - ORIENTATION
ORIENTATION: ANTERIOR
ORIENTATION: MID
ORIENTATION: ANTERIOR

## 2024-12-21 ASSESSMENT — PAIN SCALES - WONG BAKER
WONGBAKER_NUMERICALRESPONSE: HURTS WHOLE LOT
WONGBAKER_NUMERICALRESPONSE: HURTS EVEN MORE
WONGBAKER_NUMERICALRESPONSE: HURTS EVEN MORE

## 2024-12-21 ASSESSMENT — PAIN DESCRIPTION - DESCRIPTORS
DESCRIPTORS: DISCOMFORT;ACHING
DESCRIPTORS: ACHING;DISCOMFORT

## 2024-12-21 NOTE — PROGRESS NOTES
Otolaryngology-[facility] Daily Progress Note  --------------------------------------------------------------------------  Subjective:  No acute events overnight. Patient does not right sided headache today as well as intermittent blood drainage/clot that he has been coughing up. No sanjuana epistaxis or bleed.   --------------------------------------------------------------------------  Objective:  Vitals reviewed  General: Alert, oriented, no acute distress  Resp: Breathing comfortably on room air, no stridor  Head: Atraumatic, normocephalic  Eyes: Right lateral gaze palsy in the right eye, unable to ABduct past midline intact, sclera normal   Oral Cavity: MMM, no evidence of active bleed in pharynx  Ears:  Normal external ears  Nose: Anterior nasal mucosa normal in appearance  Neck/Lymph:  no thyroid masses, trachea midline.  Right postauricular mass in the area of the right postauricular lymph nodes.  Approximately 3 cm in diameter     --------------------------------------------------------------------------  Assessment:  Gaurav Mckay is a 54-year-old male with a past medical history of laryngeal cancer status post chemoradiation plus right neck dissection and flap reconstruction presenting for evaluation of clival mass that was found incidentally on preoperative testing.  ENT is consulted for help with access to the patient's clival mass. Biopsy completed in OR on 12/20. Stable postoperative course.     --------------------------------------------------------------------------  Plan:  -Continue nasal saline   - ENT to arrange outpatient follow up. We will sign off. Page if bleeding from nose increases or fails to resolve.   --------------------------------------------------------------------------    ENT  [phone]

## 2024-12-21 NOTE — PROGRESS NOTES
Urology [place]  Progress Note      Patient: Gaurav Mckay  Age/Sex: 54 y.o., male  MRN: [id number]  Date of surgery:   Admit Date: 12/15/2024   Code Status: Full Code  Length of Stay: 6      Interval History/Overnight Events:   POD1 s/p cystoscopy with bladder biopsy and biopsy of clival mass with END  NAOEN  Stephen draining clear yellow urine, Cr: 0.71 WNL    Objective  12/19 1900 - 12/21 0659  In: 1080 [P.O.:840; I.V.:240]  Out: 3126 [Urine:3050]    Medications:  Current Facility-Administered Medications   Medication Dose Route Frequency Provider Last Rate Last Admin    albuterol 90 mcg/actuation inhaler 2 puff  2 puff inhalation q6h PRN Lilly Sharif DO   2 puff at 12/16/24 1127    atorvastatin (Lipitor) tablet 20 mg  20 mg oral Nightly Lilly Sharif DO   20 mg at 12/20/24 2257    bisacodyl (Dulcolax) EC tablet 10 mg  10 mg oral Daily Cely Lopez,         cyclobenzaprine (Flexeril) tablet 10 mg  10 mg oral TID PRN Lilly Sharif, DO        dextrose 50 % injection 12.5 g  12.5 g intravenous q15 min PRN Lilly Sharif, DO        dextrose 50 % injection 25 g  25 g intravenous q15 min PRN Lilly Sharif, DO        [START ON 12/22/2024] ferrous sulfate (325 mg ferrous sulfate) tablet 325 mg  65 mg of iron oral Every other day Cely Lopez DO        folic acid (Folvite) tablet 1 mg  1 mg oral Daily Lilly Sharif DO   1 mg at 12/21/24 0905    glucagon (Glucagen) injection 1 mg  1 mg intramuscular q15 min PRN Lilly Sharif DO        glucagon (Glucagen) injection 1 mg  1 mg intramuscular q15 min PRN Lilly Sharif DO        guaiFENesin (Mucinex) 12 hr tablet 600 mg  600 mg oral BID Lilly Sharif, DO   600 mg at 12/21/24 0905    HYDROmorphone (Dilaudid) injection 0.5 mg  0.5 mg intravenous q4h PRN Lilly Sharif, DO   0.5 mg at 12/16/24 0325    HYDROmorphone (Dilaudid) injection 1 mg  1 mg intravenous q4h PRN Lilly Sharif,    1 mg at 12/21/24  0901    ipratropium-albuteroL (Duo-Neb) 0.5-2.5 mg/3 mL nebulizer solution 3 mL  3 mL nebulization q2h PRN Lilly R Sharif, DO   3 mL at 12/16/24 0415    ketorolac (Toradol) injection 15 mg  15 mg intravenous q8h PRN Lilly R Sharif, DO   15 mg at 12/21/24 0041    lidocaine (Xylocaine) 2 % mouth solution 15 mL  15 mL oral Before meals & nightly Lilly R Sharif, DO   15 mL at 12/21/24 0513    lidocaine 4 % patch 1 patch  1 patch transdermal Daily Lilly R Sharif, DO   1 patch at 12/21/24 0906    LORazepam (Ativan) injection 0.5 mg  0.5 mg intravenous q2h PRN Lilly R Sharif, DO   0.5 mg at 12/16/24 1200    Or    LORazepam (Ativan) injection 1 mg  1 mg intravenous q2h PRN Lilly R Sharif, DO        Or    LORazepam (Ativan) injection 2 mg  2 mg intravenous q2h PRN Lilly R Sharif, DO   2 mg at 12/16/24 0933    melatonin tablet 3 mg  3 mg oral Nightly PRN Lilly R Sharif, DO   3 mg at 12/18/24 2158    mirtazapine (Remeron) tablet 15 mg  15 mg oral Nightly Lilly R Sharif, DO   15 mg at 12/20/24 2257    montelukast (Singulair) tablet 10 mg  10 mg oral Daily Lilly CURTIS Sharif, DO   10 mg at 12/21/24 0904    multivitamin with minerals 1 tablet  1 tablet oral Daily Tato Rodríguez MD   1 tablet at 12/21/24 0904    naloxone (Narcan) injection 0.4 mg  0.4 mg intravenous q5 min PRN Lilly R Sharif, DO        nicotine (Nicoderm CQ) 14 mg/24 hr patch 1 patch  1 patch transdermal Daily Lilly CURTIS Sharif, DO   1 patch at 12/21/24 0926    ondansetron ODT (Zofran-ODT) disintegrating tablet 4 mg  4 mg oral q6h PRN Lilly R Sharif, DO        Or    ondansetron (Zofran) injection 4 mg  4 mg intravenous q6h PRN Lilly Sharif DO   4 mg at 12/16/24 0933    oxygen (O2) therapy   inhalation Continuous - Inhalation ANASTACIA Ledesma-CNP   3 L/min at 12/21/24 0931    oxymetazoline (Afrin) 0.05 % nasal spray 2 spray  2 spray Each Nostril q12h PRN Bre Chu MD   2 spray at 12/21/24  0906    pancrelipase (Lip-Prot-Amyl) (Creon) 6,000-19,000 -30,000 unit per capsule 1 capsule  1 capsule oral BID Lilly Sharif DO   1 capsule at 12/21/24 0905    pantoprazole (ProtoNix) EC tablet 40 mg  40 mg oral Daily before breakfast Lilly Sharif DO   40 mg at 12/21/24 0513    polyethylene glycol (Glycolax, Miralax) packet 17 g  17 g oral q8h Cely Lopez DO        pregabalin (Lyrica) capsule 200 mg  200 mg oral TID Lilly Sharif DO   200 mg at 12/21/24 0904    rOPINIRole (Requip) tablet 0.5 mg  0.5 mg oral TID Lilly Sharif DO   0.5 mg at 12/21/24 0905    sennosides (Senokot) tablet 17.2 mg  2 tablet oral BID Cely Lopez DO        sodium chloride (Ocean) 0.65 % nasal spray 2 spray  2 spray Each Nostril 4x daily Bre Chu MD   2 spray at 12/21/24 0906    tamsulosin (Flomax) 24 hr capsule 0.4 mg  0.4 mg oral Daily Lilly Sharif DO   0.4 mg at 12/21/24 0905    thiamine (Vitamin B-1) tablet 100 mg  100 mg oral Daily Lilly Sharif, DO   100 mg at 12/21/24 0904    topiramate (Topamax) tablet 100 mg  100 mg oral BID Lilly Sharif DO   100 mg at 12/21/24 0905     Vitals:    12/20/24 1945 12/20/24 2000 12/20/24 2031 12/21/24 0455   BP: 150/86 144/82 123/73 118/62   Pulse: 71 70 76 67   Resp: 14 15 16 17   Temp:  36 °C (96.8 °F) 35.7 °C (96.3 °F) 36.2 °C (97.2 °F)   TempSrc:  Temporal Temporal Temporal   SpO2: 95% 94% 98% 95%   Weight:       Height:                      11.0     5.9>-----<332              35.8   136  101  8                  ----------------<109     3.7  26  0.71          Ca 9.4 Phos 4.4 Mg 2.18       ALT 19 AST 25 AlkPhos 105 tBili 0.4          Physical Exam                                                                                                                      General: adult male, comfortable in bed  Neuro:  no obvious focal deficit, gaze palsy present, diffusely tremulous  Head/Neck: normocephalic, atraumatic, hypophonic  voice  ENT: moist mucous membranes  CV: regular rate, normotensive   Pulm: nonlabored breathing on NC, no conversational dyspnea  Abd: soft, nondistended, nontender   : No suprapubic tenderness, no CVA tenderness, anders with clear yellow urine   MSK: [place], no gross deformities  Psych: mood and behavior normal      Imaging  No results found.     Assessment & Plan  Gaurav Mckay is a 54 year-old male with a history of AUD c/b peripheral neuropathy, restless leg syndrome, laryngeal cancer (s/p radical neck dissection and radiation), saddle PE (on eliquis), HTN, GERD, COPD, hematuria and bladder mass who was admitted to [facility] for medical optimization in light of upcoming cystoscopy with bladder biopsy/TURBT on 12/17/24 with DR. Murcia. 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 Urology is consulted for evaluation and management recommendations for bladder mass given upcoming procedure scheduled.     Patient had no current urologic complaints and is voiding clear yellow urine through anders. Given his medical comorbidities and difficulty with transportation, it would be ideal to complete his cystoscopic procedure during this admission. However, newly diagnosed brain mass will take precedence.     Recommendations:  - Recommend maintaining anders until outpatient follow-up  - From urology standpoint no need to hold AC  - Follow-up with Dr. Murcia on 1/2 for path review and trial of void  - Urology to sign-off  -Please page urology for any questions or concerns     Assessment and plan discussed with chief resident Dr. Damon and attending Dr. Murcia.     Alyssa Camarena DO  Urology Resident, PGY-4  Adult Urology: [id number]    Pediatric Urology: [id number]

## 2024-12-21 NOTE — CARE PLAN
The patient's goals for the shift include      The clinical goals for the shift include patient will have procedure by end of shift 12/20/24 1900    Over the shift, the patient did not make progress toward the following goals. Barriers to progression include monitoring after procedure and assist in decreasing pain. Recommendations to address these barriers include Q2H purposeful rounding.

## 2024-12-21 NOTE — PROGRESS NOTES
"Gaurav Mckay \"Lemuel\" is a 54 y.o. male on day 6 of admission presenting with Brain mass.      Subjective   Patient was seen and examined at bedside. He continues to admit to headaches.  He is aware of the preliminary reports.    He is also requesting an advanced diet     Objective     Last Recorded Vitals  /73 (BP Location: Left arm, Patient Position: Lying)   Pulse 71   Temp 36.8 °C (98.2 °F) (Temporal)   Resp 17   Wt 83 kg (183 lb)   SpO2 98%   Intake/Output last 3 Shifts:    Intake/Output Summary (Last 24 hours) at 12/21/2024 1754  Last data filed at 12/21/2024 1730  Gross per 24 hour   Intake 2160 ml   Output 3086 ml   Net -926 ml       Admission Weight  Weight: 83 kg (183 lb) (12/15/24 1714)    Daily Weight  12/15/24 : 83 kg (183 lb)    Image Results  FL modified barium swallow study  Narrative: Interpreted By:  Hardeep Sanchez and Greenfield Ellen   STUDY:  FL MODIFIED BARIUM SWALLOW STUDY;; 12/17/2024 1:20 pm      INDICATION:  Signs/Symptoms:Concern for pharyngeal dysphagia.          COMPARISON:  None.      ACCESSION NUMBER(S):  [id number]      ORDERING CLINICIAN:  MALVIN GALLO      TECHNIQUE:  Modified Barium Swallow Study completed. Informed verbal consent  obtained prior to completion of exam. Trials of thin, nectar/mildly  thick liquid, honey/moderately thick liquid, puree, were given. Pt  declined solid trials      SLP: Melinda Washington MS, CCC-SLP  Contact info: Haiku secure chat; phone: [phone]      SPEECH FINDINGS:  Reason for Referral: To ascertain readiness for a safe and efficient  PO diet. Patient Hx: Gaurav Mckay is a 54 y.o. male with a PMH  of laryngeal cancer s/p radical neck dissection, PE on Apixaban,  alcohol abuse, and HTN who presented to [facility] as a transfer from [facility] due to need for urology consult as well as neurosurgery  consult due to incidental finding of brain mass. Pt was a direct  admit to OSH on 12/13 after he presented to [facility] " for  surgical clearance for a scheduled cystoscopy with ablation of tissue  on 24 secondary to a bladder tumor. He presented late and  intoxicated to his appointment and there was concern of compliance  with preop instructions which could further delay treatment. His case  was discussed with the surgeon as well as anesthesia and admission  was recommended to ensure sobriety, medication compliance, and  surgical readiness. During admission and evaluation, Mr. Mckay was  found to have dysconjugate gaze and he was complaining of a severe  headache which he felt was related to a recent fall. Stat CTH was  ordered d/t concern for a bleed and imaging reveal what appears to be  a tumor extending from the clivus into the sphenoid area, sellar  area, near carotids as well. Neurology was consulted at OSH and  consulting team felt that this was the cause of this diplopia. This  mass is a new finding since it was not seen on CT imaging of brain  done at the end of October Respiratory Status: Room air  Current diet: NPO      Pain:  Pain Scale: 0-10  Ratin      DIET RECOMMENDATIONS:  - Pureed (IDDSI Level 4)  - Mildly/nectar thick liquids (IDDSI Level 2)      STRATEGIES:  - Small bites  - Small, single sips  - Upright for all PO intake  - Medications whole in puree or as best tolerated  - Remain upright 45-60 min after meals.          SLP PLAN:  Skilled SLP Services: Skilled SLP intervention for dysphagia is  warranted. SLP Frequency: 2x per week  Duration:  Treatment/Interventions:  - Oropharyngeal exercises  - Bolus trials  - Diet tolerance/advancement  - Patient/caregiver education      Discussed POC: Patient  Discussed Risks/Benefits: Yes  Patient/Caregiver Agreeable: Yes      Short term goals established 24:  Pt will tolerate least restrictive diet without s/s aspiration,  respiratory compromise, or overt difficulty throughout admission.  Start: 24, End: 2 weeks Status: goal initiated  Pt will  complete x30 trials of effortful swallows with cues as needed  for accurate completion. Start: 12/17/24, End: 2 weeks  Status: goal initiated      Long term goals 12/17/24:  Patient will tolerate the least restrictive diet without overt  difficulty or further pulmonary compromise by time of discharge.          Education Provided: Results and recommendations per MBSS, with video  review; recommendations and POC at this time. Verbal understanding  and agreement given on all accounts.      Treatment Provided Today: ST provided extensive education and  training to pt/pt family regarding anatomy/physiology of swallow  function, risk factors of aspiration/aspiration pna & how to  mitigate factors, diet modifications, and the use of compensatory  swallow strategies to promote pt safety upon PO intake including  small bites/sips, positioned upright during and 45-60 after meals. .      Additional Medical Consults Suggested:  - No new disciplines indicated      Repeat Study: 2-3 weeks or upon completion of treatment.      Mechanics of the Swallow Summary:  ORAL PHASE:  Lip Closure - Intact  Tongue Control During Bolus Hold - Intact  Bolus prep/mastication - Intact  Bolus transport/lingual motion - Intact  Oral residue - Intact      PHARYNGEAL PHASE:  Initiation of pharyngeal swallow - Intacte  Soft palate elevation - Intact  Laryngeal elevation - Min impaired  Anterior hyoid excursion - Min impaired  Epiglottic movement - Intact  Laryngeal vestibule closure - Inconsistently impaired  Pharyngeal stripping wave - Intact  Pharyngoesophageal segment opening - Intact  Tongue base retraction - Intact  Pharyngeal residue - Intact      ESOPHAGEAL PHASE:  Esophageal clearance - Not evaluated          SLP Impressions with Severity Rating:  Pt presents with functional oral phase swallow, mild pharyngeal  dysphagia upon completion of modified barium swallow study this date.  Swallowing physiology is detailed above. Adequate manipulation  and  maneuvering of each bolus trial posteriorly. Declined solids trials  therefore mastication was not able to evaluated. Functional bolus  containment evident.      Timely initiation of pharyngeal swallow. Functional base of tongue  retraction, laryngeal elevation/anterior shift. Trace amount of post  swallow residue within the pharyngeal lumen. Mistiming of laryngeal  vestibular closure occurred with thin liquids resulted in aspiration.  Penetration to the level of the TVF with mildly thick in 1/4 trials  which occurred when consuming a larger sip than previously consumed.  No further penetration or aspiration during this study. Pt ready for  a modified diet. Discussed results and recommendations with RN and MD.      OUTCOME MEASURES:  Functional Oral Intake Scale  Functional Oral Intake Scale: Level 5        total oral diet with  multiple consistencies, but requires special preparations and  compensations          Rosenbek's Penetration Aspiration Scale  Thin Liquids: 8. SILENT ASPIRATION - contrast passes glottis, visible  residue, NO pt response During the Swallow  Nectar Thick Liquids: 2. PENETRATION that CLEARS - contrast enter  airway, above vocal cords, no residue Honey Thick Liquids: 1. NO  ASPIRATION & NO PENETRATION - no aspiration, contrast does not  enter airway Puree: 1. NO ASPIRATION & NO PENETRATION - no  aspiration, contrast does not enter airway Solids: N/A      Speech Therapy section of this report signed by Melinda Washington MS,  CCC-SLP on 12/20/2024 at 2:27 pm.      RADIOLOGY FINDINGS:  Degenerative changes are noted in the included portion of the lateral  cervical spine.      Radiology section of this report signed by Dr. Hardeep Sanchez.      Impression: Please see speech language pathology report as detailed above.      MACRO:  None      Signed by: Hardeep Sanchez 12/20/2024 4:27 PM  Dictation workstation:   [id number]      Physical Exam  Constitutional: sitting in bed,  malnourished  HEENT: moist oral mucosa  Neck: No JVD  LUNGS: Clear to auscultation bilaterally, no wheezing, no rhonchi   Cardiac: regular rate and rhythm, S1 and S2 present, no rubs, no murmurs, no gallops  Abdomen: bowel sounds present, non tender, non distended  Ext: No cyanosis, no clubbing, no edema    Relevant Results  Results for orders placed or performed during the hospital encounter of 12/15/24 (from the past 24 hours)   POCT GLUCOSE   Result Value Ref Range    POCT Glucose 159 (H) 74 - 99 mg/dL   CBC   Result Value Ref Range    WBC 5.9 4.4 - 11.3 x10*3/uL    nRBC 0.0 0.0 - 0.0 /100 WBCs    RBC 3.98 (L) 4.50 - 5.90 x10*6/uL    Hemoglobin 11.0 (L) 13.5 - 17.5 g/dL    Hematocrit 35.8 (L) 41.0 - 52.0 %    MCV 90 80 - 100 fL    MCH 27.6 26.0 - 34.0 pg    MCHC 30.7 (L) 32.0 - 36.0 g/dL    RDW 17.2 (H) 11.5 - 14.5 %    Platelets 332 150 - 450 x10*3/uL   Magnesium   Result Value Ref Range    Magnesium 2.18 1.60 - 2.40 mg/dL   Renal Function Panel   Result Value Ref Range    Glucose 109 (H) 74 - 99 mg/dL    Sodium 136 136 - 145 mmol/L    Potassium 3.7 3.5 - 5.3 mmol/L    Chloride 101 98 - 107 mmol/L    Bicarbonate 26 21 - 32 mmol/L    Anion Gap 13 10 - 20 mmol/L    Urea Nitrogen 8 6 - 23 mg/dL    Creatinine 0.71 0.50 - 1.30 mg/dL    eGFR >90 >60 mL/min/1.73m*2    Calcium 9.4 8.6 - 10.6 mg/dL    Phosphorus 4.4 2.5 - 4.9 mg/dL    Albumin 3.3 (L) 3.4 - 5.0 g/dL   POCT GLUCOSE   Result Value Ref Range    POCT Glucose 133 (H) 74 - 99 mg/dL   POCT GLUCOSE   Result Value Ref Range    POCT Glucose 187 (H) 74 - 99 mg/dL   POCT GLUCOSE   Result Value Ref Range    POCT Glucose 117 (H) 74 - 99 mg/dL      Scheduled medications  atorvastatin, 20 mg, oral, Nightly  bisacodyl, 10 mg, oral, Daily  [START ON 12/22/2024] ferrous sulfate (325 mg ferrous sulfate), 65 mg of iron, oral, Every other day  folic acid, 1 mg, oral, Daily  guaiFENesin, 600 mg, oral, BID  lidocaine, 15 mL, oral, Before meals & nightly  lidocaine, 1 patch,  transdermal, Daily  mirtazapine, 15 mg, oral, Nightly  montelukast, 10 mg, oral, Daily  multivitamin with minerals, 1 tablet, oral, Daily  nicotine, 1 patch, transdermal, Daily  oxygen, , inhalation, Continuous - Inhalation  pancrelipase (Lip-Prot-Amyl), 1 capsule, oral, BID  pantoprazole, 40 mg, oral, Daily before breakfast  polyethylene glycol, 17 g, oral, q8h  pregabalin, 200 mg, oral, TID  rOPINIRole, 0.5 mg, oral, TID  sennosides, 2 tablet, oral, BID  sodium chloride, 2 spray, Each Nostril, 4x daily  tamsulosin, 0.4 mg, oral, Daily  thiamine, 100 mg, oral, Daily  topiramate, 100 mg, oral, BID      Continuous medications     PRN medications  PRN medications: albuterol, cyclobenzaprine, dextrose, dextrose, glucagon, glucagon, HYDROmorphone, ipratropium-albuteroL, melatonin, naloxone, ondansetron ODT **OR** ondansetron, oxyCODONE, oxymetazoline     Assessment:   Gaurav Mckay is a 54 y.o. 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     - No other focal deficits except for right eye deviation which he states is not new   -  Neurosurgery consulted, appreciate recs.       :: Planning potential biopsy   - Ophthalmology consulted. Appreciate recs.        :: Recommend monocular occlusion as needed for symptomatic relief. Can consider strabismus surgery outpatient in the long-term   - ENT consulted. Appreciate recs.      :: s/p Clival mass biopsy on 12/20     :: ENT to arrange outpatient follow up. We will sign off.   - CT sinus and CT angio Head/Neck 12/17: Large 3.4 cm mass located within the clivus and sphenoid bone with surrounding extension including into the sphenoid sinus posteriorly, causing destruction of the petrous segments of the bilateral temporal bones medially, and protruding into the cavernous sinuses and circumferentially surrounding the right internal carotid artery causing mild luminal narrowing  -CT CAP on 12/15: Interval development of new centrilobular ground-glass nodular  densities in both lungs, predominantly in the right upper and middle lobes. Small amount of retained secretions noted in the trachea. These findings in the setting of a patulous and fluid-filled esophagus favors sequela of reflux/aspiration.  -CT thoracic 12/15: No thoracic spine metastatic disease   -CT lumbar 12/15: No metastatic disease to the lumbar spine.   -CT cervical 12/15: Abnormal enhancement involving the spinous process of C6 and the interspinous ligaments at C5-6 and C6-7. This has an appearance more favorable for inflammatory process over malignancy  -SLP consulted. Appreciate recs.       :: Pureed along with Mildly/nectar thick liquids   -Pain control: Dilaudid 1.0 mg every 4 hrs PRN severe pain first line; started Fioricet 2 tablets every 4 hours scheduled; oxycodone changed from 5 to 10 mg every 4 hours PRN breakthrough  [ ] Supportive oncology consulted for optimal pain control      #Bladder Tumor  #Urinary retention  -Urology consulted, appreciate recs.     :: s/p cystoscopy with bladder biopsy on 12/20     :: Recommend maintaining  anders until outpatient follow-up     :: Follow-up with Dr. Murcia on 1/2 for path review and trial of void     :: Urology to sign-off   - Anders cath in place  - C/w Flomax 0.4 mg PO QD     #Alcohol Use Disorder  -Cessation advised  -Last drink was approximately 3AM on 12/13/24.   -CIWA discontinued 12/17  - c/w Thiamine, Folate, and MVI PO QD  - pt interested in attending rehab on discharge     #Chronic Hypoxic Respiratory Failure  #COPD  #HANY  - Continue bronchial hygiene, duonebs, mucinex  - Wean O2 as tolerated (per chart review, does not seem like he was on O2 at home)  -Currently on room air      #HTN  #orthostatic hypotension  - BP on admit 118/81  - continue to monitor BP  - no home meds     #Hx of Pulmonary Embolism (?requiring 3L NC since although unclear per chart review)  -S/p embolization 11/2023  -Provoked by surgery.   -Resumed home Eliquis 5 mg BID      #Tobacco Use Disorder  - nicotine patch      #Decreased strength  - PT rec: Moderate intensity level of continued care 4x /week   - OT rec: Moderate intensity level of continued care 3x/week     #Dispo  - Pending SNF placement      Fluids: PRN  Electrolytes: Keep K>4, Mg>3, Phos>3  Nutrition: Pureed with Mildly/nectar thick liquids   Abx: N/A  DVT: Home Eliquis 5 mg BID          Cely Lopez DO

## 2024-12-22 VITALS
TEMPERATURE: 97.2 F | DIASTOLIC BLOOD PRESSURE: 70 MMHG | WEIGHT: 183 LBS | OXYGEN SATURATION: 93 % | HEIGHT: 70 IN | HEART RATE: 60 BPM | BODY MASS INDEX: 26.2 KG/M2 | RESPIRATION RATE: 17 BRPM | SYSTOLIC BLOOD PRESSURE: 114 MMHG

## 2024-12-22 LAB
ALBUMIN SERPL BCP-MCNC: 3.6 G/DL (ref 3.4–5)
ANION GAP SERPL CALC-SCNC: 17 MMOL/L (ref 10–20)
BUN SERPL-MCNC: 6 MG/DL (ref 6–23)
CALCIUM SERPL-MCNC: 9.9 MG/DL (ref 8.6–10.6)
CHLORIDE SERPL-SCNC: 101 MMOL/L (ref 98–107)
CO2 SERPL-SCNC: 26 MMOL/L (ref 21–32)
CREAT SERPL-MCNC: 0.65 MG/DL (ref 0.5–1.3)
EGFRCR SERPLBLD CKD-EPI 2021: >90 ML/MIN/1.73M*2
ERYTHROCYTE [DISTWIDTH] IN BLOOD BY AUTOMATED COUNT: 17 % (ref 11.5–14.5)
GLUCOSE BLD MANUAL STRIP-MCNC: 114 MG/DL (ref 74–99)
GLUCOSE BLD MANUAL STRIP-MCNC: 147 MG/DL (ref 74–99)
GLUCOSE SERPL-MCNC: 117 MG/DL (ref 74–99)
HCT VFR BLD AUTO: 39.3 % (ref 41–52)
HGB BLD-MCNC: 12.5 G/DL (ref 13.5–17.5)
MAGNESIUM SERPL-MCNC: 2.25 MG/DL (ref 1.6–2.4)
MCH RBC QN AUTO: 28.2 PG (ref 26–34)
MCHC RBC AUTO-ENTMCNC: 31.8 G/DL (ref 32–36)
MCV RBC AUTO: 89 FL (ref 80–100)
NRBC BLD-RTO: 0 /100 WBCS (ref 0–0)
PHOSPHATE SERPL-MCNC: 5 MG/DL (ref 2.5–4.9)
PLATELET # BLD AUTO: 386 X10*3/UL (ref 150–450)
POTASSIUM SERPL-SCNC: 3.6 MMOL/L (ref 3.5–5.3)
RBC # BLD AUTO: 4.43 X10*6/UL (ref 4.5–5.9)
SODIUM SERPL-SCNC: 140 MMOL/L (ref 136–145)
WBC # BLD AUTO: 6.7 X10*3/UL (ref 4.4–11.3)

## 2024-12-22 PROCEDURE — 2500000004 HC RX 250 GENERAL PHARMACY W/ HCPCS (ALT 636 FOR OP/ED): Performed by: INTERNAL MEDICINE

## 2024-12-22 PROCEDURE — 99233 SBSQ HOSP IP/OBS HIGH 50: CPT | Performed by: PODIATRIST

## 2024-12-22 PROCEDURE — 83735 ASSAY OF MAGNESIUM: CPT | Performed by: PODIATRIST

## 2024-12-22 PROCEDURE — 2500000001 HC RX 250 WO HCPCS SELF ADMINISTERED DRUGS (ALT 637 FOR MEDICARE OP): Performed by: INTERNAL MEDICINE

## 2024-12-22 PROCEDURE — 2500000004 HC RX 250 GENERAL PHARMACY W/ HCPCS (ALT 636 FOR OP/ED): Performed by: PODIATRIST

## 2024-12-22 PROCEDURE — 2500000005 HC RX 250 GENERAL PHARMACY W/O HCPCS: Performed by: NURSE PRACTITIONER

## 2024-12-22 PROCEDURE — 2500000001 HC RX 250 WO HCPCS SELF ADMINISTERED DRUGS (ALT 637 FOR MEDICARE OP): Performed by: STUDENT IN AN ORGANIZED HEALTH CARE EDUCATION/TRAINING PROGRAM

## 2024-12-22 PROCEDURE — 2500000005 HC RX 250 GENERAL PHARMACY W/O HCPCS: Performed by: STUDENT IN AN ORGANIZED HEALTH CARE EDUCATION/TRAINING PROGRAM

## 2024-12-22 PROCEDURE — 2500000004 HC RX 250 GENERAL PHARMACY W/ HCPCS (ALT 636 FOR OP/ED): Performed by: STUDENT IN AN ORGANIZED HEALTH CARE EDUCATION/TRAINING PROGRAM

## 2024-12-22 PROCEDURE — S4991 NICOTINE PATCH NONLEGEND: HCPCS | Performed by: STUDENT IN AN ORGANIZED HEALTH CARE EDUCATION/TRAINING PROGRAM

## 2024-12-22 PROCEDURE — 2500000002 HC RX 250 W HCPCS SELF ADMINISTERED DRUGS (ALT 637 FOR MEDICARE OP, ALT 636 FOR OP/ED): Performed by: STUDENT IN AN ORGANIZED HEALTH CARE EDUCATION/TRAINING PROGRAM

## 2024-12-22 PROCEDURE — 85027 COMPLETE CBC AUTOMATED: CPT | Performed by: PODIATRIST

## 2024-12-22 PROCEDURE — 82947 ASSAY GLUCOSE BLOOD QUANT: CPT

## 2024-12-22 PROCEDURE — 2500000001 HC RX 250 WO HCPCS SELF ADMINISTERED DRUGS (ALT 637 FOR MEDICARE OP): Performed by: PODIATRIST

## 2024-12-22 PROCEDURE — 36415 COLL VENOUS BLD VENIPUNCTURE: CPT | Performed by: PODIATRIST

## 2024-12-22 PROCEDURE — 99223 1ST HOSP IP/OBS HIGH 75: CPT | Performed by: NURSE PRACTITIONER

## 2024-12-22 PROCEDURE — 80069 RENAL FUNCTION PANEL: CPT | Performed by: PODIATRIST

## 2024-12-22 PROCEDURE — 1100000001 HC PRIVATE ROOM DAILY

## 2024-12-22 RX ORDER — BISACODYL 10 MG/1
10 SUPPOSITORY RECTAL ONCE
Status: COMPLETED | OUTPATIENT
Start: 2024-12-22 | End: 2024-12-22

## 2024-12-22 RX ORDER — OXYCODONE HYDROCHLORIDE 10 MG/1
10 TABLET ORAL EVERY 4 HOURS PRN
Status: DISCONTINUED | OUTPATIENT
Start: 2024-12-22 | End: 2024-12-22

## 2024-12-22 RX ORDER — NALOXONE HYDROCHLORIDE 0.4 MG/ML
0.2 INJECTION, SOLUTION INTRAMUSCULAR; INTRAVENOUS; SUBCUTANEOUS AS NEEDED
Status: DISCONTINUED | OUTPATIENT
Start: 2024-12-22 | End: 2024-12-26 | Stop reason: HOSPADM

## 2024-12-22 RX ORDER — BUTALBITAL, ACETAMINOPHEN AND CAFFEINE 50; 325; 40 MG/1; MG/1; MG/1
1 TABLET ORAL EVERY 4 HOURS PRN
Status: DISCONTINUED | OUTPATIENT
Start: 2024-12-22 | End: 2024-12-26 | Stop reason: HOSPADM

## 2024-12-22 RX ORDER — HYDROMORPHONE HCL/0.9% NACL/PF 15 MG/30ML
PATIENT CONTROLLED ANALGESIA SYRINGE INTRAVENOUS CONTINUOUS
Status: DISCONTINUED | OUTPATIENT
Start: 2024-12-22 | End: 2024-12-24

## 2024-12-22 RX ORDER — LACTULOSE 10 G/15ML
20 SOLUTION ORAL 2 TIMES DAILY
Status: DISCONTINUED | OUTPATIENT
Start: 2024-12-22 | End: 2024-12-23

## 2024-12-22 RX ORDER — HYDROMORPHONE HYDROCHLORIDE 1 MG/ML
1 INJECTION, SOLUTION INTRAMUSCULAR; INTRAVENOUS; SUBCUTANEOUS EVERY 2 HOUR PRN
Status: DISCONTINUED | OUTPATIENT
Start: 2024-12-22 | End: 2024-12-24

## 2024-12-22 RX ADMIN — HYDROMORPHONE HYDROCHLORIDE 1 MG: 1 INJECTION, SOLUTION INTRAMUSCULAR; INTRAVENOUS; SUBCUTANEOUS at 21:50

## 2024-12-22 RX ADMIN — HYDROMORPHONE HYDROCHLORIDE 1 MG: 1 INJECTION, SOLUTION INTRAMUSCULAR; INTRAVENOUS; SUBCUTANEOUS at 04:47

## 2024-12-22 RX ADMIN — POLYETHYLENE GLYCOL 3350 17 G: 17 POWDER, FOR SOLUTION ORAL at 09:02

## 2024-12-22 RX ADMIN — Medication 3 MG: at 20:08

## 2024-12-22 RX ADMIN — THIAMINE HCL TAB 100 MG 100 MG: 100 TAB at 08:51

## 2024-12-22 RX ADMIN — PANTOPRAZOLE SODIUM 40 MG: 40 TABLET, DELAYED RELEASE ORAL at 06:23

## 2024-12-22 RX ADMIN — LIDOCAINE HYDROCHLORIDE 15 ML: 20 SOLUTION ORAL at 20:08

## 2024-12-22 RX ADMIN — PANCRELIPASE 1 CAPSULE: 30000; 6000; 19000 CAPSULE, DELAYED RELEASE PELLETS ORAL at 16:21

## 2024-12-22 RX ADMIN — SALINE NASAL SPRAY 2 SPRAY: 1.5 SOLUTION NASAL at 06:23

## 2024-12-22 RX ADMIN — LIDOCAINE HYDROCHLORIDE 15 ML: 20 SOLUTION ORAL at 06:23

## 2024-12-22 RX ADMIN — Medication 1 TABLET: at 08:51

## 2024-12-22 RX ADMIN — BISACODYL 10 MG: 10 SUPPOSITORY RECTAL at 14:59

## 2024-12-22 RX ADMIN — OXYCODONE HYDROCHLORIDE 5 MG: 5 TABLET ORAL at 06:23

## 2024-12-22 RX ADMIN — MIRTAZAPINE 15 MG: 15 TABLET, FILM COATED ORAL at 20:08

## 2024-12-22 RX ADMIN — MONTELUKAST 10 MG: 10 TABLET, FILM COATED ORAL at 08:51

## 2024-12-22 RX ADMIN — PANCRELIPASE 1 CAPSULE: 30000; 6000; 19000 CAPSULE, DELAYED RELEASE PELLETS ORAL at 08:51

## 2024-12-22 RX ADMIN — PREGABALIN 200 MG: 100 CAPSULE ORAL at 14:58

## 2024-12-22 RX ADMIN — Medication: at 16:20

## 2024-12-22 RX ADMIN — TOPIRAMATE 100 MG: 100 TABLET, FILM COATED ORAL at 20:08

## 2024-12-22 RX ADMIN — PREGABALIN 200 MG: 100 CAPSULE ORAL at 20:08

## 2024-12-22 RX ADMIN — PREGABALIN 200 MG: 100 CAPSULE ORAL at 08:51

## 2024-12-22 RX ADMIN — POLYETHYLENE GLYCOL 3350 17 G: 17 POWDER, FOR SOLUTION ORAL at 02:14

## 2024-12-22 RX ADMIN — ATORVASTATIN CALCIUM 20 MG: 20 TABLET, FILM COATED ORAL at 20:08

## 2024-12-22 RX ADMIN — LIDOCAINE HYDROCHLORIDE 15 ML: 20 SOLUTION ORAL at 16:21

## 2024-12-22 RX ADMIN — HYDROMORPHONE HYDROCHLORIDE 1 MG: 1 INJECTION, SOLUTION INTRAMUSCULAR; INTRAVENOUS; SUBCUTANEOUS at 13:16

## 2024-12-22 RX ADMIN — TOPIRAMATE 100 MG: 100 TABLET, FILM COATED ORAL at 08:51

## 2024-12-22 RX ADMIN — ROPINIROLE 0.5 MG: 0.5 TABLET, FILM COATED ORAL at 14:58

## 2024-12-22 RX ADMIN — TAMSULOSIN HYDROCHLORIDE 0.4 MG: 0.4 CAPSULE ORAL at 08:51

## 2024-12-22 RX ADMIN — OXYMETAZOLINE HYDROCHLORIDE 2 SPRAY: 0.5 SPRAY NASAL at 20:08

## 2024-12-22 RX ADMIN — ROPINIROLE 0.5 MG: 0.5 TABLET, FILM COATED ORAL at 08:51

## 2024-12-22 RX ADMIN — ROPINIROLE 0.5 MG: 0.5 TABLET, FILM COATED ORAL at 20:08

## 2024-12-22 RX ADMIN — APIXABAN 5 MG: 5 TABLET, FILM COATED ORAL at 11:18

## 2024-12-22 RX ADMIN — LIDOCAINE HYDROCHLORIDE 15 ML: 20 SOLUTION ORAL at 11:18

## 2024-12-22 RX ADMIN — APIXABAN 5 MG: 5 TABLET, FILM COATED ORAL at 20:08

## 2024-12-22 RX ADMIN — CARBOXYMETHYLCELLULOSE SODIUM 1 DROP: 5 SOLUTION/ DROPS OPHTHALMIC at 11:23

## 2024-12-22 RX ADMIN — HYDROMORPHONE HYDROCHLORIDE 1 MG: 1 INJECTION, SOLUTION INTRAMUSCULAR; INTRAVENOUS; SUBCUTANEOUS at 00:40

## 2024-12-22 RX ADMIN — Medication 1 PATCH: at 08:50

## 2024-12-22 RX ADMIN — LIDOCAINE 4% 1 PATCH: 40 PATCH TOPICAL at 08:51

## 2024-12-22 RX ADMIN — GUAIFENESIN 600 MG: 600 TABLET ORAL at 08:51

## 2024-12-22 RX ADMIN — BISACODYL 10 MG: 5 TABLET, COATED ORAL at 08:51

## 2024-12-22 RX ADMIN — Medication 0.3 L/MIN: at 08:56

## 2024-12-22 RX ADMIN — OXYCODONE HYDROCHLORIDE 5 MG: 5 TABLET ORAL at 11:18

## 2024-12-22 RX ADMIN — SENNOSIDES 17.2 MG: 8.6 TABLET, FILM COATED ORAL at 08:51

## 2024-12-22 RX ADMIN — CARBOXYMETHYLCELLULOSE SODIUM 1 DROP: 5 SOLUTION/ DROPS OPHTHALMIC at 06:23

## 2024-12-22 RX ADMIN — FERROUS SULFATE TAB 325 MG (65 MG ELEMENTAL FE) 325 MG: 325 (65 FE) TAB at 08:51

## 2024-12-22 RX ADMIN — HYDROMORPHONE HYDROCHLORIDE 1 MG: 1 INJECTION, SOLUTION INTRAMUSCULAR; INTRAVENOUS; SUBCUTANEOUS at 08:53

## 2024-12-22 RX ADMIN — FOLIC ACID 1 MG: 1 TABLET ORAL at 08:51

## 2024-12-22 RX ADMIN — SALINE NASAL SPRAY 2 SPRAY: 1.5 SOLUTION NASAL at 11:19

## 2024-12-22 ASSESSMENT — PAIN SCALES - GENERAL
PAINLEVEL_OUTOF10: 9
PAINLEVEL_OUTOF10: 7
PAINLEVEL_OUTOF10: 10 - WORST POSSIBLE PAIN
PAINLEVEL_OUTOF10: 9
PAINLEVEL_OUTOF10: 4

## 2024-12-22 ASSESSMENT — PAIN DESCRIPTION - DESCRIPTORS
DESCRIPTORS: ACHING;DISCOMFORT

## 2024-12-22 ASSESSMENT — COGNITIVE AND FUNCTIONAL STATUS - GENERAL
MOVING TO AND FROM BED TO CHAIR: A LITTLE
TOILETING: A LOT
DAILY ACTIVITIY SCORE: 16
TURNING FROM BACK TO SIDE WHILE IN FLAT BAD: A LITTLE
STANDING UP FROM CHAIR USING ARMS: A LITTLE
WALKING IN HOSPITAL ROOM: A LITTLE
PERSONAL GROOMING: A LITTLE
MOBILITY SCORE: 20
WALKING IN HOSPITAL ROOM: A LOT
HELP NEEDED FOR BATHING: A LITTLE
STANDING UP FROM CHAIR USING ARMS: A LOT
CLIMB 3 TO 5 STEPS WITH RAILING: A LITTLE
MOVING TO AND FROM BED TO CHAIR: A LITTLE
MOBILITY SCORE: 15
DAILY ACTIVITIY SCORE: 24
EATING MEALS: A LITTLE
DRESSING REGULAR LOWER BODY CLOTHING: A LOT
DRESSING REGULAR UPPER BODY CLOTHING: A LITTLE
CLIMB 3 TO 5 STEPS WITH RAILING: TOTAL

## 2024-12-22 ASSESSMENT — PAIN - FUNCTIONAL ASSESSMENT
PAIN_FUNCTIONAL_ASSESSMENT: 0-10

## 2024-12-22 ASSESSMENT — PAIN DESCRIPTION - LOCATION
LOCATION: HEAD

## 2024-12-22 NOTE — PROGRESS NOTES
"Gaurav Mckay \"Lucy" is a 54 y.o. male on day 7 of admission presenting with Brain mass.    Subjective   No acute events overnight. His headache pain is not controlled and having constipation. Otherwise doing well. Denies fever, chills, nausea, vomiting, chest pain, shortness of breath.  Still having constipation.     Objective     Physical Exam  Constitutional:       General: He is not in acute distress.     Appearance: Normal appearance.   HENT:      Head: Normocephalic and atraumatic.      Nose: Nose normal.      Mouth/Throat:      Mouth: Mucous membranes are moist.   Eyes:      Conjunctiva/sclera: Conjunctivae normal.   Cardiovascular:      Rate and Rhythm: Normal rate and regular rhythm.      Heart sounds: Normal heart sounds.   Pulmonary:      Effort: Pulmonary effort is normal. No respiratory distress.      Breath sounds: Normal breath sounds. No wheezing.      Comments: On Room Air  Abdominal:      General: Bowel sounds are normal. There is no distension.      Palpations: Abdomen is soft.      Tenderness: There is no abdominal tenderness.   Genitourinary:     Comments: Stephen in place draining clear yellow.   Musculoskeletal:         General: No swelling. Normal range of motion.      Cervical back: Normal range of motion and neck supple.      Right lower leg: No edema.      Left lower leg: No edema.   Skin:     General: Skin is warm.   Neurological:      General: No focal deficit present.      Mental Status: He is alert and oriented to person, place, and time. Mental status is at baseline.   Psychiatric:         Mood and Affect: Mood normal.         Behavior: Behavior normal.         Thought Content: Thought content normal.         Judgment: Judgment normal.         Last Recorded Vitals  Blood pressure 154/86, pulse 65, temperature 36.1 °C (97 °F), resp. rate 16, height 1.778 m (5' 10\"), weight 83 kg (183 lb), SpO2 95%.  Intake/Output last 3 Shifts:  I/O last 3 completed shifts:  In: 1960 (23.6 mL/kg) " [P.O.:1920; I.V.:40 (0.5 mL/kg)]  Out: 4160 (50.1 mL/kg) [Urine:4160 (1.4 mL/kg/hr)]  Weight: 83 kg     Relevant Results  Scheduled medications  apixaban, 5 mg, oral, BID  atorvastatin, 20 mg, oral, Nightly  bisacodyl, 10 mg, oral, Daily  ferrous sulfate (325 mg ferrous sulfate), 65 mg of iron, oral, Every other day  folic acid, 1 mg, oral, Daily  guaiFENesin, 600 mg, oral, BID  lidocaine, 15 mL, oral, Before meals & nightly  lidocaine, 1 patch, transdermal, Daily  mirtazapine, 15 mg, oral, Nightly  montelukast, 10 mg, oral, Daily  multivitamin with minerals, 1 tablet, oral, Daily  nicotine, 1 patch, transdermal, Daily  oxygen, , inhalation, Continuous - Inhalation  pancrelipase (Lip-Prot-Amyl), 1 capsule, oral, BID  pantoprazole, 40 mg, oral, Daily before breakfast  polyethylene glycol, 17 g, oral, q8h  pregabalin, 200 mg, oral, TID  rOPINIRole, 0.5 mg, oral, TID  sennosides, 2 tablet, oral, BID  sodium chloride, 2 spray, Each Nostril, 4x daily  tamsulosin, 0.4 mg, oral, Daily  thiamine, 100 mg, oral, Daily  topiramate, 100 mg, oral, BID      Continuous medications     PRN medications  PRN medications: albuterol, butalbital-acetaminophen-caff, cyclobenzaprine, dextrose, dextrose, glucagon, glucagon, HYDROmorphone, ipratropium-albuteroL, lubricating eye drops, melatonin, naloxone, ondansetron ODT **OR** ondansetron, oxyCODONE, oxymetazoline     Results for orders placed or performed during the hospital encounter of 12/15/24 (from the past 24 hours)   POCT GLUCOSE   Result Value Ref Range    POCT Glucose 117 (H) 74 - 99 mg/dL   CBC   Result Value Ref Range    WBC 6.7 4.4 - 11.3 x10*3/uL    nRBC 0.0 0.0 - 0.0 /100 WBCs    RBC 4.43 (L) 4.50 - 5.90 x10*6/uL    Hemoglobin 12.5 (L) 13.5 - 17.5 g/dL    Hematocrit 39.3 (L) 41.0 - 52.0 %    MCV 89 80 - 100 fL    MCH 28.2 26.0 - 34.0 pg    MCHC 31.8 (L) 32.0 - 36.0 g/dL    RDW 17.0 (H) 11.5 - 14.5 %    Platelets 386 150 - 450 x10*3/uL   Magnesium   Result Value Ref Range     Magnesium 2.25 1.60 - 2.40 mg/dL   Renal Function Panel   Result Value Ref Range    Glucose 117 (H) 74 - 99 mg/dL    Sodium 140 136 - 145 mmol/L    Potassium 3.6 3.5 - 5.3 mmol/L    Chloride 101 98 - 107 mmol/L    Bicarbonate 26 21 - 32 mmol/L    Anion Gap 17 10 - 20 mmol/L    Urea Nitrogen 6 6 - 23 mg/dL    Creatinine 0.65 0.50 - 1.30 mg/dL    eGFR >90 >60 mL/min/1.73m*2    Calcium 9.9 8.6 - 10.6 mg/dL    Phosphorus 5.0 (H) 2.5 - 4.9 mg/dL    Albumin 3.6 3.4 - 5.0 g/dL   POCT GLUCOSE   Result Value Ref Range    POCT Glucose 147 (H) 74 - 99 mg/dL   POCT GLUCOSE   Result Value Ref Range    POCT Glucose 114 (H) 74 - 99 mg/dL        Assessment/Plan   Assessment & Plan  Brain mass    Gaurav Mckay is a 54 y.o. 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 Consulting supportive oncology for further pain control.     #Brain mass  #Headache with Visual Changes  #Right sixth nerve palsy   #Pharyngeal dysphagia   - CTH: concerning for a large destructive lytic soft tissue mass upon the clivus that protrudes into the sella turcica and bilateral cavernous sinuses and erodes bilateral petrous carotid canals.   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     - No other focal deficits except for right eye deviation which he states is not new   - Neurosurgery consulted, appreciate recs.       :: Planning  potential biopsy   - Ophthalmology consulted. Appreciate recs.        :: Recommend monocular occlusion as needed for symptomatic relief. Can consider strabismus surgery outpatient in the long-term   - ENT consulted. Appreciate recs.      :: s/p Clival mass biopsy on 12/20     :: ENT to arrange outpatient follow up. We will sign off.   - CT sinus and CT angio Head/Neck 12/17: Large 3.4 cm mass located within the clivus and sphenoid bone with surrounding extension including into the sphenoid sinus posteriorly, causing destruction of the petrous segments of the bilateral temporal bones medially, and protruding into the cavernous sinuses and circumferentially surrounding the right internal carotid artery causing mild luminal narrowing  -CT CAP on 12/15: Interval development of new centrilobular ground-glass nodular  densities in both lungs, predominantly in the right upper and middle lobes. Small amount of retained secretions noted in the trachea. These findings in the setting of a patulous and fluid-filled esophagus favors sequela of reflux/aspiration.  -CT thoracic 12/15: No thoracic spine metastatic disease   -CT lumbar 12/15: No metastatic disease to the lumbar spine.   -CT cervical 12/15: Abnormal enhancement involving the spinous process of C6 and the interspinous ligaments at C5-6 and C6-7. This has an appearance more favorable for inflammatory process over malignancy  -SLP consulted. Appreciate recs.       :: Pureed along with Mildly/nectar thick liquids   -Pain control: Dilaudid 1.0 mg every 4 hrs PRN severe pain first line; started Fioricet 2 tablets every 4 hours scheduled; oxycodone changed from 5 to 10 mg every 4 hours PRN breakthrough  [ ] Supportive oncology consulted for optimal pain control      #Bladder Tumor  #Urinary retention  -Urology consulted, appreciate recs.     :: s/p cystoscopy with bladder biopsy on 12/20     :: Recommend maintaining anders until outpatient follow-up     :: Follow-up with   [facility] on 1/2 for path review and trial of void     :: Urology to sign-off   - Stephen cath in place  - C/w Flomax 0.4 mg PO QD     #Alcohol Use Disorder  -Cessation advised  -Last drink was approximately 3AM on 12/13/24.   -CIWA discontinued 12/17  - c/w Thiamine, Folate, and MVI PO QD  - pt interested in attending rehab on discharge     #Chronic Hypoxic Respiratory Failure  #COPD  #HANY  - Continue bronchial hygiene, duonebs, mucinex  - Wean O2 as tolerated (per chart review, does not seem like he was on O2 at home)  -Currently on room air      #HTN  #orthostatic hypotension  - BP on admit 118/81  - continue to monitor BP  - no home meds     #Hx of Pulmonary Embolism (?requiring 3L NC since although unclear per chart review)  -S/p embolization 11/2023  -Provoked by surgery  -Heparin gtt stopped in anticipation of surgery on 12/20.   -Resumed home Eliquis 5 mg BID today      #Tobacco Use Disorder  - nicotine patch      #Decreased strength  - PT rec: Moderate intensity level of continued care 4x /week   - OT rec: Moderate intensity level of continued care 3x/week     #Dispo  - Pending SNF placement      Fluids: PRN  Electrolytes: Keep K>4, Mg>3, Phos>3  Nutrition: Pureed with Mildly/nectar thick liquids   Abx: N/A  DVT: Home Eliquis 5 mg BID     Patient discussed with attending physician, Dr. Wasserman.      Bre Chu MD   PGY2, Family Medicine   [facility]  Available by [employer]

## 2024-12-22 NOTE — CONSULTS
"SUPPORTIVE AND PALLIATIVE ONCOLOGY CONSULT    Inpatient consult to [facility] Supportive Oncology  Consult performed by: Samy Ford, APRN-CNP  Consult ordered by: Cely Lopez DO  Reason for consult: pain        SERVICE DATE: 12/22/2024      PALLIATIVE MEDICINE OUTPATIENT PROVIDER:  None  CURRENT ATTENDING PROVIDER: Cely Lopez DO     Medical Oncologist: No care team member to display   Radiation Oncologist: No care team member to display  Primary Physician: No Assigned PCP Generic Provider  None    REASON FOR CONSULT/CHIEF CONSULT COMPLAINT: pain management    Subjective   HISTORY OF PRESENT ILLNESS: Gaurav Mckay is a 54 y.o. male diagnosed with laryngeal cancer status post chemoradiation plus right neck dissection and flap reconstruction presenting for evaluation of clival mass that was found incidentally on preoperative testing. PMH significant for peripheral neuropathy, restless leg syndrome, HTN, GERD, COPD, hematuria and bladder mass. Admitted 12/15/2024 for further evaluation and management of clival mass. 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 Supportive and Palliative Oncology is consulted for pain management.       Patient was seen at bedside with no family present. He reports that his pain is controlled only in 1.5 - 2 hour increments when he uses dilaudid 1mg iv. When the dilaudid wears off then his pain is back and he is miserable. Pain makes it hard for him to sleep, and he also does not want to \" truly fall asleep because I am scared I will never wake up\". He report that oxycodone is not effective, it does \"nothing for my pain\". Discussed his OARRS, given that buprenorphine was seen, he reports that he used to use Meth, cocaine a lot, and did every drug you can think off, but he never used Heroin. He was getting Suboxone and it was not really helpful for him, so he stopped going to the Suboxone clinic in June.       Pain Assessment:  Onset: 5 " Weeks  Location:  back of head and eyes   Duration: Constant  Characteristics:   Ratin and Severe   Descriptors: sharp, burning, and sensitivity to light    Aggravating:  light      Relieving: Analgesics   Intolerances:Gaurav Mckay is allergic to oxycodone.   Personal Pain Goal: 5    Interference with Function: Very Much   Coping Strategies: none   Emotional Response: Depression, Anxiety, and Fear   Barriers to Pain Management: None    Opioid Requirements  Past 24 h opioid requirements (24 at 0800 to 24 at 0800):   Hydromorphone 1 mg IV x 5 doses = 5 mg = 62.5 OME  Oxycodone IR 5 mg PO x 2 doses = 10 mg = 12.5 OME  Total 24h OME use:  75    Note: OME calculations based on equianalgesic table below. Please note this table is based on best available evidence but conversions are still approximate. These are NOT opioid DOSES for individual patient use; this is equivalency information.  Drug Parenteral Enteral   Morphine 10 25   Oxycodone N/A 20   Hydromorphone 2 5   Fentanyl 0.15 N/A   Tramadol N/A 120   Citation: Darnell ENGLAND. Demystifying opioid conversion calculations: A guide for effective dosing, Second edition. Essie MD: American Society of Health-System Pharmacists, 2018.    OARRS/PDMP reviewed 24 no aberrant behavior noted.    Symptom Assessment:  Pain:very much   Headache: very much   Dizziness:very much I fall after taking a step  Lack of energy: very much  Difficulty sleeping: very much due to fear of going to sleep and not waking up again  Worrying: none  Anxiety: very much  Depressive symptoms: very much  Pain in mouth/swallowing: none  Dry mouth: a little  Taste changes: none  Shortness of breath: none  Lack of appetite: very much   Nausea: very much  Vomiting: very much  Constipation: very much LBM 1 week ago   Diarrhea: none  Sore muscles: a little  Numbness or tingling in hands/feet/other: very much  Weight loss: none  Other: none        Information obtained from: chart  review, interview of patient, discussion with RN, and discussion with primary team  ______________________________________________________________________     Oncology History    No history exists.       Past Medical History:   Diagnosis Date    Acute inflammation of orbit 06/27/2013    Acute upper respiratory infection, unspecified 03/20/2015    Acute upper respiratory infection    Alcohol abuse, in remission 02/12/2015    History of ETOH abuse    Alcohol dependence, in remission 05/07/2015    Alcohol dependence in remission    Allergic contact dermatitis due to plants, except food 05/21/2014    Contact dermatitis due to poison ivy    Allergy status to unspecified drugs, medicaments and biological substances 08/29/2013    History of allergy    Anxiety disorder, unspecified 03/20/2015    Anxiety    Asthmatic bronchitis ([facility])     Bipolar disorder, current episode manic without psychotic features, unspecified (Multi)     Bipolar disorder, current episode manic without psychotic features    Cancer of larynx (Multi)     Cervicalgia     Cervicalgia    Chronic asthmatic bronchitis (Multi) 12/02/2014    Clotting disorder (Multi)     P.E.    COPD (chronic obstructive pulmonary disease) (Multi)     Degeneration of cervical intervertebral disc     Dysphagia     Edentulous     Encounter for immunization 09/22/2016    Flu vaccine need    Frequent falls     pt states he falls every time he gets up.    GERD (gastroesophageal reflux disease)     Hearing aid worn     currently missing    History of abdominal pain 02/16/2017    History of allergic rhinitis 01/16/2014    History of allergic rhinitis 06/26/2014    History of allergic rhinitis    History of depression     History of esophageal reflux 08/27/2015    History of gastroenteritis 04/11/2013    History of hepatitis C     History of sinusitis 02/05/2014    History of sore throat 03/20/2017    Hyperlipidemia     Hypertension     Other long term (current) drug therapy  06/02/2015    High risk medication use    Personal history of nicotine dependence 08/16/2017    History of tobacco abuse    Personal history of nicotine dependence 06/26/2017    History of nicotine dependence    Personal history of other diseases of the respiratory system 09/26/2013    History of acute sinusitis    Personal history of other mental and behavioral disorders     Personal history of other specified conditions 08/25/2016    RLS (restless legs syndrome)     Unspecified asthma, uncomplicated ([facility]) 02/13/2014    Uses walker     and wheelchair    Wears glasses     currently broken     Past Surgical History:   Procedure Laterality Date    ANKLE SURGERY Left     ANKLE SURGERY  07/08/2024    R ankle spanning ex-fix revision    CHOLECYSTECTOMY      CT ABDOMEN PELVIS ANGIOGRAM W AND/OR WO IV CONTRAST  04/01/2020    CT ABDOMEN PELVIS ANGIOGRAM W AND/OR WO IV CONTRAST 4/1/2020 [facility]    FRACTURE SURGERY Right 07/15/2024    Open reduction and internal fixation of right distal tibia pilon and fibula fractures, removal of right ankle spanning external fixation under anesthesia, debridement down to including bone external fixator pin sites right leg    INVASIVE VASCULAR PROCEDURE N/A 11/08/2023    Procedure: Pulmonary Angiogram;  Surgeon: Surya Templeton MD;  Location: [facility] Cardiac Cath Lab;  Service: Cardiovascular;  Laterality: N/A;    LARYNGOSCOPY  07/01/2024    R radical neck dissection and L ALT free flap reconstruction    LEG SURGERY  07/06/2024    RLE ex-fix placement    MR HEAD ANGIO WO IV CONTRAST  01/24/2016    MR HEAD ANGIO WO IV CONTRAST 1/24/2016 [facility]    MR NECK ANGIO WO IV CONTRAST  01/24/2016    MR NECK ANGIO WO IV CONTRAST 1/24/2016 [facility]    THROMBECTOMY      WOUND DEBRIDEMENT Left 08/06/2024    L thigh debridement with woud vac placement 2/2 surgical wound dehiscence     Family History   Problem Relation Name Age of Onset    Diabetes Mother       Hypertension Mother      Heart attack Father          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 He has quit using smokeless tobacco.  His smokeless tobacco use included snuff. He reports current alcohol use. He reports current drug use. Drugs: Methamphetamines and Cocaine.    Baptism and Importance of Baptism:  None  Role of hadley in daily life/Role of spirituality in health care decision-making: none      REVIEW OF SYSTEMS:  Review of systems negative unless noted in HPI.       Objective       Lab Results   Component Value Date    WBC 6.7 12/22/2024    HGB 12.5 (L) 12/22/2024    HCT 39.3 (L) 12/22/2024    MCV 89 12/22/2024     12/22/2024      Lab Results   Component Value Date    GLUCOSE 117 (H) 12/22/2024    CALCIUM 9.9 12/22/2024     12/22/2024    K 3.6 12/22/2024    CO2 26 12/22/2024     12/22/2024    BUN 6 12/22/2024    CREATININE 0.65 12/22/2024     Lab Results   Component Value Date    ALT 19 12/18/2024    AST 25 12/18/2024    ALKPHOS 105 12/18/2024    BILITOT 0.4 12/18/2024     Estimated Creatinine Clearance: 125 mL/min (by C-G formula based on SCr of 0.65 mg/dL).     Encounter Date: 12/15/24   ECG 12 Lead   Result Value    Ventricular Rate 91    Atrial Rate 91    ME Interval 140    QRS Duration 88    QT Interval 386    QTC Calculation(Bazett) 474    P Axis 42    R Axis 17    T Axis 50    QRS Count 15    Q Onset 211    P Onset 141    P Offset 192    T Offset 404    QTC Fredericia 443    Narrative    Normal sinus rhythm  Normal ECG  Confirmed by Anastacio Hernandez ([id number]) on 12/19/2024 1:20:26 PM     Wt Readings from Last 5 Encounters:   12/15/24 83 kg (183 lb)   12/13/24 83.1 kg (183 lb 1.6 oz)   12/13/24 84.6 kg (186 lb 8.2 oz)   11/19/24 89.4 kg (197 lb)   10/28/24 94 kg (207 lb 3.7 oz)       Current Outpatient Medications   Medication Instructions    acetaminophen (TYLENOL) 650 mg, oral, Every 6 hours PRN    atorvastatin  (LIPITOR) 20 mg, Nightly    calcium carbonate (Tums) 200 mg calcium chewable tablet 1 tablet, oral, Daily    cyclobenzaprine (FLEXERIL) 10 mg, 3 times daily PRN    docusate sodium (COLACE) 100 mg, oral, 2 times daily    ferrous sulfate 65 mg, 3 times daily (morning, midday, late afternoon)    folic acid (FOLVITE) 1 mg, oral, Daily    guaiFENesin (MUCINEX) 600 mg, oral, 2 times daily, Do not crush, chew, or split.    HYDROmorphone (DILAUDID) 0.5 mg, intravenous, Every 4 hours PRN    HYDROmorphone (DILAUDID) 1 mg, intravenous, Every 4 hours PRN    ipratropium-albuteroL (Duo-Neb) 0.5-2.5 mg/3 mL nebulizer solution 3 mL, nebulization, Every 2 hour PRN    lidocaine (Lidoderm) 5 % patch 1 patch, transdermal, Daily, Remove & discard patch within 12 hours or as directed by MD.    LORazepam (ATIVAN) 0.5 mg, intravenous, Every 2 hour PRN    mirtazapine (REMERON) 15 mg, oral, Nightly    montelukast (SINGULAIR) 10 mg, Daily    multivitamin with minerals tablet 1 tablet, oral, Daily    nicotine (Nicoderm CQ) 14 mg/24 hr patch 1 patch, transdermal, Every 24 hours    ondansetron ODT (ZOFRAN-ODT) 4 mg, oral, Every 8 hours PRN    oxygen (O2) 2 L/min, inhalation, Every 24 hours    pancrelipase, Lip-Prot-Amyl, (Creon) 6,000-19,000 -30,000 unit capsule 1 capsule, 2 times daily    pantoprazole (PROTONIX) 40 mg, oral, Daily before breakfast, Do not crush, chew, or split.    polyethylene glycol (GLYCOLAX, MIRALAX) 17 g, oral, 3 times daily PRN    pregabalin (LYRICA) 200 mg, oral, 3 times daily    rOPINIRole (REQUIP) 0.5 mg, 3 times daily    sennosides-docusate sodium (Marya-Colace) 8.6-50 mg tablet 1 tablet, oral, Nightly    tamsulosin (FLOMAX) 0.4 mg, oral, Daily    thiamine (VITAMIN B-1) 100 mg, oral, Daily    topiramate (TOPAMAX) 100 mg, 2 times daily     Scheduled medications   apixaban, 5 mg, oral, BID  atorvastatin, 20 mg, oral, Nightly  bisacodyl, 10 mg, oral, Daily  ferrous sulfate (325 mg ferrous sulfate), 65 mg of iron, oral,  Every other day  folic acid, 1 mg, oral, Daily  guaiFENesin, 600 mg, oral, BID  lidocaine, 15 mL, oral, Before meals & nightly  lidocaine, 1 patch, transdermal, Daily  mirtazapine, 15 mg, oral, Nightly  montelukast, 10 mg, oral, Daily  multivitamin with minerals, 1 tablet, oral, Daily  nicotine, 1 patch, transdermal, Daily  oxygen, , inhalation, Continuous - Inhalation  pancrelipase (Lip-Prot-Amyl), 1 capsule, oral, BID  pantoprazole, 40 mg, oral, Daily before breakfast  polyethylene glycol, 17 g, oral, q8h  pregabalin, 200 mg, oral, TID  rOPINIRole, 0.5 mg, oral, TID  sennosides, 2 tablet, oral, BID  sodium chloride, 2 spray, Each Nostril, 4x daily  tamsulosin, 0.4 mg, oral, Daily  thiamine, 100 mg, oral, Daily  topiramate, 100 mg, oral, BID      Continuous medications     PRN medications  albuterol, 2 puff, q6h PRN  butalbital-acetaminophen-caff, 1 tablet, q4h PRN  cyclobenzaprine, 10 mg, TID PRN  dextrose, 12.5 g, q15 min PRN  dextrose, 25 g, q15 min PRN  glucagon, 1 mg, q15 min PRN  glucagon, 1 mg, q15 min PRN  HYDROmorphone, 1 mg, q4h PRN  ipratropium-albuteroL, 3 mL, q2h PRN  lubricating eye drops, 1 drop, q6h PRN  melatonin, 3 mg, Nightly PRN  naloxone, 0.4 mg, q5 min PRN  ondansetron ODT, 4 mg, q6h PRN   Or  ondansetron, 4 mg, q6h PRN  oxyCODONE, 10 mg, q4h PRN  oxymetazoline, 2 spray, q12h PRN         Allergies:   Allergies   Allergen Reactions    Oxycodone Itching                PHYSICAL EXAMINATION:  Vital Signs:   Vital signs reviewed  Vitals:    12/22/24 0452   BP: 154/86   Pulse: 65   Resp: 16   Temp: 36.1 °C (97 °F)   SpO2: 95%     Pain Score: 10 - Worst possible pain     Physical Exam  Eyes:      Pupils: Pupils are equal, round, and reactive to light.   Cardiovascular:      Rate and Rhythm: Regular rhythm.      Heart sounds: Normal heart sounds.   Pulmonary:      Breath sounds: Normal breath sounds.   Abdominal:      General: Bowel sounds are normal.      Palpations: Abdomen is soft.   Skin:      General: Skin is warm.   Neurological:      Mental Status: He is alert and oriented to person, place, and time.   Psychiatric:         Mood and Affect: Mood normal.         Behavior: Behavior normal.         Thought Content: Thought content normal.          ASSESSMENT/PLAN:  Gaurav Mckay is a 54 y.o. male diagnosed with laryngeal cancer status post chemoradiation plus right neck dissection and flap reconstruction presenting for evaluation of clival mass that was found incidentally on preoperative testing. PMH significant for peripheral neuropathy, restless leg syndrome, HTN, GERD, COPD, hematuria and bladder mass. Admitted 12/15/2024 for further evaluation and management of clival mass. 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 Supportive and Palliative Oncology is consulted for pain management.      Neoplasm related to Pain:  Headache and eye pain related to laryngeal cancer   Pain is: cancer related pain  Type: visceral, somatic, and neuropathic  Pain control: sub-optimally controlled  Home regimen:  Pregabalin 200mg BID   Intolerances/previously tried:  buprenorphine   Personalized pain goal: 5  Total OME usage for the past 24 hours:  75  Cigerette 1 pack a day. Used Met, Cocaine and Marijuana in the past  Continue Lyrica 200mg TID   Continue Flexeril 10mg TID PRN   Change  Dilaudid 1mg iv q2hr PRN for clinician bolus   Discontinue Oxycodone 10mg q4hrs PRN   Continue lidocaine 4% patch TD daily   Consider adding Dilaudid PCA pump   Basal: 0  Demand dose: 0.8mg   Lockout time: 30 minutes   Hourly max dose: 1.8 mg/hr  Continue to monitor pain scores and administer PRN medications as appropriate  Continue/initiate nonpharmacologic pain management strategies including ice/heat therapy, distraction techniques, deep breathing/relaxation techniques, calming music, and repositioning  Continue to monitor for signs of opioid efficacy (pain scores, improved functionality) and toxicity  (pinpoint pupils, excess sedation/drowsiness/confusion, respiratory depression, etc.)    Nausea:  At risk for nausea with vomiting related to opioids   Home regimen:  none  Denies  Continue Zofran 4mg q6hrs PRN     Constipation  At risk for constipation related to medication side effects (including opioids), currently constipated  Usual bowel pattern: every day  Home regimen: none  LBM 12/16/22  Continue Miralax 17 grams q8hrs   Continue Senna 2 tablets BID   Continue bisacodyl 10mg   Add Lactulose 20 grams BID   Goal to have BM without straining q48-72h, adjust regimen as needed    Altered Mood:  Acute on chronic anxiety and depression related to health concerns   uncontrolled with home regimen   Home regimen:  none   Mirtazapine 15mg at night     Sleeping Difficulty:  Impaired sleep related to pain and anxiety  Home regimen:  mirtazapine  With better pain control he will sleep better.       Medical Decision Making/Goals of Care/Advance Care Planning:  Patient's current clinical condition, including diagnosis, prognosis, and management plan, and goals of care were discussed.   Life limiting disease: metastatic malignancy  Family: Supportive family   Performance status: Major  limitations due to pain  Joys/meaning/strength: Family  Understanding of health: Demonstrates good understanding of disease process, understands plan for symptom management   Information:Wants full disclosure  Goals: symptom control and cancer directed therapy  Worries and fears now and future: none   Minimum acceptable outcome/QOL:  In an event that my heart stops I do want to be resuscitated . Additionally I do want to be intubated , do want artificial nutrition via NG and Peg tube , and am acceptable of dialysis    Code status discussion:  Full code     Advance Directives  Existence of Advance Directives:  No - not interested  Decision maker: Surrogate decision maker is Girlfrmarito Hernandez [phone]  Code Status: Full  code    Introduction to Supportive and Palliative Oncology:  Spoke with patient at bedside  Introduced the role and philosophy of Supportive and Palliative oncology in the evaluation and management of symptoms during cancer treatment  Palliative care was introduced as a service for patients with serious illness to help with symptoms, assist with goals of care conversations, navigate complex decision making, improve quality of life for patients, and provide support both patients and families.  Patient seemed to appreciate the extra layer of support.     Supportive Interventions: Interventions: Music Therapy: offered declined, Art Therapy: offered declined, SPO Spiritual Care: offered declined    Disposition:  Please  start the process of having prior authorization with meds to beds deliver medications to patient prior to discharge via [facility] pharmacy. Prescriptions will need to be sent 48-72 hours prior to discharge so that a prior authorization can be completed.     Discharge date: unknown pending acute issues, pain control, and symptom control  Will assess if patient needs an appointment with Outpatient Supportive Oncology None      Signature and billing:    Medical complexity was high level due to due to complexity of problems, extensive data review, and high risk of management/treatment.    I spent 75 minutes in the care of this patient which included chart review, interviewing patient/family, discussion with primary team, coordination of care, and documentation.      DATA   Diagnostic tests and information reviewed for today's visit:  Conversation with primary team       Some elements copied from  Otolaryngology  and Urology  note on 12/21, the elements have been updated and all reflect current decision making from today, 12/22/2024.    Plan of Care discussed with: Provider, RN, Patient    Thank you for asking Supportive and Palliative Oncology to assist with care of this patient.  Recommendations will be  communicated back to the consulting service by way of shared electronic medical record/secure chat/email or face-to-face.   We will continue to follow.  Please contact us for additional questions or concerns.      SIGNATURE: JUSTINO Rivera  PAGER/CONTACT:  Contact information:  Supportive and Palliative Oncology  Monday-Friday 8 AM-5 PM  Epic Secure chat or pager [phone].  After hours and weekends:  pager [phone]

## 2024-12-23 LAB
ALBUMIN SERPL BCP-MCNC: 3.3 G/DL (ref 3.4–5)
ANION GAP SERPL CALC-SCNC: 15 MMOL/L (ref 10–20)
BUN SERPL-MCNC: 6 MG/DL (ref 6–23)
CALCIUM SERPL-MCNC: 9.6 MG/DL (ref 8.6–10.6)
CHLORIDE SERPL-SCNC: 100 MMOL/L (ref 98–107)
CO2 SERPL-SCNC: 28 MMOL/L (ref 21–32)
CREAT SERPL-MCNC: 0.63 MG/DL (ref 0.5–1.3)
EGFRCR SERPLBLD CKD-EPI 2021: >90 ML/MIN/1.73M*2
GLUCOSE BLD MANUAL STRIP-MCNC: 112 MG/DL (ref 74–99)
GLUCOSE BLD MANUAL STRIP-MCNC: 115 MG/DL (ref 74–99)
GLUCOSE BLD MANUAL STRIP-MCNC: 136 MG/DL (ref 74–99)
GLUCOSE BLD MANUAL STRIP-MCNC: 148 MG/DL (ref 74–99)
GLUCOSE SERPL-MCNC: 103 MG/DL (ref 74–99)
MAGNESIUM SERPL-MCNC: 2.14 MG/DL (ref 1.6–2.4)
PHOSPHATE SERPL-MCNC: 5.8 MG/DL (ref 2.5–4.9)
POTASSIUM SERPL-SCNC: 3.9 MMOL/L (ref 3.5–5.3)
SODIUM SERPL-SCNC: 139 MMOL/L (ref 136–145)

## 2024-12-23 PROCEDURE — 80069 RENAL FUNCTION PANEL: CPT | Performed by: PODIATRIST

## 2024-12-23 PROCEDURE — 2500000001 HC RX 250 WO HCPCS SELF ADMINISTERED DRUGS (ALT 637 FOR MEDICARE OP): Performed by: STUDENT IN AN ORGANIZED HEALTH CARE EDUCATION/TRAINING PROGRAM

## 2024-12-23 PROCEDURE — 99233 SBSQ HOSP IP/OBS HIGH 50: CPT | Performed by: PODIATRIST

## 2024-12-23 PROCEDURE — 1100000001 HC PRIVATE ROOM DAILY

## 2024-12-23 PROCEDURE — 2500000001 HC RX 250 WO HCPCS SELF ADMINISTERED DRUGS (ALT 637 FOR MEDICARE OP): Performed by: INTERNAL MEDICINE

## 2024-12-23 PROCEDURE — 2500000005 HC RX 250 GENERAL PHARMACY W/O HCPCS: Performed by: NURSE PRACTITIONER

## 2024-12-23 PROCEDURE — 2500000004 HC RX 250 GENERAL PHARMACY W/ HCPCS (ALT 636 FOR OP/ED): Performed by: PODIATRIST

## 2024-12-23 PROCEDURE — 99254 IP/OBS CNSLTJ NEW/EST MOD 60: CPT | Performed by: PHYSICIAN ASSISTANT

## 2024-12-23 PROCEDURE — 2500000002 HC RX 250 W HCPCS SELF ADMINISTERED DRUGS (ALT 637 FOR MEDICARE OP, ALT 636 FOR OP/ED): Performed by: STUDENT IN AN ORGANIZED HEALTH CARE EDUCATION/TRAINING PROGRAM

## 2024-12-23 PROCEDURE — 2500000004 HC RX 250 GENERAL PHARMACY W/ HCPCS (ALT 636 FOR OP/ED): Performed by: INTERNAL MEDICINE

## 2024-12-23 PROCEDURE — 2500000001 HC RX 250 WO HCPCS SELF ADMINISTERED DRUGS (ALT 637 FOR MEDICARE OP): Performed by: PODIATRIST

## 2024-12-23 PROCEDURE — S4991 NICOTINE PATCH NONLEGEND: HCPCS | Performed by: STUDENT IN AN ORGANIZED HEALTH CARE EDUCATION/TRAINING PROGRAM

## 2024-12-23 PROCEDURE — 2500000004 HC RX 250 GENERAL PHARMACY W/ HCPCS (ALT 636 FOR OP/ED): Performed by: STUDENT IN AN ORGANIZED HEALTH CARE EDUCATION/TRAINING PROGRAM

## 2024-12-23 PROCEDURE — 82947 ASSAY GLUCOSE BLOOD QUANT: CPT

## 2024-12-23 PROCEDURE — 36415 COLL VENOUS BLD VENIPUNCTURE: CPT | Performed by: PODIATRIST

## 2024-12-23 PROCEDURE — 83735 ASSAY OF MAGNESIUM: CPT | Performed by: PODIATRIST

## 2024-12-23 PROCEDURE — 99233 SBSQ HOSP IP/OBS HIGH 50: CPT | Performed by: NURSE PRACTITIONER

## 2024-12-23 PROCEDURE — 2500000005 HC RX 250 GENERAL PHARMACY W/O HCPCS: Performed by: STUDENT IN AN ORGANIZED HEALTH CARE EDUCATION/TRAINING PROGRAM

## 2024-12-23 PROCEDURE — 99252 IP/OBS CONSLTJ NEW/EST SF 35: CPT | Performed by: INTERNAL MEDICINE

## 2024-12-23 RX ORDER — HYDROXYZINE HYDROCHLORIDE 25 MG/1
25 TABLET, FILM COATED ORAL EVERY 6 HOURS PRN
Status: DISCONTINUED | OUTPATIENT
Start: 2024-12-23 | End: 2024-12-26 | Stop reason: HOSPADM

## 2024-12-23 RX ORDER — OLANZAPINE 5 MG/1
5 TABLET ORAL 2 TIMES DAILY PRN
Status: DISCONTINUED | OUTPATIENT
Start: 2024-12-23 | End: 2024-12-26 | Stop reason: HOSPADM

## 2024-12-23 RX ADMIN — Medication 1 PATCH: at 09:26

## 2024-12-23 RX ADMIN — TAMSULOSIN HYDROCHLORIDE 0.4 MG: 0.4 CAPSULE ORAL at 09:27

## 2024-12-23 RX ADMIN — ROPINIROLE 0.5 MG: 0.5 TABLET, FILM COATED ORAL at 21:05

## 2024-12-23 RX ADMIN — SALINE NASAL SPRAY 2 SPRAY: 1.5 SOLUTION NASAL at 17:55

## 2024-12-23 RX ADMIN — PANTOPRAZOLE SODIUM 40 MG: 40 TABLET, DELAYED RELEASE ORAL at 06:20

## 2024-12-23 RX ADMIN — PREGABALIN 200 MG: 100 CAPSULE ORAL at 15:46

## 2024-12-23 RX ADMIN — FOLIC ACID 1 MG: 1 TABLET ORAL at 09:27

## 2024-12-23 RX ADMIN — Medication 1 TABLET: at 09:26

## 2024-12-23 RX ADMIN — LIDOCAINE 4% 1 PATCH: 40 PATCH TOPICAL at 09:35

## 2024-12-23 RX ADMIN — THIAMINE HCL TAB 100 MG 100 MG: 100 TAB at 09:27

## 2024-12-23 RX ADMIN — TOPIRAMATE 100 MG: 100 TABLET, FILM COATED ORAL at 09:27

## 2024-12-23 RX ADMIN — BISACODYL 10 MG: 5 TABLET, COATED ORAL at 09:27

## 2024-12-23 RX ADMIN — PREGABALIN 200 MG: 100 CAPSULE ORAL at 09:45

## 2024-12-23 RX ADMIN — APIXABAN 5 MG: 5 TABLET, FILM COATED ORAL at 09:26

## 2024-12-23 RX ADMIN — LIDOCAINE HYDROCHLORIDE 15 ML: 20 SOLUTION ORAL at 11:58

## 2024-12-23 RX ADMIN — ROPINIROLE 0.5 MG: 0.5 TABLET, FILM COATED ORAL at 09:27

## 2024-12-23 RX ADMIN — POLYETHYLENE GLYCOL 3350 17 G: 17 POWDER, FOR SOLUTION ORAL at 18:33

## 2024-12-23 RX ADMIN — LIDOCAINE HYDROCHLORIDE 15 ML: 20 SOLUTION ORAL at 06:20

## 2024-12-23 RX ADMIN — TOPIRAMATE 100 MG: 100 TABLET, FILM COATED ORAL at 21:05

## 2024-12-23 RX ADMIN — POLYETHYLENE GLYCOL 3350 17 G: 17 POWDER, FOR SOLUTION ORAL at 09:25

## 2024-12-23 RX ADMIN — SALINE NASAL SPRAY 2 SPRAY: 1.5 SOLUTION NASAL at 21:04

## 2024-12-23 RX ADMIN — MONTELUKAST 10 MG: 10 TABLET, FILM COATED ORAL at 09:27

## 2024-12-23 RX ADMIN — PANCRELIPASE 1 CAPSULE: 30000; 6000; 19000 CAPSULE, DELAYED RELEASE PELLETS ORAL at 17:55

## 2024-12-23 RX ADMIN — Medication 3 L/MIN: at 08:00

## 2024-12-23 RX ADMIN — LIDOCAINE HYDROCHLORIDE 15 ML: 20 SOLUTION ORAL at 15:46

## 2024-12-23 RX ADMIN — LIDOCAINE HYDROCHLORIDE 15 ML: 20 SOLUTION ORAL at 21:05

## 2024-12-23 RX ADMIN — PREGABALIN 200 MG: 100 CAPSULE ORAL at 21:05

## 2024-12-23 RX ADMIN — SENNOSIDES 17.2 MG: 8.6 TABLET, FILM COATED ORAL at 21:06

## 2024-12-23 RX ADMIN — MIRTAZAPINE 15 MG: 15 TABLET, FILM COATED ORAL at 21:05

## 2024-12-23 RX ADMIN — GUAIFENESIN 600 MG: 600 TABLET, EXTENDED RELEASE ORAL at 09:43

## 2024-12-23 RX ADMIN — APIXABAN 5 MG: 5 TABLET, FILM COATED ORAL at 21:05

## 2024-12-23 RX ADMIN — Medication: at 09:24

## 2024-12-23 RX ADMIN — PANCRELIPASE 1 CAPSULE: 30000; 6000; 19000 CAPSULE, DELAYED RELEASE PELLETS ORAL at 09:26

## 2024-12-23 RX ADMIN — ROPINIROLE 0.5 MG: 0.5 TABLET, FILM COATED ORAL at 15:46

## 2024-12-23 RX ADMIN — ATORVASTATIN CALCIUM 20 MG: 20 TABLET, FILM COATED ORAL at 21:05

## 2024-12-23 RX ADMIN — GUAIFENESIN 600 MG: 600 TABLET, EXTENDED RELEASE ORAL at 21:06

## 2024-12-23 RX ADMIN — SENNOSIDES 17.2 MG: 8.6 TABLET, FILM COATED ORAL at 09:27

## 2024-12-23 ASSESSMENT — COGNITIVE AND FUNCTIONAL STATUS - GENERAL
MOVING FROM LYING ON BACK TO SITTING ON SIDE OF FLAT BED WITH BEDRAILS: A LITTLE
MOVING TO AND FROM BED TO CHAIR: A LOT
DRESSING REGULAR LOWER BODY CLOTHING: A LITTLE
DAILY ACTIVITIY SCORE: 17
DRESSING REGULAR UPPER BODY CLOTHING: A LITTLE
PERSONAL GROOMING: A LITTLE
TURNING FROM BACK TO SIDE WHILE IN FLAT BAD: A LITTLE
TOILETING: A LOT
HELP NEEDED FOR BATHING: A LITTLE
DAILY ACTIVITIY SCORE: 21
EATING MEALS: A LITTLE
WALKING IN HOSPITAL ROOM: A LOT
WALKING IN HOSPITAL ROOM: A LOT
CLIMB 3 TO 5 STEPS WITH RAILING: A LOT
MOBILITY SCORE: 14
TURNING FROM BACK TO SIDE WHILE IN FLAT BAD: A LITTLE
MOBILITY SCORE: 14
TOILETING: A LITTLE
STANDING UP FROM CHAIR USING ARMS: A LOT
HELP NEEDED FOR BATHING: A LITTLE
MOVING FROM LYING ON BACK TO SITTING ON SIDE OF FLAT BED WITH BEDRAILS: A LITTLE
STANDING UP FROM CHAIR USING ARMS: A LOT
MOVING TO AND FROM BED TO CHAIR: A LOT
DRESSING REGULAR LOWER BODY CLOTHING: A LITTLE
CLIMB 3 TO 5 STEPS WITH RAILING: A LOT

## 2024-12-23 ASSESSMENT — PAIN SCALES - GENERAL
PAINLEVEL_OUTOF10: 10 - WORST POSSIBLE PAIN
PAINLEVEL_OUTOF10: 0 - NO PAIN
PAINLEVEL_OUTOF10: 4

## 2024-12-23 ASSESSMENT — PAIN - FUNCTIONAL ASSESSMENT
PAIN_FUNCTIONAL_ASSESSMENT: 0-10

## 2024-12-23 NOTE — PROGRESS NOTES
"Gaurav Mckay \"Lemuel\" is a 54 y.o. male on day 8 of admission presenting with Brain mass.    Subjective   No acute events overnight. His pain is tolerable on PCA pump. He had a bowel movement last pm and is stomach is feeling much better. Denies fever, chills, nausea, vomiting, chest pain, shortness of breath.     Objective     Physical Exam  Constitutional:       Appearance: Normal appearance.   HENT:      Head: Normocephalic and atraumatic.      Nose: Nose normal.      Mouth/Throat:      Mouth: Mucous membranes are moist.   Eyes:      Conjunctiva/sclera: Conjunctivae normal.   Cardiovascular:      Rate and Rhythm: Normal rate and regular rhythm.   Pulmonary:      Effort: Pulmonary effort is normal.      Breath sounds: Normal breath sounds.      Comments: Room air  Abdominal:      General: Bowel sounds are normal. There is no distension.      Palpations: Abdomen is soft.      Tenderness: There is no abdominal tenderness.   Genitourinary:     Comments: Stephen in place draining clear yellow urine.   Musculoskeletal:         General: No swelling. Normal range of motion.      Cervical back: Normal range of motion and neck supple.   Skin:     General: Skin is warm.   Neurological:      General: No focal deficit present.      Mental Status: He is alert and oriented to person, place, and time. Mental status is at baseline.   Psychiatric:         Mood and Affect: Mood normal.         Behavior: Behavior normal.         Thought Content: Thought content normal.         Judgment: Judgment normal.         Last Recorded Vitals  Blood pressure 118/73, pulse 58, temperature 36.3 °C (97.3 °F), resp. rate 17, height 1.778 m (5' 10\"), weight 83 kg (183 lb), SpO2 97%.  Intake/Output last 3 Shifts:  I/O last 3 completed shifts:  In: 2160 (26 mL/kg) [P.O.:2160]  Out: 3300 (39.8 mL/kg) [Urine:3300 (1.1 mL/kg/hr)]  Weight: 83 kg     Relevant Results  Scheduled medications  apixaban, 5 mg, oral, BID  atorvastatin, 20 mg, oral, " Nightly  bisacodyl, 10 mg, oral, Daily  ferrous sulfate (325 mg ferrous sulfate), 65 mg of iron, oral, Every other day  folic acid, 1 mg, oral, Daily  guaiFENesin, 600 mg, oral, BID  lidocaine, 15 mL, oral, Before meals & nightly  lidocaine, 1 patch, transdermal, Daily  mirtazapine, 15 mg, oral, Nightly  montelukast, 10 mg, oral, Daily  multivitamin with minerals, 1 tablet, oral, Daily  nicotine, 1 patch, transdermal, Daily  oxygen, , inhalation, Continuous - Inhalation  pancrelipase (Lip-Prot-Amyl), 1 capsule, oral, BID  pantoprazole, 40 mg, oral, Daily before breakfast  polyethylene glycol, 17 g, oral, q8h  pregabalin, 200 mg, oral, TID  rOPINIRole, 0.5 mg, oral, TID  sennosides, 2 tablet, oral, BID  sodium chloride, 2 spray, Each Nostril, 4x daily  tamsulosin, 0.4 mg, oral, Daily  thiamine, 100 mg, oral, Daily  topiramate, 100 mg, oral, BID      Continuous medications  HYDROmorphone,       PRN medications  PRN medications: albuterol, butalbital-acetaminophen-caff, cyclobenzaprine, dextrose, dextrose, glucagon, glucagon, HYDROmorphone, ipratropium-albuteroL, lubricating eye drops, melatonin, naloxone, ondansetron ODT **OR** ondansetron, oxymetazoline     Results for orders placed or performed during the hospital encounter of 12/15/24 (from the past 24 hours)   Renal Function Panel   Result Value Ref Range    Glucose 103 (H) 74 - 99 mg/dL    Sodium 139 136 - 145 mmol/L    Potassium 3.9 3.5 - 5.3 mmol/L    Chloride 100 98 - 107 mmol/L    Bicarbonate 28 21 - 32 mmol/L    Anion Gap 15 10 - 20 mmol/L    Urea Nitrogen 6 6 - 23 mg/dL    Creatinine 0.63 0.50 - 1.30 mg/dL    eGFR >90 >60 mL/min/1.73m*2    Calcium 9.6 8.6 - 10.6 mg/dL    Phosphorus 5.8 (H) 2.5 - 4.9 mg/dL    Albumin 3.3 (L) 3.4 - 5.0 g/dL   Magnesium   Result Value Ref Range    Magnesium 2.14 1.60 - 2.40 mg/dL   POCT GLUCOSE   Result Value Ref Range    POCT Glucose 112 (H) 74 - 99 mg/dL   POCT GLUCOSE   Result Value Ref Range    POCT Glucose 136 (H) 74 - 99  mg/dL          Assessment/Plan   Assessment & Plan  Brain mass      Gaurav Mckay is a 54 y.o. 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 Supportive oncology on board for further pain control. Consulting oncology and radiation oncology as well to establish care. Pending all this, plan is for SNF.      #Brain mass  #Headache with Visual Changes  #Right sixth nerve palsy   #Pharyngeal dysphagia   - CTH: concerning for a large destructive lytic soft tissue mass upon the clivus that protrudes into the sella turcica and bilateral cavernous sinuses and erodes bilateral petrous carotid canals.   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     - No other focal deficits except for right eye deviation which he states is not new   - Neurosurgery consulted, appreciate recs.       :: Planning potential biopsy   - Ophthalmology consulted. Appreciate recs.        :: Recommend monocular occlusion as needed for symptomatic relief. Can consider strabismus surgery outpatient in the long-term   - ENT consulted. Appreciate recs.      :: s/p Clival mass biopsy on 12/20     :: ENT to arrange outpatient follow up. Signed off.    - CT sinus and CT angio Head/Neck 12/17: Large 3.4 cm mass located within the clivus and sphenoid bone with surrounding extension including into the sphenoid sinus posteriorly, causing  destruction of the petrous segments of the bilateral temporal bones medially, and protruding into the cavernous sinuses and circumferentially surrounding the right internal carotid artery causing mild luminal narrowing  -CT CAP on 12/15: Interval development of new centrilobular ground-glass nodular  densities in both lungs, predominantly in the right upper and middle lobes. Small amount of retained secretions noted in the trachea. These findings in the setting of a patulous and fluid-filled esophagus favors sequela of reflux/aspiration.  -CT thoracic 12/15: No thoracic spine metastatic disease   -CT lumbar 12/15: No metastatic disease to the lumbar spine.   -CT cervical 12/15: Abnormal enhancement involving the spinous process of C6 and the interspinous ligaments at C5-6 and C6-7. This has an appearance more favorable for inflammatory process over malignancy  -SLP consulted. Appreciate recs.       :: Pureed along with Mildly/nectar thick liquids   -Supportive oncology consulted for optimal pain control. Appreciate recs.     :: Pain control: Dilaudid PCA pump along with Dilaudid 1mg iv q2hr PRN for clinician bolus   [ ] Consulted oncology and radiation oncology to establish care/treatment planning in setting of recent biopsy on 12/20      #Bladder Tumor  #Urinary retention  -Urology consulted, appreciate recs.     :: s/p cystoscopy with bladder biopsy on 12/20     :: Recommend maintaining anders until outpatient follow-up     :: Follow-up with Dr. Murcia on 1/2 for path review and trial of void. Signed off.   - Anders cath in place  - C/w Flomax 0.4 mg PO QD     #Alcohol Use Disorder  -Cessation advised  -Last drink was approximately 3AM on 12/13/24.   -CIWA discontinued 12/17  - c/w Thiamine, Folate, and MVI PO QD  - pt interested in attending rehab on discharge     #Chronic Hypoxic Respiratory Failure  #COPD  #HANY  - Continue bronchial hygiene, duonebs, mucinex  - Wean O2 as tolerated (per chart review, does not  seem like he was on O2 at home)  -Currently on room air      #HTN  #orthostatic hypotension  - BP on admit 118/81  - continue to monitor BP  - no home meds     #Hx of Pulmonary Embolism (?requiring 3L NC since although unclear per chart review)  -S/p embolization 11/2023  -Provoked by surgery  -Heparin gtt stopped in anticipation of surgery on 12/20.   -On home Eliquis 5 mg BID since 12/22     #Tobacco Use Disorder  - nicotine patch      #Decreased strength  - PT rec: Moderate intensity level of continued care 4x /week   - OT rec: Moderate intensity level of continued care 3x/week    #Bowel regimen  -Continue Miralax 17 grams q8hrs   -Continue Senna 2 tablets BID   -Continue bisacodyl 10mg po     #Dispo  - Pending SNF placement pending improvement pain control      Fluids: PRN  Electrolytes: Keep K>4, Mg>3, Phos>3  Nutrition: Pureed with Mildly/nectar thick liquids   Abx: N/A  DVT: Home Eliquis 5 mg BID     Patient discussed with attending physician, Dr. Wasserman.      Bre Chu MD   PGY2, Family Medicine   [facility]  Available by [employer]

## 2024-12-23 NOTE — CONSULTS
Radiation Oncology Inpatient Consult    Patient Name:  Gaurav Mckay  MRN:  [id number]  :  1970    Referring Provider: No ref. provider found  Primary Care Provider: No Assigned PCP Generic Provider, MD  Care Team: Patient Care Team:  No Assigned Pcp Generic Provider, MD as PCP - General (General Practice)    Date of Service: 24    SUBJECTIVE  History of Present Illness:  This is a 54-year-old male with multiple comorbidities, including a history of [id number] squamous cell carcinoma of the larynx treated with definitive radiotherapy in 2022.  In May 2024 the patient presented with a right neck mass, with  biopsy showing invasive moderately to poorly differentiated SCC.  In 2024 the patient underwent resection with reconstruction.  He then received chemoradiotherapy with cisplatin, completed 2024.  The patient has received all of his oncological care at [facility].  He presented to [facility] ED 10/2024 with falls, and was incidentally found to have a thickened bladder on CT imaging.  The patient was scheduled for cystoscopy .  He was admitted to [facility]  with intoxication and complaints of headaches with visual changes.  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  MRI brain completed  showed a 3 cm mass in the right clivus, which has increased in size when compared to CT neck from 10/23.  The patient was transferred to [facility] on 12/15 for further management.  On  the patient underwent biopsy of the clival mass and bladder.  Pathology is pending.  Neurosurgical services have assessed the patient and outpatient follow-up.  Radiation oncology has been asked to assist with potential treatment planning.    Today, the patient complained of visual disturbance of the right eye.  He estimates that symptoms began approximately 2 months ago.  He additionally complains of decreased appetite with weight loss.  Increased fatigue.  He is currently being considered for SNF placement.      Prior  Radiotherapy:      Previous treatments at [facility]  -Definitive head and neck radiotherapy June 2022 for squamous cell carcinoma of the larynx  -Chemoradiotherapy to head and neck for SCC recurrence, completed September/2024    Presence of Pacemaker or ICD:  no    Past Medical History:    Past Medical History:   Diagnosis Date    Acute inflammation of orbit 06/27/2013    Acute upper respiratory infection, unspecified 03/20/2015    Acute upper respiratory infection    Alcohol abuse, in remission 02/12/2015    History of ETOH abuse    Alcohol dependence, in remission 05/07/2015    Alcohol dependence in remission    Allergic contact dermatitis due to plants, except food 05/21/2014    Contact dermatitis due to poison ivy    Allergy status to unspecified drugs, medicaments and biological substances 08/29/2013    History of allergy    Anxiety disorder, unspecified 03/20/2015    Anxiety    Asthmatic bronchitis ([facility])     Bipolar disorder, current episode manic without psychotic features, unspecified (Multi)     Bipolar disorder, current episode manic without psychotic features    Cancer of larynx (Multi)     Cervicalgia     Cervicalgia    Chronic asthmatic bronchitis (Multi) 12/02/2014    Clotting disorder (Multi)     P.E.    COPD (chronic obstructive pulmonary disease) (Multi)     Degeneration of cervical intervertebral disc     Dysphagia     Edentulous     Encounter for immunization 09/22/2016    Flu vaccine need    Frequent falls     pt states he falls every time he gets up.    GERD (gastroesophageal reflux disease)     Hearing aid worn     currently missing    History of abdominal pain 02/16/2017    History of allergic rhinitis 01/16/2014    History of allergic rhinitis 06/26/2014    History of allergic rhinitis    History of depression     History of esophageal reflux 08/27/2015    History of gastroenteritis 04/11/2013    History of hepatitis C     History of sinusitis 02/05/2014    History of sore throat 03/20/2017     Hyperlipidemia     Hypertension     Other long term (current) drug therapy 06/02/2015    High risk medication use    Personal history of nicotine dependence 08/16/2017    History of tobacco abuse    Personal history of nicotine dependence 06/26/2017    History of nicotine dependence    Personal history of other diseases of the respiratory system 09/26/2013    History of acute sinusitis    Personal history of other mental and behavioral disorders     Personal history of other specified conditions 08/25/2016    RLS (restless legs syndrome)     Unspecified asthma, uncomplicated ([facility]) 02/13/2014    Uses walker     and wheelchair    Wears glasses     currently broken        Past Surgical History:    Past Surgical History:   Procedure Laterality Date    ANKLE SURGERY Left     ANKLE SURGERY  07/08/2024    R ankle spanning ex-fix revision    CHOLECYSTECTOMY      CT ABDOMEN PELVIS ANGIOGRAM W AND/OR WO IV CONTRAST  04/01/2020    CT ABDOMEN PELVIS ANGIOGRAM W AND/OR WO IV CONTRAST 4/1/2020 [facility]    FRACTURE SURGERY Right 07/15/2024    Open reduction and internal fixation of right distal tibia pilon and fibula fractures, removal of right ankle spanning external fixation under anesthesia, debridement down to including bone external fixator pin sites right leg    INVASIVE VASCULAR PROCEDURE N/A 11/08/2023    Procedure: Pulmonary Angiogram;  Surgeon: Surya Templeton MD;  Location: [facility] Cardiac Cath Lab;  Service: Cardiovascular;  Laterality: N/A;    LARYNGOSCOPY  07/01/2024    R radical neck dissection and L ALT free flap reconstruction    LEG SURGERY  07/06/2024    RLE ex-fix placement    MR HEAD ANGIO WO IV CONTRAST  01/24/2016    MR HEAD ANGIO WO IV CONTRAST 1/24/2016 [facility]    MR NECK ANGIO WO IV CONTRAST  01/24/2016    MR NECK ANGIO WO IV CONTRAST 1/24/2016 [facility]    THROMBECTOMY      WOUND DEBRIDEMENT Left 08/06/2024    L thigh debridement with woud vac placement 2/2 surgical  wound dehiscence        Family History:  Cancer-related family history is not on file.    Social History:    Social History     Tobacco Use    Smoking status: Every Day     Current packs/day: 1.00     Types: Cigarettes    Smokeless tobacco: Former     Types: Snuff    Tobacco comments:     1 ppd since the age of 16   Substance Use Topics    Alcohol use: Yes     Comment: 6 pack beer a day    Drug use: Yes     Types: Methamphetamines, Cocaine     Comment: Hx cocaine use           Allergies:    Allergies   Allergen Reactions    Oxycodone Itching        Medications:    Current Facility-Administered Medications:     albuterol 90 mcg/actuation inhaler 2 puff, 2 puff, inhalation, q6h PRN, Lilly Sharif DO, 2 puff at 12/16/24 1127    apixaban (Eliquis) tablet 5 mg, 5 mg, oral, BID, Bre Chu MD, 5 mg at 12/23/24 0926    atorvastatin (Lipitor) tablet 20 mg, 20 mg, oral, Nightly, Lilly Sharif DO, 20 mg at 12/22/24 2008    bisacodyl (Dulcolax) EC tablet 10 mg, 10 mg, oral, Daily, Cely Lopez DO, 10 mg at 12/23/24 0927    butalbital-acetaminophen-caff -40 mg per tablet 1 tablet, 1 tablet, oral, q4h PRN, Bre Chu MD    cyclobenzaprine (Flexeril) tablet 10 mg, 10 mg, oral, TID PRN, Lilly Sharif DO    dextrose 50 % injection 12.5 g, 12.5 g, intravenous, q15 min PRN, Lilly Sharif DO    dextrose 50 % injection 25 g, 25 g, intravenous, q15 min PRN, Lilly Sharif DO    ferrous sulfate (325 mg ferrous sulfate) tablet 325 mg, 65 mg of iron, oral, Every other day, Cely Lopez DO, 325 mg at 12/22/24 0851    folic acid (Folvite) tablet 1 mg, 1 mg, oral, Daily, Lilly Sharif DO, 1 mg at 12/23/24 0927    glucagon (Glucagen) injection 1 mg, 1 mg, intramuscular, q15 min PRN, Lilly Sharif DO    glucagon (Glucagen) injection 1 mg, 1 mg, intramuscular, q15 min PRN, Lilly Sharif DO    guaiFENesin (Mucinex) 12 hr tablet 600 mg, 600 mg, oral, BID, Lilly CURTIS  Kole DO, 600 mg at 12/23/24 0943    HYDROmorphone (Dilaudid) injection 1 mg, 1 mg, intravenous, q2h PRN, Bre Chu MD, 1 mg at 12/22/24 2150    hydromorphone PCA 0.5 mg/mL in NS opioid naive, , intravenous, Continuous, Bre Chu MD, New Syringe/Cartridge at 12/23/24 0924    ipratropium-albuteroL (Duo-Neb) 0.5-2.5 mg/3 mL nebulizer solution 3 mL, 3 mL, nebulization, q2h PRN, Lilly Sharif DO, 3 mL at 12/16/24 0415    lidocaine (Xylocaine) 2 % mouth solution 15 mL, 15 mL, oral, Before meals & nightly, Lilly Sharif DO, 15 mL at 12/23/24 1158    lidocaine 4 % patch 1 patch, 1 patch, transdermal, Daily, Lilly Sharif DO, 1 patch at 12/23/24 0935    lubricating eye drops ophthalmic solution 1 drop, 1 drop, Both Eyes, q6h PRN, Lilly Sharif DO, 1 drop at 12/22/24 1123    melatonin tablet 3 mg, 3 mg, oral, Nightly PRN, Lilly Sharif DO, 3 mg at 12/22/24 2008    mirtazapine (Remeron) tablet 15 mg, 15 mg, oral, Nightly, Lilly Sharif DO, 15 mg at 12/22/24 2008    montelukast (Singulair) tablet 10 mg, 10 mg, oral, Daily, Lilly Sharif DO, 10 mg at 12/23/24 0927    multivitamin with minerals 1 tablet, 1 tablet, oral, Daily, Tato Rodríguze MD, 1 tablet at 12/23/24 0926    naloxone (Narcan) injection 0.2 mg, 0.2 mg, intravenous, PRN, Bre Chu MD    nicotine (Nicoderm CQ) 14 mg/24 hr patch 1 patch, 1 patch, transdermal, Daily, Lilly Sharif DO, 1 patch at 12/23/24 0926    ondansetron ODT (Zofran-ODT) disintegrating tablet 4 mg, 4 mg, oral, q6h PRN **OR** ondansetron (Zofran) injection 4 mg, 4 mg, intravenous, q6h PRN, Lilly Sharif DO, 4 mg at 12/16/24 0933    oxygen (O2) therapy, , inhalation, Continuous - Inhalation, Lana Keen, APRN-CNP, 3 L/min at 12/23/24 0800    oxymetazoline (Afrin) 0.05 % nasal spray 2 spray, 2 spray, Each Nostril, q12h PRN, Bre Chu MD, 2 spray at 12/22/24 2008    pancrelipase (Lip-Prot-Amyl)  "(Creon) 6,000-19,000 -30,000 unit per capsule 1 capsule, 1 capsule, oral, BID, Lilly Bricebons, DO, 1 capsule at 12/23/24 0926    pantoprazole (ProtoNix) EC tablet 40 mg, 40 mg, oral, Daily before breakfast, Lilly Bricebons, DO, 40 mg at 12/23/24 0620    polyethylene glycol (Glycolax, Miralax) packet 17 g, 17 g, oral, q8h, Cely Lopez, DO, 17 g at 12/23/24 0925    pregabalin (Lyrica) capsule 200 mg, 200 mg, oral, TID, Lilly Bricebons, DO, 200 mg at 12/23/24 0945    rOPINIRole (Requip) tablet 0.5 mg, 0.5 mg, oral, TID, Lilly Wheatleys, DO, 0.5 mg at 12/23/24 0927    sennosides (Senokot) tablet 17.2 mg, 2 tablet, oral, BID, Cely Lopez DO, 17.2 mg at 12/23/24 0927    sodium chloride (Ocean) 0.65 % nasal spray 2 spray, 2 spray, Each Nostril, 4x daily, Bre Chu MD, 2 spray at 12/22/24 1119    tamsulosin (Flomax) 24 hr capsule 0.4 mg, 0.4 mg, oral, Daily, Lilly Wheatleys, DO, 0.4 mg at 12/23/24 0927    thiamine (Vitamin B-1) tablet 100 mg, 100 mg, oral, Daily, Lilly Bricebons, DO, 100 mg at 12/23/24 0927    topiramate (Topamax) tablet 100 mg, 100 mg, oral, BID, Lilly Bricebons, DO, 100 mg at 12/23/24 0927      Review of Systems:    90 pound weight loss over the past year, with increased fatigue appetite syncopal episodes, previous PE and embolectomy in November    Patient currently denies any chest pain or difficulty with breathing, no abdominal pain no abnormal bleeding    Performance Status:  The Karnofsky performance scale today is 50, Requires considerable assistance and frequent medical care (ECOG equivalent 2).        OBJECTIVE  Physical Exam:  /78   Pulse 77   Temp 36.4 °C (97.5 °F)   Resp 22   Ht 1.778 m (5' 10\")   Wt 83 kg (183 lb)   SpO2 90%   BMI 26.26 kg/m²    General: awake, alert, chronically ill in appearance, in no acute distress  HEENT: pupils equal and round, no scleral icterus, right abducens palsy  Pulmonary: Breathing comfortably at rest with " supplemental oxygen  Cardiac: regular rate  Abdomen: soft, nondistended, non tender to palpation   EXT: no peripheral edema, no asymmetry noted  MSK: no focal joint swelling noted  Neuro: A&O x 3, right abducens palsy, intact sensation and strength intact  Psych: Normal affect     Laboratory Review:    12/22/24 0746  Renal Function Panel  Collected: 12/22/24 0637  Final result  Specimen: Blood, Venous    Glucose 117 High  mg/dL Urea Nitrogen 6 mg/dL   Sodium 140 mmol/L Creatinine 0.65 mg/dL   Potassium 3.6 mmol/L eGFR >90 mL/min/1.73m*2    Chloride 101 mmol/L Calcium 9.9 mg/dL   Bicarbonate 26 mmol/L Phosphorus 5.0 High  mg/dL    Anion Gap 17 mmol/L Albumin 3.6 g/dL          12/22/24 0729  CBC  Collected: 12/22/24 0637  Final result  Specimen: Blood, Venous    WBC 6.7 x10*3/uL MCV 89 fL   nRBC 0.0 /100 WBCs MCH 28.2 pg   RBC 4.43 Low  x10*6/uL MCHC 31.8 Low  g/dL   Hemoglobin 12.5 Low  g/dL RDW 17.0 High  %   Hematocrit 39.3 Low  % Platelets 386 x10*3/uL        Pathology Review:  12/20 biopsies are pending    Imaging:   CT CAP   IMPRESSION:  1. Interval development of new centrilobular ground-glass nodular  densities in both lungs, predominantly in the right upper and middle  lobes. Small amount of retained secretions noted in the trachea.  These findings in the setting of a patulous and fluid-filled  esophagus favors sequela of reflux/aspiration.  2. Partially imaged right neck dissection with fat attenuating free  flap reconstruction.  3. A Stephen catheter in place with tip imbedded in the dome of the  bladder and nonvisualization of the balloon. The urinary bladder  mildly distended, for correlation with urine output and balloon  inflation.  4. Mild hepatomegaly with diffuse fatty infiltration.  5. Stable mild anterior wedging of T10 and T11.  6. Additional incidental non-acute findings as detailed above.    MRI brain 12/14  FINDINGS:  The images are degraded by motion artifact, especially the enhanced  image  sets.      The central right clivus has an enlarging mass 2.6 x 3 cm transverse  dimension, increased in size from the neck CT.      Laterally to the right the mass partially surround the distal  horizontal and cavernous segments of the right internal carotid  artery, extending into the right Meckel's cave.      Laterally to the left the mass abuts the junction of the horizontal  and cavernous segment of the left internal carotid.      Posteriorly a thin band of tissue extends through the posterior  clival cortex abutting the basilar artery.      Anteriorly, the tumor extends into the sella turcica partially  encasing the pituitary. The tumor extends into the larger left  sphenoid sinus. (The sphenoid septum deviates to the right  posteriorly creating a small right sinus.)      Inferiorly the lesion extends nearly to the most inferior aspect of  the clivus with cortical breakthrough of the inferior central right  clival cortex.        IMPRESSION:  1. The central right clivus has an enlarging tumor as noted above.      2. The central bety has a triangular focus of encephalomalacia  corresponding to the triangle shaped abnormality with restricted  diffusion on the 2016 brain MR consistent with an old area of osmotic  demyelination.      3. The parenchymal hyperintensities elsewhere are nonspecific and may  be secondary to degenerative, chronic microvascular ischemic or other  etiologies.       ASSESSMENT:  This is a 54-year-old male with multiple comorbidities, including a history of [id number] squamous cell carcinoma of the larynx treated with definitive radiotherapy in June 2022.  In May 2024 the patient presented with a right neck mass, with  biopsy showing invasive moderately to poorly differentiated SCC.  In July/2024 the patient underwent resection with reconstruction.  He then received chemoradiotherapy with cisplatin, completed 9/2024.  The patient has received all of his oncological care at [facility].      He was  admitted to [facility] 12/13 with intoxication and complaints of headaches with visual changes.  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  MRI brain completed 12/14 showed a 3 cm mass in the right clivus, which has increased in size when compared to CT neck from 10/23.  The patient was transferred to [facility] on 12/15 for further management.  On 12/20 the patient underwent biopsy of the clival mass and bladder.  Pathology is pending.   Radiation oncology has been asked to assist with potential treatment planning.    PLAN:    The patient was discussed with attending Dr. Machado.    Potential treatment options including stereotatic radiosurgery were discussed with patient.  The patient states that he would like transfer his care to .  He has provided consent to obtain previous records from [facility].  He is currently being considered for discharge to SNF.      -Referral for multidisciplinary tumor board review  -Radiation oncology follow-up with Dr. Machado to discuss treatment options      I spent 60 minutes in the professional and overall care of this patient.

## 2024-12-23 NOTE — PROGRESS NOTES
SUPPORTIVE AND PALLIATIVE ONCOLOGY PROGRESS NOTE      SERVICE DATE: 2024      SUBJECTIVE:  Interval Events:    Patient reports that he was able to sleep better last night. The PCA is very effective  His pain is rated 5/10, which is tolerable pain level for him.   He reports that he finally had a BM last night, it was large.  Discussed with primary team about above and leaving patient on PCA and possibly converting him to oral pain medications tomorrow.     Pain Assessment:  Location:  back of head and eyes   Duration: Constant  Characteristics:   Ratin and 6   Descriptors: aching, throbbing, and sharp   Aggravating: movement    Relieving: Analgesics dilaudid   Interference with Function: Somewhat    Opioid Requirements  Past 24 h opioid requirements (24 at 0800 to 24 at 0800):   dilaudid PCA:  0 basal rate, 21 attempts, 16 demand doses of 0.8 mg received = 13.8 mg = 172.5 OME  Hydromorphone 1 mg IV x 3 doses = 3 mg = 37.5 OME  Oxycodone IR 5 mg PO x 1 doses = 5 mg = 6.25 OME  Total 24h OME use:  216.25 OME    Note: OME calculations based on equianalgesic table below. Please note this table is based on best available evidence but conversions are still approximate. These are NOT opioid DOSES for individual patient use; this is equivalency information.  Drug Parenteral Enteral   Morphine 10 25   Oxycodone N/A 20   Hydromorphone 2 5   Fentanyl 0.15 N/A   Tramadol N/A 120   Citation: Darnell ENGLAND. Demystifying opioid conversion calculations: A guide for effective dosing, Second edition. MD Essie: American [employer] of Health-System Pharmacists, 2018.    Symptom Assessment:  Nausea none  Vomiting somewhat  Constipation none  Anxiety none  Difficulty Sleeping none    Information obtained from: chart review, interview of patient, and discussion with primary team  ______________________________________________________________________        Objective       Lab Results   Component Value Date    WBC 6.7  12/22/2024    HGB 12.5 (L) 12/22/2024    HCT 39.3 (L) 12/22/2024    MCV 89 12/22/2024     12/22/2024      Lab Results   Component Value Date    GLUCOSE 117 (H) 12/22/2024    CALCIUM 9.9 12/22/2024     12/22/2024    K 3.6 12/22/2024    CO2 26 12/22/2024     12/22/2024    BUN 6 12/22/2024    CREATININE 0.65 12/22/2024     Lab Results   Component Value Date    ALT 19 12/18/2024    AST 25 12/18/2024    ALKPHOS 105 12/18/2024    BILITOT 0.4 12/18/2024     Estimated Creatinine Clearance: 125 mL/min (by C-G formula based on SCr of 0.65 mg/dL).       Scheduled medications   apixaban, 5 mg, oral, BID  atorvastatin, 20 mg, oral, Nightly  bisacodyl, 10 mg, oral, Daily  ferrous sulfate (325 mg ferrous sulfate), 65 mg of iron, oral, Every other day  folic acid, 1 mg, oral, Daily  guaiFENesin, 600 mg, oral, BID  lactulose, 20 g, oral, BID  lidocaine, 15 mL, oral, Before meals & nightly  lidocaine, 1 patch, transdermal, Daily  mirtazapine, 15 mg, oral, Nightly  montelukast, 10 mg, oral, Daily  multivitamin with minerals, 1 tablet, oral, Daily  nicotine, 1 patch, transdermal, Daily  oxygen, , inhalation, Continuous - Inhalation  pancrelipase (Lip-Prot-Amyl), 1 capsule, oral, BID  pantoprazole, 40 mg, oral, Daily before breakfast  polyethylene glycol, 17 g, oral, q8h  pregabalin, 200 mg, oral, TID  rOPINIRole, 0.5 mg, oral, TID  sennosides, 2 tablet, oral, BID  sodium chloride, 2 spray, Each Nostril, 4x daily  tamsulosin, 0.4 mg, oral, Daily  thiamine, 100 mg, oral, Daily  topiramate, 100 mg, oral, BID      Continuous medications  HYDROmorphone,       PRN medications  albuterol, 2 puff, q6h PRN  butalbital-acetaminophen-caff, 1 tablet, q4h PRN  cyclobenzaprine, 10 mg, TID PRN  dextrose, 12.5 g, q15 min PRN  dextrose, 25 g, q15 min PRN  glucagon, 1 mg, q15 min PRN  glucagon, 1 mg, q15 min PRN  HYDROmorphone, 1 mg, q2h PRN  ipratropium-albuteroL, 3 mL, q2h PRN  lubricating eye drops, 1 drop, q6h PRN  melatonin, 3  mg, Nightly PRN  naloxone, 0.2 mg, PRN  ondansetron ODT, 4 mg, q6h PRN   Or  ondansetron, 4 mg, q6h PRN  oxymetazoline, 2 spray, q12h PRN                    PHYSICAL EXAMINATION:  Vital Signs:   Vital signs reviewed  Vitals:    12/23/24 0430   BP: 118/73   Pulse: 58   Resp: 17   Temp: 36.3 °C (97.3 °F)   SpO2: 97%     Pain Score: 9     Physical Exam  Eyes:      Pupils: Pupils are equal, round, and reactive to light.   Neck:      Comments: Excoriation to neck   Cardiovascular:      Rate and Rhythm: Regular rhythm.      Heart sounds: Normal heart sounds.   Pulmonary:      Breath sounds: Normal breath sounds.   Abdominal:      General: Abdomen is flat. Bowel sounds are normal.   Musculoskeletal:         General: Normal range of motion.   Skin:     General: Skin is warm.   Neurological:      Mental Status: He is alert and oriented to person, place, and time.   Psychiatric:         Mood and Affect: Mood normal.         Behavior: Behavior normal.         Thought Content: Thought content normal.         ASSESSMENT/PLAN:  Gaurav Mckay is a 54 y.o. male diagnosed with laryngeal cancer status post chemoradiation plus right neck dissection and flap reconstruction presenting for evaluation of clival mass that was found incidentally on preoperative testing. PMH significant for peripheral neuropathy, restless leg syndrome, HTN, GERD, COPD, hematuria and bladder mass. Admitted 12/15/2024 for further evaluation and management of clival mass. 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 Supportive and Palliative Oncology is consulted for pain management.      Neoplasm related to Pain:  Headache and eye pain related to laryngeal cancer   Pain is: cancer related pain  Type: visceral, somatic, and neuropathic  Pain control: sub-optimally controlled  Home regimen:  Pregabalin 200mg BID   Intolerances/previously tried:  buprenorphine   Personalized pain goal: 5  Total OME usage for the past 24 hours:     Cigerette 1 pack a day. Used Met, Cocaine and Marijuana in the past.   Drinks about 6 tall boys Beer a day   Continue Lyrica 200mg TID   Continue Flexeril 10mg TID PRN   Continue Dilaudid 1mg iv q2hr PRN for clinician bolus   Continue lidocaine 4% patch TD daily   Continue Dilaudid PCA pump   Basal: 0  Demand dose: 0.8mg   Lockout time: 30 minutes   Hourly max dose: 1.8 mg/hr  Continue to monitor pain scores and administer PRN medications as appropriate  Continue/initiate nonpharmacologic pain management strategies including ice/heat therapy, distraction techniques, deep breathing/relaxation techniques, calming music, and repositioning  Continue to monitor for signs of opioid efficacy (pain scores, improved functionality) and toxicity (pinpoint pupils, excess sedation/drowsiness/confusion, respiratory depression, etc.)     Nausea:  At risk for nausea with vomiting related to opioids   Home regimen:  none  Denies  Continue Zofran 4mg q6hrs PRN      Constipation  At risk for constipation related to medication side effects (including opioids), currently constipated  Usual bowel pattern: every day  Home regimen: none  LBM 12/16/22  Continue Miralax 17 grams q8hrs   Continue Senna 2 tablets BID   Continue bisacodyl 10mg   Continue Lactulose 20 grams BID   Goal to have BM without straining q48-72h, adjust regimen as needed     Altered Mood:  Acute on chronic anxiety and depression related to health concerns   uncontrolled with home regimen   Home regimen:  none   Mirtazapine 15mg at night      Sleeping Difficulty:  Impaired sleep related to pain and anxiety  Home regimen:  mirtazapine  With better pain control he will sleep better.        Medical Decision Making/Goals of Care/Advance Care Planning:  Patient's current clinical condition, including diagnosis, prognosis, and management plan, and goals of care were discussed.   Life limiting disease: metastatic malignancy  Family: Supportive family   Performance status:  Major  limitations due to pain  Joys/meaning/strength: Family  Understanding of health: Demonstrates good understanding of disease process, understands plan for symptom management   Information:Wants full disclosure  Goals: symptom control and cancer directed therapy  Worries and fears now and future: none   Minimum acceptable outcome/QOL:  In an event that my heart stops I do want to be resuscitated . Additionally I do want to be intubated , do want artificial nutrition via NG and Peg tube , and am acceptable of dialysis    Code status discussion:  Full code      Advance Directives  Existence of Advance Directives:  No - not interested  Decision maker: Surrogate decision maker is Girlfrienaugie Hernandez [phone]  Code Status: Full code     Supportive and Palliative Oncology encounter:  Spoke with patient patient at bedside  Emotional support provided  Coordination of care:  medication changes and home-going prescription recommendations    Signature and billing:    Medical complexity was high level due to due to complexity of problems, extensive data review, and high risk of management/treatment.    I spent 50 minutes in the care of this patient which included chart review, interviewing patient/family, discussion with primary team, coordination of care, and documentation.      DATA   Diagnostic tests and information reviewed for today's visit:  Conversation with primary team       Some elements copied from my note on 12/22/24, the elements have been updated and all reflect current decision making from today, 12/23/2024.    Plan of Care discussed with: Provider, RN, Patient    Thank you for asking Supportive and Palliative Oncology to assist with care of this patient.  Recommendations will be communicated back to the consulting service by way of shared electronic medical record/secure chat/email or face-to-face.   We will continue to follow.  Please contact us for additional questions or concerns.      SIGNATURE:  ANASTACIA Rivera-CNP  PAGER/CONTACT:  Contact information:  Supportive and Palliative Oncology  Monday-Friday 8 AM-5 PM  Epic Secure chat or pager [phone].  After hours and weekends:  pager [phone]

## 2024-12-23 NOTE — PROGRESS NOTES
"Gaurav Mckya \"Lemuel\" is a 54 y.o. male on day 8 of admission presenting with Brain mass.    SW consulted to assist with dc planning on 12/18/24.     SW completed pt's 7000.    SW informed by auth team that pt's precert for [facility] was approved.    Med team currently waiting on recs from oncology and radiation oncology before pt can dc.    SW sent updates to SNF via [id number].    Auth is good until 12/30.    SW will follow.    Pt adod: 12/24/24  Current dc plan: [facility] MSW LSW  Care Transitions  2  [phone] or Epic Secure Chat    "

## 2024-12-23 NOTE — CONSULTS
Name: Gaurav Mckay  MRN: [id number]  Admit Date: 12/15/2024  Encounter Date: 12/23/2024  PCP: No Assigned PCP Generic Provider, MD    Reason for consult: Clivus mass  Attending provider: Cely Lopez DO  Consult attending provider: Dr Perez    Oncology Consult Note      History of Present Illness   Gaurav Mckay is a 54 y.o. male with a notable history of [id number] squamous cell carcinoma of the larynx treated with definitive radiotherapy in June 2022. He presented as a transfer on 12/15 after MRI brain showed a 3 cm mass in the right clivus. He underwent a biopsy of the mass on 12/20 and prelim path was consistent with possible squamous cell carcinoma vs sinonasal carcinoma.     The patient c/o visual disturbance of the right eye, decreased appetite and weight loss. He also endorses worsening fatigue.     Onc History   In May 2024 the patient presented with a right neck mass, with biopsy showing invasive moderately to poorly differentiated SCC. In July/2024 the patient underwent resection with reconstruction. He then received chemoradiotherapy with cisplatin, completed 9/2024.       Past Medical History     Past Medical History:   Diagnosis Date    Acute inflammation of orbit 06/27/2013    Acute upper respiratory infection, unspecified 03/20/2015    Acute upper respiratory infection    Alcohol abuse, in remission 02/12/2015    History of ETOH abuse    Alcohol dependence, in remission 05/07/2015    Alcohol dependence in remission    Allergic contact dermatitis due to plants, except food 05/21/2014    Contact dermatitis due to poison ivy    Allergy status to unspecified drugs, medicaments and biological substances 08/29/2013    History of allergy    Anxiety disorder, unspecified 03/20/2015    Anxiety    Asthmatic bronchitis ([facility])     Bipolar disorder, current episode manic without psychotic features, unspecified (Multi)     Bipolar disorder, current episode manic without psychotic features    Cancer  of larynx (Multi)     Cervicalgia     Cervicalgia    Chronic asthmatic bronchitis (Multi) 12/02/2014    Clotting disorder (Multi)     P.E.    COPD (chronic obstructive pulmonary disease) (Multi)     Degeneration of cervical intervertebral disc     Dysphagia     Edentulous     Encounter for immunization 09/22/2016    Flu vaccine need    Frequent falls     pt states he falls every time he gets up.    GERD (gastroesophageal reflux disease)     Hearing aid worn     currently missing    History of abdominal pain 02/16/2017    History of allergic rhinitis 01/16/2014    History of allergic rhinitis 06/26/2014    History of allergic rhinitis    History of depression     History of esophageal reflux 08/27/2015    History of gastroenteritis 04/11/2013    History of hepatitis C     History of sinusitis 02/05/2014    History of sore throat 03/20/2017    Hyperlipidemia     Hypertension     Other long term (current) drug therapy 06/02/2015    High risk medication use    Personal history of nicotine dependence 08/16/2017    History of tobacco abuse    Personal history of nicotine dependence 06/26/2017    History of nicotine dependence    Personal history of other diseases of the respiratory system 09/26/2013    History of acute sinusitis    Personal history of other mental and behavioral disorders     Personal history of other specified conditions 08/25/2016    RLS (restless legs syndrome)     Unspecified asthma, uncomplicated ([facility]) 02/13/2014    Uses walker     and wheelchair    Wears glasses     currently broken         Past Surgical History     Past Surgical History:   Procedure Laterality Date    ANKLE SURGERY Left     ANKLE SURGERY  07/08/2024    R ankle spanning ex-fix revision    CHOLECYSTECTOMY      CT ABDOMEN PELVIS ANGIOGRAM W AND/OR WO IV CONTRAST  04/01/2020    CT ABDOMEN PELVIS ANGIOGRAM W AND/OR WO IV CONTRAST 4/1/2020 [facility]    FRACTURE SURGERY Right 07/15/2024    Open reduction and internal  fixation of right distal tibia pilon and fibula fractures, removal of right ankle spanning external fixation under anesthesia, debridement down to including bone external fixator pin sites right leg    INVASIVE VASCULAR PROCEDURE N/A 11/08/2023    Procedure: Pulmonary Angiogram;  Surgeon: Surya Templeton MD;  Location: [facility];  Service: Cardiovascular;  Laterality: N/A;    LARYNGOSCOPY  07/01/2024    R radical neck dissection and L ALT free flap reconstruction    LEG SURGERY  07/06/2024    RLE ex-fix placement    MR HEAD ANGIO WO IV CONTRAST  01/24/2016    MR HEAD ANGIO WO IV CONTRAST 1/24/2016 [facility]    MR NECK ANGIO WO IV CONTRAST  01/24/2016    MR NECK ANGIO WO IV CONTRAST 1/24/2016 [facility]    THROMBECTOMY      WOUND DEBRIDEMENT Left 08/06/2024    L thigh debridement with woud vac placement 2/2 surgical wound dehiscence         Family History      Family History   Problem Relation Name Age of Onset    Diabetes Mother      Hypertension Mother      Heart attack Father           Social History     Social History     Socioeconomic History    Marital status:    Tobacco Use    Smoking status: Every Day     Current packs/day: 1.00     Types: Cigarettes    Smokeless tobacco: Former     Types: Snuff    Tobacco comments:     1 ppd since the age of 16   Substance and Sexual Activity    Alcohol use: Yes     Comment: 6 pack beer a day    Drug use: Yes     Types: Methamphetamines, Cocaine     Comment: Hx cocaine use    Sexual activity: Defer     Social Drivers of Health     Financial Resource Strain: High Risk (12/18/2024)    Overall Financial Resource Strain (CARDIA)     Difficulty of Paying Living Expenses: Hard   Food Insecurity: Food Insecurity Present (12/18/2024)    Hunger Vital Sign     Worried About Running Out of Food in the Last Year: Sometimes true     Ran Out of Food in the Last Year: Sometimes true   Transportation Needs: Unmet Transportation Needs (12/18/2024)     PRAPARE - Transportation     Lack of Transportation (Medical): Yes     Lack of Transportation (Non-Medical): Yes   Stress: No Stress Concern Present (12/18/2024)    [employer] [place] [employer] - Occupational Stress Questionnaire     Feeling of Stress : Only a little   Intimate Partner Violence: Not At Risk (12/18/2024)    Humiliation, Afraid, Rape, and Kick questionnaire     Fear of Current or Ex-Partner: No     Emotionally Abused: No     Physically Abused: No     Sexually Abused: No   Housing Stability: High Risk (12/18/2024)    Housing Stability Vital Sign     Unable to Pay for Housing in the Last Year: Yes     Number of Times Moved in the Last Year: 0     Homeless in the Last Year: No         Allergies     Allergies   Allergen Reactions    Oxycodone Itching       Medications   apixaban, 5 mg, BID  atorvastatin, 20 mg, Nightly  bisacodyl, 10 mg, Daily  ferrous sulfate (325 mg ferrous sulfate), 65 mg of iron, Every other day  folic acid, 1 mg, Daily  guaiFENesin, 600 mg, BID  lidocaine, 15 mL, Before meals & nightly  lidocaine, 1 patch, Daily  mirtazapine, 15 mg, Nightly  montelukast, 10 mg, Daily  multivitamin with minerals, 1 tablet, Daily  nicotine, 1 patch, Daily  oxygen, , Continuous - Inhalation  pancrelipase (Lip-Prot-Amyl), 1 capsule, BID  pantoprazole, 40 mg, Daily before breakfast  polyethylene glycol, 17 g, q8h  pregabalin, 200 mg, TID  rOPINIRole, 0.5 mg, TID  sennosides, 2 tablet, BID  sodium chloride, 2 spray, 4x daily  tamsulosin, 0.4 mg, Daily  thiamine, 100 mg, Daily  topiramate, 100 mg, BID      HYDROmorphone      albuterol, 2 puff, q6h PRN  butalbital-acetaminophen-caff, 1 tablet, q4h PRN  cyclobenzaprine, 10 mg, TID PRN  dextrose, 12.5 g, q15 min PRN  dextrose, 25 g, q15 min PRN  glucagon, 1 mg, q15 min PRN  glucagon, 1 mg, q15 min PRN  HYDROmorphone, 1 mg, q2h PRN  ipratropium-albuteroL, 3 mL, q2h PRN  lubricating eye drops, 1 drop, q6h PRN  melatonin, 3 mg, Nightly PRN  naloxone,  "0.2 mg, PRN  ondansetron ODT, 4 mg, q6h PRN   Or  ondansetron, 4 mg, q6h PRN        Review of Systems   12-point ROS negative, except as specified in the HPI.    Physical Exam   /80   Pulse 90   Temp 37 °C (98.6 °F)   Resp 19   Ht 1.778 m (5' 10\")   Wt 83 kg (183 lb)   SpO2 90%   BMI 26.26 kg/m²     General: awake, alert, ill appearing   CV: normal rate, regular rhythm, no MRG  Resp: CTAB, no labored breathing  Abdominal: soft, non-tender, non-distended, active bowel sounds  Extremities: no lower extremity swelling    Labs   Reviewed    Imaging   Reviewed    Assessment/Plan     Gaurav Mckay is a 54 y.o. male with history of [id number] squamous cell carcinoma of the larynx treated with definitive radiotherapy in June 2022.  In May 2024 the patient presented with a right neck mass, with  biopsy showing invasive moderately to poorly differentiated SCC.  In July/2024 the patient underwent resection with reconstruction.  He then received chemoradiotherapy with cisplatin, completed 9/2024.  The patient has received all of his oncological care at [facility].       He was admitted to [facility] 12/13 with intoxication and complaints of headaches with visual changes.  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  MRI brain completed 12/14 showed a 3 cm mass in the right clivus, which has increased in size when compared to CT neck from 10/23.  The patient was transferred to [facility] on 12/15 for further management.  On 12/20 the patient underwent biopsy of the clival mass and bladder. Prelim path showed possible squamous cell carcinoma vs sinonasal carcinoma.     Recommendations:  -Please consult rad onc  -Please consult ophthalmology  -Please order outpatient PET  -Patient will continue f/up at [facility] where he has an established oncologist    Thank you for this consult. Oncology will sign off. Patient was discussed with Dr. Perez.     Robert Fajardo MD  Medical Oncology Fellow  12/23/2024     "

## 2024-12-24 ENCOUNTER — APPOINTMENT (OUTPATIENT)
Dept: CARDIOLOGY | Facility: HOSPITAL | Age: 54
End: 2024-12-24
Payer: COMMERCIAL

## 2024-12-24 LAB
ALBUMIN SERPL BCP-MCNC: 3.6 G/DL (ref 3.4–5)
ANION GAP SERPL CALC-SCNC: 12 MMOL/L (ref 10–20)
BUN SERPL-MCNC: 7 MG/DL (ref 6–23)
CALCIUM SERPL-MCNC: 9.7 MG/DL (ref 8.6–10.6)
CHLORIDE SERPL-SCNC: 99 MMOL/L (ref 98–107)
CO2 SERPL-SCNC: 30 MMOL/L (ref 21–32)
CREAT SERPL-MCNC: 0.7 MG/DL (ref 0.5–1.3)
EGFRCR SERPLBLD CKD-EPI 2021: >90 ML/MIN/1.73M*2
GLUCOSE BLD MANUAL STRIP-MCNC: 103 MG/DL (ref 74–99)
GLUCOSE BLD MANUAL STRIP-MCNC: 107 MG/DL (ref 74–99)
GLUCOSE BLD MANUAL STRIP-MCNC: 141 MG/DL (ref 74–99)
GLUCOSE SERPL-MCNC: 80 MG/DL (ref 74–99)
MAGNESIUM SERPL-MCNC: 2.1 MG/DL (ref 1.6–2.4)
PHOSPHATE SERPL-MCNC: 4.4 MG/DL (ref 2.5–4.9)
POTASSIUM SERPL-SCNC: 3.3 MMOL/L (ref 3.5–5.3)
SODIUM SERPL-SCNC: 138 MMOL/L (ref 136–145)

## 2024-12-24 PROCEDURE — 82947 ASSAY GLUCOSE BLOOD QUANT: CPT

## 2024-12-24 PROCEDURE — 2500000001 HC RX 250 WO HCPCS SELF ADMINISTERED DRUGS (ALT 637 FOR MEDICARE OP): Performed by: STUDENT IN AN ORGANIZED HEALTH CARE EDUCATION/TRAINING PROGRAM

## 2024-12-24 PROCEDURE — 2500000004 HC RX 250 GENERAL PHARMACY W/ HCPCS (ALT 636 FOR OP/ED): Performed by: PODIATRIST

## 2024-12-24 PROCEDURE — 80069 RENAL FUNCTION PANEL: CPT | Performed by: PODIATRIST

## 2024-12-24 PROCEDURE — 2500000005 HC RX 250 GENERAL PHARMACY W/O HCPCS: Performed by: STUDENT IN AN ORGANIZED HEALTH CARE EDUCATION/TRAINING PROGRAM

## 2024-12-24 PROCEDURE — 2500000004 HC RX 250 GENERAL PHARMACY W/ HCPCS (ALT 636 FOR OP/ED): Performed by: INTERNAL MEDICINE

## 2024-12-24 PROCEDURE — 2500000005 HC RX 250 GENERAL PHARMACY W/O HCPCS: Performed by: NURSE PRACTITIONER

## 2024-12-24 PROCEDURE — 2500000001 HC RX 250 WO HCPCS SELF ADMINISTERED DRUGS (ALT 637 FOR MEDICARE OP): Performed by: INTERNAL MEDICINE

## 2024-12-24 PROCEDURE — 99233 SBSQ HOSP IP/OBS HIGH 50: CPT | Performed by: NURSE PRACTITIONER

## 2024-12-24 PROCEDURE — 93005 ELECTROCARDIOGRAM TRACING: CPT

## 2024-12-24 PROCEDURE — 2500000002 HC RX 250 W HCPCS SELF ADMINISTERED DRUGS (ALT 637 FOR MEDICARE OP, ALT 636 FOR OP/ED): Performed by: PODIATRIST

## 2024-12-24 PROCEDURE — 2500000004 HC RX 250 GENERAL PHARMACY W/ HCPCS (ALT 636 FOR OP/ED): Performed by: STUDENT IN AN ORGANIZED HEALTH CARE EDUCATION/TRAINING PROGRAM

## 2024-12-24 PROCEDURE — 1100000001 HC PRIVATE ROOM DAILY

## 2024-12-24 PROCEDURE — S4991 NICOTINE PATCH NONLEGEND: HCPCS | Performed by: STUDENT IN AN ORGANIZED HEALTH CARE EDUCATION/TRAINING PROGRAM

## 2024-12-24 PROCEDURE — 83735 ASSAY OF MAGNESIUM: CPT | Performed by: PODIATRIST

## 2024-12-24 PROCEDURE — 99233 SBSQ HOSP IP/OBS HIGH 50: CPT | Performed by: PODIATRIST

## 2024-12-24 PROCEDURE — 2500000001 HC RX 250 WO HCPCS SELF ADMINISTERED DRUGS (ALT 637 FOR MEDICARE OP): Performed by: PODIATRIST

## 2024-12-24 PROCEDURE — 2500000002 HC RX 250 W HCPCS SELF ADMINISTERED DRUGS (ALT 637 FOR MEDICARE OP, ALT 636 FOR OP/ED): Performed by: STUDENT IN AN ORGANIZED HEALTH CARE EDUCATION/TRAINING PROGRAM

## 2024-12-24 PROCEDURE — S0109 METHADONE ORAL 5MG: HCPCS | Performed by: PODIATRIST

## 2024-12-24 PROCEDURE — 36415 COLL VENOUS BLD VENIPUNCTURE: CPT | Performed by: PODIATRIST

## 2024-12-24 RX ORDER — HYDROMORPHONE HYDROCHLORIDE 1 MG/ML
1 INJECTION, SOLUTION INTRAMUSCULAR; INTRAVENOUS; SUBCUTANEOUS EVERY 4 HOURS PRN
Status: DISCONTINUED | OUTPATIENT
Start: 2024-12-24 | End: 2024-12-26

## 2024-12-24 RX ORDER — POTASSIUM CHLORIDE 20 MEQ/1
40 TABLET, EXTENDED RELEASE ORAL ONCE
Status: COMPLETED | OUTPATIENT
Start: 2024-12-24 | End: 2024-12-24

## 2024-12-24 RX ORDER — HYDROMORPHONE HYDROCHLORIDE 1 MG/ML
1 INJECTION, SOLUTION INTRAMUSCULAR; INTRAVENOUS; SUBCUTANEOUS EVERY 2 HOUR PRN
Status: DISCONTINUED | OUTPATIENT
Start: 2024-12-24 | End: 2024-12-24

## 2024-12-24 RX ORDER — METHADONE HYDROCHLORIDE 5 MG/1
5 TABLET ORAL EVERY 8 HOURS
Status: DISCONTINUED | OUTPATIENT
Start: 2024-12-24 | End: 2024-12-26 | Stop reason: HOSPADM

## 2024-12-24 RX ORDER — MORPHINE SULFATE 15 MG/1
15 TABLET ORAL
Status: DISCONTINUED | OUTPATIENT
Start: 2024-12-24 | End: 2024-12-26

## 2024-12-24 RX ADMIN — METHADONE HYDROCHLORIDE 5 MG: 5 TABLET ORAL at 21:47

## 2024-12-24 RX ADMIN — LIDOCAINE HYDROCHLORIDE 15 ML: 20 SOLUTION ORAL at 10:51

## 2024-12-24 RX ADMIN — PREGABALIN 200 MG: 100 CAPSULE ORAL at 20:21

## 2024-12-24 RX ADMIN — GUAIFENESIN 600 MG: 600 TABLET, EXTENDED RELEASE ORAL at 20:20

## 2024-12-24 RX ADMIN — PREGABALIN 200 MG: 100 CAPSULE ORAL at 10:52

## 2024-12-24 RX ADMIN — TOPIRAMATE 100 MG: 100 TABLET, FILM COATED ORAL at 10:51

## 2024-12-24 RX ADMIN — CARBOXYMETHYLCELLULOSE SODIUM 1 DROP: 5 SOLUTION/ DROPS OPHTHALMIC at 11:12

## 2024-12-24 RX ADMIN — PANTOPRAZOLE SODIUM 40 MG: 40 TABLET, DELAYED RELEASE ORAL at 06:11

## 2024-12-24 RX ADMIN — SALINE NASAL SPRAY 2 SPRAY: 1.5 SOLUTION NASAL at 20:22

## 2024-12-24 RX ADMIN — ALBUTEROL SULFATE 2 PUFF: 90 AEROSOL, METERED RESPIRATORY (INHALATION) at 17:39

## 2024-12-24 RX ADMIN — SENNOSIDES 17.2 MG: 8.6 TABLET, FILM COATED ORAL at 20:20

## 2024-12-24 RX ADMIN — LIDOCAINE 4% 1 PATCH: 40 PATCH TOPICAL at 10:54

## 2024-12-24 RX ADMIN — FERROUS SULFATE TAB 325 MG (65 MG ELEMENTAL FE) 325 MG: 325 (65 FE) TAB at 10:51

## 2024-12-24 RX ADMIN — HYDROMORPHONE HYDROCHLORIDE 1 MG: 1 INJECTION, SOLUTION INTRAMUSCULAR; INTRAVENOUS; SUBCUTANEOUS at 17:33

## 2024-12-24 RX ADMIN — POLYETHYLENE GLYCOL 3350 17 G: 17 POWDER, FOR SOLUTION ORAL at 17:39

## 2024-12-24 RX ADMIN — Medication 1 PATCH: at 10:53

## 2024-12-24 RX ADMIN — MORPHINE SULFATE 15 MG: 15 TABLET ORAL at 14:47

## 2024-12-24 RX ADMIN — FOLIC ACID 1 MG: 1 TABLET ORAL at 10:52

## 2024-12-24 RX ADMIN — BISACODYL 10 MG: 5 TABLET, COATED ORAL at 10:52

## 2024-12-24 RX ADMIN — METHADONE HYDROCHLORIDE 5 MG: 5 TABLET ORAL at 15:22

## 2024-12-24 RX ADMIN — Medication 3 MG: at 20:21

## 2024-12-24 RX ADMIN — HYDROMORPHONE HYDROCHLORIDE 1 MG: 1 INJECTION, SOLUTION INTRAMUSCULAR; INTRAVENOUS; SUBCUTANEOUS at 21:47

## 2024-12-24 RX ADMIN — APIXABAN 5 MG: 5 TABLET, FILM COATED ORAL at 20:19

## 2024-12-24 RX ADMIN — SENNOSIDES 17.2 MG: 8.6 TABLET, FILM COATED ORAL at 10:54

## 2024-12-24 RX ADMIN — MIRTAZAPINE 15 MG: 15 TABLET, FILM COATED ORAL at 20:21

## 2024-12-24 RX ADMIN — LIDOCAINE HYDROCHLORIDE 15 ML: 20 SOLUTION ORAL at 06:11

## 2024-12-24 RX ADMIN — ATORVASTATIN CALCIUM 20 MG: 20 TABLET, FILM COATED ORAL at 20:21

## 2024-12-24 RX ADMIN — SALINE NASAL SPRAY 2 SPRAY: 1.5 SOLUTION NASAL at 17:40

## 2024-12-24 RX ADMIN — POTASSIUM CHLORIDE 40 MEQ: 1500 TABLET, EXTENDED RELEASE ORAL at 10:51

## 2024-12-24 RX ADMIN — Medication 3 L/MIN: at 20:27

## 2024-12-24 RX ADMIN — LIDOCAINE HYDROCHLORIDE 15 ML: 20 SOLUTION ORAL at 17:35

## 2024-12-24 RX ADMIN — HYDROMORPHONE HYDROCHLORIDE 1 MG: 1 INJECTION, SOLUTION INTRAMUSCULAR; INTRAVENOUS; SUBCUTANEOUS at 12:58

## 2024-12-24 RX ADMIN — SALINE NASAL SPRAY 2 SPRAY: 1.5 SOLUTION NASAL at 06:11

## 2024-12-24 RX ADMIN — TOPIRAMATE 100 MG: 100 TABLET, FILM COATED ORAL at 20:19

## 2024-12-24 RX ADMIN — PREGABALIN 200 MG: 100 CAPSULE ORAL at 14:47

## 2024-12-24 RX ADMIN — MONTELUKAST 10 MG: 10 TABLET, FILM COATED ORAL at 10:52

## 2024-12-24 RX ADMIN — POLYETHYLENE GLYCOL 3350 17 G: 17 POWDER, FOR SOLUTION ORAL at 03:20

## 2024-12-24 RX ADMIN — HYDROMORPHONE HYDROCHLORIDE 1 MG: 1 INJECTION, SOLUTION INTRAMUSCULAR; INTRAVENOUS; SUBCUTANEOUS at 00:22

## 2024-12-24 RX ADMIN — Medication 1 TABLET: at 10:52

## 2024-12-24 RX ADMIN — GUAIFENESIN 600 MG: 600 TABLET, EXTENDED RELEASE ORAL at 10:52

## 2024-12-24 RX ADMIN — HYDROMORPHONE HYDROCHLORIDE 1 MG: 1 INJECTION, SOLUTION INTRAMUSCULAR; INTRAVENOUS; SUBCUTANEOUS at 10:39

## 2024-12-24 RX ADMIN — APIXABAN 5 MG: 5 TABLET, FILM COATED ORAL at 10:51

## 2024-12-24 RX ADMIN — Medication 2 L/MIN: at 10:55

## 2024-12-24 RX ADMIN — TAMSULOSIN HYDROCHLORIDE 0.4 MG: 0.4 CAPSULE ORAL at 10:54

## 2024-12-24 RX ADMIN — PANCRELIPASE 1 CAPSULE: 30000; 6000; 19000 CAPSULE, DELAYED RELEASE PELLETS ORAL at 17:36

## 2024-12-24 RX ADMIN — MORPHINE SULFATE 15 MG: 15 TABLET ORAL at 20:19

## 2024-12-24 RX ADMIN — SALINE NASAL SPRAY 2 SPRAY: 1.5 SOLUTION NASAL at 14:48

## 2024-12-24 RX ADMIN — THIAMINE HCL TAB 100 MG 100 MG: 100 TAB at 10:53

## 2024-12-24 RX ADMIN — HYDROMORPHONE HYDROCHLORIDE 1 MG: 1 INJECTION, SOLUTION INTRAMUSCULAR; INTRAVENOUS; SUBCUTANEOUS at 06:11

## 2024-12-24 RX ADMIN — LIDOCAINE HYDROCHLORIDE 15 ML: 20 SOLUTION ORAL at 20:27

## 2024-12-24 RX ADMIN — POLYETHYLENE GLYCOL 3350 17 G: 17 POWDER, FOR SOLUTION ORAL at 10:54

## 2024-12-24 RX ADMIN — PANCRELIPASE 1 CAPSULE: 30000; 6000; 19000 CAPSULE, DELAYED RELEASE PELLETS ORAL at 10:50

## 2024-12-24 ASSESSMENT — PAIN - FUNCTIONAL ASSESSMENT
PAIN_FUNCTIONAL_ASSESSMENT: 0-10

## 2024-12-24 ASSESSMENT — PAIN SCALES - GENERAL
PAINLEVEL_OUTOF10: 9
PAINLEVEL_OUTOF10: 7
PAINLEVEL_OUTOF10: 6
PAINLEVEL_OUTOF10: 8
PAINLEVEL_OUTOF10: 8
PAINLEVEL_OUTOF10: 9
PAINLEVEL_OUTOF10: 9
PAINLEVEL_OUTOF10: 8
PAINLEVEL_OUTOF10: 9
PAINLEVEL_OUTOF10: 7
PAINLEVEL_OUTOF10: 5 - MODERATE PAIN
PAINLEVEL_OUTOF10: 10 - WORST POSSIBLE PAIN

## 2024-12-24 ASSESSMENT — PAIN DESCRIPTION - LOCATION
LOCATION: GENERALIZED
LOCATION: HEAD
LOCATION: GENERALIZED
LOCATION: HEAD

## 2024-12-24 ASSESSMENT — COGNITIVE AND FUNCTIONAL STATUS - GENERAL
MOBILITY SCORE: 14
CLIMB 3 TO 5 STEPS WITH RAILING: A LOT
WALKING IN HOSPITAL ROOM: A LOT
TURNING FROM BACK TO SIDE WHILE IN FLAT BAD: A LITTLE
STANDING UP FROM CHAIR USING ARMS: A LOT
DAILY ACTIVITIY SCORE: 18
PERSONAL GROOMING: A LITTLE
TOILETING: A LITTLE
HELP NEEDED FOR BATHING: A LITTLE
MOVING FROM LYING ON BACK TO SITTING ON SIDE OF FLAT BED WITH BEDRAILS: A LITTLE
DRESSING REGULAR UPPER BODY CLOTHING: A LITTLE
EATING MEALS: A LITTLE
MOVING TO AND FROM BED TO CHAIR: A LOT
DRESSING REGULAR LOWER BODY CLOTHING: A LITTLE

## 2024-12-24 ASSESSMENT — PAIN SCALES - PAIN ASSESSMENT IN ADVANCED DEMENTIA (PAINAD): TOTALSCORE: MEDICATION (SEE MAR)

## 2024-12-24 ASSESSMENT — PAIN DESCRIPTION - ORIENTATION: ORIENTATION: POSTERIOR

## 2024-12-24 NOTE — PROGRESS NOTES
"Gaurav Mckay \"Lucy" is a 54 y.o. male on day 9 of admission presenting with Brain mass.    Subjective   No acute overnight events, but PCA was discontinued as patient was trying to take the pump apart. His headache pain is well controlled on dilaudid. Denies fever, chills, nausea, vomiting, chest pain, shortness of breath.         Objective     Physical Exam  Constitutional:       Appearance: Normal appearance.   HENT:      Head: Normocephalic and atraumatic.      Nose: Nose normal.      Mouth/Throat:      Mouth: Mucous membranes are moist.   Eyes:      Conjunctiva/sclera: Conjunctivae normal.   Cardiovascular:      Rate and Rhythm: Normal rate and regular rhythm.   Pulmonary:      Effort: Pulmonary effort is normal. No respiratory distress.      Breath sounds: Normal breath sounds. No wheezing.      Comments: On room air  Abdominal:      General: Bowel sounds are normal.      Palpations: Abdomen is soft.      Tenderness: There is no abdominal tenderness. There is no guarding.   Genitourinary:     Comments: Stephen in place draining clear yellow urine.   Musculoskeletal:         General: No swelling. Normal range of motion.      Cervical back: Normal range of motion and neck supple.   Skin:     General: Skin is warm.   Neurological:      General: No focal deficit present.      Mental Status: He is alert and oriented to person, place, and time. Mental status is at baseline.   Psychiatric:         Mood and Affect: Mood normal.         Behavior: Behavior normal.         Thought Content: Thought content normal.         Judgment: Judgment normal.         Last Recorded Vitals  Blood pressure 107/72, pulse 62, temperature 36.4 °C (97.5 °F), resp. rate 17, height 1.778 m (5' 10\"), weight 83 kg (183 lb), SpO2 94%.  Intake/Output last 3 Shifts:  I/O last 3 completed shifts:  In: 480 (5.8 mL/kg) [P.O.:480]  Out: 2500 (30.1 mL/kg) [Urine:2500 (0.8 mL/kg/hr)]  Weight: 83 kg     Relevant Results  Scheduled " medications  apixaban, 5 mg, oral, BID  atorvastatin, 20 mg, oral, Nightly  bisacodyl, 10 mg, oral, Daily  ferrous sulfate (325 mg ferrous sulfate), 65 mg of iron, oral, Every other day  folic acid, 1 mg, oral, Daily  guaiFENesin, 600 mg, oral, BID  lidocaine, 15 mL, oral, Before meals & nightly  lidocaine, 1 patch, transdermal, Daily  mirtazapine, 15 mg, oral, Nightly  montelukast, 10 mg, oral, Daily  multivitamin with minerals, 1 tablet, oral, Daily  nicotine, 1 patch, transdermal, Daily  oxygen, , inhalation, Continuous - Inhalation  pancrelipase (Lip-Prot-Amyl), 1 capsule, oral, BID  pantoprazole, 40 mg, oral, Daily before breakfast  polyethylene glycol, 17 g, oral, q8h  pregabalin, 200 mg, oral, TID  [Held by provider] rOPINIRole, 0.5 mg, oral, TID  sennosides, 2 tablet, oral, BID  sodium chloride, 2 spray, Each Nostril, 4x daily  tamsulosin, 0.4 mg, oral, Daily  thiamine, 100 mg, oral, Daily  topiramate, 100 mg, oral, BID      Continuous medications     PRN medications  PRN medications: albuterol, butalbital-acetaminophen-caff, cyclobenzaprine, dextrose, dextrose, glucagon, glucagon, HYDROmorphone, hydrOXYzine HCL, ipratropium-albuteroL, lubricating eye drops, melatonin, naloxone, OLANZapine, ondansetron ODT **OR** ondansetron     Results for orders placed or performed during the hospital encounter of 12/15/24 (from the past 24 hours)   POCT GLUCOSE   Result Value Ref Range    POCT Glucose 115 (H) 74 - 99 mg/dL   POCT GLUCOSE   Result Value Ref Range    POCT Glucose 148 (H) 74 - 99 mg/dL   Renal Function Panel   Result Value Ref Range    Glucose 80 74 - 99 mg/dL    Sodium 138 136 - 145 mmol/L    Potassium 3.3 (L) 3.5 - 5.3 mmol/L    Chloride 99 98 - 107 mmol/L    Bicarbonate 30 21 - 32 mmol/L    Anion Gap 12 10 - 20 mmol/L    Urea Nitrogen 7 6 - 23 mg/dL    Creatinine 0.70 0.50 - 1.30 mg/dL    eGFR >90 >60 mL/min/1.73m*2    Calcium 9.7 8.6 - 10.6 mg/dL    Phosphorus 4.4 2.5 - 4.9 mg/dL    Albumin 3.6 3.4 - 5.0  g/dL   Magnesium   Result Value Ref Range    Magnesium 2.10 1.60 - 2.40 mg/dL   POCT GLUCOSE   Result Value Ref Range    POCT Glucose 107 (H) 74 - 99 mg/dL        Assessment/Plan   Assessment & Plan  Brain mass    Gaurav Mckay is a 54 y.o. 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 Supportive oncology on board for further pain control. Pending this, plan is for SNF.      #Brain mass  #Headache with Visual Changes  #Right sixth nerve palsy   #Pharyngeal dysphagia   - CTH: concerning for a large destructive lytic soft tissue mass upon the clivus that protrudes into the sella turcica and bilateral cavernous sinuses and erodes bilateral petrous carotid canals.   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     - No other focal deficits except for right eye deviation which he states is not new   - Neurosurgery consulted, appreciate recs.       :: Planning potential biopsy   - Ophthalmology consulted. Appreciate recs.        :: Recommend monocular occlusion as needed for symptomatic relief. Can consider strabismus surgery outpatient in the long-term   - ENT consulted. Appreciate recs.      :: s/p Clival mass biopsy on 12/20     :: ENT to arrange outpatient follow up. Signed off.    - CT sinus and CT angio Head/Neck 12/17: Large 3.4 cm mass located within the clivus and sphenoid bone with surrounding  extension including into the sphenoid sinus posteriorly, causing destruction of the petrous segments of the bilateral temporal bones medially, and protruding into the cavernous sinuses and circumferentially surrounding the right internal carotid artery causing mild luminal narrowing  -CT CAP on 12/15: Interval development of new centrilobular ground-glass nodular  densities in both lungs, predominantly in the right upper and middle lobes. Small amount of retained secretions noted in the trachea. These findings in the setting of a patulous and fluid-filled esophagus favors sequela of reflux/aspiration.  -CT thoracic 12/15: No thoracic spine metastatic disease   -CT lumbar 12/15: No metastatic disease to the lumbar spine.   -CT cervical 12/15: Abnormal enhancement involving the spinous process of C6 and the interspinous ligaments at C5-6 and C6-7. This has an appearance more favorable for inflammatory process over malignancy  -SLP consulted. Appreciate recs.       :: Pureed along with Mildly/nectar thick liquids   -Supportive oncology consulted for optimal pain control. Appreciate recs.     :: Transitioning from PCA to discharge pain regimen today   - Consulted radiation oncology. Appreciate recs.      :: Referral for multidisciplinary tumor board review     :: Radiation oncology follow-up with Dr. Machado to discuss treatment options     #Bladder Tumor  #Urinary retention  -Urology consulted, appreciate recs.     :: s/p cystoscopy with bladder biopsy on 12/20     :: Recommend maintaining anders until outpatient follow-up     :: Follow-up with Dr. Murcia on 1/2 for path review and trial of void. Signed off.   - Anders cath in place  - C/w Flomax 0.4 mg PO QD     #Alcohol Use Disorder  -Cessation advised  -Last drink was approximately 3AM on 12/13/24.   -CIWA discontinued 12/17  - c/w Thiamine, Folate, and MVI PO QD  - pt interested in attending rehab on discharge     #Chronic Hypoxic Respiratory Failure  #COPD  #HANY  -  Continue bronchial hygiene, duonebs, mucinex  - Wean O2 as tolerated (per chart review, does not seem like he was on O2 at home)  -Currently on room air      #HTN  #orthostatic hypotension  - BP on admit 118/81  - continue to monitor BP  - no home meds     #Hx of Pulmonary Embolism (?requiring 3L NC since although unclear per chart review)  -S/p embolization 11/2023  -Provoked by surgery  -Heparin gtt stopped in anticipation of surgery on 12/20.   -On home Eliquis 5 mg BID since 12/22     #Tobacco Use Disorder  - nicotine patch      #Decreased strength  - PT rec: Moderate intensity level of continued care 4x /week   - OT rec: Moderate intensity level of continued care 3x/week     #Bowel regimen  -Continue Miralax 17 grams q8hrs   -Continue Senna 2 tablets BID   -Continue bisacodyl 10mg po      #Dispo  - Pending SNF placement pending improvement pain control      Fluids: PRN  Electrolytes: Keep K>4, Mg>3, Phos>3  Nutrition: Pureed with Mildly/nectar thick liquids   Abx: N/A  DVT: Home Eliquis 5 mg BID     Patient seen and discussed with attending physician, Dr. Hurtado.       Bre Chu MD   PGY2, Family Medicine   [facility]  Available by [employer]

## 2024-12-24 NOTE — PROGRESS NOTES
SUPPORTIVE AND PALLIATIVE ONCOLOGY PROGRESS NOTE      SERVICE DATE: 2024      SUBJECTIVE:  Interval Events:    With chart review patient was found tampering with his PCA pump, and IV attempting to shut the pump down, unfortunately his PCA pump was discontinued, and he was deemed not a good candidate for pump per bedside nurse note.   Spoke to primary team resident who reports that patient is approved for SNF and would like discharge recs.   Patient was seen at bedside reports that he is remorseful for his actions on trying to press the Dilaudid PCA pump to get more medications.  Given that the pump was discontinued unsure of his new 24 hr OME. Current recommendation would be based on eri day OME of 216.25. Discussed with patient about starting him on a long acting pain medication, discussed morphine, methadone. Given his recent drug seeking behaviors, methadone is a better option for him at this time. Additionally given that he sometimes has issues with swallowing, methadone can be crushed and placed in apple sauce which is his mode of medication administration.   Patient was receptive to any medication that would help with his symptom management.   Above discussed with primary team to consider starting patient on methadone and morphine for breakthrough pain.   During assessment patient reports that he is in excruciating pain, his pain is not well managed with current regimen and was receptive to the below changes.     Pain Assessment:  Location:  back of head and eyes   Duration: Constant  Characteristics:   Ratin   Descriptors: aching, throbbing, and sharp   Aggravating: movement    Relieving: Analgesics dilaudid   Interference with Function: Somewhat    Opioid Requirements  Past 24 h opioid requirements (24 at 0800 to 24 at 0800):   Hydromorphone 1 mg IV x 2 doses = 2 mg = 25 OME  Total 24h OME use:   OME    Note: OME calculations based on equianalgesic table below. Please note this  table is based on best available evidence but conversions are still approximate. These are NOT opioid DOSES for individual patient use; this is equivalency information.  Drug Parenteral Enteral   Morphine 10 25   Oxycodone N/A 20   Hydromorphone 2 5   Fentanyl 0.15 N/A   Tramadol N/A 120   Citation: Darnell ENGLAND. Demystifying opioid conversion calculations: A guide for effective dosing, Second edition. MD Essie: American Society of Health-System Pharmacists, 2018.    Symptom Assessment:  Nausea none  Vomiting somewhat  Constipation none  Anxiety none  Difficulty Sleeping none    Information obtained from: chart review, interview of patient, and discussion with primary team  ______________________________________________________________________        Objective       Lab Results   Component Value Date    WBC 6.7 12/22/2024    HGB 12.5 (L) 12/22/2024    HCT 39.3 (L) 12/22/2024    MCV 89 12/22/2024     12/22/2024      Lab Results   Component Value Date    GLUCOSE 80 12/24/2024    CALCIUM 9.7 12/24/2024     12/24/2024    K 3.3 (L) 12/24/2024    CO2 30 12/24/2024    CL 99 12/24/2024    BUN 7 12/24/2024    CREATININE 0.70 12/24/2024     Lab Results   Component Value Date    ALT 19 12/18/2024    AST 25 12/18/2024    ALKPHOS 105 12/18/2024    BILITOT 0.4 12/18/2024     Estimated Creatinine Clearance: 124.6 mL/min (by C-G formula based on SCr of 0.7 mg/dL).       Scheduled medications   apixaban, 5 mg, oral, BID  atorvastatin, 20 mg, oral, Nightly  bisacodyl, 10 mg, oral, Daily  ferrous sulfate (325 mg ferrous sulfate), 65 mg of iron, oral, Every other day  folic acid, 1 mg, oral, Daily  guaiFENesin, 600 mg, oral, BID  lidocaine, 15 mL, oral, Before meals & nightly  lidocaine, 1 patch, transdermal, Daily  mirtazapine, 15 mg, oral, Nightly  montelukast, 10 mg, oral, Daily  multivitamin with minerals, 1 tablet, oral, Daily  nicotine, 1 patch, transdermal, Daily  oxygen, , inhalation, Continuous -  Inhalation  pancrelipase (Lip-Prot-Amyl), 1 capsule, oral, BID  pantoprazole, 40 mg, oral, Daily before breakfast  polyethylene glycol, 17 g, oral, q8h  pregabalin, 200 mg, oral, TID  [Held by provider] rOPINIRole, 0.5 mg, oral, TID  sennosides, 2 tablet, oral, BID  sodium chloride, 2 spray, Each Nostril, 4x daily  tamsulosin, 0.4 mg, oral, Daily  thiamine, 100 mg, oral, Daily  topiramate, 100 mg, oral, BID      Continuous medications       PRN medications  albuterol, 2 puff, q6h PRN  butalbital-acetaminophen-caff, 1 tablet, q4h PRN  cyclobenzaprine, 10 mg, TID PRN  dextrose, 12.5 g, q15 min PRN  dextrose, 25 g, q15 min PRN  glucagon, 1 mg, q15 min PRN  glucagon, 1 mg, q15 min PRN  HYDROmorphone, 1 mg, q2h PRN  hydrOXYzine HCL, 25 mg, q6h PRN  ipratropium-albuteroL, 3 mL, q2h PRN  lubricating eye drops, 1 drop, q6h PRN  melatonin, 3 mg, Nightly PRN  naloxone, 0.2 mg, PRN  OLANZapine, 5 mg, BID PRN  ondansetron ODT, 4 mg, q6h PRN   Or  ondansetron, 4 mg, q6h PRN                    PHYSICAL EXAMINATION:  Vital Signs:   Vital signs reviewed  Vitals:    12/24/24 0445   BP: 107/72   Pulse: 62   Resp: 17   Temp: 36.4 °C (97.5 °F)   SpO2: 94%     Pain Score: 5 - Moderate pain     Physical Exam  Eyes:      Pupils: Pupils are equal, round, and reactive to light.   Neck:      Comments: Excoriation to neck   Cardiovascular:      Rate and Rhythm: Regular rhythm.      Heart sounds: Normal heart sounds.   Pulmonary:      Breath sounds: Normal breath sounds.   Abdominal:      General: Abdomen is flat. Bowel sounds are normal.   Genitourinary:     Comments: Stephen alexandra urine   Musculoskeletal:         General: Normal range of motion.   Skin:     General: Skin is warm.   Neurological:      Mental Status: He is alert and oriented to person, place, and time.   Psychiatric:         Mood and Affect: Mood normal.         Behavior: Behavior normal.         Thought Content: Thought content normal.         ASSESSMENT/PLAN:  Gaurav HELLER  Woody is a 54 y.o. male diagnosed with laryngeal cancer status post chemoradiation plus right neck dissection and flap reconstruction presenting for evaluation of clival mass that was found incidentally on preoperative testing. PMH significant for peripheral neuropathy, restless leg syndrome, HTN, GERD, COPD, hematuria and bladder mass. Admitted 12/15/2024 for further evaluation and management of clival mass. 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 Supportive and Palliative Oncology is consulted for pain management.      Neoplasm related to Pain:  Headache and eye pain related to laryngeal cancer   Pain is: cancer related pain  Type: visceral, somatic, and neuropathic  Pain control: sub-optimally controlled  Home regimen:  Pregabalin 200mg BID   Intolerances/previously tried:  buprenorphine   Personalized pain goal: 5  Total OME usage for the past 24 hours:    Cigerette 1 pack a day. Used Met, Cocaine and Marijuana in the past.   Drinks about 6 tall boys Beer a day   Continue Lyrica 200mg TID   Continue Flexeril 10mg TID PRN   Change Dilaudid 1mg iv q4hr PRN for breakthrough pain    Continue lidocaine 4% patch TD daily   Start morphine IR 15mg q3hrs PRN  for moderate and severe pain   Start methadone 5mg po q8hrs scheduled  Please get another EKG, last one was 12/16, to determine patients QTC.   Continue to monitor pain scores and administer PRN medications as appropriate  Continue/initiate nonpharmacologic pain management strategies including ice/heat therapy, distraction techniques, deep breathing/relaxation techniques, calming music, and repositioning  Continue to monitor for signs of opioid efficacy (pain scores, improved functionality) and toxicity (pinpoint pupils, excess sedation/drowsiness/confusion, respiratory depression, etc.)     Nausea:  At risk for nausea with vomiting related to opioids   Home regimen:  none  Denies  Continue Zofran 4mg q6hrs PRN       Constipation  At risk for constipation related to medication side effects (including opioids), currently constipated  Usual bowel pattern: every day  Home regimen: none  LBM 12/23/22  Continue Miralax 17 grams q8hrs   Continue Senna 2 tablets BID   Continue bisacodyl 10mg   Continue Lactulose 20 grams BID   Goal to have BM without straining q48-72h, adjust regimen as needed     Altered Mood:  Acute on chronic anxiety and depression related to health concerns   uncontrolled with home regimen   Home regimen:  none   Mirtazapine 15mg at night      Sleeping Difficulty:  Impaired sleep related to pain and anxiety  Home regimen:  mirtazapine  With better pain control he will sleep better.        Medical Decision Making/Goals of Care/Advance Care Planning:  Patient's current clinical condition, including diagnosis, prognosis, and management plan, and goals of care were discussed.   Life limiting disease: metastatic malignancy  Family: Supportive family   Performance status: Major  limitations due to pain  Joys/meaning/strength: Family  Understanding of health: Demonstrates good understanding of disease process, understands plan for symptom management   Information:Wants full disclosure  Goals: symptom control and cancer directed therapy  Worries and fears now and future: none   Minimum acceptable outcome/QOL:  In an event that my heart stops I do want to be resuscitated . Additionally I do want to be intubated , do want artificial nutrition via NG and Peg tube , and am acceptable of dialysis    Code status discussion:  Full code      Advance Directives  Existence of Advance Directives:  No - not interested  Decision maker: Surrogate decision maker is Girlfriend Jacqui Hernandez [phone]  Code Status: Full code     Supportive and Palliative Oncology encounter:  Spoke with patient patient at bedside  Emotional support provided  Coordination of care:  medication changes and home-going prescription  recommendations    Signature and billing:    Medical complexity was high level due to due to complexity of problems, extensive data review, and high risk of management/treatment.    I spent 50 minutes in the care of this patient which included chart review, interviewing patient/family, discussion with primary team, coordination of care, and documentation.      DATA   Diagnostic tests and information reviewed for today's visit:  Conversation with primary team       Some elements copied from my note on 12/23/24, the elements have been updated and all reflect current decision making from today, 12/24/2024.    Plan of Care discussed with: Provider, RN, Patient    Thank you for asking Supportive and Palliative Oncology to assist with care of this patient.  Recommendations will be communicated back to the consulting service by way of shared electronic medical record/secure chat/email or face-to-face.   We will continue to follow.  Please contact us for additional questions or concerns.      SIGNATURE: JUSTINO Rivera  PAGER/CONTACT:  Contact information:  Supportive and Palliative Oncology  Monday-Friday 8 AM-5 PM  Epic Secure chat or pager [phone].  After hours and weekends:  pager [phone]

## 2024-12-24 NOTE — CARE PLAN
The patient's goals for the shift include  decrease and control pain    The clinical goals for the shift include Pt will remain free from falls and injuries throughout shift [id number]    Over the shift, the patient did not make progress toward the following goals. Barriers to progression include administer pawel medication as needed and as ordered. Recommendations to address these barriers include purposeful Q2H rounding.

## 2024-12-24 NOTE — PROGRESS NOTES
"Gaurav Mckay \"Lemuel\" is a 54 y.o. male on day 9 of admission presenting with Brain mass.    SW consulted to assist with dc planning on 12/18/24.     SW informed by med team that pt's adod is tomorrow 12/25/24.    SW added pt to Roundtrip under Will Call for 12/25.    SW informed SNF, [facility], of adod and sent updates.    Auth is good until 12/30.     Pt adod: 12/25/24  Current dc plan: SNF - [facility]  Care Transitions  2  [phone] or Epic Secure Chat    "

## 2024-12-24 NOTE — CARE PLAN
The clinical goals for the shift include Pt will remain free from falls and injuries throughout shift [id number]

## 2024-12-25 LAB
C DIF TOX TCDA+TCDB STL QL NAA+PROBE: NOT DETECTED
GLUCOSE BLD MANUAL STRIP-MCNC: 117 MG/DL (ref 74–99)
GLUCOSE BLD MANUAL STRIP-MCNC: 118 MG/DL (ref 74–99)
GLUCOSE BLD MANUAL STRIP-MCNC: 130 MG/DL (ref 74–99)
GLUCOSE BLD MANUAL STRIP-MCNC: 131 MG/DL (ref 74–99)

## 2024-12-25 PROCEDURE — 99233 SBSQ HOSP IP/OBS HIGH 50: CPT | Performed by: PODIATRIST

## 2024-12-25 PROCEDURE — 87493 C DIFF AMPLIFIED PROBE: CPT | Performed by: PODIATRIST

## 2024-12-25 PROCEDURE — 2500000001 HC RX 250 WO HCPCS SELF ADMINISTERED DRUGS (ALT 637 FOR MEDICARE OP): Performed by: STUDENT IN AN ORGANIZED HEALTH CARE EDUCATION/TRAINING PROGRAM

## 2024-12-25 PROCEDURE — 2500000002 HC RX 250 W HCPCS SELF ADMINISTERED DRUGS (ALT 637 FOR MEDICARE OP, ALT 636 FOR OP/ED): Performed by: PODIATRIST

## 2024-12-25 PROCEDURE — 2500000005 HC RX 250 GENERAL PHARMACY W/O HCPCS: Performed by: STUDENT IN AN ORGANIZED HEALTH CARE EDUCATION/TRAINING PROGRAM

## 2024-12-25 PROCEDURE — 2500000001 HC RX 250 WO HCPCS SELF ADMINISTERED DRUGS (ALT 637 FOR MEDICARE OP): Performed by: PODIATRIST

## 2024-12-25 PROCEDURE — 93005 ELECTROCARDIOGRAM TRACING: CPT

## 2024-12-25 PROCEDURE — 2500000001 HC RX 250 WO HCPCS SELF ADMINISTERED DRUGS (ALT 637 FOR MEDICARE OP): Performed by: INTERNAL MEDICINE

## 2024-12-25 PROCEDURE — 2500000004 HC RX 250 GENERAL PHARMACY W/ HCPCS (ALT 636 FOR OP/ED): Performed by: STUDENT IN AN ORGANIZED HEALTH CARE EDUCATION/TRAINING PROGRAM

## 2024-12-25 PROCEDURE — 93010 ELECTROCARDIOGRAM REPORT: CPT | Performed by: INTERNAL MEDICINE

## 2024-12-25 PROCEDURE — 2500000004 HC RX 250 GENERAL PHARMACY W/ HCPCS (ALT 636 FOR OP/ED): Performed by: PODIATRIST

## 2024-12-25 PROCEDURE — 82947 ASSAY GLUCOSE BLOOD QUANT: CPT

## 2024-12-25 PROCEDURE — 2500000004 HC RX 250 GENERAL PHARMACY W/ HCPCS (ALT 636 FOR OP/ED): Performed by: INTERNAL MEDICINE

## 2024-12-25 PROCEDURE — S4991 NICOTINE PATCH NONLEGEND: HCPCS | Performed by: STUDENT IN AN ORGANIZED HEALTH CARE EDUCATION/TRAINING PROGRAM

## 2024-12-25 PROCEDURE — 2500000005 HC RX 250 GENERAL PHARMACY W/O HCPCS: Performed by: NURSE PRACTITIONER

## 2024-12-25 PROCEDURE — S0109 METHADONE ORAL 5MG: HCPCS | Performed by: PODIATRIST

## 2024-12-25 PROCEDURE — 1100000001 HC PRIVATE ROOM DAILY

## 2024-12-25 PROCEDURE — 2500000002 HC RX 250 W HCPCS SELF ADMINISTERED DRUGS (ALT 637 FOR MEDICARE OP, ALT 636 FOR OP/ED): Performed by: STUDENT IN AN ORGANIZED HEALTH CARE EDUCATION/TRAINING PROGRAM

## 2024-12-25 RX ADMIN — PANCRELIPASE 1 CAPSULE: 30000; 6000; 19000 CAPSULE, DELAYED RELEASE PELLETS ORAL at 16:35

## 2024-12-25 RX ADMIN — MORPHINE SULFATE 15 MG: 15 TABLET ORAL at 08:06

## 2024-12-25 RX ADMIN — THIAMINE HCL TAB 100 MG 100 MG: 100 TAB at 08:08

## 2024-12-25 RX ADMIN — BISACODYL 10 MG: 5 TABLET, COATED ORAL at 08:09

## 2024-12-25 RX ADMIN — TOPIRAMATE 100 MG: 100 TABLET, FILM COATED ORAL at 08:07

## 2024-12-25 RX ADMIN — LIDOCAINE HYDROCHLORIDE 15 ML: 20 SOLUTION ORAL at 05:40

## 2024-12-25 RX ADMIN — LIDOCAINE 4% 1 PATCH: 40 PATCH TOPICAL at 08:09

## 2024-12-25 RX ADMIN — POLYETHYLENE GLYCOL 3350 17 G: 17 POWDER, FOR SOLUTION ORAL at 02:00

## 2024-12-25 RX ADMIN — CARBOXYMETHYLCELLULOSE SODIUM 1 DROP: 5 SOLUTION/ DROPS OPHTHALMIC at 12:30

## 2024-12-25 RX ADMIN — CYCLOBENZAPRINE 10 MG: 10 TABLET, FILM COATED ORAL at 12:26

## 2024-12-25 RX ADMIN — HYDROMORPHONE HYDROCHLORIDE 1 MG: 1 INJECTION, SOLUTION INTRAMUSCULAR; INTRAVENOUS; SUBCUTANEOUS at 19:00

## 2024-12-25 RX ADMIN — Medication 1 PATCH: at 08:09

## 2024-12-25 RX ADMIN — HYDROMORPHONE HYDROCHLORIDE 1 MG: 1 INJECTION, SOLUTION INTRAMUSCULAR; INTRAVENOUS; SUBCUTANEOUS at 09:17

## 2024-12-25 RX ADMIN — TAMSULOSIN HYDROCHLORIDE 0.4 MG: 0.4 CAPSULE ORAL at 08:08

## 2024-12-25 RX ADMIN — GUAIFENESIN 600 MG: 600 TABLET, EXTENDED RELEASE ORAL at 08:08

## 2024-12-25 RX ADMIN — SALINE NASAL SPRAY 2 SPRAY: 1.5 SOLUTION NASAL at 05:38

## 2024-12-25 RX ADMIN — MORPHINE SULFATE 15 MG: 15 TABLET ORAL at 11:06

## 2024-12-25 RX ADMIN — Medication 21 PERCENT: at 08:00

## 2024-12-25 RX ADMIN — PREGABALIN 200 MG: 100 CAPSULE ORAL at 20:43

## 2024-12-25 RX ADMIN — PANCRELIPASE 1 CAPSULE: 30000; 6000; 19000 CAPSULE, DELAYED RELEASE PELLETS ORAL at 08:11

## 2024-12-25 RX ADMIN — ATORVASTATIN CALCIUM 20 MG: 20 TABLET, FILM COATED ORAL at 20:43

## 2024-12-25 RX ADMIN — SENNOSIDES 17.2 MG: 8.6 TABLET, FILM COATED ORAL at 08:09

## 2024-12-25 RX ADMIN — POLYETHYLENE GLYCOL 3350 17 G: 17 POWDER, FOR SOLUTION ORAL at 09:17

## 2024-12-25 RX ADMIN — HYDROMORPHONE HYDROCHLORIDE 1 MG: 1 INJECTION, SOLUTION INTRAMUSCULAR; INTRAVENOUS; SUBCUTANEOUS at 13:21

## 2024-12-25 RX ADMIN — TOPIRAMATE 100 MG: 100 TABLET, FILM COATED ORAL at 20:44

## 2024-12-25 RX ADMIN — METHADONE HYDROCHLORIDE 5 MG: 5 TABLET ORAL at 05:37

## 2024-12-25 RX ADMIN — METHADONE HYDROCHLORIDE 5 MG: 5 TABLET ORAL at 22:27

## 2024-12-25 RX ADMIN — METHADONE HYDROCHLORIDE 5 MG: 5 TABLET ORAL at 14:19

## 2024-12-25 RX ADMIN — PANTOPRAZOLE SODIUM 40 MG: 40 TABLET, DELAYED RELEASE ORAL at 05:37

## 2024-12-25 RX ADMIN — GUAIFENESIN 600 MG: 600 TABLET, EXTENDED RELEASE ORAL at 20:44

## 2024-12-25 RX ADMIN — HYDROMORPHONE HYDROCHLORIDE 1 MG: 1 INJECTION, SOLUTION INTRAMUSCULAR; INTRAVENOUS; SUBCUTANEOUS at 03:37

## 2024-12-25 RX ADMIN — Medication 1 TABLET: at 08:07

## 2024-12-25 RX ADMIN — APIXABAN 5 MG: 5 TABLET, FILM COATED ORAL at 20:44

## 2024-12-25 RX ADMIN — SALINE NASAL SPRAY 2 SPRAY: 1.5 SOLUTION NASAL at 12:26

## 2024-12-25 RX ADMIN — SALINE NASAL SPRAY 2 SPRAY: 1.5 SOLUTION NASAL at 16:38

## 2024-12-25 RX ADMIN — MONTELUKAST 10 MG: 10 TABLET, FILM COATED ORAL at 08:08

## 2024-12-25 RX ADMIN — MORPHINE SULFATE 15 MG: 15 TABLET ORAL at 16:31

## 2024-12-25 RX ADMIN — MORPHINE SULFATE 15 MG: 15 TABLET ORAL at 00:01

## 2024-12-25 RX ADMIN — LIDOCAINE HYDROCHLORIDE 15 ML: 20 SOLUTION ORAL at 20:43

## 2024-12-25 RX ADMIN — PREGABALIN 200 MG: 100 CAPSULE ORAL at 08:07

## 2024-12-25 RX ADMIN — MIRTAZAPINE 15 MG: 15 TABLET, FILM COATED ORAL at 20:44

## 2024-12-25 RX ADMIN — APIXABAN 5 MG: 5 TABLET, FILM COATED ORAL at 08:08

## 2024-12-25 RX ADMIN — PREGABALIN 200 MG: 100 CAPSULE ORAL at 14:18

## 2024-12-25 RX ADMIN — LIDOCAINE HYDROCHLORIDE 15 ML: 20 SOLUTION ORAL at 16:35

## 2024-12-25 RX ADMIN — MORPHINE SULFATE 15 MG: 15 TABLET ORAL at 23:38

## 2024-12-25 RX ADMIN — CARBOXYMETHYLCELLULOSE SODIUM 1 DROP: 5 SOLUTION/ DROPS OPHTHALMIC at 05:42

## 2024-12-25 RX ADMIN — SALINE NASAL SPRAY 2 SPRAY: 1.5 SOLUTION NASAL at 20:47

## 2024-12-25 RX ADMIN — FOLIC ACID 1 MG: 1 TABLET ORAL at 08:08

## 2024-12-25 ASSESSMENT — PAIN SCALES - GENERAL
PAINLEVEL_OUTOF10: 7
PAINLEVEL_OUTOF10: 8
PAINLEVEL_OUTOF10: 7
PAINLEVEL_OUTOF10: 8
PAINLEVEL_OUTOF10: 0 - NO PAIN
PAINLEVEL_OUTOF10: 8
PAINLEVEL_OUTOF10: 5 - MODERATE PAIN
PAINLEVEL_OUTOF10: 8
PAINLEVEL_OUTOF10: 9
PAINLEVEL_OUTOF10: 7
PAINLEVEL_OUTOF10: 7
PAINLEVEL_OUTOF10: 8
PAINLEVEL_OUTOF10: 8

## 2024-12-25 ASSESSMENT — COGNITIVE AND FUNCTIONAL STATUS - GENERAL
DRESSING REGULAR LOWER BODY CLOTHING: A LITTLE
TOILETING: A LITTLE
PERSONAL GROOMING: A LITTLE
MOVING FROM LYING ON BACK TO SITTING ON SIDE OF FLAT BED WITH BEDRAILS: A LITTLE
HELP NEEDED FOR BATHING: A LITTLE
DAILY ACTIVITIY SCORE: 18
MOVING TO AND FROM BED TO CHAIR: A LOT
TOILETING: A LITTLE
EATING MEALS: A LITTLE
MOVING FROM LYING ON BACK TO SITTING ON SIDE OF FLAT BED WITH BEDRAILS: A LITTLE
HELP NEEDED FOR BATHING: A LITTLE
DRESSING REGULAR UPPER BODY CLOTHING: A LITTLE
EATING MEALS: A LITTLE
MOVING TO AND FROM BED TO CHAIR: A LOT
CLIMB 3 TO 5 STEPS WITH RAILING: A LOT
MOBILITY SCORE: 14
DRESSING REGULAR LOWER BODY CLOTHING: A LITTLE
STANDING UP FROM CHAIR USING ARMS: A LOT
PERSONAL GROOMING: A LITTLE
WALKING IN HOSPITAL ROOM: A LOT
MOBILITY SCORE: 14
WALKING IN HOSPITAL ROOM: A LOT
DRESSING REGULAR UPPER BODY CLOTHING: A LITTLE
TURNING FROM BACK TO SIDE WHILE IN FLAT BAD: A LITTLE
CLIMB 3 TO 5 STEPS WITH RAILING: A LOT
STANDING UP FROM CHAIR USING ARMS: A LOT
TURNING FROM BACK TO SIDE WHILE IN FLAT BAD: A LITTLE
DAILY ACTIVITIY SCORE: 18

## 2024-12-25 ASSESSMENT — PAIN - FUNCTIONAL ASSESSMENT
PAIN_FUNCTIONAL_ASSESSMENT: 0-10

## 2024-12-25 ASSESSMENT — PAIN SCALES - WONG BAKER: WONGBAKER_NUMERICALRESPONSE: HURTS EVEN MORE

## 2024-12-25 ASSESSMENT — PAIN DESCRIPTION - LOCATION
LOCATION: HEAD
LOCATION: BACK
LOCATION: HEAD
LOCATION: GENERALIZED
LOCATION: HEAD

## 2024-12-25 ASSESSMENT — PAIN DESCRIPTION - ORIENTATION
ORIENTATION: POSTERIOR

## 2024-12-25 NOTE — PROGRESS NOTES
Gaurav Mckay is a 54 y.o. y.o. male on day 10 of admission presenting with Brain mass [G93.89].     Subjective   Received update from primary medical team that patient is not ready for discharge; ADOD tomorrow. Patient developed diarrhea and cdiff test pending. [facility] updated in [facility]; authorization is good through 12/30.    SW will continue to follow.    - Fernanda YOUNG, MA, LSW  Care Transitions   [facility] Secure Chat or [id number]

## 2024-12-25 NOTE — PROGRESS NOTES
"Gaurav Mckay \"Lemuel\" is a 54 y.o. male on day 10 of admission presenting with Brain mass.    Subjective   No acute events overnight. He has been tolerating his new pain regimen. He is complaining of some diarrhea this am. Otherwise doing ok. Denies fever, chills, nausea, vomiting, chest pain, shortness of breath.     Objective     Physical Exam  Constitutional:       Appearance: Normal appearance.   HENT:      Head: Normocephalic and atraumatic.      Nose: Nose normal.      Mouth/Throat:      Mouth: Mucous membranes are moist.   Eyes:      Conjunctiva/sclera: Conjunctivae normal.   Cardiovascular:      Rate and Rhythm: Normal rate and regular rhythm.   Pulmonary:      Effort: Pulmonary effort is normal. No respiratory distress.      Breath sounds: Normal breath sounds. No wheezing.      Comments: On room air.   Abdominal:      General: Bowel sounds are normal. There is no distension.      Palpations: Abdomen is soft.      Tenderness: There is no abdominal tenderness. There is no guarding.   Genitourinary:     Comments: Stephen in place draining clear yellow urine  Musculoskeletal:         General: No swelling. Normal range of motion.      Cervical back: Normal range of motion and neck supple.   Skin:     General: Skin is warm.   Neurological:      General: No focal deficit present.      Mental Status: He is alert and oriented to person, place, and time. Mental status is at baseline.   Psychiatric:         Mood and Affect: Mood normal.         Behavior: Behavior normal.         Thought Content: Thought content normal.         Judgment: Judgment normal.         Last Recorded Vitals  Blood pressure 102/59, pulse 68, temperature 36.5 °C (97.7 °F), temperature source Temporal, resp. rate 18, height 1.778 m (5' 10\"), weight 83 kg (183 lb), SpO2 94%.  Intake/Output last 3 Shifts:  I/O last 3 completed shifts:  In: 240 (2.9 mL/kg) [P.O.:240]  Out: 4125 (49.7 mL/kg) [Urine:4125 (1.4 mL/kg/hr)]  Weight: 83 kg     Relevant " Results  Scheduled medications  apixaban, 5 mg, oral, BID  atorvastatin, 20 mg, oral, Nightly  [Held by provider] bisacodyl, 10 mg, oral, Daily  ferrous sulfate (325 mg ferrous sulfate), 65 mg of iron, oral, Every other day  folic acid, 1 mg, oral, Daily  guaiFENesin, 600 mg, oral, BID  lidocaine, 15 mL, oral, Before meals & nightly  lidocaine, 1 patch, transdermal, Daily  methadone, 5 mg, oral, q8h  mirtazapine, 15 mg, oral, Nightly  montelukast, 10 mg, oral, Daily  multivitamin with minerals, 1 tablet, oral, Daily  nicotine, 1 patch, transdermal, Daily  oxygen, , inhalation, Continuous - Inhalation  pancrelipase (Lip-Prot-Amyl), 1 capsule, oral, BID  pantoprazole, 40 mg, oral, Daily before breakfast  [Held by provider] polyethylene glycol, 17 g, oral, q8h  pregabalin, 200 mg, oral, TID  [Held by provider] rOPINIRole, 0.5 mg, oral, TID  [Held by provider] sennosides, 2 tablet, oral, BID  sodium chloride, 2 spray, Each Nostril, 4x daily  tamsulosin, 0.4 mg, oral, Daily  thiamine, 100 mg, oral, Daily  topiramate, 100 mg, oral, BID      Continuous medications     PRN medications  PRN medications: albuterol, butalbital-acetaminophen-caff, cyclobenzaprine, dextrose, dextrose, glucagon, glucagon, HYDROmorphone, hydrOXYzine HCL, ipratropium-albuteroL, lubricating eye drops, melatonin, morphine, naloxone, OLANZapine, ondansetron ODT **OR** ondansetron     Results for orders placed or performed during the hospital encounter of 12/15/24 (from the past 24 hours)   POCT GLUCOSE   Result Value Ref Range    POCT Glucose 103 (H) 74 - 99 mg/dL   POCT GLUCOSE   Result Value Ref Range    POCT Glucose 141 (H) 74 - 99 mg/dL   POCT GLUCOSE   Result Value Ref Range    POCT Glucose 130 (H) 74 - 99 mg/dL        Assessment/Plan   Assessment & Plan  Brain mass    Gaurav Mckay is a 54 y.o. 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 Supportive oncology on board for further pain control. He is tolerating new regimen. He has some diarrhea today, will work up for C. Diff. Pending this, plan is for SNF.      #Brain mass  #Headache with Visual Changes  #Right sixth nerve palsy   #Pharyngeal dysphagia   - CTH: concerning for a large destructive lytic soft tissue mass upon the clivus that protrudes into the sella turcica and bilateral cavernous sinuses and erodes bilateral petrous carotid canals.   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     - No other focal deficits except for right eye deviation which he states is not new   - Neurosurgery consulted, appreciate recs.       :: Planning potential biopsy   - Ophthalmology consulted. Appreciate recs.        :: Recommend monocular occlusion as needed for symptomatic relief. Can consider strabismus surgery outpatient in the long-term   - ENT consulted. Appreciate recs.      :: s/p Clival mass biopsy on 12/20     :: ENT to arrange outpatient follow up. Signed off.    - CT sinus and CT angio Head/Neck 12/17: Large 3.4 cm mass located within the clivus and sphenoid bone with surrounding extension including into the sphenoid sinus posteriorly, causing destruction of the petrous segments of the bilateral temporal bones medially, and protruding into the cavernous sinuses and circumferentially surrounding the right internal carotid artery causing mild luminal narrowing  -CT CAP on 12/15:  Interval development of new centrilobular ground-glass nodular  densities in both lungs, predominantly in the right upper and middle lobes. Small amount of retained secretions noted in the trachea. These findings in the setting of a patulous and fluid-filled esophagus favors sequela of reflux/aspiration.  -CT thoracic 12/15: No thoracic spine metastatic disease   -CT lumbar 12/15: No metastatic disease to the lumbar spine.   -CT cervical 12/15: Abnormal enhancement involving the spinous process of C6 and the interspinous ligaments at C5-6 and C6-7. This has an appearance more favorable for inflammatory process over malignancy  -SLP consulted. Appreciate recs.       :: Pureed along with Mildly/nectar thick liquids   -Supportive oncology consulted for optimal pain control. Appreciate recs.   New regimen: Dilaudid 1mg IV q4hr PRN for breakthrough pain      ::  Continue lidocaine 4% patch TD daily     :: morphine IR 15mg q3hrs PRN  for moderate and severe pain     :: methadone 5mg po q8hrs scheduled  - Consulted radiation oncology. Appreciate recs.      :: Referral for multidisciplinary tumor board review     :: Radiation oncology follow-up with Dr. Machado to discuss treatment options     #Bladder Tumor  #Urinary retention  -Urology consulted, appreciate recs.     :: s/p cystoscopy with bladder biopsy on 12/20     :: Recommend maintaining anders until outpatient follow-up     :: Follow-up with Dr. Murcia on 1/2 for path review and trial of void. Signed off.   - Anders cath in place  - C/w Flomax 0.4 mg PO QD     #Alcohol Use Disorder  -Cessation advised  -Last drink was approximately 3AM on 12/13/24.   -CIWA discontinued 12/17  - c/w Thiamine, Folate, and MVI PO QD  - pt interested in attending rehab on discharge     #Chronic Hypoxic Respiratory Failure  #COPD  #HANY  - Continue bronchial hygiene, duonebs, mucinex  - Wean O2 as tolerated (per chart review, does not seem like he was on O2 at home)  -Currently on room air       #HTN  #orthostatic hypotension  - BP on admit 118/81  - continue to monitor BP  - no home meds     #Hx of Pulmonary Embolism (?requiring 3L NC since although unclear per chart review)  -S/p embolization 11/2023  -Provoked by surgery  -Heparin gtt stopped in anticipation of surgery on 12/20.   -On home Eliquis 5 mg BID since 12/22     #Tobacco Use Disorder  - nicotine patch      #Decreased strength  - PT rec: Moderate intensity level of continued care 4x /week   - OT rec: Moderate intensity level of continued care 3x/week     #Bowel regimen  -Continue Miralax 17 grams q8hrs   -Continue Senna 2 tablets BID   -Continue bisacodyl 10mg po     #Diarrhea, new  -May be related to recent bowel regimen   -Will rule out C. Diff      #Dispo  - Pending SNF placement pending Diarrhea work up     Fluids: PRN  Electrolytes: Keep K>4, Mg>3, Phos>3  Nutrition: Pureed with Mildly/nectar thick liquids   Abx: N/A  DVT: Home Eliquis 5 mg BID     Patient seen and discussed with attending physician, Dr. Hurtado.       Bre Chu MD   PGY2, Family Medicine   [facility]  Available by imedo

## 2024-12-25 NOTE — CARE PLAN
The clinical goals for the shift include pt to be safe from falls and injuries by the end of the shift    Over the shift, the patient did make progress toward the following goals.

## 2024-12-26 ENCOUNTER — TELEPHONE (OUTPATIENT)
Dept: OTOLARYNGOLOGY | Facility: HOSPITAL | Age: 54
End: 2024-12-26
Payer: COMMERCIAL

## 2024-12-26 ENCOUNTER — APPOINTMENT (OUTPATIENT)
Dept: CARDIOLOGY | Facility: HOSPITAL | Age: 54
End: 2024-12-26
Payer: COMMERCIAL

## 2024-12-26 VITALS
OXYGEN SATURATION: 98 % | DIASTOLIC BLOOD PRESSURE: 78 MMHG | WEIGHT: 183 LBS | TEMPERATURE: 97.9 F | BODY MASS INDEX: 26.2 KG/M2 | HEART RATE: 78 BPM | HEIGHT: 70 IN | SYSTOLIC BLOOD PRESSURE: 129 MMHG | RESPIRATION RATE: 18 BRPM

## 2024-12-26 LAB
GLUCOSE BLD MANUAL STRIP-MCNC: 121 MG/DL (ref 74–99)
GLUCOSE BLD MANUAL STRIP-MCNC: 126 MG/DL (ref 74–99)

## 2024-12-26 PROCEDURE — 97530 THERAPEUTIC ACTIVITIES: CPT | Mod: GP,CQ

## 2024-12-26 PROCEDURE — 2500000001 HC RX 250 WO HCPCS SELF ADMINISTERED DRUGS (ALT 637 FOR MEDICARE OP): Performed by: INTERNAL MEDICINE

## 2024-12-26 PROCEDURE — 97110 THERAPEUTIC EXERCISES: CPT | Mod: GP,CQ

## 2024-12-26 PROCEDURE — 2500000001 HC RX 250 WO HCPCS SELF ADMINISTERED DRUGS (ALT 637 FOR MEDICARE OP): Performed by: STUDENT IN AN ORGANIZED HEALTH CARE EDUCATION/TRAINING PROGRAM

## 2024-12-26 PROCEDURE — 2500000005 HC RX 250 GENERAL PHARMACY W/O HCPCS: Performed by: NURSE PRACTITIONER

## 2024-12-26 PROCEDURE — 82947 ASSAY GLUCOSE BLOOD QUANT: CPT

## 2024-12-26 PROCEDURE — 99233 SBSQ HOSP IP/OBS HIGH 50: CPT | Performed by: NURSE PRACTITIONER

## 2024-12-26 PROCEDURE — 2500000002 HC RX 250 W HCPCS SELF ADMINISTERED DRUGS (ALT 637 FOR MEDICARE OP, ALT 636 FOR OP/ED): Performed by: PODIATRIST

## 2024-12-26 PROCEDURE — S4991 NICOTINE PATCH NONLEGEND: HCPCS | Performed by: STUDENT IN AN ORGANIZED HEALTH CARE EDUCATION/TRAINING PROGRAM

## 2024-12-26 PROCEDURE — 99239 HOSP IP/OBS DSCHRG MGMT >30: CPT | Performed by: PODIATRIST

## 2024-12-26 PROCEDURE — 2500000005 HC RX 250 GENERAL PHARMACY W/O HCPCS: Performed by: STUDENT IN AN ORGANIZED HEALTH CARE EDUCATION/TRAINING PROGRAM

## 2024-12-26 PROCEDURE — S0109 METHADONE ORAL 5MG: HCPCS | Performed by: PODIATRIST

## 2024-12-26 PROCEDURE — 92526 ORAL FUNCTION THERAPY: CPT | Mod: GN | Performed by: SPEECH-LANGUAGE PATHOLOGIST

## 2024-12-26 PROCEDURE — 2500000004 HC RX 250 GENERAL PHARMACY W/ HCPCS (ALT 636 FOR OP/ED): Performed by: STUDENT IN AN ORGANIZED HEALTH CARE EDUCATION/TRAINING PROGRAM

## 2024-12-26 PROCEDURE — 97530 THERAPEUTIC ACTIVITIES: CPT | Mod: GO

## 2024-12-26 PROCEDURE — 2500000002 HC RX 250 W HCPCS SELF ADMINISTERED DRUGS (ALT 637 FOR MEDICARE OP, ALT 636 FOR OP/ED): Performed by: STUDENT IN AN ORGANIZED HEALTH CARE EDUCATION/TRAINING PROGRAM

## 2024-12-26 PROCEDURE — 2500000004 HC RX 250 GENERAL PHARMACY W/ HCPCS (ALT 636 FOR OP/ED): Performed by: PODIATRIST

## 2024-12-26 PROCEDURE — 2500000001 HC RX 250 WO HCPCS SELF ADMINISTERED DRUGS (ALT 637 FOR MEDICARE OP): Performed by: PODIATRIST

## 2024-12-26 RX ORDER — BISACODYL 5 MG
10 TABLET, DELAYED RELEASE (ENTERIC COATED) ORAL DAILY PRN
Start: 2024-12-26

## 2024-12-26 RX ORDER — DEXAMETHASONE 4 MG/1
4 TABLET ORAL DAILY
Status: DISCONTINUED | OUTPATIENT
Start: 2024-12-26 | End: 2024-12-26 | Stop reason: HOSPADM

## 2024-12-26 RX ORDER — DEXAMETHASONE 4 MG/1
4 TABLET ORAL DAILY
Start: 2024-12-26 | End: 2025-01-02

## 2024-12-26 RX ORDER — MORPHINE SULFATE 15 MG/1
15 TABLET ORAL
Start: 2024-12-26 | End: 2024-12-26 | Stop reason: HOSPADM

## 2024-12-26 RX ORDER — MORPHINE SULFATE 30 MG/1
30 TABLET ORAL
Start: 2024-12-26 | End: 2024-12-26

## 2024-12-26 RX ORDER — NALOXONE HYDROCHLORIDE 0.4 MG/ML
0.2 INJECTION, SOLUTION INTRAMUSCULAR; INTRAVENOUS; SUBCUTANEOUS AS NEEDED
Qty: 1 ML | Status: SHIPPED | OUTPATIENT
Start: 2024-12-26

## 2024-12-26 RX ORDER — POLYETHYLENE GLYCOL 3350 17 G/17G
17 POWDER, FOR SOLUTION ORAL EVERY 8 HOURS
Start: 2024-12-26

## 2024-12-26 RX ORDER — METHADONE HYDROCHLORIDE 5 MG/1
5 TABLET ORAL EVERY 8 HOURS
Qty: 9 TABLET | Refills: 0 | Status: SHIPPED | OUTPATIENT
Start: 2024-12-26 | End: 2024-12-29

## 2024-12-26 RX ORDER — MORPHINE SULFATE 30 MG/1
30 TABLET ORAL
Qty: 10 TABLET | Refills: 0 | Status: SHIPPED | OUTPATIENT
Start: 2024-12-26 | End: 2024-12-27 | Stop reason: SDUPTHER

## 2024-12-26 RX ORDER — METHADONE HYDROCHLORIDE 5 MG/1
5 TABLET ORAL EVERY 8 HOURS
Start: 2024-12-26 | End: 2024-12-26

## 2024-12-26 RX ORDER — OLANZAPINE 5 MG/1
5 TABLET ORAL 2 TIMES DAILY PRN
Start: 2024-12-26

## 2024-12-26 RX ORDER — BUTALBITAL, ACETAMINOPHEN AND CAFFEINE 50; 325; 40 MG/1; MG/1; MG/1
1 TABLET ORAL EVERY 4 HOURS PRN
Start: 2024-12-26

## 2024-12-26 RX ORDER — SENNOSIDES 8.6 MG/1
2 TABLET ORAL 2 TIMES DAILY PRN
Start: 2024-12-26

## 2024-12-26 RX ORDER — MORPHINE SULFATE 15 MG/1
30 TABLET ORAL
Status: DISCONTINUED | OUTPATIENT
Start: 2024-12-26 | End: 2024-12-26 | Stop reason: HOSPADM

## 2024-12-26 RX ADMIN — FOLIC ACID 1 MG: 1 TABLET ORAL at 08:34

## 2024-12-26 RX ADMIN — PANCRELIPASE 1 CAPSULE: 30000; 6000; 19000 CAPSULE, DELAYED RELEASE PELLETS ORAL at 08:34

## 2024-12-26 RX ADMIN — Medication 1 TABLET: at 08:34

## 2024-12-26 RX ADMIN — METHADONE HYDROCHLORIDE 5 MG: 5 TABLET ORAL at 06:03

## 2024-12-26 RX ADMIN — PANTOPRAZOLE SODIUM 40 MG: 40 TABLET, DELAYED RELEASE ORAL at 06:03

## 2024-12-26 RX ADMIN — THIAMINE HCL TAB 100 MG 100 MG: 100 TAB at 08:34

## 2024-12-26 RX ADMIN — PREGABALIN 200 MG: 100 CAPSULE ORAL at 08:34

## 2024-12-26 RX ADMIN — SALINE NASAL SPRAY 2 SPRAY: 1.5 SOLUTION NASAL at 13:02

## 2024-12-26 RX ADMIN — LIDOCAINE HYDROCHLORIDE 15 ML: 20 SOLUTION ORAL at 06:03

## 2024-12-26 RX ADMIN — APIXABAN 5 MG: 5 TABLET, FILM COATED ORAL at 08:33

## 2024-12-26 RX ADMIN — Medication 1 PATCH: at 08:34

## 2024-12-26 RX ADMIN — TOPIRAMATE 100 MG: 100 TABLET, FILM COATED ORAL at 08:34

## 2024-12-26 RX ADMIN — LIDOCAINE HYDROCHLORIDE 15 ML: 20 SOLUTION ORAL at 08:34

## 2024-12-26 RX ADMIN — Medication 3.5 L/MIN: at 08:42

## 2024-12-26 RX ADMIN — PREGABALIN 200 MG: 100 CAPSULE ORAL at 17:55

## 2024-12-26 RX ADMIN — HYDROMORPHONE HYDROCHLORIDE 1 MG: 1 INJECTION, SOLUTION INTRAMUSCULAR; INTRAVENOUS; SUBCUTANEOUS at 02:15

## 2024-12-26 RX ADMIN — SALINE NASAL SPRAY 2 SPRAY: 1.5 SOLUTION NASAL at 06:03

## 2024-12-26 RX ADMIN — SALINE NASAL SPRAY 2 SPRAY: 1.5 SOLUTION NASAL at 17:56

## 2024-12-26 RX ADMIN — TAMSULOSIN HYDROCHLORIDE 0.4 MG: 0.4 CAPSULE ORAL at 08:34

## 2024-12-26 RX ADMIN — LIDOCAINE HYDROCHLORIDE 15 ML: 20 SOLUTION ORAL at 17:55

## 2024-12-26 RX ADMIN — METHADONE HYDROCHLORIDE 5 MG: 5 TABLET ORAL at 13:00

## 2024-12-26 RX ADMIN — DEXAMETHASONE 4 MG: 4 TABLET ORAL at 13:01

## 2024-12-26 RX ADMIN — PANCRELIPASE 1 CAPSULE: 30000; 6000; 19000 CAPSULE, DELAYED RELEASE PELLETS ORAL at 17:55

## 2024-12-26 RX ADMIN — MONTELUKAST 10 MG: 10 TABLET, FILM COATED ORAL at 08:34

## 2024-12-26 RX ADMIN — FERROUS SULFATE TAB 325 MG (65 MG ELEMENTAL FE) 325 MG: 325 (65 FE) TAB at 08:34

## 2024-12-26 RX ADMIN — LIDOCAINE 4% 1 PATCH: 40 PATCH TOPICAL at 08:35

## 2024-12-26 RX ADMIN — GUAIFENESIN 600 MG: 600 TABLET, EXTENDED RELEASE ORAL at 08:34

## 2024-12-26 ASSESSMENT — PAIN - FUNCTIONAL ASSESSMENT
PAIN_FUNCTIONAL_ASSESSMENT: 0-10

## 2024-12-26 ASSESSMENT — COGNITIVE AND FUNCTIONAL STATUS - GENERAL
MOVING TO AND FROM BED TO CHAIR: A LOT
DRESSING REGULAR LOWER BODY CLOTHING: A LOT
MOVING FROM LYING ON BACK TO SITTING ON SIDE OF FLAT BED WITH BEDRAILS: A LITTLE
MOBILITY SCORE: 13
DRESSING REGULAR UPPER BODY CLOTHING: A LITTLE
TOILETING: A LOT
DAILY ACTIVITIY SCORE: 16
TURNING FROM BACK TO SIDE WHILE IN FLAT BAD: A LITTLE
STANDING UP FROM CHAIR USING ARMS: A LOT
WALKING IN HOSPITAL ROOM: A LOT
HELP NEEDED FOR BATHING: A LOT
PERSONAL GROOMING: A LITTLE
CLIMB 3 TO 5 STEPS WITH RAILING: TOTAL

## 2024-12-26 ASSESSMENT — PAIN SCALES - WONG BAKER: WONGBAKER_NUMERICALRESPONSE: HURTS WHOLE LOT

## 2024-12-26 ASSESSMENT — PAIN DESCRIPTION - LOCATION
LOCATION: GENERALIZED
LOCATION: GENERALIZED

## 2024-12-26 ASSESSMENT — PAIN SCALES - GENERAL
PAINLEVEL_OUTOF10: 8
PAINLEVEL_OUTOF10: 7
PAINLEVEL_OUTOF10: 8
PAINLEVEL_OUTOF10: 9
PAINLEVEL_OUTOF10: 8

## 2024-12-26 NOTE — TELEPHONE ENCOUNTER
Pt currently admitted. Called wife to make follow up appt with Dr. Hamilton. Wife declined to make an appt as patient will be going into a nursing home upon discharge.

## 2024-12-26 NOTE — PROGRESS NOTES
Gaurav Mckay is a 54 y.o. y.o. male on day 11 of admission presenting with Brain mass [G93.89].     Subjective      12/26/24 1043   Discharge Planning   Home or Post Acute Services Post acute facilities (Rehab/SNF/etc)   Type of Post Acute Facility Services Skilled nursing  ([facility]: [phone])   Expected Discharge Disposition SNF   Does the patient need discharge transport arranged? Yes   RoundTrip coordination needed? Yes   Has discharge transport been arranged? Yes   What day is the transport expected? 12/26/24   What time is the transport expected? 1700  ([facility]- stretcher)     Received update that patient has authorization to admit to [facility] and confirmed that patient is medically ready for discharge. Transportation requested in Roundtrip and received confirmed  time of 5:00pm with [facility]. Completed [place]/AVS uploaded via [employer] previously completed in [facility]. Blue slip provided to unit secretary and bedside RN updated of discharge.     - Fernanda BROCKA, MA, LSW  Care Transitions   [facility] Secure Chat or [phone]

## 2024-12-26 NOTE — CARE PLAN
The patient's goals for the shift include  patient will report decrease in pain of 5-6/10 by end of shift    The clinical goals for the shift include pt to be safe from falls and injuries by the end of the shift    Over the shift, the patient did not make progress toward the following goals. Barriers to progression include administer pain medication as needed and as ordered. Recommendations to address these barriers include Q2H purposeful rounding and pain assessments and reassessments .

## 2024-12-26 NOTE — PROGRESS NOTES
"Speech-Language Pathology  Adult Inpatient Swallow Treatment    Patient Name: Gaurav Mckay \"Lemuel\"  MRN: [id number]  Today's Date: 12/26/2024   Start Time: 1115  Stop Time: 1137  Time Calculation (min): 22    Impression:   Pt seen for dysphagia therapy. Using 8 oz of mildly thick liquids pt instructed to use effortful swallow. Pt required intermittent verbal cueing to completed. Pt tolerating puree diet well. Consider repeat MBSS in 2-3 weeks after continued dysphagia therapy.      Recommendations:  - Pureed (IDDSI Level 4)  - Mildly/nectar thick liquids (IDDSI Level 2)     STRATEGIES:  - Small bites  - Small, single sips  - Upright for all PO intake  - Medications whole in puree or as best tolerated  - Remain upright 45-60 min after meals.      Goals:   Pt will tolerate least restrictive diet without s/s aspiration, respiratory compromise, or overt difficulty throughout admission.  Start: 12/17/24, End: 2 weeks  Status: goal initiated  Pt will complete x30 trials of effortful swallows with cues as needed for accurate completion.               Start: 12/17/24, End: 2 weeks              Status: goal initiated                Plan:  SLP Services Indicated: Yes  Frequency: 2x week  Discussed POC with patient  SLP - OK to Discharge    Pain:   0-10  0 = No pain.     Inpatient Education:  Extensive education provided to patient regarding current swallow function, recommendations/results, and POC.      Consultations/Referrals/Coordination of Services:   N/A   "

## 2024-12-26 NOTE — NURSING NOTE
2020--Received hand off report from PACU Nurse Devon Vallejo, awaiting patient arrival to unit.    2030--Patient arrived, VSS, able to ambul;ate from cart to bed X 2 assist, contact team to release patient orders, patient requesting to eat.    
Called to give report to receiving facility [facility] and spoke with Florencio SWEET.   
During shift change/handoff report patient was found pulling apart IV tubing and pulling on IV, instructed patient to not to bother IV's.    2040--Patient was found again tampering with IV's, IV's were wrapped with ace bandage.    2045--Patient called Nurse in to say his IV is not working, both IV's not working, contact the [facility] team for insertion of new IV's, new IV inserted left forearm.  Family Medicine Team notified.    2212--VAST insertion of new IV.    2230--Patient tampering with IV PCA pump trying to increase amount of medication, constantly tampering and shutting pump down, will give patient IV dilaudid, patient appear to not be a good candidate for PCA pump.  
Patient states that he needs meds before he can do a long MRI procedures, the procedures will last about a hour, so he refused until he gets something ordered; I spoke with MRI department, they are aware and I will message doctor to order something for him.  
Yes

## 2024-12-26 NOTE — DISCHARGE INSTRUCTIONS
Follow up appointments :  [ ] ENT- follow up brain mass biopsy; they are to arrange follow up; Consult also placed  [ ] Urology- on 1/2 for follow up biopsy results/anders catheter   [ ] Radiation oncology- on 1/10 with Dr. Machado to follow up biopsy/treatment plan   [ ] Oncology - to establish care; consult placed   [ ] Ophthalmology- follow up R 6th nerve palsy and consider surgery in long term for strabismus; consult placed     Please call [phone] to schedule your appointments.     Medications started for his headache:  -morphine IR 30 mg q3hrs PRN  for moderate and severe pain  -methadone 5mg po q8hrs scheduled  -Decadron 4 mg daily for 7 days; stop date 1/2/25  -butalbital-acetaminophen-caff -40 mg per tablet 1 tablet every 4 hours PRN headache     KEEP ANDERS IN UNTIL SEES UROLOGY on 1/2

## 2024-12-26 NOTE — PROGRESS NOTES
SUPPORTIVE AND PALLIATIVE ONCOLOGY PROGRESS NOTE      SERVICE DATE: 2024      SUBJECTIVE:  Interval Events:  Patient was seen at bedside and reports that he has a lot of pain. He reports that his current regimen is somewhat effective. When he gets morphine 15mg it takes his pain from a 10/10 to 8/10. It takes a while for him to receive his pain medications and he lays here in misery. His pain is mostly to his head,, eyes, its sharp throbbing pain.   Discussed starting him on short course of Dex and he was receptive.   LBM: yesterday   N/v: denies, reports having phlegm spit ups after he eats   Sleep: reports that he slept well last night.   Pain Assessment:  Location:  back of head and eyes   Duration: Constant  Characteristics:   Ratin   Descriptors: aching, throbbing, and sharp   Aggravating: movement    Relieving: Analgesics dilaudid   Interference with Function: Somewhat    Opioid Requirements  Past 24 h opioid requirements (24 at 0800 to 24 at 0800):   Hydromorphone 1 mg IV x 4 doses = 4 mg = 50 OME  Morphine 15mg x 4 dose = 60 OME   Total 24h OME use:  110 OME + methadone 15    Note: OME calculations based on equianalgesic table below. Please note this table is based on best available evidence but conversions are still approximate. These are NOT opioid DOSES for individual patient use; this is equivalency information.  Drug Parenteral Enteral   Morphine 10 25   Oxycodone N/A 20   Hydromorphone 2 5   Fentanyl 0.15 N/A   Tramadol N/A 120   Citation: Darnell ENGLAND. Demystifying opioid conversion calculations: A guide for effective dosing, Second edition. MD Essie: American [employer] of Health-System Pharmacists, 2018.    Symptom Assessment:  Nausea none  Vomiting somewhat  Constipation none  Anxiety none  Difficulty Sleeping none    Information obtained from: chart review, interview of patient, and discussion with primary  team  ______________________________________________________________________        Objective       Lab Results   Component Value Date    WBC 6.7 12/22/2024    HGB 12.5 (L) 12/22/2024    HCT 39.3 (L) 12/22/2024    MCV 89 12/22/2024     12/22/2024      Lab Results   Component Value Date    GLUCOSE 80 12/24/2024    CALCIUM 9.7 12/24/2024     12/24/2024    K 3.3 (L) 12/24/2024    CO2 30 12/24/2024    CL 99 12/24/2024    BUN 7 12/24/2024    CREATININE 0.70 12/24/2024     Lab Results   Component Value Date    ALT 19 12/18/2024    AST 25 12/18/2024    ALKPHOS 105 12/18/2024    BILITOT 0.4 12/18/2024     Estimated Creatinine Clearance: 124.6 mL/min (by C-G formula based on SCr of 0.7 mg/dL).       Scheduled medications   apixaban, 5 mg, oral, BID  atorvastatin, 20 mg, oral, Nightly  [Held by provider] bisacodyl, 10 mg, oral, Daily  ferrous sulfate (325 mg ferrous sulfate), 65 mg of iron, oral, Every other day  folic acid, 1 mg, oral, Daily  guaiFENesin, 600 mg, oral, BID  lidocaine, 15 mL, oral, Before meals & nightly  lidocaine, 1 patch, transdermal, Daily  methadone, 5 mg, oral, q8h  mirtazapine, 15 mg, oral, Nightly  montelukast, 10 mg, oral, Daily  multivitamin with minerals, 1 tablet, oral, Daily  nicotine, 1 patch, transdermal, Daily  oxygen, , inhalation, Continuous - Inhalation  pancrelipase (Lip-Prot-Amyl), 1 capsule, oral, BID  pantoprazole, 40 mg, oral, Daily before breakfast  [Held by provider] polyethylene glycol, 17 g, oral, q8h  pregabalin, 200 mg, oral, TID  [Held by provider] rOPINIRole, 0.5 mg, oral, TID  [Held by provider] sennosides, 2 tablet, oral, BID  sodium chloride, 2 spray, Each Nostril, 4x daily  tamsulosin, 0.4 mg, oral, Daily  thiamine, 100 mg, oral, Daily  topiramate, 100 mg, oral, BID      Continuous medications       PRN medications  albuterol, 2 puff, q6h PRN  butalbital-acetaminophen-caff, 1 tablet, q4h PRN  cyclobenzaprine, 10 mg, TID PRN  dextrose, 12.5 g, q15 min  PRN  dextrose, 25 g, q15 min PRN  glucagon, 1 mg, q15 min PRN  glucagon, 1 mg, q15 min PRN  HYDROmorphone, 1 mg, q4h PRN  hydrOXYzine HCL, 25 mg, q6h PRN  ipratropium-albuteroL, 3 mL, q2h PRN  lubricating eye drops, 1 drop, q6h PRN  melatonin, 3 mg, Nightly PRN  morphine, 15 mg, q3h PRN  naloxone, 0.2 mg, PRN  OLANZapine, 5 mg, BID PRN  ondansetron ODT, 4 mg, q6h PRN   Or  ondansetron, 4 mg, q6h PRN                    PHYSICAL EXAMINATION:  Vital Signs:   Vital signs reviewed  Vitals:    12/26/24 0430   BP: 132/73   Pulse: 98   Resp: 20   Temp: 37 °C (98.6 °F)   SpO2: 90%     Pain Score: 9     Physical Exam  Eyes:      Pupils: Pupils are equal, round, and reactive to light.   Neck:      Comments: Excoriation to neck   Cardiovascular:      Rate and Rhythm: Regular rhythm.      Heart sounds: Normal heart sounds.   Pulmonary:      Breath sounds: Normal breath sounds.   Abdominal:      General: Abdomen is flat. Bowel sounds are normal.   Genitourinary:     Comments: Stephen alexandra urine   Musculoskeletal:         General: Normal range of motion.   Skin:     General: Skin is warm.   Neurological:      Mental Status: He is alert and oriented to person, place, and time.   Psychiatric:         Mood and Affect: Mood normal.         Behavior: Behavior normal.         Thought Content: Thought content normal.         ASSESSMENT/PLAN:  Gaurav Mckay is a 54 y.o. male diagnosed with laryngeal cancer status post chemoradiation plus right neck dissection and flap reconstruction presenting for evaluation of clival mass that was found incidentally on preoperative testing. PMH significant for peripheral neuropathy, restless leg syndrome, HTN, GERD, COPD, hematuria and bladder mass. Admitted 12/15/2024 for further evaluation and management of clival mass. 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 Supportive and Palliative Oncology is consulted for pain management.      Neoplasm related to  Pain:  Headache and eye pain related to laryngeal cancer   Pain is: cancer related pain  Type: visceral, somatic, and neuropathic  Pain control: sub-optimally controlled  Home regimen:  Pregabalin 200mg BID   Intolerances/previously tried:  buprenorphine   Personalized pain goal: 5  Total OME usage for the past 24 hours:    Cigerette 1 pack a day. Used Met, Cocaine and Marijuana in the past.   Drinks about 6 tall boys Beer a day   Continue Lyrica 200mg TID   Continue Flexeril 10mg TID PRN   discontinue Dilaudid 1mg iv q4hr PRN for breakthrough pain    Continue lidocaine 4% patch TD daily   Discontinue Morphine IR 15mg q3hrs PRN  for moderate and severe pain   Start  Morphine IR 30mg q3hrs PRN  for moderate and severe pain Continue methadone 5mg po q8hrs scheduled   EKG 12/25, QTC 427ms  Add Dex 4mg po q morning x 7 days.   Continue to monitor pain scores and administer PRN medications as appropriate  Continue/initiate nonpharmacologic pain management strategies including ice/heat therapy, distraction techniques, deep breathing/relaxation techniques, calming music, and repositioning  Continue to monitor for signs of opioid efficacy (pain scores, improved functionality) and toxicity (pinpoint pupils, excess sedation/drowsiness/confusion, respiratory depression, etc.)     Nausea:  At risk for nausea with vomiting related to opioids   Home regimen:  none  Denies  Continue Zofran 4mg q6hrs PRN      Constipation  At risk for constipation related to medication side effects (including opioids), currently constipated  Usual bowel pattern: every day  Home regimen: none  LBM 12/23/22  Continue Miralax 17 grams q8hrs   Continue Senna 2 tablets BID   Continue bisacodyl 10mg   Continue Lactulose 20 grams BID   Goal to have BM without straining q48-72h, adjust regimen as needed     Altered Mood:  Acute on chronic anxiety and depression related to health concerns   uncontrolled with home regimen   Home regimen:  none   Mirtazapine  15mg at night      Sleeping Difficulty:  Impaired sleep related to pain and anxiety  Home regimen:  mirtazapine  With better pain control he will sleep better.        Medical Decision Making/Goals of Care/Advance Care Planning:  Patient's current clinical condition, including diagnosis, prognosis, and management plan, and goals of care were discussed.   Life limiting disease: metastatic malignancy  Family: Supportive family   Performance status: Major  limitations due to pain  Joys/meaning/strength: Family  Understanding of health: Demonstrates good understanding of disease process, understands plan for symptom management   Information:Wants full disclosure  Goals: symptom control and cancer directed therapy  Worries and fears now and future: none   Minimum acceptable outcome/QOL:  In an event that my heart stops I do want to be resuscitated . Additionally I do want to be intubated , do want artificial nutrition via NG and Peg tube , and am acceptable of dialysis    Code status discussion:  Full code      Advance Directives  Existence of Advance Directives:  No - not interested  Decision maker: Surrogate decision maker is Girlfriend Jacqui Hernandez [phone]  Code Status: Full code     Supportive and Palliative Oncology encounter:  Spoke with patient patient at bedside  Emotional support provided  Coordination of care:  medication changes and home-going prescription recommendations    Signature and billing:    Medical complexity was high level due to due to complexity of problems, extensive data review, and high risk of management/treatment.    I spent 50 minutes in the care of this patient which included chart review, interviewing patient/family, discussion with primary team, coordination of care, and documentation.      DATA   Diagnostic tests and information reviewed for today's visit:  Conversation with primary team       Some elements copied from my note on 12/24/24, the elements have been updated and all  reflect current decision making from today, 12/26/2024.    Plan of Care discussed with: Provider, RN, Patient    Thank you for asking Supportive and Palliative Oncology to assist with care of this patient.  Recommendations will be communicated back to the consulting service by way of shared electronic medical record/secure chat/email or face-to-face.   We will continue to follow.  Please contact us for additional questions or concerns.      SIGNATURE: JUSTINO Rivera  PAGER/CONTACT:  Contact information:  Supportive and Palliative Oncology  Monday-Friday 8 AM-5 PM  Epic Secure chat or pager [phone].  After hours and weekends:  pager [phone]

## 2024-12-26 NOTE — DISCHARGE SUMMARY
Discharge Diagnosis  Brain mass    Issues Requiring Follow-Up  [ ] ENT- follow up brain mass biopsy; they are to arrange follow up; Consult also placed  [ ] Urology- on 1/2 for follow up biopsy results/anders catheter   [ ] Radiation oncology- on 1/10 with Dr. 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 male with a PMH of laryngeal cancer s/p radical neck dissection, PE on Apixaban, alcohol abuse, and HTN who presented to [facility] as a transfer from [facility] due to need for urology consult as well as neurosurgery consult due to incidental finding of brain mass. Pt was a direct admit to OSH on 12/13 after he presented to [facility] for surgical clearance for a scheduled cystoscopy with ablation of tissue on 12/17/24 secondary to a bladder tumor. He presented late and intoxicated to his appointment and there was concern of compliance with preop instructions which could further delay treatment. His case was discussed with the surgeon as well as anesthesia and admission was recommended to ensure sobriety, medication compliance, and surgical readiness. During admission and evaluation, Mr. Mckay was found to have dysconjugate gaze and he was complaining of a severe headache which he felt was related to a recent fall. 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 Neurology was consulted at OSH and consulting team felt that this was the cause of this diplopia. This mass is a new finding since it was not seen  on CT imaging of brain done at the end of October. Heparin bridge started on admit to OSH. Pt had massive PE last November at which time he underwent embolectomy that was complicated by chronic respiratory failure and he has been on 3LNC since this occurred. Stephen cath placed due to urinary retention at OSH. Pt arrived in stable condition from OSH. Pt resting comfortably and eating when seen on LK55. He endorses pain on right side of head as well as SOB. Pt placed on 3LNC. Neurosurgery and Urology consulted. Pt admitted to medicine service for further treatment and management of brain mass and bladder tumor.     SLP saw patient and recommended MBSS to further evaluate swallow function. CT thoracic  on 12/15: No thoracic spine metastatic disease. CT lumbar on  12/15 No metastatic disease to the lumbar spine. CT cervical on 12/15 Abnormal enhancement involving the spinous process of C6 and the interspinous ligaments at C5-6 and C6-7. This has an appearance more favorable for inflammatory process over malignancy.  SLP recommended Pureed and Mildly/nectar thick liquids.  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     CT sinus and CT angio Head/Neck 12/17: Large 3.4 cm mass located within the clivus and sphenoid bone with surrounding extension including into the sphenoid sinus posteriorly, causing destruction of the petrous segments of the bilateral temporal bones medially, and protruding into the cavernous sinuses and circumferentially surrounding the right internal carotid artery causing mild luminal narrowing.    ENT and Urology took patient to surgery on 12/20 for clival mass biopsy and cystoscopy with bladder biopsy respectively. Stephen catheter to remain in until outpatient urology appointment.     PT recommending Moderate intensity level of continued care 4x /week and OT  recommending moderate intensity level of continued care 3x/week.     His headache pain was not well controlled. Supportive oncology was consulted. They recommended starting a PCA pump with dilaudid. That improved his pain. He was then transitioned to   Dilaudid 1mg IV q4hr PRN for breakthrough pain, morphine IR 30 mg q3hrs PRN  for moderate and severe pain, methadone 5mg po q8hrs scheduled. He tolerated it well. Decadron 4 mg daily for 7 days was started on 12/26 with stop date 1/2.     He had some diarrhea on 12/25 and C. Diff culture was ordered and came back negative. Loose stool also resolved.     Radiation oncology saw patient and said no plans for emergent RT. We can review his case at tumor board, and have him seen in follow up at  with Dr. Machado.   He was on room air several days prior to discharge.     Medications started for his headache:  -morphine IR 30 mg q3hrs PRN  for moderate and severe pain  -methadone 5mg po q8hrs scheduled  -Decadron 4 mg daily for 7 days; stop date 1/2/25  -butalbital-acetaminophen-caff -40 mg per tablet 1 tablet every 4 hours PRN headache    [facility] was [facility] to [facility]. He has extensive follow up appointments as listed above.   KEEP DE [facility] IN UNTIL SEES UROLOGY on 1/2      Pertinent Physical Exam At Time of Discharge  Physical Exam  Vitals reviewed.   Constitutional:       Appearance: Normal appearance.   HENT:      Head: Normocephalic.      Right Ear: Tympanic membrane normal.      Left Ear: Tympanic membrane normal.      Nose: Nose normal.      Mouth/Throat:      Mouth: Mucous membranes are moist.   Eyes:      Conjunctiva/sclera: Conjunctivae normal.   Cardiovascular:      Rate and Rhythm: Normal rate and regular rhythm.   Pulmonary:      Effort: Pulmonary effort is normal. No respiratory distress.      Breath sounds: Normal breath sounds. No wheezing.      Comments: On room air  Abdominal:      General: Bowel sounds are normal. There is no distension.       Palpations: Abdomen is soft.      Tenderness: There is no abdominal tenderness. There is no guarding.   Genitourinary:     Comments: Stephen in place, draining clear yellow urine.   Musculoskeletal:         General: No swelling. Normal range of motion.      Cervical back: Normal range of motion and neck supple.      Right lower leg: No edema.      Left lower leg: No edema.   Skin:     General: Skin is warm.      Comments: SCDs in place;  Eye covering in place to R eye   Neurological:      General: No focal deficit present.      Mental Status: He is alert and oriented to person, place, and time. Mental status is at baseline.   Psychiatric:         Mood and Affect: Mood normal.         Behavior: Behavior normal.         Thought Content: Thought content normal.         Judgment: Judgment normal.         Home Medications     Medication List      START taking these medications     apixaban 5 mg tablet; Commonly known as: Eliquis; Take 1 tablet (5 mg)   by mouth 2 times a day.   bisacodyl 5 mg EC tablet; Commonly known as: Dulcolax; Take 2 tablets   (10 mg) by mouth once daily as needed for constipation. Do not crush,   chew, or split.   butalbital-acetaminophen-caff -40 mg tablet; Take 1 tablet by   mouth every 4 hours if needed for headaches.   dexAMETHasone 4 mg tablet; Commonly known as: Decadron; Take 1 tablet (4   mg) by mouth once daily for 7 doses.   lubricating eye drops ophthalmic solution; Administer 1 drop into both   eyes every 6 hours if needed for dry eyes.   methadone 5 mg tablet; Commonly known as: Dolophine; Take 1 tablet (5   mg) by mouth every 8 hours.   morphine 30 mg tablet; Commonly known as: MSIR; Take 1 tablet (30 mg) by   mouth every 3 hours if needed for moderate pain (4 - 6) or severe pain (7   - 10).   naloxone 0.4 mg/mL injection; Commonly known as: Narcan; Infuse 0.5 mL   (0.2 mg) into a venous catheter if needed for respiratory depression (Once   PRN patient is unarousable, and  respiratory rate less than 8).   OLANZapine 5 mg tablet; Commonly known as: ZyPREXA; Take 1 tablet (5 mg)   by mouth 2 times a day as needed (Severe agitation/restlessness or   interference with care and not redirectable).   sennosides 8.6 mg tablet; Commonly known as: Senokot; Take 2 tablets   (17.2 mg) by mouth 2 times a day as needed for constipation.   sodium chloride 0.65 % nasal spray; Commonly known as: Ocean; Administer   2 sprays into each nostril 4 times a day.     CHANGE how you take these medications     * polyethylene glycol 17 gram packet; Commonly known as: Glycolax,   Miralax; What changed: Another medication with the same name was added.   Make sure you understand how and when to take each.   * polyethylene glycol 17 gram packet; Commonly known as: Glycolax,   Miralax; Take 17 g by mouth every 8 hours.; What changed: You were already   taking a medication with the same name, and this prescription was added.   Make sure you understand how and when to take each.  * This list has 2 medication(s) that are the same as other medications   prescribed for you. Read the directions carefully, and ask your doctor or   other care provider to review them with you.     CONTINUE taking these medications     acetaminophen 325 mg tablet; Commonly known as: Tylenol; Take 2 tablets   (650 mg) by mouth every 6 hours if needed for mild pain (1 - 3).   atorvastatin 20 mg tablet; Commonly known as: Lipitor   calcium carbonate 200 mg calcium chewable tablet; Commonly known as:   Tums   cyclobenzaprine 10 mg tablet; Commonly known as: Flexeril   docusate sodium 100 mg capsule; Commonly known as: Colace   ferrous sulfate 325 (65 Fe) MG EC tablet   Flomax 0.4 mg 24 hr capsule; Generic drug: tamsulosin   folic acid 1 mg tablet; Commonly known as: Folvite; Take 1 tablet (1 mg)   by mouth once daily.   guaiFENesin 600 mg 12 hr tablet; Commonly known as: Mucinex; Take 1   tablet (600 mg) by mouth 2 times a day. Do not crush,  chew, or split.   * HYDROmorphone 0.5 mg/0.5 mL solution injection; Commonly known as:   Dilaudid; Infuse 0.5 mL (0.5 mg) into a venous catheter every 4 hours if   needed (moderate pain).   * HYDROmorphone 1 mg/mL injection; Commonly known as: Dilaudid; Infuse 1   mL (1 mg) into a venous catheter every 4 hours if needed for severe pain   (7 - 10).   ipratropium-albuteroL 0.5-2.5 mg/3 mL nebulizer solution; Commonly known   as: Duo-Neb; Take 3 mL by nebulization every 2 hours if needed for   wheezing.   lidocaine 5 % patch; Commonly known as: Lidoderm   LORazepam 2 mg/mL injection; Commonly known as: Ativan; Infuse 0.25 mL   (0.5 mg) into a venous catheter every 2 hours if needed (CIWA score 6-7 or   HR greater than 100).   mirtazapine 15 mg tablet; Commonly known as: Remeron   montelukast 10 mg tablet; Commonly known as: Singulair   multivitamin with minerals tablet; Take 1 tablet by mouth once daily.   nicotine 14 mg/24 hr patch; Commonly known as: Nicoderm CQ   ondansetron ODT 4 mg disintegrating tablet; Commonly known as:   Zofran-ODT; Dissolve 1 tablet (4 mg) in the mouth every 8 hours if needed   for nausea.   oxygen gas therapy; Commonly known as: O2; Inhale 2 L/min once every 24   hours.   pancrelipase (Lip-Prot-Amyl) 6,000-19,000 -30,000 unit capsule; Commonly   known as: Creon   pantoprazole 40 mg EC tablet; Commonly known as: ProtoNix; Take 1 tablet   (40 mg) by mouth once daily in the morning. Take before meals. Do not   crush, chew, or split.   pregabalin 200 mg capsule; Commonly known as: Lyrica; Take 1 capsule   (200 mg) by mouth 3 times a day for 14 days.   rOPINIRole 0.5 mg tablet; Commonly known as: Requip   sennosides-docusate sodium 8.6-50 mg tablet; Commonly known as:   Marya-Colace; Take 1 tablet by mouth once daily at bedtime.   thiamine 100 mg tablet; Commonly known as: Vitamin B-1   topiramate 100 mg tablet; Commonly known as: Topamax  * This list has 2 medication(s) that are the same as other  medications   prescribed for you. Read the directions carefully, and ask your doctor or   other care provider to review them with you.       Outpatient Follow-Up  Future Appointments   Date Time Provider Department Center   1/2/2025  1:00 PM Eddie Murcia MD [id number] [facility]   1/10/2025  2:00 PM Candice Machado MD [id number] [facility]   1/16/2025  8:00 AM Asim Way MD [id number] [facility]       Bre Chu MD

## 2024-12-26 NOTE — PROGRESS NOTES
"Physical Therapy    Physical Therapy Treatment    Patient Name: Gaurav Mckay \"Lucy"  MRN: [id number]  Department: [facility] 55  Room: [facility]5568  Today's Date: 12/26/2024  Time Calculation  Start Time: 1014  Stop Time: 1037  Time Calculation (min): 23 min         Assessment/Plan   PT Assessment  Barriers to Discharge Home: Physical needs  Physical Needs: High falls risk due to function or environment, Ambulating household distances limited by function/safety, 24hr mobility assistance needed  End of Session Communication: Bedside nurse  Assessment Comment: Pt tolerated PT session well, continues to require increased assist with transfers, unable to progress ambulation this date. Pt continues to remain appropriate for Moderate intensity PT upon D/C from \[facility]\"".  End of Session Patient Position: Bed, 3 rail up, Alarm on  PT Plan  Inpatient/Swing Bed or Outpatient: Inpatient  PT Plan  Treatment/Interventions: Bed mobility, Transfer training, Gait training, Stair training, Balance training, Endurance training, Therapeutic exercise, Therapeutic activity, Home exercise program  PT Plan: Ongoing PT  PT Frequency: 4 times per week  PT Discharge Recommendations: Moderate intensity level of continued care  PT Recommended Transfer Status: Assist x1, Assist x2, Assistive device  PT - OK to Discharge: Yes (PT evaluation complete and rehab recommendations made)      General Visit Information:   PT  Visit  PT Received On: 12/26/24  General  Missed Visit Reason:  (n/a)  Family/Caregiver Present: No  Co-Treatment: OT  Co-Treatment Reason: Partial co-tx with OT to maximize pt safety with progression of OOB activity this date.  Prior to Session Communication: Bedside nurse  Patient Position Received: Bed, 3 rail up, Alarm on  General Comment: Pt supine in bed, agreeable to work with therapy.    Subjective   Precautions:  Precautions  Medical Precautions: Fall precautions       12/26/24 1015   Vital Signs   Vitals Session During PT "   Heart Rate 87   SpO2 96 %       Objective   Pain:  Pain Assessment  Pain Assessment: 0-10  0-10 (Numeric) Pain Score: 8  Pain Type: Acute pain  Pain Location: Abdomen (Head)  Cognition:  Cognition  Orientation Level: Oriented X4  Insight: Mild  Impulsive: Mildly    Activity Tolerance:  Activity Tolerance  Endurance: Decreased tolerance for upright activites  Treatments:  Therapeutic Exercise  Therapeutic Exercise Performed: Yes  Therapeutic Exercise Activity 1: Supine: AP, QS, GS, HS, SAQ, Hip ABD, SLR x10 BLE    Therapeutic Activity  Therapeutic Activity Performed: Yes  Therapeutic Activity 1: Pt performed x1 STS transfer EOB with FWW and MinAx2, Pt able to tolerated standing ~30-60 seconds before returning to seated then supine.    Bed Mobility  Bed Mobility: Yes  Bed Mobility 1  Bed Mobility 1: Supine to sitting, Sitting to supine  Level of Assistance 1: Contact guard  Bed Mobility Comments 1: HOB elevated    Ambulation/Gait Training  Ambulation/Gait Training Performed: No (Unable to progress this date)    Transfers  Transfer: Yes  Transfer 1  Transfer From 1: Sit to  Transfer to 1: Stand  Technique 1: Sit to stand, Stand to sit  Transfer Device 1: Walker  Transfer Level of Assistance 1: Minimum assistance, +2, Minimal verbal cues  Trials/Comments 1: x1 trial, cues for safe hand placement and sequencing. Pt pulling up on FWW, pt with increased breathing upon standing, with noted increased tension in UE/LE. Verbal cues for deep breathing. Pt reports increased anxious with transfers.    Stairs  Stairs: No    Outcome Measures:  [facility] Basic Mobility  Turning from your back to your side while in a flat bed without using bedrails: A little  Moving from lying on your back to sitting on the side of a flat bed without using bedrails: A little  Moving to and from bed to chair (including a wheelchair): A lot  Standing up from a chair using your arms (e.g. wheelchair or bedside chair): A lot  To walk in hospital room: A  lot  Climbing 3-5 steps with railing: Total  Basic Mobility - Total Score: 13    Education Documentation  Body Mechanics, taught by Amanda Whiting PTA at 12/26/2024 12:47 PM.  Learner: Patient  Readiness: Acceptance  Method: Explanation  Response: Verbalizes Understanding, Needs Reinforcement  Comment: Cues for safe sequencing and deep breathing techniques with mobility, HEP    Home Exercise Program, taught by Amanda Whiting PTA at 12/26/2024 12:47 PM.  Learner: Patient  Readiness: Acceptance  Method: Explanation  Response: Verbalizes Understanding, Needs Reinforcement  Comment: Cues for safe sequencing and deep breathing techniques with mobility, HEP    Precautions, taught by Amanda Whiting PTA at 12/26/2024 12:47 PM.  Learner: Patient  Readiness: Acceptance  Method: Explanation  Response: Verbalizes Understanding, Needs Reinforcement  Comment: Cues for safe sequencing and deep breathing techniques with mobility, HEP    Mobility Training, taught by Amanda Whiting PTA at 12/26/2024 12:47 PM.  Learner: Patient  Readiness: Acceptance  Method: Explanation  Response: Verbalizes Understanding, Needs Reinforcement  Comment: Cues for safe sequencing and deep breathing techniques with mobility, HEP    Education Comments  No comments found.        OP EDUCATION:       Encounter Problems       Encounter Problems (Active)       PT Problem       Pt. will perform transfers with CGA with LRD  (Progressing)       Start:  12/17/24    Expected End:  12/31/24            Pt. will ambulate > 50 ft. with CGA with LRD to safely perform short household ambulation  (Progressing)       Start:  12/17/24    Expected End:  12/31/24            Pt will ascend/descend 3 steps with B handrails with min A to safely enter/exit the home set up  (Progressing)       Start:  12/17/24    Expected End:  12/31/24            Pt. will require CGA for static.dynamic standing balance to safely participate in ADLs  (Progressing)       Start:   12/17/24    Expected End:  12/31/24               Pain - Adult          Safety       LTG - Patient will demonstrate safety requirements appropriate to situation/environment       Start:  12/24/24            LTG - Patient will utilize safety techniques       Start:  12/24/24 12/26/24 at 12:49 PM   Amanda Whiting PTA   Rehab Office: [phone]

## 2024-12-26 NOTE — PROGRESS NOTES
"Occupational Therapy    OT Treatment    Patient Name: Gaurav Mckay \"Lucy"  MRN: [id number]  Department: [facility] 55  Room: [facility]  Today's Date: 12/26/2024  Time Calculation  Start Time: 1014  Stop Time: 1027  Time Calculation (min): 13 min        Assessment:  OT Assessment: Pt will benefit from continued skilled OT to increase independence in ADLs, functional mobility, activity tolerance, safety, strength, and cognition.  Prognosis: Good  Barriers to Discharge Home: Cognition needs, Physical needs  Physical Needs: Stair navigation into home limited by function/safety, Ambulating household distances limited by function/safety, Intermittent ADL assistance needed  Evaluation/Treatment Tolerance: Patient limited by fatigue  Medical Staff Made Aware: Yes  End of Session Communication: Bedside nurse  End of Session Patient Position: Bed, 3 rail up, Alarm off, caregiver present (PTA present end of session)  OT Assessment Results: Decreased ADL status, Decreased endurance, Decreased functional mobility, Decreased gross motor control, Decreased IADLs  Prognosis: Good  Barriers to Discharge: None  Evaluation/Treatment Tolerance: Patient limited by fatigue  Medical Staff Made Aware: Yes  Strengths: Attitude of self  Barriers to Participation: Comorbidities  Plan:  Treatment Interventions: ADL retraining, Functional transfer training, UE strengthening/ROM, Endurance training, Cognitive reorientation, Patient/family training, Equipment evaluation/education, Compensatory technique education  OT Frequency: 3 times per week  OT Discharge Recommendations: Moderate intensity level of continued care  Equipment Recommended upon Discharge:  (TBD)  OT Recommended Transfer Status: Assist of 1  OT - OK to Discharge: Yes  Treatment Interventions: ADL retraining, Functional transfer training, UE strengthening/ROM, Endurance training, Cognitive reorientation, Patient/family training, Equipment evaluation/education, Compensatory " technique education    Subjective   Previous Visit Info:  OT Last Visit  OT Received On: 12/26/24  General:  General  Reason for Referral: urology consult as well as neurosurgery consult due to incidental finding of brain mass. Initial plan was  scheduled cystoscopy with ablation of tissue on 12/17/24 secondary to a bladder tumor.  Past Medical History Relevant to Rehab: PMH of laryngeal cancer s/p radical neck dissection, PE on Apixaban, alcohol abuse, and HTN  Family/Caregiver Present: No  Co-Treatment: PT  Co-Treatment Reason: to maximize safety and functional mobility while focusing on discipline specific goals  Prior to Session Communication: Bedside nurse  Patient Position Received: Bed, 3 rail up, Alarm on  General Comment: Pt supine in bed upon arrival, agreeable to OT  Precautions:  Medical Precautions: Fall precautions    Pain:  Pain Assessment  Pain Assessment: 0-10  0-10 (Numeric) Pain Score: 8  Pain Type: Acute pain  Pain Location: Abdomen (and head)  Pain Interventions: Repositioned, Distraction    Objective    Cognition:  Cognition  Orientation Level: Oriented X4  Following Commands: Follows one step commands without difficulty  Safety/Judgement: Exceptions to WFL  Insight: Mild  Impulsive: Mildly  Task Initiation: Delayed initiation  Processing Speed: Delayed    Activities of Daily Living:      UE Dressing  UE Dressing Comments: donned gown over back seated EOB CGA    Bed Mobility/Transfers: Bed Mobility  Bed Mobility: Yes  Bed Mobility 1  Bed Mobility 1: Supine to sitting, Sitting to supine  Level of Assistance 1: Contact guard  Bed Mobility Comments 1: HOB elevated    Transfers  Transfer: Yes  Transfer 1  Transfer From 1: Sit to, Stand to  Transfer to 1: Stand, Sit  Technique 1: Sit to stand, Stand to sit  Transfer Device 1: Walker  Transfer Level of Assistance 1: Minimum assistance, +2, Minimal verbal cues  Trials/Comments 1: VCs for hand placement, pt breathing heavy upon standing, VCs for deep  breathing techniques, pt reporting anxious with transfers    Sitting Balance:  Static Sitting Balance  Static Sitting-Balance Support: Feet supported  Static Sitting-Level of Assistance: Close supervision  Dynamic Sitting Balance  Dynamic Sitting-Balance Support: Feet supported  Dynamic Sitting-Level of Assistance: Close supervision  Standing Balance:  Static Standing Balance  Static Standing-Balance Support: Bilateral upper extremity supported  Static Standing-Level of Assistance: Minimum assistance (x2)  Dynamic Standing Balance  Dynamic Standing-Balance Support: Bilateral upper extremity supported  Dynamic Standing-Level of Assistance: Minimum assistance (x2)  Modalities:  Modalities Used: No    Therapy/Activity:      Therapeutic Activity  Therapeutic Activity Performed: Yes  Therapeutic Activity 1: bed mob, transfers    Outcome Measures:[facility] Daily Activity  Putting on and taking off regular lower body clothing: A lot  Bathing (including washing, rinsing, drying): A lot  Putting on and taking off regular upper body clothing: A little  Toileting, which includes using toilet, bedpan or urinal: A lot  Taking care of personal grooming such as brushing teeth: A little  Eating Meals: None  Daily Activity - Total Score: 16    Education Documentation  Body Mechanics, taught by Sanchez Carney OT at 12/26/2024 10:53 AM.  Learner: Patient  Readiness: Acceptance  Method: Explanation  Response: Verbalizes Understanding, Needs Reinforcement    Precautions, taught by Sanchez Carney OT at 12/26/2024 10:53 AM.  Learner: Patient  Readiness: Acceptance  Method: Explanation  Response: Verbalizes Understanding, Needs Reinforcement    ADL Training, taught by Sanchez Carney OT at 12/26/2024 10:53 AM.  Learner: Patient  Readiness: Acceptance  Method: Explanation  Response: Verbalizes Understanding, Needs Reinforcement    Education Comments  No comments found.      Goals:  Encounter Problems       Encounter Problems (Active)       ADLs        Patient with complete upper body dressing with stand by assist level of assistance donning and doffing all UE clothes w PRN adaptive equipment while edge of bed. (Progressing)       Start:  12/17/24    Expected End:  01/07/25            Patient with complete lower body dressing with stand by assist level of assistance donning and doffing all LE clothes  with PRN adaptive equipment while edge of bed. (Progressing)       Start:  12/17/24    Expected End:  01/07/25            Patient will complete daily grooming tasks brushing teeth and washing face/hair with supervision level of assistance and PRN adaptive equipment while edge of bed and/or standing at sink. (Progressing)       Start:  12/17/24    Expected End:  01/07/25               BALANCE       Patient will tolerate standing for 8 minutes to contact guard assist level of assistance with least restrictive device in order to improve functional activity tolerance for ADL tasks. (Progressing)       Start:  12/17/24    Expected End:  01/07/25               MOBILITY       Patient will perform Functional mobility mod  Household distances/Community Distances with contact guard assist level of assistance and least restrictive device in order to improve safety and functional mobility. (Progressing)       Start:  12/17/24    Expected End:  01/07/25                 DEIDRE Solis/ARRON

## 2024-12-27 ENCOUNTER — TELEPHONE (OUTPATIENT)
Dept: RADIATION ONCOLOGY | Facility: HOSPITAL | Age: 54
End: 2024-12-27
Payer: COMMERCIAL

## 2024-12-27 DIAGNOSIS — G62.9 NEUROPATHY: ICD-10-CM

## 2024-12-27 DIAGNOSIS — G93.89 BRAIN MASS: ICD-10-CM

## 2024-12-27 RX ORDER — MORPHINE SULFATE 15 MG/1
30 TABLET ORAL
Qty: 60 TABLET | Refills: 0 | Status: SHIPPED | OUTPATIENT
Start: 2024-12-27 | End: 2025-01-03

## 2024-12-27 RX ORDER — PREGABALIN 200 MG/1
200 CAPSULE ORAL 3 TIMES DAILY
Qty: 42 CAPSULE | Refills: 0 | Status: SHIPPED | OUTPATIENT
Start: 2024-12-27 | End: 2025-01-10

## 2024-12-30 DIAGNOSIS — G93.89 BRAIN MASS: ICD-10-CM

## 2024-12-30 RX ORDER — METHADONE HYDROCHLORIDE 5 MG/1
5 TABLET ORAL EVERY 8 HOURS
Qty: 21 TABLET | Refills: 0 | Status: SHIPPED | OUTPATIENT
Start: 2024-12-30 | End: 2025-01-06

## 2024-12-31 LAB
ATRIAL RATE: 68 BPM
P AXIS: 2 DEGREES
P OFFSET: 204 MS
P ONSET: 156 MS
PR INTERVAL: 136 MS
Q ONSET: 224 MS
QRS COUNT: 11 BEATS
QRS DURATION: 82 MS
QT INTERVAL: 402 MS
QTC CALCULATION(BAZETT): 427 MS
QTC FREDERICIA: 419 MS
R AXIS: 12 DEGREES
T AXIS: 37 DEGREES
T OFFSET: 425 MS
VENTRICULAR RATE: 68 BPM

## 2025-01-02 ENCOUNTER — NURSING HOME VISIT (OUTPATIENT)
Dept: POST ACUTE CARE | Facility: EXTERNAL LOCATION | Age: 55
End: 2025-01-02

## 2025-01-02 ENCOUNTER — OFFICE VISIT (OUTPATIENT)
Dept: UROLOGY | Facility: CLINIC | Age: 55
End: 2025-01-02
Payer: COMMERCIAL

## 2025-01-02 ENCOUNTER — APPOINTMENT (OUTPATIENT)
Dept: UROLOGY | Facility: CLINIC | Age: 55
End: 2025-01-02
Payer: COMMERCIAL

## 2025-01-02 DIAGNOSIS — J44.9 CHRONIC OBSTRUCTIVE PULMONARY DISEASE, UNSPECIFIED COPD TYPE (MULTI): ICD-10-CM

## 2025-01-02 DIAGNOSIS — N32.89 BLADDER MASS: Primary | ICD-10-CM

## 2025-01-02 DIAGNOSIS — F17.200 TOBACCO USE DISORDER: ICD-10-CM

## 2025-01-02 DIAGNOSIS — K21.9 GASTROESOPHAGEAL REFLUX DISEASE WITHOUT ESOPHAGITIS: ICD-10-CM

## 2025-01-02 DIAGNOSIS — R53.1 GENERALIZED WEAKNESS: ICD-10-CM

## 2025-01-02 DIAGNOSIS — F10.11 HISTORY OF ETOH ABUSE: ICD-10-CM

## 2025-01-02 DIAGNOSIS — R51.9 ACUTE INTRACTABLE HEADACHE, UNSPECIFIED HEADACHE TYPE: ICD-10-CM

## 2025-01-02 DIAGNOSIS — G93.89 BRAIN MASS: Primary | ICD-10-CM

## 2025-01-02 DIAGNOSIS — R53.81 PHYSICAL DECONDITIONING: ICD-10-CM

## 2025-01-02 DIAGNOSIS — H49.21 RIGHT ABDUCENS NERVE PALSY: ICD-10-CM

## 2025-01-02 DIAGNOSIS — E78.49 OTHER HYPERLIPIDEMIA: ICD-10-CM

## 2025-01-02 DIAGNOSIS — Z86.711 HISTORY OF PULMONARY EMBOLUS (PE): ICD-10-CM

## 2025-01-02 DIAGNOSIS — R13.13 PHARYNGEAL DYSPHAGIA: ICD-10-CM

## 2025-01-02 DIAGNOSIS — K59.00 CONSTIPATION, UNSPECIFIED CONSTIPATION TYPE: ICD-10-CM

## 2025-01-02 DIAGNOSIS — R33.9 URINARY RETENTION: ICD-10-CM

## 2025-01-02 DIAGNOSIS — C32.9 LARYNGEAL CANCER (MULTI): ICD-10-CM

## 2025-01-02 DIAGNOSIS — D49.4 BLADDER TUMOR: ICD-10-CM

## 2025-01-02 PROCEDURE — 99213 OFFICE O/P EST LOW 20 MIN: CPT | Performed by: STUDENT IN AN ORGANIZED HEALTH CARE EDUCATION/TRAINING PROGRAM

## 2025-01-02 PROCEDURE — 99310 SBSQ NF CARE HIGH MDM 45: CPT | Performed by: NURSE PRACTITIONER

## 2025-01-02 NOTE — PROGRESS NOTES
"Subjective   Patient ID: Gaurav Mckay \"Lemuel\" is a 54 y.o. male.      HPI  54 y.o. male presents status post cystoscopy and biopsy for thickened bladder and signs of inflammation on cystoscopy. He was admitted multiple times for different medical complaints. The patient was found to be intoxicated during his pre admission testing. He was admitted and further testing revealed a brain tumor, so he was referred to [facility].   The patient was taken to OR for brain tumor biopsies and cystoscopy and biopsy at that time.   He has not had the anders catheter removed since the cystoscopy and biopsy.     Review of Systems  A complete review of systems was performed. All systems are noted to be negative unless indicated in the history of present illness, impression, active problem list, or past histories.     Objective   Physical Exam  General: in wheelchair, able to ambulate with assistance  Eyes: Non-injected conjunctiva, sclera clear, no proptosis   Cardiac: Extremities are warm and well perfused. No edema, cyanosis or pallor.   Lungs: Breathing is easy, non-labored. Speaking in clear and complete sentences. Normal diaphragmatic movement.   Abdomen: soft, non-tender   MSK: Ambulatory with steady gait, unassisted   Neuro: alert and oriented to person, place and time   Skin: no obvious lesions, no rashes.   : anders in place, clear urine     Procedure:   Patient came in for a catheter removal. Patient reports no signs or symptoms of Infection. 16fr latex catheter was removed in a sterile fashion.     Assessment/Plan   Diagnoses and all orders for this visit:  Bladder mass      54 y.o. male presents status post cystoscopy and biopsy for thickened bladder for signs of inflammation. He was admitted multiple times for different medical complaints. The patient was found to be intoxicated during his pre admission testing. During which testing, a brain tumor was found. The patient was seen for management of the brain tumor " and the cystoscopy and biopsy were managed at that time.     He has not had the anders catheter removed since the cystoscopy and biopsy. The anders catheter has remained in place. Today, the anders catheter was removed. The patient had no signs or symptoms of infection. He was able to void freely after removal.     Plan:  If he does not urinate in 6 hours, please reinsert a 16Fr Anders catheter  FUV 1 month      Scribe Attestation   By signing my name below, I, Albert iYp, Scribe attestation that this documentation has been prepared under the direction and in the presence of Eddie Murcia MD.

## 2025-01-02 NOTE — LETTER
Patient: Lemuel Mckay  : 1970    Encounter Date: 2025    Chief Complaint:   SNF F/U  -Brain mass  -Headache with visual changes  -Right 6th nerve palsy  -Pharyngeal dysphagia  -Bladder tumor  -Urinary retention  -Hematuria  -Physical deconditioning/weakness  -Hx laryngeal cancer    HPI:   54 year-old male presenting to [facility] on 24 for PAT for surgical clearance for a scheduled cystoscopy with ablation of tissue due to a bladder tumor (24). He presented to PAT appointment late and intoxicated. There was c/f compliance with pre-op instructions (Ex: holding eliquis), so he was direct admitted to the hospital to ensure sobriety, medication compliance, and surgical readiness. During hospitalization, he was found to have dysconjugate gaze and was c/o a severe headache. Symptoms were relatively acute and he felt related to a recent fall. Stat CT of head showed a tumor extending from the clivus, into the sphenoid area, sellar area, and near carotids as well. Neuro felt this is cause of diplopia. Mass appears new from October, so it is rapidly growing. After discussion with neuro, patient was transferred to [facility] for management. Anders catheter was placed for acute urinary retention, prior to transfer. He was transferred to [facility] on 12/15/24. Hospital course:    Brain mass/headache with visual changes/R 6th nerve palsy/pharyngeal dysphagia-NSGY consult, ophthalmology consult, ENT consult, s/p clival mass biopsy on , SLP consult, recommending pureed diet with mildly/nectar thick liquids, supportive oncology consulted for optimal pain control, radiation oncology consult, F/U with ENT, radiation oncology, oncology, and ophthalmology after discharge  Bladder tumor/urinary retention-urology consulted, s/p cystoscopy with bladder biopsy on , maintain anders until OP F/U, c/w flomax  Alcohol use disorder-cessation advised, CIWA protocol  Chronic hypoxic respiratory failure/COPD/HANY-c/w  bronchial hygiene, duonebs, and mucinex, wean O2  HTN/orthostatic hypotension-monitor BP  Hx PE s/p embolization (11/2023)-provoked by surgery, c/w eliquis  Tobacco use disorder-c/w nicotine patch  Decreased strength-PT/OT evaluations, recommending moderate intensity   Bowel regimen-miralax Q 8 hours, senna 2 tabs BID, bisacodyl 10 mg  Diarrhea-thought to be 2/2 recent bowel regimen, CDiff ruled out    Pt. was HDS and discharged to [facility] on 12/26/24. Today, patient is c/o constipation, mild nausea, and abdominal discomfort. He reports that his PO intake has been decreased, due to his pureed diet. He reports slight improvement in headache. He continues to report diplopia. He denies dizziness, SOB, cough, or chest pain. He reports chronic pain, controlled with current Rx. He states that he is unsure if he would want further treatment for his cancer. He is scheduled to F/U with oncology next week. Staff report no other clinical concerns at this time.     ROS:    As above in HPI. Otherwise, all other systems have been reviewed and are negative for complaint.    Medications reviewed and verified in NH chart.     Patient Active Problem List:  Cellulitis of neck  Orthostatic hypotension  Physical deconditioning/weakness  Malignant neoplasm of larynx  COPD   Severe protein-calorie malnutrition   Oropharyngeal dysphagia  Neck mass (May 2024; + for SCC)  Hx bilateral PE  Hx RLE DVT  HTN  HLD  Tinnitus  Constipation  BPH  Right cervical adenopathy  Hypokalemia  Hypomagnesemia  Pancytopenia  GERD  Neuropathy  Folic acid deficiency  Thiamine deficiency  Vit B12 deficiency  HANY (undiagnosed per EMR)  Allergic rhinitis  Anxiety  Arthritis  Hearing loss  Lumbosacral radiculopathy  Chronic pancreatitis  Asthma/COPD  Chronic back pain  Depression  RLS    Past Medical History:  RIJ thrombosis  Hepatitis C  Gastroenteritis  Sepsis  Urinary retention  UTI  Alcohol abuse  Acute respiratory failure  Bronchitis    Past Surgical  History:   Procedure Laterality Date   • ANKLE SURGERY Left    • ANKLE SURGERY  07/08/2024    R ankle spanning ex-fix revision   • CHOLECYSTECTOMY     • CT ABDOMEN PELVIS ANGIOGRAM W AND/OR WO IV CONTRAST  04/01/2020    CT ABDOMEN PELVIS ANGIOGRAM W AND/OR WO IV CONTRAST 4/1/2020 [facility]   • FRACTURE SURGERY Right 07/15/2024    Open reduction and internal fixation of right distal tibia pilon and fibula fractures, removal of right ankle spanning external fixation under anesthesia, debridement down to including bone external fixator pin sites right leg   • INVASIVE VASCULAR PROCEDURE N/A 11/08/2023    Procedure: Pulmonary Angiogram;  Surgeon: Surya Templeton MD;  Location: [facility] Cardiac Cath Lab;  Service: Cardiovascular;  Laterality: N/A;   • LARYNGOSCOPY  07/01/2024    R radical neck dissection and L ALT free flap reconstruction   • LEG SURGERY  07/06/2024    RLE ex-fix placement   • MR HEAD ANGIO WO IV CONTRAST  01/24/2016    MR HEAD ANGIO WO IV CONTRAST 1/24/2016 [facility]   • MR NECK ANGIO WO IV CONTRAST  01/24/2016    MR NECK ANGIO WO IV CONTRAST 1/24/2016 [facility] INPATIENT LEGACY   • THROMBECTOMY     • WOUND DEBRIDEMENT Left 08/06/2024    L thigh debridement with woud vac placement 2/2 surgical wound dehiscence       Family History   Problem Relation Name Age of Onset   • Diabetes Mother     • Hypertension Mother     • Heart attack Father     • Hypertension Other         Social History     Tobacco Use   Smoking Status Former   • Current packs/day: 1.00   • Types: Cigarettes   Smokeless Tobacco Former   • Types: Snuff   Tobacco Comments    1 ppd since the age of 16       Social History     Substance and Sexual Activity   Alcohol Use Not Currently    Comment: 6 pack beer a day       Social History     Substance and Sexual Activity   Drug Use Yes   • Types: Methamphetamines, Cocaine, Marijuana    Comment: Hx cocaine use       Allergies   Allergen Reactions   • Oxycodone Itching        Vital  Signs:   122/88-80-17-98.6-96% on RA     Physical Exam:  General: Sitting up in WC in NAD, alert   Head/Face: NCAT, symmetrical  Eyes: PERRLA, no injection, no discharge  ENT: Hearing impaired, ears without scars or lesions, nasal mucosa and turbinates pink, septum midline, lips pink and moist  Neck: Supple, symmetrical  Respiratory: CTA but diminished without adventitious sounds, respirations even and nonlabored without use of accessory muscles, good air exchange  Cardio: RRR without murmur or gallops, normal S1S2, no edema, pedal pulses 3+/4 bilaterally  Chest/Breast: Symmetrical  GI: BS x 4, normoactive, non-distended, abd round and soft, no masses or tenderness  : No suprapubic tenderness or distention, anders catheter draining clear/yellow urine   MSK: Gait not assessed, joints with full ROM without pain or contractures, + generalized weakness  Skin: Skin warm and dry, no induration  Neurologic: Cranial nerves II through XII intact, superficial touch and pain sensation intact  Psychiatric: Alert to person, place, and time, calm and cooperative     Results/Data:   12/31/24: Cr 0.6, CO2 31, HDL 34, Hgb 12.3, Hct 37.5  9/20/24: Alb 3.3, Phos 3.5, Mag 1.8, Hgb 9.7, Hct 30.1    Assessment/Plan:  Brain mass/headache with visual changes/R 6th nerve palsy-s/p clival mass biopsy on 12/20/24, c/w dexamethasone, c/w pain mgmt (methadone 5 mg Q 8 hours, MSIR 30 mg Q 3 hours prn, Imitrex prn), supportive care, F/U with ENT, radiation oncology, oncology, and ophthalmology as scheduled  Pharyngeal dysphagia-c/w pureed diet with NTL, aspiration precautions, SLP following   Bladder tumor/urinary retention/Hx hematuria/BPH-s/p cystoscopy with bladder biopsy on 12/20, c/w anders catheter care, c/w flomax, patient scheduled to F/U with urology this afternoon  Physical deconditioning/weakness-c/w PT/OT, safety and fall precautions  Alcohol use disorder-c/w MVI, folic acid, and thiamine, continued cessation advised  COPD/HANY-c/w  singulair and mucinex, duonebs prn, monitor respiratory status closely  Tobacco use disorder-c/w nicotine patch, continued cessation encouraged  Constipation-D/C miralax, D/C colace, increase senna plus to 2 tabs BID, add dulcolax 10 mg PO daily, increase fluids and fiber, increase ambulation, monitor for BMs  HTN/HLD/Hx orthostatic hypotension-c/w atorvastatin, monitor BP, monitor lipid panel and LFTs  Anemia-c/w iron supplement, monitor CBC and iron panel   Hx DVT/PE-s/p embolization (11/2023), c/w eliquis  Laryngeal cancer-s/p radiation, c/w pain mgmt (flexeril prn, methadone Q 8 hours, MSIS Q 3 hours prn, and lyrica), F/U with specialists as scheduled  GERD-c/w PPI and Tums  Pancreatic insufficiency-c/w Creon  RLS-c/w requip  Depression/anxiety-c/w remeron and topamax, vistaril prn, monitor behaviors, supportive care, consult Psych  Dry nares-c/w NaCl nasal spray    Orders:  D/C miralax   D/C Colace   Increase senna plus to 2 tabs PO BID   Bisacodyl 10 mg PO daily     Code Status:   Full Code    Time spent reviewing patient's chart on the unit (PMH, PSH, FH, Social Hx AND progress and/or consult notes, labs, and radiology results): 28 minutes  Time spent interviewing and/or examining the patient: 10 minutes  Time spent writing note on the unit: 6 minutes  Time spent reviewing POC w/ patient, family, and/or staff: 5 minutes  Total visit time: 49 minutes. Greater than 50% of time was spent on counseling and/or coordination of care of the patient. Start time: 12:01 PM, End time: 12:50 PM.        Electronically Signed By: JUSTINO Jerome   2/8/25  7:22 PM

## 2025-01-05 ENCOUNTER — NURSING HOME VISIT (OUTPATIENT)
Dept: POST ACUTE CARE | Facility: EXTERNAL LOCATION | Age: 55
End: 2025-01-05
Payer: COMMERCIAL

## 2025-01-05 DIAGNOSIS — J44.9 CHRONIC OBSTRUCTIVE PULMONARY DISEASE, UNSPECIFIED COPD TYPE (MULTI): ICD-10-CM

## 2025-01-05 DIAGNOSIS — I26.99 PULMONARY EMBOLISM, UNSPECIFIED CHRONICITY, UNSPECIFIED PULMONARY EMBOLISM TYPE, UNSPECIFIED WHETHER ACUTE COR PULMONALE PRESENT (MULTI): ICD-10-CM

## 2025-01-05 DIAGNOSIS — C79.51 SECONDARY MALIGNANT NEOPLASM OF BONE (MULTI): ICD-10-CM

## 2025-01-05 DIAGNOSIS — G47.30 SLEEP APNEA, UNSPECIFIED TYPE: ICD-10-CM

## 2025-01-05 DIAGNOSIS — F32.A DEPRESSION, UNSPECIFIED DEPRESSION TYPE: ICD-10-CM

## 2025-01-05 DIAGNOSIS — I26.09 OTHER ACUTE PULMONARY EMBOLISM WITH ACUTE COR PULMONALE: ICD-10-CM

## 2025-01-05 DIAGNOSIS — K86.1 OTHER CHRONIC PANCREATITIS: ICD-10-CM

## 2025-01-05 DIAGNOSIS — J96.22 ACUTE ON CHRONIC RESPIRATORY FAILURE WITH HYPOXIA AND HYPERCAPNIA (MULTI): ICD-10-CM

## 2025-01-05 DIAGNOSIS — J96.21 ACUTE ON CHRONIC RESPIRATORY FAILURE WITH HYPOXIA AND HYPERCAPNIA (MULTI): ICD-10-CM

## 2025-01-05 DIAGNOSIS — F19.94 SUBSTANCE INDUCED MOOD DISORDER (MULTI): ICD-10-CM

## 2025-01-05 DIAGNOSIS — R53.1 WEAKNESS: ICD-10-CM

## 2025-01-05 DIAGNOSIS — N32.89 BLADDER MASS: ICD-10-CM

## 2025-01-05 DIAGNOSIS — F31.9 BIPOLAR AFFECTIVE DISORDER, REMISSION STATUS UNSPECIFIED (MULTI): ICD-10-CM

## 2025-01-05 DIAGNOSIS — K21.9 GASTROESOPHAGEAL REFLUX DISEASE WITHOUT ESOPHAGITIS: ICD-10-CM

## 2025-01-05 DIAGNOSIS — G62.1 ALCOHOLIC POLYNEUROPATHY (MULTI): ICD-10-CM

## 2025-01-05 DIAGNOSIS — Z91.81 AT HIGH RISK FOR FALLS: ICD-10-CM

## 2025-01-05 DIAGNOSIS — G93.89 BRAIN MASS: Primary | ICD-10-CM

## 2025-01-05 DIAGNOSIS — F10.10 ALCOHOL ABUSE: ICD-10-CM

## 2025-01-05 DIAGNOSIS — E78.5 HYPERLIPIDEMIA, UNSPECIFIED HYPERLIPIDEMIA TYPE: ICD-10-CM

## 2025-01-05 PROCEDURE — 99306 1ST NF CARE HIGH MDM 50: CPT | Performed by: INTERNAL MEDICINE

## 2025-01-05 NOTE — LETTER
Patient: Lemuel Mckay  : 1970    Encounter Date: 2025    PLACE OF SERVICE:  [facility]    This is new/initial history and physical.    Subjective  Patient ID: Lemuel Mckay is a 54 y.o. male who presents for initial visit.    Mr. Gaurav Mckay is a 54-year-old male who is found to have a brain mass with recent finding of bladder mass.  The bladder mass has been biopsied.  He has multiple medical issues and unable to care for himself.  He requires supportive care.    Review of Systems   Constitutional:  Negative for chills and fever.   Cardiovascular:  Negative for chest pain.   All other systems reviewed and are negative.    Objective  /82   Pulse 86   Temp 36.7 °C (98.1 °F)   Resp 18     Physical Exam  Vitals reviewed.   Constitutional:       General: He is not in acute distress.     Comments: This is a well-developed, well-nourished male, sitting in a chair   HENT:      Right Ear: Tympanic membrane, ear canal and external ear normal.      Left Ear: Tympanic membrane, ear canal and external ear normal.   Eyes:      General: No scleral icterus.     Pupils: Pupils are equal, round, and reactive to light.   Neck:      Vascular: No carotid bruit.   Cardiovascular:      Heart sounds: Normal heart sounds, S1 normal and S2 normal. No murmur heard.     No friction rub.   Pulmonary:      Effort: Pulmonary effort is normal.      Breath sounds: Decreased breath sounds (throughout.) present.   Abdominal:      Palpations: There is no hepatomegaly, splenomegaly or mass.   Musculoskeletal:         General: No swelling or deformity. Normal range of motion.      Cervical back: Neck supple.      Right lower leg: No edema.      Left lower leg: No edema.   Lymphadenopathy:      Cervical: No cervical adenopathy.      Upper Body:      Right upper body: No axillary adenopathy.      Left upper body: No axillary adenopathy.      Lower Body: No right inguinal  adenopathy. No left inguinal adenopathy.   Neurological:      Mental Status: He is oriented to person, place, and time.      Cranial Nerves: Cranial nerves 2-12 are intact. No cranial nerve deficit.      Sensory: No sensory deficit.      Motor: Motor function is intact. No weakness.      Gait: Gait is intact.      Deep Tendon Reflexes: Reflexes normal.   Psychiatric:         Mood and Affect: Mood normal. Mood is not anxious or depressed. Affect is not angry.         Behavior: Behavior is not agitated.         Thought Content: Thought content normal.         Judgment: Judgment normal.     LAB WORK:  Laboratory studies were reviewed.    Assessment/Plan  Problem List Items Addressed This Visit             ICD-10-CM       Gastrointestinal and Abdominal    Gastroesophageal reflux disease without esophagitis K21.9       Mental Health    Depression, unspecified F32.A       Neuro    Brain mass - Primary G93.89       Pulmonary and Pneumonias    COPD (chronic obstructive pulmonary disease) (Multi) J44.9     Other Visit Diagnoses         Codes    Bladder mass     N32.89    Pulmonary embolism, unspecified chronicity, unspecified pulmonary embolism type, unspecified whether acute cor pulmonale present (Multi)     I26.99    Hyperlipidemia, unspecified hyperlipidemia type     E78.5    Alcohol abuse     F10.10    Bipolar affective disorder, remission status unspecified (Multi)     F31.9    Weakness     R53.1    At high risk for falls     Z91.81    Sleep apnea, unspecified type     G47.30        1. Brain mass.  Follow with Neurology.  2. Bladder mass.  Follow with Urology.  This has been biopsied.  3. Pulmonary embolism, anticoagulated.  4. COPD, on bronchodilator therapy.  5. Hyperlipidemia, on statin.  6. Alcohol abuse, on CIWA protocol.  7. Bipolar disorder, on medication.  8. Depression, on medication.  9. GERD, on PPI.  10. Weakness, on PT/OT.  11. Fall risk, on fall precautions.  12. Sleep apnea, on CPAP.    Scribe  Attestation  By signing my name below, I, Sharla Davila attest that this documentation has been prepared under the direction and in the presence of Sohan Saeed MD.    All medical record entries made by the scribe were personally dictated by me I have reviewed the chart and agree the record accurately reflects my personal performance of his history physical examination and management         Electronically Signed By: Sohan Saeed MD   1/14/25 10:55 PM

## 2025-01-06 DIAGNOSIS — G93.89 BRAIN MASS: ICD-10-CM

## 2025-01-06 RX ORDER — METHADONE HYDROCHLORIDE 5 MG/1
5 TABLET ORAL EVERY 8 HOURS
Qty: 21 TABLET | Refills: 0 | Status: SHIPPED | OUTPATIENT
Start: 2025-01-06 | End: 2025-01-13

## 2025-01-07 ENCOUNTER — NURSING HOME VISIT (OUTPATIENT)
Dept: POST ACUTE CARE | Facility: EXTERNAL LOCATION | Age: 55
End: 2025-01-07
Payer: COMMERCIAL

## 2025-01-07 DIAGNOSIS — G93.89 BRAIN MASS: ICD-10-CM

## 2025-01-07 DIAGNOSIS — N32.89 BLADDER MASS: ICD-10-CM

## 2025-01-07 DIAGNOSIS — J06.9 UPPER RESPIRATORY TRACT INFECTION, UNSPECIFIED TYPE: Primary | ICD-10-CM

## 2025-01-07 DIAGNOSIS — F10.11 HISTORY OF ETOH ABUSE: ICD-10-CM

## 2025-01-07 DIAGNOSIS — E87.6 HYPOKALEMIA: ICD-10-CM

## 2025-01-07 DIAGNOSIS — R04.0 EPISTAXIS: ICD-10-CM

## 2025-01-07 DIAGNOSIS — I26.99 PULMONARY EMBOLISM, UNSPECIFIED CHRONICITY, UNSPECIFIED PULMONARY EMBOLISM TYPE, UNSPECIFIED WHETHER ACUTE COR PULMONALE PRESENT (MULTI): ICD-10-CM

## 2025-01-07 DIAGNOSIS — J34.89 DRY NARES: ICD-10-CM

## 2025-01-07 DIAGNOSIS — J44.9 CHRONIC OBSTRUCTIVE PULMONARY DISEASE, UNSPECIFIED COPD TYPE (MULTI): ICD-10-CM

## 2025-01-07 DIAGNOSIS — R53.1 WEAKNESS: ICD-10-CM

## 2025-01-07 DIAGNOSIS — R13.13 PHARYNGEAL DYSPHAGIA: ICD-10-CM

## 2025-01-07 DIAGNOSIS — R53.81 PHYSICAL DECONDITIONING: ICD-10-CM

## 2025-01-07 PROCEDURE — 99309 SBSQ NF CARE MODERATE MDM 30: CPT | Performed by: NURSE PRACTITIONER

## 2025-01-07 RX ORDER — MORPHINE SULFATE 15 MG/1
30 TABLET ORAL
Qty: 60 TABLET | Refills: 0 | Status: SHIPPED | OUTPATIENT
Start: 2025-01-07 | End: 2025-01-14

## 2025-01-07 NOTE — LETTER
Patient: Lemuel Mckay  : 1970    Encounter Date: 2025    Chief Complaint:   SNF F/U  -Brain mass  -Headache with visual changes  -Right 6th nerve palsy  -Pharyngeal dysphagia  -Bladder tumor  -Urinary retention  -Hematuria  -Physical deconditioning/weakness  -Hx laryngeal cancer  -Hypokalemia     HPI:   54 year-old male presenting to [facility] on 24 for PAT for surgical clearance for a scheduled cystoscopy with ablation of tissue due to a bladder tumor (24). He presented to PAT appointment late and intoxicated. There was c/f compliance with pre-op instructions (Ex: holding eliquis), so he was direct admitted to the hospital to ensure sobriety, medication compliance, and surgical readiness. During hospitalization, he was found to have dysconjugate gaze and was c/o a severe headache. Symptoms were relatively acute and he felt related to a recent fall. Stat CT of head showed a tumor extending from the clivus, into the sphenoid area, sellar area, and near carotids as well. Neuro felt this is cause of diplopia. Mass appears new from October, so it is rapidly growing. After discussion with neuro, patient was transferred to [facility] for management. Anders catheter was placed for acute urinary retention, prior to transfer. He was transferred to [facility] on 12/15/24. Hospital course:    Brain mass/headache with visual changes/R 6th nerve palsy/pharyngeal dysphagia-NSGY consult, ophthalmology consult, ENT consult, s/p clival mass biopsy on , SLP consult, recommending pureed diet with mildly/nectar thick liquids, supportive oncology consulted for optimal pain control, radiation oncology consult, F/U with ENT, radiation oncology, oncology, and ophthalmology after discharge  Bladder tumor/urinary retention-urology consulted, s/p cystoscopy with bladder biopsy on , maintain anders until OP F/U, c/w flomax  Alcohol use disorder-cessation advised, CIWA protocol  Chronic hypoxic respiratory  failure/COPD/HANY-c/w bronchial hygiene, duonebs, and mucinex, wean O2  HTN/orthostatic hypotension-monitor BP  Hx PE s/p embolization (11/2023)-provoked by surgery, c/w eliquis  Tobacco use disorder-c/w nicotine patch  Decreased strength-PT/OT evaluations, recommending moderate intensity   Bowel regimen-miralax Q 8 hours, senna 2 tabs BID, bisacodyl 10 mg  Diarrhea-thought to be 2/2 recent bowel regimen, CDiff ruled out    Pt. was HDS and discharged to [facility] on 12/26/24. Patient was seen by urology for F/U on 1/2 and anders catheter was removed. Pt. reports that he is able to void without difficulty. On 1/6, patient was noted to have a moist, productive cough (yellow, thick sputum) and abnormal lung sounds. CXR was obtained, which was negative for acute findings. He was started on Doxycycline x 7 days for URI. Today, patient denies dizziness, HA, SOB, chest pain or palpitations, abdominal pain, N/V/D/C, or dysuria. He reports slightly improvement in cough. He reports dry nares with blood observed. No other clinical concerns noted at this time.     ROS:    As above in HPI. Otherwise, all other systems have been reviewed and are negative for complaint.    Medications reviewed and verified in NH chart.     Patient Active Problem List:  Cellulitis of neck  Orthostatic hypotension  Physical deconditioning/weakness  Malignant neoplasm of larynx  COPD   Severe protein-calorie malnutrition   Oropharyngeal dysphagia  Neck mass (May 2024; + for SCC)  Hx bilateral PE  Hx RLE DVT  HTN  HLD  Tinnitus  Constipation  BPH  Right cervical adenopathy  Hypokalemia  Hypomagnesemia  Pancytopenia  GERD  Neuropathy  Folic acid deficiency  Thiamine deficiency  Vit B12 deficiency  HANY (undiagnosed per EMR)  Allergic rhinitis  Anxiety  Arthritis  Hearing loss  Lumbosacral radiculopathy  Chronic pancreatitis  Asthma/COPD  Chronic back pain  Depression  RLS    Past Medical History:  RIJ thrombosis  Hepatitis  C  Gastroenteritis  Sepsis  Urinary retention  UTI  Alcohol abuse  Acute respiratory failure  Bronchitis    Past Surgical History:   Procedure Laterality Date   • ANKLE SURGERY Left    • ANKLE SURGERY  07/08/2024    R ankle spanning ex-fix revision   • CHOLECYSTECTOMY     • CT ABDOMEN PELVIS ANGIOGRAM W AND/OR WO IV CONTRAST  04/01/2020    CT ABDOMEN PELVIS ANGIOGRAM W AND/OR WO IV CONTRAST 4/1/2020 [facility]   • FRACTURE SURGERY Right 07/15/2024    Open reduction and internal fixation of right distal tibia pilon and fibula fractures, removal of right ankle spanning external fixation under anesthesia, debridement down to including bone external fixator pin sites right leg   • INVASIVE VASCULAR PROCEDURE N/A 11/08/2023    Procedure: Pulmonary Angiogram;  Surgeon: Surya Templeton MD;  Location: [facility] Cardiac Cath Lab;  Service: Cardiovascular;  Laterality: N/A;   • LARYNGOSCOPY  07/01/2024    R radical neck dissection and L ALT free flap reconstruction   • LEG SURGERY  07/06/2024    RLE ex-fix placement   • MR HEAD ANGIO WO IV CONTRAST  01/24/2016    MR HEAD ANGIO WO IV CONTRAST 1/24/2016 [facility]   • MR NECK ANGIO WO IV CONTRAST  01/24/2016    MR NECK ANGIO WO IV CONTRAST 1/24/2016 [facility]   • THROMBECTOMY     • WOUND DEBRIDEMENT Left 08/06/2024    L thigh debridement with woud vac placement 2/2 surgical wound dehiscence       Family History   Problem Relation Name Age of Onset   • Diabetes Mother     • Hypertension Mother     • Heart attack Father     • Hypertension Other         Social History     Tobacco Use   Smoking Status Former   • Current packs/day: 1.00   • Types: Cigarettes   Smokeless Tobacco Former   • Types: Snuff   Tobacco Comments    1 ppd since the age of 16       Social History     Substance and Sexual Activity   Alcohol Use Not Currently    Comment: 6 pack beer a day       Social History     Substance and Sexual Activity   Drug Use Yes   • Types: Methamphetamines,  Cocaine, Marijuana    Comment: Hx cocaine use       Allergies   Allergen Reactions   • Oxycodone Itching        Vital Signs:   160/-58-98.0-95% on RA     Physical Exam:  General: Sitting up in bed in NAD, alert   Head/Face: NCAT, symmetrical  Eyes: PERRLA, no injection, no discharge  ENT: Hearing impaired, ears without scars or lesions, nasal mucosa dry with blood noted, septum midline, lips pink and moist  Neck: Supple, symmetrical  Respiratory: Wheezing and rhonchi noted throughout all lung fields, respirations even and nonlabored without use of accessory muscles, + moist cough noted during exam   Cardio: RRR without murmur or gallops, normal S1S2, no edema, pedal pulses 3+/4 bilaterally  Chest/Breast: Symmetrical  GI: BS x 4, normoactive, non-distended, abd round and soft, no masses or tenderness  : No suprapubic tenderness or distention  MSK: Gait not assessed, joints with full ROM without pain or contractures, + generalized weakness  Skin: Skin warm and dry, no induration  Neurologic: Cranial nerves II through XII intact, superficial touch and pain sensation intact  Psychiatric: Alert to person, place, and time, calm and cooperative     Results/Data:   1/6/25: Cr 0.6, K+ 3.2, CO2 35, CBC WNL   12/31/24: Cr 0.6, CO2 31, HDL 34, Hgb 12.3, Hct 37.5  9/20/24: Alb 3.3, Phos 3.5, Mag 1.8, Hgb 9.7, Hct 30.1    Assessment/Plan:  Brain mass/headache with visual changes/R 6th nerve palsy-s/p clival mass biopsy on 12/20/24, c/w dexamethasone, c/w pain mgmt (methadone 5 mg Q 8 hours, MSIR 30 mg Q 3 hours prn, Imitrex prn), supportive care, F/U with ENT, radiation oncology, oncology, and ophthalmology as scheduled  Pharyngeal dysphagia-c/w pureed diet with NTL, aspiration precautions, SLP following   Bladder tumor/urinary retention/Hx hematuria/BPH-s/p cystoscopy with bladder biopsy on 12/20, c/w flomax, F/U with urology as scheduled (seen 1/2 and advised to F/U in 1 month)   Physical deconditioning/weakness-c/w  PT/OT, safety and fall precautions  Alcohol use disorder-c/w MVI, folic acid, and thiamine, continued cessation advised  COPD/HANY-c/w singulair and mucinex, duonebs prn, monitor respiratory status closely  Tobacco use disorder-c/w nicotine patch, continued cessation encouraged  Constipation-c/w senna plus 2 tabs BID and dulcolax 10 mg PO daily, increase fluids and fiber, increase ambulation, monitor for BMs  HTN/HLD/Hx orthostatic hypotension-c/w atorvastatin, monitor BP, monitor lipid panel and LFTs  Anemia-c/w iron supplement, monitor CBC and iron panel   Hx DVT/PE-s/p embolization (11/2023), c/w eliquis  Laryngeal cancer-s/p radiation, c/w pain mgmt (flexeril prn, methadone Q 8 hours, MSIS Q 3 hours prn, and lyrica), F/U with specialists as scheduled  GERD-c/w PPI and Tums  Pancreatic insufficiency-c/w Creon  RLS-c/w requip  Depression/anxiety-c/w remeron and topamax, vistaril prn, monitor behaviors, supportive care, consult Psych  Dry nares-c/w NaCl nasal spray  URI-c/w doxycycline, encourage use of IS, OOB to chair, monitor resp. status closely   Hypokalemia-KDur 40 meq PO x 1, repeat K+ level on 1/9    Orders:  KDur 40 meq PO x 1   K+ level on 1/9     Code Status:   Full Code      Electronically Signed By: ANASTACIA Jerome-CNP   2/16/25 12:34 PM

## 2025-01-08 ENCOUNTER — APPOINTMENT (OUTPATIENT)
Dept: OTOLARYNGOLOGY | Facility: CLINIC | Age: 55
End: 2025-01-08
Payer: COMMERCIAL

## 2025-01-08 ENCOUNTER — APPOINTMENT (OUTPATIENT)
Dept: HEMATOLOGY/ONCOLOGY | Facility: HOSPITAL | Age: 55
End: 2025-01-08
Payer: COMMERCIAL

## 2025-01-08 VITALS
HEART RATE: 86 BPM | TEMPERATURE: 98.1 F | DIASTOLIC BLOOD PRESSURE: 82 MMHG | SYSTOLIC BLOOD PRESSURE: 128 MMHG | RESPIRATION RATE: 18 BRPM

## 2025-01-08 LAB
LAB AP ASR DISCLAIMER: NORMAL
LABORATORY COMMENT REPORT: NORMAL
Lab: NORMAL
PATH REPORT.FINAL DX SPEC: NORMAL
PATH REPORT.GROSS SPEC: NORMAL
PATH REPORT.RELEVANT HX SPEC: NORMAL
PATH REPORT.TOTAL CANCER: NORMAL
RESIDENT REVIEW: NORMAL

## 2025-01-08 ASSESSMENT — ENCOUNTER SYMPTOMS
CHILLS: 0
FEVER: 0

## 2025-01-08 NOTE — PROGRESS NOTES
PLACE OF SERVICE:  [facility]    This is new/initial history and physical.    Subjective   Patient ID: Lemuel Mckay is a 54 y.o. male who presents for initial visit.    Mr. Gaurav Mckay is a 54-year-old male who is found to have a brain mass with recent finding of bladder mass.  The bladder mass has been biopsied.  He has multiple medical issues and unable to care for himself.  He requires supportive care.    Review of Systems   Constitutional:  Negative for chills and fever.   Cardiovascular:  Negative for chest pain.   All other systems reviewed and are negative.    Objective   /82   Pulse 86   Temp 36.7 °C (98.1 °F)   Resp 18     Physical Exam  Vitals reviewed.   Constitutional:       General: He is not in acute distress.     Comments: This is a well-developed, well-nourished male, sitting in a chair   HENT:      Right Ear: Tympanic membrane, ear canal and external ear normal.      Left Ear: Tympanic membrane, ear canal and external ear normal.   Eyes:      General: No scleral icterus.     Pupils: Pupils are equal, round, and reactive to light.   Neck:      Vascular: No carotid bruit.   Cardiovascular:      Heart sounds: Normal heart sounds, S1 normal and S2 normal. No murmur heard.     No friction rub.   Pulmonary:      Effort: Pulmonary effort is normal.      Breath sounds: Decreased breath sounds (throughout.) present.   Abdominal:      Palpations: There is no hepatomegaly, splenomegaly or mass.   Musculoskeletal:         General: No swelling or deformity. Normal range of motion.      Cervical back: Neck supple.      Right lower leg: No edema.      Left lower leg: No edema.   Lymphadenopathy:      Cervical: No cervical adenopathy.      Upper Body:      Right upper body: No axillary adenopathy.      Left upper body: No axillary adenopathy.      Lower Body: No right inguinal adenopathy. No left inguinal adenopathy.   Neurological:      Mental Status: He  is oriented to person, place, and time.      Cranial Nerves: Cranial nerves 2-12 are intact. No cranial nerve deficit.      Sensory: No sensory deficit.      Motor: Motor function is intact. No weakness.      Gait: Gait is intact.      Deep Tendon Reflexes: Reflexes normal.   Psychiatric:         Mood and Affect: Mood normal. Mood is not anxious or depressed. Affect is not angry.         Behavior: Behavior is not agitated.         Thought Content: Thought content normal.         Judgment: Judgment normal.     LAB WORK:  Laboratory studies were reviewed.    Assessment/Plan   Problem List Items Addressed This Visit             ICD-10-CM       Gastrointestinal and Abdominal    Gastroesophageal reflux disease without esophagitis K21.9       Mental Health    Depression, unspecified F32.A       Neuro    Brain mass - Primary G93.89       Pulmonary and Pneumonias    COPD (chronic obstructive pulmonary disease) (Multi) J44.9     Other Visit Diagnoses         Codes    Bladder mass     N32.89    Pulmonary embolism, unspecified chronicity, unspecified pulmonary embolism type, unspecified whether acute cor pulmonale present (Multi)     I26.99    Hyperlipidemia, unspecified hyperlipidemia type     E78.5    Alcohol abuse     F10.10    Bipolar affective disorder, remission status unspecified (Multi)     F31.9    Weakness     R53.1    At high risk for falls     Z91.81    Sleep apnea, unspecified type     G47.30        1. Brain mass.  Follow with Neurology.  2. Bladder mass.  Follow with Urology.  This has been biopsied.  3. Pulmonary embolism, anticoagulated.  4. COPD, on bronchodilator therapy.  5. Hyperlipidemia, on statin.  6. Alcohol abuse, on CIWA protocol.  7. Bipolar disorder, on medication.  8. Depression, on medication.  9. GERD, on PPI.  10. Weakness, on PT/OT.  11. Fall risk, on fall precautions.  12. Sleep apnea, on CPAP.    Scribe Attestation  By signing my name below, ILizbeth, Scribe attest that this documentation  has been prepared under the direction and in the presence of Sohan Saeed MD.    All medical record entries made by the scribe were personally dictated by me I have reviewed the chart and agree the record accurately reflects my personal performance of his history physical examination and management

## 2025-01-08 NOTE — TUMOR BOARD NOTE
CNS Tumor Board Recommendations       Patient was presented by Dr. Candice Machado at our CNS Tumor Board on 01/08/2025 which included representatives from Radiation oncology, Surgical oncology, Neuro-oncology, Pathology, Radiology, Research, Neurosurgery, Social Work (Neurosurgery).     Current patient presents with history of the following treatment history:  PMH HTN, laryngeal cancer s/p radical neck dissection, PE on Eliquis, alcohol abuse, presented for scheduled cystoscopy with ablation on 12/17 for bladder tumor but due to intoxication case was not able to be completed and while inpatient found to have dysconjugate gaze with complaint of headaches. CTH c/f tumor. CT C/A/P without additional lesions. MRI 12/14/24 with enlarging mass of right clivus concerning for mets vs lymphoma. Degenerative chronic microvascular changes.     The CNS Tumor Board tumor board considered available treatment options and made the following recommendations: Radiation oncology for fractionated SBRT and repeat imaging.     Clinical Trial Status: N/A     National site-specific guidelines were discussed with respect to the case.

## 2025-01-09 ENCOUNTER — TELEPHONE (OUTPATIENT)
Dept: RADIATION ONCOLOGY | Facility: HOSPITAL | Age: 55
End: 2025-01-09
Payer: COMMERCIAL

## 2025-01-09 NOTE — TELEPHONE ENCOUNTER
Called pt to remind of appointment on 1/10/2025  at 1:30 Pt's phone went to voicemail left number if needs to reschedule.      Lennie Padilla MA

## 2025-01-10 ENCOUNTER — HOSPITAL ENCOUNTER (OUTPATIENT)
Dept: RADIATION ONCOLOGY | Facility: HOSPITAL | Age: 55
Setting detail: RADIATION/ONCOLOGY SERIES
Discharge: HOME | End: 2025-01-10
Payer: COMMERCIAL

## 2025-01-10 DIAGNOSIS — G93.89 BRAIN MASS: Primary | ICD-10-CM

## 2025-01-10 NOTE — PROGRESS NOTES
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  In May 2024 the patient presented with a right neck mass with biopsy showing invasive moderate to poorly differentiated squamous cell carcinoma. He had a dissection in July 2024 and then chemoradiotherapy completing in September 2024.  This was all completed at the [facility].  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  An MRI brain on 12/14/2024 showed a 3 cm mass in the right clivus which was increased in size compared to a October 2023 CT scan. On 12/20/2024 the patient underwent biopsy of the clival mass and also bladder due to bladder thickening seen on imaging.  Final pathology from the clival mass shows squamous cell carcinoma confirming that this is also metastatic disease. The bladder showed no evidence of malignancy. The patient presents in consultation for consideration of palliative radiation to the clival mass.    Interval History:  Patient presents with *** and is overall doing well this morning.     I have reviewed Gaurav Mckay medical, surgical and other pertinent history in detail, and have updated medication and allergy information as needed in the computerized patient record.     PAST MEDICAL HISTORY:  Past Medical History:   Diagnosis Date    Acute inflammation of orbit 06/27/2013    Acute upper respiratory infection, unspecified 03/20/2015    Acute upper respiratory infection    Alcohol abuse, in remission 02/12/2015    History of ETOH abuse    Alcohol dependence, in remission 05/07/2015    Alcohol dependence in remission    Allergic contact dermatitis due to plants, except food 05/21/2014    Contact dermatitis due to  poison ivy    Allergy status to unspecified drugs, medicaments and biological substances 08/29/2013    History of allergy    Anxiety     Anxiety disorder, unspecified 03/20/2015    Anxiety    Asthmatic bronchitis ([facility])     At high risk for falls     Bipolar disorder, current episode manic without psychotic features, unspecified (Multi)     Bipolar disorder, current episode manic without psychotic features    Bladder mass     Brain mass     Cancer of larynx (Multi)     Cervicalgia     Cervicalgia    Chronic asthmatic bronchitis (Multi) 12/02/2014    Clotting disorder (Multi)     P.E.    COPD (chronic obstructive pulmonary disease) (Multi)     Degeneration of cervical intervertebral disc     Depression     Dysphagia     Edentulous     Encounter for immunization 09/22/2016    Flu vaccine need    Frequent falls     pt states he falls every time he gets up.    GERD (gastroesophageal reflux disease)     Hearing aid worn     currently missing    History of abdominal pain 02/16/2017    History of allergic rhinitis 01/16/2014    History of allergic rhinitis 06/26/2014    History of allergic rhinitis    History of depression     History of esophageal reflux 08/27/2015    History of gastroenteritis 04/11/2013    History of hepatitis C     History of sinusitis 02/05/2014    History of sore throat 03/20/2017    Hyperlipidemia     Hypertension     Other long term (current) drug therapy 06/02/2015    High risk medication use    Personal history of nicotine dependence 08/16/2017    History of tobacco abuse    Personal history of nicotine dependence 06/26/2017    History of nicotine dependence    Personal history of other diseases of the respiratory system 09/26/2013    History of acute sinusitis    Personal history of other mental and behavioral disorders     Personal history of other specified conditions 08/25/2016    Pulmonary embolism     RLS (restless legs syndrome)     Unspecified asthma, uncomplicated ([facility]) 02/13/2014     Uses walker     and wheelchair    Weakness     Wears glasses     currently broken     PAST SURGICAL HISTORY:  Past Surgical History:   Procedure Laterality Date    ANKLE SURGERY Left     ANKLE SURGERY  07/08/2024    R ankle spanning ex-fix revision    CHOLECYSTECTOMY      CT ABDOMEN PELVIS ANGIOGRAM W AND/OR WO IV CONTRAST  04/01/2020    CT ABDOMEN PELVIS ANGIOGRAM W AND/OR WO IV CONTRAST 4/1/2020 [facility]    FRACTURE SURGERY Right 07/15/2024    Open reduction and internal fixation of right distal tibia pilon and fibula fractures, removal of right ankle spanning external fixation under anesthesia, debridement down to including bone external fixator pin sites right leg    INVASIVE VASCULAR PROCEDURE N/A 11/08/2023    Procedure: Pulmonary Angiogram;  Surgeon: Surya Templeton MD;  Location: [facility];  Service: Cardiovascular;  Laterality: N/A;    LARYNGOSCOPY  07/01/2024    R radical neck dissection and L ALT free flap reconstruction    LEG SURGERY  07/06/2024    RLE ex-fix placement    MR HEAD ANGIO WO IV CONTRAST  01/24/2016    MR HEAD ANGIO WO IV CONTRAST 1/24/2016 [facility]    MR NECK ANGIO WO IV CONTRAST  01/24/2016    MR NECK ANGIO WO IV CONTRAST 1/24/2016 [facility]    THROMBECTOMY      WOUND DEBRIDEMENT Left 08/06/2024    L thigh debridement with woud vac placement 2/2 surgical wound dehiscence     MEDICATIONS:  Current Outpatient Medications   Medication Sig Dispense Refill    acetaminophen (Tylenol) 325 mg tablet Take 2 tablets (650 mg) by mouth every 6 hours if needed for mild pain (1 - 3).      apixaban (Eliquis) 5 mg tablet Take 1 tablet (5 mg) by mouth 2 times a day.      atorvastatin (Lipitor) 20 mg tablet Take 1 tablet (20 mg) by mouth once daily at bedtime.      bisacodyl (Dulcolax) 5 mg EC tablet Take 2 tablets (10 mg) by mouth once daily as needed for constipation. Do not crush, chew, or split.      butalbital-acetaminophen-caff -40 mg tablet Take  1 tablet by mouth every 4 hours if needed for headaches.      calcium carbonate (Tums) 200 mg calcium chewable tablet Chew 1 tablet (500 mg) once daily.      cyclobenzaprine (Flexeril) 10 mg tablet Take 1 tablet (10 mg) by mouth 3 times a day as needed for muscle spasms.      dexAMETHasone (Decadron) 4 mg tablet Take 1 tablet (4 mg) by mouth once daily for 7 doses.      docusate sodium (Colace) 100 mg capsule Take 1 capsule (100 mg) by mouth 2 times a day.      ferrous sulfate 325 (65 Fe) MG EC tablet Take 65 mg by mouth 3 times daily (morning, midday, late afternoon). Do not crush, chew, or split.      folic acid (Folvite) 1 mg tablet Take 1 tablet (1 mg) by mouth once daily.      guaiFENesin (Mucinex) 600 mg 12 hr tablet Take 1 tablet (600 mg) by mouth 2 times a day. Do not crush, chew, or split.      ipratropium-albuteroL (Duo-Neb) 0.5-2.5 mg/3 mL nebulizer solution Take 3 mL by nebulization every 2 hours if needed for wheezing. 180 mL 11    lidocaine (Lidoderm) 5 % patch Place 1 patch on the skin once daily. Remove & discard patch within 12 hours or as directed by MD.      LORazepam (Ativan) 2 mg/mL injection Infuse 0.25 mL (0.5 mg) into a venous catheter every 2 hours if needed (CIWA score 6-7 or HR greater than 100).      lubricating eye drops ophthalmic solution Administer 1 drop into both eyes every 6 hours if needed for dry eyes.      methadone (Dolophine) 5 mg tablet Take 1 tablet (5 mg) by mouth every 8 hours for 7 days. 21 tablet 0    mirtazapine (Remeron) 15 mg tablet Take 1 tablet (15 mg) by mouth once daily at bedtime.      montelukast (Singulair) 10 mg tablet Take 1 tablet (10 mg) by mouth once daily.      morphine (MSIR) 15 mg tablet Take 2 tablets (30 mg) by mouth every 3 hours if needed for moderate pain (4 - 6) or severe pain (7 - 10) for up to 7 days. 60 tablet 0    multivitamin with minerals tablet Take 1 tablet by mouth once daily.      naloxone (Narcan) 0.4 mg/mL injection Infuse 0.5 mL (0.2  mg) into a venous catheter if needed for respiratory depression (Once PRN patient is unarousable, and respiratory rate less than 8). 1 mL prn    nicotine (Nicoderm CQ) 14 mg/24 hr patch Place 1 patch on the skin once every 24 hours.      OLANZapine (ZyPREXA) 5 mg tablet Take 1 tablet (5 mg) by mouth 2 times a day as needed (Severe agitation/restlessness or interference with care and not redirectable).      ondansetron ODT (Zofran-ODT) 4 mg disintegrating tablet Dissolve 1 tablet (4 mg) in the mouth every 8 hours if needed for nausea.      oxygen (O2) gas therapy Inhale 2 L/min once every 24 hours.      pancrelipase, Lip-Prot-Amyl, (Creon) 6,000-19,000 -30,000 unit capsule Take 1 capsule by mouth 2 times a day.      pantoprazole (ProtoNix) 40 mg EC tablet Take 1 tablet (40 mg) by mouth once daily in the morning. Take before meals. Do not crush, chew, or split.      polyethylene glycol (Glycolax, Miralax) 17 gram packet Take 17 g by mouth 3 times a day as needed.      polyethylene glycol (Glycolax, Miralax) 17 gram packet Take 17 g by mouth every 8 hours.      pregabalin (Lyrica) 200 mg capsule Take 1 capsule (200 mg) by mouth 3 times a day for 14 days. 42 capsule 0    rOPINIRole (Requip) 0.5 mg tablet Take 1 tablet (0.5 mg) by mouth 3 times a day.      sennosides (Senokot) 8.6 mg tablet Take 2 tablets (17.2 mg) by mouth 2 times a day as needed for constipation.      sennosides-docusate sodium (Marya-Colace) 8.6-50 mg tablet Take 1 tablet by mouth once daily at bedtime. 30 tablet 0    sodium chloride (Ocean) 0.65 % nasal spray Administer 2 sprays into each nostril 4 times a day. 30 mL 12    tamsulosin (Flomax) 0.4 mg 24 hr capsule Take 1 capsule (0.4 mg) by mouth once daily.      thiamine 100 mg tablet Take 1 tablet (100 mg) by mouth once daily.      topiramate (Topamax) 100 mg tablet Take 1 tablet (100 mg) by mouth 2 times a day.       No current facility-administered medications for this visit.      ALLERGIES:  Oxycodone  SOCIAL HISTORY:  Social History     Socioeconomic History    Marital status:      Spouse name: Not on file    Number of children: Not on file    Years of education: Not on file    Highest education level: Not on file   Occupational History    Not on file   Tobacco Use    Smoking status: Every Day     Current packs/day: 1.00     Types: Cigarettes    Smokeless tobacco: Former     Types: Snuff    Tobacco comments:     1 ppd since the age of 16   Substance and Sexual Activity    Alcohol use: Yes     Comment: 6 pack beer a day    Drug use: Yes     Types: Methamphetamines, Cocaine     Comment: Hx cocaine use    Sexual activity: Defer   Other Topics Concern    Not on file   Social History Narrative    Not on file     Social Drivers of Health     Financial Resource Strain: High Risk (12/18/2024)    Overall Financial Resource Strain (CARDIA)     Difficulty of Paying Living Expenses: Hard   Food Insecurity: Food Insecurity Present (12/18/2024)    Hunger Vital Sign     Worried About Running Out of Food in the Last Year: Sometimes true     Ran Out of Food in the Last Year: Sometimes true   Transportation Needs: Unmet Transportation Needs (12/18/2024)    PRAPARE - Transportation     Lack of Transportation (Medical): Yes     Lack of Transportation (Non-Medical): Yes   Physical Activity: Not on file   Stress: No Stress Concern Present (12/18/2024)    [employer] [place] [employer] - Occupational Stress Questionnaire     Feeling of Stress : Only a little   Social Connections: Not on file   Intimate Partner Violence: Not At Risk (12/18/2024)    Humiliation, Afraid, Rape, and Kick questionnaire     Fear of Current or Ex-Partner: No     Emotionally Abused: No     Physically Abused: No     Sexually Abused: No   Housing Stability: High Risk (12/18/2024)    Housing Stability Vital Sign     Unable to Pay for Housing in the Last Year: Yes     Number of Times Moved in the Last Year: 0     Homeless  in the Last Year: No     FAMILY HISTORY:  Family History   Problem Relation Name Age of Onset    Diabetes Mother      Hypertension Mother      Heart attack Father         REVIEW OF SYSTEMS:    A 14 point review of systems was negative except for what is noted in the HPI.    There were no vitals filed for this visit.    PHYSICAL EXAM:   KPS:***  General: NAD.  CV: Regular rate.  Resp: Breathing is non-labored. Breathing comfortably on RA.  Abdomen: Soft, ND.  Extremities: No acute findings. No edema   Skin: Color, texture and turgor wnl. No rashes and lesions.  Neuro: AAO x 3, appropriately interactive, normal affect, speech fluent. Cranial nerves II through XII are intact grossly and symmetrically. No focal neurologic deficit. Normal strength and sensation to LT intact bilaterally in the upper and lower extremities. No dysmetria or dysdiadochokinesia.     RADIOGRAPHIC FINDINGS:  I reviewed all the images associated with this patient and the current presentation.    PATHOLOGIC FINDINGS:  I have reviewed all the pertinent pathological studies and results associated with this case.    LABORATORY FINDINGS:  All pertinent lab values have been reviewed.    IMPRESSION AND PLAN:   Gaurav Mckay [id number],     PLAN:  - Patient is overall doing well and ready to proceed with ***.

## 2025-01-12 ENCOUNTER — NURSING HOME VISIT (OUTPATIENT)
Dept: POST ACUTE CARE | Facility: EXTERNAL LOCATION | Age: 55
End: 2025-01-12
Payer: COMMERCIAL

## 2025-01-12 DIAGNOSIS — G93.89 BRAIN MASS: ICD-10-CM

## 2025-01-12 DIAGNOSIS — F32.A DEPRESSION, UNSPECIFIED DEPRESSION TYPE: ICD-10-CM

## 2025-01-12 DIAGNOSIS — F31.9 BIPOLAR AFFECTIVE DISORDER, REMISSION STATUS UNSPECIFIED (MULTI): ICD-10-CM

## 2025-01-12 DIAGNOSIS — J44.9 CHRONIC OBSTRUCTIVE PULMONARY DISEASE, UNSPECIFIED COPD TYPE (MULTI): Primary | ICD-10-CM

## 2025-01-12 DIAGNOSIS — E78.5 HYPERLIPIDEMIA, UNSPECIFIED HYPERLIPIDEMIA TYPE: ICD-10-CM

## 2025-01-12 DIAGNOSIS — N32.89 BLADDER MASS: ICD-10-CM

## 2025-01-12 DIAGNOSIS — R53.1 WEAKNESS: ICD-10-CM

## 2025-01-12 DIAGNOSIS — F10.10 ALCOHOL ABUSE: ICD-10-CM

## 2025-01-12 DIAGNOSIS — Z91.81 AT HIGH RISK FOR FALLS: ICD-10-CM

## 2025-01-12 DIAGNOSIS — K21.9 GASTROESOPHAGEAL REFLUX DISEASE WITHOUT ESOPHAGITIS: ICD-10-CM

## 2025-01-12 DIAGNOSIS — G47.30 SLEEP APNEA, UNSPECIFIED TYPE: ICD-10-CM

## 2025-01-12 DIAGNOSIS — I26.99 PULMONARY EMBOLISM, UNSPECIFIED CHRONICITY, UNSPECIFIED PULMONARY EMBOLISM TYPE, UNSPECIFIED WHETHER ACUTE COR PULMONALE PRESENT (MULTI): ICD-10-CM

## 2025-01-12 PROCEDURE — 99309 SBSQ NF CARE MODERATE MDM 30: CPT | Performed by: INTERNAL MEDICINE

## 2025-01-12 NOTE — LETTER
Patient: Lemuel Mckay  : 1970    Encounter Date: 2025    PLACE OF SERVICE:  [facility].    This is a subsequent visit.    Subjective  Patient ID: Lemuel Mckay is a 54 y.o. male who presents for Follow-up.    Mr. Gaurav Mckay is a 54-year-old male with history of COPD and pulmonary embolism.  He was found to have a brain mass, as well as a bladder mass awaiting biopsy results.  He requires supportive care.    Review of Systems   Constitutional:  Negative for chills and fever.   Cardiovascular:  Negative for chest pain.   All other systems reviewed and are negative.    Objective  /76   Pulse 78   Temp 36.6 °C (97.9 °F)   Resp 18     Physical Exam  Vitals reviewed.   Constitutional:       General: He is not in acute distress.     Comments: This is a well-developed, well-nourished male, sitting in a chair.   HENT:      Right Ear: Tympanic membrane, ear canal and external ear normal.      Left Ear: Tympanic membrane, ear canal and external ear normal.   Eyes:      General: No scleral icterus.     Pupils: Pupils are equal, round, and reactive to light.   Neck:      Vascular: No carotid bruit.   Cardiovascular:      Heart sounds: Normal heart sounds, S1 normal and S2 normal. No murmur heard.     No friction rub.   Pulmonary:      Effort: Pulmonary effort is normal.      Breath sounds: Decreased breath sounds (throughout) present.   Abdominal:      Palpations: There is no hepatomegaly, splenomegaly or mass.   Musculoskeletal:         General: No swelling or deformity. Normal range of motion.      Cervical back: Neck supple.      Right lower leg: No edema.      Left lower leg: No edema.   Lymphadenopathy:      Cervical: No cervical adenopathy.      Upper Body:      Right upper body: No axillary adenopathy.      Left upper body: No axillary adenopathy.      Lower Body: No right inguinal adenopathy. No left inguinal adenopathy.   Neurological:       Mental Status: He is oriented to person, place, and time.      Cranial Nerves: Cranial nerves 2-12 are intact. No cranial nerve deficit.      Sensory: No sensory deficit.      Motor: Motor function is intact. No weakness.      Gait: Gait is intact.      Deep Tendon Reflexes: Reflexes normal.   Psychiatric:         Mood and Affect: Mood normal. Mood is not anxious or depressed. Affect is not angry.         Behavior: Behavior is not agitated.         Thought Content: Thought content normal.         Judgment: Judgment normal.     LAB WORK: Laboratory studies were reviewed.    Assessment/Plan  Problem List Items Addressed This Visit             ICD-10-CM       Gastrointestinal and Abdominal    Gastroesophageal reflux disease without esophagitis K21.9       Mental Health    Depression, unspecified F32.A       Neuro    Brain mass G93.89       Pulmonary and Pneumonias    COPD (chronic obstructive pulmonary disease) (Multi) - Primary J44.9     Other Visit Diagnoses         Codes    Pulmonary embolism, unspecified chronicity, unspecified pulmonary embolism type, unspecified whether acute cor pulmonale present (Multi)     I26.99    Bladder mass     N32.89    Bipolar affective disorder, remission status unspecified (Multi)     F31.9    Sleep apnea, unspecified type     G47.30    Hyperlipidemia, unspecified hyperlipidemia type     E78.5    Alcohol abuse     F10.10    Weakness     R53.1    At high risk for falls     Z91.81        1. COPD, on bronchodilator therapy.  2. Pulmonary embolism, on anticoagulant.  3. Bladder mass, follow with Urology.  4. Brain mass, follow with Neurosurgery.  5. Bipolar disorder, on medication.  6. Depression, on medication.  7. Sleep apnea, on CPAP.  8. GERD, on PPI.  9. Hyperlipidemia, on statin.  10. Alcohol abuse, on CIWA protocol.  11. Weakness, on PT/OT.  12. Fall risk, on fall precaution.    Scribe Attestation  By signing my name below, ILeticia, Scribe attest that this documentation has  been prepared under the direction and in the presence of Sohan Saeed MD.     All medical record entries made by the scribe were personally dictated by me I have reviewed the chart and agree the record accurately reflects my personal performance of his history physical examination and management      Electronically Signed By: Sohan Saeed MD   1/19/25 12:32 AM

## 2025-01-13 VITALS
HEART RATE: 78 BPM | SYSTOLIC BLOOD PRESSURE: 124 MMHG | DIASTOLIC BLOOD PRESSURE: 76 MMHG | TEMPERATURE: 97.9 F | RESPIRATION RATE: 18 BRPM

## 2025-01-13 DIAGNOSIS — G62.9 NEUROPATHY: ICD-10-CM

## 2025-01-13 RX ORDER — PREGABALIN 200 MG/1
200 CAPSULE ORAL 3 TIMES DAILY
Qty: 42 CAPSULE | Refills: 0 | Status: ON HOLD | OUTPATIENT
Start: 2025-01-13 | End: 2025-01-27

## 2025-01-13 ASSESSMENT — ENCOUNTER SYMPTOMS
CHILLS: 0
FEVER: 0

## 2025-01-13 NOTE — PROGRESS NOTES
PLACE OF SERVICE:  [facility] [place].    This is a subsequent visit.    Subjective   Patient ID: Lemuel Mckay is a 54 y.o. male who presents for Follow-up.    Mr. Gaurav Mckay is a 54-year-old male with history of COPD and pulmonary embolism.  He was found to have a brain mass, as well as a bladder mass awaiting biopsy results.  He requires supportive care.    Review of Systems   Constitutional:  Negative for chills and fever.   Cardiovascular:  Negative for chest pain.   All other systems reviewed and are negative.    Objective   /76   Pulse 78   Temp 36.6 °C (97.9 °F)   Resp 18     Physical Exam  Vitals reviewed.   Constitutional:       General: He is not in acute distress.     Comments: This is a well-developed, well-nourished male, sitting in a chair.   HENT:      Right Ear: Tympanic membrane, ear canal and external ear normal.      Left Ear: Tympanic membrane, ear canal and external ear normal.   Eyes:      General: No scleral icterus.     Pupils: Pupils are equal, round, and reactive to light.   Neck:      Vascular: No carotid bruit.   Cardiovascular:      Heart sounds: Normal heart sounds, S1 normal and S2 normal. No murmur heard.     No friction rub.   Pulmonary:      Effort: Pulmonary effort is normal.      Breath sounds: Decreased breath sounds (throughout) present.   Abdominal:      Palpations: There is no hepatomegaly, splenomegaly or mass.   Musculoskeletal:         General: No swelling or deformity. Normal range of motion.      Cervical back: Neck supple.      Right lower leg: No edema.      Left lower leg: No edema.   Lymphadenopathy:      Cervical: No cervical adenopathy.      Upper Body:      Right upper body: No axillary adenopathy.      Left upper body: No axillary adenopathy.      Lower Body: No right inguinal adenopathy. No left inguinal adenopathy.   Neurological:      Mental Status: He is oriented to person, place, and time.      Cranial  Nerves: Cranial nerves 2-12 are intact. No cranial nerve deficit.      Sensory: No sensory deficit.      Motor: Motor function is intact. No weakness.      Gait: Gait is intact.      Deep Tendon Reflexes: Reflexes normal.   Psychiatric:         Mood and Affect: Mood normal. Mood is not anxious or depressed. Affect is not angry.         Behavior: Behavior is not agitated.         Thought Content: Thought content normal.         Judgment: Judgment normal.     LAB WORK: Laboratory studies were reviewed.    Assessment/Plan   Problem List Items Addressed This Visit             ICD-10-CM       Gastrointestinal and Abdominal    Gastroesophageal reflux disease without esophagitis K21.9       Mental Health    Depression, unspecified F32.A       Neuro    Brain mass G93.89       Pulmonary and Pneumonias    COPD (chronic obstructive pulmonary disease) (Multi) - Primary J44.9     Other Visit Diagnoses         Codes    Pulmonary embolism, unspecified chronicity, unspecified pulmonary embolism type, unspecified whether acute cor pulmonale present (Multi)     I26.99    Bladder mass     N32.89    Bipolar affective disorder, remission status unspecified (Multi)     F31.9    Sleep apnea, unspecified type     G47.30    Hyperlipidemia, unspecified hyperlipidemia type     E78.5    Alcohol abuse     F10.10    Weakness     R53.1    At high risk for falls     Z91.81        1. COPD, on bronchodilator therapy.  2. Pulmonary embolism, on anticoagulant.  3. Bladder mass, follow with Urology.  4. Brain mass, follow with Neurosurgery.  5. Bipolar disorder, on medication.  6. Depression, on medication.  7. Sleep apnea, on CPAP.  8. GERD, on PPI.  9. Hyperlipidemia, on statin.  10. Alcohol abuse, on CIWA protocol.  11. Weakness, on PT/OT.  12. Fall risk, on fall precaution.    Scribe Attestation  By signing my name below, Leticia WHALEY, Sharla attest that this documentation has been prepared under the direction and in the presence of Sohan  MD Christophe.     All medical record entries made by the scribe were personally dictated by me I have reviewed the chart and agree the record accurately reflects my personal performance of his history physical examination and management

## 2025-01-14 ENCOUNTER — TELEPHONE (OUTPATIENT)
Dept: RADIATION ONCOLOGY | Facility: HOSPITAL | Age: 55
End: 2025-01-14
Payer: COMMERCIAL

## 2025-01-14 ENCOUNTER — NURSING HOME VISIT (OUTPATIENT)
Dept: POST ACUTE CARE | Facility: EXTERNAL LOCATION | Age: 55
End: 2025-01-14
Payer: COMMERCIAL

## 2025-01-14 DIAGNOSIS — R53.81 PHYSICAL DECONDITIONING: ICD-10-CM

## 2025-01-14 DIAGNOSIS — J34.89 DRY NARES: ICD-10-CM

## 2025-01-14 DIAGNOSIS — J44.9 CHRONIC OBSTRUCTIVE PULMONARY DISEASE, UNSPECIFIED COPD TYPE (MULTI): ICD-10-CM

## 2025-01-14 DIAGNOSIS — E87.6 HYPOKALEMIA: ICD-10-CM

## 2025-01-14 DIAGNOSIS — R13.13 PHARYNGEAL DYSPHAGIA: ICD-10-CM

## 2025-01-14 DIAGNOSIS — G93.89 BRAIN MASS: ICD-10-CM

## 2025-01-14 DIAGNOSIS — J06.9 UPPER RESPIRATORY TRACT INFECTION, UNSPECIFIED TYPE: Primary | ICD-10-CM

## 2025-01-14 DIAGNOSIS — R68.84 JAW PAIN: ICD-10-CM

## 2025-01-14 DIAGNOSIS — F32.A DEPRESSION, UNSPECIFIED DEPRESSION TYPE: ICD-10-CM

## 2025-01-14 DIAGNOSIS — N32.89 BLADDER MASS: ICD-10-CM

## 2025-01-14 PROBLEM — J96.21 ACUTE ON CHRONIC RESPIRATORY FAILURE WITH HYPOXIA AND HYPERCAPNIA (MULTI): Status: ACTIVE | Noted: 2025-01-14

## 2025-01-14 PROBLEM — I26.09 OTHER ACUTE PULMONARY EMBOLISM WITH ACUTE COR PULMONALE: Status: ACTIVE | Noted: 2023-11-08

## 2025-01-14 PROBLEM — J96.22 ACUTE ON CHRONIC RESPIRATORY FAILURE WITH HYPOXIA AND HYPERCAPNIA (MULTI): Status: ACTIVE | Noted: 2025-01-14

## 2025-01-14 PROCEDURE — 99309 SBSQ NF CARE MODERATE MDM 30: CPT | Performed by: NURSE PRACTITIONER

## 2025-01-14 RX ORDER — MORPHINE SULFATE 15 MG/1
30 TABLET ORAL
Qty: 60 TABLET | Refills: 0 | Status: ON HOLD | OUTPATIENT
Start: 2025-01-14 | End: 2025-01-21

## 2025-01-14 NOTE — LETTER
Patient: Lemuel Mckay  : 1970    Encounter Date: 2025    Chief Complaint:   SNF F/U  -Brain mass  -Headache with visual changes  -Right 6th nerve palsy  -Pharyngeal dysphagia  -Bladder tumor  -Urinary retention  -Hematuria  -Physical deconditioning/weakness  -Hx laryngeal cancer  -Hypokalemia     HPI:   54 year-old male presenting to [facility] on 24 for PAT for surgical clearance for a scheduled cystoscopy with ablation of tissue due to a bladder tumor (24). He presented to PAT appointment late and intoxicated. There was c/f compliance with pre-op instructions (Ex: holding eliquis), so he was direct admitted to the hospital to ensure sobriety, medication compliance, and surgical readiness. During hospitalization, he was found to have dysconjugate gaze and was c/o a severe headache. Symptoms were relatively acute and he felt related to a recent fall. Stat CT of head showed a tumor extending from the clivus, into the sphenoid area, sellar area, and near carotids as well. Neuro felt this is cause of diplopia. Mass appears new from October, so it is rapidly growing. After discussion with neuro, patient was transferred to [facility] for management. Anders catheter was placed for acute urinary retention, prior to transfer. He was transferred to [facility] on 12/15/24. Hospital course:    Brain mass/headache with visual changes/R 6th nerve palsy/pharyngeal dysphagia-NSGY consult, ophthalmology consult, ENT consult, s/p clival mass biopsy on , SLP consult, recommending pureed diet with mildly/nectar thick liquids, supportive oncology consulted for optimal pain control, radiation oncology consult, F/U with ENT, radiation oncology, oncology, and ophthalmology after discharge  Bladder tumor/urinary retention-urology consulted, s/p cystoscopy with bladder biopsy on , maintain anders until OP F/U, c/w flomax  Alcohol use disorder-cessation advised, CIWA protocol  Chronic hypoxic respiratory  failure/COPD/HANY-c/w bronchial hygiene, duonebs, and mucinex, wean O2  HTN/orthostatic hypotension-monitor BP  Hx PE s/p embolization (11/2023)-provoked by surgery, c/w eliquis  Tobacco use disorder-c/w nicotine patch  Decreased strength-PT/OT evaluations, recommending moderate intensity   Bowel regimen-miralax Q 8 hours, senna 2 tabs BID, bisacodyl 10 mg  Diarrhea-thought to be 2/2 recent bowel regimen, CDiff ruled out    Pt. was HDS and discharged to [facility] on 12/26/24. Patient was seen by urology for F/U on 1/2 and anders catheter was removed. Pt. reports that he is able to void without difficulty. On 1/6, patient was noted to have a moist, productive cough (yellow, thick sputum) and abnormal lung sounds. CXR was obtained, which was negative for acute findings. He was started on Doxycycline x 7 days for URI. Today, pt. is c/o shortness of breath, cough, and weakness. He also reports right-sided facial pain, swelling, and mild redness. He reports no improvement in respiratory symptoms with Doxycycline. He denies fevers or chills. Staff report no other clinical concerns at this time.     ROS:    As above in HPI. Otherwise, all other systems have been reviewed and are negative for complaint.    Medications reviewed and verified in NH chart.     Patient Active Problem List:  Cellulitis of neck  Orthostatic hypotension  Physical deconditioning/weakness  Malignant neoplasm of larynx  COPD   Severe protein-calorie malnutrition   Oropharyngeal dysphagia  Neck mass (May 2024; + for SCC)  Hx bilateral PE  Hx RLE DVT  HTN  HLD  Tinnitus  Constipation  BPH  Right cervical adenopathy  Hypokalemia  Hypomagnesemia  Pancytopenia  GERD  Neuropathy  Folic acid deficiency  Thiamine deficiency  Vit B12 deficiency  HANY (undiagnosed per EMR)  Allergic rhinitis  Anxiety  Arthritis  Hearing loss  Lumbosacral radiculopathy  Chronic pancreatitis  Asthma/COPD  Chronic back pain  Depression  RLS    Past Medical History:  GERSON  thrombosis  Hepatitis C  Gastroenteritis  Sepsis  Urinary retention  UTI  Alcohol abuse  Acute respiratory failure  Bronchitis    Past Surgical History:   Procedure Laterality Date   • ANKLE SURGERY Left    • ANKLE SURGERY  07/08/2024    R ankle spanning ex-fix revision   • CHOLECYSTECTOMY     • CT ABDOMEN PELVIS ANGIOGRAM W AND/OR WO IV CONTRAST  04/01/2020    CT ABDOMEN PELVIS ANGIOGRAM W AND/OR WO IV CONTRAST 4/1/2020 [facility]   • FRACTURE SURGERY Right 07/15/2024    Open reduction and internal fixation of right distal tibia pilon and fibula fractures, removal of right ankle spanning external fixation under anesthesia, debridement down to including bone external fixator pin sites right leg   • INVASIVE VASCULAR PROCEDURE N/A 11/08/2023    Procedure: Pulmonary Angiogram;  Surgeon: Surya Templeton MD;  Location: [facility];  Service: Cardiovascular;  Laterality: N/A;   • LARYNGOSCOPY  07/01/2024    R radical neck dissection and L ALT free flap reconstruction   • LEG SURGERY  07/06/2024    RLE ex-fix placement   • MR HEAD ANGIO WO IV CONTRAST  01/24/2016    MR HEAD ANGIO WO IV CONTRAST 1/24/2016 [facility]   • MR NECK ANGIO WO IV CONTRAST  01/24/2016    MR NECK ANGIO WO IV CONTRAST 1/24/2016 [facility]   • THROMBECTOMY     • WOUND DEBRIDEMENT Left 08/06/2024    L thigh debridement with woud vac placement 2/2 surgical wound dehiscence       Family History   Problem Relation Name Age of Onset   • Diabetes Mother     • Hypertension Mother     • Heart attack Father     • Hypertension Other         Social History     Tobacco Use   Smoking Status Former   • Current packs/day: 1.00   • Types: Cigarettes   Smokeless Tobacco Former   • Types: Snuff   Tobacco Comments    1 ppd since the age of 16       Social History     Substance and Sexual Activity   Alcohol Use Not Currently    Comment: 6 pack beer a day       Social History     Substance and Sexual Activity   Drug Use Yes   •  Types: Methamphetamines, Cocaine, Marijuana    Comment: Hx cocaine use       Allergies   Allergen Reactions   • Oxycodone Itching        Vital Signs:   129/65-85-18-98.5-96% on RA     Physical Exam:  General: Sitting up in bed in NAD, alert   Head/Face: NCAT, symmetrical  Eyes: PERRLA, no injection, no discharge  ENT: Hearing impaired, ears without scars or lesions, nasal mucosa dry with blood noted, septum midline, lips pink and moist  Neck: Supple, symmetrical  Respiratory: Rhonchi noted throughout all lung fields, respirations even and nonlabored without use of accessory muscles, + cough noted during exam, mild dyspnea noted   Cardio: RRR without murmur or gallops, normal S1S2, no edema, pedal pulses 3+/4 bilaterally  Chest/Breast: Symmetrical  GI: BS x 4, normoactive, non-distended, abd round and soft, no masses or tenderness  : No suprapubic tenderness or distention  MSK: Gait not assessed, joints with full ROM without pain or contractures, + generalized weakness  Skin: Skin warm and dry, no induration, R jaw-line with mild swelling and redness noted, no warmth with touch   Neurologic: Cranial nerves II through XII intact, superficial touch and pain sensation intact  Psychiatric: Alert to person, place, and time, calm and cooperative     Results/Data:   1/13/25: BUN 5, Cr 0.6, Na+ 135, K+ 3.2, CO2 32, CBC unremarkable   1/6/25: Cr 0.6, K+ 3.2, CO2 35, CBC WNL   12/31/24: Cr 0.6, CO2 31, HDL 34, Hgb 12.3, Hct 37.5  9/20/24: Alb 3.3, Phos 3.5, Mag 1.8, Hgb 9.7, Hct 30.1    Assessment/Plan:  Brain mass/headache with visual changes/R 6th nerve palsy-s/p clival mass biopsy on 12/20/24, s/p dexamethasone (end date 1/2), c/w pain mgmt (methadone 5 mg Q 8 hours, MSIR 30 mg Q 3 hours prn, Imitrex prn), supportive care, F/U with ENT, radiation oncology, oncology, and ophthalmology as scheduled  Pharyngeal dysphagia-c/w pureed diet with NTL, aspiration precautions, SLP following   Bladder tumor/urinary retention/Hx  hematuria/BPH-s/p cystoscopy with bladder biopsy on 12/20, c/w flomax, F/U with urology as scheduled (seen 1/2 and advised to F/U in 1 month)   Physical deconditioning/weakness-c/w PT/OT, safety and fall precautions  Alcohol use disorder-c/w MVI, folic acid, and thiamine, continued cessation advised  COPD/HANY-c/w singulair and mucinex, duonebs prn, monitor respiratory status closely  Tobacco use disorder-c/w nicotine patch, continued cessation encouraged  Constipation-c/w senna plus 2 tabs BID and dulcolax 10 mg PO daily, increase fluids and fiber, increase ambulation, monitor for BMs  HTN/HLD/Hx orthostatic hypotension-c/w atorvastatin, monitor BP, monitor lipid panel and LFTs  Anemia-c/w iron supplement, monitor CBC and iron panel   Hx DVT/PE-s/p embolization (11/2023), c/w eliquis  Laryngeal cancer-s/p radiation, c/w pain mgmt (flexeril prn, methadone Q 8 hours, MSIS Q 3 hours prn, and lyrica), F/U with specialists as scheduled  GERD-c/w PPI and Tums  Pancreatic insufficiency-c/w Creon  RLS-c/w requip  Depression/anxiety-c/w remeron and topamax, vistaril prn, monitor behaviors, supportive care, consult Psych  Dry nares-c/w NaCl nasal spray  URI/R jaw pain and swelling-s/p doxycycline, start on Prednisone 40 mg daily x 5 days and Augmentin 875/125 mg BID x 10 days, encourage use of IS, OOB to chair, monitor resp. status closely   Hypokalemia-s/p KDur, monitor K+ level on weekly labs     Orders:  Prednisone 40 mg PO daily x 5 days   Augmentin 875/125 mg PO BID x 10 days     Code Status:   Full Code      Electronically Signed By: JUSTINO Jerome   2/23/25  8:13 PM

## 2025-01-15 ENCOUNTER — SOCIAL WORK (OUTPATIENT)
Dept: CASE MANAGEMENT | Facility: HOSPITAL | Age: 55
End: 2025-01-15

## 2025-01-15 ENCOUNTER — APPOINTMENT (OUTPATIENT)
Dept: RADIOLOGY | Facility: HOSPITAL | Age: 55
End: 2025-01-15
Payer: COMMERCIAL

## 2025-01-15 ENCOUNTER — APPOINTMENT (OUTPATIENT)
Dept: RADIATION ONCOLOGY | Facility: HOSPITAL | Age: 55
End: 2025-01-15
Payer: COMMERCIAL

## 2025-01-15 ENCOUNTER — HOSPITAL ENCOUNTER (OUTPATIENT)
Dept: RADIATION ONCOLOGY | Facility: HOSPITAL | Age: 55
Setting detail: RADIATION/ONCOLOGY SERIES
Discharge: HOME | End: 2025-01-15
Payer: COMMERCIAL

## 2025-01-15 ENCOUNTER — HOSPITAL ENCOUNTER (OUTPATIENT)
Dept: RADIOLOGY | Facility: EXTERNAL LOCATION | Age: 55
Discharge: HOME | End: 2025-01-15

## 2025-01-15 ENCOUNTER — HOSPITAL ENCOUNTER (OUTPATIENT)
Dept: RADIOLOGY | Facility: HOSPITAL | Age: 55
Discharge: HOME | End: 2025-01-15
Payer: COMMERCIAL

## 2025-01-15 ENCOUNTER — HOSPITAL ENCOUNTER (INPATIENT)
Facility: HOSPITAL | Age: 55
End: 2025-01-15
Attending: EMERGENCY MEDICINE | Admitting: STUDENT IN AN ORGANIZED HEALTH CARE EDUCATION/TRAINING PROGRAM
Payer: COMMERCIAL

## 2025-01-15 VITALS
OXYGEN SATURATION: 91 % | RESPIRATION RATE: 18 BRPM | TEMPERATURE: 97.2 F | DIASTOLIC BLOOD PRESSURE: 67 MMHG | SYSTOLIC BLOOD PRESSURE: 99 MMHG | HEART RATE: 85 BPM

## 2025-01-15 DIAGNOSIS — H57.02 ANISOCORIA: ICD-10-CM

## 2025-01-15 DIAGNOSIS — C79.51 METASTASIS TO BONE (MULTI): ICD-10-CM

## 2025-01-15 DIAGNOSIS — G93.89 BRAIN MASS: ICD-10-CM

## 2025-01-15 DIAGNOSIS — G62.9 NEUROPATHY: ICD-10-CM

## 2025-01-15 DIAGNOSIS — C79.51 METASTASIS TO BONE (MULTI): Primary | ICD-10-CM

## 2025-01-15 DIAGNOSIS — H02.401 PTOSIS OF RIGHT EYELID: Primary | ICD-10-CM

## 2025-01-15 LAB
ALBUMIN SERPL BCP-MCNC: 3.7 G/DL (ref 3.4–5)
ALP SERPL-CCNC: 104 U/L (ref 33–120)
ALT SERPL W P-5'-P-CCNC: 20 U/L (ref 10–52)
ANION GAP SERPL CALC-SCNC: 15 MMOL/L (ref 10–20)
AST SERPL W P-5'-P-CCNC: 24 U/L (ref 9–39)
BASOPHILS # BLD AUTO: 0.02 X10*3/UL (ref 0–0.1)
BASOPHILS NFR BLD AUTO: 0.2 %
BILIRUB SERPL-MCNC: 0.4 MG/DL (ref 0–1.2)
BUN SERPL-MCNC: 9 MG/DL (ref 6–23)
CALCIUM SERPL-MCNC: 10.2 MG/DL (ref 8.6–10.6)
CHLORIDE SERPL-SCNC: 95 MMOL/L (ref 98–107)
CO2 SERPL-SCNC: 32 MMOL/L (ref 21–32)
CREAT SERPL-MCNC: 0.59 MG/DL (ref 0.5–1.3)
EGFRCR SERPLBLD CKD-EPI 2021: >90 ML/MIN/1.73M*2
EOSINOPHIL # BLD AUTO: 0 X10*3/UL (ref 0–0.7)
EOSINOPHIL NFR BLD AUTO: 0 %
ERYTHROCYTE [DISTWIDTH] IN BLOOD BY AUTOMATED COUNT: 16 % (ref 11.5–14.5)
GLUCOSE SERPL-MCNC: 111 MG/DL (ref 74–99)
HCT VFR BLD AUTO: 39 % (ref 41–52)
HGB BLD-MCNC: 12.8 G/DL (ref 13.5–17.5)
IMM GRANULOCYTES # BLD AUTO: 0.28 X10*3/UL (ref 0–0.7)
IMM GRANULOCYTES NFR BLD AUTO: 2.3 % (ref 0–0.9)
LYMPHOCYTES # BLD AUTO: 0.44 X10*3/UL (ref 1.2–4.8)
LYMPHOCYTES NFR BLD AUTO: 3.7 %
MCH RBC QN AUTO: 27.2 PG (ref 26–34)
MCHC RBC AUTO-ENTMCNC: 32.8 G/DL (ref 32–36)
MCV RBC AUTO: 83 FL (ref 80–100)
MONOCYTES # BLD AUTO: 0.17 X10*3/UL (ref 0.1–1)
MONOCYTES NFR BLD AUTO: 1.4 %
NEUTROPHILS # BLD AUTO: 11.1 X10*3/UL (ref 1.2–7.7)
NEUTROPHILS NFR BLD AUTO: 92.4 %
NRBC BLD-RTO: 0 /100 WBCS (ref 0–0)
PLATELET # BLD AUTO: 277 X10*3/UL (ref 150–450)
POTASSIUM SERPL-SCNC: 3.7 MMOL/L (ref 3.5–5.3)
PROT SERPL-MCNC: 8.2 G/DL (ref 6.4–8.2)
RBC # BLD AUTO: 4.71 X10*6/UL (ref 4.5–5.9)
SODIUM SERPL-SCNC: 138 MMOL/L (ref 136–145)
WBC # BLD AUTO: 12 X10*3/UL (ref 4.4–11.3)

## 2025-01-15 PROCEDURE — 99215 OFFICE O/P EST HI 40 MIN: CPT | Performed by: STUDENT IN AN ORGANIZED HEALTH CARE EDUCATION/TRAINING PROGRAM

## 2025-01-15 PROCEDURE — 99205 OFFICE O/P NEW HI 60 MIN: CPT | Performed by: STUDENT IN AN ORGANIZED HEALTH CARE EDUCATION/TRAINING PROGRAM

## 2025-01-15 PROCEDURE — 1170000001 HC PRIVATE ONCOLOGY ROOM DAILY

## 2025-01-15 PROCEDURE — 36415 COLL VENOUS BLD VENIPUNCTURE: CPT | Performed by: EMERGENCY MEDICINE

## 2025-01-15 PROCEDURE — 85025 COMPLETE CBC W/AUTO DIFF WBC: CPT | Performed by: EMERGENCY MEDICINE

## 2025-01-15 PROCEDURE — 2500000001 HC RX 250 WO HCPCS SELF ADMINISTERED DRUGS (ALT 637 FOR MEDICARE OP): Mod: SE

## 2025-01-15 PROCEDURE — 99285 EMERGENCY DEPT VISIT HI MDM: CPT | Performed by: EMERGENCY MEDICINE

## 2025-01-15 PROCEDURE — 99222 1ST HOSP IP/OBS MODERATE 55: CPT

## 2025-01-15 PROCEDURE — 70450 CT HEAD/BRAIN W/O DYE: CPT

## 2025-01-15 PROCEDURE — 99285 EMERGENCY DEPT VISIT HI MDM: CPT | Mod: 25 | Performed by: EMERGENCY MEDICINE

## 2025-01-15 PROCEDURE — 80053 COMPREHEN METABOLIC PANEL: CPT | Performed by: EMERGENCY MEDICINE

## 2025-01-15 PROCEDURE — 84075 ASSAY ALKALINE PHOSPHATASE: CPT | Performed by: EMERGENCY MEDICINE

## 2025-01-15 PROCEDURE — 96375 TX/PRO/DX INJ NEW DRUG ADDON: CPT

## 2025-01-15 PROCEDURE — 70551 MRI BRAIN STEM W/O DYE: CPT

## 2025-01-15 PROCEDURE — 2500000004 HC RX 250 GENERAL PHARMACY W/ HCPCS (ALT 636 FOR OP/ED): Mod: SE | Performed by: EMERGENCY MEDICINE

## 2025-01-15 PROCEDURE — 70450 CT HEAD/BRAIN W/O DYE: CPT | Performed by: STUDENT IN AN ORGANIZED HEALTH CARE EDUCATION/TRAINING PROGRAM

## 2025-01-15 PROCEDURE — 96374 THER/PROPH/DIAG INJ IV PUSH: CPT

## 2025-01-15 RX ORDER — LIDOCAINE 560 MG/1
1 PATCH PERCUTANEOUS; TOPICAL; TRANSDERMAL EVERY 12 HOURS PRN
Status: DISCONTINUED | OUTPATIENT
Start: 2025-01-15 | End: 2025-01-25 | Stop reason: HOSPADM

## 2025-01-15 RX ORDER — ACETAMINOPHEN 325 MG/1
975 TABLET ORAL ONCE
Status: COMPLETED | OUTPATIENT
Start: 2025-01-15 | End: 2025-01-15

## 2025-01-15 RX ORDER — DIAZEPAM 5 MG/1
10 TABLET ORAL EVERY 2 HOUR PRN
Status: DISCONTINUED | OUTPATIENT
Start: 2025-01-15 | End: 2025-01-20

## 2025-01-15 RX ORDER — ATORVASTATIN CALCIUM 20 MG/1
20 TABLET, FILM COATED ORAL NIGHTLY
Status: DISCONTINUED | OUTPATIENT
Start: 2025-01-16 | End: 2025-01-23

## 2025-01-15 RX ORDER — ROPINIROLE 0.5 MG/1
0.5 TABLET, FILM COATED ORAL 3 TIMES DAILY
Status: DISCONTINUED | OUTPATIENT
Start: 2025-01-16 | End: 2025-01-25 | Stop reason: HOSPADM

## 2025-01-15 RX ORDER — SENNOSIDES 8.6 MG/1
2 TABLET ORAL NIGHTLY
Status: DISCONTINUED | OUTPATIENT
Start: 2025-01-16 | End: 2025-01-25 | Stop reason: HOSPADM

## 2025-01-15 RX ORDER — MIRTAZAPINE 15 MG/1
15 TABLET, FILM COATED ORAL NIGHTLY
Status: DISCONTINUED | OUTPATIENT
Start: 2025-01-16 | End: 2025-01-16

## 2025-01-15 RX ORDER — DIPHENHYDRAMINE HCL 25 MG
25 CAPSULE ORAL ONCE
Status: COMPLETED | OUTPATIENT
Start: 2025-01-15 | End: 2025-01-15

## 2025-01-15 RX ORDER — MORPHINE SULFATE 30 MG/1
30 TABLET ORAL
Status: DISCONTINUED | OUTPATIENT
Start: 2025-01-15 | End: 2025-01-16

## 2025-01-15 RX ORDER — PREDNISONE 20 MG/1
40 TABLET ORAL DAILY
Status: DISCONTINUED | OUTPATIENT
Start: 2025-01-16 | End: 2025-01-16

## 2025-01-15 RX ORDER — HEPARIN SODIUM 10000 [USP'U]/100ML
0-4500 INJECTION, SOLUTION INTRAVENOUS CONTINUOUS
Status: DISCONTINUED | OUTPATIENT
Start: 2025-01-15 | End: 2025-01-20

## 2025-01-15 RX ORDER — ONDANSETRON 4 MG/1
4 TABLET, ORALLY DISINTEGRATING ORAL EVERY 8 HOURS PRN
Status: DISCONTINUED | OUTPATIENT
Start: 2025-01-15 | End: 2025-01-25 | Stop reason: HOSPADM

## 2025-01-15 RX ORDER — ROPINIROLE 0.5 MG/1
0.5 TABLET, FILM COATED ORAL 3 TIMES DAILY
Status: DISCONTINUED | OUTPATIENT
Start: 2025-01-15 | End: 2025-01-15

## 2025-01-15 RX ORDER — MORPHINE SULFATE 4 MG/ML
4 INJECTION INTRAVENOUS ONCE
Status: COMPLETED | OUTPATIENT
Start: 2025-01-15 | End: 2025-01-15

## 2025-01-15 RX ORDER — CYCLOBENZAPRINE HCL 10 MG
10 TABLET ORAL 3 TIMES DAILY PRN
Status: DISCONTINUED | OUTPATIENT
Start: 2025-01-15 | End: 2025-01-24

## 2025-01-15 RX ORDER — AMOXICILLIN AND CLAVULANATE POTASSIUM 875; 125 MG/1; MG/1
875 TABLET, FILM COATED ORAL 2 TIMES DAILY
Status: DISCONTINUED | OUTPATIENT
Start: 2025-01-15 | End: 2025-01-16

## 2025-01-15 RX ORDER — POLYETHYLENE GLYCOL 3350 17 G/17G
17 POWDER, FOR SOLUTION ORAL DAILY
Status: DISCONTINUED | OUTPATIENT
Start: 2025-01-16 | End: 2025-01-25 | Stop reason: HOSPADM

## 2025-01-15 RX ORDER — MONTELUKAST SODIUM 10 MG/1
10 TABLET ORAL DAILY
Status: DISCONTINUED | OUTPATIENT
Start: 2025-01-16 | End: 2025-01-25 | Stop reason: HOSPADM

## 2025-01-15 RX ORDER — IPRATROPIUM BROMIDE AND ALBUTEROL SULFATE 2.5; .5 MG/3ML; MG/3ML
3 SOLUTION RESPIRATORY (INHALATION) EVERY 2 HOUR PRN
Status: DISCONTINUED | OUTPATIENT
Start: 2025-01-15 | End: 2025-01-19

## 2025-01-15 RX ORDER — OXYCODONE HYDROCHLORIDE 5 MG/1
5 TABLET ORAL EVERY 6 HOURS PRN
Status: DISCONTINUED | OUTPATIENT
Start: 2025-01-15 | End: 2025-01-15

## 2025-01-15 RX ORDER — TAMSULOSIN HYDROCHLORIDE 0.4 MG/1
0.4 CAPSULE ORAL DAILY
Status: DISCONTINUED | OUTPATIENT
Start: 2025-01-16 | End: 2025-01-25 | Stop reason: HOSPADM

## 2025-01-15 RX ORDER — ONDANSETRON HYDROCHLORIDE 2 MG/ML
4 INJECTION, SOLUTION INTRAVENOUS ONCE
Status: COMPLETED | OUTPATIENT
Start: 2025-01-15 | End: 2025-01-15

## 2025-01-15 RX ORDER — PREDNISONE 20 MG/1
40 TABLET ORAL DAILY
Status: ON HOLD | COMMUNITY
End: 2025-01-20

## 2025-01-15 RX ORDER — IBUPROFEN 200 MG
1 TABLET ORAL EVERY 24 HOURS
Status: DISCONTINUED | OUTPATIENT
Start: 2025-01-15 | End: 2025-01-25 | Stop reason: HOSPADM

## 2025-01-15 RX ORDER — AMOXICILLIN AND CLAVULANATE POTASSIUM 875; 125 MG/1; MG/1
875 TABLET, FILM COATED ORAL 2 TIMES DAILY
Status: ON HOLD | COMMUNITY
End: 2025-01-24

## 2025-01-15 RX ORDER — PANTOPRAZOLE SODIUM 40 MG/1
40 TABLET, DELAYED RELEASE ORAL
Status: DISCONTINUED | OUTPATIENT
Start: 2025-01-16 | End: 2025-01-25 | Stop reason: HOSPADM

## 2025-01-15 RX ORDER — TOPIRAMATE 100 MG/1
100 TABLET, FILM COATED ORAL 2 TIMES DAILY
Status: DISCONTINUED | OUTPATIENT
Start: 2025-01-15 | End: 2025-01-25 | Stop reason: HOSPADM

## 2025-01-15 RX ORDER — GADOTERATE MEGLUMINE 376.9 MG/ML
0.2 INJECTION INTRAVENOUS
Status: DISCONTINUED | OUTPATIENT
Start: 2025-01-15 | End: 2025-01-16 | Stop reason: HOSPADM

## 2025-01-15 RX ORDER — METHADONE HYDROCHLORIDE 5 MG/1
5 TABLET ORAL EVERY 8 HOURS
Status: DISCONTINUED | OUTPATIENT
Start: 2025-01-15 | End: 2025-01-16

## 2025-01-15 RX ORDER — LANOLIN ALCOHOL/MO/W.PET/CERES
100 CREAM (GRAM) TOPICAL DAILY
Status: DISCONTINUED | OUTPATIENT
Start: 2025-01-18 | End: 2025-01-24

## 2025-01-15 RX ORDER — MULTIVIT-MIN/IRON FUM/FOLIC AC 7.5 MG-4
1 TABLET ORAL DAILY
Status: DISCONTINUED | OUTPATIENT
Start: 2025-01-16 | End: 2025-01-23

## 2025-01-15 RX ORDER — THIAMINE HYDROCHLORIDE 100 MG/ML
100 INJECTION, SOLUTION INTRAMUSCULAR; INTRAVENOUS DAILY
Status: COMPLETED | OUTPATIENT
Start: 2025-01-16 | End: 2025-01-18

## 2025-01-15 RX ORDER — ACETAMINOPHEN 325 MG/1
650 TABLET ORAL EVERY 6 HOURS PRN
Status: DISCONTINUED | OUTPATIENT
Start: 2025-01-15 | End: 2025-01-19

## 2025-01-15 RX ORDER — FOLIC ACID 1 MG/1
1 TABLET ORAL DAILY
Status: DISCONTINUED | OUTPATIENT
Start: 2025-01-16 | End: 2025-01-23

## 2025-01-15 RX ORDER — LANOLIN ALCOHOL/MO/W.PET/CERES
100 CREAM (GRAM) TOPICAL DAILY
Status: DISCONTINUED | OUTPATIENT
Start: 2025-01-16 | End: 2025-01-15

## 2025-01-15 RX ADMIN — ONDANSETRON 4 MG: 2 INJECTION INTRAMUSCULAR; INTRAVENOUS at 18:47

## 2025-01-15 RX ADMIN — MORPHINE SULFATE 4 MG: 4 INJECTION INTRAVENOUS at 18:47

## 2025-01-15 RX ADMIN — DIPHENHYDRAMINE HYDROCHLORIDE 25 MG: 25 CAPSULE ORAL at 21:43

## 2025-01-15 RX ADMIN — ACETAMINOPHEN 975 MG: 325 TABLET ORAL at 15:42

## 2025-01-15 RX ADMIN — OXYCODONE HYDROCHLORIDE 5 MG: 5 TABLET ORAL at 21:43

## 2025-01-15 ASSESSMENT — ENCOUNTER SYMPTOMS
LOSS OF SENSATION IN FEET: 0
HEMATOLOGIC/LYMPHATIC NEGATIVE: 1
DEPRESSION: 0
UNEXPECTED WEIGHT CHANGE: 1
OCCASIONAL FEELINGS OF UNSTEADINESS: 0
EYE PROBLEMS: 1
GASTROINTESTINAL NEGATIVE: 1
EXTREMITY WEAKNESS: 1
SHORTNESS OF BREATH: 1
APPETITE CHANGE: 1
ENDOCRINE NEGATIVE: 1
FATIGUE: 1
CARDIOVASCULAR NEGATIVE: 1
DEPRESSION: 1

## 2025-01-15 ASSESSMENT — COLUMBIA-SUICIDE SEVERITY RATING SCALE - C-SSRS

## 2025-01-15 ASSESSMENT — PAIN SCALES - GENERAL
PAINLEVEL_OUTOF10: 8
PAINLEVEL_OUTOF10: 7

## 2025-01-15 ASSESSMENT — PATIENT HEALTH QUESTIONNAIRE - PHQ9
2. FEELING DOWN, DEPRESSED OR HOPELESS: SEVERAL DAYS
SUM OF ALL RESPONSES TO PHQ9 QUESTIONS 1 AND 2: 2
1. LITTLE INTEREST OR PLEASURE IN DOING THINGS: SEVERAL DAYS

## 2025-01-15 NOTE — ED TRIAGE NOTES
Hx HEENT cancer, scheduled for MRI today, R eye drooping, pupil fixed and dilated, sent by rad onc for admission

## 2025-01-15 NOTE — PROGRESS NOTES
RADIATION ONCOLOGY HISTORY & PHYSICAL:    Consults     HISTORY OF PRESENT ILLNESS:   Gaurav Mckay [id number] is a 54-year-old male with a history of a T2N0M0 squamous cell carcinoma of the larynx treated with definitive radiotherapy in June 2022.  In May 2024 the patient presented with a right neck mass with biopsy showing invasive moderate to poorly differentiated squamous cell carcinoma. He had a dissection in July 2024 and then chemoradiotherapy completing in September 2024.  This was all completed at the [facility].  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  An MRI brain on 12/14/2024 showed a 3 cm mass in the right clivus which was increased in size compared to a October 2023 CT scan. On 12/20/2024 the patient underwent biopsy of the clival mass and also bladder due to bladder thickening seen on imaging.  Final pathology from the clival mass shows squamous cell carcinoma confirming that this is also metastatic disease. The bladder showed no evidence of malignancy. The patient presents in consultation for consideration of palliative radiation to the clival mass.    Interval History:  Patient presents by himself today.  He was unable to complete his MRI earlier today secondary to severe pain while laying flat.  He is currently out of facility and is taking methadone and morphine which is being managed by his outpatient pain team.  He is also taking prednisone. He reports that he has been experiencing worsening right sided ptosis for the last month, and worsening vision changes. He does not for currently follow with a medical oncologist but was treated with a medical oncologist at [facility] late last year.     I have reviewed Gaurav Mckay medical, surgical and other pertinent history in detail, and have updated medication and allergy information as needed in the computerized patient  record.     PAST MEDICAL HISTORY:  Past Medical History:   Diagnosis Date    Acute inflammation of orbit 06/27/2013    Acute upper respiratory infection, unspecified 03/20/2015    Acute upper respiratory infection    Alcohol abuse, in remission 02/12/2015    History of ETOH abuse    Alcohol dependence, in remission 05/07/2015    Alcohol dependence in remission    Allergic contact dermatitis due to plants, except food 05/21/2014    Contact dermatitis due to poison ivy    Allergy status to unspecified drugs, medicaments and biological substances 08/29/2013    History of allergy    Anxiety     Anxiety disorder, unspecified 03/20/2015    Anxiety    Asthmatic bronchitis ([facility])     At high risk for falls     Bipolar disorder, current episode manic without psychotic features, unspecified (Multi)     Bipolar disorder, current episode manic without psychotic features    Bladder mass     Brain mass     Cancer of larynx (Multi)     Cervicalgia     Cervicalgia    Chronic asthmatic bronchitis (Multi) 12/02/2014    Clotting disorder (Multi)     P.E.    COPD (chronic obstructive pulmonary disease) (Multi)     Degeneration of cervical intervertebral disc     Depression     Dysphagia     Edentulous     Encounter for immunization 09/22/2016    Flu vaccine need    Frequent falls     pt states he falls every time he gets up.    GERD (gastroesophageal reflux disease)     Hearing aid worn     currently missing    History of abdominal pain 02/16/2017    History of allergic rhinitis 01/16/2014    History of allergic rhinitis 06/26/2014    History of allergic rhinitis    History of depression     History of esophageal reflux 08/27/2015    History of gastroenteritis 04/11/2013    History of hepatitis C     History of sinusitis 02/05/2014    History of sore throat 03/20/2017    Hyperlipidemia     Hypertension     Other long term (current) drug therapy 06/02/2015    High risk medication use    Personal history of nicotine dependence  08/16/2017    History of tobacco abuse    Personal history of nicotine dependence 06/26/2017    History of nicotine dependence    Personal history of other diseases of the respiratory system 09/26/2013    History of acute sinusitis    Personal history of other mental and behavioral disorders     Personal history of other specified conditions 08/25/2016    Pulmonary embolism     RLS (restless legs syndrome)     Sleep apnea     Unspecified asthma, uncomplicated ([facility]) 02/13/2014    Uses walker     and wheelchair    Weakness     Wears glasses     currently broken     PAST SURGICAL HISTORY:  Past Surgical History:   Procedure Laterality Date    ANKLE SURGERY Left     ANKLE SURGERY  07/08/2024    R ankle spanning ex-fix revision    CHOLECYSTECTOMY      CT ABDOMEN PELVIS ANGIOGRAM W AND/OR WO IV CONTRAST  04/01/2020    CT ABDOMEN PELVIS ANGIOGRAM W AND/OR WO IV CONTRAST 4/1/2020 [facility]    FRACTURE SURGERY Right 07/15/2024    Open reduction and internal fixation of right distal tibia pilon and fibula fractures, removal of right ankle spanning external fixation under anesthesia, debridement down to including bone external fixator pin sites right leg    INVASIVE VASCULAR PROCEDURE N/A 11/08/2023    Procedure: Pulmonary Angiogram;  Surgeon: Surya Templeton MD;  Location: [facility];  Service: Cardiovascular;  Laterality: N/A;    LARYNGOSCOPY  07/01/2024    R radical neck dissection and L ALT free flap reconstruction    LEG SURGERY  07/06/2024    RLE ex-fix placement    MR HEAD ANGIO WO IV CONTRAST  01/24/2016    MR HEAD ANGIO WO IV CONTRAST 1/24/2016 [facility]    MR NECK ANGIO WO IV CONTRAST  01/24/2016    MR NECK ANGIO WO IV CONTRAST 1/24/2016 [facility]    THROMBECTOMY      WOUND DEBRIDEMENT Left 08/06/2024    L thigh debridement with woud vac placement 2/2 surgical wound dehiscence     MEDICATIONS:  Current Outpatient Medications   Medication Sig Dispense Refill     acetaminophen (Tylenol) 325 mg tablet Take 2 tablets (650 mg) by mouth every 6 hours if needed for mild pain (1 - 3).      apixaban (Eliquis) 5 mg tablet Take 1 tablet (5 mg) by mouth 2 times a day.      bisacodyl (Dulcolax) 5 mg EC tablet Take 2 tablets (10 mg) by mouth once daily as needed for constipation. Do not crush, chew, or split.      butalbital-acetaminophen-caff -40 mg tablet Take 1 tablet by mouth every 4 hours if needed for headaches.      calcium carbonate (Tums) 200 mg calcium chewable tablet Chew 1 tablet (500 mg) once daily.      cyclobenzaprine (Flexeril) 10 mg tablet Take 1 tablet (10 mg) by mouth 3 times a day as needed for muscle spasms.      docusate sodium (Colace) 100 mg capsule Take 1 capsule (100 mg) by mouth 2 times a day.      ferrous sulfate 325 (65 Fe) MG EC tablet Take 65 mg by mouth 3 times daily (morning, midday, late afternoon). Do not crush, chew, or split.      folic acid (Folvite) 1 mg tablet Take 1 tablet (1 mg) by mouth once daily.      guaiFENesin (Mucinex) 600 mg 12 hr tablet Take 1 tablet (600 mg) by mouth 2 times a day. Do not crush, chew, or split.      ipratropium-albuteroL (Duo-Neb) 0.5-2.5 mg/3 mL nebulizer solution Take 3 mL by nebulization every 2 hours if needed for wheezing. 180 mL 11    lidocaine (Lidoderm) 5 % patch Place 1 patch on the skin once daily. Remove & discard patch within 12 hours or as directed by MD.      lubricating eye drops ophthalmic solution Administer 1 drop into both eyes every 6 hours if needed for dry eyes.      methadone (Dolophine) 5 mg tablet Take 1 tablet (5 mg) by mouth every 8 hours for 7 days. 21 tablet 0    mirtazapine (Remeron) 15 mg tablet Take 1 tablet (15 mg) by mouth once daily at bedtime.      montelukast (Singulair) 10 mg tablet Take 1 tablet (10 mg) by mouth once daily.      morphine (MSIR) 15 mg tablet Take 2 tablets (30 mg) by mouth every 3 hours if needed for moderate pain (4 - 6) or severe pain (7 - 10) for up to 7  days. 60 tablet 0    multivitamin with minerals tablet Take 1 tablet by mouth once daily.      naloxone (Narcan) 0.4 mg/mL injection Infuse 0.5 mL (0.2 mg) into a venous catheter if needed for respiratory depression (Once PRN patient is unarousable, and respiratory rate less than 8). 1 mL prn    nicotine (Nicoderm CQ) 14 mg/24 hr patch Place 1 patch on the skin once every 24 hours.      OLANZapine (ZyPREXA) 5 mg tablet Take 1 tablet (5 mg) by mouth 2 times a day as needed (Severe agitation/restlessness or interference with care and not redirectable).      ondansetron ODT (Zofran-ODT) 4 mg disintegrating tablet Dissolve 1 tablet (4 mg) in the mouth every 8 hours if needed for nausea.      oxygen (O2) gas therapy Inhale 2 L/min once every 24 hours.      pancrelipase, Lip-Prot-Amyl, (Creon) 6,000-19,000 -30,000 unit capsule Take 1 capsule by mouth 2 times a day.      pantoprazole (ProtoNix) 40 mg EC tablet Take 1 tablet (40 mg) by mouth once daily in the morning. Take before meals. Do not crush, chew, or split.      polyethylene glycol (Glycolax, Miralax) 17 gram packet Take 17 g by mouth 3 times a day as needed.      pregabalin (Lyrica) 200 mg capsule Take 1 capsule (200 mg) by mouth 3 times a day for 14 days. 42 capsule 0    rOPINIRole (Requip) 0.5 mg tablet Take 1 tablet (0.5 mg) by mouth 3 times a day.      sennosides-docusate sodium (Marya-Colace) 8.6-50 mg tablet Take 1 tablet by mouth once daily at bedtime. 30 tablet 0    sodium chloride (Ocean) 0.65 % nasal spray Administer 2 sprays into each nostril 4 times a day. 30 mL 12    tamsulosin (Flomax) 0.4 mg 24 hr capsule Take 1 capsule (0.4 mg) by mouth once daily.      thiamine 100 mg tablet Take 1 tablet (100 mg) by mouth once daily.      topiramate (Topamax) 100 mg tablet Take 1 tablet (100 mg) by mouth 2 times a day.      amoxicillin-pot clavulanate (Augmentin) 875-125 mg tablet Take 1 tablet (875 mg) by mouth 2 times a day.      atorvastatin (Lipitor) 20 mg  tablet Take 1 tablet (20 mg) by mouth once daily at bedtime. (Patient not taking: Reported on 1/15/2025)      dexAMETHasone (Decadron) 4 mg tablet Take 1 tablet (4 mg) by mouth once daily for 7 doses.      LORazepam (Ativan) 2 mg/mL injection Infuse 0.25 mL (0.5 mg) into a venous catheter every 2 hours if needed (CIWA score 6-7 or HR greater than 100). (Patient not taking: Reported on 1/15/2025)      polyethylene glycol (Glycolax, Miralax) 17 gram packet Take 17 g by mouth every 8 hours. (Patient not taking: Reported on 1/15/2025)      predniSONE (Deltasone) 20 mg tablet Take 2 tablets (40 mg) by mouth once daily. FOR UTI      sennosides (Senokot) 8.6 mg tablet Take 2 tablets (17.2 mg) by mouth 2 times a day as needed for constipation. (Patient not taking: Reported on 1/15/2025)       No current facility-administered medications for this encounter.     Facility-Administered Medications Ordered in Other Encounters   Medication Dose Route Frequency Provider Last Rate Last Admin    gadoterate meglumine (Dotarem) 0.5 mmol/mL contrast injection 15.5 mL  0.2 mL/kg (Order-Specific) intravenous Once in imaging Candice Machado MD         ALLERGIES:  Oxycodone  SOCIAL HISTORY:  Social History     Socioeconomic History    Marital status:      Spouse name: Not on file    Number of children: Not on file    Years of education: Not on file    Highest education level: Not on file   Occupational History    Not on file   Tobacco Use    Smoking status: Every Day     Current packs/day: 1.00     Types: Cigarettes    Smokeless tobacco: Former     Types: Snuff    Tobacco comments:     1 ppd since the age of 16   Substance and Sexual Activity    Alcohol use: Yes     Comment: 6 pack beer a day    Drug use: Yes     Types: Methamphetamines, Cocaine     Comment: Hx cocaine use    Sexual activity: Defer   Other Topics Concern    Not on file   Social History Narrative    Not on file     Social Drivers of Health     Financial Resource  Strain: High Risk (12/18/2024)    Overall Financial Resource Strain (CARDIA)     Difficulty of Paying Living Expenses: Hard   Food Insecurity: Food Insecurity Present (12/18/2024)    Hunger Vital Sign     Worried About Running Out of Food in the Last Year: Sometimes true     Ran Out of Food in the Last Year: Sometimes true   Transportation Needs: Unmet Transportation Needs (12/18/2024)    PRAPARE - Transportation     Lack of Transportation (Medical): Yes     Lack of Transportation (Non-Medical): Yes   Physical Activity: Not on file   Stress: No Stress Concern Present (12/18/2024)    [employer] [place] [employer] - Occupational Stress Questionnaire     Feeling of Stress : Only a little   Social Connections: Not on file   Intimate Partner Violence: Not At Risk (12/18/2024)    Humiliation, Afraid, Rape, and Kick questionnaire     Fear of Current or Ex-Partner: No     Emotionally Abused: No     Physically Abused: No     Sexually Abused: No   Housing Stability: High Risk (12/18/2024)    Housing Stability Vital Sign     Unable to Pay for Housing in the Last Year: Yes     Number of Times Moved in the Last Year: 0     Homeless in the Last Year: No     FAMILY HISTORY:  Family History   Problem Relation Name Age of Onset    Diabetes Mother      Hypertension Mother      Heart attack Father      Hypertension Other         REVIEW OF SYSTEMS:    A 14 point review of systems was negative except for what is noted in the HPI.    Vitals:    01/15/25 1113   BP: 99/67   Pulse: 85   Resp: 18   Temp: 36.2 °C (97.2 °F)   TempSrc: Skin   SpO2: 91%       PHYSICAL EXAM:   KPS:60  General: NAD.  CV: Regular rate.  Resp: Breathing is non-labored. Breathing comfortably on RA.  Abdomen: Soft, ND.  Extremities: No acute findings. No edema   Skin: Color, texture and turgor wnl. No rashes and lesions.  Neuro: AAO x 3, appropriately interactive, normal affect, speech fluent. Right sided ptosis, right pupil dilated, right eye with limited  "movement, left abducens palsy. Normal strength and sensation to LT intact bilaterally in the upper and lower extremities.     RADIOGRAPHIC FINDINGS:  I reviewed all the images associated with this patient and the current presentation.    PATHOLOGIC FINDINGS:  I have reviewed all the pertinent pathological studies and results associated with this case.    LABORATORY FINDINGS:  All pertinent lab values have been reviewed.    IMPRESSION AND PLAN:   Gaurav Mckay [id number] is a 54-year-old male with metastatic head and neck squamous cell carcinoma and a large destructive symptom in the clivus causing worsening neurologic symptoms and severe pain. The patient was plan for an MRI today and radiation planning scan, however the patient is in too much pain to lay flat or complete the radiation planning scan.  In addition he is experiencing worsening neurologic symptoms including severe right-sided ptosis, a right dilated pupil, and limited mobility of his right eye. He is slow to respond to questions and the facility states they began noticing he was a little bit \"out of it\" yesterday. We will send him to the emergency department for neurosurgical evaluation, ophthalmology evaluation, and pain management, with radiation oncology plans for a repeat MRI and radiation planning scan pending evaluation by neurosurgery and optimization of pain.     PLAN:  - Patient sent to ED from radiation oncology clinic  - Neurosurgery, ophthalmology, and palliative medicine consultation upon arrival to ED    Candice Machado MD  , Radiation Oncology    LONGITUDINAL CARE, EXTRA EFFORT/ : The patient will be followed longitudinally by providers (including APPs) in the department of radiation oncology for monitoring treatment effects during and after radiation. Additional effort needed in the setting of his metastatic disease and coordination of care with the emergency department.       "

## 2025-01-15 NOTE — CONSULTS
Consults    Reason For Consult  Clival mass, recommended for palliative radiation    History Of Present Illness  Lemuel Mckay is a 54 y.o. 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     Patient is a very pleasant 54-year-old gentleman with past medical history listed above.  Unfortunately last month he was admitted for findings of bladder mass as well as clival mass and that is described above.  He underwent bladder and bilateral biopsy and the biopsy result revealed metastatic carcinoma.  He additionally was discussed in neuro tumor board with the recommendation of palliative radiation with follow-up imaging.  Unfortunately given the extent and location of his tumor as well as other comorbidity there was no good operative intervention.  He is now presenting to us with 2 weeks of right eye droopiness that is related to cranial nerve III palsy.  He denies any weakness, numbness or loss of consciousness.  Besides his visual and eye movement issues and does not have any other neurologic deficit at this time.    Past Medical History  He has a past medical history of Acute inflammation of orbit (06/27/2013), Acute upper respiratory infection, unspecified (03/20/2015), Alcohol abuse, in remission (02/12/2015), Alcohol dependence, in remission (05/07/2015), Allergic contact dermatitis due to plants, except food (05/21/2014),  Allergy status to unspecified drugs, medicaments and biological substances (08/29/2013), Anxiety, Anxiety disorder, unspecified (03/20/2015), Asthmatic bronchitis ([facility]), At high risk for falls, Bipolar disorder, current episode manic without psychotic features, unspecified (Multi), Bladder mass, Brain mass, Cancer of larynx (Multi), Cervicalgia, Chronic asthmatic bronchitis (Multi) (12/02/2014), Clotting disorder (Multi), COPD (chronic obstructive pulmonary disease) (Multi), Degeneration of cervical intervertebral disc, Depression, Dysphagia, Edentulous, Encounter for immunization (09/22/2016), Frequent falls, GERD (gastroesophageal reflux disease), Hearing aid worn, History of abdominal pain (02/16/2017), History of allergic rhinitis (01/16/2014), History of allergic rhinitis (06/26/2014), History of depression, History of esophageal reflux (08/27/2015), History of gastroenteritis (04/11/2013), History of hepatitis C, History of sinusitis (02/05/2014), History of sore throat (03/20/2017), Hyperlipidemia, Hypertension, Other long term (current) drug therapy (06/02/2015), Personal history of nicotine dependence (08/16/2017), Personal history of nicotine dependence (06/26/2017), Personal history of other diseases of the respiratory system (09/26/2013), Personal history of other mental and behavioral disorders, Personal history of other specified conditions (08/25/2016), Pulmonary embolism, RLS (restless legs syndrome), Sleep apnea, Unspecified asthma, uncomplicated ([facility]) (02/13/2014), Uses walker, Weakness, and Wears glasses.    Surgical History  He has a past surgical history that includes MR angio head wo IV contrast (01/24/2016); MR angio neck wo IV contrast (01/24/2016); CT angio abdomen pelvis w and or wo IV IV contrast (04/01/2020); Invasive Vascular Procedure (N/A, 11/08/2023); Cholecystectomy; Ankle surgery (Left); Thrombectomy; Wound debridement (Left, 08/06/2024); Fracture surgery (Right,  07/15/2024); Ankle surgery (07/08/2024); Leg Surgery (07/06/2024); and Laryngoscopy (07/01/2024).     Social History  He reports that he has quit smoking. His smoking use included cigarettes. He has quit using smokeless tobacco.  His smokeless tobacco use included snuff. He reports that he does not currently use alcohol. He reports current drug use. Drugs: Methamphetamines, Cocaine, and Marijuana.    Family History  Family History   Problem Relation Name Age of Onset    Diabetes Mother      Hypertension Mother      Heart attack Father      Hypertension Other          Allergies  Oxycodone    Review of Systems  10 point ROS is obtained and negative except the ones mentioned in the HPI     Physical Exam  ANO x 3  Face symmetric  Tongue midline  Right pupil 6 mm nonreactive, CN III, CN VI palsy  Left pupil 3 mm reactive, partial CN VI palsy  Hearing intact bilaterally  Facial sensation intact bilaterally  Follows command x 4 5/5  SILT     Last Recorded Vitals  Blood pressure (!) 120/91, pulse 89, temperature 36.6 °C (97.9 °F), temperature source Temporal, resp. rate 16, SpO2 99%.    Relevant Results  As above     Assessment/Plan     Lemuel Mckay is a 54 y.o. male w/ h/o HTN, GERD, COPD, saddle PE (on eliquis), alcohol abuse c/b peripheral neuropathy, illicit drug use (methamphetamine), tobacco use, restless leg syndrome, [id number] squamous cell carcinoma of the larynx, s/p radical neck dissection and radiation, bladder mass w hematuria, recently p/w R CN 6 palsy, found to have expansile clival mass, 12/14/24 MRI brain R clival tumor, 12/20/24 s/p bladder biopsy (by urology) and sinonasal biopsy (by ENT, final path: squamous cell carcinoma), recently discussed at neuro TB w/ recommendation for radiation oncology for fractionated palliative SBRT and follow up imaging, 1/15 p/w HA and 2wk R ptosis.    Recs:  Cranial nerve palsies are likely related to the growing skull base clival mass with involvement of multiple  cranial nerves.  However recommend obtaining CT head to rule out any other acute processes such as peritumoral or clival subdural hematoma.    Had an extensive discussion with the patient regarding the previously mentioned mass as well as his progressive worsening of cranial nerve palsies.  He understands and agrees that there is a chance that with time his symptoms can get worse and he may lose vision or movement of his eye muscles.  Unfortunately given the location and extent of the tumor there is no good surgical intervention to reverse the effect of the tumor on his cranial nerve deficits.  Patient endorses understanding of his symptoms and the fact that things can get/will get worse over time.  He decline obtaining MRI at this time.    Recommend radiation oncology consultation for more urgent palliative radiation planning.  Neurosurgery will continue to follow and will review CT head.

## 2025-01-15 NOTE — PROGRESS NOTES
"Social Work Note    Referral Source: LEANN Morin   Person(s) Present: [facility] Staff, Ailin AUGUSTE   Identified Needs: Notified SNF of ED Admission  Impression and Plan: SW received Epic chat from LEANN Morin who shared that Gaurav needed to be admitted to the ED due to worsening symptoms related to his cancer. SW called [facility] [phone] and provided update. Nurse shared that she noticed Gaurav's \"face looked swollen and he was out of it\" when he left today and noticed change yesterday. She asked what symptoms were presented that warranted ED admission, SW shared unaware of specific symptoms, was only consulted to inform SNF. Provided staff with [facility] ED number and LEANN Dupree number. Will remain available as needed.   Interventions Provided: Care Coordination      "

## 2025-01-15 NOTE — PROGRESS NOTES
Gaurav Mckay is a 54 year old man who presents for follow-up neurosurgical consultation.     MRI brain 12/14/24 for review           Note 1/8/25:  Patient was presented by Dr. Candice Machado at our CNS Tumor Board on 01/08/2025 which included representatives from Radiation oncology, Surgical oncology, Neuro-oncology, Pathology, Radiology, Research, Neurosurgery, Social Work (Neurosurgery).     Current patient presents with history of the following treatment history:  PMH HTN, laryngeal cancer s/p radical neck dissection, PE on Eliquis, alcohol abuse, presented for scheduled cystoscopy with ablation on 12/17 for bladder tumor but due to intoxication case was not able to be completed and while inpatient found to have dysconjugate gaze with complaint of headaches. CTH c/f tumor. CT C/A/P without additional lesions. MRI 12/14/24 with enlarging mass of right clivus concerning for mets vs lymphoma. Degenerative chronic microvascular changes.

## 2025-01-15 NOTE — PROGRESS NOTES
Radiation Oncology Nursing Note    Prior Radiotherapy:  Yes, describe: June 2024 last dose  No radiation treatments to show. (Treatments may have been administered in another system.)     Current Systemic Treatment:  Yes, describe: June 2024 last dose     Presence of Pacemaker or ICD:  No    History of Autoimmune or Connective Tissue Disorders:  No    Pain: The patient's current pain level was assessed.  They report currently having a pain of 8 out of 10.  They feel their pain is under control with the use of pain medications.    Review of Systems:  Review of Systems   Constitutional:  Positive for appetite change, fatigue and unexpected weight change.        No teeth, no taste buds. No appetite for pureed, lost about 30lbs over 4 months     HENT:   Positive for nosebleeds.         L nare nosebleed     Eyes:  Positive for eye problems.        R eye, eyelid unable to open without assistance. Double vision/blurrey R eye only   Respiratory:  Positive for shortness of breath.         O2 requirements some times during the day   Cardiovascular: Negative.         HBP     Gastrointestinal: Negative.    Endocrine: Negative.    Genitourinary: Negative.     Musculoskeletal:  Positive for gait problem.   Skin: Negative.         Healed incisions from past surgery     Neurological:  Positive for extremity weakness and gait problem.   Hematological: Negative.         On Anticoagulation at baseline     Psychiatric/Behavioral:  Positive for depression.

## 2025-01-15 NOTE — CONSULTS
Reason For Consult  Fixed, dilated pupil right eye; limited EOMs     History Of Present Illness  Lemuel Mckay is a 54 y.o. 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    The pt endorses blurry vision in his right eye, as well as monocular diplopia of his right eye, as well as constant headache (but no significant change in headache). He reports ptosis of left eye, which has been ongoing for the past week.      Past Medical History  He has a past medical history of Acute inflammation of orbit (06/27/2013), Acute upper respiratory infection, unspecified (03/20/2015), Alcohol abuse, in remission (02/12/2015), Alcohol dependence, in remission (05/07/2015), Allergic contact dermatitis due to plants, except food (05/21/2014), Allergy status to unspecified drugs, medicaments and biological substances (08/29/2013), Anxiety, Anxiety disorder, unspecified (03/20/2015), Asthmatic bronchitis ([facility]), At high risk for falls, Bipolar disorder, current episode manic without psychotic features, unspecified (Multi), Bladder mass, Brain mass, Cancer of larynx (Multi), Cervicalgia, Chronic asthmatic bronchitis (Multi) (12/02/2014), Clotting disorder (Multi), COPD (chronic obstructive pulmonary disease) (Multi), Degeneration of cervical intervertebral disc, Depression, Dysphagia, Edentulous, Encounter for immunization (09/22/2016), Frequent falls, GERD (gastroesophageal reflux disease), Hearing aid worn, History of abdominal pain (02/16/2017), History of allergic rhinitis (01/16/2014), History of allergic rhinitis (06/26/2014), History of depression, History of esophageal reflux (08/27/2015), History of  gastroenteritis (04/11/2013), History of hepatitis C, History of sinusitis (02/05/2014), History of sore throat (03/20/2017), Hyperlipidemia, Hypertension, Other long term (current) drug therapy (06/02/2015), Personal history of nicotine dependence (08/16/2017), Personal history of nicotine dependence (06/26/2017), Personal history of other diseases of the respiratory system (09/26/2013), Personal history of other mental and behavioral disorders, Personal history of other specified conditions (08/25/2016), Pulmonary embolism, RLS (restless legs syndrome), Sleep apnea, Unspecified asthma, uncomplicated ([facility]) (02/13/2014), Uses walker, Weakness, and Wears glasses.    Surgical History  He has a past surgical history that includes MR angio head wo IV contrast (01/24/2016); MR angio neck wo IV contrast (01/24/2016); CT angio abdomen pelvis w and or wo IV IV contrast (04/01/2020); Invasive Vascular Procedure (N/A, 11/08/2023); Cholecystectomy; Ankle surgery (Left); Thrombectomy; Wound debridement (Left, 08/06/2024); Fracture surgery (Right, 07/15/2024); Ankle surgery (07/08/2024); Leg Surgery (07/06/2024); and Laryngoscopy (07/01/2024).    Social History  He reports that he has quit smoking. His smoking use included cigarettes. He has quit using smokeless tobacco.  His smokeless tobacco use included snuff. He reports that he does not currently use alcohol. He reports current drug use. Drugs: Methamphetamines, Cocaine, and Marijuana.    Family History  Family History   Problem Relation Name Age of Onset    Diabetes Mother      Hypertension Mother      Heart attack Father      Hypertension Other          Allergies  Oxycodone    Review of Systems  As above      Physical Exam  Base Eye Exam       Visual Acuity (+2,.50 readers)         Right Left    Near cc 20/20 20/20      Correction: Glasses              Tonometry (Tonopen, 4:35 PM)         Right Left    Pressure 13 9              Pupils         Dark Light Shape React  APD    Right 7 7 Round Fixed None by reverse    Left 4 3 Round Brisk None              Visual Fields         Left Right     Full Full              Extraocular Movement         Right Left     -- -4 --   -4  -4   -- -4 --    -- 0 --   0  -3   -- 0 --                     Additional Tests       Color         Right Left    [place] 12.5/14 13/14                  Slit Lamp and Fundus Exam       External Exam         Right Left    External Normal Normal              Slit Lamp Exam         Right Left    Lids/Lashes Complete ptosis Normal    Conjunctiva/Sclera White and quiet White and quiet    Cornea Clear Clear    Anterior Chamber Deep and quiet Deep and quiet    Iris Round and reactive Round and reactive    Lens Clear Clear    Anterior Vitreous Normal Normal              Fundus Exam         Right Left    Disc Normal     C/D Ratio 0.3     Macula Normal     Vessels Normal     Periphery Normal                      Last Recorded Vitals  Blood pressure (!) 121/91, pulse 90, temperature 36.6 °C (97.9 °F), temperature source Temporal, resp. rate 16, SpO2 97%.    Relevant Results         Assessment/Plan     #Right, complete, pupil-involving, third-nerve palsy  #Left sixth nerve palsy  #Right sixth nerve palsy   - 55yo male with hx of laryngeal cancer s/p radical neck dissection, PE on Apixaban, alcohol abuse, tobacco abuse, HTN, bladder mass with hematuria, with recently diagnosed large lytic clival tumor (12/20/24 s/p bladder biopsy (by urology) and sinonasal biopsy (by ENT, final path: squamous cell carcinoma), with plan for fractionated palliative SBRT.   - Recently seen by ophthalmology on 12/17/24 for R abduction deficit and diplopia; which was 2/2 to large clival mass.   - Today, he presents after being sent from radiation oncology for new complete, pupil-involving third nerve palsy.   - Exam today shows OD with complete ptosis, eye in down and out position, and fixed-dilated pupil. Intraocular exam is wnl. OS now also has  -3 abduction deficit, which was not previously noted.   - Overall, the new third nerve palsy of the right side, and new left sixth nerve palsy of the left side, and existing right sixth nerve palsy are likely all due to expansion of clivus mass.     Recommendations:   - Defer to neurosurgery regarding imaging  - Ophthalmology will follow for imaging result    Update 1/16/25:   CTH reviewed, which shows extension of mass into bilateral cavernous sinus, which could be the cause of the above mentioned nerve palsies.   Defer to primary team and radiation oncology for management of clival squamous cell carcinoma.   Pt already scheduled for outpatient follow-up with neuro-ophthalmology.     Discussed with Dr. Harmeet Matias, neuro-ophthalmology attending.      Skyla Jasmine MD  Ophthalmology, PGY3    Ophthalmology Adult Pager - [phone]  Ophthalmology Pediatrics Pager (M-F 8:00am-5:00pm) - [phone]    For adult follow-up appointments, call: [phone]  For pediatric follow-up appointments, call: [phone]

## 2025-01-15 NOTE — ADDENDUM NOTE
Encounter addended by: Candice Machado MD on: 1/15/2025 12:40 PM   Actions taken: Clinical Note Signed

## 2025-01-16 ENCOUNTER — HOSPITAL ENCOUNTER (OUTPATIENT)
Dept: RADIATION ONCOLOGY | Facility: HOSPITAL | Age: 55
Setting detail: RADIATION/ONCOLOGY SERIES
Discharge: HOME | End: 2025-01-16
Payer: COMMERCIAL

## 2025-01-16 ENCOUNTER — APPOINTMENT (OUTPATIENT)
Dept: NEUROSURGERY | Facility: CLINIC | Age: 55
End: 2025-01-16
Payer: COMMERCIAL

## 2025-01-16 ENCOUNTER — HOSPITAL ENCOUNTER (OUTPATIENT)
Dept: RADIOLOGY | Facility: EXTERNAL LOCATION | Age: 55
Discharge: HOME | End: 2025-01-16

## 2025-01-16 DIAGNOSIS — C79.51 METASTASIS TO BONE (MULTI): Primary | ICD-10-CM

## 2025-01-16 DIAGNOSIS — C79.51 METASTASIS TO BONE (MULTI): ICD-10-CM

## 2025-01-16 LAB
AMPHETAMINES UR QL SCN: ABNORMAL
BARBITURATES UR QL SCN: ABNORMAL
BENZODIAZ UR QL SCN: ABNORMAL
BZE UR QL SCN: ABNORMAL
CANNABINOIDS UR QL SCN: ABNORMAL
ERYTHROCYTE [DISTWIDTH] IN BLOOD BY AUTOMATED COUNT: 16.1 % (ref 11.5–14.5)
FENTANYL+NORFENTANYL UR QL SCN: ABNORMAL
HCT VFR BLD AUTO: 38.9 % (ref 41–52)
HGB BLD-MCNC: 12.2 G/DL (ref 13.5–17.5)
MCH RBC QN AUTO: 26.9 PG (ref 26–34)
MCHC RBC AUTO-ENTMCNC: 31.4 G/DL (ref 32–36)
MCV RBC AUTO: 86 FL (ref 80–100)
METHADONE UR QL SCN: ABNORMAL
NRBC BLD-RTO: 0 /100 WBCS (ref 0–0)
OPIATES UR QL SCN: ABNORMAL
OXYCODONE+OXYMORPHONE UR QL SCN: ABNORMAL
PCP UR QL SCN: ABNORMAL
PLATELET # BLD AUTO: 344 X10*3/UL (ref 150–450)
RBC # BLD AUTO: 4.53 X10*6/UL (ref 4.5–5.9)
UFH PPP CHRO-ACNC: 0.7 IU/ML
WBC # BLD AUTO: 9.6 X10*3/UL (ref 4.4–11.3)

## 2025-01-16 PROCEDURE — 2500000001 HC RX 250 WO HCPCS SELF ADMINISTERED DRUGS (ALT 637 FOR MEDICARE OP): Mod: SE

## 2025-01-16 PROCEDURE — 77334 RADIATION TREATMENT AID(S): CPT | Performed by: RADIOLOGY

## 2025-01-16 PROCEDURE — 80307 DRUG TEST PRSMV CHEM ANLYZR: CPT

## 2025-01-16 PROCEDURE — 1170000001 HC PRIVATE ONCOLOGY ROOM DAILY

## 2025-01-16 PROCEDURE — 77290 THER RAD SIMULAJ FIELD CPLX: CPT | Performed by: RADIOLOGY

## 2025-01-16 PROCEDURE — 2500000004 HC RX 250 GENERAL PHARMACY W/ HCPCS (ALT 636 FOR OP/ED): Mod: SE

## 2025-01-16 PROCEDURE — 99255 IP/OBS CONSLTJ NEW/EST HI 80: CPT | Performed by: INTERNAL MEDICINE

## 2025-01-16 PROCEDURE — 85027 COMPLETE CBC AUTOMATED: CPT

## 2025-01-16 PROCEDURE — 36415 COLL VENOUS BLD VENIPUNCTURE: CPT

## 2025-01-16 PROCEDURE — S4991 NICOTINE PATCH NONLEGEND: HCPCS | Mod: SE

## 2025-01-16 PROCEDURE — 31231 NASAL ENDOSCOPY DX: CPT | Performed by: OTOLARYNGOLOGY

## 2025-01-16 PROCEDURE — S0109 METHADONE ORAL 5MG: HCPCS | Mod: SE

## 2025-01-16 PROCEDURE — 2500000005 HC RX 250 GENERAL PHARMACY W/O HCPCS: Mod: SE

## 2025-01-16 PROCEDURE — 99221 1ST HOSP IP/OBS SF/LOW 40: CPT | Performed by: OTOLARYNGOLOGY

## 2025-01-16 PROCEDURE — 99497 ADVNCD CARE PLAN 30 MIN: CPT | Performed by: INTERNAL MEDICINE

## 2025-01-16 PROCEDURE — 2500000002 HC RX 250 W HCPCS SELF ADMINISTERED DRUGS (ALT 637 FOR MEDICARE OP, ALT 636 FOR OP/ED): Mod: SE

## 2025-01-16 PROCEDURE — 80365 DRUG SCREENING OXYCODONE: CPT

## 2025-01-16 PROCEDURE — 85520 HEPARIN ASSAY: CPT

## 2025-01-16 RX ORDER — OXYCODONE HYDROCHLORIDE 10 MG/1
10 TABLET ORAL ONCE
Status: COMPLETED | OUTPATIENT
Start: 2025-01-16 | End: 2025-01-16

## 2025-01-16 RX ORDER — MORPHINE SULFATE 15 MG/1
15 TABLET ORAL
Status: DISCONTINUED | OUTPATIENT
Start: 2025-01-16 | End: 2025-01-19

## 2025-01-16 RX ORDER — MICONAZOLE NITRATE 2 %
2 CREAM (GRAM) TOPICAL EVERY 2 HOUR PRN
Status: DISCONTINUED | OUTPATIENT
Start: 2025-01-16 | End: 2025-01-23

## 2025-01-16 RX ORDER — PREGABALIN 25 MG/1
50 CAPSULE ORAL 3 TIMES DAILY
Status: DISCONTINUED | OUTPATIENT
Start: 2025-01-17 | End: 2025-01-17

## 2025-01-16 RX ORDER — TALC
3 POWDER (GRAM) TOPICAL NIGHTLY PRN
Status: DISCONTINUED | OUTPATIENT
Start: 2025-01-16 | End: 2025-01-25 | Stop reason: HOSPADM

## 2025-01-16 RX ORDER — OXYMETAZOLINE HCL 0.05 %
2 SPRAY, NON-AEROSOL (ML) NASAL EVERY 12 HOURS PRN
Status: DISPENSED | OUTPATIENT
Start: 2025-01-16 | End: 2025-01-19

## 2025-01-16 RX ORDER — PETROLATUM 420 MG/G
OINTMENT TOPICAL
Status: DISCONTINUED | OUTPATIENT
Start: 2025-01-16 | End: 2025-01-17

## 2025-01-16 RX ORDER — ONDANSETRON 4 MG/1
4 TABLET, FILM COATED ORAL EVERY 8 HOURS PRN
COMMUNITY

## 2025-01-16 RX ORDER — METHADONE HYDROCHLORIDE 5 MG/1
2.5 TABLET ORAL EVERY 8 HOURS
Status: DISCONTINUED | OUTPATIENT
Start: 2025-01-16 | End: 2025-01-20

## 2025-01-16 RX ORDER — HYDROMORPHONE HYDROCHLORIDE 1 MG/ML
1 INJECTION, SOLUTION INTRAMUSCULAR; INTRAVENOUS; SUBCUTANEOUS
Status: DISCONTINUED | OUTPATIENT
Start: 2025-01-16 | End: 2025-01-20

## 2025-01-16 RX ORDER — BACITRACIN 500 [USP'U]/G
OINTMENT TOPICAL 3 TIMES DAILY
Status: DISPENSED | OUTPATIENT
Start: 2025-01-16 | End: 2025-01-23

## 2025-01-16 RX ORDER — MIRTAZAPINE 15 MG/1
15 TABLET, FILM COATED ORAL NIGHTLY
Status: DISCONTINUED | OUTPATIENT
Start: 2025-01-16 | End: 2025-01-25 | Stop reason: HOSPADM

## 2025-01-16 RX ORDER — HYDROXYZINE HYDROCHLORIDE 25 MG/1
25 TABLET, FILM COATED ORAL ONCE
Status: COMPLETED | OUTPATIENT
Start: 2025-01-16 | End: 2025-01-16

## 2025-01-16 RX ADMIN — HYDROXYZINE HYDROCHLORIDE 25 MG: 25 TABLET ORAL at 13:54

## 2025-01-16 RX ADMIN — MORPHINE SULFATE 30 MG: 30 TABLET ORAL at 11:19

## 2025-01-16 RX ADMIN — NICOTINE 1 PATCH: 14 PATCH, EXTENDED RELEASE TRANSDERMAL at 00:00

## 2025-01-16 RX ADMIN — OXYCODONE HYDROCHLORIDE 10 MG: 10 TABLET ORAL at 14:00

## 2025-01-16 RX ADMIN — TAMSULOSIN HYDROCHLORIDE 0.4 MG: 0.4 CAPSULE ORAL at 08:37

## 2025-01-16 RX ADMIN — PREGABALIN 200 MG: 75 CAPSULE ORAL at 08:36

## 2025-01-16 RX ADMIN — HEPARIN SODIUM 1400 UNITS/HR: 10000 INJECTION, SOLUTION INTRAVENOUS at 00:45

## 2025-01-16 RX ADMIN — TOPIRAMATE 100 MG: 100 TABLET, FILM COATED ORAL at 08:37

## 2025-01-16 RX ADMIN — ROPINIROLE 0.5 MG: 0.5 TABLET, FILM COATED ORAL at 21:28

## 2025-01-16 RX ADMIN — HYDROMORPHONE HYDROCHLORIDE 1 MG: 1 INJECTION, SOLUTION INTRAMUSCULAR; INTRAVENOUS; SUBCUTANEOUS at 20:44

## 2025-01-16 RX ADMIN — STANDARDIZED SENNA CONCENTRATE 17.2 MG: 8.6 TABLET ORAL at 20:37

## 2025-01-16 RX ADMIN — MORPHINE SULFATE 30 MG: 30 TABLET ORAL at 03:00

## 2025-01-16 RX ADMIN — ROPINIROLE 0.5 MG: 0.5 TABLET, FILM COATED ORAL at 17:07

## 2025-01-16 RX ADMIN — THIAMINE HYDROCHLORIDE 100 MG: 100 INJECTION, SOLUTION INTRAMUSCULAR; INTRAVENOUS at 08:36

## 2025-01-16 RX ADMIN — Medication 1 TABLET: at 08:37

## 2025-01-16 RX ADMIN — ROPINIROLE 0.5 MG: 0.5 TABLET, FILM COATED ORAL at 08:37

## 2025-01-16 RX ADMIN — MORPHINE SULFATE 15 MG: 15 TABLET ORAL at 18:56

## 2025-01-16 RX ADMIN — PANCRELIPASE 1 CAPSULE: 30000; 6000; 19000 CAPSULE, DELAYED RELEASE PELLETS ORAL at 20:37

## 2025-01-16 RX ADMIN — PREGABALIN 200 MG: 75 CAPSULE ORAL at 00:00

## 2025-01-16 RX ADMIN — NICOTINE 1 PATCH: 14 PATCH, EXTENDED RELEASE TRANSDERMAL at 21:55

## 2025-01-16 RX ADMIN — MIRTAZAPINE 15 MG: 15 TABLET, FILM COATED ORAL at 00:39

## 2025-01-16 RX ADMIN — Medication 3 MG: at 00:39

## 2025-01-16 RX ADMIN — PANTOPRAZOLE SODIUM 40 MG: 40 TABLET, DELAYED RELEASE ORAL at 05:54

## 2025-01-16 RX ADMIN — ATORVASTATIN CALCIUM 20 MG: 20 TABLET, FILM COATED ORAL at 20:37

## 2025-01-16 RX ADMIN — MONTELUKAST 10 MG: 10 TABLET, FILM COATED ORAL at 20:37

## 2025-01-16 RX ADMIN — MORPHINE SULFATE 30 MG: 30 TABLET ORAL at 00:00

## 2025-01-16 RX ADMIN — TOPIRAMATE 100 MG: 100 TABLET, FILM COATED ORAL at 20:37

## 2025-01-16 RX ADMIN — METHADONE HYDROCHLORIDE 2.5 MG: 5 TABLET ORAL at 17:07

## 2025-01-16 RX ADMIN — MIRTAZAPINE 15 MG: 15 TABLET, FILM COATED ORAL at 20:37

## 2025-01-16 RX ADMIN — METHADONE HYDROCHLORIDE 5 MG: 5 TABLET ORAL at 08:44

## 2025-01-16 RX ADMIN — BACITRACIN: 500 OINTMENT TOPICAL at 20:37

## 2025-01-16 RX ADMIN — MORPHINE SULFATE 30 MG: 30 TABLET ORAL at 05:54

## 2025-01-16 RX ADMIN — FOLIC ACID 1 MG: 1 TABLET ORAL at 08:37

## 2025-01-16 SDOH — SOCIAL STABILITY: SOCIAL INSECURITY: DO YOU FEEL UNSAFE GOING BACK TO THE PLACE WHERE YOU ARE LIVING?: NO

## 2025-01-16 SDOH — SOCIAL STABILITY: SOCIAL INSECURITY: HAVE YOU HAD THOUGHTS OF HARMING ANYONE ELSE?: NO

## 2025-01-16 SDOH — ECONOMIC STABILITY: INCOME INSECURITY: IN THE PAST 12 MONTHS HAS THE ELECTRIC, GAS, OIL, OR WATER COMPANY THREATENED TO SHUT OFF SERVICES IN YOUR HOME?: NO

## 2025-01-16 SDOH — SOCIAL STABILITY: SOCIAL INSECURITY: ARE YOU OR HAVE YOU BEEN THREATENED OR ABUSED PHYSICALLY, EMOTIONALLY, OR SEXUALLY BY ANYONE?: NO

## 2025-01-16 SDOH — SOCIAL STABILITY: SOCIAL INSECURITY: ARE THERE ANY APPARENT SIGNS OF INJURIES/BEHAVIORS THAT COULD BE RELATED TO ABUSE/NEGLECT?: NO

## 2025-01-16 SDOH — SOCIAL STABILITY: SOCIAL INSECURITY: WITHIN THE LAST YEAR, HAVE YOU BEEN AFRAID OF YOUR PARTNER OR EX-PARTNER?: NO

## 2025-01-16 SDOH — SOCIAL STABILITY: SOCIAL INSECURITY: DOES ANYONE TRY TO KEEP YOU FROM HAVING/CONTACTING OTHER FRIENDS OR DOING THINGS OUTSIDE YOUR HOME?: NO

## 2025-01-16 SDOH — SOCIAL STABILITY: SOCIAL INSECURITY: ABUSE: ADULT

## 2025-01-16 SDOH — SOCIAL STABILITY: SOCIAL INSECURITY: WITHIN THE LAST YEAR, HAVE YOU BEEN HUMILIATED OR EMOTIONALLY ABUSED IN OTHER WAYS BY YOUR PARTNER OR EX-PARTNER?: NO

## 2025-01-16 SDOH — SOCIAL STABILITY: SOCIAL INSECURITY: DO YOU FEEL ANYONE HAS EXPLOITED OR TAKEN ADVANTAGE OF YOU FINANCIALLY OR OF YOUR PERSONAL PROPERTY?: NO

## 2025-01-16 SDOH — SOCIAL STABILITY: SOCIAL INSECURITY: HAVE YOU HAD ANY THOUGHTS OF HARMING ANYONE ELSE?: NO

## 2025-01-16 SDOH — SOCIAL STABILITY: SOCIAL INSECURITY: HAS ANYONE EVER THREATENED TO HURT YOUR FAMILY OR YOUR PETS?: NO

## 2025-01-16 SDOH — ECONOMIC STABILITY: FOOD INSECURITY: WITHIN THE PAST 12 MONTHS, THE FOOD YOU BOUGHT JUST DIDN'T LAST AND YOU DIDN'T HAVE MONEY TO GET MORE.: PATIENT DECLINED

## 2025-01-16 SDOH — ECONOMIC STABILITY: FOOD INSECURITY
WITHIN THE PAST 12 MONTHS, YOU WORRIED THAT YOUR FOOD WOULD RUN OUT BEFORE YOU GOT THE MONEY TO BUY MORE.: PATIENT DECLINED

## 2025-01-16 SDOH — SOCIAL STABILITY: SOCIAL INSECURITY: WERE YOU ABLE TO COMPLETE ALL THE BEHAVIORAL HEALTH SCREENINGS?: YES

## 2025-01-16 ASSESSMENT — PAIN SCALES - GENERAL
PAINLEVEL_OUTOF10: 8
PAINLEVEL_OUTOF10: 7
PAINLEVEL_OUTOF10: 5 - MODERATE PAIN
PAINLEVEL_OUTOF10: 9
PAINLEVEL_OUTOF10: 8
PAINLEVEL_OUTOF10: 8
PAINLEVEL_OUTOF10: 9
PAINLEVEL_OUTOF10: 8

## 2025-01-16 ASSESSMENT — LIFESTYLE VARIABLES
HEADACHE, FULLNESS IN HEAD: MODERATE
AGITATION: NORMAL ACTIVITY
PAROXYSMAL SWEATS: NO SWEAT VISIBLE
ORIENTATION AND CLOUDING OF SENSORIUM: ORIENTED AND CAN DO SERIAL ADDITIONS
NAUSEA AND VOMITING: NO NAUSEA AND NO VOMITING
HOW OFTEN DO YOU HAVE 6 OR MORE DRINKS ON ONE OCCASION: DAILY OR ALMOST DAILY
AGITATION: NORMAL ACTIVITY
NAUSEA AND VOMITING: NO NAUSEA AND NO VOMITING
ANXIETY: NO ANXIETY, AT EASE
AGITATION: NORMAL ACTIVITY
HEADACHE, FULLNESS IN HEAD: MODERATE
VISUAL DISTURBANCES: NOT PRESENT
ANXIETY: NO ANXIETY, AT EASE
TREMOR: NO TREMOR
NAUSEA AND VOMITING: NO NAUSEA AND NO VOMITING
TREMOR: NO TREMOR
ORIENTATION AND CLOUDING OF SENSORIUM: ORIENTED AND CAN DO SERIAL ADDITIONS
TREMOR: NO TREMOR
HEADACHE, FULLNESS IN HEAD: MODERATE
TOTAL SCORE: 3
TREMOR: NO TREMOR
VISUAL DISTURBANCES: NOT PRESENT
ORIENTATION AND CLOUDING OF SENSORIUM: ORIENTED AND CAN DO SERIAL ADDITIONS
HEADACHE, FULLNESS IN HEAD: MODERATE
NAUSEA AND VOMITING: NO NAUSEA AND NO VOMITING
PULSE: 83
TREMOR: NO TREMOR
ORIENTATION AND CLOUDING OF SENSORIUM: ORIENTED AND CAN DO SERIAL ADDITIONS
ANXIETY: MILDLY ANXIOUS
VISUAL DISTURBANCES: NOT PRESENT
NAUSEA AND VOMITING: NO NAUSEA AND NO VOMITING
TOTAL SCORE: 4
ORIENTATION AND CLOUDING OF SENSORIUM: ORIENTED AND CAN DO SERIAL ADDITIONS
ANXIETY: NO ANXIETY, AT EASE
AUDITORY DISTURBANCES: NOT PRESENT
ORIENTATION AND CLOUDING OF SENSORIUM: ORIENTED AND CAN DO SERIAL ADDITIONS
ANXIETY: NO ANXIETY, AT EASE
AUDITORY DISTURBANCES: NOT PRESENT
TOTAL SCORE: 3
ANXIETY: NO ANXIETY, AT EASE
AUDITORY DISTURBANCES: NOT PRESENT
ORIENTATION AND CLOUDING OF SENSORIUM: ORIENTED AND CAN DO SERIAL ADDITIONS
NAUSEA AND VOMITING: NO NAUSEA AND NO VOMITING
TOTAL SCORE: 3
PAROXYSMAL SWEATS: NO SWEAT VISIBLE
ORIENTATION AND CLOUDING OF SENSORIUM: ORIENTED AND CAN DO SERIAL ADDITIONS
HEADACHE, FULLNESS IN HEAD: MODERATE
TOTAL SCORE: 4
ANXIETY: MILDLY ANXIOUS
AUDITORY DISTURBANCES: NOT PRESENT
TREMOR: NO TREMOR
NAUSEA AND VOMITING: NO NAUSEA AND NO VOMITING
AUDITORY DISTURBANCES: NOT PRESENT
PAROXYSMAL SWEATS: NO SWEAT VISIBLE
AUDITORY DISTURBANCES: NOT PRESENT
AGITATION: NORMAL ACTIVITY
HEADACHE, FULLNESS IN HEAD: MODERATE
TOTAL SCORE: 4
PULSE: 86
HOW MANY STANDARD DRINKS CONTAINING ALCOHOL DO YOU HAVE ON A TYPICAL DAY: 5 OR 6
TOTAL SCORE: 3
AGITATION: NORMAL ACTIVITY
PAROXYSMAL SWEATS: NO SWEAT VISIBLE
BLOOD PRESSURE: 124/74
VISUAL DISTURBANCES: NOT PRESENT
PAROXYSMAL SWEATS: NO SWEAT VISIBLE
VISUAL DISTURBANCES: NOT PRESENT
VISUAL DISTURBANCES: NOT PRESENT
NAUSEA AND VOMITING: NO NAUSEA AND NO VOMITING
TOTAL SCORE: 3
HEADACHE, FULLNESS IN HEAD: MODERATE
AUDITORY DISTURBANCES: NOT PRESENT
AUDITORY DISTURBANCES: NOT PRESENT
ORIENTATION AND CLOUDING OF SENSORIUM: ORIENTED AND CAN DO SERIAL ADDITIONS
PAROXYSMAL SWEATS: NO SWEAT VISIBLE
HEADACHE, FULLNESS IN HEAD: MODERATE
HEADACHE, FULLNESS IN HEAD: MODERATE
HOW OFTEN DO YOU HAVE A DRINK CONTAINING ALCOHOL: 4 OR MORE TIMES A WEEK
SKIP TO QUESTIONS 9-10: 0
AGITATION: NORMAL ACTIVITY
TREMOR: NO TREMOR
PAROXYSMAL SWEATS: NO SWEAT VISIBLE
ANXIETY: MILDLY ANXIOUS
ANXIETY: NO ANXIETY, AT EASE
BLOOD PRESSURE: 106/70
AGITATION: NORMAL ACTIVITY
PAROXYSMAL SWEATS: NO SWEAT VISIBLE
VISUAL DISTURBANCES: NOT PRESENT
AGITATION: NORMAL ACTIVITY
HEADACHE, FULLNESS IN HEAD: MODERATELY SEVERE
PULSE: 79
AUDITORY DISTURBANCES: NOT PRESENT
TOTAL SCORE: 3
BLOOD PRESSURE: 132/81
PULSE: 68
VISUAL DISTURBANCES: NOT PRESENT
AUDIT-C TOTAL SCORE: 10
TREMOR: NO TREMOR
PAROXYSMAL SWEATS: NO SWEAT VISIBLE
VISUAL DISTURBANCES: NOT PRESENT
AGITATION: NORMAL ACTIVITY
TREMOR: NO TREMOR
AGITATION: NORMAL ACTIVITY
AUDIT-C TOTAL SCORE: 10
VISUAL DISTURBANCES: NOT PRESENT
AUDITORY DISTURBANCES: NOT PRESENT
ORIENTATION AND CLOUDING OF SENSORIUM: ORIENTED AND CAN DO SERIAL ADDITIONS
TREMOR: NO TREMOR
NAUSEA AND VOMITING: NO NAUSEA AND NO VOMITING
ANXIETY: NO ANXIETY, AT EASE
NAUSEA AND VOMITING: NO NAUSEA AND NO VOMITING
TOTAL SCORE: 4
PAROXYSMAL SWEATS: NO SWEAT VISIBLE

## 2025-01-16 ASSESSMENT — ACTIVITIES OF DAILY LIVING (ADL)
GROOMING: NEEDS ASSISTANCE
ASSISTIVE_DEVICE: WALKER;WHEELCHAIR
JUDGMENT_ADEQUATE_SAFELY_COMPLETE_DAILY_ACTIVITIES: NO
BATHING: NEEDS ASSISTANCE
DRESSING YOURSELF: NEEDS ASSISTANCE
TOILETING: NEEDS ASSISTANCE
LACK_OF_TRANSPORTATION: YES
PATIENT'S MEMORY ADEQUATE TO SAFELY COMPLETE DAILY ACTIVITIES?: YES
HEARING - RIGHT EAR: FUNCTIONAL
HEARING - LEFT EAR: FUNCTIONAL
FEEDING YOURSELF: INDEPENDENT
ADEQUATE_TO_COMPLETE_ADL: YES
WALKS IN HOME: NEEDS ASSISTANCE

## 2025-01-16 ASSESSMENT — COGNITIVE AND FUNCTIONAL STATUS - GENERAL
DRESSING REGULAR LOWER BODY CLOTHING: A LITTLE
STANDING UP FROM CHAIR USING ARMS: A LITTLE
DAILY ACTIVITIY SCORE: 20
DRESSING REGULAR LOWER BODY CLOTHING: A LITTLE
PATIENT BASELINE BEDBOUND: NO
WALKING IN HOSPITAL ROOM: A LITTLE
DAILY ACTIVITIY SCORE: 20
TOILETING: A LITTLE
MOBILITY SCORE: 21
STANDING UP FROM CHAIR USING ARMS: A LITTLE
HELP NEEDED FOR BATHING: A LITTLE
TOILETING: A LITTLE
PERSONAL GROOMING: A LITTLE
MOBILITY SCORE: 21
CLIMB 3 TO 5 STEPS WITH RAILING: A LITTLE
CLIMB 3 TO 5 STEPS WITH RAILING: A LITTLE
PERSONAL GROOMING: A LITTLE
HELP NEEDED FOR BATHING: A LITTLE
WALKING IN HOSPITAL ROOM: A LITTLE

## 2025-01-16 ASSESSMENT — PAIN - FUNCTIONAL ASSESSMENT
PAIN_FUNCTIONAL_ASSESSMENT: 0-10

## 2025-01-16 ASSESSMENT — ENCOUNTER SYMPTOMS
ABDOMINAL DISTENTION: 0
ENDOCRINE NEGATIVE: 1
ACTIVITY CHANGE: 1
CHILLS: 0
GASTROINTESTINAL NEGATIVE: 1
FATIGUE: 1
MUSCULOSKELETAL NEGATIVE: 1
ABDOMINAL PAIN: 0
PALPITATIONS: 0

## 2025-01-16 ASSESSMENT — PAIN DESCRIPTION - LOCATION
LOCATION: HEAD

## 2025-01-16 ASSESSMENT — PAIN SCALES - PAIN ASSESSMENT IN ADVANCED DEMENTIA (PAINAD): TOTALSCORE: MEDICATION (SEE MAR)

## 2025-01-16 NOTE — CARE PLAN
Problem: Pain - Adult  Goal: Verbalizes/displays adequate comfort level or baseline comfort level  Outcome: Progressing     Problem: Safety - Adult  Goal: Free from fall injury  Outcome: Progressing     Problem: Discharge Planning  Goal: Discharge to home or other facility with appropriate resources  Outcome: Progressing     Problem: Chronic Conditions and Co-morbidities  Goal: Patient's chronic conditions and co-morbidity symptoms are monitored and maintained or improved  Outcome: Progressing       The clinical goals for the shift include Pt will be safe and free from injury throughout shift.

## 2025-01-16 NOTE — H&P
History Of Present Illness  Lemuel Mckay is a 54 y.o. 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    Per chart review, patient has a history of T2N0Mo squamous cell carcinoma of larynx treated in June 2022 with definitive radiotherapy. Presented in May 2023 with right neck mass, with biopsy showing squamous cell carcinoma. Underwent dissection in July 2023, completed chemoradiotherapy in September 2024, all at [facility]. Admitted to  in 12/13/2024 for surgery clearance for a scheduled cystoscopy with tissue ablation on 12/17/2024 for bladder tumor. Presented late and intoxicated, and during evaluation was found to have disconjugate gaze and severe headache for which he was admitted. MRI brain on 12/14 showed 3 cm mass in right clivus, increased in size from prior 2023 CT. Biopsy of mass on 12/20 showed metastatic squamous cell carcinoma. Bladder biopsy done at the time for hematuria and bladder thickening showed no signs of malignancy. Has been taking methadone, morphine, and steroids.    Per patient, presented 1/15 for radiation oncology appointment and MRI. When lying flat, had severe pain in the back right side of his head. Reports he has had a headache for the past month with pain stretching from right jaw to back of his head that has worsened. Has also had headache across forehead that feels like a tension headahce. Also had ptosis of his right eye, which has happened for the past month, with double vision that has become blurred. Right orbit is sensitive to touch, especially along sinuses. Denies floaters, aura, or color changes in vision.     Reports weight loss of 30 lbs d/t decreased appetite, fatigue, memory changes  (forgets things and remembers them later), SoB, decreased mobility, constipation (last BM 2-3 days ago, has flatus but infrequently). Denies CP, N/V, lightheadedness/dizziness, recent falls.    Past Medical History  Past Medical History:   Diagnosis Date    Acute inflammation of orbit 06/27/2013    Acute upper respiratory infection, unspecified 03/20/2015    Acute upper respiratory infection    Alcohol abuse, in remission 02/12/2015    History of ETOH abuse    Alcohol dependence, in remission 05/07/2015    Alcohol dependence in remission    Allergic contact dermatitis due to plants, except food 05/21/2014    Contact dermatitis due to poison ivy    Allergy status to unspecified drugs, medicaments and biological substances 08/29/2013    History of allergy    Anxiety     Anxiety disorder, unspecified 03/20/2015    Anxiety    Asthmatic bronchitis ([facility])     At high risk for falls     Bipolar disorder, current episode manic without psychotic features, unspecified (Multi)     Bipolar disorder, current episode manic without psychotic features    Bladder mass     Brain mass     Cancer of larynx (Multi)     Cervicalgia     Cervicalgia    Chronic asthmatic bronchitis (Multi) 12/02/2014    Clotting disorder (Multi)     P.E.    COPD (chronic obstructive pulmonary disease) (Multi)     Degeneration of cervical intervertebral disc     Depression     Dysphagia     Edentulous     Encounter for immunization 09/22/2016    Flu vaccine need    Frequent falls     pt states he falls every time he gets up.    GERD (gastroesophageal reflux disease)     Hearing aid worn     currently missing    History of abdominal pain 02/16/2017    History of allergic rhinitis 01/16/2014    History of allergic rhinitis 06/26/2014    History of allergic rhinitis    History of depression     History of esophageal reflux 08/27/2015    History of gastroenteritis 04/11/2013    History of hepatitis C     History of sinusitis 02/05/2014    History of sore  throat 03/20/2017    Hyperlipidemia     Hypertension     Other long term (current) drug therapy 06/02/2015    High risk medication use    Personal history of nicotine dependence 08/16/2017    History of tobacco abuse    Personal history of nicotine dependence 06/26/2017    History of nicotine dependence    Personal history of other diseases of the respiratory system 09/26/2013    History of acute sinusitis    Personal history of other mental and behavioral disorders     Personal history of other specified conditions 08/25/2016    Pulmonary embolism     RLS (restless legs syndrome)     Sleep apnea     Unspecified asthma, uncomplicated ([facility]) 02/13/2014    Uses walker     and wheelchair    Weakness     Wears glasses     currently broken       Surgical History  Past Surgical History:   Procedure Laterality Date    ANKLE SURGERY Left     ANKLE SURGERY  07/08/2024    R ankle spanning ex-fix revision    CHOLECYSTECTOMY      CT ABDOMEN PELVIS ANGIOGRAM W AND/OR WO IV CONTRAST  04/01/2020    CT ABDOMEN PELVIS ANGIOGRAM W AND/OR WO IV CONTRAST 4/1/2020 [facility]    FRACTURE SURGERY Right 07/15/2024    Open reduction and internal fixation of right distal tibia pilon and fibula fractures, removal of right ankle spanning external fixation under anesthesia, debridement down to including bone external fixator pin sites right leg    INVASIVE VASCULAR PROCEDURE N/A 11/08/2023    Procedure: Pulmonary Angiogram;  Surgeon: Surya Templeton MD;  Location: [facility] Cardiac Cath Lab;  Service: Cardiovascular;  Laterality: N/A;    LARYNGOSCOPY  07/01/2024    R radical neck dissection and L ALT free flap reconstruction    LEG SURGERY  07/06/2024    RLE ex-fix placement    MR HEAD ANGIO WO IV CONTRAST  01/24/2016    MR HEAD ANGIO WO IV CONTRAST 1/24/2016 [facility]    MR NECK ANGIO WO IV CONTRAST  01/24/2016    MR NECK ANGIO WO IV CONTRAST 1/24/2016 [facility]    THROMBECTOMY      WOUND DEBRIDEMENT Left  08/06/2024    L thigh debridement with woud vac placement 2/2 surgical wound dehiscence       Medications  Current Outpatient Medications   Medication Instructions    acetaminophen (TYLENOL) 650 mg, oral, Every 6 hours PRN    amoxicillin-pot clavulanate (Augmentin) 875-125 mg tablet 875 mg, oral, 2 times daily    apixaban (ELIQUIS) 5 mg, oral, 2 times daily    atorvastatin (LIPITOR) 20 mg, Nightly    bisacodyl (DULCOLAX) 10 mg, oral, Daily PRN, Do not crush, chew, or split.    butalbital-acetaminophen-caff -40 mg tablet 1 tablet, oral, Every 4 hours PRN    calcium carbonate (Tums) 200 mg calcium chewable tablet 1 tablet, Daily    cyclobenzaprine (FLEXERIL) 10 mg, 3 times daily PRN    dexAMETHasone (DECADRON) 4 mg, oral, Daily    docusate sodium (COLACE) 100 mg, 2 times daily    ferrous sulfate 65 mg, 3 times daily (morning, midday, late afternoon)    folic acid (FOLVITE) 1 mg, oral, Daily    guaiFENesin (MUCINEX) 600 mg, oral, 2 times daily, Do not crush, chew, or split.    ipratropium-albuteroL (Duo-Neb) 0.5-2.5 mg/3 mL nebulizer solution 3 mL, nebulization, Every 2 hour PRN    lidocaine (Lidoderm) 5 % patch 1 patch, Daily    LORazepam (ATIVAN) 0.5 mg, intravenous, Every 2 hour PRN    lubricating eye drops ophthalmic solution 1 drop, Both Eyes, Every 6 hours PRN    methadone (DOLOPHINE) 5 mg, oral, Every 8 hours    mirtazapine (REMERON) 15 mg, Nightly    montelukast (SINGULAIR) 10 mg, Daily    morphine (MSIR) 30 mg, oral, Every 3 hours PRN    multivitamin with minerals tablet 1 tablet, oral, Daily    naloxone (NARCAN) 0.2 mg, intravenous, As needed    nicotine (Nicoderm CQ) 14 mg/24 hr patch 1 patch, Every 24 hours    OLANZapine (ZYPREXA) 5 mg, oral, 2 times daily PRN    ondansetron ODT (ZOFRAN-ODT) 4 mg, oral, Every 8 hours PRN    oxygen (O2) 2 L/min, inhalation, Every 24 hours    pancrelipase, Lip-Prot-Amyl, (Creon) 6,000-19,000 -30,000 unit capsule 1 capsule, 2 times daily    pantoprazole (PROTONIX) 40 mg,  oral, Daily before breakfast, Do not crush, chew, or split.    polyethylene glycol (GLYCOLAX, MIRALAX) 17 g, 3 times daily PRN    polyethylene glycol (GLYCOLAX, MIRALAX) 17 g, oral, Every 8 hours    predniSONE (DELTASONE) 40 mg, oral, Daily, FOR UTI    pregabalin (LYRICA) 200 mg, oral, 3 times daily    rOPINIRole (REQUIP) 0.5 mg, 3 times daily    sennosides (SENOKOT) 17.2 mg, oral, 2 times daily PRN    sennosides-docusate sodium (Marya-Colace) 8.6-50 mg tablet 1 tablet, oral, Nightly    sodium chloride (Ocean) 0.65 % nasal spray 2 sprays, Each Nostril, 4 times daily    tamsulosin (FLOMAX) 0.4 mg, Daily    thiamine (VITAMIN B-1) 100 mg, Daily    topiramate (TOPAMAX) 100 mg, 2 times daily       Social History  He reports that smokes 1 PPD of cigarettes, vapes around 2 hours a week. He reports that he does not currently use alcohol. He reports current drug use. Drugs: Methamphetamines, Cocaine, and Marijuana.    Family History  Family History   Problem Relation Name Age of Onset    Diabetes Mother      Hypertension Mother      Heart attack Father      Hypertension Other       Allergies  Oxycodone    Review of Systems  Negative unless otherwise noted in HPI.     Physical Exam  Constitutional:       Comments: Tired-appearing, NC in place   HENT:      Head:      Comments: Diffuse flushing, no anhidrosis noted. Hoarse voice.  Eyes:      Comments: Impaired lateral abduction of left eye. Reactive to light and accomodation.  Right eye fixed and dilated with down and out pupil.      Neck:      Comments: Large mass on right lateral neck, diffusely red  Cardiovascular:      Rate and Rhythm: Normal rate and regular rhythm.   Pulmonary:      Comments: Diffuse inspiratory wheezing.  Abdominal:      General: Abdomen is flat.      Palpations: Abdomen is soft.      Tenderness: There is no abdominal tenderness.   Neurological:      Mental Status: He is alert and oriented to person, place, and time.      Comments: Strength intact in BUE  and BLE. Reduced sensation in right foot d/t olga placement.        Last Recorded Vitals  /75   Pulse 95   Temp 36.6 °C (97.9 °F) (Temporal)   Resp 16   SpO2 100%     Relevant Results    Results for orders placed or performed during the hospital encounter of 01/15/25 (from the past 24 hours)   CBC with Differential   Result Value Ref Range    WBC 12.0 (H) 4.4 - 11.3 x10*3/uL    nRBC 0.0 0.0 - 0.0 /100 WBCs    RBC 4.71 4.50 - 5.90 x10*6/uL    Hemoglobin 12.8 (L) 13.5 - 17.5 g/dL    Hematocrit 39.0 (L) 41.0 - 52.0 %    MCV 83 80 - 100 fL    MCH 27.2 26.0 - 34.0 pg    MCHC 32.8 32.0 - 36.0 g/dL    RDW 16.0 (H) 11.5 - 14.5 %    Platelets 277 150 - 450 x10*3/uL    Neutrophils % 92.4 40.0 - 80.0 %    Immature Granulocytes %, Automated 2.3 (H) 0.0 - 0.9 %    Lymphocytes % 3.7 13.0 - 44.0 %    Monocytes % 1.4 2.0 - 10.0 %    Eosinophils % 0.0 0.0 - 6.0 %    Basophils % 0.2 0.0 - 2.0 %    Neutrophils Absolute 11.10 (H) 1.20 - 7.70 x10*3/uL    Immature Granulocytes Absolute, Automated 0.28 0.00 - 0.70 x10*3/uL    Lymphocytes Absolute 0.44 (L) 1.20 - 4.80 x10*3/uL    Monocytes Absolute 0.17 0.10 - 1.00 x10*3/uL    Eosinophils Absolute 0.00 0.00 - 0.70 x10*3/uL    Basophils Absolute 0.02 0.00 - 0.10 x10*3/uL   Comprehensive Metabolic Panel   Result Value Ref Range    Glucose 111 (H) 74 - 99 mg/dL    Sodium 138 136 - 145 mmol/L    Potassium 3.7 3.5 - 5.3 mmol/L    Chloride 95 (L) 98 - 107 mmol/L    Bicarbonate 32 21 - 32 mmol/L    Anion Gap 15 10 - 20 mmol/L    Urea Nitrogen 9 6 - 23 mg/dL    Creatinine 0.59 0.50 - 1.30 mg/dL    eGFR >90 >60 mL/min/1.73m*2    Calcium 10.2 8.6 - 10.6 mg/dL    Albumin 3.7 3.4 - 5.0 g/dL    Alkaline Phosphatase 104 33 - 120 U/L    Total Protein 8.2 6.4 - 8.2 g/dL    AST 24 9 - 39 U/L    Bilirubin, Total 0.4 0.0 - 1.2 mg/dL    ALT 20 10 - 52 U/L     Imaging   CT head wo IV contrast    Result Date: 1/15/2025  There is aggressive destructive heterogeneous mass centered in the clivus with  poorly defined margins extending into the bilateral medial petrous apices, nasopharynx, and pituitary sella. This corresponds to biopsy-proven squamous cell carcinoma. There is erosion of the posterior cortex of the clivus without gross evidence of extension of the prepontine cistern. The mass however has increased in the short-term interval measuring 2.5 cm in AP dimension on sagittal view, previously 2.0 cm.       Assessment & Plan  Ptosis of right eyelid    Anisocoria    Lemuel Mckay is a 54 y.o. 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 CT imaging does not show hemorrhage or localized edema - discussed with NSGY and patient not a surgical candidate at this time. Recommend palliative radiation oncology consult in the morning for further management; neurosurgery and ophthalmology following at this time.    #clival mass  #laryngeal cancer  #headache  #vision changes, right eye ptosis  :: on regimen of morphine, methadone for pain; steroid regimen per oncology  :: complete ptosis of right eye, worsening ptosis of left eye  :: CT head shows clival mass 2.5 cm in diameter, patient declining MRI at this time d/t pain  - palliative radiation oncology consult in the AM  - neurosurgery and ophthalmology following  - continue scheduled methadone, morphine and acetaminophen PRN  - zofran PRN     #history of saddle PE  #COPD  :: on apixaban at home  :: requires 2 L NC after PE and d/t COPD  - monitor O2 requirements  - change to heparin drip, in case of surgical intervention    #alcohol use disorder  :: prior hospitalization in 12/2024 showed  "intoxication  :: patient states last drink was six months ago  - CIWA protocol    #tobacco use  :: currently smoking 1 PPD, vaping 2 hours/week  - nicotine patch daily    Vent: 2 L NC  Electrolytes: PRN  Lines/tubes: PIV   Abx: none  Drips: heparin   GI ppx: none  DVT: heparin  Code status: full code  NOK: Jacqui Hernandez (Significant Other)  [phone] (Mobile)   Dispo: floor       [facility] MS3      Patient seen and discussed with MS3.    Briefly, this is a  54 y.o. 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 He had been planned for fractionated palliative SBRT as an outpatient.    Hemodynamically stable and protecting airway on presentation. Seen by Ophthalmology - new third nerve palsy of the right side, and new left sixth nerve palsy of the left side, and existing right sixth nerve palsy noted. Seen by NSGY, and CT head obtained, patient declining MRI. Increasing \"aggressive destructive heterogeneous\" clival mass noted, no surrounding edema or hemorrhagic conversion. Is not a surgical candidate. Admission for expedited palliative radiation workup.    #SCC with clival mets  - Rad onc in AM  - Supportive onc, continued home pain regimen  - Nutrition referral  - Defer steroids for now    #AUD  #Nicotine dependence  Unclear last drink, though has been at SNF. Drinks about 6 tall boy beers a day.  - CIWAs for now, with thiamine and folate  - Nicotine patch    #Hx PE  - Transition from Eliquis to heparin gtt " inpatient    Of note - 1/14 was started on Augmentin and 40 of prednisone x5 days by SNF team per phone call with them - thought to be for both cellulitis of the neck, and URI. On my exam, no concern for cellulitis, and patient without O2 requirement, change in sputum, fevers, or respiratory distress. Do note neutrophilic leukocytosis on CBC - received prednisone dose one prior to presentation. Discontinued both agents.    Full diet.  Heparin gtt.  AM CBCd.  CIWAs.    Dispo planning - came from [facility].    Ashley Becker MD  Internal Medicine and Pediatrics, PGY-2  [place]

## 2025-01-16 NOTE — PROGRESS NOTES
"Pharmacy Medication History Review    Gaurav Mckay \"Lucy" is a 54 y.o. male admitted for Ptosis of right eyelid. Pharmacy reviewed the patient's [id number] medications and allergies for accuracy.    Medications CHANGED:  Remeron to 45mg HS - appears recently increased 1/14, from 30 to 45mg  Lidocaine patches to BID  Bisacodyl to 5mg daily  Marya-Colace to 2 tabs BID (senna is 2 tabs q12h PRN  Medications REMOVED:   Zyprexa - not administered at [facility]  Miralax  Docusate  Ativan  Naloxone  Dexamethasone    The list below reflects the updated PTA list.   Prior to Admission Medications   Prescriptions Informant   LACTOBACILLUS ACIDOPHILUS ORAL Other   Sig: Take 1 tablet by mouth once daily.   LORazepam (Ativan) 2 mg/mL injection Other   Sig: Infuse 0.25 mL (0.5 mg) into a venous catheter every 2 hours if needed (CIWA score 6-7 or HR greater than 100).   Patient not taking: Reported on 1/16/2025   OLANZapine (ZyPREXA) 5 mg tablet Other   Sig: Take 1 tablet (5 mg) by mouth 2 times a day as needed (Severe agitation/restlessness or interference with care and not redirectable).   Patient not taking: Reported on 1/16/2025      acetaminophen (Tylenol) 325 mg tablet Other   Sig: Take 2 tablets (650 mg) by mouth every 6 hours if needed for mild pain (1 - 3).   amoxicillin-pot clavulanate (Augmentin) 875-125 mg tablet Other   Sig: Take 1 tablet (875 mg) by mouth 2 times a day.   apixaban (Eliquis) 5 mg tablet Other   Sig: Take 1 tablet (5 mg) by mouth 2 times a day.   atorvastatin (Lipitor) 20 mg tablet Other   Sig: Take 1 tablet (20 mg) by mouth once daily at bedtime.   bisacodyl (Dulcolax) 5 mg EC tablet Other   Sig: Take 2 tablets (10 mg) by mouth once daily as needed for constipation. Do not crush, chew, or split.   Patient taking differently: Take 1 tablet (5 mg) by mouth once daily. Do not crush, chew, or split.   butalbital-acetaminophen-caff -40 mg tablet Other   Sig: Take 1 tablet by mouth every 4 hours " if needed for headaches.   calcium carbonate (Tums) 200 mg calcium chewable tablet Other   Sig: Chew 1 tablet (500 mg) once daily.   cyclobenzaprine (Flexeril) 10 mg tablet Other   Sig: Take 1 tablet (10 mg) by mouth every 8 hours if needed for muscle spasms.   dexAMETHasone (Decadron) 4 mg tablet    Sig: Take 1 tablet (4 mg) by mouth once daily for 7 doses.   Patient not taking: Reported on 1/16/2025   ferrous sulfate 325 (65 Fe) MG EC tablet Other   Sig: Take 1 tablet by mouth 3 times daily (morning, midday, late afternoon). Do not crush, chew, or split.   folic acid (Folvite) 1 mg tablet Other   Sig: Take 1 tablet (1 mg) by mouth once daily.   guaiFENesin (Mucinex) 600 mg 12 hr tablet Other   Sig: Take 1 tablet (600 mg) by mouth 2 times a day. Do not crush, chew, or split.   ipratropium-albuteroL (Duo-Neb) 0.5-2.5 mg/3 mL nebulizer solution Other   Sig: Take 3 mL by nebulization every 2 hours if needed for wheezing.   lidocaine (Lidoderm) 5 % patch Other   Sig: Place 1 patch on the skin 2 times a day. Remove & discard patch within 12 hours or as directed by MD.   lubricating eye drops ophthalmic solution Other   Sig: Administer 1 drop into both eyes every 6 hours if needed for dry eyes.   methadone (Dolophine) 5 mg tablet    Sig: Take 1 tablet (5 mg) by mouth every 8 hours for 7 days.  Appears continuous per SNF    mirtazapine (Remeron) 15 mg tablet Other   Sig: Take 3 tablets (45 mg) by mouth once daily at bedtime.   montelukast (Singulair) 10 mg tablet Other   Sig: Take 1 tablet (10 mg) by mouth once daily.   morphine (MSIR) 15 mg tablet Other   Sig: Take 2 tablets (30 mg) by mouth every 3 hours if needed for moderate pain (4 - 6) or severe pain (7 - 10) for up to 7 days.  Appears continuous per SNF    multivitamin with minerals tablet Other   Sig: Take 1 tablet by mouth once daily.   naloxone (Narcan) 0.4 mg/mL injection Other   Sig: Infuse 0.5 mL (0.2 mg) into a venous catheter if needed for respiratory  depression (Once PRN patient is unarousable, and respiratory rate less than 8).   Patient not taking: Reported on 1/16/2025      nicotine (Nicoderm CQ) 14 mg/24 hr patch Other   Sig: Place 1 patch on the skin once every 24 hours.   ondansetron (Zofran) 4 mg tablet Other   Sig: Take 1 tablet (4 mg) by mouth every 8 hours if needed for nausea or vomiting.   ondansetron ODT (Zofran-ODT) 4 mg disintegrating tablet Other   Sig: Dissolve 1 tablet (4 mg) in the mouth every 8 hours if needed for nausea.   Patient not taking: Reported on 1/16/2025  Non dissolvable per SNF   oxygen (O2) gas therapy Other   Sig: Inhale 2 L/min once every 24 hours.   pancrelipase, Lip-Prot-Amyl, (Creon) 6,000-19,000 -30,000 unit capsule Other   Sig: Take 1 capsule by mouth 2 times a day.   pantoprazole (ProtoNix) 40 mg EC tablet Other   Sig: Take 1 tablet (40 mg) by mouth once daily in the morning. Take before meals. Do not crush, chew, or split.   polyethylene glycol (Glycolax, Miralax) 17 gram packet Other   Sig: Take 17 g by mouth every 8 hours.   Patient not taking: Reported on 1/15/2025   predniSONE (Deltasone) 20 mg tablet Other   Sig: Take 2 tablets (40 mg) by mouth once daily. FOR UTI   pregabalin (Lyrica) 200 mg capsule Other   Sig: Take 1 capsule (200 mg) by mouth 3 times a day for 14 days.  Appears continuous per SNF   rOPINIRole (Requip) 0.5 mg tablet Other   Sig: Take 1 tablet (0.5 mg) by mouth 3 times a day.   sennosides (Senokot) 8.6 mg tablet Other   Sig: Take 2 tablets (17.2 mg) by mouth 2 times a day as needed for constipation.   sennosides-docusate sodium (Marya-Colace) 8.6-50 mg tablet Other   Sig: Take 1 tablet by mouth once daily at bedtime.   Patient taking differently: Take 2 tablets by mouth 2 times a day.   sodium chloride (Ocean) 0.65 % nasal spray Other   Sig: Administer 2 sprays into each nostril 4 times a day.   tamsulosin (Flomax) 0.4 mg 24 hr capsule Other   Sig: Take 1 capsule (0.4 mg) by mouth once daily.  "  thiamine 100 mg tablet Other   Sig: Take 1 tablet (100 mg) by mouth once daily.   topiramate (Topamax) 100 mg tablet Other   Sig: Take 1 tablet (100 mg) by mouth 2 times a day.      Facility-Administered Medications: None        The list below reflects the updated allergy list. Please review each documented allergy for additional clarification and justification.  Allergies  Reviewed by Regine Champion RN on 1/15/2025        Severity Reactions Comments    Oxycodone Not Specified Itching             Patient was unable to be assessed for M2B at discharge.     Sources:   [facility]  [facility] Summary report active as of 1/16/2025    Additional Comments:  Primary source for med rec - SNF report      Amanda JamaD  Transitions of Care Pharmacist  01/16/25     Secure Chat preferred   If no response call [phone] or [email or web address] \"Med Rec\"    "

## 2025-01-16 NOTE — CONSULTS
Nutrition Initial Assessment:   Nutrition Assessment    The patient is a 54 y.o. male who is hospital day #1.  Pt admitted with c/o headache, ptosis of R eye, and vision changes. CTH showed growth of clival tumor. NSGY following - no surgical intervention at this time. Plan to consult rad onc for palliative radiation.     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 Reports today he had 75% of eggs, cheese, toast, banana, and yoghurt. Lunch tray in room: cheese burger, chips, brownie, 2 chocolate milks. Reports at home he was eating at his baseline. Eats/snacks throughout the day and then has a regular meal for dinner. Pt reports he snack son chips, yoghurts, pastries. Asked pt about wt changes - pt reports wt loss d/t issues with swallowing. Feels like food gets stuck when he swallows though this sensation is improved. States he was on a pureed diet before but that it was advanced to regular in the past week. He states not following a pureed diet at home. Reviewed EMR, last SLP note is from 12/16 - recommendations were for pureed diet and mild/nectar thick liquids. Pt reports he does not regularly drink ONS. Have tried them in the past and they were ok. Ok with ordering some during this admission. No food allergies.  Vitamin/Herbal Supplement Use: Pt reports taking daily vitamins - what MD has prescribed. Per home med list, pt taking MVI, iron, folic acid, calcium, and thiamine. Pt also has pancreatic enzymes in his home med list Creon 6 - 1 tablet twice per day --> This would be 76 units lipase/kg/meal > below recommended minimum dose of 500 units and not enough pills/d to  use with all meals.      Anthropometrics:  Start of admission anthropometrics:  Height: 182.9 cm (6')  Weight: 78.6 kg (173 lb 3.2 oz)  BMI (Calculated): 23.49  IBW/kg (Dietitian Calculated): 80.91 kg  Percent of IBW: 97 %     Wt Hx  01/15/25 78.6 kg (173 lb 3.2 oz)  12/13/24 83.1 kg (183 lb 1.6 oz) - 5.5% wt loss x1 month   10/28/24 94 kg (207 lb 3.7 oz) - 16% wt loss x3 months   09/04/24 100 kg (221 lb 5.5 oz)  05/13/24 120 kg (265 lb 3.4 oz) - 35% wt loss x8 months  11/16/23 125 kg (276 lb 6.4 oz) - 37% wt loss x 14 months     Weight Change %:  Weight History / % Weight Change: Per EMR wt hx, pt with significant wt loss of 5.5% x1 month, 16% x3 months, >30% in 1 year.  Significant Weight Loss: Yes    Nutrition Focused Physical Exam Findings:    Subcutaneous Fat Loss:   Buccal Fat Pads: Severe (hollow, sunken and narrow face)  Triceps: Mild-Moderate (less than ample fat tissue)  Muscle Wasting:  Temporalis: Mild-Moderate (slight depression)  Pectoralis (Clavicular Region): Mild-Moderate (some protrusion of clavicle)  Deltoid/Trapezius: Mild-Moderate (slight protrusion of acromion process)  Interosseous: Mild-Moderate (slightly depressed area between thumb and forefinger)  Quadriceps: Mild-Moderate (mild depression on inner and outer thigh)  Gastrocnemius: Mild-Moderate (not well developed muscle)  Edema:  Edema: none  Physical Findings:  Hair: Negative  Nails: Negative  Skin: Negative    Objective Data:    Last BM Date: 01/15/25 (per pt)    Nutrition Significant Labs:    Results from last 7 days   Lab Units 01/16/25  0809 01/15/25  1458   HEMOGLOBIN g/dL 12.2* 12.8*   MCV fL 86 83   GLUCOSE mg/dL  --  111*   POTASSIUM mmol/L  --  3.7   SODIUM mmol/L  --  138   CREATININE mg/dL  --  0.59   BUN mg/dL  --  9   ALT U/L  --  20   AST U/L  --  24   ALK PHOS U/L  --  104       Nutrition Specific Medications:  atorvastatin, 20 mg, oral, Nightly  folic acid, 1 mg, oral, Daily  methadone, 5 mg, oral, q8h  mirtazapine, 15  mg, oral, Nightly  multivitamin with minerals, 1 tablet, oral, Daily  pancrelipase (Lip-Prot-Amyl), 1 capsule, oral, BID  pantoprazole, 40 mg, oral, Daily before breakfast  polyethylene glycol, 17 g, oral, Daily  sennosides, 2 tablet, oral, Nightly  thiamine, 100 mg, intravenous, Daily    I/O:   No intake/output data recorded.    Dietary Orders (From admission, onward)       Start     Ordered    01/16/25 0013  May Participate in Room Service  ( ROOM SERVICE MAY PARTICIPATE)  Once        Question:  .  Answer:  Yes    01/16/25 0012    01/15/25 2236  Adult diet Regular  Diet effective now        Question:  Diet type  Answer:  Regular    01/15/25 2235                     Estimated Needs:   Total Energy Estimated Needs (kCal): 2200.8 kCal  Method for Estimating Needs: 28 kcal/kg * 78.6 kg    Total Protein Estimated Needs (g): 102.18 g  Method for Estimating Needs: 1.3 g/kg * 78.6 kg    Method for Estimating Needs: 1 ml/kcal or per team          Nutrition Diagnosis   Malnutrition Diagnosis  Patient has Malnutrition Diagnosis: Yes  Diagnosis Status: New  Malnutrition Diagnosis: Severe malnutrition related to chronic disease or condition  As Evidenced by: significant wt loss of >5% x1 month; 16% x3 months; 35% x8 months; moderate muscle and adipose tissue wasting            Nutrition Interventions/Recommendations   Individualized Nutrition Prescription Provided for : 2200 kcal and 105g protein via oral diet    - Recommend SLP consult   -- Last note is from 12/26/24. Diet recommended was pureed diet w/ mildly/nectar thick liquids  - Discontinue order for pancreatic enzymes (pancrelipase)  - Discontinue folic acid  - Oral supplement:  --  If pt is allowed to have thin liquids > Boost VHC (530 kcal,22g pro) TID.   -- If pt kept on mildly/nectar thick liquids > Magic Cup BID               Nutrition Monitoring and Evaluation   Monitoring and Evaluation Plan: Energy intake  Energy Intake: Estimated energy intake  Criteria: >75%  of nutr needs    Monitoring and Evaluation Plan: Weight  Weight: Weight change  Criteria: no wt change                   Time Spent (min): 80 minutes

## 2025-01-16 NOTE — PROGRESS NOTES
"Gaurav Mckay \"Lucy" is a 54 y.o. male on day 1 of admission presenting with Ptosis of right eyelid.    Subjective   Patient seen and examined at the bedside. C/o right occipital pain, nose and ears bleed. Reports picking his nose making it bleed so encourage him to stop the nose picking. Otherwise denies any CP, SOB, Abdominal pain, N/V/D.     Review of Systems   Review of Systems   Constitutional:  Positive for activity change and fatigue. Negative for chills.   HENT:  Positive for nosebleeds.    Cardiovascular:  Negative for chest pain, palpitations and leg swelling.   Gastrointestinal: Negative.  Negative for abdominal distention and abdominal pain.   Endocrine: Negative.    Genitourinary: Negative.    Musculoskeletal: Negative.       Objective     Last Recorded Vitals  Blood pressure 124/74, pulse 86, temperature 36 °C (96.8 °F), temperature source Temporal, resp. rate 16, height 1.829 m (6'), weight 78.6 kg (173 lb 3.2 oz), SpO2 93%.  Intake/Output last 3 Shifts:  No intake/output data recorded.    Physical Exam  Constitutional:       Appearance: He is not toxic-appearing.      Comments: Conversant and not in acute distress    HENT:      Head: Atraumatic.      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       Relevant Results    Labs    Results from last 7 days   Lab Units 01/15/25  1458   WBC AUTO x10*3/uL 12.0*   HEMOGLOBIN g/dL 12.8*   HEMATOCRIT % 39.0*   PLATELETS AUTO x10*3/uL 277            Results from last 7 days   Lab Units 01/15/25  1458   SODIUM mmol/L 138   POTASSIUM mmol/L 3.7   CHLORIDE mmol/L 95*   CO2 mmol/L 32   BUN mg/dL 9   CREATININE mg/dL 0.59   CALCIUM mg/dL 10.2     Results from last 7 days   Lab Units 01/15/25  1458   ALK PHOS U/L 104   BILIRUBIN TOTAL mg/dL 0.4   PROTEIN TOTAL g/dL 8.2   ALT U/L 20   AST U/L 24             Medications  Scheduled " medications  atorvastatin, 20 mg, oral, Nightly  folic acid, 1 mg, oral, Daily  methadone, 5 mg, oral, q8h  mirtazapine, 15 mg, oral, Nightly  montelukast, 10 mg, oral, Daily  multivitamin with minerals, 1 tablet, oral, Daily  nicotine, 1 patch, transdermal, q24h  pancrelipase (Lip-Prot-Amyl), 1 capsule, oral, BID  pantoprazole, 40 mg, oral, Daily before breakfast  polyethylene glycol, 17 g, oral, Daily  pregabalin, 200 mg, oral, TID  rOPINIRole, 0.5 mg, oral, TID  sennosides, 2 tablet, oral, Nightly  tamsulosin, 0.4 mg, oral, Daily  [START ON 1/18/2025] thiamine, 100 mg, oral, Daily  thiamine, 100 mg, intravenous, Daily  topiramate, 100 mg, oral, BID      Continuous medications  [Held by provider] heparin, 0-4,500 Units/hr, Last Rate: Stopped (01/16/25 0820)      PRN medications  PRN medications: acetaminophen, cyclobenzaprine, diazePAM, [Held by provider] heparin, ipratropium-albuteroL, lidocaine, melatonin, morphine, ondansetron ODT     Imaging  CT head wo IV contrast   Final Result   There is aggressive destructive heterogeneous mass centered in the   clivus with poorly defined margins extending into the bilateral   medial petrous apices, nasopharynx, and pituitary sella. 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 There is   erosion of the posterior cortex of the clivus without gross evidence   of extension of the prepontine cistern. The mass however has   increased in the short-term interval measuring 2.5 cm in AP dimension   on sagittal view, previously 2.0 cm.        Signed by: Enrrique Zhou 1/15/2025 8:01 PM   Dictation workstation:   [id number]      Point of Care Ultrasound    (Results Pending)            Assessment/Plan   Assessment & Plan  Ptosis of right eyelid    Lemuel Mckay is a 54 y.o. 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 CT imaging does not show hemorrhage or localized edema - discussed with NSGY and patient not a surgical candidate at this time. ophthalmology following at this time. Palliative radiation oncology consulted and following patient now. They recommends CT- Sim and MRI brain under anesthesia.      Today 1/16/2025:   Consulted Rad Onc who recommends CT- Sim today and MRI of the brain tomorrow   Neurosurgery signed off since there is no acute surgical intervention for patient.   Support Onc following    #clival mass  #laryngeal cancer  #headache  #vision changes, right eye ptosis  :: on regimen of morphine, methadone for pain; steroid regimen per oncology  :: complete ptosis of right eye, worsening ptosis of left eye  :: CT head shows clival mass 2.5 cm in diameter, patient declining MRI at this time d/t pain  - Consulted Rad Onc who recommends CT- Sim today and MRI of the brain tomorrow   -Neurosurgery signed off since there is no acute surgical intervention for patient.   Support Onc following  - continue scheduled methadone, morphine and acetaminophen PRN  - zofran PRN      #history of saddle PE  #COPD  :: on apixaban at home  :: requires 2 L NC after PE and d/t COPD  - monitor O2 requirements  - change to heparin drip, in case of surgical intervention     #alcohol use disorder  :: prior hospitalization in 12/2024 showed intoxication  :: patient states last drink was six months ago  - [facility] protocol     #tobacco use  :: currently smoking 1 PPD, vaping 2 hours/week  - nicotine patch daily     Electrolytes: PRN  Lines/tubes: PIV   Abx: none  Drips: heparin   GI ppx: none  DVT: heparin  Code status: full code  NOK: Jacqui Hernandez (Significant Other)  [phone] (Mobile)   Dispo: floor        Case  seen and discussed with attending Dr. Holman, to addend as necessary.    Siva Larkin MD PGY-1

## 2025-01-16 NOTE — CARE PLAN
Problem: Pain - Adult  Goal: Verbalizes/displays adequate comfort level or baseline comfort level  Outcome: Progressing     Problem: Safety - Adult  Goal: Free from fall injury  Outcome: Progressing     Problem: Discharge Planning  Goal: Discharge to home or other facility with appropriate resources  Outcome: Progressing     Problem: Chronic Conditions and Co-morbidities  Goal: Patient's chronic conditions and co-morbidity symptoms are monitored and maintained or improved  Outcome: Progressing   The clinical goals for the shift include patient will rate pain <8/10 by 1/16/25 1900    Patient with stable VS. Supp onc & ENT saw patient today. Went to radiation for SIM. Premedicated with atarax and oxycodone prior to it. Bed alarm on. Placed on 2L nasal cannula because desaturation when sleeping at times. Remained safe this shift.

## 2025-01-16 NOTE — SIGNIFICANT EVENT
Patient with known metastatic clival tumor, presenting with headache and right ptosis. CTH showed no hemorrhage, growth of clival tumor. In discussions with the patient, he elected to not pursue any surgical intervention. We recommended palliative radiation. Per neurosurgery, no additional imaging needed at this time. Patient has follow up with neurosurgery. We will now sign off. Please page or chat with any questions or concerns.    Harmeet Snow MD  Neurosurgery

## 2025-01-16 NOTE — CONSULTS
Wound Care Consult     Visit Date: 1/16/2025      Patient Name: Lemuel Mckay         MRN: [id number]           YOB: 1970     Reason for Consult: assess Left knee wound         Wound History: Patient states H/O skin graft      Pertinent Labs:   Albumin   Date Value Ref Range Status   01/15/2025 3.7 3.4 - 5.0 g/dL Final       Wound Assessment:  Wound 12/13/24 Knee Left;Anterior (Active)   Wound Image   01/16/25 1100   Wound Length (cm) 2.5 cm 01/16/25 1100   Wound Width (cm) 1 cm 01/16/25 1100   Wound Surface Area (cm^2) 2.5 cm^2 01/16/25 1100   Wound Depth (cm) 0.1 cm 01/16/25 1100   Wound Volume (cm^3) 0.25 cm^3 01/16/25 1100   Wound Healing % 75 01/16/25 1100   State of Healing Epithelialized 01/16/25 1100   Drainage Description None 01/16/25 1100   Drainage Amount None 01/16/25 1100   Dressing Open to air 01/16/25 1100   Dressing Changed Reinforced 01/16/25 0307   Dressing Status Clean;Dry 01/16/25 0756       Wound 12/20/24 Nose (Active)   Drainage Description Serosanguineous 01/16/25 0756   Drainage Amount Small 01/16/25 0756       Wound Team Summary Assessment: Area is pink, scarred, closed wound. Would leave open to air.      Wound Team Plan: Please review recommendations      Marleen COVARRUBIAS   1/16/2025  1:58 PM

## 2025-01-16 NOTE — CONSULTS
ENT DEPARTMENT CONSULTATION NOTE  Name: Gaurav Mckay  MRN: [id number]  : 1970  Consulting Team: Oncology  Reason for Consult: Ear bleeding and epistaxis    History of Present Illness  4 y.o. 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     Per chart review, patient has a history of T2N0Mo squamous cell carcinoma of larynx treated in 2022 with definitive radiotherapy. Presented in May 2023 with right neck mass, with biopsy showing squamous cell carcinoma. Underwent dissection in 2023, completed chemoradiotherapy in 2024, all at [facility]. Admitted to  in 2024 for surgery clearance for a scheduled cystoscopy with tissue ablation on 2024 for bladder tumor. Presented late and intoxicated, and during evaluation was found to have disconjugate gaze and severe headache for which he was admitted. MRI brain on  showed 3 cm mass in right clivus, increased in size from prior  CT. Biopsy of mass on  showed metastatic squamous cell carcinoma. Bladder biopsy done at the time for hematuria and bladder thickening showed no signs of malignancy. Has been taking methadone, morphine, and steroids.    ENT was consulted for evaluation of nasal bleeding and ear bleeding.  He states it mostly bleeds when he picks at it and stopped on its own.  He has never had to be cauterized or taken to the OR for bleed.  He was recently on heparin.  Platelets 344        Review of Systems  14 point review of systems completed and all negative except as noted in HPI.    Past Medical History  Past Medical History:   Diagnosis Date    Acute inflammation of orbit 2013    Acute upper respiratory infection, unspecified  03/20/2015    Acute upper respiratory infection    Alcohol abuse, in remission 02/12/2015    History of ETOH abuse    Alcohol dependence, in remission 05/07/2015    Alcohol dependence in remission    Allergic contact dermatitis due to plants, except food 05/21/2014    Contact dermatitis due to poison ivy    Allergy status to unspecified drugs, medicaments and biological substances 08/29/2013    History of allergy    Anxiety     Anxiety disorder, unspecified 03/20/2015    Anxiety    Asthmatic bronchitis ([facility])     At high risk for falls     Bipolar disorder, current episode manic without psychotic features, unspecified (Multi)     Bipolar disorder, current episode manic without psychotic features    Bladder mass     Brain mass     Cancer of larynx (Multi)     Cervicalgia     Cervicalgia    Chronic asthmatic bronchitis (Multi) 12/02/2014    Clotting disorder (Multi)     P.E.    COPD (chronic obstructive pulmonary disease) (Multi)     Degeneration of cervical intervertebral disc     Depression     Dysphagia     Edentulous     Encounter for immunization 09/22/2016    Flu vaccine need    Frequent falls     pt states he falls every time he gets up.    GERD (gastroesophageal reflux disease)     Hearing aid worn     currently missing    History of abdominal pain 02/16/2017    History of allergic rhinitis 01/16/2014    History of allergic rhinitis 06/26/2014    History of allergic rhinitis    History of depression     History of esophageal reflux 08/27/2015    History of gastroenteritis 04/11/2013    History of hepatitis C     History of sinusitis 02/05/2014    History of sore throat 03/20/2017    Hyperlipidemia     Hypertension     Other long term (current) drug therapy 06/02/2015    High risk medication use    Personal history of nicotine dependence 08/16/2017    History of tobacco abuse    Personal history of nicotine dependence 06/26/2017    History of nicotine dependence    Personal history of other diseases of the  respiratory system 09/26/2013    History of acute sinusitis    Personal history of other mental and behavioral disorders     Personal history of other specified conditions 08/25/2016    Pulmonary embolism     RLS (restless legs syndrome)     Sleep apnea     Unspecified asthma, uncomplicated ([facility]) 02/13/2014    Uses walker     and wheelchair    Weakness     Wears glasses     currently broken       Past Surgical History  Past Surgical History:   Procedure Laterality Date    ANKLE SURGERY Left     ANKLE SURGERY  07/08/2024    R ankle spanning ex-fix revision    CHOLECYSTECTOMY      CT ABDOMEN PELVIS ANGIOGRAM W AND/OR WO IV CONTRAST  04/01/2020    CT ABDOMEN PELVIS ANGIOGRAM W AND/OR WO IV CONTRAST 4/1/2020 [facility]    FRACTURE SURGERY Right 07/15/2024    Open reduction and internal fixation of right distal tibia pilon and fibula fractures, removal of right ankle spanning external fixation under anesthesia, debridement down to including bone external fixator pin sites right leg    INVASIVE VASCULAR PROCEDURE N/A 11/08/2023    Procedure: Pulmonary Angiogram;  Surgeon: Surya Templeton MD;  Location: [facility] Cardiac Cath Lab;  Service: Cardiovascular;  Laterality: N/A;    LARYNGOSCOPY  07/01/2024    R radical neck dissection and L ALT free flap reconstruction    LEG SURGERY  07/06/2024    RLE ex-fix placement    MR HEAD ANGIO WO IV CONTRAST  01/24/2016    MR HEAD ANGIO WO IV CONTRAST 1/24/2016 [facility]    MR NECK ANGIO WO IV CONTRAST  01/24/2016    MR NECK ANGIO WO IV CONTRAST 1/24/2016 [facility]    THROMBECTOMY      WOUND DEBRIDEMENT Left 08/06/2024    L thigh debridement with woud vac placement 2/2 surgical wound dehiscence       Allergies  Allergies   Allergen Reactions    Oxycodone Itching       Medications    Current Facility-Administered Medications:     acetaminophen (Tylenol) tablet 650 mg, 650 mg, oral, q6h PRN, Ashley Becker MD    atorvastatin (Lipitor) tablet 20 mg, 20 mg,  oral, Nightly, Ashley Becker MD    cyclobenzaprine (Flexeril) tablet 10 mg, 10 mg, oral, TID PRN, Ashley Becker MD    diazePAM (Valium) tablet 10 mg, 10 mg, oral, q2h PRN, Ashley Becker MD    folic acid (Folvite) tablet 1 mg, 1 mg, oral, Daily, Ashley Becker MD, 1 mg at 01/16/25 0837    [Held by provider] heparin 25,000 Units in dextrose 5% 250 mL (100 Units/mL) infusion (premix), 0-4,500 Units/hr, intravenous, Continuous, Ashley Becker MD, Stopped at 01/16/25 0820    [Held by provider] heparin bolus from bag 3,000-6,000 Units, 3,000-6,000 Units, intravenous, q4h PRN, Ashley Becker MD    HYDROmorphone (Dilaudid) injection 1 mg, 1 mg, intravenous, q3h PRN, Siva Larkin MD    ipratropium-albuteroL (Duo-Neb) 0.5-2.5 mg/3 mL nebulizer solution 3 mL, 3 mL, nebulization, q2h PRN, Ashley Becker MD    lidocaine 4 % patch 1 patch, 1 patch, transdermal, q12h PRN, Ashley Becker MD    melatonin tablet 3 mg, 3 mg, oral, Nightly PRN, Ashley Becker MD, 3 mg at 01/16/25 0039    methadone (Dolophine) tablet 2.5 mg, 2.5 mg, oral, q8h, Siva Larkin MD    mirtazapine (Remeron) tablet 15 mg, 15 mg, oral, Nightly, Bridger Amor MD, 15 mg at 01/16/25 0039    montelukast (Singulair) tablet 10 mg, 10 mg, oral, Daily, Ashley Becker MD    morphine (MSIR) tablet 15 mg, 15 mg, oral, q3h PRN, Siva Larkin MD    multivitamin with minerals 1 tablet, 1 tablet, oral, Daily, Ashley Becker MD, 1 tablet at 01/16/25 0837    nicotine (Nicoderm CQ) 14 mg/24 hr patch 1 patch, 1 patch, transdermal, q24h, Ashley Becker MD, 1 patch at 01/16/25 0000    nicotine polacrilex (Nicorette) gum 2 mg, 2 mg, Mouth/Throat, q2h PRN, Siva Larkin MD    ondansetron ODT (Zofran-ODT) disintegrating tablet 4 mg, 4 mg, oral, q8h PRN, Ashley Becker MD    oxymetazoline (Afrin) 0.05 % nasal spray 2 spray, 2 spray, Each Nostril, q12h PRN, Siva Larkin MD    pancrelipase (Lip-Prot-Amyl) (Creon) 6,000-19,000 -30,000 unit per capsule 1 capsule, 1 capsule, oral, BID, Ashley MASSEY  MD Eugenio    pantoprazole (ProtoNix) EC tablet 40 mg, 40 mg, oral, Daily before breakfast, Ashley Becker MD, 40 mg at 01/16/25 0554    polyethylene glycol (Glycolax, Miralax) packet 17 g, 17 g, oral, Daily, Ashley Becker MD    [START ON 1/17/2025] pregabalin (Lyrica) capsule 50 mg, 50 mg, oral, TID, Siva Larkin MD    rOPINIRole (Requip) tablet 0.5 mg, 0.5 mg, oral, TID, Ashley Becker MD, 0.5 mg at 01/16/25 0837    sennosides (Senokot) tablet 17.2 mg, 2 tablet, oral, Nightly, Ashley Becker MD    sodium chloride (Ocean) 0.65 % nasal spray 1 spray, 1 spray, Each Nostril, 4x daily PRN, Bridger Amor MD    tamsulosin (Flomax) 24 hr capsule 0.4 mg, 0.4 mg, oral, Daily, Ashley Becker MD, 0.4 mg at 01/16/25 0837    [START ON 1/18/2025] thiamine (Vitamin B-1) tablet 100 mg, 100 mg, oral, Daily, Ashley Becker MD    thiamine (Vitamin B1) injection 100 mg, 100 mg, intravenous, Daily, Ashley Becker MD, 100 mg at 01/16/25 0836    topiramate (Topamax) tablet 100 mg, 100 mg, oral, BID, Ashley Becker MD, 100 mg at 01/16/25 0837    white petrolatum (Aquaphor) ointment, , Topical, q1h PRN, Bridger Amor MD    Family History  Family History   Problem Relation Name Age of Onset    Diabetes Mother      Hypertension Mother      Heart attack Father      Hypertension Other         Social History  Social History     Socioeconomic History    Marital status:      Spouse name: Not on file    Number of children: Not on file    Years of education: Not on file    Highest education level: Not on file   Occupational History    Not on file   Tobacco Use    Smoking status: Former     Current packs/day: 1.00     Types: Cigarettes    Smokeless tobacco: Former     Types: Snuff    Tobacco comments:     1 ppd since the age of 16   Vaping Use    Vaping status: Never Used   Substance and Sexual Activity    Alcohol use: Not Currently     Comment: 6 pack beer a day    Drug use: Yes     Types: Methamphetamines, Cocaine, Marijuana     Comment: Hx  cocaine use    Sexual activity: Defer   Other Topics Concern    Not on file   Social History Narrative    Not on file     Social Drivers of Health     Financial Resource Strain: Patient Declined (1/16/2025)    Overall Financial Resource Strain (CARDIA)     Difficulty of Paying Living Expenses: Patient declined   Recent Concern: Financial Resource Strain - High Risk (12/18/2024)    Overall Financial Resource Strain (CARDIA)     Difficulty of Paying Living Expenses: Hard   Food Insecurity: Patient Declined (1/16/2025)    Hunger Vital Sign     Worried About Running Out of Food in the Last Year: Patient declined     Ran Out of Food in the Last Year: Patient declined   Recent Concern: Food Insecurity - Food Insecurity Present (12/18/2024)    Hunger Vital Sign     Worried About Running Out of Food in the Last Year: Sometimes true     Ran Out of Food in the Last Year: Sometimes true   Transportation Needs: Unmet Transportation Needs (1/16/2025)    PRAPARE - Transportation     Lack of Transportation (Medical): Yes     Lack of Transportation (Non-Medical): Yes   Physical Activity: Not on file   Stress: No Stress Concern Present (12/18/2024)    [employer] - Occupational Stress Questionnaire     Feeling of Stress : Only a little   Social Connections: Not on file   Intimate Partner Violence: Not At Risk (1/16/2025)    Humiliation, Afraid, Rape, and Kick questionnaire     Fear of Current or Ex-Partner: No     Emotionally Abused: No     Physically Abused: No     Sexually Abused: No   Housing Stability: Unknown (1/16/2025)    Housing Stability Vital Sign     Unable to Pay for Housing in the Last Year: Patient declined     Number of Times Moved in the Last Year: 0     Homeless in the Last Year: No   Recent Concern: Housing Stability - High Risk (12/18/2024)    Housing Stability Vital Sign     Unable to Pay for Housing in the Last Year: Yes     Number of Times Moved in the Last Year: 0     Homeless in  the Last Year: No       Vital Signs  Vitals:    01/16/25 1319   BP: 122/88   Pulse: 79   Resp: 16   Temp: 35.9 °C (96.6 °F)   SpO2: 94%       Physical Examination  GEN: The patient appears stated age in no acute distress  VOICE: No dysphonia, volume is appropriate  RESP: Unlabored on room air with no audible stertor or stridor  CV: Clinically well perfused   EYES: right eye closed  NEURO: right and left sixth nerve palsy.   HEAD: Scalp is normocephalic and atraumatic  FACE: No abrasions or lacerations, no maxillary or mandibular stepoffs  EARS: Right tragus laceration, approximately 1 to 2 cm very superficial with well-healed crusting, most likely from scratching.  EAC and TMs visualized without lesion, likely fluid in middle ear on the right.  Left ear normal EAC and TM  NOSE: External nose appears normal, no significant septal deviation, anterior rhinoscopy limited with no active bleeding or lesions  OC: Normal lips, normal buccal mucosa, normal alveolar ridge, normal floor of mouth, normal tongue, normal hard palate, normal retromolar trigone  OP: normal pharyngeal walls, normal soft palate, uvula midline  NECK: Extensive surgical changes of right neck with well-healing flap, noted alterations of clavicle.    Laboratory and Data  Results for orders placed or performed during the hospital encounter of 01/15/25 (from the past 24 hours)   Drug Screen, Urine With Reflex to Confirmation   Result Value Ref Range    Amphetamine Screen, Urine Presumptive Negative Presumptive Negative    Barbiturate Screen, Urine Presumptive Negative Presumptive Negative    Benzodiazepines Screen, Urine Presumptive Negative Presumptive Negative    Cannabinoid Screen, Urine Presumptive Negative Presumptive Negative    Cocaine Metabolite Screen, Urine Presumptive Negative Presumptive Negative    Fentanyl Screen, Urine Presumptive Negative Presumptive Negative    Opiate Screen, Urine Presumptive Positive (A) Presumptive Negative    Oxycodone  Screen, Urine Presumptive Negative Presumptive Negative    PCP Screen, Urine Presumptive Negative Presumptive Negative    Methadone Screen, Urine Presumptive Negative Presumptive Negative   Heparin Assay, UFH   Result Value Ref Range    Heparin Unfractionated 0.7 See Comment Below for Therapeutic Ranges IU/mL   CBC   Result Value Ref Range    WBC 9.6 4.4 - 11.3 x10*3/uL    nRBC 0.0 0.0 - 0.0 /100 WBCs    RBC 4.53 4.50 - 5.90 x10*6/uL    Hemoglobin 12.2 (L) 13.5 - 17.5 g/dL    Hematocrit 38.9 (L) 41.0 - 52.0 %    MCV 86 80 - 100 fL    MCH 26.9 26.0 - 34.0 pg    MCHC 31.4 (L) 32.0 - 36.0 g/dL    RDW 16.1 (H) 11.5 - 14.5 %    Platelets 344 150 - 450 x10*3/uL       Radiology Reviewed  I have personally reviewed the CT Head: destructive lesion in posterior aspect of sphenoid with erosion through the clivus.       PROCEDURE NOTE:  Nasopharyngolaryngoscopy    For better visualization because of nasopharynx, a flexible fiberoptic nasopharyngolaryngoscopy was performed. Patient was correctly identified and verbal consent obtained.  After topical anesthesia with atomized 4% Lidocaine with Afrin, the flexible scope was introduced into the left naris.    The following areas were visualized:  Nasal septum, turbinates, nasopharynx, oropharynx, hypopharynx, soft palate, base of tongue, epiglottis, and larynx.    These structures were found to be normal with the following notable findings:     -No clear lesion to biopsy      Assessment  Gaurav Mckay is a 54 y.o. male who Has complex history of laryngeal cancer with concerning posterior nasal mass on CT.  Prior biopsy of squamous cell carcinoma being closely followed by oncology and neurosurgery.  Biopsy was previously taken by Dr. Hamilton 12/20 which found invasive poorly differentiated squamous cell carcinoma.  Patient currently being taken down for urgent mapping and radiation.  ENT consulted for evaluation of epistaxis and ear drainage.    Recommendations  -AYA R gel or  nasal saline equivalent to tip of nose twice daily while in hospital  - Nasal saline irrigation  - Continue with planned radiation  - Continue with neurosurgical recommendations  - If nose bleeding occur, Afrin liberally to both naris every 15 minutes for 3 times.  - Bacitracin to right tragal lesion twice daily for 7 days, highly encourage patient to avoid picking nose or ear.    Seen and discussed with Dr. Wang who agrees with assessment and plan.     Delano Ramos, PGY-2  Rotating Resident, ENT    ENT Consult pager: [phone]  ENT Peds pager: [phone]  ENT Head & Neck Surgery Phone: [phone]  ENT subspecialty team: [place] individual resident who wrote today's note  ENT Outpatient scheduling number: [phone]  Please Page [phone] or call [phone] if Urgent

## 2025-01-16 NOTE — ED PROVIDER NOTES
History of Present Illness     History provided by: Patient  Limitations to History: None  External Records Reviewed with Brief Summary: Outpatient progress note from 1/15/2025 which showed visit with rad onc who is presenting today with concern for a mass in the brain.    HPI:  Gaurav Mckay is a 54 y.o. 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 Patient reports a few weeks of our right eye  ptosis, dilated right pupil that was not reactive. Patient had swelling over the neck that was seen at outside appointment.    Physical Exam   Triage vitals:  T 36.6 °C (97.9 °F)  HR 92  /82  RR 16  O2 97 % Supplemental oxygen (2L NC home O2)    General: Awake, alert, in no acute distress  Eyes: Gaze conjugate.  No scleral icterus or injection left pupil reactive. Right pupil dilated and not reactive. Ptosis of the right eye.   HENT: Normo-cephalic, atraumatic. No stridor  CV: Regular rate, regular rhythm. Radial pulses 2+ bilaterally  Resp: Breathing non-labored, speaking in full sentences.  Clear to auscultation bilaterally  GI: Soft, non-distended, non-tender. No rebound or guarding.  MSK/Extremities: No gross bony deformities. Moving all extremities  Skin: Warm. Appropriate color  Neuro: Alert. Oriented. Face symmetric. Speech is fluent.  Gross strength and sensation intact in b/l UE and LEs  Psych: Appropriate mood and affect      Medical Decision Making & ED Course   Medical Decision Makin y.o. 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 Vital signs within normal limits. On exam, patient has been ptosis and a dilated right pupil. Patient's exam was concerning for worsening metastatic lesions. CT scan was ordered and obtained in the ED. Neurosurgery was consulted as well as ophthalmology. CT scan showed worsening metastatic  lesions. Patient was admitted to medicine for oncology evaluation and potential palliative radiation  ----      Differential diagnoses considered include but are not limited to: Please see MDM.     Social Determinants of Health which Significantly Impact Care: None identified     EKG Independent Interpretation: EKG interpreted by myself. Please see ED Course and MDM for full interpretation.    Independent Result Review and Interpretation: Results were independently reviewed and interpreted by myself. Please see ED course and MDM for full interpretation.    Chronic conditions affecting the patient's care: As documented in the MDM    The patient was discussed with the following consultants/services: Admission Coordinator who accepted the patient for admission and neurosurgery evaluation and ophthalmology evaluation    Care Considerations: As per [facility]    ED Course:  ED Course as of 01/17/25 1459   Wed Justin 15, 2025   1520 External chart review:  Rad onc office visit from today with patient was testing outpatient MRI but was unable to tolerate was so was referred to the ED for further evaluation and MRI     [SHERLEY]      ED Course User Index  [SHERLEY] Pooja Jacques MD         Diagnoses as of 01/17/25 7949   Ptosis of right eyelid   Anisocoria     Disposition   As a result of their workup, the patient will require admission to the hospital.  The patient was informed of his diagnosis.  The patient was given the opportunity to ask questions and I answered them. The patient agreed to be admitted to the hospital.    Procedures   Procedures    Patient seen and discussed with ED attending physician.    Pooja Jacques MD  Emergency Medicine     Pooja Jacques MD  Resident  01/17/25 2807

## 2025-01-17 ENCOUNTER — APPOINTMENT (OUTPATIENT)
Dept: RADIOLOGY | Facility: HOSPITAL | Age: 55
End: 2025-01-17
Payer: COMMERCIAL

## 2025-01-17 LAB
6MAM UR CFM-MCNC: <25 NG/ML
BASOPHILS # BLD AUTO: 0.02 X10*3/UL (ref 0–0.1)
BASOPHILS NFR BLD AUTO: 0.1 %
CODEINE UR CFM-MCNC: <50 NG/ML
EOSINOPHIL # BLD AUTO: 0 X10*3/UL (ref 0–0.7)
EOSINOPHIL NFR BLD AUTO: 0 %
ERYTHROCYTE [DISTWIDTH] IN BLOOD BY AUTOMATED COUNT: 15.8 % (ref 11.5–14.5)
HCT VFR BLD AUTO: 40.3 % (ref 41–52)
HGB BLD-MCNC: 12.4 G/DL (ref 13.5–17.5)
HYDROCODONE CTO UR CFM-MCNC: <25 NG/ML
HYDROMORPHONE UR CFM-MCNC: 28 NG/ML
IMM GRANULOCYTES # BLD AUTO: 0.37 X10*3/UL (ref 0–0.7)
IMM GRANULOCYTES NFR BLD AUTO: 2 % (ref 0–0.9)
LYMPHOCYTES # BLD AUTO: 0.32 X10*3/UL (ref 1.2–4.8)
LYMPHOCYTES NFR BLD AUTO: 1.7 %
MCH RBC QN AUTO: 27.1 PG (ref 26–34)
MCHC RBC AUTO-ENTMCNC: 30.8 G/DL (ref 32–36)
MCV RBC AUTO: 88 FL (ref 80–100)
MONOCYTES # BLD AUTO: 0.59 X10*3/UL (ref 0.1–1)
MONOCYTES NFR BLD AUTO: 3.2 %
MORPHINE UR CFM-MCNC: >2500 NG/ML
NEUTROPHILS # BLD AUTO: 17.38 X10*3/UL (ref 1.2–7.7)
NEUTROPHILS NFR BLD AUTO: 93 %
NORHYDROCODONE UR CFM-MCNC: <25 NG/ML
NOROXYCODONE UR CFM-MCNC: 469 NG/ML
NRBC BLD-RTO: 0 /100 WBCS (ref 0–0)
OXYCODONE UR CFM-MCNC: 35 NG/ML
OXYMORPHONE UR CFM-MCNC: <25 NG/ML
PLATELET # BLD AUTO: 408 X10*3/UL (ref 150–450)
RBC # BLD AUTO: 4.58 X10*6/UL (ref 4.5–5.9)
WBC # BLD AUTO: 18.7 X10*3/UL (ref 4.4–11.3)

## 2025-01-17 PROCEDURE — 2500000001 HC RX 250 WO HCPCS SELF ADMINISTERED DRUGS (ALT 637 FOR MEDICARE OP): Mod: SE

## 2025-01-17 PROCEDURE — 36415 COLL VENOUS BLD VENIPUNCTURE: CPT

## 2025-01-17 PROCEDURE — 2500000002 HC RX 250 W HCPCS SELF ADMINISTERED DRUGS (ALT 637 FOR MEDICARE OP, ALT 636 FOR OP/ED): Mod: SE

## 2025-01-17 PROCEDURE — 99233 SBSQ HOSP IP/OBS HIGH 50: CPT | Performed by: INTERNAL MEDICINE

## 2025-01-17 PROCEDURE — 1170000001 HC PRIVATE ONCOLOGY ROOM DAILY

## 2025-01-17 PROCEDURE — 2500000004 HC RX 250 GENERAL PHARMACY W/ HCPCS (ALT 636 FOR OP/ED): Mod: SE

## 2025-01-17 PROCEDURE — 94640 AIRWAY INHALATION TREATMENT: CPT

## 2025-01-17 PROCEDURE — 85025 COMPLETE CBC W/AUTO DIFF WBC: CPT

## 2025-01-17 PROCEDURE — 97162 PT EVAL MOD COMPLEX 30 MIN: CPT | Mod: GP | Performed by: PHYSICAL THERAPIST

## 2025-01-17 PROCEDURE — S4991 NICOTINE PATCH NONLEGEND: HCPCS | Mod: SE

## 2025-01-17 PROCEDURE — S0109 METHADONE ORAL 5MG: HCPCS | Mod: SE

## 2025-01-17 PROCEDURE — 70551 MRI BRAIN STEM W/O DYE: CPT | Mod: RSC

## 2025-01-17 PROCEDURE — 99231 SBSQ HOSP IP/OBS SF/LOW 25: CPT

## 2025-01-17 RX ORDER — PREGABALIN 25 MG/1
200 CAPSULE ORAL 3 TIMES DAILY
Status: DISCONTINUED | OUTPATIENT
Start: 2025-01-17 | End: 2025-01-20

## 2025-01-17 RX ORDER — PETROLATUM 420 MG/G
OINTMENT TOPICAL 3 TIMES DAILY
Status: DISCONTINUED | OUTPATIENT
Start: 2025-01-17 | End: 2025-01-25 | Stop reason: HOSPADM

## 2025-01-17 RX ORDER — OXYMETAZOLINE HCL 0.05 %
2 SPRAY, NON-AEROSOL (ML) NASAL ONCE
Status: COMPLETED | OUTPATIENT
Start: 2025-01-17 | End: 2025-01-17

## 2025-01-17 RX ADMIN — ROPINIROLE 0.5 MG: 0.5 TABLET, FILM COATED ORAL at 21:19

## 2025-01-17 RX ADMIN — OXYMETAZOLINE HYDROCHLORIDE 2 SPRAY: 0.5 SPRAY NASAL at 16:30

## 2025-01-17 RX ADMIN — STANDARDIZED SENNA CONCENTRATE 17.2 MG: 8.6 TABLET ORAL at 21:20

## 2025-01-17 RX ADMIN — METHADONE HYDROCHLORIDE 2.5 MG: 5 TABLET ORAL at 00:09

## 2025-01-17 RX ADMIN — PANCRELIPASE 1 CAPSULE: 30000; 6000; 19000 CAPSULE, DELAYED RELEASE PELLETS ORAL at 08:26

## 2025-01-17 RX ADMIN — HYDROMORPHONE HYDROCHLORIDE 1 MG: 1 INJECTION, SOLUTION INTRAMUSCULAR; INTRAVENOUS; SUBCUTANEOUS at 03:03

## 2025-01-17 RX ADMIN — HYDROMORPHONE HYDROCHLORIDE 1 MG: 1 INJECTION, SOLUTION INTRAMUSCULAR; INTRAVENOUS; SUBCUTANEOUS at 14:47

## 2025-01-17 RX ADMIN — MORPHINE SULFATE 15 MG: 15 TABLET ORAL at 00:09

## 2025-01-17 RX ADMIN — ROPINIROLE 0.5 MG: 0.5 TABLET, FILM COATED ORAL at 08:17

## 2025-01-17 RX ADMIN — METHADONE HYDROCHLORIDE 2.5 MG: 5 TABLET ORAL at 08:16

## 2025-01-17 RX ADMIN — MORPHINE SULFATE 15 MG: 15 TABLET ORAL at 18:26

## 2025-01-17 RX ADMIN — OXYMETAZOLINE HYDROCHLORIDE 2 SPRAY: 0.5 SPRAY NASAL at 09:59

## 2025-01-17 RX ADMIN — FOLIC ACID 1 MG: 1 TABLET ORAL at 08:17

## 2025-01-17 RX ADMIN — PANTOPRAZOLE SODIUM 40 MG: 40 TABLET, DELAYED RELEASE ORAL at 06:05

## 2025-01-17 RX ADMIN — ATORVASTATIN CALCIUM 20 MG: 20 TABLET, FILM COATED ORAL at 21:20

## 2025-01-17 RX ADMIN — METHADONE HYDROCHLORIDE 2.5 MG: 5 TABLET ORAL at 18:06

## 2025-01-17 RX ADMIN — THIAMINE HYDROCHLORIDE 100 MG: 100 INJECTION, SOLUTION INTRAMUSCULAR; INTRAVENOUS at 08:16

## 2025-01-17 RX ADMIN — PREGABALIN 200 MG: 75 CAPSULE ORAL at 14:47

## 2025-01-17 RX ADMIN — BACITRACIN: 500 OINTMENT TOPICAL at 14:47

## 2025-01-17 RX ADMIN — MORPHINE SULFATE 15 MG: 15 TABLET ORAL at 22:08

## 2025-01-17 RX ADMIN — PREGABALIN 200 MG: 75 CAPSULE ORAL at 21:19

## 2025-01-17 RX ADMIN — IPRATROPIUM BROMIDE AND ALBUTEROL SULFATE 3 ML: .5; 3 SOLUTION RESPIRATORY (INHALATION) at 22:45

## 2025-01-17 RX ADMIN — TOPIRAMATE 100 MG: 100 TABLET, FILM COATED ORAL at 21:18

## 2025-01-17 RX ADMIN — MORPHINE SULFATE 15 MG: 15 TABLET ORAL at 06:05

## 2025-01-17 RX ADMIN — TAMSULOSIN HYDROCHLORIDE 0.4 MG: 0.4 CAPSULE ORAL at 08:17

## 2025-01-17 RX ADMIN — PREGABALIN 50 MG: 25 CAPSULE ORAL at 08:17

## 2025-01-17 RX ADMIN — BACITRACIN 1 APPLICATION: 500 OINTMENT TOPICAL at 21:21

## 2025-01-17 RX ADMIN — MIRTAZAPINE 15 MG: 15 TABLET, FILM COATED ORAL at 21:20

## 2025-01-17 RX ADMIN — SALINE NASAL SPRAY 2 SPRAY: 1.5 SOLUTION NASAL at 18:06

## 2025-01-17 RX ADMIN — MONTELUKAST 10 MG: 10 TABLET, FILM COATED ORAL at 21:19

## 2025-01-17 RX ADMIN — ROPINIROLE 0.5 MG: 0.5 TABLET, FILM COATED ORAL at 14:47

## 2025-01-17 RX ADMIN — POLYETHYLENE GLYCOL 3350 17 G: 17 POWDER, FOR SOLUTION ORAL at 08:17

## 2025-01-17 RX ADMIN — HYDROMORPHONE HYDROCHLORIDE 1 MG: 1 INJECTION, SOLUTION INTRAMUSCULAR; INTRAVENOUS; SUBCUTANEOUS at 08:16

## 2025-01-17 RX ADMIN — SALINE NASAL SPRAY 2 SPRAY: 1.5 SOLUTION NASAL at 21:24

## 2025-01-17 RX ADMIN — Medication 1 TABLET: at 08:17

## 2025-01-17 RX ADMIN — NICOTINE 1 PATCH: 14 PATCH, EXTENDED RELEASE TRANSDERMAL at 22:08

## 2025-01-17 RX ADMIN — TOPIRAMATE 100 MG: 100 TABLET, FILM COATED ORAL at 08:17

## 2025-01-17 RX ADMIN — MORPHINE SULFATE 15 MG: 15 TABLET ORAL at 10:02

## 2025-01-17 RX ADMIN — MORPHINE SULFATE 15 MG: 15 TABLET ORAL at 13:49

## 2025-01-17 RX ADMIN — BACITRACIN: 500 OINTMENT TOPICAL at 08:17

## 2025-01-17 RX ADMIN — PANCRELIPASE 1 CAPSULE: 30000; 6000; 19000 CAPSULE, DELAYED RELEASE PELLETS ORAL at 21:23

## 2025-01-17 ASSESSMENT — COGNITIVE AND FUNCTIONAL STATUS - GENERAL
CLIMB 3 TO 5 STEPS WITH RAILING: A LITTLE
STANDING UP FROM CHAIR USING ARMS: A LITTLE
MOVING TO AND FROM BED TO CHAIR: A LITTLE
CLIMB 3 TO 5 STEPS WITH RAILING: TOTAL
HELP NEEDED FOR BATHING: A LITTLE
PERSONAL GROOMING: A LITTLE
MOBILITY SCORE: 21
STANDING UP FROM CHAIR USING ARMS: A LITTLE
DAILY ACTIVITIY SCORE: 20
MOBILITY SCORE: 17
DRESSING REGULAR LOWER BODY CLOTHING: A LITTLE
TURNING FROM BACK TO SIDE WHILE IN FLAT BAD: A LITTLE
WALKING IN HOSPITAL ROOM: A LITTLE
TOILETING: A LITTLE
WALKING IN HOSPITAL ROOM: A LITTLE

## 2025-01-17 ASSESSMENT — LIFESTYLE VARIABLES
NAUSEA AND VOMITING: NO NAUSEA AND NO VOMITING
VISUAL DISTURBANCES: NOT PRESENT
AUDITORY DISTURBANCES: NOT PRESENT
TREMOR: NO TREMOR
TOTAL SCORE: 4
BLOOD PRESSURE: 122/85
AGITATION: NORMAL ACTIVITY
AGITATION: NORMAL ACTIVITY
ANXIETY: NO ANXIETY, AT EASE
PAROXYSMAL SWEATS: NO SWEAT VISIBLE
TREMOR: NO TREMOR
ORIENTATION AND CLOUDING OF SENSORIUM: ORIENTED AND CAN DO SERIAL ADDITIONS
ORIENTATION AND CLOUDING OF SENSORIUM: ORIENTED AND CAN DO SERIAL ADDITIONS
NAUSEA AND VOMITING: NO NAUSEA AND NO VOMITING
PULSE: 89
TREMOR: NO TREMOR
AUDITORY DISTURBANCES: NOT PRESENT
NAUSEA AND VOMITING: NO NAUSEA AND NO VOMITING
ANXIETY: MILDLY ANXIOUS
ORIENTATION AND CLOUDING OF SENSORIUM: ORIENTED AND CAN DO SERIAL ADDITIONS
ANXIETY: NO ANXIETY, AT EASE
VISUAL DISTURBANCES: NOT PRESENT
HEADACHE, FULLNESS IN HEAD: MODERATE
HEADACHE, FULLNESS IN HEAD: MODERATELY SEVERE
AGITATION: NORMAL ACTIVITY
TOTAL SCORE: 4
AUDITORY DISTURBANCES: NOT PRESENT
PAROXYSMAL SWEATS: NO SWEAT VISIBLE
HEADACHE, FULLNESS IN HEAD: MODERATELY SEVERE
PAROXYSMAL SWEATS: NO SWEAT VISIBLE
TOTAL SCORE: 4
VISUAL DISTURBANCES: NOT PRESENT

## 2025-01-17 ASSESSMENT — PAIN - FUNCTIONAL ASSESSMENT
PAIN_FUNCTIONAL_ASSESSMENT: 0-10

## 2025-01-17 ASSESSMENT — PAIN SCALES - GENERAL
PAINLEVEL_OUTOF10: 9
PAINLEVEL_OUTOF10: 9
PAINLEVEL_OUTOF10: 8
PAINLEVEL_OUTOF10: 6
PAINLEVEL_OUTOF10: 9
PAINLEVEL_OUTOF10: 9
PAINLEVEL_OUTOF10: 5 - MODERATE PAIN
PAINLEVEL_OUTOF10: 9
PAINLEVEL_OUTOF10: 9
PAINLEVEL_OUTOF10: 6
PAINLEVEL_OUTOF10: 10 - WORST POSSIBLE PAIN
PAINLEVEL_OUTOF10: 10 - WORST POSSIBLE PAIN

## 2025-01-17 ASSESSMENT — ACTIVITIES OF DAILY LIVING (ADL)
ADLS_ADDRESSED: NO
ADL_ASSISTANCE: NEEDS ASSISTANCE

## 2025-01-17 NOTE — PROGRESS NOTES
"Physical Therapy    Physical Therapy Evaluation    Patient Name: Gaurav Mckay \"Lucy"  MRN: [id number]  Department: [facility] 4  Room: [address] [place], [place] [address]  Today's Date: 1/17/2025   Time Calculation  Start Time: 1414  Stop Time: 1425  Time Calculation (min): 11 min    Assessment/Plan   PT Assessment  PT Assessment Results: Decreased strength, Decreased endurance, Impaired balance, Decreased mobility, Decreased safety awareness, Decreased cognition, Impaired judgement, Impaired vision, Impaired hearing, Pain, Decreased skin integrity  Rehab Prognosis: Fair  Barriers to Discharge Home: Cognition needs, Physical needs  Cognition Needs: 24hr supervision for safety awareness needed  Physical Needs: 24hr mobility assistance needed  Evaluation/Treatment Tolerance: Other (Comment), Patient limited by pain (headache)  Medical Staff Made Aware: Yes  Strengths: Premorbid level of function  Barriers to Participation: Other (Comment) (pain)  End of Session Communication: Bedside nurse  Assessment Comment: 53 yo male with SCC of larynx with new CN palsies, Right eye ptosis, weakness, decreased activity tolerance, balance deficits and headache. Once medically stable recommend moderate intensity therapy as pt needs PT & OT (maybe SLT), is fall risk, is not at baseline and has medical and rehab needs.  End of Session Patient Position: Bed, 3 rail up, Alarm on  IP OR SWING BED PT PLAN  Inpatient or Swing Bed: Inpatient  PT Plan  Treatment/Interventions: Bed mobility, Transfer training, Gait training, Balance training, Neuromuscular re-education, Strengthening, Endurance training, Therapeutic exercise, Therapeutic activity, Home exercise program, Postural re-education  PT Plan: Ongoing PT  PT Frequency: 3 times per week  PT Discharge Recommendations: Moderate intensity level of continued care  Equipment Recommended upon Discharge: Other (comment) (unable to determine today (no anticipated equipment needs if returns to SNF))  PT Recommended " Transfer Status: Assist x1, Assistive device (CG/min A +1 with WW)  PT - OK to Discharge: Yes (PT eval completed & DC recs made)    Subjective   General Visit Information:  General  Reason for Referral: Admitted 1/15 from [facility] with severe pain, Right eye drooping, fixed dilated pupils; dx: new RIGHT CNIII palsy/LET CNVI palsy, premorbid RIGHT CNVI palsy likely all due to expansion of clivus mass  Past Medical History Relevant to Rehab: 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 Pt able to walk short distance with walker with PT as noted (headache limiting activity today).  Home Living:  Home Living  Type of Home:  (came from SNF (living there x 1 month), he is unsure if that is where he will return/what plan is ([facility]leg[facility]))  Home Adaptive Equipment:  (wheeled walker)  Home Layout: One level  Prior Level of Function:  Prior Function Per Pt/Caregiver Report  Level of [place]:  (per pt walks with WW at SNF, gets PT & OT, facility does cooking/cleaning/laundry)  Receives Help From:  (facility staff)  ADL Assistance: Needs assistance  Homemaking Assistance: Needs assistance  Prior Function Comments: as noted; pt fatigued/not feeling well so not best historian today  Precautions:  Precautions  Medical Precautions: Fall precautions (neuropathy, decreased vision (right eye ptosis))     Vital Signs (Past  2hrs)        Date/Time Vitals Session Patient Position Pulse Resp SpO2 BP MAP (mmHg)    01/17/25 1414 During PT  Lying  --  --  95 %  --  --                   Vital Signs Comment: post short walk     Objective   Pain:  Pain Assessment  Pain Assessment: 0-10  0-10 (Numeric) Pain Score:  (8/10 pre/during/post posterior headache (RN aware))  Pain Interventions: Heat applied, Ambulation/increased activity, Rest, Therapeutic presence, Therapeutic touch (given warm blankets and turned heat up in room)  Response to Interventions:  (same level of pain (RN aware))  Cognition:  Cognition  Overall Cognitive Status: Impaired  Orientation Level:  (oriented to place and general situation, not year/month/day/date)  Following Commands: Follows one step commands with repetition  Safety Judgment: Decreased awareness of need for assistance  Problem Solving: Assistance required to identify errors made  Memory: Exceptions to WFL  Short-Term Memory: Impaired  Problem Solving: Exceptions to WFL  Safety/Judgement: Exceptions to WFL  Processing Speed: Delayed    General Assessments:  General Observation  General Observation: pale, fatigued, picking nose on arrival     Activity Tolerance  Endurance: Other (Comment) (limited by headache today)    Sensation  Light Touch: Not tested (reports numnbess bilateral feet from neuropathy)       Postural Control  Postural Control: Impaired    Static Sitting Balance  Static Sitting-Balance Support: Feet supported, No upper extremity supported  Static Sitting-Level of Assistance: Close supervision    Static Standing Balance  Static Standing-Balance Support: Bilateral upper extremity supported (on WW)  Static Standing-Level of Assistance: Contact guard  Dynamic Standing Balance  Dynamic Standing-Balance Support: Bilateral upper extremity supported (on WW)  Dynamic Standing-Level of Assistance: Contact guard (cues for safety)  Dynamic Standing-Balance:  (gait including turns, in room short distance due to  headache)  Functional Assessments:  ADL  ADL's Addressed: No    Bed Mobility  Bed Mobility: Yes  Bed Mobility 1  Bed Mobility 1: Supine to sitting, Sitting to supine  Level of Assistance 1: Close supervision  Bed Mobility Comments 1: HOB elevated slightly, use of rail, in/out on Right side    Transfers  Transfer: Yes  Transfer 1  Transfer From 1: Sit to, Stand to  Transfer to 1: Sit, Stand  Technique 1: Sit to stand, Stand to sit  Transfer Device 1: Walker  Transfer Level of Assistance 1: Contact guard, Minimal verbal cues    Ambulation/Gait Training  Ambulation/Gait Training Performed: Yes  Ambulation/Gait Training 1  Surface 1: Level tile  Device 1: Rolling walker  Assistance 1: Contact guard, Minimal verbal cues  Quality of Gait 1:  (fast, unsafe at times, cues for walker)  Comments/Distance (ft) 1: 16 (limited by headache and feeling cold today)    Stairs  Stairs: No (N/A (currently living at SNF))  Extremity/Trunk Assessments:  RUE   RUE : Within Functional Limits  LUE   LUE: Within Functional Limits  RLE   RLE : Within Functional Limits  LLE   LLE : Within Functional Limits  Outcome Measures:  [facility] Basic Mobility  Turning from your back to your side while in a flat bed without using bedrails: None  Moving from lying on your back to sitting on the side of a flat bed without using bedrails: A little  Moving to and from bed to chair (including a wheelchair): A little  Standing up from a chair using your arms (e.g. wheelchair or bedside chair): A little  To walk in hospital room: A little  Climbing 3-5 steps with railing: Total  Basic Mobility - Total Score: 17    Encounter Problems       Encounter Problems (Active)       General Goals       Pt will be oriented x 4 and follow 100% of simple 2 step commands without additional cues during session (Progressing)       Start:  01/17/25    Expected End:  01/31/25            Pt will come supine to/from sit (HOB flat, no rail) with supervision (Progressing)       Start:   01/17/25    Expected End:  01/31/25            Pt will score >41 on the Watts Balance scale indicating low risk for falls (Not Progressing)       Start:  01/17/25    Expected End:  01/31/25               Mobility       Pt will transfer sit to/from stand, bed to/from chair with WW with supervision, no LOB, stable vitals, pain <2 (Progressing)       Start:  01/17/25    Expected End:  01/31/25            Pt will ambulate 500' with WW and supervision, no LOB, stable vitals (Progressing)       Start:  01/17/25    Expected End:  01/31/25               Pain - Adult              Education Documentation  Body Mechanics, taught by Dori Tate PT at 1/17/2025  2:44 PM.  Learner: Patient  Readiness: Acceptance  Method: Explanation  Response: Needs Reinforcement, Verbalizes Understanding  Comment: PT purpose/POC, safe transfers/gait    Precautions, taught by Dori Tate PT at 1/17/2025  2:44 PM.  Learner: Patient  Readiness: Acceptance  Method: Explanation  Response: Needs Reinforcement, Verbalizes Understanding  Comment: PT purpose/POC, safe transfers/gait    Mobility Training, taught by Dori Tate PT at 1/17/2025  2:44 PM.  Learner: Patient  Readiness: Acceptance  Method: Explanation  Response: Needs Reinforcement, Verbalizes Understanding  Comment: PT purpose/POC, safe transfers/gait    Education Comments  No comments found.

## 2025-01-17 NOTE — CONSULTS
SUPPORTIVE AND PALLIATIVE ONCOLOGY CONSULT    Consults  SERVICE DATE: 2025      PALLIATIVE MEDICINE OUTPATIENT PROVIDER:  Yadi Gallardo APRN-CNP  CURRENT ATTENDING PROVIDER: Estela Holman MD*     Medical Oncologist: No care team member to display   Radiation Oncologist: No care team member to display  Primary Physician: No Assigned PCP Generic Provider  None    REASON FOR CONSULT/CHIEF CONSULT COMPLAINT: pain management    Subjective   HISTORY OF PRESENT ILLNESS: Gaurav Mckay is a 54 y.o. 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 PMH significant for COPD,PE (2023) on apixaban and on 2 L NC at home, alcohol use disorder, tobacco use, HTN, and bladder mass with hematuria of presenting with headache, ptosis of right eye and vision changes. Admitted 1/15/2025 for further evaluation and management of pain. Supportive and Palliative Oncology is consulted for pain management.     Pain Assessment:  Onset: Several years   Location:  Head and neck  Duration: Constant  Characteristics:   Ratin   Descriptors: dull, aching, throbbing, sharp, and stabbing   Aggravating: movement, bending, and coughing    Relieving: Analgesics and Positioning   Intolerances:Gaurav Mckay is allergic to oxycodone.   Personal Pain Goal: 6    Interference with Function: A little   Coping Strategies: distraction   Emotional Response: Depression, Anxiety, Fear, and Psychological distress   Barriers to Pain Management: None    Opioid Requirements  Past 24 h opioid requirements (1/15/25 at 0800 to 25 at 0800):   Morphine IR 30 mg PO x 3 doses = 90 mg = 90 OME  Hydromorphone 1 mg IV x 0 doses = 0 mg = 0 OME  Oxycodone IR 5 mg PO x 1 doses = 5 mg = 6.25 OME  Methadone 2.5 mg p.o. x 0 doses = 0 mg = 0 OME    Total 24h OME use:  96.25    Note: OME calculations based on equianalgesic table below. Please note this table is based on best available evidence  but conversions are still approximate. These are NOT opioid DOSES for individual patient use; this is equivalency information.  Drug Parenteral Enteral   Morphine 10 25   Oxycodone N/A 20   Hydromorphone 2 5   Fentanyl 0.15 N/A   Tramadol N/A 120   Citation: Darnell ENGLAND. Demystifying opioid conversion calculations: A guide for effective dosing, Second edition. [place] Essie: American Society of Health-System Pharmacists, 2018.    OARRS/PDMP reviewed no aberrant behavior noted.    Symptom Assessment:  Pain:very much   Headache: very much   Dizziness:somewhat  Lack of energy: somewhat  Difficulty sleeping: somewhat  Worrying: somewhat  Anxiety: somewhat  Depressive symptoms: somewhat  Pain in mouth/swallowing: somewhat  Dry mouth: somewhat  Taste changes: a little  Shortness of breath: a little  Lack of appetite: a little   Nausea: none  Vomiting: none  Constipation: none  Diarrhea: none  Sore muscles: none  Numbness or tingling in hands/feet/other: none  Weight loss: a little  Other: none        Information obtained from: chart review, interview of patient, and discussion with primary team  ______________________________________________________________________     Oncology History    No history exists.       Past Medical History:   Diagnosis Date    Acute inflammation of orbit 06/27/2013    Acute upper respiratory infection, unspecified 03/20/2015    Acute upper respiratory infection    Alcohol abuse, in remission 02/12/2015    History of ETOH abuse    Alcohol dependence, in remission 05/07/2015    Alcohol dependence in remission    Allergic contact dermatitis due to plants, except food 05/21/2014    Contact dermatitis due to poison ivy    Allergy status to unspecified drugs, medicaments and biological substances 08/29/2013    History of allergy    Anxiety     Anxiety disorder, unspecified 03/20/2015    Anxiety    Asthmatic bronchitis ([facility])     At high risk for falls     Bipolar disorder, current episode manic  without psychotic features, unspecified (Multi)     Bipolar disorder, current episode manic without psychotic features    Bladder mass     Brain mass     Cancer of larynx (Multi)     Cervicalgia     Cervicalgia    Chronic asthmatic bronchitis (Multi) 12/02/2014    Clotting disorder (Multi)     P.E.    COPD (chronic obstructive pulmonary disease) (Multi)     Degeneration of cervical intervertebral disc     Depression     Dysphagia     Edentulous     Encounter for immunization 09/22/2016    Flu vaccine need    Frequent falls     pt states he falls every time he gets up.    GERD (gastroesophageal reflux disease)     Hearing aid worn     currently missing    History of abdominal pain 02/16/2017    History of allergic rhinitis 01/16/2014    History of allergic rhinitis 06/26/2014    History of allergic rhinitis    History of depression     History of esophageal reflux 08/27/2015    History of gastroenteritis 04/11/2013    History of hepatitis C     History of sinusitis 02/05/2014    History of sore throat 03/20/2017    Hyperlipidemia     Hypertension     Other long term (current) drug therapy 06/02/2015    High risk medication use    Personal history of nicotine dependence 08/16/2017    History of tobacco abuse    Personal history of nicotine dependence 06/26/2017    History of nicotine dependence    Personal history of other diseases of the respiratory system 09/26/2013    History of acute sinusitis    Personal history of other mental and behavioral disorders     Personal history of other specified conditions 08/25/2016    Pulmonary embolism     RLS (restless legs syndrome)     Sleep apnea     Unspecified asthma, uncomplicated ([facility]) 02/13/2014    Uses walker     and wheelchair    Weakness     Wears glasses     currently broken     Past Surgical History:   Procedure Laterality Date    ANKLE SURGERY Left     ANKLE SURGERY  07/08/2024    R ankle spanning ex-fix revision    CHOLECYSTECTOMY      CT ABDOMEN PELVIS  ANGIOGRAM W AND/OR WO IV CONTRAST  04/01/2020    CT ABDOMEN PELVIS ANGIOGRAM W AND/OR WO IV CONTRAST 4/1/2020 [facility]    FRACTURE SURGERY Right 07/15/2024    Open reduction and internal fixation of right distal tibia pilon and fibula fractures, removal of right ankle spanning external fixation under anesthesia, debridement down to including bone external fixator pin sites right leg    INVASIVE VASCULAR PROCEDURE N/A 11/08/2023    Procedure: Pulmonary Angiogram;  Surgeon: Surya Templeton MD;  Location: [facility] Cardiac Cath Lab;  Service: Cardiovascular;  Laterality: N/A;    LARYNGOSCOPY  07/01/2024    R radical neck dissection and L ALT free flap reconstruction    LEG SURGERY  07/06/2024    RLE ex-fix placement    MR HEAD ANGIO WO IV CONTRAST  01/24/2016    MR HEAD ANGIO WO IV CONTRAST 1/24/2016 [facility]    MR NECK ANGIO WO IV CONTRAST  01/24/2016    MR NECK ANGIO WO IV CONTRAST 1/24/2016 [facility]    THROMBECTOMY      WOUND DEBRIDEMENT Left 08/06/2024    L thigh debridement with woud vac placement 2/2 surgical wound dehiscence     Family History   Problem Relation Name Age of Onset    Diabetes Mother      Hypertension Mother      Heart attack Father      Hypertension Other          SOCIAL HISTORY:  Marital Status single   Social History:  reports that he has quit smoking. His smoking use included cigarettes. He has quit using smokeless tobacco.  His smokeless tobacco use included snuff. He reports that he does not currently use alcohol. He reports current drug use. Drugs: Methamphetamines, Cocaine, and Marijuana.    Alevism and Importance of Alevism:      REVIEW OF SYSTEMS:  Review of systems negative unless noted in HPI.       Objective       Lab Results   Component Value Date    WBC 9.6 01/16/2025    HGB 12.2 (L) 01/16/2025    HCT 38.9 (L) 01/16/2025    MCV 86 01/16/2025     01/16/2025      Lab Results   Component Value Date    GLUCOSE 111 (H) 01/15/2025    CALCIUM 10.2  01/15/2025     01/15/2025    K 3.7 01/15/2025    CO2 32 01/15/2025    CL 95 (L) 01/15/2025    BUN 9 01/15/2025    CREATININE 0.59 01/15/2025     Lab Results   Component Value Date    ALT 20 01/15/2025    AST 24 01/15/2025    ALKPHOS 104 01/15/2025    BILITOT 0.4 01/15/2025     Estimated Creatinine Clearance: 125 mL/min (by C-G formula based on SCr of 0.59 mg/dL).     Encounter Date: 12/15/24   ECG 12 Lead   Result Value    Ventricular Rate 68    Atrial Rate 68    IL Interval 136    QRS Duration 82    QT Interval 402    QTC Calculation(Bazett) 427    P Axis 2    R Axis 12    T Axis 37    QRS Count 11    Q Onset 224    P Onset 156    P Offset 204    T Offset 425    QTC Fredericia 419    Narrative    Normal sinus rhythm  Normal ECG  When compared with ECG of 15-DEC-2024 18:50,  QT has shortened  Confirmed by Blaine Mason ([id number]) on 1/7/2025 4:50:15 PM     Wt Readings from Last 5 Encounters:   01/15/25 78.6 kg (173 lb 3.2 oz)   12/15/24 83 kg (183 lb)   12/13/24 83.1 kg (183 lb 1.6 oz)   12/13/24 84.6 kg (186 lb 8.2 oz)   11/19/24 89.4 kg (197 lb)       Current Outpatient Medications   Medication Instructions    acetaminophen (TYLENOL) 650 mg, oral, Every 6 hours PRN    amoxicillin-pot clavulanate (Augmentin) 875-125 mg tablet 875 mg, oral, 2 times daily    apixaban (ELIQUIS) 5 mg, oral, 2 times daily    atorvastatin (LIPITOR) 20 mg, Nightly    bisacodyl (DULCOLAX) 10 mg, oral, Daily PRN, Do not crush, chew, or split.    butalbital-acetaminophen-caff -40 mg tablet 1 tablet, oral, Every 4 hours PRN    calcium carbonate (Tums) 200 mg calcium chewable tablet 1 tablet, oral, Daily    cyclobenzaprine (FLEXERIL) 10 mg, oral, Every 8 hours PRN    dexAMETHasone (DECADRON) 4 mg, oral, Daily    ferrous sulfate 325 (65 Fe) MG EC tablet 1 tablet, oral, 3 times daily (morning, midday, late afternoon), Do not crush, chew, or split.    folic acid (FOLVITE) 1 mg, oral, Daily    guaiFENesin (MUCINEX) 600 mg, oral, 2 times  daily, Do not crush, chew, or split.    ipratropium-albuteroL (Duo-Neb) 0.5-2.5 mg/3 mL nebulizer solution 3 mL, nebulization, Every 2 hour PRN    LACTOBACILLUS ACIDOPHILUS ORAL 1 tablet, oral, Daily    lidocaine (Lidoderm) 5 % patch 1 patch, transdermal, 2 times daily, Remove & discard patch within 12 hours or as directed by MD.    LORazepam (ATIVAN) 0.5 mg, intravenous, Every 2 hour PRN    lubricating eye drops ophthalmic solution 1 drop, Both Eyes, Every 6 hours PRN    mirtazapine (REMERON) 45 mg, oral, Nightly    montelukast (SINGULAIR) 10 mg, oral, Daily    morphine (MSIR) 30 mg, oral, Every 3 hours PRN    multivitamin with minerals tablet 1 tablet, oral, Daily    naloxone (NARCAN) 0.2 mg, intravenous, As needed    nicotine (Nicoderm CQ) 14 mg/24 hr patch 1 patch, transdermal, Every 24 hours    OLANZapine (ZYPREXA) 5 mg, oral, 2 times daily PRN    ondansetron (ZOFRAN) 4 mg, oral, Every 8 hours PRN    ondansetron ODT (ZOFRAN-ODT) 4 mg, oral, Every 8 hours PRN    oxygen (O2) 2 L/min, inhalation, Every 24 hours    pancrelipase, Lip-Prot-Amyl, (Creon) 6,000-19,000 -30,000 unit capsule 1 capsule, oral, 2 times daily    pantoprazole (PROTONIX) 40 mg, oral, Daily before breakfast, Do not crush, chew, or split.    polyethylene glycol (GLYCOLAX, MIRALAX) 17 g, oral, Every 8 hours    predniSONE (DELTASONE) 40 mg, oral, Daily, FOR UTI    pregabalin (LYRICA) 200 mg, oral, 3 times daily    rOPINIRole (REQUIP) 0.5 mg, oral, 3 times daily    sennosides (SENOKOT) 17.2 mg, oral, 2 times daily PRN    sennosides-docusate sodium (Marya-Colace) 8.6-50 mg tablet 1 tablet, oral, Nightly    sodium chloride (Ocean) 0.65 % nasal spray 2 sprays, Each Nostril, 4 times daily    tamsulosin (FLOMAX) 0.4 mg, oral, Daily    thiamine (VITAMIN B-1) 100 mg, oral, Daily    topiramate (TOPAMAX) 100 mg, oral, 2 times daily     Scheduled medications   atorvastatin, 20 mg, oral, Nightly  bacitracin, , Topical, TID  folic acid, 1 mg, oral,  Daily  methadone, 2.5 mg, oral, q8h  mirtazapine, 15 mg, oral, Nightly  montelukast, 10 mg, oral, Daily  multivitamin with minerals, 1 tablet, oral, Daily  nicotine, 1 patch, transdermal, q24h  pancrelipase (Lip-Prot-Amyl), 1 capsule, oral, BID  pantoprazole, 40 mg, oral, Daily before breakfast  polyethylene glycol, 17 g, oral, Daily  [START ON 1/17/2025] pregabalin, 50 mg, oral, TID  rOPINIRole, 0.5 mg, oral, TID  sennosides, 2 tablet, oral, Nightly  tamsulosin, 0.4 mg, oral, Daily  [START ON 1/18/2025] thiamine, 100 mg, oral, Daily  thiamine, 100 mg, intravenous, Daily  topiramate, 100 mg, oral, BID      Continuous medications  [Held by provider] heparin, 0-4,500 Units/hr, Last Rate: Stopped (01/16/25 0820)      PRN medications  acetaminophen, 650 mg, q6h PRN  cyclobenzaprine, 10 mg, TID PRN  diazePAM, 10 mg, q2h PRN  [Held by provider] heparin, 3,000-6,000 Units, q4h PRN  HYDROmorphone, 1 mg, q3h PRN  ipratropium-albuteroL, 3 mL, q2h PRN  lidocaine, 1 patch, q12h PRN  melatonin, 3 mg, Nightly PRN  morphine, 15 mg, q3h PRN  nicotine polacrilex, 2 mg, q2h PRN  ondansetron ODT, 4 mg, q8h PRN  oxymetazoline, 2 spray, q12h PRN  sodium chloride, 1 spray, 4x daily PRN  white petrolatum, , q1h PRN         Allergies:   Allergies   Allergen Reactions    Oxycodone Itching                PHYSICAL EXAMINATION:  Vital Signs:   Vital signs reviewed  Vitals:    01/16/25 1655   BP: 148/89   Pulse: 85   Resp: 16   Temp: 36.5 °C (97.7 °F)   SpO2: 94%     Pain Score: 7     Physical Exam  General: NAD, well groomed, well nourished  Eyes: Non-icteric sclera, EOMI  Ears, Nose, Mouth, and Throat: External ears and nose appear to be without deformity or rash. No lesions or masses noted. Hearing is grossly intact.   Neck: Trachea midline  Respiratory: Nonlabored breathing   Cardiovascular:No peripheral edema   Skin: No rashes or open lesions/ulcers identified on skin.  Psychiatric: Alert, orientation to person, place, and time.  Cooperative.      ASSESSMENT/PLAN:  Gaurav Mckay is a 54 y.o. 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 PMH significant for COPD,PE (11/2023) on apixaban and on 2 L NC at home, alcohol use disorder, tobacco use, HTN, and bladder mass with hematuria of presenting with headache, ptosis of right eye and vision changes. Admitted 1/15/2025 for further evaluation and management of pain. Supportive and Palliative Oncology is consulted for pain management.      Pain:   Pain is: cancer related pain and acute on chronic  Type: somatic  Pain control: well-controlled  Home regimen:  Methadone 2.5mg TID  Intolerances/previously tried: none  Personalized pain goal: 6  Total OME usage for the past 24 hours:  96.25  Continue methadone 2.5 mg p.o. every 8 hours  Continue morphine 15 mg p.o. every 3 hours as needed moderate pain  Continue hydromorphone 1 mg IV every 3 hours as needed breakthrough pain  Continue home Lyrica 200 mg twice daily  Continue topiramate 100 mg twice daily  Continue to monitor pain scores and administer PRN medications as appropriate  Continue/initiate nonpharmacologic pain management strategies including ice/heat therapy, distraction techniques, deep breathing/relaxation techniques, calming music, and repositioning  Continue to monitor for signs of opioid efficacy (pain scores, improved functionality) and toxicity (pinpoint pupils, excess sedation/drowsiness/confusion, respiratory depression, etc.)    Nausea:  At risk for nausea with vomiting related to chemotherapy and opioids   Home regimen:  Ondansetron   Improving  Continue ondansetron as needed nausea    Constipation  At risk for constipation related to medication side effects (including opioids), decreased oral intake, and prolonged immobility in the setting of hospitalization , currently constipated  Usual bowel pattern: every other day  Home regimen: none  LBM yesterday  Continue  bowel regimen with Colace and Senokot  Goal to have BM without straining q48-72h, adjust regimen as needed    Altered Mood:  Chronic anxiety and depression related to health concerns   controlled with home regimen   Home regimen: none      Medical Decision Making/Goals of Care/Advance Care Planning:  Patient's current clinical condition, including diagnosis, prognosis, and management plan, and goals of care were discussed.   Life limiting disease: metastatic malignancy  Family: Supportive  Performance status: Major  limitations due to pain  Joys/meaning/strength: [place]  Understanding of health: Demonstrates understanding of disease process, understands that he needs to have, as well as a release to goal pain control.  Information:Wants full disclosure  Worries and fears now and future: ongoing symptoms and death   Minimum acceptable outcome/QOL: Ability to perform ADLs and IADLs  Code status discussion: Full code    Advance Directives  Existence of Advance Directives:  Yes, but NOT documented in medical record  Decision maker: Surrogate decision maker is girlfriend  Code Status: Full code    Introduction to Supportive and Palliative Oncology:  Spoke with patient at bedside  Introduced the role and philosophy of Supportive and Palliative oncology in the evaluation and management of symptoms during cancer treatment  Palliative care was introduced as a service for patients with serious illness to help with symptoms, assist with goals of care conversations, navigate complex decision making, improve quality of life for patients, and provide support both patients and families.  Patient seemed to appreciate the extra layer of support.     Supportive Interventions: None    Disposition:  Please  start the process of having prior authorization with meds to beds deliver medications to patient prior to discharge via [facility] pharmacy. Prescriptions will need to be sent 48-72 hours prior to discharge so that a prior  authorization can be completed.       DATA   Diagnostic tests and information reviewed for today's visit:  Conversation with primary team, Most recent labs and imaging results         Plan of Care discussed with: Provider, RN, Patient    Thank you for asking Supportive and Palliative Oncology to assist with care of this patient.  Recommendations will be communicated back to the consulting service by way of shared electronic medical record/secure chat/email or face-to-face.   We will continue to follow.  Please contact us for additional questions or concerns.

## 2025-01-17 NOTE — PROGRESS NOTES
SUPPORTIVE AND PALLIATIVE ONCOLOGY PROGRESS NOTE      SERVICE DATE: 2025      SUBJECTIVE:  Interval Events:  Seen at bedside.  He endorsed adequate control of his pain today, although still having nosebleeds.  He was found picking at his nose today, and counseled on the need to not do so.  He was counseled to apply pressure as needed to bleeds, and if that does not help, to use Afrin spray or oxymetazoline as needed.    Pain Assessment:  Onset: Several years   Location:  Head and neck  Duration: Constant  Characteristics:              Ratin              Descriptors: dull, aching, throbbing, sharp, and stabbing              Aggravating: movement, bending, and coughing               Relieving: Analgesics and Positioning              Interference with Function: A little    Opioid Requirements  Past 24 h opioid requirements (25 at 0800 to 25 at 0800):   Morphine IR 30 mg PO x 1 doses = 30 mg = 30 OME  Hydromorphone 1 mg IV x 2 doses = 2 mg = 5 OME  Morphine IR 15 mg PO x 3 doses = 45 mg = 45 OME  Methadone 2.5 mg p.o. x 0 doses = 0 mg = 0 OME    Total 24h OME use:  80    Note: OME calculations based on equianalgesic table below. Please note this table is based on best available evidence but conversions are still approximate. These are NOT opioid DOSES for individual patient use; this is equivalency information.  Drug Parenteral Enteral   Morphine 10 25   Oxycodone N/A 20   Hydromorphone 2 5   Fentanyl 0.15 N/A   Tramadol N/A 120   Citation: Darnell NEGLAND. Demystifying opioid conversion calculations: A guide for effective dosing, Second edition. MD Essie: [employer] of [employer]-System Pharmacists, 2018.    Symptom Assessment:  Pain:very much   Headache: very much   Dizziness:somewhat  Lack of energy: somewhat  Difficulty sleeping: somewhat  Worrying: somewhat  Anxiety: somewhat  Depressive symptoms: somewhat  Pain in mouth/swallowing: somewhat  Dry mouth: somewhat  Taste changes: a  little  Shortness of breath: a little  Lack of appetite: a little             Nausea: none  Vomiting: none  Constipation: none  Diarrhea: none  Sore muscles: none  Numbness or tingling in hands/feet/other: none  Weight loss: a little  Other: none       Information obtained from: chart review, interview of patient, discussion with RN, and discussion with primary team  ______________________________________________________________________        Objective       Lab Results   Component Value Date    WBC 18.7 (H) 01/17/2025    HGB 12.4 (L) 01/17/2025    HCT 40.3 (L) 01/17/2025    MCV 88 01/17/2025     01/17/2025      Lab Results   Component Value Date    GLUCOSE 111 (H) 01/15/2025    CALCIUM 10.2 01/15/2025     01/15/2025    K 3.7 01/15/2025    CO2 32 01/15/2025    CL 95 (L) 01/15/2025    BUN 9 01/15/2025    CREATININE 0.59 01/15/2025     Lab Results   Component Value Date    ALT 20 01/15/2025    AST 24 01/15/2025    ALKPHOS 104 01/15/2025    BILITOT 0.4 01/15/2025     Estimated Creatinine Clearance: 125 mL/min (by C-G formula based on SCr of 0.59 mg/dL).       Scheduled medications   atorvastatin, 20 mg, oral, Nightly  bacitracin, , Topical, TID  folic acid, 1 mg, oral, Daily  methadone, 2.5 mg, oral, q8h  mirtazapine, 15 mg, oral, Nightly  montelukast, 10 mg, oral, Daily  multivitamin with minerals, 1 tablet, oral, Daily  nicotine, 1 patch, transdermal, q24h  pancrelipase (Lip-Prot-Amyl), 1 capsule, oral, BID  pantoprazole, 40 mg, oral, Daily before breakfast  polyethylene glycol, 17 g, oral, Daily  pregabalin, 200 mg, oral, TID  rOPINIRole, 0.5 mg, oral, TID  sennosides, 2 tablet, oral, Nightly  sodium chloride, 2 spray, Each Nostril, 4x daily  tamsulosin, 0.4 mg, oral, Daily  [START ON 1/18/2025] thiamine, 100 mg, oral, Daily  thiamine, 100 mg, intravenous, Daily  topiramate, 100 mg, oral, BID  white petrolatum, , Topical, TID      Continuous medications  heparin, 0-4,500 Units/hr, Last Rate: 1,400  Units/hr (01/17/25 1555)      PRN medications  acetaminophen, 650 mg, q6h PRN  cyclobenzaprine, 10 mg, TID PRN  diazePAM, 10 mg, q2h PRN  heparin, 3,000-6,000 Units, q4h PRN  HYDROmorphone, 1 mg, q3h PRN  ipratropium-albuteroL, 3 mL, q2h PRN  lidocaine, 1 patch, q12h PRN  melatonin, 3 mg, Nightly PRN  morphine, 15 mg, q3h PRN  nicotine polacrilex, 2 mg, q2h PRN  ondansetron ODT, 4 mg, q8h PRN  oxymetazoline, 2 spray, q12h PRN                    PHYSICAL EXAMINATION:  Vital Signs:   Vital signs reviewed  Vitals:    01/17/25 1632   BP: 105/70   Pulse: 92   Resp: 18   Temp: 36.5 °C (97.7 °F)   SpO2: 96%     Pain Score: 10 - Worst possible pain     Physical Exam  General: NAD, well groomed, well nourished  Eyes: Non-icteric sclera, EOMI  Ears, Nose, Mouth, and Throat: External ears and nose appear to be without deformity or rash. No lesions or masses noted. Hearing is grossly intact.   Neck: Trachea midline  Respiratory: Nonlabored breathing   Cardiovascular: No peripheral edema   Skin: No rashes or open lesions/ulcers identified on skin.  Psychiatric: Alert, orientation to person, place, and time. Cooperative.      ASSESSMENT/PLAN:  Gaurav Mckay is a 54 y.o. 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 PMH significant for COPD,PE (11/2023) on apixaban and on 2 L NC at home, alcohol use disorder, tobacco use, HTN, and bladder mass with hematuria of presenting with headache, ptosis of right eye and vision changes. Admitted 1/15/2025 for further evaluation and management of pain. Supportive and Palliative Oncology is consulted for pain management.      Pain:   Pain is: cancer related pain and acute on chronic  Type: somatic  Pain control: well-controlled  Home regimen:  Methadone 2.5mg TID  Intolerances/previously tried: none  Personalized pain goal: 6  Continue methadone 2.5 mg p.o. every 8 hours  Continue morphine 15 mg p.o. every 3 hours as needed  moderate pain  Continue hydromorphone 1 mg IV every 3 hours as needed breakthrough pain  Continue home Lyrica 200 mg twice daily  Continue topiramate 100 mg twice daily  Continue to monitor pain scores and administer PRN medications as appropriate  Continue/initiate nonpharmacologic pain management strategies including ice/heat therapy, distraction techniques, deep breathing/relaxation techniques, calming music, and repositioning  Continue to monitor for signs of opioid efficacy (pain scores, improved functionality) and toxicity (pinpoint pupils, excess sedation/drowsiness/confusion, respiratory depression, etc.)     Nausea:  At risk for nausea with vomiting related to chemotherapy and opioids   Home regimen:  Ondansetron   Improving  Continue ondansetron as needed nausea     Constipation  At risk for constipation related to medication side effects (including opioids), decreased oral intake, and prolonged immobility in the setting of hospitalization , currently constipated  Usual bowel pattern: every other day  Home regimen: none  LBM yesterday  Continue bowel regimen with Colace and Senokot  Goal to have BM without straining q48-72h, adjust regimen as needed     Altered Mood:  Chronic anxiety and depression related to health concerns   controlled with home regimen   Home regimen: none      Supportive and Palliative Oncology encounter:  Spoke with patient  at bedside  Emotional support provided  Coordination of care:  medication changes      DATA   Diagnostic tests and information reviewed for today's visit:  Conversation with primary team, Most recent labs and imaging results       Plan of Care discussed with: Provider, RN, Patient    Thank you for asking Supportive and Palliative Oncology to assist with care of this patient.  Recommendations will be communicated back to the consulting service by way of shared electronic medical record/secure chat/email or face-to-face.   We will continue to follow.  Please contact  us for additional questions or concerns.      SIGNATURE: Mirza Garcia MD  PAGER/CONTACT:  Contact information:  Supportive and Palliative Oncology  Monday-Friday 8 AM-5 PM  Epic Secure chat or pager [phone].  After hours and weekends:  pager [phone]

## 2025-01-17 NOTE — SIGNIFICANT EVENT
Called by primary team due to intermittent desaturations when sleeping with concern for stridor. On my exam, he is breathing comfortably on NC without appreciable stridor. There are mild intermittent stertorous sounds heard, consistent with possible obstructive sleep apnea-like symptoms. Currently saturating well and comfortable. No indication to scope at this time.     - Please page ENT with any questions or concerns       Angelic Anne MD - PGY2  Otolaryngology - Head & Neck Surgery  [facility]    I am the night resident. I can only be contacted from 5 PM to 6 AM. Page on weekends or off hours.   ENT Consult pager: [phone]  ENT Peds pager: [phone]  ENT Head & Neck Surgery Phone: [phone]  ENT subspecialty team: [place] individual resident who wrote today's note  ENT Outpatient scheduling number: [phone]  Please Page [phone] or call [phone] if Urgent

## 2025-01-17 NOTE — NURSING NOTE
"Pt having intermittent episodes of desaturation into the low 80's for about 10-15 seconds with almost immediate o2 improvement to his baseline (95%). Pt states he does feel some \"difficulty\" breathing however this is not new. Pt states that it is the same as it has been prior to admission. Provider Martina Owens on [facility] team made aware and came to the bedside to evaluate. Pt on continuous pulse ox monitoring.   "

## 2025-01-17 NOTE — CARE PLAN
Problem: Pain - Adult  Goal: Verbalizes/displays adequate comfort level or baseline comfort level  1/17/2025 0502 by Porsche Lopez RN  Outcome: Progressing  1/16/2025 1947 by Porsche Lopez RN  Outcome: Progressing     Problem: Safety - Adult  Goal: Free from fall injury  1/17/2025 0502 by Porsche Lopez RN  Outcome: Progressing  1/16/2025 1947 by Porsche Lopez RN  Outcome: Progressing     Problem: Discharge Planning  Goal: Discharge to home or other facility with appropriate resources  1/17/2025 0502 by Porsche Lopez RN  Outcome: Progressing  1/16/2025 1947 by Porsche Lopez RN  Outcome: Progressing     Problem: Chronic Conditions and Co-morbidities  Goal: Patient's chronic conditions and co-morbidity symptoms are monitored and maintained or improved  1/17/2025 0502 by Porsche Lopez RN  Outcome: Progressing  1/16/2025 1947 by Porsche Lopez RN  Outcome: Progressing   The patient's goals for the shift include      The clinical goals for the shift include pt luan remain safe and free of injury through EOS

## 2025-01-18 LAB — UFH PPP CHRO-ACNC: 0.5 IU/ML

## 2025-01-18 PROCEDURE — 2500000004 HC RX 250 GENERAL PHARMACY W/ HCPCS (ALT 636 FOR OP/ED): Mod: SE

## 2025-01-18 PROCEDURE — 94640 AIRWAY INHALATION TREATMENT: CPT

## 2025-01-18 PROCEDURE — 36415 COLL VENOUS BLD VENIPUNCTURE: CPT

## 2025-01-18 PROCEDURE — 85520 HEPARIN ASSAY: CPT

## 2025-01-18 PROCEDURE — 2500000001 HC RX 250 WO HCPCS SELF ADMINISTERED DRUGS (ALT 637 FOR MEDICARE OP): Mod: SE

## 2025-01-18 PROCEDURE — 2500000002 HC RX 250 W HCPCS SELF ADMINISTERED DRUGS (ALT 637 FOR MEDICARE OP, ALT 636 FOR OP/ED): Mod: SE

## 2025-01-18 PROCEDURE — 1170000001 HC PRIVATE ONCOLOGY ROOM DAILY

## 2025-01-18 PROCEDURE — S4991 NICOTINE PATCH NONLEGEND: HCPCS | Mod: SE

## 2025-01-18 PROCEDURE — 99231 SBSQ HOSP IP/OBS SF/LOW 25: CPT

## 2025-01-18 PROCEDURE — S0109 METHADONE ORAL 5MG: HCPCS | Mod: SE

## 2025-01-18 RX ADMIN — METHADONE HYDROCHLORIDE 2.5 MG: 5 TABLET ORAL at 17:11

## 2025-01-18 RX ADMIN — SALINE NASAL SPRAY 2 SPRAY: 1.5 SOLUTION NASAL at 09:54

## 2025-01-18 RX ADMIN — ROPINIROLE 0.5 MG: 0.5 TABLET, FILM COATED ORAL at 15:17

## 2025-01-18 RX ADMIN — MIRTAZAPINE 15 MG: 15 TABLET, FILM COATED ORAL at 21:33

## 2025-01-18 RX ADMIN — POLYETHYLENE GLYCOL 3350 17 G: 17 POWDER, FOR SOLUTION ORAL at 09:46

## 2025-01-18 RX ADMIN — ROPINIROLE 0.5 MG: 0.5 TABLET, FILM COATED ORAL at 21:34

## 2025-01-18 RX ADMIN — SALINE NASAL SPRAY 2 SPRAY: 1.5 SOLUTION NASAL at 17:12

## 2025-01-18 RX ADMIN — METHADONE HYDROCHLORIDE 2.5 MG: 5 TABLET ORAL at 09:47

## 2025-01-18 RX ADMIN — STANDARDIZED SENNA CONCENTRATE 17.2 MG: 8.6 TABLET ORAL at 21:33

## 2025-01-18 RX ADMIN — FOLIC ACID 1 MG: 1 TABLET ORAL at 09:46

## 2025-01-18 RX ADMIN — ROPINIROLE 0.5 MG: 0.5 TABLET, FILM COATED ORAL at 09:47

## 2025-01-18 RX ADMIN — TOPIRAMATE 100 MG: 100 TABLET, FILM COATED ORAL at 21:34

## 2025-01-18 RX ADMIN — PREGABALIN 200 MG: 75 CAPSULE ORAL at 15:16

## 2025-01-18 RX ADMIN — BACITRACIN: 500 OINTMENT TOPICAL at 21:36

## 2025-01-18 RX ADMIN — SALINE NASAL SPRAY 2 SPRAY: 1.5 SOLUTION NASAL at 12:51

## 2025-01-18 RX ADMIN — BACITRACIN: 500 OINTMENT TOPICAL at 09:54

## 2025-01-18 RX ADMIN — TOPIRAMATE 100 MG: 100 TABLET, FILM COATED ORAL at 09:46

## 2025-01-18 RX ADMIN — TAMSULOSIN HYDROCHLORIDE 0.4 MG: 0.4 CAPSULE ORAL at 09:48

## 2025-01-18 RX ADMIN — HEPARIN SODIUM 1400 UNITS/HR: 10000 INJECTION, SOLUTION INTRAVENOUS at 06:13

## 2025-01-18 RX ADMIN — MORPHINE SULFATE 15 MG: 15 TABLET ORAL at 17:11

## 2025-01-18 RX ADMIN — SALINE NASAL SPRAY 2 SPRAY: 1.5 SOLUTION NASAL at 21:36

## 2025-01-18 RX ADMIN — NICOTINE 1 PATCH: 14 PATCH, EXTENDED RELEASE TRANSDERMAL at 21:41

## 2025-01-18 RX ADMIN — MONTELUKAST 10 MG: 10 TABLET, FILM COATED ORAL at 21:33

## 2025-01-18 RX ADMIN — PREGABALIN 200 MG: 75 CAPSULE ORAL at 09:46

## 2025-01-18 RX ADMIN — PREGABALIN 200 MG: 75 CAPSULE ORAL at 21:31

## 2025-01-18 RX ADMIN — PANCRELIPASE 1 CAPSULE: 30000; 6000; 19000 CAPSULE, DELAYED RELEASE PELLETS ORAL at 21:32

## 2025-01-18 RX ADMIN — PANTOPRAZOLE SODIUM 40 MG: 40 TABLET, DELAYED RELEASE ORAL at 09:54

## 2025-01-18 RX ADMIN — THIAMINE HYDROCHLORIDE 100 MG: 100 INJECTION, SOLUTION INTRAMUSCULAR; INTRAVENOUS at 09:46

## 2025-01-18 RX ADMIN — Medication 1 TABLET: at 09:47

## 2025-01-18 RX ADMIN — ATORVASTATIN CALCIUM 20 MG: 20 TABLET, FILM COATED ORAL at 21:32

## 2025-01-18 RX ADMIN — MORPHINE SULFATE 15 MG: 15 TABLET ORAL at 12:51

## 2025-01-18 RX ADMIN — BACITRACIN: 500 OINTMENT TOPICAL at 15:23

## 2025-01-18 RX ADMIN — MORPHINE SULFATE 15 MG: 15 TABLET ORAL at 20:11

## 2025-01-18 RX ADMIN — IPRATROPIUM BROMIDE AND ALBUTEROL SULFATE 3 ML: .5; 3 SOLUTION RESPIRATORY (INHALATION) at 02:49

## 2025-01-18 RX ADMIN — MORPHINE SULFATE 15 MG: 15 TABLET ORAL at 02:49

## 2025-01-18 RX ADMIN — HYDROPHOR: 42 OINTMENT TOPICAL at 15:17

## 2025-01-18 RX ADMIN — METHADONE HYDROCHLORIDE 2.5 MG: 5 TABLET ORAL at 00:47

## 2025-01-18 RX ADMIN — PANCRELIPASE 1 CAPSULE: 30000; 6000; 19000 CAPSULE, DELAYED RELEASE PELLETS ORAL at 09:47

## 2025-01-18 RX ADMIN — HYDROPHOR: 42 OINTMENT TOPICAL at 21:35

## 2025-01-18 ASSESSMENT — ENCOUNTER SYMPTOMS
PALPITATIONS: 0
ACTIVITY CHANGE: 1
ABDOMINAL PAIN: 0
CHILLS: 0
ABDOMINAL DISTENTION: 0
MUSCULOSKELETAL NEGATIVE: 1
FATIGUE: 1
ENDOCRINE NEGATIVE: 1
GASTROINTESTINAL NEGATIVE: 1

## 2025-01-18 ASSESSMENT — PAIN SCALES - GENERAL
PAINLEVEL_OUTOF10: 8
PAINLEVEL_OUTOF10: 0 - NO PAIN
PAINLEVEL_OUTOF10: 10 - WORST POSSIBLE PAIN
PAINLEVEL_OUTOF10: 9
PAINLEVEL_OUTOF10: 9
PAINLEVEL_OUTOF10: 6
PAINLEVEL_OUTOF10: 9

## 2025-01-18 ASSESSMENT — COGNITIVE AND FUNCTIONAL STATUS - GENERAL
DRESSING REGULAR LOWER BODY CLOTHING: A LITTLE
PERSONAL GROOMING: A LITTLE
MOBILITY SCORE: 21
WALKING IN HOSPITAL ROOM: A LITTLE
CLIMB 3 TO 5 STEPS WITH RAILING: A LOT
PERSONAL GROOMING: A LITTLE
HELP NEEDED FOR BATHING: A LITTLE
CLIMB 3 TO 5 STEPS WITH RAILING: A LOT
MOBILITY SCORE: 21
DAILY ACTIVITIY SCORE: 21
DAILY ACTIVITIY SCORE: 21
WALKING IN HOSPITAL ROOM: A LITTLE
HELP NEEDED FOR BATHING: A LITTLE
DRESSING REGULAR LOWER BODY CLOTHING: A LITTLE

## 2025-01-18 ASSESSMENT — LIFESTYLE VARIABLES
NAUSEA AND VOMITING: NO NAUSEA AND NO VOMITING
VISUAL DISTURBANCES: NOT PRESENT
TREMOR: NO TREMOR
PAROXYSMAL SWEATS: NO SWEAT VISIBLE
PAROXYSMAL SWEATS: NO SWEAT VISIBLE
ORIENTATION AND CLOUDING OF SENSORIUM: ORIENTED AND CAN DO SERIAL ADDITIONS
AUDITORY DISTURBANCES: NOT PRESENT
ORIENTATION AND CLOUDING OF SENSORIUM: ORIENTED AND CAN DO SERIAL ADDITIONS
HEADACHE, FULLNESS IN HEAD: MODERATELY SEVERE
TREMOR: NO TREMOR
ANXIETY: NO ANXIETY, AT EASE
AGITATION: NORMAL ACTIVITY
TOTAL SCORE: 2
NAUSEA AND VOMITING: NO NAUSEA AND NO VOMITING
AUDITORY DISTURBANCES: NOT PRESENT
TOTAL SCORE: 3
ORIENTATION AND CLOUDING OF SENSORIUM: ORIENTED AND CAN DO SERIAL ADDITIONS
VISUAL DISTURBANCES: NOT PRESENT
AGITATION: NORMAL ACTIVITY
ANXIETY: NO ANXIETY, AT EASE
PAROXYSMAL SWEATS: NO SWEAT VISIBLE
VISUAL DISTURBANCES: NOT PRESENT
HEADACHE, FULLNESS IN HEAD: MILD
AUDITORY DISTURBANCES: NOT PRESENT
NAUSEA AND VOMITING: NO NAUSEA AND NO VOMITING
HEADACHE, FULLNESS IN HEAD: MODERATE
TOTAL SCORE: 4
AGITATION: NORMAL ACTIVITY
TREMOR: NO TREMOR
ANXIETY: NO ANXIETY, AT EASE

## 2025-01-18 ASSESSMENT — PAIN - FUNCTIONAL ASSESSMENT
PAIN_FUNCTIONAL_ASSESSMENT: 0-10

## 2025-01-18 ASSESSMENT — PAIN DESCRIPTION - ORIENTATION: ORIENTATION: POSTERIOR

## 2025-01-18 ASSESSMENT — PAIN DESCRIPTION - LOCATION: LOCATION: NECK

## 2025-01-18 NOTE — PROGRESS NOTES
"Gaurav Mckay \"Lemuel\" is a 54 y.o. male on day 3 of admission presenting with Ptosis of right eyelid.    Subjective   Patient seen and examined at the bedside. C/o right occipital pain, nose and ears bleed. Reports picking his nose making it bleed so encourage him to stop the nose picking. Otherwise denies any CP, SOB, Abdominal pain, N/V/D.     Review of Systems   Review of Systems   Constitutional:  Positive for activity change and fatigue. Negative for chills.   HENT:  Positive for nosebleeds.    Cardiovascular:  Negative for chest pain, palpitations and leg swelling.   Gastrointestinal: Negative.  Negative for abdominal distention and abdominal pain.   Endocrine: Negative.    Genitourinary: Negative.    Musculoskeletal: Negative.       Objective     Last Recorded Vitals  Blood pressure 140/85, pulse 92, temperature 36.3 °C (97.3 °F), temperature source Temporal, resp. rate 18, height 1.829 m (6'), weight 78.6 kg (173 lb 3.2 oz), SpO2 95%.  Intake/Output last 3 Shifts:  No intake/output data recorded.    Physical Exam  Constitutional:       Appearance: He is not toxic-appearing.      Comments: Conversant and not in acute distress    HENT:      Head: Atraumatic.      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         Relevant Results    Labs    Results from last 7 days   Lab Units 01/17/25  0658 01/16/25  0809 01/15/25  1458   WBC AUTO x10*3/uL 18.7* 9.6 12.0*   HEMOGLOBIN g/dL 12.4* 12.2* 12.8*   HEMATOCRIT % 40.3* 38.9* 39.0*   PLATELETS AUTO x10*3/uL 408 344 277            Results from last 7 days   Lab Units 01/15/25  1458   SODIUM mmol/L 138   POTASSIUM mmol/L 3.7   CHLORIDE mmol/L 95*   CO2 mmol/L 32   BUN mg/dL 9   CREATININE mg/dL 0.59   CALCIUM mg/dL 10.2     Results from last 7 days   Lab Units 01/15/25  1458   ALK PHOS U/L 104   BILIRUBIN TOTAL mg/dL 0.4   PROTEIN TOTAL " g/dL 8.2   ALT U/L 20   AST U/L 24             Medications  Scheduled medications  atorvastatin, 20 mg, oral, Nightly  bacitracin, , Topical, TID  folic acid, 1 mg, oral, Daily  methadone, 2.5 mg, oral, q8h  mirtazapine, 15 mg, oral, Nightly  montelukast, 10 mg, oral, Daily  multivitamin with minerals, 1 tablet, oral, Daily  nicotine, 1 patch, transdermal, q24h  pancrelipase (Lip-Prot-Amyl), 1 capsule, oral, BID  pantoprazole, 40 mg, oral, Daily before breakfast  polyethylene glycol, 17 g, oral, Daily  pregabalin, 200 mg, oral, TID  rOPINIRole, 0.5 mg, oral, TID  sennosides, 2 tablet, oral, Nightly  sodium chloride, 2 spray, Each Nostril, 4x daily  tamsulosin, 0.4 mg, oral, Daily  thiamine, 100 mg, oral, Daily  thiamine, 100 mg, intravenous, Daily  topiramate, 100 mg, oral, BID  white petrolatum, , Topical, TID      Continuous medications  heparin, 0-4,500 Units/hr, Last Rate: 1,400 Units/hr (01/18/25 0613)      PRN medications  PRN medications: acetaminophen, cyclobenzaprine, diazePAM, heparin, HYDROmorphone, ipratropium-albuteroL, lidocaine, melatonin, morphine, nicotine polacrilex, ondansetron ODT, oxymetazoline     Imaging  CT head wo IV contrast   Final Result   There is aggressive destructive heterogeneous mass centered in the   clivus with poorly defined margins extending into the bilateral   medial petrous apices, nasopharynx, and pituitary sella. 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 There is   erosion of the posterior cortex of the clivus without gross evidence   of extension of the prepontine cistern. The mass however has   increased in the short-term interval measuring 2.5 cm in AP dimension   on sagittal view, previously 2.0 cm.        Signed by: Enrrique Zhou 1/15/2025 8:01 PM   Dictation workstation:   [id number]      Point of Care Ultrasound    (Results Pending)   MR brain wo IV contrast    (Results Pending)            Assessment/Plan   Assessment & Plan  Ptosis of right  eyelid    Lemuel Mckay is a 54 y.o. 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 CT imaging does not show hemorrhage or localized edema - discussed with NSGY and patient not a surgical candidate at this time. ophthalmology following at this time. Palliative radiation oncology consulted and following patient now. They recommends CT- Sim and MRI brain under anesthesia.      Today 1/17/2025:   Consulted Rad Onc who recommends CT- Sim today and MRI of the brain tomorrow   Neurosurgery signed off since there is no acute surgical intervention for patient.   Support Onc following  Went down for MRI brain yesterday, per nurse pt could not tolerate it because of pain.     #clival mass  #laryngeal cancer  #headache  #vision changes, right eye ptosis  :: on regimen of morphine, methadone for pain; steroid regimen per oncology  :: complete ptosis of right eye, worsening ptosis of left eye  :: CT head shows clival mass 2.5 cm in diameter, patient declining MRI at this time d/t pain  - Consulted Rad Onc who recommends CT- Sim today and MRI of the brain tomorrow   -Neurosurgery signed off since there is no acute surgical intervention for patient.   Support Onc following  - continue scheduled methadone, morphine and acetaminophen PRN  - zofran PRN      #history of saddle PE  #COPD  :: on apixaban at home  :: requires 2 L NC after PE and d/t COPD  - monitor O2 requirements  - change to heparin drip, in case of surgical intervention     #alcohol use disorder  :: prior hospitalization in 12/2024 showed intoxication  :: patient states last drink was six months  saud GALDAMEZ protocol     #tobacco use  :: currently smoking 1 PPD, vaping 2 hours/week  - nicotine patch daily     Electrolytes: PRN  Lines/tubes: PIV   Abx: none  Drips: heparin   GI ppx: none  DVT: heparin  Code status: full code  NOK: Jacqui Hernandez (Significant Other)  [phone] (Mobile)   Dispo: floor        Case seen and discussed with attending Dr. Holman, to addend as necessary.    Siva Larkin MD PGY-1

## 2025-01-18 NOTE — HOSPITAL COURSE
Lemuel Mckay is a 54 y.o. 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 CT imaging does not show hemorrhage or localized edema - discussed with NSGY and patient not a surgical candidate at this time. ophthalmology following at this time. Palliative radiation oncology consulted and following patient now.     Pt completed CT-Sim 1/16 as a work up for radiation therapy which is scheduled to start on Monday for five days.        To dos:   [ ] Please follow up on MRI brain (patient may need to have it under anesthesia   [ ] Planning on palliative radiation treatment starting 1/20

## 2025-01-18 NOTE — PROGRESS NOTES
"Gaurav Mckay \"Lemuel\" is a 54 y.o. male on day 3 of admission presenting with Ptosis of right eyelid.    Subjective   NAEON. Patient seen this am sleeping comfortably, denies any symptoms of chest pain, shortness of breath, nausea, vomiting, fevers, chills.     Review of Systems   Review of Systems   Constitutional:  Positive for activity change and fatigue. Negative for chills.   HENT:  Positive for nosebleeds.    Cardiovascular:  Negative for chest pain, palpitations and leg swelling.   Gastrointestinal: Negative.  Negative for abdominal distention and abdominal pain.   Endocrine: Negative.    Genitourinary: Negative.    Musculoskeletal: Negative.       Objective     Last Recorded Vitals  Blood pressure 140/85, pulse 92, temperature 36.3 °C (97.3 °F), temperature source Temporal, resp. rate 18, height 1.829 m (6'), weight 78.6 kg (173 lb 3.2 oz), SpO2 95%.  Intake/Output last 3 Shifts:  No intake/output data recorded.    Physical Exam  Constitutional:       Appearance: He is not toxic-appearing.      Comments: Conversant and not in acute distress    HENT:      Head: Atraumatic.      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         Relevant Results    Labs    Results from last 7 days   Lab Units 01/17/25  0658 01/16/25  0809 01/15/25  1458   WBC AUTO x10*3/uL 18.7* 9.6 12.0*   HEMOGLOBIN g/dL 12.4* 12.2* 12.8*   HEMATOCRIT % 40.3* 38.9* 39.0*   PLATELETS AUTO x10*3/uL 408 344 277            Results from last 7 days   Lab Units 01/15/25  1458   SODIUM mmol/L 138   POTASSIUM mmol/L 3.7   CHLORIDE mmol/L 95*   CO2 mmol/L 32   BUN mg/dL 9   CREATININE mg/dL 0.59   CALCIUM mg/dL 10.2     Results from last 7 days   Lab Units 01/15/25  1458   ALK PHOS U/L 104   BILIRUBIN TOTAL mg/dL 0.4   PROTEIN TOTAL g/dL 8.2   ALT U/L 20   AST U/L 24             Medications  Scheduled " medications  atorvastatin, 20 mg, oral, Nightly  bacitracin, , Topical, TID  folic acid, 1 mg, oral, Daily  methadone, 2.5 mg, oral, q8h  mirtazapine, 15 mg, oral, Nightly  montelukast, 10 mg, oral, Daily  multivitamin with minerals, 1 tablet, oral, Daily  nicotine, 1 patch, transdermal, q24h  pancrelipase (Lip-Prot-Amyl), 1 capsule, oral, BID  pantoprazole, 40 mg, oral, Daily before breakfast  polyethylene glycol, 17 g, oral, Daily  pregabalin, 200 mg, oral, TID  rOPINIRole, 0.5 mg, oral, TID  sennosides, 2 tablet, oral, Nightly  sodium chloride, 2 spray, Each Nostril, 4x daily  tamsulosin, 0.4 mg, oral, Daily  thiamine, 100 mg, oral, Daily  thiamine, 100 mg, intravenous, Daily  topiramate, 100 mg, oral, BID  white petrolatum, , Topical, TID      Continuous medications  heparin, 0-4,500 Units/hr, Last Rate: 1,400 Units/hr (01/18/25 0613)      PRN medications  PRN medications: acetaminophen, cyclobenzaprine, diazePAM, heparin, HYDROmorphone, ipratropium-albuteroL, lidocaine, melatonin, morphine, nicotine polacrilex, ondansetron ODT, oxymetazoline     Imaging  CT head wo IV contrast   Final Result   There is aggressive destructive heterogeneous mass centered in the   clivus with poorly defined margins extending into the bilateral   medial petrous apices, nasopharynx, and pituitary sella. 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 There is   erosion of the posterior cortex of the clivus without gross evidence   of extension of the prepontine cistern. The mass however has   increased in the short-term interval measuring 2.5 cm in AP dimension   on sagittal view, previously 2.0 cm.        Signed by: Enrrique Zhou 1/15/2025 8:01 PM   Dictation workstation:   [id number]      Point of Care Ultrasound    (Results Pending)   MR brain wo IV contrast    (Results Pending)            Assessment/Plan   Assessment & Plan  Ptosis of right eyelid    Lemuel Mckay is a 54 y.o. 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 CT imaging does not show hemorrhage or localized edema - discussed with NSGY and patient not a surgical candidate at this time. ophthalmology following at this time. Palliative radiation oncology consulted and following patient now. They recommends CT- Sim and MRI brain under anesthesia.      Today 1/18/2025:   Rad onc planning for radiation, possibly 1/20  Neurosurgery signed off since there is no acute surgical intervention for patient.   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   Support Onc following  - continue scheduled methadone, morphine and acetaminophen PRN  - zofran PRN      #history of saddle PE  #COPD  :: on apixaban at home  :: requires 2 L NC after PE and d/t COPD  - monitor O2 requirements  - change to heparin drip, in case of surgical intervention     #alcohol use disorder  :: prior hospitalization in 12/2024 showed intoxication  :: patient states last drink was six months ago  - CIWA protocol     #tobacco use  :: currently smoking 1 PPD, vaping 2 hours/week  - nicotine patch daily     Electrolytes: PRN  Lines/tubes: PIV   Abx: none  Drips:  heparin   GI ppx: none  DVT: heparin  Code status: full code  NOK: Jacqui Hernandez (Significant Other)  [phone] (Mobile)   Dispo: floor        Case seen and discussed with attending Dr. Holman, to addend as necessary.    Chi Arreguin MD PGY-2

## 2025-01-18 NOTE — CARE PLAN
The patient's goals for the shift include  Pain Management    The clinical goals for the shift include Pt will remain HDS and safe and free from injury through shift    Over the shift, the patient did make progress toward the following goals.       Problem: Pain - Adult  Goal: Verbalizes/displays adequate comfort level or baseline comfort level  Outcome: Progressing     Problem: Safety - Adult  Goal: Free from fall injury  Outcome: Progressing     Problem: Discharge Planning  Goal: Discharge to home or other facility with appropriate resources  Outcome: Progressing     Problem: Chronic Conditions and Co-morbidities  Goal: Patient's chronic conditions and co-morbidity symptoms are monitored and maintained or improved  Outcome: Progressing

## 2025-01-19 ENCOUNTER — APPOINTMENT (OUTPATIENT)
Dept: RADIOLOGY | Facility: HOSPITAL | Age: 55
End: 2025-01-19
Payer: COMMERCIAL

## 2025-01-19 LAB
ALBUMIN SERPL BCP-MCNC: 2.9 G/DL (ref 3.4–5)
ALBUMIN SERPL BCP-MCNC: 3 G/DL (ref 3.4–5)
ANION GAP BLDV CALCULATED.4IONS-SCNC: -1 MMOL/L (ref 10–25)
ANION GAP BLDV CALCULATED.4IONS-SCNC: -1 MMOL/L (ref 10–25)
ANION GAP BLDV CALCULATED.4IONS-SCNC: 0 MMOL/L (ref 10–25)
ANION GAP SERPL CALC-SCNC: 11 MMOL/L (ref 10–20)
ANION GAP SERPL CALC-SCNC: 11 MMOL/L (ref 10–20)
BASE EXCESS BLDV CALC-SCNC: 11.6 MMOL/L (ref -2–3)
BASE EXCESS BLDV CALC-SCNC: 12.2 MMOL/L (ref -2–3)
BASE EXCESS BLDV CALC-SCNC: 12.8 MMOL/L (ref -2–3)
BASOPHILS # BLD AUTO: 0.01 X10*3/UL (ref 0–0.1)
BASOPHILS NFR BLD AUTO: 0.1 %
BODY TEMPERATURE: 37 DEGREES CELSIUS
BUN SERPL-MCNC: 4 MG/DL (ref 6–23)
BUN SERPL-MCNC: 4 MG/DL (ref 6–23)
CA-I BLDV-SCNC: 1.28 MMOL/L (ref 1.1–1.33)
CA-I BLDV-SCNC: 1.3 MMOL/L (ref 1.1–1.33)
CA-I BLDV-SCNC: 1.31 MMOL/L (ref 1.1–1.33)
CALCIUM SERPL-MCNC: 9.6 MG/DL (ref 8.6–10.6)
CALCIUM SERPL-MCNC: 9.8 MG/DL (ref 8.6–10.6)
CHLORIDE BLDV-SCNC: 101 MMOL/L (ref 98–107)
CHLORIDE BLDV-SCNC: 102 MMOL/L (ref 98–107)
CHLORIDE BLDV-SCNC: 99 MMOL/L (ref 98–107)
CHLORIDE SERPL-SCNC: 97 MMOL/L (ref 98–107)
CHLORIDE SERPL-SCNC: 97 MMOL/L (ref 98–107)
CO2 SERPL-SCNC: 35 MMOL/L (ref 21–32)
CO2 SERPL-SCNC: 37 MMOL/L (ref 21–32)
CREAT SERPL-MCNC: 0.41 MG/DL (ref 0.5–1.3)
CREAT SERPL-MCNC: 0.46 MG/DL (ref 0.5–1.3)
EGFRCR SERPLBLD CKD-EPI 2021: >90 ML/MIN/1.73M*2
EGFRCR SERPLBLD CKD-EPI 2021: >90 ML/MIN/1.73M*2
EOSINOPHIL # BLD AUTO: 0.04 X10*3/UL (ref 0–0.7)
EOSINOPHIL NFR BLD AUTO: 0.6 %
ERYTHROCYTE [DISTWIDTH] IN BLOOD BY AUTOMATED COUNT: 15.8 % (ref 11.5–14.5)
ETHANOL SERPL-MCNC: <10 MG/DL
GLUCOSE BLD MANUAL STRIP-MCNC: 170 MG/DL (ref 74–99)
GLUCOSE BLD MANUAL STRIP-MCNC: 89 MG/DL (ref 74–99)
GLUCOSE BLDV-MCNC: 185 MG/DL (ref 74–99)
GLUCOSE BLDV-MCNC: 83 MG/DL (ref 74–99)
GLUCOSE BLDV-MCNC: 95 MG/DL (ref 74–99)
GLUCOSE SERPL-MCNC: 100 MG/DL (ref 74–99)
GLUCOSE SERPL-MCNC: 174 MG/DL (ref 74–99)
HCO3 BLDV-SCNC: 40.7 MMOL/L (ref 22–26)
HCO3 BLDV-SCNC: 42.3 MMOL/L (ref 22–26)
HCO3 BLDV-SCNC: 42.8 MMOL/L (ref 22–26)
HCT VFR BLD AUTO: 38.6 % (ref 41–52)
HCT VFR BLD EST: 35 % (ref 41–52)
HCT VFR BLD EST: 38 % (ref 41–52)
HCT VFR BLD EST: 43 % (ref 41–52)
HGB BLD-MCNC: 11.7 G/DL (ref 13.5–17.5)
HGB BLDV-MCNC: 11.6 G/DL (ref 13.5–17.5)
HGB BLDV-MCNC: 12.7 G/DL (ref 13.5–17.5)
HGB BLDV-MCNC: 14.2 G/DL (ref 13.5–17.5)
IMM GRANULOCYTES # BLD AUTO: 0.05 X10*3/UL (ref 0–0.7)
IMM GRANULOCYTES NFR BLD AUTO: 0.7 % (ref 0–0.9)
INHALED O2 CONCENTRATION: 22 %
INHALED O2 CONCENTRATION: 24 %
INHALED O2 CONCENTRATION: 28 %
LACTATE BLDV-SCNC: 1 MMOL/L (ref 0.4–2)
LACTATE BLDV-SCNC: 1.1 MMOL/L (ref 0.4–2)
LACTATE BLDV-SCNC: 1.3 MMOL/L (ref 0.4–2)
LYMPHOCYTES # BLD AUTO: 0.58 X10*3/UL (ref 1.2–4.8)
LYMPHOCYTES NFR BLD AUTO: 8.5 %
MAGNESIUM SERPL-MCNC: 2.15 MG/DL (ref 1.6–2.4)
MAGNESIUM SERPL-MCNC: 2.2 MG/DL (ref 1.6–2.4)
MCH RBC QN AUTO: 26.2 PG (ref 26–34)
MCHC RBC AUTO-ENTMCNC: 30.3 G/DL (ref 32–36)
MCV RBC AUTO: 87 FL (ref 80–100)
MONOCYTES # BLD AUTO: 0.44 X10*3/UL (ref 0.1–1)
MONOCYTES NFR BLD AUTO: 6.5 %
NEUTROPHILS # BLD AUTO: 5.69 X10*3/UL (ref 1.2–7.7)
NEUTROPHILS NFR BLD AUTO: 83.6 %
NRBC BLD-RTO: 0 /100 WBCS (ref 0–0)
OXYHGB MFR BLDV: 38.9 % (ref 45–75)
OXYHGB MFR BLDV: 69.7 % (ref 45–75)
OXYHGB MFR BLDV: 89 % (ref 45–75)
PCO2 BLDV: 72 MM HG (ref 41–51)
PCO2 BLDV: 88 MM HG (ref 41–51)
PCO2 BLDV: 89 MM HG (ref 41–51)
PH BLDV: 7.29 PH (ref 7.33–7.43)
PH BLDV: 7.29 PH (ref 7.33–7.43)
PH BLDV: 7.36 PH (ref 7.33–7.43)
PHOSPHATE SERPL-MCNC: 3.3 MG/DL (ref 2.5–4.9)
PHOSPHATE SERPL-MCNC: 3.9 MG/DL (ref 2.5–4.9)
PLATELET # BLD AUTO: 353 X10*3/UL (ref 150–450)
PO2 BLDV: 31 MM HG (ref 35–45)
PO2 BLDV: 46 MM HG (ref 35–45)
PO2 BLDV: 63 MM HG (ref 35–45)
POTASSIUM BLDV-SCNC: 3 MMOL/L (ref 3.5–5.3)
POTASSIUM BLDV-SCNC: 3.9 MMOL/L (ref 3.5–5.3)
POTASSIUM BLDV-SCNC: 3.9 MMOL/L (ref 3.5–5.3)
POTASSIUM SERPL-SCNC: 3.3 MMOL/L (ref 3.5–5.3)
POTASSIUM SERPL-SCNC: 3.4 MMOL/L (ref 3.5–5.3)
RBC # BLD AUTO: 4.46 X10*6/UL (ref 4.5–5.9)
SAO2 % BLDV: 40 % (ref 45–75)
SAO2 % BLDV: 71 % (ref 45–75)
SAO2 % BLDV: 91 % (ref 45–75)
SODIUM BLDV-SCNC: 137 MMOL/L (ref 136–145)
SODIUM BLDV-SCNC: 138 MMOL/L (ref 136–145)
SODIUM BLDV-SCNC: 140 MMOL/L (ref 136–145)
SODIUM SERPL-SCNC: 140 MMOL/L (ref 136–145)
SODIUM SERPL-SCNC: 142 MMOL/L (ref 136–145)
UFH PPP CHRO-ACNC: 0.5 IU/ML
WBC # BLD AUTO: 6.8 X10*3/UL (ref 4.4–11.3)

## 2025-01-19 PROCEDURE — 94660 CPAP INITIATION&MGMT: CPT

## 2025-01-19 PROCEDURE — 2500000004 HC RX 250 GENERAL PHARMACY W/ HCPCS (ALT 636 FOR OP/ED): Mod: SE

## 2025-01-19 PROCEDURE — 82947 ASSAY GLUCOSE BLOOD QUANT: CPT

## 2025-01-19 PROCEDURE — 2500000005 HC RX 250 GENERAL PHARMACY W/O HCPCS: Mod: SE | Performed by: STUDENT IN AN ORGANIZED HEALTH CARE EDUCATION/TRAINING PROGRAM

## 2025-01-19 PROCEDURE — 36415 COLL VENOUS BLD VENIPUNCTURE: CPT | Performed by: NUTRITIONIST

## 2025-01-19 PROCEDURE — 82140 ASSAY OF AMMONIA: CPT

## 2025-01-19 PROCEDURE — 99291 CRITICAL CARE FIRST HOUR: CPT

## 2025-01-19 PROCEDURE — 36415 COLL VENOUS BLD VENIPUNCTURE: CPT

## 2025-01-19 PROCEDURE — 2500000002 HC RX 250 W HCPCS SELF ADMINISTERED DRUGS (ALT 637 FOR MEDICARE OP, ALT 636 FOR OP/ED): Mod: SE

## 2025-01-19 PROCEDURE — 80365 DRUG SCREENING OXYCODONE: CPT

## 2025-01-19 PROCEDURE — 83735 ASSAY OF MAGNESIUM: CPT

## 2025-01-19 PROCEDURE — 99231 SBSQ HOSP IP/OBS SF/LOW 25: CPT

## 2025-01-19 PROCEDURE — 71045 X-RAY EXAM CHEST 1 VIEW: CPT

## 2025-01-19 PROCEDURE — 84132 ASSAY OF SERUM POTASSIUM: CPT | Performed by: NUTRITIONIST

## 2025-01-19 PROCEDURE — 82077 ASSAY SPEC XCP UR&BREATH IA: CPT

## 2025-01-19 PROCEDURE — 5A09357 ASSISTANCE WITH RESPIRATORY VENTILATION, LESS THAN 24 CONSECUTIVE HOURS, CONTINUOUS POSITIVE AIRWAY PRESSURE: ICD-10-PCS | Performed by: STUDENT IN AN ORGANIZED HEALTH CARE EDUCATION/TRAINING PROGRAM

## 2025-01-19 PROCEDURE — 80069 RENAL FUNCTION PANEL: CPT

## 2025-01-19 PROCEDURE — 2500000001 HC RX 250 WO HCPCS SELF ADMINISTERED DRUGS (ALT 637 FOR MEDICARE OP): Mod: SE

## 2025-01-19 PROCEDURE — 84132 ASSAY OF SERUM POTASSIUM: CPT

## 2025-01-19 PROCEDURE — 85025 COMPLETE CBC W/AUTO DIFF WBC: CPT

## 2025-01-19 PROCEDURE — 94640 AIRWAY INHALATION TREATMENT: CPT

## 2025-01-19 PROCEDURE — 2500000005 HC RX 250 GENERAL PHARMACY W/O HCPCS: Mod: SE

## 2025-01-19 PROCEDURE — 83735 ASSAY OF MAGNESIUM: CPT | Performed by: NUTRITIONIST

## 2025-01-19 PROCEDURE — 1200000002 HC GENERAL ROOM WITH TELEMETRY DAILY

## 2025-01-19 PROCEDURE — 51701 INSERT BLADDER CATHETER: CPT

## 2025-01-19 PROCEDURE — 80307 DRUG TEST PRSMV CHEM ANLYZR: CPT

## 2025-01-19 PROCEDURE — 2500000002 HC RX 250 W HCPCS SELF ADMINISTERED DRUGS (ALT 637 FOR MEDICARE OP, ALT 636 FOR OP/ED): Mod: SE | Performed by: NUTRITIONIST

## 2025-01-19 PROCEDURE — 81003 URINALYSIS AUTO W/O SCOPE: CPT

## 2025-01-19 PROCEDURE — S0109 METHADONE ORAL 5MG: HCPCS | Mod: SE

## 2025-01-19 PROCEDURE — 80358 DRUG SCREENING METHADONE: CPT

## 2025-01-19 PROCEDURE — 85520 HEPARIN ASSAY: CPT

## 2025-01-19 RX ORDER — POTASSIUM CHLORIDE 20 MEQ/1
40 TABLET, EXTENDED RELEASE ORAL ONCE
Status: COMPLETED | OUTPATIENT
Start: 2025-01-19 | End: 2025-01-19

## 2025-01-19 RX ORDER — GUAIFENESIN 600 MG/1
600 TABLET, EXTENDED RELEASE ORAL 2 TIMES DAILY
Status: DISCONTINUED | OUTPATIENT
Start: 2025-01-19 | End: 2025-01-23

## 2025-01-19 RX ORDER — ACETAMINOPHEN 325 MG/1
975 TABLET ORAL EVERY 6 HOURS
Status: DISCONTINUED | OUTPATIENT
Start: 2025-01-19 | End: 2025-01-21

## 2025-01-19 RX ORDER — POTASSIUM CHLORIDE 14.9 MG/ML
20 INJECTION INTRAVENOUS
Status: DISPENSED | OUTPATIENT
Start: 2025-01-19 | End: 2025-01-19

## 2025-01-19 RX ORDER — NALOXONE HYDROCHLORIDE 0.4 MG/ML
0.2 INJECTION, SOLUTION INTRAMUSCULAR; INTRAVENOUS; SUBCUTANEOUS EVERY 5 MIN PRN
Status: DISCONTINUED | OUTPATIENT
Start: 2025-01-19 | End: 2025-01-25 | Stop reason: HOSPADM

## 2025-01-19 RX ORDER — IPRATROPIUM BROMIDE AND ALBUTEROL SULFATE 2.5; .5 MG/3ML; MG/3ML
3 SOLUTION RESPIRATORY (INHALATION) 3 TIMES DAILY
Status: DISCONTINUED | OUTPATIENT
Start: 2025-01-19 | End: 2025-01-24

## 2025-01-19 RX ADMIN — PREGABALIN 200 MG: 75 CAPSULE ORAL at 14:13

## 2025-01-19 RX ADMIN — Medication 2 L/MIN: at 23:43

## 2025-01-19 RX ADMIN — FOLIC ACID 1 MG: 1 TABLET ORAL at 09:22

## 2025-01-19 RX ADMIN — MORPHINE SULFATE 15 MG: 15 TABLET ORAL at 06:52

## 2025-01-19 RX ADMIN — Medication 2 L/MIN: at 16:50

## 2025-01-19 RX ADMIN — HYDROMORPHONE HYDROCHLORIDE 1 MG: 1 INJECTION, SOLUTION INTRAMUSCULAR; INTRAVENOUS; SUBCUTANEOUS at 12:59

## 2025-01-19 RX ADMIN — PANCRELIPASE 1 CAPSULE: 30000; 6000; 19000 CAPSULE, DELAYED RELEASE PELLETS ORAL at 09:22

## 2025-01-19 RX ADMIN — SALINE NASAL SPRAY 2 SPRAY: 1.5 SOLUTION NASAL at 12:59

## 2025-01-19 RX ADMIN — PREGABALIN 200 MG: 75 CAPSULE ORAL at 09:22

## 2025-01-19 RX ADMIN — HEPARIN SODIUM 1400 UNITS/HR: 10000 INJECTION, SOLUTION INTRAVENOUS at 20:04

## 2025-01-19 RX ADMIN — PANTOPRAZOLE SODIUM 40 MG: 40 TABLET, DELAYED RELEASE ORAL at 06:43

## 2025-01-19 RX ADMIN — BACITRACIN: 500 OINTMENT TOPICAL at 09:29

## 2025-01-19 RX ADMIN — BACITRACIN: 500 OINTMENT TOPICAL at 14:26

## 2025-01-19 RX ADMIN — METHADONE HYDROCHLORIDE 2.5 MG: 5 TABLET ORAL at 09:21

## 2025-01-19 RX ADMIN — POTASSIUM CHLORIDE 20 MEQ: 14.9 INJECTION, SOLUTION INTRAVENOUS at 14:13

## 2025-01-19 RX ADMIN — POLYETHYLENE GLYCOL 3350 17 G: 17 POWDER, FOR SOLUTION ORAL at 09:21

## 2025-01-19 RX ADMIN — TOPIRAMATE 100 MG: 100 TABLET, FILM COATED ORAL at 09:29

## 2025-01-19 RX ADMIN — ROPINIROLE 0.5 MG: 0.5 TABLET, FILM COATED ORAL at 14:26

## 2025-01-19 RX ADMIN — POTASSIUM CHLORIDE 40 MEQ: 1500 TABLET, EXTENDED RELEASE ORAL at 14:13

## 2025-01-19 RX ADMIN — TAMSULOSIN HYDROCHLORIDE 0.4 MG: 0.4 CAPSULE ORAL at 09:22

## 2025-01-19 RX ADMIN — THIAMINE HCL TAB 100 MG 100 MG: 100 TAB at 01:38

## 2025-01-19 RX ADMIN — MORPHINE SULFATE 15 MG: 15 TABLET ORAL at 10:21

## 2025-01-19 RX ADMIN — Medication 1 TABLET: at 09:22

## 2025-01-19 RX ADMIN — HEPARIN SODIUM 1400 UNITS/HR: 10000 INJECTION, SOLUTION INTRAVENOUS at 01:39

## 2025-01-19 RX ADMIN — HYDROMORPHONE HYDROCHLORIDE 1 MG: 1 INJECTION, SOLUTION INTRAMUSCULAR; INTRAVENOUS; SUBCUTANEOUS at 09:21

## 2025-01-19 RX ADMIN — MORPHINE SULFATE 15 MG: 15 TABLET ORAL at 14:26

## 2025-01-19 RX ADMIN — ROPINIROLE 0.5 MG: 0.5 TABLET, FILM COATED ORAL at 09:22

## 2025-01-19 RX ADMIN — SALINE NASAL SPRAY 2 SPRAY: 1.5 SOLUTION NASAL at 06:43

## 2025-01-19 RX ADMIN — SALINE NASAL SPRAY 2 SPRAY: 1.5 SOLUTION NASAL at 17:57

## 2025-01-19 RX ADMIN — METHADONE HYDROCHLORIDE 2.5 MG: 5 TABLET ORAL at 01:38

## 2025-01-19 RX ADMIN — IPRATROPIUM BROMIDE AND ALBUTEROL SULFATE 3 ML: .5; 3 SOLUTION RESPIRATORY (INHALATION) at 17:01

## 2025-01-19 RX ADMIN — Medication 2 L/MIN: at 20:32

## 2025-01-19 RX ADMIN — METHADONE HYDROCHLORIDE 2.5 MG: 5 TABLET ORAL at 17:57

## 2025-01-19 ASSESSMENT — LIFESTYLE VARIABLES
NAUSEA AND VOMITING: NO NAUSEA AND NO VOMITING
TREMOR: NO TREMOR
AUDITORY DISTURBANCES: NOT PRESENT
TOTAL SCORE: 0
AGITATION: NORMAL ACTIVITY
ANXIETY: NO ANXIETY, AT EASE
NAUSEA AND VOMITING: NO NAUSEA AND NO VOMITING
HEADACHE, FULLNESS IN HEAD: MODERATELY SEVERE
AGITATION: SOMEWHAT MORE THAN NORMAL ACTIVITY
HEADACHE, FULLNESS IN HEAD: NOT PRESENT
PAROXYSMAL SWEATS: NO SWEAT VISIBLE
HEADACHE, FULLNESS IN HEAD: MILD
AGITATION: NORMAL ACTIVITY
TREMOR: NO TREMOR
PAROXYSMAL SWEATS: NO SWEAT VISIBLE
NAUSEA AND VOMITING: NO NAUSEA AND NO VOMITING
ORIENTATION AND CLOUDING OF SENSORIUM: ORIENTED AND CAN DO SERIAL ADDITIONS
VISUAL DISTURBANCES: NOT PRESENT
NAUSEA AND VOMITING: NO NAUSEA AND NO VOMITING
TREMOR: NO TREMOR
VISUAL DISTURBANCES: NOT PRESENT
TOTAL SCORE: 4
ANXIETY: NO ANXIETY, AT EASE
AUDITORY DISTURBANCES: NOT PRESENT
PAROXYSMAL SWEATS: NO SWEAT VISIBLE
AGITATION: NORMAL ACTIVITY
VISUAL DISTURBANCES: NOT PRESENT
ANXIETY: NO ANXIETY, AT EASE
ANXIETY: NO ANXIETY, AT EASE
ORIENTATION AND CLOUDING OF SENSORIUM: ORIENTED AND CAN DO SERIAL ADDITIONS
TREMOR: NO TREMOR
NAUSEA AND VOMITING: NO NAUSEA AND NO VOMITING
ANXIETY: MILDLY ANXIOUS
TOTAL SCORE: 5
TREMOR: NO TREMOR
VISUAL DISTURBANCES: NOT PRESENT
AUDITORY DISTURBANCES: NOT PRESENT
AUDITORY DISTURBANCES: NOT PRESENT
PAROXYSMAL SWEATS: NO SWEAT VISIBLE
PAROXYSMAL SWEATS: NO SWEAT VISIBLE
HEADACHE, FULLNESS IN HEAD: NOT PRESENT
AGITATION: NORMAL ACTIVITY
TOTAL SCORE: 4
VISUAL DISTURBANCES: NOT PRESENT
ORIENTATION AND CLOUDING OF SENSORIUM: ORIENTED AND CAN DO SERIAL ADDITIONS
AUDITORY DISTURBANCES: NOT PRESENT
TOTAL SCORE: 2
HEADACHE, FULLNESS IN HEAD: MODERATELY SEVERE
ORIENTATION AND CLOUDING OF SENSORIUM: DISORIENTED FOR DATE BY MORE THAN 2 CALENDAR DAYS
ORIENTATION AND CLOUDING OF SENSORIUM: ORIENTED AND CAN DO SERIAL ADDITIONS

## 2025-01-19 ASSESSMENT — PAIN - FUNCTIONAL ASSESSMENT
PAIN_FUNCTIONAL_ASSESSMENT: 0-10

## 2025-01-19 ASSESSMENT — PAIN SCALES - GENERAL
PAINLEVEL_OUTOF10: 9
PAINLEVEL_OUTOF10: 8
PAINLEVEL_OUTOF10: 7
PAINLEVEL_OUTOF10: 9
PAINLEVEL_OUTOF10: 0 - NO PAIN
PAINLEVEL_OUTOF10: 0 - NO PAIN
PAINLEVEL_OUTOF10: 8
PAINLEVEL_OUTOF10: 10 - WORST POSSIBLE PAIN
PAINLEVEL_OUTOF10: 8

## 2025-01-19 ASSESSMENT — COGNITIVE AND FUNCTIONAL STATUS - GENERAL
DAILY ACTIVITIY SCORE: 21
HELP NEEDED FOR BATHING: A LITTLE
CLIMB 3 TO 5 STEPS WITH RAILING: A LITTLE
DRESSING REGULAR LOWER BODY CLOTHING: A LITTLE
MOBILITY SCORE: 22
WALKING IN HOSPITAL ROOM: A LITTLE
DRESSING REGULAR UPPER BODY CLOTHING: A LITTLE

## 2025-01-19 NOTE — SIGNIFICANT EVENT
RAPID RESPONSE NOTE    Reason for Rapid Response:   []    BAT []  New CPAP/BiPAP []  Bleeding [x]  Change in mental status   []  Chest pain []  Code blue []  FiO2 >/= 50% []  HR </= 40 bpm   []  HR >/= 130 bpm []  Hyperglycemia []  Hypoglycemia []  SpO2 < 90%    []  RR </= 8 bpm []  RR >/= 30 bpm []  SBP </= 90 mmHg []  Seizure   []  Staff concern:         Vital Signs on Arrival: 116/69 HR  95  RR 20 Os sat 95%    Focused Physical Exam:   General: Awake, follows command  Chest: + ronchi, decreased BS bibasal  Cardiac: normal rate regular rhythm  Abdomen: soft non tender  Extremities: no swelling  Neuro: 5/5 on both upper and lower extremity. + right eye lid droop, + tongue deviation to the right. R pupil approx 3 mmm non reactive to light. Left pupil 1-2 mm reactive to light.      Differential Diagnosis: AMS sec to known intracranial mass vs metabolic encephalopathy.    Plan (Workup/Intervention):  - VBG 7.36/ pO2 63/ pCO2 72  ( no baseline CO2 on file), glucose 185  - Chest xray  - Breathing treatment now  - Pulmonology consult for initiation of BiPaP.   - RT consult for bronchopulmnonary hygiene.  - discontinue morphine and c/w methadone and dilaudid to simplify his pain regimen.  - Naloxone ordered       Conclusion:   [x] Patient improved will continue to monitor  [] Patient failed to improve , will notify ICU fellow    ICU Notified:   [] Yes  [x] No    Disposition: monitor in the floor

## 2025-01-19 NOTE — PROGRESS NOTES
"Gaurav Mckay \"Lemuel\" is a 54 y.o. male on day 4 of admission presenting with Ptosis of right eyelid.    Subjective   NAEON. Patient said that he didn't sleep well, but aside from that denies any acute complaints. Denies fevers, chills, headaches, any changes in his vision.        Objective     Last Recorded Vitals  Blood pressure 150/86, pulse 87, temperature 36.2 °C (97.2 °F), temperature source Temporal, resp. rate 18, height 1.829 m (6'), weight 78.6 kg (173 lb 3.2 oz), SpO2 94%.  Intake/Output last 3 Shifts:  I/O last 3 completed shifts:  In: 440 (5.6 mL/kg) [P.O.:440]  Out: - (0 mL/kg)   Weight: 78.6 kg     Physical Exam  Constitutional:       Appearance: He is not toxic-appearing.      Comments: Conversant and not in acute distress    HENT:      Head: Atraumatic.      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         Relevant Results    Labs    Results from last 7 days   Lab Units 01/17/25  0658 01/16/25  0809 01/15/25  1458   WBC AUTO x10*3/uL 18.7* 9.6 12.0*   HEMOGLOBIN g/dL 12.4* 12.2* 12.8*   HEMATOCRIT % 40.3* 38.9* 39.0*   PLATELETS AUTO x10*3/uL 408 344 277            Results from last 7 days   Lab Units 01/15/25  1458   SODIUM mmol/L 138   POTASSIUM mmol/L 3.7   CHLORIDE mmol/L 95*   CO2 mmol/L 32   BUN mg/dL 9   CREATININE mg/dL 0.59   CALCIUM mg/dL 10.2     Results from last 7 days   Lab Units 01/15/25  1458   ALK PHOS U/L 104   BILIRUBIN TOTAL mg/dL 0.4   PROTEIN TOTAL g/dL 8.2   ALT U/L 20   AST U/L 24             Medications  Scheduled medications  atorvastatin, 20 mg, oral, Nightly  bacitracin, , Topical, TID  folic acid, 1 mg, oral, Daily  methadone, 2.5 mg, oral, q8h  mirtazapine, 15 mg, oral, Nightly  montelukast, 10 mg, oral, Daily  multivitamin with minerals, 1 tablet, oral, Daily  nicotine, 1 patch, transdermal, q24h  pancrelipase (Lip-Prot-Amyl), 1 " capsule, oral, BID  pantoprazole, 40 mg, oral, Daily before breakfast  polyethylene glycol, 17 g, oral, Daily  pregabalin, 200 mg, oral, TID  rOPINIRole, 0.5 mg, oral, TID  sennosides, 2 tablet, oral, Nightly  sodium chloride, 2 spray, Each Nostril, 4x daily  tamsulosin, 0.4 mg, oral, Daily  thiamine, 100 mg, oral, Daily  topiramate, 100 mg, oral, BID  white petrolatum, , Topical, TID      Continuous medications  heparin, 0-4,500 Units/hr, Last Rate: 1,400 Units/hr (01/19/25 0139)      PRN medications  PRN medications: acetaminophen, cyclobenzaprine, diazePAM, heparin, HYDROmorphone, ipratropium-albuteroL, lidocaine, melatonin, morphine, nicotine polacrilex, ondansetron ODT, oxymetazoline     Imaging  MR brain wo IV contrast   Final Result   Single axial T2 weighted images obtained, after which study was   terminated due to extensive motion. Please see above for findings   that could be ascertained from the markedly limited study.        MACRO:   None.        Signed by: Marc Deras 1/18/2025 1:48 PM   Dictation workstation:   [id number]      CT head wo IV contrast   Final Result   There is aggressive destructive heterogeneous mass centered in the   clivus with poorly defined margins extending into the bilateral   medial petrous apices, nasopharynx, and pituitary sella. 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 There is   erosion of the posterior cortex of the clivus without gross evidence   of extension of the prepontine cistern. The mass however has   increased in the short-term interval measuring 2.5 cm in AP dimension   on sagittal view, previously 2.0 cm.        Signed by: Enrrique Zhou 1/15/2025 8:01 PM   Dictation workstation:   [id number]      Point of Care Ultrasound    (Results Pending)            Assessment/Plan   Assessment & Plan  Ptosis of right eyelid    Lemuel Mckay is a 54 y.o. 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 CT imaging does not show hemorrhage or localized edema - discussed with NSGY and patient not a surgical candidate at this time. ophthalmology following at this time. Palliative radiation oncology consulted and following patient now. They recommends CT- Sim and MRI brain under anesthesia.      Today 1/19/2025:   Rad onc planning for radiation, possibly 1/20  Neurosurgery signed off since there is no acute surgical intervention for patient.   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   Support Onc following  - continue scheduled methadone, morphine and acetaminophen PRN  - zofran PRN      #history of saddle PE  #COPD  :: on apixaban at home  :: requires 2 L NC after PE and d/t COPD  - monitor O2 requirements  - change to heparin drip, in case of surgical intervention; since no surgery per NSGY, consider switching back to eliquis      #alcohol use disorder  :: prior hospitalization in 12/2024 showed intoxication  :: patient states last drink was six months ago  - CIWA protocol discontinued       #tobacco use  :: currently smoking 1 PPD, vaping 2 hours/week  - nicotine patch daily     Electrolytes: PRN  Lines/tubes: PIV   Abx: none  Drips: heparin   GI ppx: none  DVT: heparin  Code status: full code  NOK: Jacqui Hernandez (Significant Other)  [phone] (Mobile)   Dispo: floor        Case seen and discussed with attending Dr. Holman, to addend as necessary.    Chi Arreguin MD PGY-2

## 2025-01-19 NOTE — CARE PLAN
Problem: Pain - Adult  Goal: Verbalizes/displays adequate comfort level or baseline comfort level  1/18/2025 2037 by Dominique Ayoub RN  Outcome: Progressing  1/18/2025 2037 by Dominique Ayoub RN  Outcome: Progressing     Problem: Safety - Adult  Goal: Free from fall injury  1/18/2025 2037 by Dominique Ayoub RN  Outcome: Progressing  1/18/2025 2037 by Dominique Ayoub RN  Outcome: Progressing     Problem: Fall/Injury  Goal: Not fall by end of shift  Outcome: Progressing  Goal: Be free from injury by end of the shift  Outcome: Progressing  Goal: Verbalize understanding of personal risk factors for fall in the hospital  Outcome: Progressing  Goal: Verbalize understanding of risk factor reduction measures to prevent injury from fall in the home  Outcome: Progressing  Goal: Use assistive devices by end of the shift  Outcome: Progressing  Goal: Pace activities to prevent fatigue by end of the shift  Outcome: Progressing     Problem: Pain  Goal: Takes deep breaths with improved pain control throughout the shift  Outcome: Progressing  Goal: Turns in bed with improved pain control throughout the shift  Outcome: Progressing  Goal: Walks with improved pain control throughout the shift  Outcome: Progressing  Goal: Performs ADL's with improved pain control throughout shift  Outcome: Progressing  Goal: Participates in PT with improved pain control throughout the shift  Outcome: Progressing  Goal: Free from opioid side effects throughout the shift  Outcome: Progressing  Goal: Free from acute confusion related to pain meds throughout the shift  Outcome: Progressing   The patient's goals for the shift include      The clinical goals for the shift include pt will remain free from falls/injury thru the shift

## 2025-01-19 NOTE — SIGNIFICANT EVENT
Rapid Response Nurse Note: Rapid Response    Pager time:   Arrival time:   Event end time:   Location: [place] [address]  [] Triage by phone or secure messaging    Rapid response initiated by:  [] Rapid response RN [] Family [] Nursing Supervisor [] Physician   [] RADAR auto page [] Sepsis auto-page [x] RN [] RT   [] NP/PA [] Other:     Primary reason for call:   [] BAT [] New CPAP/BiPAP [] Bleeding [x] Change in mental status   [] Chest pain [] Code blue [] FiO2 >/= 50% [] HR </= 40 bpm   [] HR >/= 130 bpm [] Hyperglycemia [] Hypoglycemia [] RADAR    [] RR </= 8 bpm [] RR >/= 30 bpm [] SBP </= 90 mmHg [] SpO2 < 90%   [] Seizure [] Sepsis [] Shortness of breath  [] Staff concern: see comments     Initial VS and/or RADAR VS: T 36.2 °C; HR 97; RR 18; /69; SPO2 98%.    Providers present at bedside (if applicable): Primary RN, Rapid Response RN, Rapid Response RTCARROLL L Anakwenze MD - [place] Resident    Name of ICU Provider contacted (if applicable):     Interventions:  [] None [x] ABG/VBG [] Assist w/ICU transfer [] BAT paged    [] Bag mask [] Blood [] Cardioversion [] Code Blue   [] Code blue for intubation [] Code status changed [x] Chest x-ray [] EKG   [] IV fluid/bolus [] KUB x-ray [x] Labs/cultures [] Medication   [x] Nebulizer treatment [] NIPPV (CPAP/BiPAP) [] Oxygen [] Oral airway   [] Peripheral IV [] Palliative care consult [] CT/MRI [] Sepsis protocol    [] Suctioned [] Other:     Outcome:  [] Coded and  [] Code blue for intubation [] Coded and transferred to ICU []  on division   [] Remained on division (no change) [] Remained on division + additional monitoring [] Remained in ED [] Transferred to ED   [] Transferred to ICU [] Transferred to inpatient status [] Transferred for interventions (procedure) [] Transferred to ICU stepdown    [] Transferred to surgery [] Transferred to telemetry [] Sepsis protocol [] STEMI protocol   [] Stroke protocol [x] Bedside nurse  instructed to page rapid response for any concerns or acute change in condition/VS     Additional Comments:     Responded to Rapid Response called for a change in mental status.  Per report was found unable to awaken.      Upon examination at the bedside, he is lethargic but wakes up to noxious stimuli.  Once awake is able to maintain conversation.  Head contracted forward due to clival mass.  Appears to be partially obstructing airway.  Also with a history of HANY and COPD but is non-compliament.  SpO2 noted to be labile with no respiratory distress or decrease in respiratory drive noted.  Breath sounds diminished with upper airway secretions.    VB.36, 72, 63, SvO2 91%, Lactate 1.3, HCO3 40.7    Chest x-ray ordered.    DuoNeb given via face mask.    Plan to consult Pulmonology for possible BiPAP initiation.  V60 placed at bedside    RN advised to contact Rapid Response with any future concerns or signs of clinical decompensation.

## 2025-01-20 ENCOUNTER — APPOINTMENT (OUTPATIENT)
Dept: PALLIATIVE MEDICINE | Facility: CLINIC | Age: 55
End: 2025-01-20
Payer: COMMERCIAL

## 2025-01-20 ENCOUNTER — APPOINTMENT (OUTPATIENT)
Dept: RADIATION ONCOLOGY | Facility: HOSPITAL | Age: 55
End: 2025-01-20
Payer: COMMERCIAL

## 2025-01-20 ENCOUNTER — HOSPITAL ENCOUNTER (OUTPATIENT)
Dept: RADIATION ONCOLOGY | Facility: HOSPITAL | Age: 55
Setting detail: RADIATION/ONCOLOGY SERIES
Discharge: HOME | End: 2025-01-20
Payer: COMMERCIAL

## 2025-01-20 VITALS
SYSTOLIC BLOOD PRESSURE: 125 MMHG | HEIGHT: 72 IN | TEMPERATURE: 97.2 F | WEIGHT: 173.2 LBS | DIASTOLIC BLOOD PRESSURE: 85 MMHG | HEART RATE: 101 BPM | OXYGEN SATURATION: 100 % | RESPIRATION RATE: 18 BRPM | BODY MASS INDEX: 23.46 KG/M2

## 2025-01-20 LAB
ALBUMIN SERPL BCP-MCNC: 3.3 G/DL (ref 3.4–5)
ALBUMIN SERPL BCP-MCNC: 3.4 G/DL (ref 3.4–5)
ALP SERPL-CCNC: 83 U/L (ref 33–120)
ALT SERPL W P-5'-P-CCNC: 12 U/L (ref 10–52)
AMMONIA PLAS-SCNC: 40 UMOL/L (ref 16–53)
AMPHETAMINES UR QL SCN: ABNORMAL
ANION GAP BLDA CALCULATED.4IONS-SCNC: -4 MMO/L (ref 10–25)
ANION GAP BLDV CALCULATED.4IONS-SCNC: 5 MMOL/L (ref 10–25)
ANION GAP SERPL CALC-SCNC: 11 MMOL/L (ref 10–20)
ANION GAP SERPL CALC-SCNC: 19 MMOL/L (ref 10–20)
APPEARANCE UR: CLEAR
AST SERPL W P-5'-P-CCNC: 12 U/L (ref 9–39)
BARBITURATES UR QL SCN: ABNORMAL
BASE EXCESS BLDA CALC-SCNC: 14.2 MMOL/L (ref -2–3)
BASE EXCESS BLDV CALC-SCNC: 10.5 MMOL/L (ref -2–3)
BASOPHILS # BLD AUTO: 0 X10*3/UL (ref 0–0.1)
BASOPHILS # BLD AUTO: 0.02 X10*3/UL (ref 0–0.1)
BASOPHILS NFR BLD AUTO: 0 %
BASOPHILS NFR BLD AUTO: 0.2 %
BENZODIAZ UR QL SCN: ABNORMAL
BILIRUB SERPL-MCNC: 0.5 MG/DL (ref 0–1.2)
BILIRUB UR STRIP.AUTO-MCNC: NEGATIVE MG/DL
BODY TEMPERATURE: 37 DEGREES CELSIUS
BODY TEMPERATURE: 37 DEGREES CELSIUS
BUN SERPL-MCNC: 4 MG/DL (ref 6–23)
BUN SERPL-MCNC: 6 MG/DL (ref 6–23)
BZE UR QL SCN: ABNORMAL
CA-I BLDA-SCNC: 1.33 MMOL/L (ref 1.1–1.33)
CA-I BLDV-SCNC: 1.26 MMOL/L (ref 1.1–1.33)
CALCIUM SERPL-MCNC: 10 MG/DL (ref 8.6–10.6)
CALCIUM SERPL-MCNC: 9.8 MG/DL (ref 8.6–10.6)
CANNABINOIDS UR QL SCN: ABNORMAL
CHLORIDE BLDA-SCNC: 104 MMOL/L (ref 98–107)
CHLORIDE BLDV-SCNC: 106 MMOL/L (ref 98–107)
CHLORIDE SERPL-SCNC: 102 MMOL/L (ref 98–107)
CHLORIDE SERPL-SCNC: 103 MMOL/L (ref 98–107)
CO2 SERPL-SCNC: 30 MMOL/L (ref 21–32)
CO2 SERPL-SCNC: 31 MMOL/L (ref 21–32)
COLOR UR: COLORLESS
CREAT SERPL-MCNC: 0.46 MG/DL (ref 0.5–1.3)
CREAT SERPL-MCNC: 0.48 MG/DL (ref 0.5–1.3)
EGFRCR SERPLBLD CKD-EPI 2021: >90 ML/MIN/1.73M*2
EGFRCR SERPLBLD CKD-EPI 2021: >90 ML/MIN/1.73M*2
EOSINOPHIL # BLD AUTO: 0.02 X10*3/UL (ref 0–0.7)
EOSINOPHIL # BLD AUTO: 0.02 X10*3/UL (ref 0–0.7)
EOSINOPHIL NFR BLD AUTO: 0.2 %
EOSINOPHIL NFR BLD AUTO: 0.3 %
ERYTHROCYTE [DISTWIDTH] IN BLOOD BY AUTOMATED COUNT: 15.8 % (ref 11.5–14.5)
ERYTHROCYTE [DISTWIDTH] IN BLOOD BY AUTOMATED COUNT: 15.9 % (ref 11.5–14.5)
FENTANYL+NORFENTANYL UR QL SCN: ABNORMAL
GLUCOSE BLD MANUAL STRIP-MCNC: 123 MG/DL (ref 74–99)
GLUCOSE BLDA-MCNC: 107 MG/DL (ref 74–99)
GLUCOSE BLDV-MCNC: 84 MG/DL (ref 74–99)
GLUCOSE SERPL-MCNC: 100 MG/DL (ref 74–99)
GLUCOSE SERPL-MCNC: 73 MG/DL (ref 74–99)
GLUCOSE UR STRIP.AUTO-MCNC: NORMAL MG/DL
HCO3 BLDA-SCNC: 42.1 MMOL/L (ref 22–26)
HCO3 BLDV-SCNC: 35.7 MMOL/L (ref 22–26)
HCT VFR BLD AUTO: 25.4 % (ref 41–52)
HCT VFR BLD AUTO: 38.8 % (ref 41–52)
HCT VFR BLD EST: 38 % (ref 41–52)
HCT VFR BLD EST: 43 % (ref 41–52)
HGB BLD-MCNC: 12.1 G/DL (ref 13.5–17.5)
HGB BLD-MCNC: 8 G/DL (ref 13.5–17.5)
HGB BLDA-MCNC: 12.7 G/DL (ref 13.5–17.5)
HGB BLDV-MCNC: 14.4 G/DL (ref 13.5–17.5)
HOLD SPECIMEN: NORMAL
IMM GRANULOCYTES # BLD AUTO: 0.04 X10*3/UL (ref 0–0.7)
IMM GRANULOCYTES # BLD AUTO: 0.07 X10*3/UL (ref 0–0.7)
IMM GRANULOCYTES NFR BLD AUTO: 0.7 % (ref 0–0.9)
IMM GRANULOCYTES NFR BLD AUTO: 0.9 % (ref 0–0.9)
INHALED O2 CONCENTRATION: 21 %
INHALED O2 CONCENTRATION: 99 %
KETONES UR STRIP.AUTO-MCNC: NEGATIVE MG/DL
LACTATE BLDA-SCNC: 0.6 MMOL/L (ref 0.4–2)
LACTATE BLDV-SCNC: 1.8 MMOL/L (ref 0.4–2)
LACTATE BLDV-SCNC: 3.1 MMOL/L (ref 0.4–2)
LEUKOCYTE ESTERASE UR QL STRIP.AUTO: NEGATIVE
LYMPHOCYTES # BLD AUTO: 0.48 X10*3/UL (ref 1.2–4.8)
LYMPHOCYTES # BLD AUTO: 0.86 X10*3/UL (ref 1.2–4.8)
LYMPHOCYTES NFR BLD AUTO: 10.7 %
LYMPHOCYTES NFR BLD AUTO: 7.9 %
MAGNESIUM SERPL-MCNC: 2.3 MG/DL (ref 1.6–2.4)
MCH RBC QN AUTO: 26.9 PG (ref 26–34)
MCH RBC QN AUTO: 27 PG (ref 26–34)
MCHC RBC AUTO-ENTMCNC: 31.2 G/DL (ref 32–36)
MCHC RBC AUTO-ENTMCNC: 31.5 G/DL (ref 32–36)
MCV RBC AUTO: 86 FL (ref 80–100)
MCV RBC AUTO: 87 FL (ref 80–100)
METHADONE UR QL SCN: ABNORMAL
MONOCYTES # BLD AUTO: 0.31 X10*3/UL (ref 0.1–1)
MONOCYTES # BLD AUTO: 0.63 X10*3/UL (ref 0.1–1)
MONOCYTES NFR BLD AUTO: 5.1 %
MONOCYTES NFR BLD AUTO: 7.9 %
NEUTROPHILS # BLD AUTO: 5.19 X10*3/UL (ref 1.2–7.7)
NEUTROPHILS # BLD AUTO: 6.41 X10*3/UL (ref 1.2–7.7)
NEUTROPHILS NFR BLD AUTO: 80.1 %
NEUTROPHILS NFR BLD AUTO: 86 %
NITRITE UR QL STRIP.AUTO: NEGATIVE
NRBC BLD-RTO: 0 /100 WBCS (ref 0–0)
NRBC BLD-RTO: 0 /100 WBCS (ref 0–0)
OPIATES UR QL SCN: ABNORMAL
OXYCODONE+OXYMORPHONE UR QL SCN: ABNORMAL
OXYHGB MFR BLDA: 90.6 % (ref 94–98)
OXYHGB MFR BLDV: 54 % (ref 45–75)
PCO2 BLDA: 68 MM HG (ref 38–42)
PCO2 BLDV: 48 MM HG (ref 41–51)
PCP UR QL SCN: ABNORMAL
PH BLDA: 7.4 PH (ref 7.38–7.42)
PH BLDV: 7.48 PH (ref 7.33–7.43)
PH UR STRIP.AUTO: 6.5 [PH]
PHOSPHATE SERPL-MCNC: 1.7 MG/DL (ref 2.5–4.9)
PLATELET # BLD AUTO: 240 X10*3/UL (ref 150–450)
PLATELET # BLD AUTO: 363 X10*3/UL (ref 150–450)
PO2 BLDA: 64 MM HG (ref 85–95)
PO2 BLDV: 32 MM HG (ref 35–45)
POTASSIUM BLDA-SCNC: 3.7 MMOL/L (ref 3.5–5.3)
POTASSIUM BLDV-SCNC: 3.9 MMOL/L (ref 3.5–5.3)
POTASSIUM SERPL-SCNC: 3.3 MMOL/L (ref 3.5–5.3)
POTASSIUM SERPL-SCNC: 4.5 MMOL/L (ref 3.5–5.3)
PROT SERPL-MCNC: 7.5 G/DL (ref 6.4–8.2)
PROT UR STRIP.AUTO-MCNC: NEGATIVE MG/DL
RBC # BLD AUTO: 2.97 X10*6/UL (ref 4.5–5.9)
RBC # BLD AUTO: 4.48 X10*6/UL (ref 4.5–5.9)
RBC # UR STRIP.AUTO: NEGATIVE /UL
SAO2 % BLDA: 93 % (ref 94–100)
SAO2 % BLDV: 55 % (ref 45–75)
SODIUM BLDA-SCNC: 138 MMOL/L (ref 136–145)
SODIUM BLDV-SCNC: 143 MMOL/L (ref 136–145)
SODIUM SERPL-SCNC: 141 MMOL/L (ref 136–145)
SODIUM SERPL-SCNC: 147 MMOL/L (ref 136–145)
SP GR UR STRIP.AUTO: 1
UFH PPP CHRO-ACNC: 0.2 IU/ML
UFH PPP CHRO-ACNC: 0.5 IU/ML
UROBILINOGEN UR STRIP.AUTO-MCNC: NORMAL MG/DL
WBC # BLD AUTO: 6 X10*3/UL (ref 4.4–11.3)
WBC # BLD AUTO: 8 X10*3/UL (ref 4.4–11.3)

## 2025-01-20 PROCEDURE — 94640 AIRWAY INHALATION TREATMENT: CPT

## 2025-01-20 PROCEDURE — 84132 ASSAY OF SERUM POTASSIUM: CPT

## 2025-01-20 PROCEDURE — 85025 COMPLETE CBC W/AUTO DIFF WBC: CPT

## 2025-01-20 PROCEDURE — 2500000004 HC RX 250 GENERAL PHARMACY W/ HCPCS (ALT 636 FOR OP/ED): Mod: SE

## 2025-01-20 PROCEDURE — 2500000005 HC RX 250 GENERAL PHARMACY W/O HCPCS: Mod: SE | Performed by: STUDENT IN AN ORGANIZED HEALTH CARE EDUCATION/TRAINING PROGRAM

## 2025-01-20 PROCEDURE — 2500000001 HC RX 250 WO HCPCS SELF ADMINISTERED DRUGS (ALT 637 FOR MEDICARE OP): Mod: SE

## 2025-01-20 PROCEDURE — 99232 SBSQ HOSP IP/OBS MODERATE 35: CPT

## 2025-01-20 PROCEDURE — 77334 RADIATION TREATMENT AID(S): CPT | Performed by: STUDENT IN AN ORGANIZED HEALTH CARE EDUCATION/TRAINING PROGRAM

## 2025-01-20 PROCEDURE — 99233 SBSQ HOSP IP/OBS HIGH 50: CPT

## 2025-01-20 PROCEDURE — 2500000002 HC RX 250 W HCPCS SELF ADMINISTERED DRUGS (ALT 637 FOR MEDICARE OP, ALT 636 FOR OP/ED): Mod: SE | Performed by: STUDENT IN AN ORGANIZED HEALTH CARE EDUCATION/TRAINING PROGRAM

## 2025-01-20 PROCEDURE — 2500000004 HC RX 250 GENERAL PHARMACY W/ HCPCS (ALT 636 FOR OP/ED): Mod: SE | Performed by: NUTRITIONIST

## 2025-01-20 PROCEDURE — 2500000002 HC RX 250 W HCPCS SELF ADMINISTERED DRUGS (ALT 637 FOR MEDICARE OP, ALT 636 FOR OP/ED): Mod: SE

## 2025-01-20 PROCEDURE — 2500000005 HC RX 250 GENERAL PHARMACY W/O HCPCS: Mod: SE

## 2025-01-20 PROCEDURE — 36415 COLL VENOUS BLD VENIPUNCTURE: CPT

## 2025-01-20 PROCEDURE — 85520 HEPARIN ASSAY: CPT

## 2025-01-20 PROCEDURE — S0109 METHADONE ORAL 5MG: HCPCS | Mod: SE

## 2025-01-20 PROCEDURE — 97165 OT EVAL LOW COMPLEX 30 MIN: CPT | Mod: GO

## 2025-01-20 PROCEDURE — 83605 ASSAY OF LACTIC ACID: CPT

## 2025-01-20 PROCEDURE — 77300 RADIATION THERAPY DOSE PLAN: CPT | Performed by: STUDENT IN AN ORGANIZED HEALTH CARE EDUCATION/TRAINING PROGRAM

## 2025-01-20 PROCEDURE — 77295 3-D RADIOTHERAPY PLAN: CPT | Performed by: STUDENT IN AN ORGANIZED HEALTH CARE EDUCATION/TRAINING PROGRAM

## 2025-01-20 PROCEDURE — 2500000001 HC RX 250 WO HCPCS SELF ADMINISTERED DRUGS (ALT 637 FOR MEDICARE OP): Mod: SE | Performed by: NUTRITIONIST

## 2025-01-20 PROCEDURE — 83735 ASSAY OF MAGNESIUM: CPT

## 2025-01-20 PROCEDURE — 97530 THERAPEUTIC ACTIVITIES: CPT | Mod: GO

## 2025-01-20 PROCEDURE — 1200000003 HC ONCOLOGY  ROOM WITH TELEMETRY DAILY

## 2025-01-20 PROCEDURE — S4991 NICOTINE PATCH NONLEGEND: HCPCS | Mod: SE

## 2025-01-20 PROCEDURE — 2500000004 HC RX 250 GENERAL PHARMACY W/ HCPCS (ALT 636 FOR OP/ED): Mod: SE,TB | Performed by: STUDENT IN AN ORGANIZED HEALTH CARE EDUCATION/TRAINING PROGRAM

## 2025-01-20 PROCEDURE — 82947 ASSAY GLUCOSE BLOOD QUANT: CPT

## 2025-01-20 RX ORDER — LORAZEPAM 2 MG/ML
0.5 INJECTION INTRAMUSCULAR ONCE AS NEEDED
Status: COMPLETED | OUTPATIENT
Start: 2025-01-20 | End: 2025-01-20

## 2025-01-20 RX ORDER — MORPHINE SULFATE 15 MG/1
15 TABLET ORAL EVERY 4 HOURS PRN
Status: DISCONTINUED | OUTPATIENT
Start: 2025-01-20 | End: 2025-01-21

## 2025-01-20 RX ORDER — HYDROMORPHONE HYDROCHLORIDE 1 MG/ML
0.5 INJECTION, SOLUTION INTRAMUSCULAR; INTRAVENOUS; SUBCUTANEOUS
Status: DISCONTINUED | OUTPATIENT
Start: 2025-01-20 | End: 2025-01-22

## 2025-01-20 RX ORDER — OLANZAPINE 10 MG/2ML
2.5 INJECTION, POWDER, FOR SOLUTION INTRAMUSCULAR EVERY 12 HOURS PRN
Status: DISCONTINUED | OUTPATIENT
Start: 2025-01-20 | End: 2025-01-25 | Stop reason: HOSPADM

## 2025-01-20 RX ORDER — POTASSIUM CHLORIDE 14.9 MG/ML
20 INJECTION INTRAVENOUS
Status: COMPLETED | OUTPATIENT
Start: 2025-01-20 | End: 2025-01-20

## 2025-01-20 RX ORDER — ACETAMINOPHEN 10 MG/ML
1000 INJECTION, SOLUTION INTRAVENOUS ONCE
Status: COMPLETED | OUTPATIENT
Start: 2025-01-20 | End: 2025-01-20

## 2025-01-20 RX ORDER — PREGABALIN 25 MG/1
75 CAPSULE ORAL 3 TIMES DAILY
Status: DISCONTINUED | OUTPATIENT
Start: 2025-01-20 | End: 2025-01-25 | Stop reason: HOSPADM

## 2025-01-20 RX ORDER — METHADONE HYDROCHLORIDE 5 MG/1
2.5 TABLET ORAL EVERY 12 HOURS
Status: DISCONTINUED | OUTPATIENT
Start: 2025-01-20 | End: 2025-01-21

## 2025-01-20 RX ADMIN — IPRATROPIUM BROMIDE AND ALBUTEROL SULFATE 3 ML: .5; 3 SOLUTION RESPIRATORY (INHALATION) at 08:35

## 2025-01-20 RX ADMIN — SALINE NASAL SPRAY 2 SPRAY: 1.5 SOLUTION NASAL at 16:03

## 2025-01-20 RX ADMIN — BACITRACIN: 500 OINTMENT TOPICAL at 15:25

## 2025-01-20 RX ADMIN — Medication 3 MG: at 20:39

## 2025-01-20 RX ADMIN — PANCRELIPASE 1 CAPSULE: 30000; 6000; 19000 CAPSULE, DELAYED RELEASE PELLETS ORAL at 14:38

## 2025-01-20 RX ADMIN — Medication 1 L/MIN: at 08:08

## 2025-01-20 RX ADMIN — GUAIFENESIN 600 MG: 600 TABLET ORAL at 08:36

## 2025-01-20 RX ADMIN — SALINE NASAL SPRAY 2 SPRAY: 1.5 SOLUTION NASAL at 13:11

## 2025-01-20 RX ADMIN — ROPINIROLE 0.5 MG: 0.5 TABLET, FILM COATED ORAL at 16:02

## 2025-01-20 RX ADMIN — TOPIRAMATE 100 MG: 100 TABLET, FILM COATED ORAL at 20:08

## 2025-01-20 RX ADMIN — NICOTINE 1 PATCH: 14 PATCH, EXTENDED RELEASE TRANSDERMAL at 22:53

## 2025-01-20 RX ADMIN — OLANZAPINE 2.5 MG: 10 INJECTION, POWDER, LYOPHILIZED, FOR SOLUTION INTRAMUSCULAR at 23:14

## 2025-01-20 RX ADMIN — THIAMINE HCL TAB 100 MG 100 MG: 100 TAB at 08:36

## 2025-01-20 RX ADMIN — POLYETHYLENE GLYCOL 3350 17 G: 17 POWDER, FOR SOLUTION ORAL at 08:35

## 2025-01-20 RX ADMIN — METHADONE HYDROCHLORIDE 2.5 MG: 5 TABLET ORAL at 20:07

## 2025-01-20 RX ADMIN — IPRATROPIUM BROMIDE AND ALBUTEROL SULFATE 3 ML: .5; 3 SOLUTION RESPIRATORY (INHALATION) at 20:07

## 2025-01-20 RX ADMIN — SALINE NASAL SPRAY 2 SPRAY: 1.5 SOLUTION NASAL at 04:08

## 2025-01-20 RX ADMIN — LORAZEPAM 0.5 MG: 2 INJECTION INTRAMUSCULAR; INTRAVENOUS at 16:51

## 2025-01-20 RX ADMIN — GUAIFENESIN 600 MG: 600 TABLET ORAL at 20:08

## 2025-01-20 RX ADMIN — ACETAMINOPHEN 1000 MG: 10 INJECTION INTRAVENOUS at 02:29

## 2025-01-20 RX ADMIN — ATORVASTATIN CALCIUM 20 MG: 20 TABLET, FILM COATED ORAL at 20:08

## 2025-01-20 RX ADMIN — TAMSULOSIN HYDROCHLORIDE 0.4 MG: 0.4 CAPSULE ORAL at 08:35

## 2025-01-20 RX ADMIN — STANDARDIZED SENNA CONCENTRATE 17.2 MG: 8.6 TABLET ORAL at 20:07

## 2025-01-20 RX ADMIN — IPRATROPIUM BROMIDE AND ALBUTEROL SULFATE 3 ML: .5; 3 SOLUTION RESPIRATORY (INHALATION) at 08:03

## 2025-01-20 RX ADMIN — MORPHINE SULFATE 15 MG: 15 TABLET ORAL at 20:39

## 2025-01-20 RX ADMIN — ACETAMINOPHEN 975 MG: 325 TABLET ORAL at 14:53

## 2025-01-20 RX ADMIN — POTASSIUM CHLORIDE 20 MEQ: 14.9 INJECTION, SOLUTION INTRAVENOUS at 00:49

## 2025-01-20 RX ADMIN — BACITRACIN: 500 OINTMENT TOPICAL at 09:00

## 2025-01-20 RX ADMIN — ACETAMINOPHEN 975 MG: 325 TABLET ORAL at 08:35

## 2025-01-20 RX ADMIN — MORPHINE SULFATE 15 MG: 15 TABLET ORAL at 16:02

## 2025-01-20 RX ADMIN — NICOTINE 1 PATCH: 14 PATCH, EXTENDED RELEASE TRANSDERMAL at 00:43

## 2025-01-20 RX ADMIN — POTASSIUM CHLORIDE 20 MEQ: 14.9 INJECTION, SOLUTION INTRAVENOUS at 02:29

## 2025-01-20 RX ADMIN — PANTOPRAZOLE SODIUM 40 MG: 40 TABLET, DELAYED RELEASE ORAL at 06:07

## 2025-01-20 RX ADMIN — TOPIRAMATE 100 MG: 100 TABLET, FILM COATED ORAL at 14:38

## 2025-01-20 RX ADMIN — Medication 1 TABLET: at 08:36

## 2025-01-20 RX ADMIN — Medication 2 L/MIN: at 20:11

## 2025-01-20 RX ADMIN — Medication 2 L/MIN: at 20:10

## 2025-01-20 RX ADMIN — ACETAMINOPHEN 975 MG: 325 TABLET ORAL at 20:08

## 2025-01-20 RX ADMIN — IPRATROPIUM BROMIDE AND ALBUTEROL SULFATE 3 ML: .5; 3 SOLUTION RESPIRATORY (INHALATION) at 14:41

## 2025-01-20 RX ADMIN — ROPINIROLE 0.5 MG: 0.5 TABLET, FILM COATED ORAL at 20:08

## 2025-01-20 RX ADMIN — MONTELUKAST 10 MG: 10 TABLET, FILM COATED ORAL at 20:08

## 2025-01-20 RX ADMIN — FOLIC ACID 1 MG: 1 TABLET ORAL at 08:36

## 2025-01-20 ASSESSMENT — COGNITIVE AND FUNCTIONAL STATUS - GENERAL
DAILY ACTIVITIY SCORE: 18
MOVING TO AND FROM BED TO CHAIR: A LOT
PERSONAL GROOMING: A LITTLE
DRESSING REGULAR UPPER BODY CLOTHING: A LITTLE
HELP NEEDED FOR BATHING: A LITTLE
MOBILITY SCORE: 19
DRESSING REGULAR UPPER BODY CLOTHING: A LITTLE
WALKING IN HOSPITAL ROOM: A LOT
CLIMB 3 TO 5 STEPS WITH RAILING: A LOT
TOILETING: A LITTLE
WALKING IN HOSPITAL ROOM: A LITTLE
DRESSING REGULAR LOWER BODY CLOTHING: A LOT
DRESSING REGULAR UPPER BODY CLOTHING: A LOT
HELP NEEDED FOR BATHING: A LOT
PERSONAL GROOMING: A LITTLE
STANDING UP FROM CHAIR USING ARMS: A LOT
MOVING TO AND FROM BED TO CHAIR: A LITTLE
STANDING UP FROM CHAIR USING ARMS: A LITTLE
TOILETING: A LITTLE
MOBILITY SCORE: 16
DRESSING REGULAR LOWER BODY CLOTHING: A LOT
EATING MEALS: A LITTLE
TOILETING: A LOT
EATING MEALS: A LITTLE
CLIMB 3 TO 5 STEPS WITH RAILING: A LOT
DAILY ACTIVITIY SCORE: 19
PERSONAL GROOMING: A LITTLE
DAILY ACTIVITIY SCORE: 14
HELP NEEDED FOR BATHING: A LITTLE

## 2025-01-20 ASSESSMENT — PAIN SCALES - GENERAL
PAINLEVEL_OUTOF10: 5 - MODERATE PAIN
PAINLEVEL_OUTOF10: 0 - NO PAIN
PAINLEVEL_OUTOF10: 0 - NO PAIN
PAINLEVEL_OUTOF10: 10 - WORST POSSIBLE PAIN
PAINLEVEL_OUTOF10: 5 - MODERATE PAIN

## 2025-01-20 ASSESSMENT — LIFESTYLE VARIABLES
PAROXYSMAL SWEATS: NO SWEAT VISIBLE
VISUAL DISTURBANCES: NOT PRESENT
ANXIETY: MILDLY ANXIOUS
HEADACHE, FULLNESS IN HEAD: VERY MILD
ORIENTATION AND CLOUDING OF SENSORIUM: DISORIENTED FOR PLACE OR PERSON
AUDITORY DISTURBANCES: NOT PRESENT
HEADACHE, FULLNESS IN HEAD: NOT PRESENT
AUDITORY DISTURBANCES: NOT PRESENT
PAROXYSMAL SWEATS: NO SWEAT VISIBLE
VISUAL DISTURBANCES: NOT PRESENT
TREMOR: 2
HEADACHE, FULLNESS IN HEAD: VERY MILD
NAUSEA AND VOMITING: NO NAUSEA AND NO VOMITING
TREMOR: NO TREMOR
AGITATION: NORMAL ACTIVITY
TOTAL SCORE: 6
ANXIETY: MILDLY ANXIOUS
NAUSEA AND VOMITING: NO NAUSEA AND NO VOMITING
ORIENTATION AND CLOUDING OF SENSORIUM: DISORIENTED FOR PLACE OR PERSON
AGITATION: NORMAL ACTIVITY
AGITATION: NORMAL ACTIVITY
ANXIETY: NO ANXIETY, AT EASE
ORIENTATION AND CLOUDING OF SENSORIUM: DISORIENTED FOR DATE BY MORE THAN 2 CALENDAR DAYS
AUDITORY DISTURBANCES: NOT PRESENT
TREMOR: NO TREMOR
TOTAL SCORE: 5
TOTAL SCORE: 6
VISUAL DISTURBANCES: NOT PRESENT
PAROXYSMAL SWEATS: NO SWEAT VISIBLE
NAUSEA AND VOMITING: NO NAUSEA AND NO VOMITING

## 2025-01-20 ASSESSMENT — PAIN - FUNCTIONAL ASSESSMENT
PAIN_FUNCTIONAL_ASSESSMENT: 0-10
PAIN_FUNCTIONAL_ASSESSMENT: 0-10
PAIN_FUNCTIONAL_ASSESSMENT: PAINAD (PAIN ASSESSMENT IN ADVANCED DEMENTIA SCALE)

## 2025-01-20 ASSESSMENT — PAIN SCALES - PAIN ASSESSMENT IN ADVANCED DEMENTIA (PAINAD)
TOTALSCORE: 8
TOTALSCORE: HEAT APPLIED;COLD APPLIED;REPOSITIONED
CONSOLABILITY: UNABLE TO CONSOLE, DISTRACT OR REASSURE
FACIALEXPRESSION: FACIAL GRIMACING
NEGVOCALIZATION: OCCASIONAL MOAN/GROAN, LOW SPEECH, NEGATIVE/DISAPPROVING QUALITY
BREATHING: OCCASIONAL LABORED BREATHING, SHORT PERIOD OF HYPERVENTILATION
BODYLANGUAGE: RIGID, FISTS CLENCHED, KNEES UP, PUSHING/PULLING AWAY, STRIKES OUT

## 2025-01-20 ASSESSMENT — ACTIVITIES OF DAILY LIVING (ADL): BATHING_ASSISTANCE: MAXIMAL

## 2025-01-20 NOTE — SIGNIFICANT EVENT
Follow-up VBG notable for pCO2 88 and reached out for possible BiPAP versus MICU transfer; alerted by bedside team that rapid response team was at bedside.  On arrival, patient still slumped over in bed with stertorous breathing.  Alerted that plan was transfer to MICU and writer alerted Yamilet attending as well as significant other listed in chart.    Vascular Surgery  Dr. Halima Mejia   Daily Progress Note    Pt Name: Malka Flanagan Record Number: [id number]  Date of Birth 1944   Today's Date: 9/23/2020        SUBJECTIVE:     Patient was seen and examined. Sitting up in bed eating breakfast.   Complaining of both arms being sore. OBJECTIVE:     Patient Vitals for the past 24 hrs:   BP Temp Temp src Pulse Resp SpO2 Weight   09/23/20 0655 (!) 96/52 97.2 °F (36.2 °C) Temporal 95 16 97 % 109 lb 2 oz (49.5 kg)   09/22/20 1945 (!) 119/50 97.6 °F (36.4 °C) Temporal 105 17 94 % --   09/22/20 1703 (!) 124/59 -- -- 109 14 98 % --   09/22/20 1600 -- -- -- 109 -- -- --   09/22/20 1400 -- -- -- 104 -- -- --   09/22/20 1237 (!) 92/52 -- -- -- -- -- --   09/22/20 1231 (!) 94/53 -- Temporal 109 16 97 % --         Intake/Output Summary (Last 24 hours) at 9/23/2020 0848  Last data filed at 9/22/2020 2154  Gross per 24 hour   Intake 629.16 ml   Output 1425 ml   Net -795.84 ml       In: 619.2 [I.V.:119.2]  Out: 625 [Urine:625]    I/O last 3 completed shifts: In: 879.2 [P.O.:250; I.V.:129.2; IV Piggyback:500]  Out: 1425 [Urine:625; Other:800]         Wt Readings from Last 3 Encounters:   09/23/20 109 lb 2 oz (49.5 kg)   03/25/16 113 lb 12.8 oz (51.6 kg)        Body mass index is 17.09 kg/m².      Diet: DIET RENAL; Carb Control: 4 carb choices (60 gms)/meal; Renal        MEDS:     Scheduled Meds:   sodium chloride flush  10 mL Intravenous 2 times per day    darbepoetin umesh-polysorbate  100 mcg Subcutaneous Weekly - Monday    ampicillin-sulbactam  1.5 g Intravenous Q12H    heparin (porcine)  5,000 Units Subcutaneous 3 times per day    ipratropium  0.5 mg Nebulization 4x daily    insulin lispro  0-6 Units Subcutaneous TID WC    insulin lispro  0-3 Units Subcutaneous Nightly     Continuous Infusions:   sodium chloride 100 mL/hr at 09/21/20 2037    dextrose       PRN Meds:sodium chloride flush, 10 mL, PRN  acetaminophen, 650 mg, Q4H PRN  docusate sodium, 100 mg, BID PRN  hydrALAZINE, 10 mg, Q4H PRN  oxyCODONE, 5 mg, Q4H PRN  acetaminophen, 650 mg, Q6H PRN  magnesium hydroxide, 30 mL, Daily PRN  promethazine, 12.5 mg, Q6H PRN    Or  ondansetron, 4 mg, Q6H PRN  morphine, 2 mg, Q4H PRN  glucose, 15 g, PRN  dextrose, 12.5 g, PRN  glucagon (rDNA), 1 mg, PRN  dextrose, 100 mL/hr, PRN          PHYSICAL EXAM:     CONSTITUTIONAL: Alert and oriented times 3, no acute distress and cooperative to examination. LUNGS: Clear bilateral breath sounds without wheezes or rhonchi. CARDIOVASCULAR: Normal HT with RRR. 2/6 systolic murmur, no gallops or rubs. ABDOMEN: Soft, non-tender with active bowel sounds x 4. NEUROLOGIC: Grossly intact. WOUND/INCISION: Non-tunneled temporary dialysis catheter - left groin. Left subclavian pacer site with dressing dry and intact. Resolving ecchymosis noted. EXTREMITY: No clubbing or cyanosis. No peripheral edema. Peripheral pulses palpable. Left upper arm AV fistula - unable to palpate thrill. No bruit auscultated. Right hand warm to touch with strong palpable radial pulse.      LABS:     CBC:   Recent Labs     09/21/20 0304 09/22/20 0253 09/23/20 0229   WBC 4.9 5.9 5.3   RBC 2.99* 2.94* 2.74*   HGB 9.4* 9.2* 8.6*   HCT 29.2* 30.1* 28.4*   MCV 97.7* 102.4* 103.6*   MCH 31.4* 31.3* 31.4*   MCHC 32.2* 30.6* 30.3*   RDW 14.6* 14.6* 14.8*   * 182 192   MPV 11.5 9.6 9.6      Last 3 CMP:   Recent Labs     09/21/20 0304 09/22/20 0253 09/23/20 0229    137 139   K 4.3 4.7 3.7    104 101   CO2 23 18* 24   BUN 47* 51* 28*   CREATININE 4.1* 4.3* 3.2*   GLUCOSE 137* 142* 101   CALCIUM 8.4* 8.5* 8.6*   PROT 5.4*  --   --    LABALBU 3.1*  --   --    BILITOT <0.2  --   --    ALKPHOS 57  --   --    AST 8  --   --    ALT <5*  --   --       Calcium:   Lab Results   Component Value Date    CALCIUM 8.6 09/23/2020    CALCIUM 8.5 09/22/2020    CALCIUM 8.4 09/21/2020        DVT prophylaxis: Heparin 5000 units sq q8h          ASSESSMENT: 1.         AV Fistula Malfunction, Left Upper Arm - S/P Placement of a dual-lumen non-tunneled left femoral vein dialysis catheter on 09/16/2020  2. Bradycardia - S/P Permanent Pacer on 09/17/2020  3. Fall at Home  4. Proteus, E-coli UTI - on Unasyn  5.         DM, Type 2  6. Essential HTN  7. CAD  8. DM, Type 2  9. Severe Malnutrition - Dietician Following   Malnutrition Assessment:  Malnutrition Status:  Severe malnutrition    Context:  Acute Illness     Findings of the 6 clinical characteristics of malnutrition:  Energy Intake:  7 - 50% or less of estimated energy requirements for 5 or more days  Weight Loss:  No significant weight loss     Body Fat Loss:  7 - Moderate body fat loss     Muscle Mass Loss:  7 - Moderate muscle mass loss    Fluid Accumulation:  No significant fluid accumulation Extremities   Strength:  Not Performed      PLAN:     1. Continue current treatment plan - D/C Temporary Dialysis when Able to Use AV graft.       Richi Roach, APRN-BC

## 2025-01-20 NOTE — SIGNIFICANT EVENT
Floor Readiness Note       I, personally, evaluated Lemuel Mckay prior to transfer to the floor, including reviewing all current laboratory and imaging studies. The patient remains appropriate for transfer to the floor. Bedside nurse and respiratory therapy are also in agreement of patient's readiness for the floor.     Brief summary:  Lemuel Mckay is a 54 y.o. male who was admitted to the MICU on 1/20 for encephalopathy in the setting of RFII. They have been treated with:  -Holding sedative drugs     Updated focused Physical Exam:  Physical Exam  Constitutional:       Appearance: He is ill-appearing.      Comments: Opens eyes to verbal stimulation and converses shortly     Cardiovascular:      Rate and Rhythm: Normal rate.      Heart sounds: No murmur heard.  Pulmonary:      Effort: Pulmonary effort is normal. No respiratory distress.      Breath sounds: No wheezing or rales.   Abdominal:      General: There is no distension.      Tenderness: There is no abdominal tenderness.         Current Vital Signs:  Heart Rate: 92 (01/20/25 0300 : User, System Default)  BP: 84/64 (01/20/25 0300 : User, System Default)  Temp: 36.2 °C (97.2 °F) (01/19/25 2300 : Lizzy Cohen)  Resp: 16 (01/20/25 0300 : User, System Default)  SpO2: 97 % (01/20/25 0300 : User, System Default)    Relevant updates since rounds:  -none    Accepting team, Yamilet, received verbal sign out and the Provider Care team/Attending has been updated. Bedside nurse will now call accepting nurse for report and patient will be transferred to [facility].    Mazin Ellis MD

## 2025-01-20 NOTE — SIGNIFICANT EVENT
Medical ICU Fellow Assessment.    54 y.o. 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     Rapid response was activated for worsening mental status and delirium. VBG with 7.36.72/63 normal lactate. Renal function with worsening metabolic alkalosis most likely compensating to worsening respiratory acidosis from hypopnea in the setting of several sedative medication.      I assessed the patient, he is A&O x 2, Bedside RN endorsed that he has worsening mental status and delirium over the day and pulled couple of his PIV. He is HDS, easily aroused to voice. Lungs clear to auscultation. Chest xray without an acute process    Recommendations:  - Please continue to follow his mental status q4 hours (Code white can help)  - Please Stop all sedative medication tonight, this includes Diazepam PRN, all opioids (Dilaudid, morphine, methadone, oxycodone), mirtazapine, Lyrica.  - Please obtain another VBG at 9-10 PM     Case discussed with Dr. Gilbert    Patient can stay on the floor fore now, please reach back to the MICU fellow overnight if needed.

## 2025-01-20 NOTE — PROGRESS NOTES
SUPPORTIVE AND PALLIATIVE ONCOLOGY PROGRESS NOTE      SERVICE DATE: 2025      SUBJECTIVE:  Interval Events:  Patient seen at bedside early afternoon with Supportive Oncology APRN. Patient's RN at bedside for encounter.     He was briefly transferred to the MICU overnight for encephalopathy in the setting of hypercapnia requiring BiPAP. Patient briefly on BiPAP (less than 30 mins) then able to wean down to 2L NC. All sedatives also held in the MICU and had improvement in mentation without any other interventions.   Medications held included methadone, pregabalin, mirtazapine, hydromorphone, morphine IR, cyclobenzaprine. Patient then transferred to the medical floor.     During our visit, he is engaged in conversation, however, has delayed responses (similar to previous visits). He is otherwise alert and oriented.     Patient reports pain is controlled at time of visit, rated 5/10. States his last BM was this morning. Reports no N/V, headache, or shortness of breath at this time.     Patient does endorse anxiety. He is scheduled for first RT fraction later this afternoon.    Pain Assessment:  Onset: several  years  Location:  head and neck  Duration: Constant  Characteristics:   Ratin   Descriptors: dull, aching, throbbing, sharp, and stabbing   Aggravating: movement, bending, and coughing    Relieving: Analgesics and Positioning   Interference with Function: Somewhat      Opioid Requirements  Past 24 h opioid requirements ( at 0800 to 25 at 0800):   Hydromorphone 1 mg IV x 2 doses = 2 mg = 25 OME  Morphine IR 15 mg PO x 2 doses = 30 mg = 30 OME  Methadone 2.5 mg PO  x 2 doses = 5 mg methadone  Total 24h OME use:  55 + 5 mg methadone    Note: OME calculations based on equianalgesic table below. Please note this table is based on best available evidence but conversions are still approximate. These are NOT opioid DOSES for individual patient use; this is equivalency information.  Drug Parenteral  Enteral   Morphine 10 25   Oxycodone N/A 20   Hydromorphone 2 5   Fentanyl 0.15 N/A   Tramadol N/A 120   Citation: Darnell ENGLAND. Demystifying opioid conversion calculations: A guide for effective dosing, Second edition. MD Essie: [employer] of [employer]-System Pharmacists, 2018.    Symptom Assessment:  Worrying: somewhat  Anxiety: somewhat  Shortness of breath: none  Nausea: none  Vomiting: none  Constipation: a little  Diarrhea: none    Information obtained from: chart review, interview of patient, discussion with RN, and discussion with primary team  ______________________________________________________________________        Objective       Lab Results   Component Value Date    WBC 6.0 01/20/2025    HGB 8.0 (L) 01/20/2025    HCT 25.4 (L) 01/20/2025    MCV 86 01/20/2025     01/20/2025      Lab Results   Component Value Date    GLUCOSE 174 (H) 01/19/2025    CALCIUM 9.6 01/19/2025     01/19/2025    K 3.4 (L) 01/19/2025    CO2 37 (H) 01/19/2025    CL 97 (L) 01/19/2025    BUN 4 (L) 01/19/2025    CREATININE 0.46 (L) 01/19/2025     Lab Results   Component Value Date    ALT 20 01/15/2025    AST 24 01/15/2025    ALKPHOS 104 01/15/2025    BILITOT 0.4 01/15/2025     Estimated Creatinine Clearance: 125 mL/min (A) (by C-G formula based on SCr of 0.46 mg/dL (L)).       Scheduled medications   acetaminophen, 975 mg, oral, q6h  atorvastatin, 20 mg, oral, Nightly  bacitracin, , Topical, TID  folic acid, 1 mg, oral, Daily  guaiFENesin, 600 mg, oral, BID  ipratropium-albuteroL, 3 mL, nebulization, TID  [Held by provider] methadone, 2.5 mg, oral, q8h  [Held by provider] mirtazapine, 15 mg, oral, Nightly  montelukast, 10 mg, oral, Daily  multivitamin with minerals, 1 tablet, oral, Daily  nicotine, 1 patch, transdermal, q24h  oxygen, , inhalation, Continuous - Inhalation  oxygen, , inhalation, Continuous - Inhalation  pancrelipase (Lip-Prot-Amyl), 1 capsule, oral, BID  pantoprazole, 40 mg, oral, Daily before  breakfast  polyethylene glycol, 17 g, oral, Daily  [Held by provider] pregabalin, 200 mg, oral, TID  rOPINIRole, 0.5 mg, oral, TID  sennosides, 2 tablet, oral, Nightly  sodium chloride, 2 spray, Each Nostril, 4x daily  tamsulosin, 0.4 mg, oral, Daily  thiamine, 100 mg, oral, Daily  topiramate, 100 mg, oral, BID  white petrolatum, , Topical, TID      Continuous medications  heparin, 0-4,500 Units/hr, Last Rate: 1,400 Units/hr (01/19/25 2200)      PRN medications  [Held by provider] cyclobenzaprine, 10 mg, TID PRN  heparin, 3,000-6,000 Units, q4h PRN  [Held by provider] HYDROmorphone, 1 mg, q3h PRN  lidocaine, 1 patch, q12h PRN  melatonin, 3 mg, Nightly PRN  naloxone, 0.2 mg, q5 min PRN  nicotine polacrilex, 2 mg, q2h PRN  ondansetron ODT, 4 mg, q8h PRN                    PHYSICAL EXAMINATION:  Vital Signs:   Vital signs reviewed  Vitals:    01/20/25 0808   BP:    Pulse:    Resp: 18   Temp:    SpO2: 95%     Pain Score: 0 - No pain    Physical Exam: see attestation      ASSESSMENT/PLAN:  Gaurav Mckay is a 54 y.o. 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 PMH significant for COPD,PE (11/2023) on apixaban and on 2 L NC at home, alcohol use disorder, tobacco use, HTN, and bladder mass with hematuria of presenting with headache, ptosis of right eye and vision changes. Admitted 1/15/2025 for further evaluation and management of pain. Supportive and Palliative Oncology is consulted for pain management.      Pain  Head and neck pain secondary to metastatic laryngeal cancer   Pain is: cancer related pain and acute on chronic; somatic and neuropathic; moderately controlled   Home regimen:  pregabalin 200 mg PO TID   Given altered mental status and hypercapnia requiring brief ICU admission for respiratory management,  de-escalation of sedating medication and/or medication that may decrease respiratory drive is appropriate. Other etiologies of depressed  respiratory effort, altered mentation, being explored by primary team. Recommend:  Decrease methadone to 2.5 mg PO Q8H  Decrease pregabalin to 75 mg PO Q8H  Decrease hydromorphone to 0.5 mg IV Q3H PRN breakthrough pain   Restart morphine IR 15 mg PO Q3H PRN moderate or severe pain  Do not restart cyclobenzaprine for now  Continue acetaminophen 975 mg PO Q6H   Continue topiramate 100 mg twice daily    Nausea  At risk for nausea with vomiting related to chemotherapy and opioids   Home regimen:  Ondansetron   Improving. Recommend:   Continue ondansetron ODT 4 mg PO Q8H PRN N/V first line     Constipation  At risk for constipation related to medication side effects (including opioids), decreased oral intake, and prolonged immobility in the setting of hospitalization , currently not constipated  Usual bowel pattern: every other day  Home regimen: none  LBM 1/20 (small). Recommend:  Continue sennosides 17.2 mg PO nightly   Continue MirLAX 17 g PO daily   Goal to have BM without straining q48-72h, adjust regimen as needed     Altered Mood  Chronic anxiety and depression related to health concerns   controlled with home regimen   Home regimen: none  Give lorazepam 0.5 mg IV x 1 dose prior to radiation   Continue to hold mirtazapine for now per primary team    Smoking Cessation   Patient continues to be indicated for combination NRT consisting of using both the nicotine patch (a long-acting form of NRT) and a short-acting NRT of the patient’s choice, generally nicotine gum or nicotine lozenge. Recommend:  Continue nicotine 14 mg/day patch daily   Continue nicotine gum (Nicorette) 2 mg Q2H PRN smoking cessation, nicotine cravings     Supportive and Palliative Oncology encounter:  Spoke with patient  at bedside  Emotional support provided  Coordination of care:  medication changes      DATA   Diagnostic tests and information reviewed for today's visit:  Conversation with primary team, Most recent labs and imaging results        Plan of Care discussed with: Provider, RN, Patient    Thank you for asking Supportive and Palliative Oncology to assist with care of this patient.  Recommendations will be communicated back to the consulting service by way of shared electronic medical record/secure chat/email or face-to-face.   We will continue to follow.  Please contact us for additional questions or concerns.      SIGNATURE:   Shania Oglesby PharmD  Palliative Medicine Clinical Pharmacy Specialist   Supportive Oncology Services    PAGER/CONTACT:  Contact information:  Supportive and Palliative Oncology  Monday-Friday 8 AM-5 PM  Epic Secure chat or pager [phone].  After hours and weekends:  pager [phone]

## 2025-01-20 NOTE — PROGRESS NOTES
"Gaurav Mckay \"Lucy" is a 54 y.o. male on day 5 of admission presenting with Ptosis of right eyelid.    Subjective   Patient was transferred to the MICU overnight due to AHRF requiring brief BiPAP. VBG prior to tx was Ph 7.29 and PCO2 88. Repeat Ph 7.40 and PCO2 is 68. Seen at the bedside denies any pain, and states that breathing is better. Significant drop of Hgb from 11.7 to 8.0. Na was 147 will give free water 2L over the next 24 hours.     Review of Systems   Review of Systems   10/10 negative except stated above       Objective     Last Recorded Vitals  Blood pressure 129/78, pulse 93, temperature 36.1 °C (97 °F), resp. rate 18, height 1.829 m (6'), weight 78.6 kg (173 lb 3.2 oz), SpO2 97%.  Intake/Output last 3 Shifts:  I/O last 3 completed shifts:  In: 1139.2 (14.5 mL/kg) [P.O.:240; I.V.:799.2 (10.2 mL/kg); IV Piggyback:100]  Out: 1650 (21 mL/kg) [Urine:1650 (0.6 mL/kg/hr)]  Weight: 78.6 kg     Physical Exam    Relevant Results    Labs    Results from last 7 days   Lab Units 01/20/25  0508 01/19/25  0748 01/17/25  0658   WBC AUTO x10*3/uL 6.0 6.8 18.7*   HEMOGLOBIN g/dL 8.0* 11.7* 12.4*   HEMATOCRIT % 25.4* 38.6* 40.3*   PLATELETS AUTO x10*3/uL 240 353 408            Results from last 7 days   Lab Units 01/20/25  0902 01/19/25  1656 01/19/25  0748   SODIUM mmol/L 147* 142 140   POTASSIUM mmol/L 4.5 3.4* 3.3*   CHLORIDE mmol/L 103 97* 97*   CO2 mmol/L 30 37* 35*   BUN mg/dL 4* 4* 4*   CREATININE mg/dL 0.48* 0.46* 0.41*   CALCIUM mg/dL 10.0 9.6 9.8     Results from last 7 days   Lab Units 01/15/25  1458   ALK PHOS U/L 104   BILIRUBIN TOTAL mg/dL 0.4   PROTEIN TOTAL g/dL 8.2   ALT U/L 20   AST U/L 24             Medications  Scheduled medications  acetaminophen, 975 mg, oral, q6h  atorvastatin, 20 mg, oral, Nightly  bacitracin, , Topical, TID  folic acid, 1 mg, oral, Daily  guaiFENesin, 600 mg, oral, BID  ipratropium-albuteroL, 3 mL, nebulization, TID  methadone, 2.5 mg, oral, q12h  [Held by provider] " mirtazapine, 15 mg, oral, Nightly  montelukast, 10 mg, oral, Daily  multivitamin with minerals, 1 tablet, oral, Daily  nicotine, 1 patch, transdermal, q24h  oxygen, , inhalation, Continuous - Inhalation  oxygen, , inhalation, Continuous - Inhalation  pancrelipase (Lip-Prot-Amyl), 1 capsule, oral, BID  pantoprazole, 40 mg, oral, Daily before breakfast  polyethylene glycol, 17 g, oral, Daily  [Held by provider] pregabalin, 75 mg, oral, TID  rOPINIRole, 0.5 mg, oral, TID  sennosides, 2 tablet, oral, Nightly  sodium chloride, 2 spray, Each Nostril, 4x daily  tamsulosin, 0.4 mg, oral, Daily  thiamine, 100 mg, oral, Daily  topiramate, 100 mg, oral, BID  white petrolatum, , Topical, TID      Continuous medications  heparin, 0-4,500 Units/hr, Last Rate: 1,400 Units/hr (01/19/25 2200)      PRN medications  PRN medications: [Held by provider] cyclobenzaprine, heparin, [Held by provider] HYDROmorphone, lidocaine, LORazepam, melatonin, morphine, naloxone, nicotine polacrilex, ondansetron ODT     Imaging  XR chest 1 view   Final Result   1.  No evidence of acute cardiopulmonary process. No focal   consolidations or pleural effusions.   2. Minimal bibasilar atelectasis.        I personally reviewed the images/study and I agree with Dr. Joyce Parish findings as stated. This study was interpreted at [facility], [place], [place]        MACRO:   None        Signed by: Yovanny Sheehan 1/20/2025 8:01 AM   Dictation workstation:   [id number]      MR brain wo IV contrast   Final Result   Single axial T2 weighted images obtained, after which study was   terminated due to extensive motion. Please see above for findings   that could be ascertained from the markedly limited study.        MACRO:   None.        Signed by: Marc Deras 1/18/2025 1:48 PM   Dictation workstation:   [id number]      CT head wo IV contrast   Final Result   There is aggressive destructive heterogeneous mass centered in the    clivus with poorly defined margins extending into the bilateral   medial petrous apices, nasopharynx, and pituitary sella. 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 There is   erosion of the posterior cortex of the clivus without gross evidence   of extension of the prepontine cistern. The mass however has   increased in the short-term interval measuring 2.5 cm in AP dimension   on sagittal view, previously 2.0 cm.        Signed by: Enrrique Zhou 1/15/2025 8:01 PM   Dictation workstation:   [id number]      Point of Care Ultrasound    (Results Pending)            Assessment/Plan   Assessment & Plan  Ptosis of right eyelid    Lemuel Mckay is a 54 y.o. male with PMHx of recently diagnosed large lytic R. Clival tumor c/f chondroma, laryngeal cancer s/p radical neck dissection, COPD, PE (11/2023) on apixaban and on 2 L NC at home, alcohol use disorder, tobacco use, HTN, and bladder mass with hematuria of presenting with headache, ptosis of right eye and vision changes. Transferred to the MICU for AHRF with Ph 7,29 and PCO2 of 88 on ABG requiring BiPAP. Now back to the floor. Respiratory function improve. Planning for radiation therapy to the brain for the clival mass. Support Onc.      Updates 1/20/25:  - Support Onc follow; appreciate recs   - Rad Onc will start radiation treatment to the brain (5 days in total)     1/19: pt transferred to the floor after being in the MICU for 6 hours iso AMS and acute hypercapnic respiratory failure required BiPAP for less than 30 mins and had improved mentation.     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   Support Onc following  - continue scheduled methadone, morphine and acetaminophen PRN  - zofran PRN      #history of saddle PE  #COPD  :: on apixaban at home  :: requires 2 L NC after PE and d/t COPD  - monitor O2 requirements  - change to heparin drip, in case of surgical intervention; since no surgery per NSGY, consider switching back to eliquis      #alcohol use disorder  :: prior hospitalization in 12/2024 showed intoxication  :: patient states last drink was six months ago  - CIWA protocol discontinued      #tobacco use  :: currently smoking 1 PPD, vaping 2 hours/week  - nicotine patch daily     Electrolytes: PRN  Lines/tubes: PIV   Abx: none  Drips: heparin   GI ppx: none  DVT: heparin  Code status: full code  NOK: Jacqui Hernandez (Significant Other)  [phone] (Mobile)     Case seen and discussed with attending Dr. Suarez, to addend as necessary.    Siva Larkin MD PGY-1

## 2025-01-20 NOTE — PROGRESS NOTES
01/20/25 1300   Discharge Planning   Living Arrangements Spouse/significant other   Support Systems Spouse/significant other   Type of Residence Skilled nursing facility   Home or Post Acute Services Post acute facilities (Rehab/SNF/etc)   Type of Post Acute Facility Services Skilled nursing   Expected Discharge Disposition SNF   Does the patient need discharge transport arranged? Yes   RoundTrip coordination needed? Yes   Has discharge transport been arranged? No     SW met with pt to complete assessment.  Pt is alert and oriented x1.  SW attempted to contact his sig other, Jacqui [phone], but number rang and then a busy signal.  Per chart review, pt was a [facility].  SW will make referral to [facility] via [facility].  Will continue with attempts to contact pt's sig other.  Will follow.  YOSVANY Terry

## 2025-01-20 NOTE — SIGNIFICANT EVENT
.Rapid Response Nurse Note: Rapid Response BAT    Pager time: [phone]  Arrival time: 939  Event end time: 955  Location: [place] [address]  [] Triage by phone or secure messaging    Rapid response initiated by:  [] Rapid response RN [] Family [] Nursing Supervisor [] Physician   [] RADAR auto page [] Sepsis auto-page [x] RN [] RT   [] NP/PA [] Other:     Primary reason for call:   [x] BAT [] New CPAP/BiPAP [] Bleeding [] Change in mental status   [] Chest pain [] Code blue [] FiO2 >/= 50% [] HR </= 40 bpm   [] HR >/= 130 bpm [] Hyperglycemia [] Hypoglycemia [] RADAR    [] RR </= 8 bpm [] RR >/= 30 bpm [] SBP </= 90 mmHg [] SpO2 < 90%   [] Seizure [] Sepsis [] Shortness of breath  [] Staff concern: see comments     Initial VS and/or RADAR VS: T 36.3 °C; HR 94; RR 18; /73; SPO2 96%.    Provider Notification: Provider Notification: Primary Team; STROKE TEAM    Interventions:  [x] None [] ABG/VBG [] Assist w/ICU transfer [] BAT paged    [] Bag mask [] Blood [] Cardioversion [] Code Blue   [] Code blue for intubation [] Code status changed [] Chest x-ray [] EKG   [] IV fluid/bolus [] KUB x-ray [] Labs/cultures [] Medication   [] Nebulizer treatment [] NIPPV (CPAP/BiPAP) [] Oxygen [] Oral airway   [] Peripheral IV [] Palliative care consult [] CT/MRI [] Sepsis protocol    [] Suctioned [] Other:     Outcome:  [] Coded and  [] Code blue for intubation [] Coded and transferred to ICU []  on division   [x] Remained on division (no change) [] Remained on division + additional monitoring [] Remained in ED [] Transferred to ED   [] Transferred to ICU [] Transferred to inpatient status [] Transferred for interventions (procedure) [] Transferred to ICU stepdown    [] Transferred to surgery [] Transferred to telemetry [] Sepsis protocol [] STEMI protocol   [] Stroke protocol [x] Bedside nurse instructed to page rapid response for any concerns or acute change in condition/VS     Additional Comments: BAT page received.  While en route to room BAT Cancelled page received. Upon arrival to room patient found sitting up in bed, ptosis of right eye noted, STROKE TEAM at bedside.  Per Nursing, staff did not realize that ptosis of right eye lid and facial presentation is the patient's current baseline, therefore BAT was paged out.  Upon discussing presentation with primary team it was discovered that this is the issue the patient was was admitted for- BAT cancelled.  Nursing to reengage Rapid Response Team with any further issues or patient deterioration.

## 2025-01-20 NOTE — H&P
Medical Intensive Care - History and Physical   Subjective    Gaurav Mckay is a 54 y.o. year old male patient admitted on 1/15/2025 with following ICU needs: hypercapnia.     HPI:  Lemuel Mckay is a 54 y.o. 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     Patient was initially admitted on 1/15 to oncology service for headache, ptosis of right eye and vision changes likely secondary to clival mass. Not a surgical candidate as per NSGY, recommended palliative radiation that was supposed to be started tomorrow.   Of note, patient had intermittent desat episodes with concerns for stridor by primary team. He was assessed by ENT on 1/17 who believed that he was comfortable at that time on NC with sleep apnea like sxs.     Today, patient's was noted to have altered mentation compared to before. Code white was activated. Patient was awake and following commands at that time with no focal deficit except for known abducens palsy. VBG 7.36/ pO2 63/ pCO2 72  ( no baseline CO2 on file), glucose 185.  CXR done:  1.  No evidence of acute cardiopulmonary process. No focal  consolidations or pleural effusions.  2. Minimal bibasilar atelectasis.    Sedative meds stopped, repeat VBG few hrs later:  7.29/88/46/63/1.3  At this point, patient was transferred to MICU.    Of note,   CT head (1/15)  There is aggressive destructive heterogeneous mass centered in the  clivus with poorly defined margins extending into the bilateral  medial petrous apices, nasopharynx, and pituitary sella. This  corresponds to biopsy-proven squamous cell carcinoma. There is  erosion of the posterior cortex of the clivus without gross evidence  of extension of the prepontine cistern. The mass  however has  increased in the short-term interval measuring 2.5 cm in AP dimension  on sagittal view, previously 2.0 cm.    In the MICU:  Patient was seen and examined. He was awake and responsive. Reports that he was told he has sleep apnea before. Is not bothered by his breathing and reports no pain.    Onc hx:  Per chart review, patient has a history of T2N0Mo squamous cell carcinoma of larynx treated in June 2022 with definitive radiotherapy. Presented in May 2023 with right neck mass, with biopsy showing squamous cell carcinoma. Underwent dissection in July 2023, completed chemoradiotherapy in September 2024, all at [facility]. Admitted to  in 12/13/2024 for surgery clearance for a scheduled cystoscopy with tissue ablation on 12/17/2024 for bladder tumor. Presented late and intoxicated, and during evaluation was found to have disconjugate gaze and severe headache for which he was admitted. MRI brain on 12/14 showed 3 cm mass in right clivus, increased in size from prior 2023 CT. Biopsy of mass on 12/20 showed metastatic squamous cell carcinoma. Bladder biopsy done at the time for hematuria and bladder thickening showed no signs of malignancy.     Review of Systems:  As described in HPI      Meds    Home medications:  Current Outpatient Medications   Medication Instructions    acetaminophen (TYLENOL) 650 mg, oral, Every 6 hours PRN    amoxicillin-pot clavulanate (Augmentin) 875-125 mg tablet 875 mg, oral, 2 times daily    apixaban (ELIQUIS) 5 mg, oral, 2 times daily    atorvastatin (LIPITOR) 20 mg, Nightly    bisacodyl (DULCOLAX) 10 mg, oral, Daily PRN, Do not crush, chew, or split.    butalbital-acetaminophen-caff -40 mg tablet 1 tablet, oral, Every 4 hours PRN    calcium carbonate (Tums) 200 mg calcium chewable tablet 1 tablet, oral, Daily    cyclobenzaprine (FLEXERIL) 10 mg, oral, Every 8 hours PRN    dexAMETHasone (DECADRON) 4 mg, oral, Daily    ferrous sulfate 325 (65 Fe) MG EC tablet 1  tablet, oral, 3 times daily (morning, midday, late afternoon), Do not crush, chew, or split.    folic acid (FOLVITE) 1 mg, oral, Daily    guaiFENesin (MUCINEX) 600 mg, oral, 2 times daily, Do not crush, chew, or split.    ipratropium-albuteroL (Duo-Neb) 0.5-2.5 mg/3 mL nebulizer solution 3 mL, nebulization, Every 2 hour PRN    LACTOBACILLUS ACIDOPHILUS ORAL 1 tablet, oral, Daily    lidocaine (Lidoderm) 5 % patch 1 patch, transdermal, 2 times daily, Remove & discard patch within 12 hours or as directed by MD.    LORazepam (ATIVAN) 0.5 mg, intravenous, Every 2 hour PRN    lubricating eye drops ophthalmic solution 1 drop, Both Eyes, Every 6 hours PRN    mirtazapine (REMERON) 45 mg, oral, Nightly    montelukast (SINGULAIR) 10 mg, oral, Daily    morphine (MSIR) 30 mg, oral, Every 3 hours PRN    multivitamin with minerals tablet 1 tablet, oral, Daily    naloxone (NARCAN) 0.2 mg, intravenous, As needed    nicotine (Nicoderm CQ) 14 mg/24 hr patch 1 patch, transdermal, Every 24 hours    OLANZapine (ZYPREXA) 5 mg, oral, 2 times daily PRN    ondansetron (ZOFRAN) 4 mg, oral, Every 8 hours PRN    ondansetron ODT (ZOFRAN-ODT) 4 mg, oral, Every 8 hours PRN    oxygen (O2) 2 L/min, inhalation, Every 24 hours    pancrelipase, Lip-Prot-Amyl, (Creon) 6,000-19,000 -30,000 unit capsule 1 capsule, oral, 2 times daily    pantoprazole (PROTONIX) 40 mg, oral, Daily before breakfast, Do not crush, chew, or split.    polyethylene glycol (GLYCOLAX, MIRALAX) 17 g, oral, Every 8 hours    predniSONE (DELTASONE) 40 mg, oral, Daily, FOR UTI    pregabalin (LYRICA) 200 mg, oral, 3 times daily    rOPINIRole (REQUIP) 0.5 mg, oral, 3 times daily    sennosides (SENOKOT) 17.2 mg, oral, 2 times daily PRN    sennosides-docusate sodium (Marya-Colace) 8.6-50 mg tablet 1 tablet, oral, Nightly    sodium chloride (Ocean) 0.65 % nasal spray 2 sprays, Each Nostril, 4 times daily    tamsulosin (FLOMAX) 0.4 mg, oral, Daily    thiamine (VITAMIN B-1) 100 mg, oral, Daily     topiramate (TOPAMAX) 100 mg, oral, 2 times daily        Inpatient medications:  Scheduled medications  acetaminophen, 975 mg, oral, q6h  atorvastatin, 20 mg, oral, Nightly  bacitracin, , Topical, TID  folic acid, 1 mg, oral, Daily  guaiFENesin, 600 mg, oral, BID  ipratropium-albuteroL, 3 mL, nebulization, TID  [Held by provider] methadone, 2.5 mg, oral, q8h  [Held by provider] mirtazapine, 15 mg, oral, Nightly  montelukast, 10 mg, oral, Daily  multivitamin with minerals, 1 tablet, oral, Daily  nicotine, 1 patch, transdermal, q24h  oxygen, , inhalation, Continuous - Inhalation  pancrelipase (Lip-Prot-Amyl), 1 capsule, oral, BID  pantoprazole, 40 mg, oral, Daily before breakfast  polyethylene glycol, 17 g, oral, Daily  [Held by provider] pregabalin, 200 mg, oral, TID  rOPINIRole, 0.5 mg, oral, TID  sennosides, 2 tablet, oral, Nightly  sodium chloride, 2 spray, Each Nostril, 4x daily  tamsulosin, 0.4 mg, oral, Daily  thiamine, 100 mg, oral, Daily  topiramate, 100 mg, oral, BID  white petrolatum, , Topical, TID      Continuous medications  heparin, 0-4,500 Units/hr, Last Rate: 1,400 Units/hr (01/19/25 2004)      PRN medications  PRN medications: [Held by provider] cyclobenzaprine, diazePAM, heparin, [Held by provider] HYDROmorphone, lidocaine, melatonin, naloxone, nicotine polacrilex, ondansetron ODT     Objective    Blood pressure 102/71, pulse 89, temperature 36.4 °C (97.5 °F), temperature source Temporal, resp. rate 22, height 1.829 m (6'), weight 78.6 kg (173 lb 3.2 oz), SpO2 100%.     Physical Exam  Constitutional:       Appearance: He is ill-appearing.   Cardiovascular:      Rate and Rhythm: Normal rate.      Heart sounds: No murmur heard.  Pulmonary:      Effort: Pulmonary effort is normal.      Breath sounds: No wheezing or rales.   Abdominal:      General: Abdomen is flat. There is no distension.      Tenderness: There is no abdominal tenderness.   Skin:     General: Skin is warm.   Neurological:       Comments: Sleepy but responds to verbal stimulation.  Asterixis present.        No intake or output data in the 24 hours ending 01/19/25 2141    Assessment and Plan     Assessment:  Gaurav Mckay is a 54 y.o. year old male patient admitted to the MICU on 01/19/25 for encephalopathy in the setting of RFII.     Mechanical Ventilation: none  Sedation/Analgesia:  none  Restraints: no    Plan:  NEUROLOGY/PSYCH:  #alcohol use disorder  :: prior hospitalization in 12/2024 showed intoxication  :: patient states last drink was six months ago  - CIWA protocol discontinued     CARDIOVASCULAR:  #  ***  Management:  ***    PULMONARY:  #RFII  Most likely related to sedative effect of pain meds given that patient's mentation improved upon arriving to MICU    #history of saddle PE  #COPD  :: on apixaban at home  :: requires 2 L NC after PE and d/t COPD  - monitor O2 requirements  - change to heparin drip, in case of surgical intervention; since no surgery per NSGY, consider switching back to eliquis     RENAL/GENITOURINARY:  #  ***  Management:  ***    GASTROENTEROLOGY:  #  ***  Management:  ***    ENDOCRINOLOGY:  #  ***  Management:  ***    HEMATOLOGY/ ONCOLOGY:  #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   Support Onc following  - continue scheduled methadone, morphine and acetaminophen PRN  - zofran PRN   ***  Management:  ***    SKIN:  #  Skin Failure {YES-DESCRIBE/NO:59504}  ***  Management:  ***    MUSCULOSKELETAL:  #  ***  Management:  ***    INFECTIOUS  "DISEASE:  #  ***  Management:  ***    ANTIBIOTICS:  ***      ICU Check List     FEN  Fluids: {Fluids:76846::\"PRN\"}  Electrolytes: PRN  Nutrition: {Diet Type:72765::\"Regular\"}  Prophylaxis:  DVT ppx: {DVT Prophylaxis:47664}  GI ppx: Pantoprazole  Bowel care: {BMregimen:23112::\"Miralax PRN\"}  Hardware:                   Social:  Code: Full Code    HPOA: ***  [facility]    Mazin Ellis MD   01/19/25 at 9:41 PM     Disclaimer: Documentation completed with the information available at the time of input. The times in the chart may not be reflective of actual patient care times, interventions, or procedures. Documentation occurs after the physical care of the patient.       "

## 2025-01-20 NOTE — CARE PLAN
Problem: Pain - Adult  Goal: Verbalizes/displays adequate comfort level or baseline comfort level  Outcome: Progressing     Problem: Safety - Adult  Goal: Free from fall injury  Outcome: Progressing     Problem: Discharge Planning  Goal: Discharge to home or other facility with appropriate resources  Outcome: Progressing     Problem: Chronic Conditions and Co-morbidities  Goal: Patient's chronic conditions and co-morbidity symptoms are monitored and maintained or improved  Outcome: Progressing     Problem: Fall/Injury  Goal: Not fall by end of shift  Outcome: Progressing  Goal: Be free from injury by end of the shift  Outcome: Progressing  Goal: Verbalize understanding of personal risk factors for fall in the hospital  Outcome: Progressing  Goal: Verbalize understanding of risk factor reduction measures to prevent injury from fall in the home  Outcome: Progressing  Goal: Use assistive devices by end of the shift  Outcome: Progressing  Goal: Pace activities to prevent fatigue by end of the shift  Outcome: Progressing     Problem: Pain  Goal: Takes deep breaths with improved pain control throughout the shift  Outcome: Progressing  Goal: Turns in bed with improved pain control throughout the shift  Outcome: Progressing  Goal: Walks with improved pain control throughout the shift  Outcome: Progressing  Goal: Performs ADL's with improved pain control throughout shift  Outcome: Progressing  Goal: Participates in PT with improved pain control throughout the shift  Outcome: Progressing  Goal: Free from opioid side effects throughout the shift  Outcome: Progressing  Goal: Free from acute confusion related to pain meds throughout the shift  Outcome: Progressing     Problem: Skin  Goal: Decreased wound size/increased tissue granulation at next dressing change  Outcome: Progressing  Flowsheets (Taken 1/19/2025 8531)  Decreased wound size/increased tissue granulation at next dressing change: Promote sleep for wound  healing  Goal: Participates in plan/prevention/treatment measures  Outcome: Progressing  Flowsheets (Taken 1/19/2025 2342)  Participates in plan/prevention/treatment measures: Discuss with provider PT/OT consult  Goal: Prevent/manage excess moisture  Outcome: Progressing  Flowsheets (Taken 1/19/2025 2342)  Prevent/manage excess moisture: Cleanse incontinence/protect with barrier cream  Goal: Prevent/minimize sheer/friction injuries  Outcome: Progressing  Flowsheets (Taken 1/19/2025 2342)  Prevent/minimize sheer/friction injuries: Increase activity/out of bed for meals  Goal: Promote/optimize nutrition  Outcome: Progressing  Flowsheets (Taken 1/19/2025 2342)  Promote/optimize nutrition: Assist with feeding  Goal: Promote skin healing  Outcome: Progressing  Flowsheets (Taken 1/19/2025 2342)  Promote skin healing: Protective dressings over bony prominences   The patient's goals for the shift include      The clinical goals for the shift include pt will have improved mental status by end of shift

## 2025-01-20 NOTE — PROGRESS NOTES
"Occupational Therapy    Evaluation and Treatment    Patient Name: Gaurav Mckay \"Lemuel\"  MRN: [id number]  Today's Date: 1/20/2025  Room: [address] [place], [place] [address]  Time Calculation  Start Time: 1556  Stop Time: 1625  Time Calculation (min): 29 min    Assessment  IP OT Assessment  OT Assessment: Pt demo's AMS, decreased strength/balance, and restlessness/fidgeting behaviors resulting in physical injury all resulting in decreased functional status and the need for continued skilled OT services at Mod intensity to allow for a safe and functional d/c.  Prognosis: Fair  Barriers to Discharge Home: Physical needs  Physical Needs: 24hr mobility assistance needed, 24hr ADL assistance needed  Evaluation/Treatment Tolerance: Patient limited by pain  End of Session Communication: Bedside nurse  End of Session Patient Position: Bed, 4 rail up, Alarm on (Sitter present)  Plan:  Inpatient Plan  Treatment Interventions: ADL retraining, Functional transfer training, Endurance training, Cognitive reorientation, Patient/family training, Equipment evaluation/education, Compensatory technique education  OT Frequency: 3 times per week  OT Discharge Recommendations: Moderate intensity level of continued care  Equipment Recommended upon Discharge:  (TBD)  OT Recommended Transfer Status: Assist of 1  OT - OK to Discharge: Yes  OT Assessment  OT Assessment Results: Decreased ADL status, Decreased safe judgment during ADL, Decreased cognition, Decreased endurance, Decreased functional mobility, Decreased IADLs  Prognosis: Fair  Evaluation/Treatment Tolerance: Patient limited by pain  Strengths: Rehab experience  Barriers to Participation: Coping skills    Subjective   Current Problem:  1. Ptosis of right eyelid        2. Anisocoria          General:  Reason for Referral: This 53 y/o male w/ h/o laryngeal cancer s/p radical neck dissection w/ mets to brain who presented to ED 1/15 w/ c/f R eye ptosis w/ vision changes. CT head shows clival mass 2.5 cm in " diameter, now s/p radiation 1/20.  Worsening symptoms, including L eye abducens palsy. Stay has been further c/b acute hypercapnic respiratory failure required BiPAP for 30 min 1/20 in early am, able to wean to 2 L.  Past Medical History Relevant to Rehab: PE, ETOH abuse, tobacco abuse, HTN, bladder mass w/ hematuria, recent dx large lytic R clival tumor c/f chondroma, COPD  Prior to Session Communication: Bedside nurse  Patient Position Received: Bed, 4 rail up, Alarm off, caregiver present  Family/Caregiver Present:  (Sitter present)  General Comment: Pt seated in fetal position in bed on approach. w/ R eye ptosis and nose bleeding d/t picking. Pt initally agreeable to OT session.   Precautions:  Hearing/Visual Limitations: R eye vision changes  Medical Precautions: Fall precautions  Vital Signs:  Vital Signs (Past 2hrs)        Date/Time Vitals Session Patient Position Pulse Resp SpO2 BP MAP (mmHg)    01/20/25 1537 --  --  98  18  98 %  151/93  112     01/20/25 1556 Post OT  --  101  --  98 %  --  --     01/20/25 1652 --  --  --  --  --  129/81  97                   Pain:  Pain Assessment  Pain Assessment: PAINAD (Pain Assessment in Advanced Dementia Scale)    Objective   Cognition:  Overall Cognitive Status: Impaired  Orientation Level: Disoriented to place, Disoriented to time, Disoriented to situation  Following Commands:  (~50% accuracy w/ simple 1 step commands)  Safety Judgment: Decreased awareness of need for assistance  Insight: Moderate  Impulsive: Severely  Home Living:  Type of Home: Skilled Nursing facility  Home Adaptive Equipment: Walker rolling or standard   Prior Function:  Prior Function Comments: prior to hospitalization resulting in SNF stay pt was IND w/ I/ADLs and mobility. Now pt has reported ambulating w/ FWW, I/ADL statis is unknown at this time as pt is unable to reports d/t decline in mental status.  ADL:  Eating Assistance: Stand by  Grooming Assistance: Minimal  Bathing Assistance:  Maximal  UE Dressing Assistance: Moderate  LE Dressing Assistance: Maximal  Toileting Assistance with Device: Maximal  Activity Tolerance:  Endurance: Tolerates less than 10 min exercise, no significant change in vital signs  Balance:  Static Sitting Balance  Static Sitting-Balance Support: Feet supported  Static Sitting-Level of Assistance: Contact guard  Static Standing Balance  Static Standing-Balance Support: Bilateral upper extremity supported  Static Standing-Level of Assistance: Moderate assistance  Static Standing-Comment/Number of Minutes: ~1 min  Bed Mobility/Transfers: Bed Mobility/Transfers: Bed Mobility 1  Bed Mobility 1: Supine to sitting  Level of Assistance 1: Minimum assistance  Bed Mobility Comments 1: HOB elevated, assist bring legs over EOB.  Bed Mobility 2  Bed Mobility  2: Scooting  Level of Assistance 2: Close supervision  Bed Mobility Comments 2: twards foot of bed  Bed Mobility 3  Bed Mobility 3: Scooting  Level of Assistance 3: Dependent, +2  Bed Mobility Comments 3: toward HOB   and Transfers  Transfer: Yes  Transfer 1  Technique 1: Sit to stand, Stand to sit  Transfer Device 1:  (HHA)  Transfer Level of Assistance 1: Minimum assistance  Vision: Vision - Basic Assessment  Current Vision:  (anticipate visual deficits, however pt unable to report d/t AMS)  Sensation:  Sensation Comment: No apparent deficits  Coordination:  Movements are Fluid and Coordinated: Yes   Hand Function:  Hand Function  Gross Grasp: Functional  Coordination: Functional  Extremities:   RUE   RUE : Within Functional Limits, LUE   LUE: Within Functional Limits    Outcome Measures: [facility] Daily Activity  Putting on and taking off regular lower body clothing: A lot  Bathing (including washing, rinsing, drying): A lot  Putting on and taking off regular upper body clothing: A lot  Toileting, which includes using toilet, bedpan or urinal: A lot  Taking care of personal grooming such as brushing teeth: A little  Eating Meals:  OLIVER briceño  Daily Activity - Total Score: 14    Education Documentation  Body Mechanics, taught by Jenae Khan OT at 1/20/2025  4:54 PM.  Learner: Patient  Readiness: Acceptance  Method: Explanation  Response: Verbalizes Understanding    Precautions, taught by Jenae Khan OT at 1/20/2025  4:54 PM.  Learner: Patient  Readiness: Acceptance  Method: Explanation  Response: Verbalizes Understanding    ADL Training, taught by Jenae Khan OT at 1/20/2025  4:54 PM.  Learner: Patient  Readiness: Acceptance  Method: Explanation  Response: Verbalizes Understanding    Education Comments  No comments found.        Goals:   Encounter Problems       Encounter Problems (Active)       ADLs       Patient with complete upper body dressing with set-up level of assistance donning and doffing all UE clothes with PRN adaptive equipment while edge of bed        Start:  01/20/25    Expected End:  02/03/25            Patient with complete lower body dressing with minimal assist  level of assistance donning and doffing all LE clothes  with PRN adaptive equipment while edge of bed        Start:  01/20/25    Expected End:  02/03/25            Patient will complete toileting including hygiene clothing management/hygiene with minimal assist  level of assistance and raised toilet seat.       Start:  01/20/25    Expected End:  02/03/25               COGNITION/SAFETY       Patient will follow 75% Simple and One Step commands to allow improved ADL performance.       Start:  01/20/25    Expected End:  02/03/25            Patient will demonstrated orientation x 3.       Start:  01/20/25    Expected End:  02/03/25       ORIENTATION            MOBILITY       Patient will perform Functional mobility mod  Household distances/Community Distances with contact guard assist level of assistance and least restrictive device in order to improve safety and functional mobility.       Start:  01/20/25    Expected End:  02/03/25               TRANSFERS        Patient will complete functional transfer to chair/toilet with least restrictive device with modified independent level of assistance.       Start:  01/20/25    Expected End:  02/03/25                   Treatment Completed on Evaluation    Therapy/Activity:     Therapeutic Activity  Therapeutic Activity Performed: Yes  Therapeutic Activity 1: Pt initially agrees to getting EOB and donning pants. Pt then begins scooting self towards foot of bed. Pt assisted to EOB. Pt then begins grasping at his head, sitting in fetal like positioning and grimicing. Attempts to inquire further about pt's pain unsuccessful as pt becomes agitated w/ repeated questions. STS, attempted side steps, however pt unable to complete (direction follow vs sequencing deficit). Pt returns to sup and seated fetal position. Pt then begins picking agressivly at R eye. Attempts to redirect unsuccessful (heat/cold pack, providing fidgeting activities.      01/20/25 at 4:54 PM   LARS TSAI, OT   Rehab Office: [phone]

## 2025-01-20 NOTE — SIGNIFICANT EVENT
Arrived at bedside on scc4-24 at 2039 for follow-up on rapid response earlier this evening in the 5 pm hr.  Patient is still lethargic only arousing to noxious stimuli.  Repeat vbg shows worsening of ph at 7.29 and pco2 of 88.  Dr. stover at bedside.  MICU fellow notified of changes and Mr. Mckay was accepted to MICU.  Patient transported to MICU bed 15 without incident.

## 2025-01-20 NOTE — NURSING NOTE
RR nurse Ollie at bedside for follow up. Repeat VBG drawn pCO2 88%. Patient responsive to sternal rub. Upon initial assessment could tell me name and that he was at a hospital in [place]. Patient VS 36.4, HR 89, RR 22, /71, and Spo2 100% on 2L. Patient accepted to MICU, report called, and transfered

## 2025-01-20 NOTE — CARE PLAN
Problem: Pain - Adult  Goal: Verbalizes/displays adequate comfort level or baseline comfort level  1/20/2025 1207 by Ashli Lau RN  Outcome: Met  1/20/2025 1207 by Ashli Lau RN  Outcome: Progressing     Problem: Safety - Adult  Goal: Free from fall injury  1/20/2025 1207 by Ashli Lau RN  Outcome: Met  1/20/2025 1207 by Ashli Lau RN  Outcome: Progressing   The patient's goals for the shift include      The clinical goals for the shift include Patient will remain hemodynamically stable and free from fall/injury for entirety of shift.

## 2025-01-20 NOTE — PROGRESS NOTES
"Gaurav Mckay \"Lemuel\" is a 54 y.o. male on day 5 of admission presenting with Ptosis of right eyelid.    Subjective   Pt briefly in the MICU overnight for 6 hours after he was found to be lethargic with concern for worsening hypercapnia. Pt had rapid response called for lethargy/AMS earlier in the evening and on follow up, his pCO2 88 on VBG. Decision was made to transfer the patient to the MICU for new BIPAP at that time. Patient briefly on BiPAP (less than 30 mins) then able to wean down to 2L NC. All sedatives also held in the MICU and had improvement in mentation without any other interventions. ABG in the MICU pH 7.40/68/93/42.1. Patient now transferred back to the floor.     On bedside evaluation, pt alert and oriented. Denies any acute complaints and reports he is feeling fine.  Objective     Last Recorded Vitals  Blood pressure 84/64, pulse 92, temperature 36.2 °C (97.2 °F), resp. rate 16, height 1.829 m (6'), weight 78.6 kg (173 lb 3.2 oz), SpO2 97%.  Intake/Output last 3 Shifts:  I/O last 3 completed shifts:  In: 440 (5.6 mL/kg) [P.O.:440]  Out: - (0 mL/kg)   Weight: 78.6 kg     Physical Exam  Constitutional:       Appearance: He is not toxic-appearing.      Comments: Conversant and not in acute distress    HENT:      Head: Atraumatic.      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       Relevant Results    Labs    Results from last 7 days   Lab Units 01/19/25  0748 01/17/25  0658 01/16/25  0809   WBC AUTO x10*3/uL 6.8 18.7* 9.6   HEMOGLOBIN g/dL 11.7* 12.4* 12.2*   HEMATOCRIT % 38.6* 40.3* 38.9*   PLATELETS AUTO x10*3/uL 353 408 344            Results from last 7 days   Lab Units 01/19/25  1656 01/19/25  0748 01/15/25  1458   SODIUM mmol/L 142 140 138   POTASSIUM mmol/L 3.4* 3.3* 3.7   CHLORIDE mmol/L 97* 97* 95*   CO2 mmol/L 37* 35* 32   BUN mg/dL 4* 4* 9   CREATININE " mg/dL 0.46* 0.41* 0.59   CALCIUM mg/dL 9.6 9.8 10.2     Results from last 7 days   Lab Units 01/15/25  1458   ALK PHOS U/L 104   BILIRUBIN TOTAL mg/dL 0.4   PROTEIN TOTAL g/dL 8.2   ALT U/L 20   AST U/L 24             Medications  Scheduled medications  acetaminophen, 975 mg, oral, q6h  atorvastatin, 20 mg, oral, Nightly  bacitracin, , Topical, TID  folic acid, 1 mg, oral, Daily  guaiFENesin, 600 mg, oral, BID  ipratropium-albuteroL, 3 mL, nebulization, TID  [Held by provider] methadone, 2.5 mg, oral, q8h  [Held by provider] mirtazapine, 15 mg, oral, Nightly  montelukast, 10 mg, oral, Daily  multivitamin with minerals, 1 tablet, oral, Daily  nicotine, 1 patch, transdermal, q24h  oxygen, , inhalation, Continuous - Inhalation  oxygen, , inhalation, Continuous - Inhalation  pancrelipase (Lip-Prot-Amyl), 1 capsule, oral, BID  pantoprazole, 40 mg, oral, Daily before breakfast  polyethylene glycol, 17 g, oral, Daily  potassium chloride, 20 mEq, intravenous, q2h  [Held by provider] pregabalin, 200 mg, oral, TID  rOPINIRole, 0.5 mg, oral, TID  sennosides, 2 tablet, oral, Nightly  sodium chloride, 2 spray, Each Nostril, 4x daily  tamsulosin, 0.4 mg, oral, Daily  thiamine, 100 mg, oral, Daily  topiramate, 100 mg, oral, BID  white petrolatum, , Topical, TID      Continuous medications  heparin, 0-4,500 Units/hr, Last Rate: 1,400 Units/hr (01/19/25 2200)      PRN medications  PRN medications: [Held by provider] cyclobenzaprine, heparin, [Held by provider] HYDROmorphone, lidocaine, melatonin, naloxone, nicotine polacrilex, ondansetron ODT     Imaging  XR chest 1 view         MR brain wo IV contrast   Final Result   Single axial T2 weighted images obtained, after which study was   terminated due to extensive motion. Please see above for findings   that could be ascertained from the markedly limited study.        MACRO:   None.        Signed by: Marc Deras 1/18/2025 1:48 PM   Dictation workstation:   [id number]      CT  head wo IV contrast   Final Result   There is aggressive destructive heterogeneous mass centered in the   clivus with poorly defined margins extending into the bilateral   medial petrous apices, nasopharynx, and pituitary sella. 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 There is   erosion of the posterior cortex of the clivus without gross evidence   of extension of the prepontine cistern. The mass however has   increased in the short-term interval measuring 2.5 cm in AP dimension   on sagittal view, previously 2.0 cm.        Signed by: Enrrique Zhou 1/15/2025 8:01 PM   Dictation workstation:   [id number]      Point of Care Ultrasound    (Results Pending)            Assessment/Plan   Assessment & Plan  Ptosis of right eyelid    Lemuel Mckay is a 54 y.o. 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 CT imaging does not show hemorrhage or localized edema - discussed with NSGY and patient not a surgical candidate at this time. ophthalmology following at this time. Palliative radiation oncology consulted and following patient now. They recommends CT- Sim and MRI brain under anesthesia.    Updates:  -pt transferred to the floor after being in the MICU for 6 hours iso AMS and acute hypercapnic respiratory failure required BiPAP for less than 30 mins and had improved mentation.   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   Support Onc following  - continue scheduled methadone, morphine and acetaminophen PRN  - zofran PRN      #history of saddle PE  #COPD  :: on apixaban at home  :: requires 2 L NC after PE and d/t COPD  - monitor O2 requirements  - change to heparin drip, in case of surgical intervention; since no surgery per NSGY, consider switching back to eliquis      #alcohol use disorder  :: prior hospitalization in 12/2024 showed intoxication  :: patient states last drink was six months ago  - CIWA protocol discontinued      #tobacco use  :: currently smoking 1 PPD, vaping 2 hours/week  - nicotine patch daily     Electrolytes: PRN  Lines/tubes: PIV   Abx: none  Drips: heparin   GI ppx: none  DVT: heparin  Code status: full code  NOK: Jacqui Hernandez (Significant Other)  [phone] (Mobile)     Pt to be staffed in the AM with attending.     Bridgette Hubbard MD PGY-3

## 2025-01-21 ENCOUNTER — HOSPITAL ENCOUNTER (OUTPATIENT)
Dept: RADIATION ONCOLOGY | Facility: HOSPITAL | Age: 55
Setting detail: RADIATION/ONCOLOGY SERIES
Discharge: HOME | End: 2025-01-21
Payer: COMMERCIAL

## 2025-01-21 LAB
ALBUMIN SERPL BCP-MCNC: 3.1 G/DL (ref 3.4–5)
ALP SERPL-CCNC: 84 U/L (ref 33–120)
ALT SERPL W P-5'-P-CCNC: 14 U/L (ref 10–52)
ANION GAP BLDV CALCULATED.4IONS-SCNC: 7 MMOL/L (ref 10–25)
ANION GAP SERPL CALC-SCNC: 15 MMOL/L (ref 10–20)
AST SERPL W P-5'-P-CCNC: 16 U/L (ref 9–39)
BASE EXCESS BLDV CALC-SCNC: 4 MMOL/L (ref -2–3)
BASOPHILS # BLD AUTO: 0.03 X10*3/UL (ref 0–0.1)
BASOPHILS NFR BLD AUTO: 0.4 %
BILIRUB SERPL-MCNC: 0.5 MG/DL (ref 0–1.2)
BODY TEMPERATURE: 37 DEGREES CELSIUS
BUN SERPL-MCNC: 8 MG/DL (ref 6–23)
CA-I BLDV-SCNC: 1.32 MMOL/L (ref 1.1–1.33)
CALCIUM SERPL-MCNC: 10 MG/DL (ref 8.6–10.6)
CHLORIDE BLDV-SCNC: 102 MMOL/L (ref 98–107)
CHLORIDE SERPL-SCNC: 100 MMOL/L (ref 98–107)
CO2 SERPL-SCNC: 28 MMOL/L (ref 21–32)
CREAT SERPL-MCNC: 0.48 MG/DL (ref 0.5–1.3)
EGFRCR SERPLBLD CKD-EPI 2021: >90 ML/MIN/1.73M*2
EOSINOPHIL # BLD AUTO: 0.07 X10*3/UL (ref 0–0.7)
EOSINOPHIL NFR BLD AUTO: 0.8 %
ERYTHROCYTE [DISTWIDTH] IN BLOOD BY AUTOMATED COUNT: 15.6 % (ref 11.5–14.5)
GLUCOSE BLDV-MCNC: 134 MG/DL (ref 74–99)
GLUCOSE SERPL-MCNC: 121 MG/DL (ref 74–99)
HCO3 BLDV-SCNC: 29.2 MMOL/L (ref 22–26)
HCT VFR BLD AUTO: 40.9 % (ref 41–52)
HCT VFR BLD EST: 41 % (ref 41–52)
HGB BLD-MCNC: 13 G/DL (ref 13.5–17.5)
HGB BLDV-MCNC: 13.5 G/DL (ref 13.5–17.5)
IMM GRANULOCYTES # BLD AUTO: 0.08 X10*3/UL (ref 0–0.7)
IMM GRANULOCYTES NFR BLD AUTO: 1 % (ref 0–0.9)
INHALED O2 CONCENTRATION: 21 %
LACTATE BLDV-SCNC: 1.4 MMOL/L (ref 0.4–2)
LYMPHOCYTES # BLD AUTO: 0.81 X10*3/UL (ref 1.2–4.8)
LYMPHOCYTES NFR BLD AUTO: 9.8 %
MCH RBC QN AUTO: 26.6 PG (ref 26–34)
MCHC RBC AUTO-ENTMCNC: 31.8 G/DL (ref 32–36)
MCV RBC AUTO: 84 FL (ref 80–100)
MONOCYTES # BLD AUTO: 0.55 X10*3/UL (ref 0.1–1)
MONOCYTES NFR BLD AUTO: 6.7 %
NEUTROPHILS # BLD AUTO: 6.72 X10*3/UL (ref 1.2–7.7)
NEUTROPHILS NFR BLD AUTO: 81.3 %
NRBC BLD-RTO: 0 /100 WBCS (ref 0–0)
OXYHGB MFR BLDV: 92.7 % (ref 45–75)
PCO2 BLDV: 45 MM HG (ref 41–51)
PH BLDV: 7.42 PH (ref 7.33–7.43)
PLATELET # BLD AUTO: 389 X10*3/UL (ref 150–450)
PO2 BLDV: 71 MM HG (ref 35–45)
POTASSIUM BLDV-SCNC: 3.5 MMOL/L (ref 3.5–5.3)
POTASSIUM SERPL-SCNC: 3.8 MMOL/L (ref 3.5–5.3)
PROT SERPL-MCNC: 6.5 G/DL (ref 6.4–8.2)
RBC # BLD AUTO: 4.88 X10*6/UL (ref 4.5–5.9)
SAO2 % BLDV: 96 % (ref 45–75)
SODIUM BLDV-SCNC: 135 MMOL/L (ref 136–145)
SODIUM SERPL-SCNC: 139 MMOL/L (ref 136–145)
WBC # BLD AUTO: 8.3 X10*3/UL (ref 4.4–11.3)

## 2025-01-21 PROCEDURE — 84132 ASSAY OF SERUM POTASSIUM: CPT | Performed by: STUDENT IN AN ORGANIZED HEALTH CARE EDUCATION/TRAINING PROGRAM

## 2025-01-21 PROCEDURE — 2500000002 HC RX 250 W HCPCS SELF ADMINISTERED DRUGS (ALT 637 FOR MEDICARE OP, ALT 636 FOR OP/ED): Mod: SE

## 2025-01-21 PROCEDURE — 2500000002 HC RX 250 W HCPCS SELF ADMINISTERED DRUGS (ALT 637 FOR MEDICARE OP, ALT 636 FOR OP/ED): Mod: SE | Performed by: STUDENT IN AN ORGANIZED HEALTH CARE EDUCATION/TRAINING PROGRAM

## 2025-01-21 PROCEDURE — 99233 SBSQ HOSP IP/OBS HIGH 50: CPT | Performed by: INTERNAL MEDICINE

## 2025-01-21 PROCEDURE — 84075 ASSAY ALKALINE PHOSPHATASE: CPT | Performed by: STUDENT IN AN ORGANIZED HEALTH CARE EDUCATION/TRAINING PROGRAM

## 2025-01-21 PROCEDURE — 1200000003 HC ONCOLOGY  ROOM WITH TELEMETRY DAILY

## 2025-01-21 PROCEDURE — 94640 AIRWAY INHALATION TREATMENT: CPT

## 2025-01-21 PROCEDURE — 92610 EVALUATE SWALLOWING FUNCTION: CPT | Mod: GN

## 2025-01-21 PROCEDURE — 92526 ORAL FUNCTION THERAPY: CPT | Mod: GN

## 2025-01-21 PROCEDURE — 2500000004 HC RX 250 GENERAL PHARMACY W/ HCPCS (ALT 636 FOR OP/ED): Mod: SE,TB | Performed by: STUDENT IN AN ORGANIZED HEALTH CARE EDUCATION/TRAINING PROGRAM

## 2025-01-21 PROCEDURE — S4991 NICOTINE PATCH NONLEGEND: HCPCS | Mod: SE

## 2025-01-21 PROCEDURE — 2500000001 HC RX 250 WO HCPCS SELF ADMINISTERED DRUGS (ALT 637 FOR MEDICARE OP): Mod: SE

## 2025-01-21 PROCEDURE — 99232 SBSQ HOSP IP/OBS MODERATE 35: CPT

## 2025-01-21 PROCEDURE — 36600 WITHDRAWAL OF ARTERIAL BLOOD: CPT | Performed by: STUDENT IN AN ORGANIZED HEALTH CARE EDUCATION/TRAINING PROGRAM

## 2025-01-21 PROCEDURE — 36415 COLL VENOUS BLD VENIPUNCTURE: CPT | Performed by: STUDENT IN AN ORGANIZED HEALTH CARE EDUCATION/TRAINING PROGRAM

## 2025-01-21 PROCEDURE — S0109 METHADONE ORAL 5MG: HCPCS | Mod: SE

## 2025-01-21 PROCEDURE — 85025 COMPLETE CBC W/AUTO DIFF WBC: CPT | Performed by: STUDENT IN AN ORGANIZED HEALTH CARE EDUCATION/TRAINING PROGRAM

## 2025-01-21 PROCEDURE — 2500000004 HC RX 250 GENERAL PHARMACY W/ HCPCS (ALT 636 FOR OP/ED): Mod: SE | Performed by: NUTRITIONIST

## 2025-01-21 PROCEDURE — 2500000004 HC RX 250 GENERAL PHARMACY W/ HCPCS (ALT 636 FOR OP/ED): Mod: SE

## 2025-01-21 RX ORDER — LORAZEPAM 2 MG/ML
0.5 INJECTION INTRAMUSCULAR ONCE
Status: COMPLETED | OUTPATIENT
Start: 2025-01-21 | End: 2025-01-21

## 2025-01-21 RX ORDER — IPRATROPIUM BROMIDE AND ALBUTEROL SULFATE 2.5; .5 MG/3ML; MG/3ML
3 SOLUTION RESPIRATORY (INHALATION) ONCE
Status: COMPLETED | OUTPATIENT
Start: 2025-01-21 | End: 2025-01-21

## 2025-01-21 RX ORDER — OLANZAPINE 10 MG/2ML
5 INJECTION, POWDER, FOR SOLUTION INTRAMUSCULAR ONCE
Status: COMPLETED | OUTPATIENT
Start: 2025-01-21 | End: 2025-01-21

## 2025-01-21 RX ORDER — FENTANYL 12.5 UG/1
1 PATCH TRANSDERMAL
Status: DISCONTINUED | OUTPATIENT
Start: 2025-01-21 | End: 2025-01-24

## 2025-01-21 RX ORDER — MORPHINE SULFATE 30 MG/1
30 TABLET ORAL ONCE
Status: DISCONTINUED | OUTPATIENT
Start: 2025-01-21 | End: 2025-01-21

## 2025-01-21 RX ORDER — HYDROMORPHONE HYDROCHLORIDE 1 MG/ML
0.5 INJECTION, SOLUTION INTRAMUSCULAR; INTRAVENOUS; SUBCUTANEOUS
Status: DISCONTINUED | OUTPATIENT
Start: 2025-01-21 | End: 2025-01-22

## 2025-01-21 RX ORDER — HYDROMORPHONE HYDROCHLORIDE 1 MG/ML
1 INJECTION, SOLUTION INTRAMUSCULAR; INTRAVENOUS; SUBCUTANEOUS ONCE
Status: COMPLETED | OUTPATIENT
Start: 2025-01-21 | End: 2025-01-21

## 2025-01-21 RX ORDER — LIDOCAINE 560 MG/1
1 PATCH PERCUTANEOUS; TOPICAL; TRANSDERMAL DAILY
Status: DISCONTINUED | OUTPATIENT
Start: 2025-01-21 | End: 2025-01-25 | Stop reason: HOSPADM

## 2025-01-21 RX ORDER — HYDROXYZINE HYDROCHLORIDE 25 MG/1
25 TABLET, FILM COATED ORAL ONCE
Status: DISCONTINUED | OUTPATIENT
Start: 2025-01-21 | End: 2025-01-22

## 2025-01-21 RX ORDER — OXYCODONE HYDROCHLORIDE 10 MG/1
10 TABLET ORAL ONCE
Status: DISCONTINUED | OUTPATIENT
Start: 2025-01-21 | End: 2025-01-21

## 2025-01-21 RX ORDER — LORAZEPAM 0.5 MG/1
0.5 TABLET ORAL ONCE
Status: COMPLETED | OUTPATIENT
Start: 2025-01-21 | End: 2025-01-21

## 2025-01-21 RX ORDER — HYDROMORPHONE HYDROCHLORIDE 1 MG/ML
1 INJECTION, SOLUTION INTRAMUSCULAR; INTRAVENOUS; SUBCUTANEOUS
Status: DISCONTINUED | OUTPATIENT
Start: 2025-01-21 | End: 2025-01-22

## 2025-01-21 RX ADMIN — OLANZAPINE 5 MG: 10 INJECTION, POWDER, LYOPHILIZED, FOR SOLUTION INTRAMUSCULAR at 02:35

## 2025-01-21 RX ADMIN — HYDROMORPHONE HYDROCHLORIDE 1 MG: 1 INJECTION, SOLUTION INTRAMUSCULAR; INTRAVENOUS; SUBCUTANEOUS at 19:51

## 2025-01-21 RX ADMIN — METHADONE HYDROCHLORIDE 2.5 MG: 5 TABLET ORAL at 08:22

## 2025-01-21 RX ADMIN — IPRATROPIUM BROMIDE AND ALBUTEROL SULFATE 3 ML: .5; 3 SOLUTION RESPIRATORY (INHALATION) at 06:56

## 2025-01-21 RX ADMIN — IPRATROPIUM BROMIDE AND ALBUTEROL SULFATE 3 ML: .5; 3 SOLUTION RESPIRATORY (INHALATION) at 08:58

## 2025-01-21 RX ADMIN — SALINE NASAL SPRAY 2 SPRAY: 1.5 SOLUTION NASAL at 16:47

## 2025-01-21 RX ADMIN — STANDARDIZED SENNA CONCENTRATE 17.2 MG: 8.6 TABLET ORAL at 21:18

## 2025-01-21 RX ADMIN — FENTANYL 1 PATCH: 12 PATCH TRANSDERMAL at 11:55

## 2025-01-21 RX ADMIN — MONTELUKAST 10 MG: 10 TABLET, FILM COATED ORAL at 21:18

## 2025-01-21 RX ADMIN — IPRATROPIUM BROMIDE AND ALBUTEROL SULFATE 3 ML: .5; 3 SOLUTION RESPIRATORY (INHALATION) at 21:17

## 2025-01-21 RX ADMIN — SALINE NASAL SPRAY 2 SPRAY: 1.5 SOLUTION NASAL at 10:43

## 2025-01-21 RX ADMIN — ROPINIROLE 0.5 MG: 0.5 TABLET, FILM COATED ORAL at 21:22

## 2025-01-21 RX ADMIN — LORAZEPAM 0.5 MG: 0.5 TABLET ORAL at 08:22

## 2025-01-21 RX ADMIN — HYDROMORPHONE HYDROCHLORIDE 1 MG: 1 INJECTION, SOLUTION INTRAMUSCULAR; INTRAVENOUS; SUBCUTANEOUS at 08:22

## 2025-01-21 RX ADMIN — SALINE NASAL SPRAY 2 SPRAY: 1.5 SOLUTION NASAL at 23:00

## 2025-01-21 RX ADMIN — TOPIRAMATE 100 MG: 100 TABLET, FILM COATED ORAL at 21:28

## 2025-01-21 RX ADMIN — GUAIFENESIN 600 MG: 600 TABLET ORAL at 21:18

## 2025-01-21 RX ADMIN — BACITRACIN: 500 OINTMENT TOPICAL at 21:19

## 2025-01-21 RX ADMIN — NICOTINE 1 PATCH: 14 PATCH, EXTENDED RELEASE TRANSDERMAL at 22:25

## 2025-01-21 RX ADMIN — HYDROMORPHONE HYDROCHLORIDE 2.5 MG: 2 INJECTION, SOLUTION INTRAMUSCULAR; INTRAVENOUS; SUBCUTANEOUS at 15:25

## 2025-01-21 RX ADMIN — MORPHINE SULFATE 15 MG: 15 TABLET ORAL at 01:55

## 2025-01-21 RX ADMIN — ATORVASTATIN CALCIUM 20 MG: 20 TABLET, FILM COATED ORAL at 21:18

## 2025-01-21 RX ADMIN — BACITRACIN: 500 OINTMENT TOPICAL at 10:43

## 2025-01-21 RX ADMIN — LORAZEPAM 0.5 MG: 2 INJECTION INTRAMUSCULAR; INTRAVENOUS at 15:29

## 2025-01-21 ASSESSMENT — COGNITIVE AND FUNCTIONAL STATUS - GENERAL
CLIMB 3 TO 5 STEPS WITH RAILING: A LOT
PERSONAL GROOMING: A LITTLE
HELP NEEDED FOR BATHING: A LITTLE
DRESSING REGULAR LOWER BODY CLOTHING: A LITTLE
PERSONAL GROOMING: A LITTLE
EATING MEALS: A LITTLE
WALKING IN HOSPITAL ROOM: A LITTLE
DAILY ACTIVITIY SCORE: 18
DAILY ACTIVITIY SCORE: 18
DRESSING REGULAR LOWER BODY CLOTHING: A LITTLE
DRESSING REGULAR UPPER BODY CLOTHING: A LITTLE
HELP NEEDED FOR BATHING: A LITTLE
MOVING TO AND FROM BED TO CHAIR: A LITTLE
EATING MEALS: A LITTLE
CLIMB 3 TO 5 STEPS WITH RAILING: A LOT
STANDING UP FROM CHAIR USING ARMS: A LITTLE
MOVING TO AND FROM BED TO CHAIR: A LITTLE
DRESSING REGULAR UPPER BODY CLOTHING: A LITTLE
STANDING UP FROM CHAIR USING ARMS: A LITTLE
TOILETING: A LITTLE
TOILETING: A LITTLE
MOBILITY SCORE: 19
WALKING IN HOSPITAL ROOM: A LITTLE
MOBILITY SCORE: 19

## 2025-01-21 ASSESSMENT — PAIN SCALES - GENERAL
PAINLEVEL_OUTOF10: 0 - NO PAIN
PAINLEVEL_OUTOF10: 9
PAINLEVEL_OUTOF10: 0 - NO PAIN
PAINLEVEL_OUTOF10: 0 - NO PAIN
PAINLEVEL_OUTOF10: 10 - WORST POSSIBLE PAIN

## 2025-01-21 ASSESSMENT — PAIN - FUNCTIONAL ASSESSMENT: PAIN_FUNCTIONAL_ASSESSMENT: WONG-BAKER FACES

## 2025-01-21 ASSESSMENT — PAIN DESCRIPTION - LOCATION: LOCATION: HEAD

## 2025-01-21 NOTE — PROGRESS NOTES
"Speech-Language Pathology    SLP Adult Inpatient Speech-Language Pathology Clinical Swallow Evaluation    Patient Name: Gaurav Mckay \"Lemuel\"  MRN: [id number]  Today's Date: 1/21/2025   Time Calculation  Start Time: 1006  Stop Time: 1029  Time Calculation (min): 23 min       Assessment:  Suspect severe pharyngeal dysphagia  High risk for aspiration w/ PO intake is apparent     Recommendations:  NPO    Frequent, aggressive oral care as tolerated to improve infection control    Recommend conversation re: goals of care.  If goal is to be aggressive, recommend completion of a Modified Barium Swallow Study and discussion for possible alternative means of nutrition and hydration.         Plan:  SLP Plan: Skilled SLP  SLP Frequency: 1-2x per week  Duration: 2-3 weeks- pending GOC   Discussed POC: Pt, medical team      Goals:  Pt/family will indicate understanding of dysphagia education: risk for aspiration, recommendations, and POC with > 80% accuracy via teach back method.   Date initiated: 1/21/25   Status: Goal initiated     Further goals to be initiated as warranted following goals of care conversation.         Subjective   Pt properly identified and evaluated bedside.  Awake and able to participate.  Room air.  Sitter present for safety.    Pt admitted 2' to headache and ptosis of R eye.  New R clival tumor- plans for xRT to the brain.  PMHx: laryngeal CA s/p sx, COPD, ETOH use, HTN.      MBSS completed 12/17/24 showed silent aspiration w/ thin liquids; recommendation made for puree solids and mildly thick liquids.  Pt reports that dysphagia has worsened since that time.  Endorses difficulty w/ solids and liquids + odynophagia.         Objective     Cognition:  Command Following: Fair   Attention: Fair   Orientation: Oriented x1       Oral/Motor Assessment:  Oral Hygiene: Adequate   Dentition: Present   Oral Motor: Limited command following; no overt deficits, possible trismus?   Voice (Perceptually): Dysphonic, " "severe  Volitional Cough (Perceptually): Dystussia, severe            Consistencies Trialed:  Ice chips, sip of water, applesauce.  Not appropriate for additional trials given apparent severity of swallowing deficits.         Clinical Observations:  Patient Positioning: Upright in Bed  Management of Oral Secretions: Adequate   Was The 3 oz Swallow Protocol Completed: No (Deferred at this time 2' to pt performance  w/ single ice chips and sips)       Clinical bedside swallow evaluation completed.  Pt presented with small, single ice chip x1 and tsp presentation of water x1.  Immediate overt s/s of aspiration following (weak cough).  Given hx of need for modified diet, trial of puree solid presented x1.  No overt s/s of aspiration initially, but pt reported \"sticking\" and required multiple (5+) swallows to clear sensation.  Delayed coughing observed; suspect aspiration of residue.  PO trials ceased due to concern for pharyngeal dysphagia/aspiration.      Therapy:  Education provided to patient regarding NPO recommendations, importance of oral care, and overall dysphagia plan of care.  Despite simplified education, pt with understanding indicated ( < 50% via teach back).  Discussed pt status w/ RN and physician team.          01/21/25 at 5:45 PM - Yelitza Ireland, SLP       "

## 2025-01-21 NOTE — PROGRESS NOTES
SUPPORTIVE AND PALLIATIVE ONCOLOGY PROGRESS NOTE      SERVICE DATE: 1/21/2025      SUBJECTIVE:  Interval Events: Patient was briefly transferred to MICU couple days ago for encephalopathy in the setting of hypercapnia requiring BiPAP and he was weaned down to nasal cannula.  Sedatives held with improvement in mental status    Patient was agitated overnight and now has a sitter    Pain Assessment: At the time of my visit patient was awake and alert.  Complains of pain 5/10 right postauricular and right side of the head constant throbbing aggravated by movements.  Patient states that at the time of radiation his pain gets aggravated however controlled otherwise.    Yesterday patient was unable to lay down for radiation treatment treatment because of pain.      Today patient received Dilaudid IV 1 mg and Ativan IV 0.5 mg prior to radiation around 8:20 AM and was sent down for radiation around 9 AM which did not control his pain and he was unable to receive radiation.    Per team patient's pain was controlled on Thursday when he went for simulation at 3 PM.  On Thursday he received following mets  Morphine IR 30 mg at 11 AM  Oxycodone 10 mg at 2 PM  Atarax 25 mg at 2 PM    Opioid Requirements  Past 24 h opioid requirements (1/20/2025 at 0800 to 01/21/25 at 0800):   Methadone 2.5 mg X1 dose  Morphine IR 15 mg X3 doses= 45 mg OME  Total 24h OME use: 45 mg OME    Note: OME calculations based on equianalgesic table below. Please note this table is based on best available evidence but conversions are still approximate. These are NOT opioid DOSES for individual patient use; this is equivalency information.  Drug Parenteral Enteral   Morphine 10 25   Oxycodone N/A 20   Hydromorphone 2 5   Fentanyl 0.15 N/A   Tramadol N/A 120   Citation: Darnell ENGLAND. Demystifying opioid conversion calculations: A guide for effective dosing, Second edition. [place] MD: [employer] of [employer]-System Pharmacists, 2018.    Symptom  Assessment:  IShortness of breath none  Lack of appetite a little  Worrying a little  Anxiety a little  Constipation a little    Information obtained from: chart review, interview of patient, discussion with RN, and discussion with primary team  ______________________________________________________________________        Objective       Lab Results   Component Value Date    WBC 8.3 01/21/2025    HGB 13.0 (L) 01/21/2025    HCT 40.9 (L) 01/21/2025    MCV 84 01/21/2025     01/21/2025      Lab Results   Component Value Date    GLUCOSE 121 (H) 01/21/2025    CALCIUM 10.0 01/21/2025     01/21/2025    K 3.8 01/21/2025    CO2 28 01/21/2025     01/21/2025    BUN 8 01/21/2025    CREATININE 0.48 (L) 01/21/2025     Lab Results   Component Value Date    ALT 14 01/21/2025    AST 16 01/21/2025    ALKPHOS 84 01/21/2025    BILITOT 0.5 01/21/2025     Estimated Creatinine Clearance: 125 mL/min (A) (by C-G formula based on SCr of 0.48 mg/dL (L)).       Scheduled medications   [Held by provider] apixaban, 5 mg, oral, BID  atorvastatin, 20 mg, oral, Nightly  bacitracin, , Topical, TID  fentaNYL, 1 patch, transdermal, q72h  folic acid, 1 mg, oral, Daily  guaiFENesin, 600 mg, oral, BID  HYDROmorphone, 2.5 mg, intravenous, Once  hydrOXYzine HCL, 25 mg, oral, Once  ipratropium-albuteroL, 3 mL, nebulization, TID  lidocaine, 1 patch, transdermal, Daily  LORazepam, 0.5 mg, intravenous, Once  [Held by provider] mirtazapine, 15 mg, oral, Nightly  montelukast, 10 mg, oral, Daily  multivitamin with minerals, 1 tablet, oral, Daily  nicotine, 1 patch, transdermal, q24h  oxygen, , inhalation, Continuous - Inhalation  oxygen, , inhalation, Continuous - Inhalation  pancrelipase (Lip-Prot-Amyl), 1 capsule, oral, BID  pantoprazole, 40 mg, oral, Daily before breakfast  polyethylene glycol, 17 g, oral, Daily  [Held by provider] pregabalin, 75 mg, oral, TID  rOPINIRole, 0.5 mg, oral, TID  sennosides, 2 tablet, oral, Nightly  sodium  chloride, 2 spray, Each Nostril, 4x daily  tamsulosin, 0.4 mg, oral, Daily  thiamine, 100 mg, oral, Daily  topiramate, 100 mg, oral, BID  white petrolatum, , Topical, TID      Continuous medications     PRN medications  [Held by provider] cyclobenzaprine, 10 mg, TID PRN  [Held by provider] HYDROmorphone, 0.5 mg, q3h PRN  HYDROmorphone, 0.5 mg, q3h PRN  HYDROmorphone, 1 mg, q3h PRN  lidocaine, 1 patch, q12h PRN  melatonin, 3 mg, Nightly PRN  naloxone, 0.2 mg, q5 min PRN  nicotine polacrilex, 2 mg, q2h PRN  OLANZapine, 2.5 mg, q12h PRN  ondansetron ODT, 4 mg, q8h PRN                    PHYSICAL EXAMINATION:  Vital Signs:   Vital signs reviewed  Vitals:    01/21/25 1000   BP: (!) 135/93   Pulse: 103   Resp: 19   Temp: 36.1 °C (96.9 °F)   SpO2: 94%     Pain Score: 0 - No pain     Physical Exam  Constitutional:       Appearance: Normal appearance.      Comments: Sitter at the bedside   HENT:      Head: Normocephalic and atraumatic.      Nose:      Comments: Nose picking with bleeding from the nostril     Mouth/Throat:      Mouth: Mucous membranes are moist.   Eyes:      Comments: Ptosis of right eye   Neck:      Comments: Right posterior neck and postauricular mass  Pulmonary:      Effort: Pulmonary effort is normal.   Abdominal:      General: Abdomen is flat.      Palpations: Abdomen is soft.   Musculoskeletal:      Cervical back: Normal range of motion and neck supple.   Skin:     General: Skin is warm and dry.   Neurological:      General: No focal deficit present.      Mental Status: He is alert.   Psychiatric:         Mood and Affect: Mood normal.       ASSESSMENT/PLAN:  54-year-old male with large lytic clival tumor with concern for chondroma, laryngeal cancer s/p surgery admitted for workup of pain.  We are consulted for pain management.  During prior admission patient was on methadone, Lyrica however per chart review it seems he was not prescribed those medications.  Patient is a bit sleepy especially since he  has received sedative medications prior to scans.  He is unable to provide history clearly regarding the meds at home hence it is unclear if he was taking methadone and Lyrica at home.     MRI head 1/18/2025-diffuse abnormal hyperintensity in the clivus and plane of sphenoidale extending laterally into the petrous temporal bones. There is mass encasement of the petrous carotid arteries.  Evaluation limited due to motion. There is opacification of the right mastoid air cells, concerning for right eustachian tube obstruction by clival mass. partial opacification of sphenoid sinuses due to mucosal thickening and mass invasion.     Neoplasm related pain.  Somatic and neuropathic.  Uncontrolled    Yesterday patient was unable to lay down for radiation treatment treatment because of pain.       Today patient received Dilaudid IV 1 mg and Ativan IV 0.5 mg prior to radiation around 8:20 AM and was sent down for radiation around 9 AM which did not control his pain and he was unable to receive radiation.     Patient will be brought back to the room from radiation department with plans to give treatments he received on Thursday as follows  Morphine IR 30 mg at 11 AM  Atarax 25 mg at 2 PM  Oxycodone 10 mg at 2 PM  Plan to go down for radiation at 3:30 PM today    Latest speech saw the patient and recommended n.p.o. due to aspiration risk.  Based on this assessment I recommend following  DC methadone p.o.  DC morphine IR 15 mg as needed  Start fentanyl patch 12 mcg/h  Start Dilaudid IV 0.5 mg IV every 3 hours as needed moderate pain  Start Dilaudid IV 1 mg IV every 3 hours as needed severe pain    His speech therapist is okay with sublingual liquid medication then recommend following meds at 2 PM prior to going for radiation at 3:30 PM  Morphine 30 mg elixir and Ativan 1 mg elixir  If patient cannot receive elixir liquid medications then recommend following meds 10 minutes prior to starting radiation  Dilaudid 2.5 mg IV and  Ativan 0.5 mg IV    Recommend Lidoderm patch right postauricular region  Recommend restarting Lyrica 75 mg p.o. 3 times daily   Continue Topamax 100 mg p.o. twice daily  Cyclobenzaprine 10 mg p.o. 3 times daily as needed on hold    Insomnia  Remeron 15 mg p.o. nightly on hold    Risk for constipation secondary to opioid  Continue MiraLAX 17 g p.o. daily  Continue senna 2 tabs p.o. nightly    Agitation and restlessness  Recommend stopping Zyprexa IM every 12 hours as needed  Recommend Zyprexa Zydis 2.5 mg sublingual every 12 hours as needed or Haldol 1 mg IV twice daily as needed    At risk for nausea secondary to opioids  Continue with Zofran ODT 4 mg p.o. every 8 hours as needed    SIGNATURE AND BILLING:  High Complexity   Today, I am treating the patient for chronic pain which is in severe exacerbation,   Extensive DATA   Reviewed records from the following unique sources: providers from oncology services or outside institutions  Diagnostic tests and information reviewed for today's visit:  Most recent labs, Medications  Independently interpreted test CBC, CMP which shows anemia, hyperglycemia.  Discussed plan of Care/management of pain with: Provider, RN, Patient and Pharmacist via shared electronic medical record/secure chat/email or face-to-face.  High risk of morbidity from additional diagnostic testing or treatment  The patient is on parenteral controlled substances: Dilaudid IV as above.    Medical Decision Making was high level due to high complexity of problems, extensive data review, and high risk of management/treatment.   Time: I spent 50 minutes the care of this patient which included chart review, interviewing patient/family, discussion with primary team, coordination of care, and documentation.    Thank you for asking Supportive and Palliative Oncology to assist with care of this patient. Recommendations will be communicated back to the consulting service by way of shared electronic medical  record/secure chat/email or face-to-face. We will continue to follow. Please contact us for additional questions or concerns.    SIGNATURE: Florina Jackson MD   PAGER/CONTACT:  Contact information:  Supportive and Palliative Oncology  Monday-Friday 8 AM-5 PM  Epic Secure chat or pager [phone].  After hours and weekends:  pager [phone]

## 2025-01-21 NOTE — PROGRESS NOTES
"Physical Therapy                 Therapy Communication Note    Patient Name: Gaurav Mckay \"Lemuel\"  MRN: [id number]  Department: [facility]  Room: 60/6024  Today's Date: 1/21/2025     Discipline: Physical Therapy    PT Missed Visit: Yes     Missed Visit Reason: Missed Visit Reason: Patient refused (pt declining PT at this time, stating that he did not want to do anything and wanted to sleep.)    Missed Time: Attempt    Shayla Marinelli, PT   "

## 2025-01-21 NOTE — SIGNIFICANT EVENT
01/21/25 0700   Code Tena   Code Tena Initated by [facility] 6   Pager Date 01/21/25   Pager Time [phone]   Individual Involved Patient   Location/Room [facility] 6024   Arrival Date 01/21/25   Arrival Time 0632   Visit Reason Ptosis of Right Eyelid   Present at Code Primary provider;Nurse;Supervisor;Police services;Rapid Response nurse   Primary Reason for Call Non re-directable;Aggressive/threatening behavior   Interventions 1:1 monitoring;Quiet milieu;Reassurance of safety;Stimulus reduciton;Verbal de-escalation;Relaxation techniques   Description of Event Code violet intitated due to patient refusing vital sign assessment and waving arms in a threatening manner. Upon arrival, patient was calmly lying in bed and was pleasant and cooperative during interaction. Patient displayed increased respiration rate and primary provider contacted the rapid response team to assist with lab draw. Patient calm and cooperative with intervention. AMY NORIEGA assisted at the bedside for patient support during intervention. Reassurance of safety and a quiet milieu provided to encourage rest.   Medications Administered No medications administered   PRN Medication Available Yes   Plan and Interventions Going Forward Continue to facilitate stimulus reduction techniques and a quiet environment.   Plan Reviewed with patient   Outcome Situation resolved;1:1 monitoring   Event End Date 01/21/25   Event End Time 0640
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
## 2025-01-21 NOTE — PROGRESS NOTES
"Gaurav Mckay \"Lucy" is a 54 y.o. male on day 6 of admission presenting with Ptosis of right eyelid.    Subjective   Overnight, patient became agitated and confused leading to him refusing his meds. Code violet was called as well as rapid response for tachycardia and increased work of breathing. Patient was seen and evaluated, sent VBG which was not concern for hypercapnia. Patient respiratory function also improved.   This morning, patient could not tolerate radiation because of pain. Will try again in the afternoon.     Review of Systems   Review of Systems   10/10 negative except stated above       Objective     Last Recorded Vitals  Blood pressure (!) 135/93, pulse 103, temperature 36.1 °C (96.9 °F), temperature source Temporal, resp. rate 19, height 1.829 m (6'), weight 78.6 kg (173 lb 3.2 oz), SpO2 94%.  Intake/Output last 3 Shifts:  I/O last 3 completed shifts:  In: 1859.2 (23.7 mL/kg) [P.O.:960; I.V.:799.2 (10.2 mL/kg); IV Piggyback:100]  Out: 1802 (22.9 mL/kg) [Urine:1802 (0.6 mL/kg/hr)]  Weight: 78.6 kg     Physical Exam  Constitutional:       Appearance: Normal appearance.   HENT:      Head: Normocephalic and atraumatic.      Nose: Nose normal.   Neck:      Comments: ROM limited by pain. Has neck mass   Cardiovascular:      Rate and Rhythm: Normal rate.      Pulses: Normal pulses.   Pulmonary:      Effort: Pulmonary effort is normal.      Comments: Stertorous breathing   Abdominal:      General: Abdomen is flat.      Palpations: Abdomen is soft.   Musculoskeletal:         General: Normal range of motion.      Cervical back: Normal range of motion.   Skin:     General: Skin is warm.   Neurological:      General: No focal deficit present.      Mental Status: He is alert and oriented to person, place, and time.      Comments: Sometimes with intermittent confusion   Psychiatric:         Mood and Affect: Mood normal.         Relevant Results    Labs    Results from last 7 days   Lab Units 01/21/25  0647 " 01/20/25  1640 01/20/25  0508   WBC AUTO x10*3/uL 8.3 8.0 6.0   HEMOGLOBIN g/dL 13.0* 12.1* 8.0*   HEMATOCRIT % 40.9* 38.8* 25.4*   PLATELETS AUTO x10*3/uL 389 363 240            Results from last 7 days   Lab Units 01/21/25  0647 01/20/25  1640 01/20/25  0902   SODIUM mmol/L 139 141 147*   POTASSIUM mmol/L 3.8 3.3* 4.5   CHLORIDE mmol/L 100 102 103   CO2 mmol/L 28 31 30   BUN mg/dL 8 6 4*   CREATININE mg/dL 0.48* 0.46* 0.48*   CALCIUM mg/dL 10.0 9.8 10.0     Results from last 7 days   Lab Units 01/21/25  0647 01/20/25  1640 01/15/25  1458   ALK PHOS U/L 84 83 104   BILIRUBIN TOTAL mg/dL 0.5 0.5 0.4   PROTEIN TOTAL g/dL 6.5 7.5 8.2   ALT U/L 14 12 20   AST U/L 16 12 24             Medications  Scheduled medications  acetaminophen, 975 mg, oral, q6h  [Held by provider] apixaban, 5 mg, oral, BID  atorvastatin, 20 mg, oral, Nightly  bacitracin, , Topical, TID  folic acid, 1 mg, oral, Daily  guaiFENesin, 600 mg, oral, BID  hydrOXYzine HCL, 25 mg, oral, Once  ipratropium-albuteroL, 3 mL, nebulization, TID  methadone, 2.5 mg, oral, q12h  [Held by provider] mirtazapine, 15 mg, oral, Nightly  montelukast, 10 mg, oral, Daily  morphine, 30 mg, oral, Once  multivitamin with minerals, 1 tablet, oral, Daily  nicotine, 1 patch, transdermal, q24h  oxyCODONE, 10 mg, oral, Once  oxygen, , inhalation, Continuous - Inhalation  oxygen, , inhalation, Continuous - Inhalation  pancrelipase (Lip-Prot-Amyl), 1 capsule, oral, BID  pantoprazole, 40 mg, oral, Daily before breakfast  polyethylene glycol, 17 g, oral, Daily  [Held by provider] pregabalin, 75 mg, oral, TID  rOPINIRole, 0.5 mg, oral, TID  sennosides, 2 tablet, oral, Nightly  sodium chloride, 2 spray, Each Nostril, 4x daily  tamsulosin, 0.4 mg, oral, Daily  thiamine, 100 mg, oral, Daily  topiramate, 100 mg, oral, BID  white petrolatum, , Topical, TID      Continuous medications       PRN medications  PRN medications: [Held by provider] cyclobenzaprine, [Held by provider]  HYDROmorphone, lidocaine, melatonin, morphine, naloxone, nicotine polacrilex, OLANZapine, ondansetron ODT     Imaging  XR chest 1 view   Final Result   1.  No evidence of acute cardiopulmonary process. No focal   consolidations or pleural effusions.   2. Minimal bibasilar atelectasis.        I personally reviewed the images/study and I agree with Dr. Joyce Parish findings as stated. This study was interpreted at [place], [place]        MACRO:   None        Signed by: Yovanny Sheehan 1/20/2025 8:01 AM   Dictation workstation:   [id number]      MR brain wo IV contrast   Final Result   Single axial T2 weighted images obtained, after which study was   terminated due to extensive motion. Please see above for findings   that could be ascertained from the markedly limited study.        MACRO:   None.        Signed by: Marc Deras 1/18/2025 1:48 PM   Dictation workstation:   [id number]      CT head wo IV contrast   Final Result   There is aggressive destructive heterogeneous mass centered in the   clivus with poorly defined margins extending into the bilateral   medial petrous apices, nasopharynx, and pituitary sella. 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 There is   erosion of the posterior cortex of the clivus without gross evidence   of extension of the prepontine cistern. The mass however has   increased in the short-term interval measuring 2.5 cm in AP dimension   on sagittal view, previously 2.0 cm.        Signed by: Enrrique Zhou 1/15/2025 8:01 PM   Dictation workstation:   [id number]      Point of Care Ultrasound    (Results Pending)            Assessment/Plan   Assessment & Plan  Ptosis of right eyelid    Lemuel Mckay is a 54 y.o. 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 Transferred to the MICU for AHRF with Ph 7,29 and PCO2 of 88 on ABG requiring BiPAP. Now back to the floor. Respiratory function improve. Planning for radiation therapy to the brain for the clival mass. Support Onc. Tried radiation in the morning, pt could not tolerate bcos of pain. Will tray again in the afternoon. Also started initiating GOC discussion with patient. Due to c/f for aspiration will plan to place Dobhoff for pt, ENT okay with that.       Updates 1/21/25:  - Support Onc follow; appreciate recs   - Tried radiation in the morning, pt could not tolerate bcos of pain. Will tray again in the afternoon. Also started initiating GOC discussion with patient.   - Due to c/f for aspiration will plan to place Dobhoff for pt, ENT okay with that.     1/19: pt transferred to the floor after being in the MICU for 6 hours iso AMS and acute hypercapnic respiratory failure required BiPAP for less than 30 mins and had improved mentation.     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   Support Onc following  - continue scheduled methadone, morphine and acetaminophen  PRN  - zofran PRN      #history of saddle PE  #COPD  :: on apixaban at home  :: requires 2 L NC after PE and d/t COPD  - monitor O2 requirements  - change to heparin drip, in case of surgical intervention; since no surgery per NSGY, consider switching back to eliquis      #alcohol use disorder  :: prior hospitalization in 12/2024 showed intoxication  :: patient states last drink was six months ago  - CIWA protocol discontinued      #tobacco use  :: currently smoking 1 PPD, vaping 2 hours/week  - nicotine patch daily     Electrolytes: PRN  Lines/tubes: PIV   Abx: none  Drips: heparin   GI ppx: none  DVT: heparin  Code status: full code  NOK: Jacqui Hernandez (Significant Other)  [phone] (Mobile)     Case seen and discussed with attending Dr. Suarez, to addend as necessary.    Siva Larkin MD PGY-1

## 2025-01-21 NOTE — NURSING NOTE
Pt refusing pulse ox and tele . Pt has removed items three times with clothing. Educated on importance, team notified. Will continue to monitor

## 2025-01-21 NOTE — CARE PLAN
Assumed care of patient. Patient removing medical equipment. Pt educated on importance. Pt attempting to get out of bed. Pt physical with staff (RN and Aid) Arun Tena called on pt. Pt assisted back in bed without incident. Rayray team at beside. Pt attempting to throw water on staff. Pain medications given as requested by patient, will continue to monitor       The patient's goals for the shift include  Pain Management    The clinical goals for the shift include Patient will remain hemodynamically stable and free from fall/injury for entirety of shift.    Over the shift, the patient did not make progress toward the following goals.       Problem: Skin  Goal: Decreased wound size/increased tissue granulation at next dressing change  Recent Flowsheet Documentation  Taken 1/20/2025 2145 by Addi Maurice RN  Decreased wound size/increased tissue granulation at next dressing change: Protective dressings over bony prominences  Goal: Participates in plan/prevention/treatment measures  Recent Flowsheet Documentation  Taken 1/20/2025 2145 by Addi Maurice RN  Participates in plan/prevention/treatment measures: Elevate heels  Goal: Prevent/manage excess moisture  Recent Flowsheet Documentation  Taken 1/20/2025 2145 by Addi Maurice RN  Prevent/manage excess moisture:   Moisturize dry skin   Monitor for/manage infection if present  Goal: Prevent/minimize sheer/friction injuries  Recent Flowsheet Documentation  Taken 1/20/2025 2145 by Addi Maurice RN  Prevent/minimize sheer/friction injuries: Use pull sheet  Goal: Promote/optimize nutrition  Recent Flowsheet Documentation  Taken 1/20/2025 2145 by Addi Maurice RN  Promote/optimize nutrition: Monitor/record intake including meals  Goal: Promote skin healing  Recent Flowsheet Documentation  Taken 1/20/2025 2145 by Addi Maurice RN  Promote skin healing: Assess skin/pad under line(s)/device(s)     Problem: Skin  Goal: Decreased wound  size/increased tissue granulation at next dressing change  Flowsheets (Taken 1/20/2025 2145)  Decreased wound size/increased tissue granulation at next dressing change: Protective dressings over bony prominences  Goal: Participates in plan/prevention/treatment measures  Flowsheets (Taken 1/20/2025 2145)  Participates in plan/prevention/treatment measures: Elevate heels  Goal: Prevent/manage excess moisture  Flowsheets (Taken 1/20/2025 2145)  Prevent/manage excess moisture:   Moisturize dry skin   Monitor for/manage infection if present  Goal: Prevent/minimize sheer/friction injuries  Flowsheets (Taken 1/20/2025 2145)  Prevent/minimize sheer/friction injuries: Use pull sheet  Goal: Promote/optimize nutrition  Flowsheets (Taken 1/20/2025 2145)  Promote/optimize nutrition: Monitor/record intake including meals  Goal: Promote skin healing  Flowsheets (Taken 1/20/2025 2145)  Promote skin healing: Assess skin/pad under line(s)/device(s)

## 2025-01-21 NOTE — DOCUMENTATION CLARIFICATION NOTE
PATIENT:               SHONA MCBRIDE  ACCT #:                  [id number]  MRN:                       [id number]  :                       1970  ADMIT DATE:       1/15/2025 2:35 PM  DISCH DATE:  RESPONDING PROVIDER #:        [id number]          PROVIDER RESPONSE TEXT:    I agree with dietician diagnosis of Severe Malnutrition on 25    CDI QUERY TEXT:    Clarification        Instruction:    Based on your assessment of the patient and the clinical information, please provide the requested documentation by clicking on the appropriate radio button and enter any additional information if prompted.    Question: Please further clarify this patient nutritional status as    When answering this query, please exercise your independent professional judgment. The fact that a question is being asked, does not imply that any particular answer is desired or expected.    The patient's clinical indicators include:  Clinical Information: 53 yo M with PMHx of laryngeal cancer s/p radical neck dissection, now with clival mets with reported weight loss of 30 lbs d/t decreased appetite.    Clinical Indicators:   at 1311 RDN Consult:  ?-BMI: 23.49  Malnutrition Diagnosis: Severe malnutrition related to chronic disease or condition  As Evidenced by: significant wt loss of >5% x1 month; 16% x3 months; 35% x8 months; moderate muscle and adipose tissue wasting?      Treatment:  -RDN Consult  -Pending SLP, BOOST VHC TID vs Magic Cup BID  -possible plan for Dobhoff    Risk Factors:  -PMHx of laryngeal cancer s/p radical neck dissection, now with clival mets  -alcohol use disorder  -tobacco use  Options provided:  -- I agree with dietician diagnosis of Severe Malnutrition on 25  -- Other - I will add my own diagnosis  -- Refer to Clinical Documentation Reviewer    Query created by: Linda Bar on 2025 3:23 PM      Electronically signed by:  JOLYNN JUSTICE MD 2025 3:55 PM

## 2025-01-21 NOTE — DOCUMENTATION CLARIFICATION NOTE
"    PATIENT:               SHONA MCBRIDE  ACCT #:                  [id number]  MRN:                       [id number]  :                       1970  ADMIT DATE:       12/15/2024 2:53 PM  DISCH DATE:        2024 7:32 PM  RESPONDING PROVIDER #:        [id number]          PROVIDER RESPONSE TEXT:    Primary Left Sphenoid and Skull Base Invasive poorly-differentiated squamous cell carcinomas    CDI QUERY TEXT:    Clarification    Instruction:    Based on your assessment of the patient and the clinical information, please provide the requested documentation by clicking on the appropriate radio button and enter any additional information if prompted.    Question: Based on your assessment of the patient and the pathology findings, can the pathology findings be confirmed by providing the requested documentation to include if specimen is benign or malignant, primary or secondary and any metastatic sites.  Please include diagnosis of disease    When answering this query, please exercise your independent professional judgment. The fact that a question is being asked, does not imply that any particular answer is desired or expected.    The patient's clinical indicators include:  Clinical Information: 54 YOM who presented to [facility] as a transfer from [facility] due to need for urology consult as well as neurosurgery consult due to incidental finding of brain mass.    Surgical Pathology from 2024 (collected), resulted on (2025) as - \"FINAL DIAGNOSIS  A. Left sphenoid mass, biopsy:  - Fragments of respiratory mucosa without significant pathologic findings.  - Negative for malignancy    B. Left sphenoid mass #2, biopsy:  -Invasive poorly-differentiated squamous cell carcinoma.  See note    C. Skull base mass, resection:  -Invasive poorly-differentiated squamous cell carcinoma.  See note    D. Urinary bladder, biopsy:  -- Polypoidal fragments of inflamed and partially denuded urothelial mucosa with edematous lamina " "propria, suggestive of polypoidal cystitis.  -- No evidence of malignancy.    Note:  History of T2N0M0 squamous cell carcinoma of the larynx , treated with radiotherapy is noted for this patient.  Neoplastic cells are immunoreactive with p40.    P16 by immunohistochemistry: Negative\"    Clinical Indicators: Brain mass    Treatment: Oncology consult, Radiation Oncology consult, brain mass biopsy    Risk Factors: History of T2N0M0 squamous cell carcinoma of the larynx, treated with radiotherapy  Options provided:  -- Primary Left Sphenoid and Skull Base Invasive poorly-differentiated squamous cell carcinomas  -- Metastatic Left Sphenoid and Skull Base Invasive poorly-differentiated squamous cell carcinomas with Primary site of metastasis from previously treated Larynx cancer  -- Other - I will add my own diagnosis  -- Refer to Clinical Documentation Reviewer    Query created by: Carole Cannon on 1/13/2025 2:19 PM      Electronically signed by:  KHRIS SNYDER MD 1/21/2025 7:23 AM          "

## 2025-01-21 NOTE — SIGNIFICANT EVENT
I was initially notified by the patient's overnight nurse that the patient was more agitated and confused. On my initial evaluation of the patient at ~10 PM, the patient was tired-appearing but directable with persistence.     Given concerns for persistent agitation and lack of sleeping overnight from nursing, I gave a 1 time of dose of zyprexa 2.5 mg and then 1 x 5 mg.     At ~6:25 AM on 1/21/25, I was notified by nursing that the patient was out of bed and refusing vitals check. On my evaluation of the patient, he was confused, drinking water and refusing care from female nurses in the room with increased work of breathing. Code Tena was called. He agreed to my initial assessment- tachycardic but regular rhythm, breath sounds with expiratory wheezing. Vitals: saturating 94% on room air, pulse ~110, and SBP ~150s-160s. He agreed to a VBG and labs from a male nurse from the Rapid Response team. VBG revealing low concern for hypercapnia, and patient's agitation significantly improved by the end of the rapid response.     - hold zyprexa and other sedating meds, as able  - follow CBC, CMP  - continue to monitor clinically  - due to patient preference for male providers, will see if male nursing can be provided as able    Discussed with rapid response RN.     Johnnie Corona MD   PGY-1, Internal Medicine Resident  [facility]

## 2025-01-22 ENCOUNTER — APPOINTMENT (OUTPATIENT)
Dept: RADIOLOGY | Facility: HOSPITAL | Age: 55
End: 2025-01-22
Payer: COMMERCIAL

## 2025-01-22 ENCOUNTER — APPOINTMENT (OUTPATIENT)
Dept: RADIATION ONCOLOGY | Facility: HOSPITAL | Age: 55
End: 2025-01-22
Payer: COMMERCIAL

## 2025-01-22 LAB
6MAM UR CFM-MCNC: <25 NG/ML
CODEINE UR CFM-MCNC: <50 NG/ML
EDDP UR CFM-MCNC: 664 NG/ML
HYDROCODONE CTO UR CFM-MCNC: <25 NG/ML
HYDROMORPHONE UR CFM-MCNC: 340 NG/ML
METHADONE UR CFM-MCNC: 175 NG/ML
MORPHINE UR CFM-MCNC: >2500 NG/ML
NORHYDROCODONE UR CFM-MCNC: <25 NG/ML
NOROXYCODONE UR CFM-MCNC: <25 NG/ML
OXYCODONE UR CFM-MCNC: <25 NG/ML
OXYMORPHONE UR CFM-MCNC: <25 NG/ML

## 2025-01-22 PROCEDURE — 2500000002 HC RX 250 W HCPCS SELF ADMINISTERED DRUGS (ALT 637 FOR MEDICARE OP, ALT 636 FOR OP/ED): Mod: SE | Performed by: STUDENT IN AN ORGANIZED HEALTH CARE EDUCATION/TRAINING PROGRAM

## 2025-01-22 PROCEDURE — 93010 ELECTROCARDIOGRAM REPORT: CPT | Performed by: INTERNAL MEDICINE

## 2025-01-22 PROCEDURE — 99233 SBSQ HOSP IP/OBS HIGH 50: CPT | Performed by: INTERNAL MEDICINE

## 2025-01-22 PROCEDURE — 2500000001 HC RX 250 WO HCPCS SELF ADMINISTERED DRUGS (ALT 637 FOR MEDICARE OP): Mod: SE

## 2025-01-22 PROCEDURE — 71045 X-RAY EXAM CHEST 1 VIEW: CPT | Performed by: RADIOLOGY

## 2025-01-22 PROCEDURE — 94640 AIRWAY INHALATION TREATMENT: CPT

## 2025-01-22 PROCEDURE — 71045 X-RAY EXAM CHEST 1 VIEW: CPT

## 2025-01-22 PROCEDURE — 1200000003 HC ONCOLOGY  ROOM WITH TELEMETRY DAILY

## 2025-01-22 PROCEDURE — 2500000004 HC RX 250 GENERAL PHARMACY W/ HCPCS (ALT 636 FOR OP/ED): Mod: SE,TB | Performed by: STUDENT IN AN ORGANIZED HEALTH CARE EDUCATION/TRAINING PROGRAM

## 2025-01-22 PROCEDURE — 2500000002 HC RX 250 W HCPCS SELF ADMINISTERED DRUGS (ALT 637 FOR MEDICARE OP, ALT 636 FOR OP/ED): Mod: SE

## 2025-01-22 PROCEDURE — 2500000004 HC RX 250 GENERAL PHARMACY W/ HCPCS (ALT 636 FOR OP/ED): Mod: SE

## 2025-01-22 PROCEDURE — 99232 SBSQ HOSP IP/OBS MODERATE 35: CPT

## 2025-01-22 PROCEDURE — 2500000005 HC RX 250 GENERAL PHARMACY W/O HCPCS: Mod: SE | Performed by: STUDENT IN AN ORGANIZED HEALTH CARE EDUCATION/TRAINING PROGRAM

## 2025-01-22 PROCEDURE — S4991 NICOTINE PATCH NONLEGEND: HCPCS | Mod: SE

## 2025-01-22 PROCEDURE — 2500000001 HC RX 250 WO HCPCS SELF ADMINISTERED DRUGS (ALT 637 FOR MEDICARE OP): Mod: SE | Performed by: NUTRITIONIST

## 2025-01-22 PROCEDURE — 2500000005 HC RX 250 GENERAL PHARMACY W/O HCPCS: Mod: SE

## 2025-01-22 RX ORDER — MORPHINE SULFATE 20 MG/ML
5 SOLUTION ORAL EVERY 4 HOURS PRN
Status: DISCONTINUED | OUTPATIENT
Start: 2025-01-22 | End: 2025-01-24

## 2025-01-22 RX ORDER — HYDROMORPHONE HYDROCHLORIDE 1 MG/ML
0.5 INJECTION, SOLUTION INTRAMUSCULAR; INTRAVENOUS; SUBCUTANEOUS
Status: DISCONTINUED | OUTPATIENT
Start: 2025-01-22 | End: 2025-01-24

## 2025-01-22 RX ORDER — HYDROMORPHONE HYDROCHLORIDE 1 MG/ML
1 INJECTION, SOLUTION INTRAMUSCULAR; INTRAVENOUS; SUBCUTANEOUS
Status: DISCONTINUED | OUTPATIENT
Start: 2025-01-22 | End: 2025-01-24

## 2025-01-22 RX ADMIN — MORPHINE SULFATE 5 MG: 20 SOLUTION ORAL at 15:31

## 2025-01-22 RX ADMIN — SALINE NASAL SPRAY 2 SPRAY: 1.5 SOLUTION NASAL at 11:53

## 2025-01-22 RX ADMIN — SALINE NASAL SPRAY 2 SPRAY: 1.5 SOLUTION NASAL at 06:23

## 2025-01-22 RX ADMIN — Medication 2 L/MIN: at 20:48

## 2025-01-22 RX ADMIN — SALINE NASAL SPRAY 2 SPRAY: 1.5 SOLUTION NASAL at 20:47

## 2025-01-22 RX ADMIN — IPRATROPIUM BROMIDE AND ALBUTEROL SULFATE 3 ML: .5; 3 SOLUTION RESPIRATORY (INHALATION) at 14:17

## 2025-01-22 RX ADMIN — HYDROMORPHONE HYDROCHLORIDE 0.5 MG: 1 INJECTION, SOLUTION INTRAMUSCULAR; INTRAVENOUS; SUBCUTANEOUS at 09:15

## 2025-01-22 RX ADMIN — HYDROPHOR: 42 OINTMENT TOPICAL at 20:48

## 2025-01-22 RX ADMIN — BACITRACIN: 500 OINTMENT TOPICAL at 20:47

## 2025-01-22 RX ADMIN — BACITRACIN: 500 OINTMENT TOPICAL at 08:42

## 2025-01-22 RX ADMIN — OLANZAPINE 2.5 MG: 10 INJECTION, POWDER, LYOPHILIZED, FOR SOLUTION INTRAMUSCULAR at 16:47

## 2025-01-22 RX ADMIN — BACITRACIN: 500 OINTMENT TOPICAL at 15:31

## 2025-01-22 RX ADMIN — NICOTINE 1 PATCH: 14 PATCH, EXTENDED RELEASE TRANSDERMAL at 22:09

## 2025-01-22 RX ADMIN — PANTOPRAZOLE SODIUM 40 MG: 40 TABLET, DELAYED RELEASE ORAL at 06:23

## 2025-01-22 RX ADMIN — SALINE NASAL SPRAY 2 SPRAY: 1.5 SOLUTION NASAL at 17:51

## 2025-01-22 RX ADMIN — LIDOCAINE 4% 1 PATCH: 40 PATCH TOPICAL at 08:41

## 2025-01-22 RX ADMIN — Medication 2 L/MIN: at 20:49

## 2025-01-22 RX ADMIN — IPRATROPIUM BROMIDE AND ALBUTEROL SULFATE 3 ML: .5; 3 SOLUTION RESPIRATORY (INHALATION) at 09:13

## 2025-01-22 RX ADMIN — HYDROPHOR: 42 OINTMENT TOPICAL at 15:31

## 2025-01-22 ASSESSMENT — COGNITIVE AND FUNCTIONAL STATUS - GENERAL
CLIMB 3 TO 5 STEPS WITH RAILING: A LOT
MOBILITY SCORE: 19
PERSONAL GROOMING: A LITTLE
WALKING IN HOSPITAL ROOM: A LITTLE
EATING MEALS: A LITTLE
TOILETING: A LITTLE
DAILY ACTIVITIY SCORE: 18
DRESSING REGULAR UPPER BODY CLOTHING: A LITTLE
MOVING TO AND FROM BED TO CHAIR: A LITTLE
STANDING UP FROM CHAIR USING ARMS: A LITTLE
DRESSING REGULAR LOWER BODY CLOTHING: A LITTLE
HELP NEEDED FOR BATHING: A LITTLE

## 2025-01-22 ASSESSMENT — PAIN SCALES - GENERAL
PAINLEVEL_OUTOF10: 3
PAINLEVEL_OUTOF10: 6

## 2025-01-22 ASSESSMENT — PAIN DESCRIPTION - ORIENTATION: ORIENTATION: RIGHT

## 2025-01-22 ASSESSMENT — PAIN - FUNCTIONAL ASSESSMENT: PAIN_FUNCTIONAL_ASSESSMENT: 0-10

## 2025-01-22 ASSESSMENT — PAIN DESCRIPTION - LOCATION: LOCATION: HEAD

## 2025-01-22 NOTE — PROGRESS NOTES
"Gaurav Mckay \"Lucy" is a 54 y.o. male on day 7 of admission presenting with Ptosis of right eyelid.    Subjective   NAEON; denies any c/o chest pain, SOB, palpitation, or any GI symptoms. Patient fell when being escorted to the bathroom with sitter.  I was called to the bedside. Denies hitting his head,however report feeling lightheaded prior to the fall. Otherwise denies chest pain. EKG showed NSR. VSS on exam.     Review of Systems   Review of Systems   10/10 negative except stated above       Objective     Last Recorded Vitals  Blood pressure 126/86, pulse (!) 111, temperature 36.3 °C (97.3 °F), resp. rate 20, height 1.829 m (6'), weight 78.6 kg (173 lb 3.2 oz), SpO2 93%.  Intake/Output last 3 Shifts:  I/O last 3 completed shifts:  In: 120 (1.5 mL/kg) [P.O.:120]  Out: 1000 (12.7 mL/kg) [Urine:1000 (0.4 mL/kg/hr)]  Weight: 78.6 kg     Physical Exam  Constitutional:       Appearance: Normal appearance.   HENT:      Head: Normocephalic and atraumatic.      Nose: Nose normal.   Neck:      Comments: ROM limited by pain. Has neck mass   Cardiovascular:      Rate and Rhythm: Normal rate.      Pulses: Normal pulses.   Pulmonary:      Effort: Pulmonary effort is normal.      Comments: Stertorous breathing   Abdominal:      General: Abdomen is flat.      Palpations: Abdomen is soft.   Musculoskeletal:         General: Normal range of motion.      Cervical back: Normal range of motion.   Skin:     General: Skin is warm.   Neurological:      General: No focal deficit present.      Mental Status: He is alert and oriented to person, place, and time.      Comments: Sometimes with intermittent confusion   Psychiatric:         Mood and Affect: Mood normal.         Relevant Results    Labs    Results from last 7 days   Lab Units 01/21/25  0647 01/20/25  1640 01/20/25  0508   WBC AUTO x10*3/uL 8.3 8.0 6.0   HEMOGLOBIN g/dL 13.0* 12.1* 8.0*   HEMATOCRIT % 40.9* 38.8* 25.4*   PLATELETS AUTO x10*3/uL 389 363 240          "   Results from last 7 days   Lab Units 01/21/25  0647 01/20/25  1640 01/20/25  0902   SODIUM mmol/L 139 141 147*   POTASSIUM mmol/L 3.8 3.3* 4.5   CHLORIDE mmol/L 100 102 103   CO2 mmol/L 28 31 30   BUN mg/dL 8 6 4*   CREATININE mg/dL 0.48* 0.46* 0.48*   CALCIUM mg/dL 10.0 9.8 10.0     Results from last 7 days   Lab Units 01/21/25  0647 01/20/25  1640 01/15/25  1458   ALK PHOS U/L 84 83 104   BILIRUBIN TOTAL mg/dL 0.5 0.5 0.4   PROTEIN TOTAL g/dL 6.5 7.5 8.2   ALT U/L 14 12 20   AST U/L 16 12 24             Medications  Scheduled medications  [Held by provider] apixaban, 5 mg, oral, BID  [Held by provider] atorvastatin, 20 mg, oral, Nightly  bacitracin, , Topical, TID  fentaNYL, 1 patch, transdermal, q72h  [Held by provider] folic acid, 1 mg, oral, Daily  [Held by provider] guaiFENesin, 600 mg, oral, BID  hydrOXYzine HCL, 25 mg, oral, Once  ipratropium-albuteroL, 3 mL, nebulization, TID  lidocaine, 1 patch, transdermal, Daily  [Held by provider] mirtazapine, 15 mg, oral, Nightly  [Held by provider] montelukast, 10 mg, oral, Daily  [Held by provider] multivitamin with minerals, 1 tablet, oral, Daily  nicotine, 1 patch, transdermal, q24h  oxygen, , inhalation, Continuous - Inhalation  oxygen, , inhalation, Continuous - Inhalation  [Held by provider] pancrelipase (Lip-Prot-Amyl), 1 capsule, oral, BID  [Held by provider] pantoprazole, 40 mg, oral, Daily before breakfast  polyethylene glycol, 17 g, oral, Daily  [Held by provider] pregabalin, 75 mg, oral, TID  [Held by provider] rOPINIRole, 0.5 mg, oral, TID  [Held by provider] sennosides, 2 tablet, oral, Nightly  sodium chloride, 2 spray, Each Nostril, 4x daily  [Held by provider] tamsulosin, 0.4 mg, oral, Daily  thiamine, 100 mg, oral, Daily  [Held by provider] topiramate, 100 mg, oral, BID  white petrolatum, , Topical, TID      Continuous medications       PRN medications  PRN medications: [Held by provider] cyclobenzaprine, HYDROmorphone, HYDROmorphone, lidocaine,  [Held by provider] melatonin, naloxone, [Held by provider] nicotine polacrilex, OLANZapine, [Held by provider] ondansetron ODT     Imaging  XR chest 1 view   Final Result   1.  No evidence of acute cardiopulmonary process. No focal   consolidations or pleural effusions.   2. Minimal bibasilar atelectasis.        I personally reviewed the images/study and I agree with Dr. Joyce Parish findings as stated. This study was interpreted at [place], [place]        MACRO:   None        Signed by: Yovanny Sheehan 1/20/2025 8:01 AM   Dictation workstation:   [id number]      MR brain wo IV contrast   Final Result   Single axial T2 weighted images obtained, after which study was   terminated due to extensive motion. Please see above for findings   that could be ascertained from the markedly limited study.        MACRO:   None.        Signed by: Marc Deras 1/18/2025 1:48 PM   Dictation workstation:   [id number]      CT head wo IV contrast   Final Result   There is aggressive destructive heterogeneous mass centered in the   clivus with poorly defined margins extending into the bilateral   medial petrous apices, nasopharynx, and pituitary sella. 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 There is   erosion of the posterior cortex of the clivus without gross evidence   of extension of the prepontine cistern. The mass however has   increased in the short-term interval measuring 2.5 cm in AP dimension   on sagittal view, previously 2.0 cm.        Signed by: Enrrique Zhou 1/15/2025 8:01 PM   Dictation workstation:   [id number]      Point of Care Ultrasound    (Results Pending)   XR chest 1 view    (Results Pending)            Assessment/Plan   Assessment & Plan  Ptosis of right eyelid    Lemuel Mckay is a 54 y.o. 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 Transferred to the MICU for AHRF with Ph 7,29 and PCO2 of 88 on ABG requiring BiPAP. Now back to the floor. Respiratory function improve. Planning for radiation therapy to the brain for the clival mass. Support Onc. Tried radiation in the morning, pt could not tolerate bcos of pain. Will tray again in the afternoon. Also started initiating GOC discussion with patient.      Today: Patient fell and I was called to the bedside. Denies hitting his head,however report feeling lightheaded prior to the fall. Otherwise denies chest pain. EKG showed NSR. VSS on exam.     Updates 1/22/25:  - Support Onc follow; appreciate recs   - Will hold radiation for now and initiate GOC discussion with patient.   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   Support Onc following  - continue scheduled methadone, morphine and acetaminophen PRN  - zofran PRN      #history of saddle PE  #COPD  :: on apixaban at home  :: requires 2 L NC after PE and d/t COPD  - monitor O2 requirements  - change to heparin  drip, in case of surgical intervention; since no surgery per NSGY, consider switching back to eliquis      #alcohol use disorder  :: prior hospitalization in 12/2024 showed intoxication  :: patient states last drink was six months ago  - CIWA protocol discontinued      #tobacco use  :: currently smoking 1 PPD, vaping 2 hours/week  - nicotine patch daily     Electrolytes: PRN  Lines/tubes: PIV   Abx: none  Drips: heparin   GI ppx: none  DVT: heparin  Code status: full code  NOK: Jacqui Hernandez (Significant Other)  [phone] (Mobile)     Case seen and discussed with attending Dr. Suarez, to addend as necessary.    Siva Larkin MD PGY-1

## 2025-01-22 NOTE — CARE PLAN
Problem: Skin  Goal: Decreased wound size/increased tissue granulation at next dressing change  Outcome: Progressing  Goal: Participates in plan/prevention/treatment measures  Outcome: Progressing  Goal: Prevent/manage excess moisture  Outcome: Progressing  Goal: Prevent/minimize sheer/friction injuries  Outcome: Progressing  Goal: Promote/optimize nutrition  Outcome: Progressing  Goal: Promote skin healing  Outcome: Progressing   The patient's goals for the shift include      The clinical goals for the shift include pt will remain safe and free from injury

## 2025-01-22 NOTE — CARE PLAN
The clinical goals for the shift include Patient will remain safe and injury free throughout the shift      Problem: Skin  Goal: Decreased wound size/increased tissue granulation at next dressing change  Outcome: Progressing  Flowsheets (Taken 1/22/2025 1221)  Decreased wound size/increased tissue granulation at next dressing change:   Promote sleep for wound healing   Protective dressings over bony prominences  Goal: Participates in plan/prevention/treatment measures  Outcome: Progressing  Flowsheets (Taken 1/22/2025 1221)  Participates in plan/prevention/treatment measures:   Discuss with provider PT/OT consult   Elevate heels   Increase activity/out of bed for meals  Goal: Prevent/manage excess moisture  Outcome: Progressing  Flowsheets (Taken 1/22/2025 1221)  Prevent/manage excess moisture:   Cleanse incontinence/protect with barrier cream   Monitor for/manage infection if present   Moisturize dry skin  Goal: Prevent/minimize sheer/friction injuries  Outcome: Progressing  Flowsheets (Taken 1/22/2025 1221)  Prevent/minimize sheer/friction injuries:   Use pull sheet   Increase activity/out of bed for meals  Goal: Promote/optimize nutrition  Outcome: Progressing  Flowsheets (Taken 1/22/2025 1221)  Promote/optimize nutrition: Discuss with provider if NPO > 2 days  Goal: Promote skin healing  Outcome: Progressing

## 2025-01-22 NOTE — PROGRESS NOTES
01/22/25 1100   Discharge Planning   Living Arrangements Spouse/significant other   Support Systems Spouse/significant other   Type of Residence Skilled nursing facility   Home or Post Acute Services Other (Comment)  (Hospice referral)   Does the patient need discharge transport arranged? Yes   RoundTrip coordination needed? Yes   Has discharge transport been arranged? No     SW received referral from care team for hospice care.  SW met with pt.  Pt is alert and oriented x2, has a sitter in the room.  Pt nodded his head when asked if the medical team had discussed hospice care with him.  He nodded in agreement to hospice care.  Pt was not able to state which hospice agency is desired. SW discussed the need to have family involvement with hospice discussion.  Pt gave permission for SW to speak with his spouse, Jacqui.  SW spoke with sig other, Jacqui Hernandez [phone].  Sig other is familiar with hospice care as her son had hospice care.  Sig other wishes for pt to return to [facility] with hospice care and use the hospice agency facility uses.  IMELDA spoke with Mary Grace, admissions at [facility] [phone].  The facility has their own hospice agency, [facility].  Admissions to send hospice information via [facility]Port.  Will follow.  YOSVANY Terry received contact information for [facility] - Nicole Bird [phone].  SW spoke with [facility] representative.  Referral information was emailed.  Will follow.  YOSVANY Terry

## 2025-01-22 NOTE — PROGRESS NOTES
SUPPORTIVE AND PALLIATIVE ONCOLOGY PROGRESS NOTE      SERVICE DATE: 1/22/2025      SUBJECTIVE:  Interval Events:  Fentanyl patch 12 mcg/h started yesterday 12 PM    Pain Assessment: Complaints of 9/10 pain below the right ear and right neck.  Goal pain 5/10 dull aching aggravated by movement and relieved by Dilaudid IV as needed  Sitter at the bedside    Opioid Requirements  Past 24 h opioid requirements (1/21/2025 at 0800 to 01/22/25 at 0800):   Hydromorphone 1 mg IV every 3 hours as needed.  Used 2 dose/24 hours= 2 mg IV  Hydromorphone 0.5 mg IV every 3 hours as needed.  Used 0 dose/24 hours  Hydromorphone 2.5 mg IV X1 dose/24 hours= 2.5 mg IV  Total 4.5 mg IV Dilaudid= 56 mg OME  Total 24h OME use: 56 mg OME + Methadone 2.5 mg 1 dose/24 hours + Fentanyl patch 12 mcg/h placed at 12 PM on 1/21/2025    Note: OME calculations based on equianalgesic table below. Please note this table is based on best available evidence but conversions are still approximate. These are NOT opioid DOSES for individual patient use; this is equivalency information.  Drug Parenteral Enteral   Morphine 10 25   Oxycodone N/A 20   Hydromorphone 2 5   Fentanyl 0.15 N/A   Tramadol N/A 120   Citation: Darnell ENGLAND. Demystifying opioid conversion calculations: A guide for effective dosing, Second edition. [place] Essie: American Society of Health-System Pharmacists, 2018.    Symptom Assessment:  Nausea none  Shortness of breath a little  Headache none  Anxiety somewhat  Difficulty Sleeping a little    Information obtained from: chart review, interview of patient, and discussion with primary team  ______________________________________________________________________        Objective       Lab Results   Component Value Date    WBC 8.3 01/21/2025    HGB 13.0 (L) 01/21/2025    HCT 40.9 (L) 01/21/2025    MCV 84 01/21/2025     01/21/2025      Lab Results   Component Value Date    GLUCOSE 121 (H) 01/21/2025    CALCIUM 10.0 01/21/2025      01/21/2025    K 3.8 01/21/2025    CO2 28 01/21/2025     01/21/2025    BUN 8 01/21/2025    CREATININE 0.48 (L) 01/21/2025     Lab Results   Component Value Date    ALT 14 01/21/2025    AST 16 01/21/2025    ALKPHOS 84 01/21/2025    BILITOT 0.5 01/21/2025     Estimated Creatinine Clearance: 125 mL/min (A) (by C-G formula based on SCr of 0.48 mg/dL (L)).       Scheduled medications   [Held by provider] apixaban, 5 mg, oral, BID  [Held by provider] atorvastatin, 20 mg, oral, Nightly  bacitracin, , Topical, TID  fentaNYL, 1 patch, transdermal, q72h  [Held by provider] folic acid, 1 mg, oral, Daily  [Held by provider] guaiFENesin, 600 mg, oral, BID  hydrOXYzine HCL, 25 mg, oral, Once  ipratropium-albuteroL, 3 mL, nebulization, TID  lidocaine, 1 patch, transdermal, Daily  [Held by provider] mirtazapine, 15 mg, oral, Nightly  [Held by provider] montelukast, 10 mg, oral, Daily  [Held by provider] multivitamin with minerals, 1 tablet, oral, Daily  nicotine, 1 patch, transdermal, q24h  oxygen, , inhalation, Continuous - Inhalation  oxygen, , inhalation, Continuous - Inhalation  [Held by provider] pancrelipase (Lip-Prot-Amyl), 1 capsule, oral, BID  [Held by provider] pantoprazole, 40 mg, oral, Daily before breakfast  polyethylene glycol, 17 g, oral, Daily  [Held by provider] pregabalin, 75 mg, oral, TID  [Held by provider] rOPINIRole, 0.5 mg, oral, TID  [Held by provider] sennosides, 2 tablet, oral, Nightly  sodium chloride, 2 spray, Each Nostril, 4x daily  [Held by provider] tamsulosin, 0.4 mg, oral, Daily  thiamine, 100 mg, oral, Daily  [Held by provider] topiramate, 100 mg, oral, BID  white petrolatum, , Topical, TID      Continuous medications     PRN medications  [Held by provider] cyclobenzaprine, 10 mg, TID PRN  HYDROmorphone, 0.5 mg, q3h PRN  HYDROmorphone, 1 mg, q3h PRN  lidocaine, 1 patch, q12h PRN  [Held by provider] melatonin, 3 mg, Nightly PRN  naloxone, 0.2 mg, q5 min PRN  [Held by provider] nicotine  polacrilex, 2 mg, q2h PRN  OLANZapine, 2.5 mg, q12h PRN  [Held by provider] ondansetron ODT, 4 mg, q8h PRN                    PHYSICAL EXAMINATION:  Vital Signs:   Vital signs reviewed  Vitals:    01/22/25 0949   BP: (!) 154/91   Pulse: (!) 136   Resp: 24   Temp:    SpO2:      Pain Score: 6     Physical Exam  Constitutional:       Appearance: He is toxic-appearing.      Comments: Sitter at the bedside   HENT:      Head: Normocephalic and atraumatic.      Ears:      Comments: Erythematous swelling below the right ear.  Mild tenderness     Mouth/Throat:      Mouth: Mucous membranes are dry.   Eyes:      Conjunctiva/sclera: Conjunctivae normal.      Comments: Right eye closed   Neck:      Comments: Limited neck motion  Pulmonary:      Effort: Pulmonary effort is normal.   Abdominal:      General: Abdomen is flat.      Palpations: Abdomen is soft.   Musculoskeletal:         General: Normal range of motion.      Cervical back: Neck supple.   Skin:     General: Skin is warm and dry.   Neurological:      Mental Status: He is alert.   Psychiatric:         Mood and Affect: Mood normal.       ASSESSMENT/PLAN:  54-year-old male with large lytic clival tumor with concern for chondroma, laryngeal cancer s/p surgery admitted for workup of pain.  We are consulted for pain management.  During prior admission patient was on methadone, Lyrica however per chart review it seems he was not prescribed those medications.  Patient is a bit sleepy especially since he has received sedative medications prior to scans.  He is unable to provide history clearly regarding the meds at home hence it is unclear if he was taking methadone and Lyrica at home.    Per oncologist patient's prognosis is poor due to cancer.  He is unable to tolerate anything p.o.  Hospice referral has been placed     Neoplasm related neck and ear pain.  Somatic and neuropathic.  Uncontrolled  Home regimen Lyrica 200 mg p.o. 3 times daily  OARRS/PDMP reviewed no aberrant  behavior noted.consistent with  prescriptions/records and patient history   Total 24h OME use: 56 mg OME + Methadone 2.5 mg 1 dose/24 hours + Fentanyl patch 12 mcg/h placed at 12 PM on 1/21/2025  Patient unable to take p.o. safely due to risk of aspiration    Recommend increasing fentanyl patch to 25 mcg/hr every 72 hours. (Based on his 24-hour opioid requirements of 56 mg OME equivalent to around 25 mcg/h patch it is safe to increase the dose of fentanyl patch)  Recommend Roxanol 20 mg/mL give 5 mg every 4 hours as needed pain moderate  Recommend Dilaudid 0.5 mg IV every 3 hours as needed severe pain  Recommend Dilaudid 1 mg IV every 3 hours as needed breakthrough pain  Continue Lidoderm patch apply to the neck area  Lyrica currently on hold and patient cannot tolerate p.o. meds safely  Continue to monitor pain scores and administer PRN medications as appropriate  Continue/initiate nonpharmacologic pain management strategies including ice/heat therapy, distraction techniques, deep breathing/relaxation techniques, calming music, and repositioning  Continue to monitor for signs of opioid efficacy (pain scores, improved functionality) and toxicity (pinpoint pupils, excess sedation/drowsiness/confusion, respiratory depression, etc.)    Encephalopathy with agitation likely secondary to pain, disease  Sitter at the bedside  Would prefer to avoid IM medications  Recommend stopping Zyprexa IM as needed  Recommend Haldol 1 mg IV every 12 hours as needed    At risk for nausea without vomiting related to opioids Well-controlled  Recommend Zofran 4 mg IV every 8 hours as needed  Recommend stopping Zofran ODT    At risk for constipation related to opioids,decreased mobility in the setting of hospitalization, and decreased PO intake, currently not constipated  Recommend Dulcolax suppository 5 mg rectally daily as needed  Warm water, Prune juice  Encourage mobility as tolerated, PT/OT following   Monitor BM frequency, adjust  regimen as needed. Goal to have BM without straining q48-72h    Medical Decision Making/Goals of Care/Advance Care Planning:  Plan for hospice enrollment.    Disposition: Please  start the process of having prior authorization with meds to beds deliver medications to patient prior to discharge via [facility] pharmacy. Prescriptions will need to be sent 48-72 hours prior to discharge so that a prior authorization can be completed.   Discharge date: unknown pending acute issues. Will assess if patient needs an appointment with Outpatient Supportive Oncology as appropriate    SIGNATURE AND BILLING:  High Complexity   Today, I am treating the patient for acute on chronic pain which is in severe exacerbation,   Extensive DATA   Reviewed records from the following unique sources:  providers from oncology  services   Diagnostic tests and information reviewed for today's visit:  Most recent labs and imaging results, Medications  Independently interpreted test CBC, CMP which shows anemia.  Discussed plan of Care/management of pain with: Provider via shared electronic medical record/secure chat/email or face-to-face.  High risk of morbidity from additional diagnostic testing or treatment  The patient is on parenteral controlled substances: IV as needed as above Dilaudid    Changes to plan indicated in bold.     Thank you for asking Supportive and Palliative Oncology to assist with care of this patient. Recommendations will be communicated back to the consulting service by way of shared electronic medical record/secure chat/email or face-to-face. We will continue to follow. Please contact us for additional questions or concerns.    Medical Decision Making was high level due to high complexity of problems, extensive data review, and high risk of management/treatment.   Time:   I spent 50 minutes the care of this patient which included chart review, interviewing patient/family, discussion with primary team, coordination of care,  and documentation.    \  SIGNATURE: Florina Jackson MD   PAGER/CONTACT:  Contact information:  Supportive and Palliative Oncology  Monday-Friday 8 AM-5 PM  Epic Secure chat or pager [phone].  After hours and weekends:  pager [phone]

## 2025-01-22 NOTE — NURSING NOTE
"Due to patient presentation, [facility] services no longer deemed necessary. Verified appropriateness of discontinuation with staff. Please contact \"[facility]\" via secure chat to resume services or if any questions/concerns arise. Consult appreciated.    "

## 2025-01-22 NOTE — SIGNIFICANT EVENT
The writer spoke to Mr. Mckay regarding his decision for hospice.  The patient endorsed that he no longer wants hospice, and he still wants to be full code, and that he would be willing to undergo further treatment if feasible.  Patient was A&O by 3 when this discussion was made at 5:05 pm on 1/22/2025.

## 2025-01-22 NOTE — SIGNIFICANT EVENT
I was called to the bedside around 10:05 to see patient following fall while being assisted to the bathroom. Patient reports feeling lightheaded prior to the fall. He denies hitting his head or LOC, losing continent. Otherwise, he denies any chest pain, SOB, palpitation, headache.     On PE, patient appears not to be distress, conversant, awake and alert. No bruises was found on the skin. VSS and hemodynamically stabled. EKG NSR. Since patient did not hit, there was no indication for CTH.      Encouraged pt to ask for assistance anytime he wants to go use the bathroom. He currently has a sitter in the room. Will continue to monitor and emergent intervention warranted for this fall.

## 2025-01-23 ENCOUNTER — APPOINTMENT (OUTPATIENT)
Dept: RADIATION ONCOLOGY | Facility: HOSPITAL | Age: 55
End: 2025-01-23
Payer: COMMERCIAL

## 2025-01-23 LAB
ATRIAL RATE: 118 BPM
P AXIS: 44 DEGREES
P OFFSET: 201 MS
P ONSET: 158 MS
PR INTERVAL: 116 MS
Q ONSET: 216 MS
QRS COUNT: 20 BEATS
QRS DURATION: 80 MS
QT INTERVAL: 336 MS
QTC CALCULATION(BAZETT): 470 MS
QTC FREDERICIA: 421 MS
R AXIS: -25 DEGREES
T AXIS: 59 DEGREES
T OFFSET: 384 MS
VENTRICULAR RATE: 118 BPM

## 2025-01-23 PROCEDURE — 2500000001 HC RX 250 WO HCPCS SELF ADMINISTERED DRUGS (ALT 637 FOR MEDICARE OP): Mod: SE

## 2025-01-23 PROCEDURE — 94640 AIRWAY INHALATION TREATMENT: CPT

## 2025-01-23 PROCEDURE — 99233 SBSQ HOSP IP/OBS HIGH 50: CPT | Performed by: INTERNAL MEDICINE

## 2025-01-23 PROCEDURE — 2500000004 HC RX 250 GENERAL PHARMACY W/ HCPCS (ALT 636 FOR OP/ED): Mod: SE

## 2025-01-23 PROCEDURE — 2500000001 HC RX 250 WO HCPCS SELF ADMINISTERED DRUGS (ALT 637 FOR MEDICARE OP): Mod: SE | Performed by: NUTRITIONIST

## 2025-01-23 PROCEDURE — 1170000001 HC PRIVATE ONCOLOGY ROOM DAILY

## 2025-01-23 PROCEDURE — 99232 SBSQ HOSP IP/OBS MODERATE 35: CPT

## 2025-01-23 PROCEDURE — 2500000002 HC RX 250 W HCPCS SELF ADMINISTERED DRUGS (ALT 637 FOR MEDICARE OP, ALT 636 FOR OP/ED): Mod: SE | Performed by: STUDENT IN AN ORGANIZED HEALTH CARE EDUCATION/TRAINING PROGRAM

## 2025-01-23 PROCEDURE — 2500000002 HC RX 250 W HCPCS SELF ADMINISTERED DRUGS (ALT 637 FOR MEDICARE OP, ALT 636 FOR OP/ED): Mod: SE

## 2025-01-23 PROCEDURE — 92526 ORAL FUNCTION THERAPY: CPT | Mod: GN

## 2025-01-23 PROCEDURE — 2500000005 HC RX 250 GENERAL PHARMACY W/O HCPCS: Mod: SE | Performed by: STUDENT IN AN ORGANIZED HEALTH CARE EDUCATION/TRAINING PROGRAM

## 2025-01-23 PROCEDURE — 2500000005 HC RX 250 GENERAL PHARMACY W/O HCPCS: Mod: SE

## 2025-01-23 PROCEDURE — S4991 NICOTINE PATCH NONLEGEND: HCPCS | Mod: SE

## 2025-01-23 RX ADMIN — HYDROPHOR: 42 OINTMENT TOPICAL at 20:49

## 2025-01-23 RX ADMIN — Medication 2 L/MIN: at 08:14

## 2025-01-23 RX ADMIN — ROPINIROLE 0.5 MG: 0.5 TABLET, FILM COATED ORAL at 14:41

## 2025-01-23 RX ADMIN — HYDROPHOR: 42 OINTMENT TOPICAL at 14:42

## 2025-01-23 RX ADMIN — BACITRACIN: 500 OINTMENT TOPICAL at 14:42

## 2025-01-23 RX ADMIN — SALINE NASAL SPRAY 2 SPRAY: 1.5 SOLUTION NASAL at 08:10

## 2025-01-23 RX ADMIN — Medication 2 L/MIN: at 20:54

## 2025-01-23 RX ADMIN — HYDROPHOR: 42 OINTMENT TOPICAL at 08:10

## 2025-01-23 RX ADMIN — PREGABALIN 75 MG: 25 CAPSULE ORAL at 14:41

## 2025-01-23 RX ADMIN — MORPHINE SULFATE 5 MG: 20 SOLUTION ORAL at 18:11

## 2025-01-23 RX ADMIN — Medication 2 L/MIN: at 20:55

## 2025-01-23 RX ADMIN — ROPINIROLE 0.5 MG: 0.5 TABLET, FILM COATED ORAL at 20:49

## 2025-01-23 RX ADMIN — HYDROMORPHONE HYDROCHLORIDE 1 MG: 1 INJECTION, SOLUTION INTRAMUSCULAR; INTRAVENOUS; SUBCUTANEOUS at 22:51

## 2025-01-23 RX ADMIN — BACITRACIN: 500 OINTMENT TOPICAL at 08:10

## 2025-01-23 RX ADMIN — MORPHINE SULFATE 5 MG: 20 SOLUTION ORAL at 12:57

## 2025-01-23 RX ADMIN — PREGABALIN 75 MG: 25 CAPSULE ORAL at 20:49

## 2025-01-23 RX ADMIN — IPRATROPIUM BROMIDE AND ALBUTEROL SULFATE 3 ML: .5; 3 SOLUTION RESPIRATORY (INHALATION) at 09:08

## 2025-01-23 RX ADMIN — NICOTINE 1 PATCH: 14 PATCH, EXTENDED RELEASE TRANSDERMAL at 22:51

## 2025-01-23 RX ADMIN — SALINE NASAL SPRAY 2 SPRAY: 1.5 SOLUTION NASAL at 20:49

## 2025-01-23 RX ADMIN — MIRTAZAPINE 15 MG: 15 TABLET, FILM COATED ORAL at 20:49

## 2025-01-23 RX ADMIN — TOPIRAMATE 100 MG: 100 TABLET, FILM COATED ORAL at 20:49

## 2025-01-23 RX ADMIN — MONTELUKAST 10 MG: 10 TABLET, FILM COATED ORAL at 20:49

## 2025-01-23 ASSESSMENT — PAIN SCALES - GENERAL
PAINLEVEL_OUTOF10: 8
PAINLEVEL_OUTOF10: 5 - MODERATE PAIN
PAINLEVEL_OUTOF10: 9
PAINLEVEL_OUTOF10: 0 - NO PAIN
PAINLEVEL_OUTOF10: 5 - MODERATE PAIN
PAINLEVEL_OUTOF10: 7
PAINLEVEL_OUTOF10: 5 - MODERATE PAIN
PAINLEVEL_OUTOF10: 5 - MODERATE PAIN

## 2025-01-23 ASSESSMENT — COGNITIVE AND FUNCTIONAL STATUS - GENERAL
EATING MEALS: A LITTLE
DRESSING REGULAR UPPER BODY CLOTHING: A LITTLE
WALKING IN HOSPITAL ROOM: A LITTLE
DRESSING REGULAR LOWER BODY CLOTHING: A LITTLE
TOILETING: A LITTLE
DRESSING REGULAR UPPER BODY CLOTHING: A LITTLE
HELP NEEDED FOR BATHING: A LITTLE
PERSONAL GROOMING: A LITTLE
DAILY ACTIVITIY SCORE: 18
PERSONAL GROOMING: A LITTLE
TOILETING: A LITTLE
CLIMB 3 TO 5 STEPS WITH RAILING: A LOT
HELP NEEDED FOR BATHING: A LITTLE
MOBILITY SCORE: 21
DRESSING REGULAR LOWER BODY CLOTHING: A LITTLE
MOBILITY SCORE: 21
WALKING IN HOSPITAL ROOM: A LITTLE
CLIMB 3 TO 5 STEPS WITH RAILING: A LOT
DAILY ACTIVITIY SCORE: 18
EATING MEALS: A LITTLE

## 2025-01-23 ASSESSMENT — PAIN - FUNCTIONAL ASSESSMENT
PAIN_FUNCTIONAL_ASSESSMENT: 0-10

## 2025-01-23 ASSESSMENT — PAIN DESCRIPTION - LOCATION: LOCATION: LEG

## 2025-01-23 ASSESSMENT — PAIN DESCRIPTION - ORIENTATION: ORIENTATION: RIGHT

## 2025-01-23 NOTE — CARE PLAN
The patient's goals for the shift include      The clinical goals for the shift include will remain free from falls      Problem: Skin  Goal: Decreased wound size/increased tissue granulation at next dressing change  Outcome: Progressing  Goal: Participates in plan/prevention/treatment measures  Outcome: Progressing  Goal: Prevent/manage excess moisture  Outcome: Progressing  Goal: Prevent/minimize sheer/friction injuries  Outcome: Progressing  Goal: Promote/optimize nutrition  Outcome: Progressing  Goal: Promote skin healing  Outcome: Progressing

## 2025-01-23 NOTE — PROGRESS NOTES
"Gaurav Mckay \"Lucy" is a 54 y.o. male on day 8 of admission presenting with Ptosis of right eyelid.    Subjective   NAEON; denies any c/o chest pain, SOB, palpitation, or any GI symptoms.   Changed code status from full code to DNR with comfort care.   \  Review of Systems   Review of Systems   10/10 negative except stated above       Objective     Last Recorded Vitals  Blood pressure 115/77, pulse 106, temperature 36.4 °C (97.5 °F), resp. rate 18, height 1.829 m (6'), weight 78.6 kg (173 lb 3.2 oz), SpO2 93%.  Intake/Output last 3 Shifts:  No intake/output data recorded.    Physical Exam  Constitutional:       Appearance: Normal appearance.   HENT:      Head: Normocephalic and atraumatic.      Nose: Nose normal.   Neck:      Comments: ROM limited by pain. Has neck mass   Cardiovascular:      Rate and Rhythm: Normal rate.      Pulses: Normal pulses.   Pulmonary:      Effort: Pulmonary effort is normal.      Comments: Stertorous breathing   Abdominal:      General: Abdomen is flat.      Palpations: Abdomen is soft.   Musculoskeletal:         General: Normal range of motion.      Cervical back: Normal range of motion.   Skin:     General: Skin is warm.   Neurological:      General: No focal deficit present.      Mental Status: He is alert and oriented to person, place, and time.      Comments: Sometimes with intermittent confusion   Psychiatric:         Mood and Affect: Mood normal.         Relevant Results    Labs    Results from last 7 days   Lab Units 01/21/25  0647 01/20/25  1640 01/20/25  0508   WBC AUTO x10*3/uL 8.3 8.0 6.0   HEMOGLOBIN g/dL 13.0* 12.1* 8.0*   HEMATOCRIT % 40.9* 38.8* 25.4*   PLATELETS AUTO x10*3/uL 389 363 240            Results from last 7 days   Lab Units 01/21/25  0647 01/20/25  1640 01/20/25  0902   SODIUM mmol/L 139 141 147*   POTASSIUM mmol/L 3.8 3.3* 4.5   CHLORIDE mmol/L 100 102 103   CO2 mmol/L 28 31 30   BUN mg/dL 8 6 4*   CREATININE mg/dL 0.48* 0.46* 0.48*   CALCIUM mg/dL 10.0 " 9.8 10.0     Results from last 7 days   Lab Units 01/21/25  0647 01/20/25  1640   ALK PHOS U/L 84 83   BILIRUBIN TOTAL mg/dL 0.5 0.5   PROTEIN TOTAL g/dL 6.5 7.5   ALT U/L 14 12   AST U/L 16 12             Medications  Scheduled medications  bacitracin, , Topical, TID  fentaNYL, 1 patch, transdermal, q72h  ipratropium-albuteroL, 3 mL, nebulization, TID  lidocaine, 1 patch, transdermal, Daily  mirtazapine, 15 mg, oral, Nightly  montelukast, 10 mg, oral, Daily  nicotine, 1 patch, transdermal, q24h  oxygen, , inhalation, Continuous - Inhalation  oxygen, , inhalation, Continuous - Inhalation  pancrelipase (Lip-Prot-Amyl), 1 capsule, oral, BID  pantoprazole, 40 mg, oral, Daily before breakfast  polyethylene glycol, 17 g, oral, Daily  pregabalin, 75 mg, oral, TID  rOPINIRole, 0.5 mg, oral, TID  sennosides, 2 tablet, oral, Nightly  sodium chloride, 2 spray, Each Nostril, 4x daily  tamsulosin, 0.4 mg, oral, Daily  thiamine, 100 mg, oral, Daily  topiramate, 100 mg, oral, BID  white petrolatum, , Topical, TID      Continuous medications       PRN medications  PRN medications: cyclobenzaprine, HYDROmorphone, HYDROmorphone, lidocaine, melatonin, morphine, naloxone, OLANZapine, ondansetron ODT     Imaging  XR chest 1 view   Final Result   1. Interval development of right basilar opacity, consider   atelectasis/aspiration pneumonitis/infection.        I personally reviewed the images/study and I agree with the findings   as stated by Rayshawn Kidd MD, PGY-2 this study was interpreted at   [facility], [place], [place].        MACRO:   None        Signed by: Ev Dinero 1/22/2025 3:58 PM   Dictation workstation:   [id number]      XR chest 1 view   Final Result   1.  No evidence of acute cardiopulmonary process.        I personally reviewed the images/study and I agree with the findings   as stated by Rayshawn Kidd MD, PGY-2 this study was interpreted at   [facility]  [place], [place].        MACRO:   None        Signed by: vE Dinero 1/22/2025 3:59 PM   Dictation workstation:   [id number]      XR chest 1 view   Final Result   1.  No evidence of acute cardiopulmonary process. No focal   consolidations or pleural effusions.   2. Minimal bibasilar atelectasis.        I personally reviewed the images/study and I agree with Dr. Joyce Parish findings as stated. This study was interpreted at [place], [place]        MACRO:   None        Signed by: Yovanny Sheehan 1/20/2025 8:01 AM   Dictation workstation:   [id number]      MR brain wo IV contrast   Final Result   Single axial T2 weighted images obtained, after which study was   terminated due to extensive motion. Please see above for findings   that could be ascertained from the markedly limited study.        MACRO:   None.        Signed by: Marc Deras 1/18/2025 1:48 PM   Dictation workstation:   [id number]      CT head wo IV contrast   Final Result   There is aggressive destructive heterogeneous mass centered in the   clivus with poorly defined margins extending into the bilateral   medial petrous apices, nasopharynx, and pituitary sella. 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 There is   erosion of the posterior cortex of the clivus without gross evidence   of extension of the prepontine cistern. The mass however has   increased in the short-term interval measuring 2.5 cm in AP dimension   on sagittal view, previously 2.0 cm.        Signed by: Enrrique Zhou 1/15/2025 8:01 PM   Dictation workstation:   [id number]      Point of Care Ultrasound    (Results Pending)            Assessment/Plan   Assessment & Plan  Ptosis of right eyelid    Lemuel Mckay is a 54 y.o. 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 Transferred to the MICU for AHRF with Ph 7,29 and PCO2 of 88 on ABG requiring BiPAP. Now back to the floor. Respiratory function improve. Planning for radiation therapy to the brain for the clival mass. Support Onc. Tried radiation in the morning, pt could not tolerate bcos of pain. Will tray again in the afternoon. Discussed GOC with patient today and opted for hospice care.     Updates 1/22/25:  - Support Onc follow; appreciate recs   - Will hold radiation for now and initiate GOC discussion with patient.   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   Support Onc following  - continue scheduled methadone, morphine and acetaminophen PRN  - zofran PRN      #history of saddle PE  #COPD  :: on apixaban at home  :: requires 2 L NC after PE and d/t COPD  - monitor O2 requirements  - change to heparin drip, in case of surgical intervention; since no surgery per NSGY, consider switching back to eliquis      #alcohol use disorder  :: prior hospitalization in 12/2024 showed intoxication  :: patient states last  drink was six months ago  - CIWA protocol discontinued      #tobacco use  :: currently smoking 1 PPD, vaping 2 hours/week  - nicotine patch daily     Electrolytes: PRN  Lines/tubes: PIV   Abx: none  Drips: heparin   GI ppx: none  DVT: heparin  Code status: full code  NOK: Jacqui Hernandez (Significant Other)  [phone] (Mobile)     Case seen and discussed with attending Dr. Suarez, to addend as necessary.    Siva Larkin MD PGY-1

## 2025-01-23 NOTE — ACP (ADVANCE CARE PLANNING)
Confirming Previous Code Status:   Advance Care Planning Note     Discussion Date: 01/23/25   Discussion Participants: patient    The patient wishes to discuss Advance Care Planning today and the following is a brief summary of our discussion.     Patient has capacity to make their own medical decisions: Yes  Health Care Agent/Surrogate Decision Maker documented in chart: Yes    Documents on file and valid:  Advance Directive/Living Will: No   Health Care Power of : Yes      Communication of Medical Status/Prognosis:    It was explained to patient that prognosis of his current disease with or without radiation therapy is poor and that radiation therapy would be mainly for palliation.     Communication of Treatment Goals/Options:   Patient emphatically states that he does not want to pursue with radiation and opted for home hospice.  As part of the conversation, patient also agrees with code status change from full code to a DNR with comfort care with the understanding that there will not be advanced resuscitative measures or further medical treatment other than to provide the patient with comfort.     Additionally, we spoke with his wife on the phone about his decision to hospice, and plan will be to discharge to a facility for hospice,Patient expresses an agreement to going to a facility hospice    Treatment Decisions  Goals of Care: comfort is paramount; other goals are not relevant or achievable     Follow Up Plan  Referral to hospice  Team Members  Siva Larkin MD, Rupert Suarez MD, and the writer present for conversation    Time Statement: Total face to face time spent on advance care planning was 15 minutes with 14 minutes spent in counseling, including the explanation.    Bridger Amor MD  1/23/2025 11:31 AM

## 2025-01-23 NOTE — CARE PLAN
The patient's goals for the shift include      The clinical goals for the shift include will remain free from falls      Problem: Skin  Goal: Decreased wound size/increased tissue granulation at next dressing change  1/23/2025 1602 by Tom Pinzon RN  Outcome: Progressing  1/23/2025 1106 by Tom Pinzon RN  Outcome: Progressing  Goal: Participates in plan/prevention/treatment measures  1/23/2025 1602 by Tom Pinzon RN  Outcome: Progressing  1/23/2025 1106 by Tom Pinzon RN  Outcome: Progressing  Goal: Prevent/manage excess moisture  1/23/2025 1602 by Tom Pinzon RN  Outcome: Progressing  1/23/2025 1106 by Tom Pinzon RN  Outcome: Progressing  Goal: Prevent/minimize sheer/friction injuries  1/23/2025 1602 by oTm Pinzon RN  Outcome: Progressing  1/23/2025 1106 by Tom Pinzon RN  Outcome: Progressing  Goal: Promote/optimize nutrition  1/23/2025 1602 by Tom Pinzon RN  Outcome: Progressing  1/23/2025 1106 by Tom Pinzon RN  Outcome: Progressing  Goal: Promote skin healing  1/23/2025 1602 by Tom Pinzon RN  Outcome: Progressing  1/23/2025 1106 by Tom Pinzon RN  Outcome: Progressing     Problem: Fall/Injury  Goal: Not fall by end of shift  Outcome: Progressing  Goal: Be free from injury by end of the shift  Outcome: Progressing  Goal: Verbalize understanding of personal risk factors for fall in the hospital  Outcome: Progressing  Goal: Verbalize understanding of risk factor reduction measures to prevent injury from fall in the home  Outcome: Progressing  Goal: Use assistive devices by end of the shift  Outcome: Progressing  Goal: Pace activities to prevent fatigue by end of the shift  Outcome: Progressing

## 2025-01-23 NOTE — PROGRESS NOTES
SUPPORTIVE AND PALLIATIVE ONCOLOGY PROGRESS NOTE      SERVICE DATE: 1/23/2025      SUBJECTIVE:  Interval Events:  Family meeting today with patient and discussion with wife over phone by the primary team.  Patient does not want to continue radiation treatment and would like to enroll in hospice   Plan to go to facility with hospice and then home hospice  CODE STATUS changed from full code to DNR comfort care  P.o. meds started  Regular diet    Pain Assessment: Complains of 8/10 pain below the right ear and right neck dull achy aggravated by movement and partially relieved by IV Dilaudid as needed.  Dilaudid decreases the pain to 6/10 with goal pain of 5/10  Sitter at the bedside    Opioid Requirements  Past 24 h opioid requirements (1/21/2025 at 0800 to 01/23/25 at 0800):   Hydromorphone 0.5 mg IV every 3 hours as needed.  1 dose/24 hours-6.25 mg OME  Hydromorphone 1 mg IV every 3 hours as needed.  Used 0 dose/24 hours  Roxanol 20 mg/mL used 5 mg every 4 hours as needed.  Used 1 dose/24 hours  Total OME= 11.5 mg OME  Note: OME calculations based on equianalgesic table below. Please note this table is based on best available evidence but conversions are still approximate. These are NOT opioid DOSES for individual patient use; this is equivalency information.  Drug Parenteral Enteral   Morphine 10 25   Oxycodone N/A 20   Hydromorphone 2 5   Fentanyl 0.15 N/A   Tramadol N/A 120   Citation: Darnell ENGLAND. Demystifying opioid conversion calculations: A guide for effective dosing, Second edition. [place] Essie: American Society of Health-System Pharmacists, 2018.    Symptom Assessment:  Nausea none  Shortness of breath a little  Headache none  Anxiety somewhat  Difficulty Sleeping a little    Information obtained from: chart review, interview of patient, and discussion with primary team  ______________________________________________________________________        Objective       Lab Results   Component Value Date    WBC  8.3 01/21/2025    HGB 13.0 (L) 01/21/2025    HCT 40.9 (L) 01/21/2025    MCV 84 01/21/2025     01/21/2025      Lab Results   Component Value Date    GLUCOSE 121 (H) 01/21/2025    CALCIUM 10.0 01/21/2025     01/21/2025    K 3.8 01/21/2025    CO2 28 01/21/2025     01/21/2025    BUN 8 01/21/2025    CREATININE 0.48 (L) 01/21/2025     Lab Results   Component Value Date    ALT 14 01/21/2025    AST 16 01/21/2025    ALKPHOS 84 01/21/2025    BILITOT 0.5 01/21/2025     Estimated Creatinine Clearance: 125 mL/min (A) (by C-G formula based on SCr of 0.48 mg/dL (L)).       Scheduled medications   bacitracin, , Topical, TID  fentaNYL, 1 patch, transdermal, q72h  ipratropium-albuteroL, 3 mL, nebulization, TID  lidocaine, 1 patch, transdermal, Daily  mirtazapine, 15 mg, oral, Nightly  montelukast, 10 mg, oral, Daily  nicotine, 1 patch, transdermal, q24h  oxygen, , inhalation, Continuous - Inhalation  oxygen, , inhalation, Continuous - Inhalation  pancrelipase (Lip-Prot-Amyl), 1 capsule, oral, BID  pantoprazole, 40 mg, oral, Daily before breakfast  polyethylene glycol, 17 g, oral, Daily  pregabalin, 75 mg, oral, TID  rOPINIRole, 0.5 mg, oral, TID  sennosides, 2 tablet, oral, Nightly  sodium chloride, 2 spray, Each Nostril, 4x daily  tamsulosin, 0.4 mg, oral, Daily  thiamine, 100 mg, oral, Daily  topiramate, 100 mg, oral, BID  white petrolatum, , Topical, TID      Continuous medications     PRN medications  cyclobenzaprine, 10 mg, TID PRN  HYDROmorphone, 0.5 mg, q3h PRN  HYDROmorphone, 1 mg, q3h PRN  lidocaine, 1 patch, q12h PRN  melatonin, 3 mg, Nightly PRN  morphine, 5 mg, q4h PRN  naloxone, 0.2 mg, q5 min PRN  OLANZapine, 2.5 mg, q12h PRN  ondansetron ODT, 4 mg, q8h PRN                    PHYSICAL EXAMINATION:  Vital Signs:   Vital signs reviewed  Vitals:    01/23/25 1137   BP: 115/77   Pulse: 106   Resp: 18   Temp: 36.4 °C (97.5 °F)   SpO2: 93%     Pain Score: 0 - No pain     Physical Exam  Constitutional:        Appearance: He is toxic-appearing.      Comments: Sitter at the bedside   HENT:      Head: Normocephalic and atraumatic.      Ears:      Comments: Erythematous swelling below the right ear.  Mild tenderness     Mouth/Throat:      Mouth: Mucous membranes are dry.   Eyes:      Conjunctiva/sclera: Conjunctivae normal.      Comments: Right eye closed   Neck:      Comments: Limited neck motion  Pulmonary:      Effort: Pulmonary effort is normal.   Abdominal:      General: Abdomen is flat.      Palpations: Abdomen is soft.   Musculoskeletal:         General: Normal range of motion.      Cervical back: Neck supple.   Skin:     General: Skin is warm and dry.   Neurological:      Mental Status: He is alert.   Psychiatric:         Mood and Affect: Mood normal.       ASSESSMENT/PLAN:  54-year-old male with large lytic clival tumor with concern for chondroma, laryngeal cancer s/p surgery admitted for workup of pain.  We are consulted for pain management.  During prior admission patient was on methadone, Lyrica however per chart review it seems he was not prescribed those medications.  Patient is a bit sleepy especially since he has received sedative medications prior to scans.  He is unable to provide history clearly regarding the meds at home hence it is unclear if he was taking methadone and Lyrica at home.    Per oncologist patient's prognosis is poor due to cancer.  He is unable to tolerate anything p.o.  Hospice referral has been placed     Neoplasm related neck and ear pain.  Somatic and neuropathic.  Uncontrolled  Home regimen Lyrica 200 mg p.o. 3 times daily  OARRS/PDMP reviewed no aberrant behavior noted.consistent with  prescriptions/records and patient history   Total 24h OME use: 11.5 mg OME plus Fentanyl patch 12 mcg/h     Recommend stopping Flexeril as needed since he does not have muscle spasms  Recommend increasing fentanyl patch to 25 mcg/hr every 72 hours. (Based on his 24-hour opioid requirements of 56 mg  OME equivalent to around 25 mcg/h patch it is safe to increase the dose of fentanyl patch)  Recommend increasing Roxanol 20 mg/mL from 5 mg to 10 mg every 4 hours as needed pain moderate  Continue Dilaudid 0.5 mg IV every 3 hours as needed moderate pain  Continue Dilaudid 1 mg IV every 3 hours as needed severe pain  Continue Lidoderm patch apply to the neck area  Continue Lyrica 75 mg p.o. 3 times daily.  If patient unable to take p.o. meds then will consider changing to elixir Lyrica  Continue to monitor pain scores and administer PRN medications as appropriate  Continue/initiate nonpharmacologic pain management strategies including ice/heat therapy, distraction techniques, deep breathing/relaxation techniques, calming music, and repositioning  Continue to monitor for signs of opioid efficacy (pain scores, improved functionality) and toxicity (pinpoint pupils, excess sedation/drowsiness/confusion, respiratory depression, etc.)    Encephalopathy with agitation likely secondary to pain, disease  Sitter at the bedside  Would prefer to avoid IM medications  Recommend stopping Zyprexa IM as needed.  Use 1 dose/24 hours  Recommend Haldol 1 mg IV every 12 hours as needed    At risk for nausea without vomiting related to opioids Well-controlled  Continue Zofran 4 mg IV every 8 hours as needed.  Used 0 dose/24 hours  Recommend stopping Zofran ODT    At risk for constipation related to opioids,decreased mobility in the setting of hospitalization, and decreased PO intake, currently not constipated  Recommend Dulcolax suppository 5 mg rectally daily as needed  Continue MiraLAX 17 g p.o. daily if patient able to tolerate  Warm water, Prune juice  Encourage mobility as tolerated, PT/OT following   Monitor BM frequency, adjust regimen as needed. Goal to have BM without straining q48-72h    Medical Decision Making/Goals of Care/Advance Care Planning:  Family meeting today with patient and discussion with wife over phone by the  primary team.  Patient does not want to continue radiation treatment and would like to enroll in hospice   Plan to go to facility with hospice and then home hospice  CODE STATUS changed from full code to DNR comfort care  P.o. meds started  Regular diet    Disposition:   Plan to discharge to facility with hospice and then home hospice    SIGNATURE AND BILLING:  High Complexity   Today, I am treating the patient for acute on chronic pain which is in severe exacerbation,   Extensive DATA   Reviewed records from the following unique sources:  providers from oncology  services   Diagnostic tests and information reviewed for today's visit: most recent labs and meds  Independently interpreted test CBC, CMP which shows anemia.  Discussed plan of Care/management of pain with: Provider via shared electronic medical record/secure chat/email or face-to-face.  High risk of morbidity from additional diagnostic testing or treatment  The patient is on parenteral controlled substances: IV as needed as above Dilaudid    Changes to plan indicated in bold.     Thank you for asking Supportive and Palliative Oncology to assist with care of this patient. Recommendations will be communicated back to the consulting service by way of shared electronic medical record/secure chat/email or face-to-face. We will continue to follow. Please contact us for additional questions or concerns.    Medical Decision Making was high level due to high complexity of problems, extensive data review, and high risk of management/treatment.   Time:   I spent 50 minutes the care of this patient which included chart review, interviewing patient/family, discussion with primary team, coordination of care, and documentation.    \  SIGNATURE: Florina Jackson MD   PAGER/CONTACT:  Contact information:  [facility] Pall[facility] Oncology  Monday-Friday 8 AM-5 PM  Epic Secure chat or pager [phone].  After hours and weekends:  pager [phone]

## 2025-01-23 NOTE — SIGNIFICANT EVENT
This morning, we had extensive conversation with patient about goals of care. Dr. Suarez discussed with patient on available care plan for his cancer treatment which includes radiation therapy with possible dobhoff vs PEG tube placement to optimize nutrition vs hospice care. Patient emphatically states that he does not want to pursue with radiation and opted for home hospice.  As part of the conversation, patient also agrees with code status change from full code to a DNR with comfort care with the understanding that there will no be any advanced resuscitative measures taking place in the event that his heart stopped functioning or he stops breathing.      Additionally, we spoke with his wife on the phone about his decision to pursue home hospice, and the wife express some concerns with him coming home and recommended pt going to a facility hospice temporarily and gradually transition to home hospice.     Patient expresses an agreement to going to a facility hospice momentarily. Will engage SW to start working patient dispo planning to a facility with hospice.

## 2025-01-23 NOTE — PROGRESS NOTES
"Speech-Language Pathology  SLP Adult Inpatient Speech-Language Pathology Treatment (Re-Eval)     Patient Name: Gaurav Mckay \"Lemuel\"  MRN: [id number]  Today's Date: 1/23/2025   Time Calculation  Start Time: 0939  Stop Time: 0949  Time Calculation (min): 10 min     Assessment:  Suspect severe pharyngeal dysphagia  High risk for aspiration w/ PO intake is apparent     Recommendations:  NPO  Allow small amounts (x5/hr) of ice chips and small, single sips of water (x5/hr) for oral comfort and to prevent further swallow disuse atrophy     Frequent, aggressive oral care as tolerated to improve infection control     If goal is to be aggressive, recommend completion of a Modified Barium Swallow Study and discussion for possible alternative means of nutrition and hydration.         Plan:  SLP Plan: Skilled SLP  SLP Frequency: 1-2x per week  Duration: 2-3 weeks- pending GOC   Discussed POC: Pt, medical team      Goals:  Pt/family will indicate understanding of dysphagia education: risk for aspiration, recommendations, and POC with > 80% accuracy via teach back method.              Date initiated: 1/21/25              Status: Progressing, pt educated about dysphagia/aspiration risk and agreeable to MBSS if GOC permit.       Further goals to be initiated as warranted following goals of care conversation.         Subjective   Pt properly identified and evaluated bedside.  A/Ox3. Cognitive status improved compared to initial evaluation. Room air.  Sitter present for safety.     Pt admitted 2' to headache and ptosis of R eye.  New R clival tumor- plans for xRT to the brain.  PMHx: laryngeal CA s/p sx, COPD, ETOH use, HTN.       MBSS completed 12/17/24 showed silent aspiration w/ thin liquids; recommendation made for puree solids and mildly thick liquids.  Pt reports that dysphagia has worsened since that time.  Endorses difficulty w/ solids and liquids + odynophagia.         Objective      Cognition:  Command Following: Good  "   Attention: Good   Orientation: Oriented x3        Oral/Motor Assessment:  Oral Hygiene: Adequate   Dentition: Edentulous, reported that he wears dentures but not present at time of evaluation.    Oral Motor: Tongue deviation to R side.   Voice (Perceptually): dysphonic, mild/moderate  Volitional Cough (Perceptually): Dystussia, moderate             Consistencies Trialed:  Ice chips, sip of water. Not appropriate for additional trials given apparent severity of swallowing deficits.         Clinical Observations:  Patient Positioning: Upright in Bed  Management of Oral Secretions: Adequate   Was The 3 oz Swallow Protocol Completed: No (Deferred at this time 2' to pt performance  w/ single ice chips and sips)        Clinical bedside swallow evaluation completed. Oral cavity clean & clear prior to start of assessment. Pt tolerated presentations of small ice chips via spoon with no overt s/sx of aspiration. Pt demonstrated throat clearing and weak cough during presentations of thin liquids via spoon, but severe cough observed during previous evaluation was not observed. Mildly wet vocal quality post trials. Delayed coughing observed; suspect aspiration of residue.  PO trials ceased due to concern for pharyngeal dysphagia/aspiration. At time of this evaluation, GOC: pt full code and wishing for aggressive tx. Recommended completion of MBSS to patient, and educated him about purpose/procedure. Pt agreeable to MBSS at the time.      Therapy:  Education provided to patient regarding NPO recommendations, importance of oral care, and overall dysphagia plan of care. Pt's understanding of ST plan improved from <50% at initial evaluation to approximately 90% during this evaluation.     Yelitza Ireland, Clinical Instructor, present for duration of session.

## 2025-01-24 ENCOUNTER — APPOINTMENT (OUTPATIENT)
Dept: OPHTHALMOLOGY | Facility: CLINIC | Age: 55
End: 2025-01-24
Payer: COMMERCIAL

## 2025-01-24 ENCOUNTER — APPOINTMENT (OUTPATIENT)
Dept: RADIATION ONCOLOGY | Facility: HOSPITAL | Age: 55
End: 2025-01-24
Payer: COMMERCIAL

## 2025-01-24 PROCEDURE — S4991 NICOTINE PATCH NONLEGEND: HCPCS | Mod: SE

## 2025-01-24 PROCEDURE — 1170000001 HC PRIVATE ONCOLOGY ROOM DAILY

## 2025-01-24 PROCEDURE — 2500000004 HC RX 250 GENERAL PHARMACY W/ HCPCS (ALT 636 FOR OP/ED): Mod: SE

## 2025-01-24 PROCEDURE — 2500000005 HC RX 250 GENERAL PHARMACY W/O HCPCS: Mod: SE

## 2025-01-24 PROCEDURE — 2500000001 HC RX 250 WO HCPCS SELF ADMINISTERED DRUGS (ALT 637 FOR MEDICARE OP): Mod: SE

## 2025-01-24 PROCEDURE — 99233 SBSQ HOSP IP/OBS HIGH 50: CPT | Performed by: INTERNAL MEDICINE

## 2025-01-24 PROCEDURE — 2500000002 HC RX 250 W HCPCS SELF ADMINISTERED DRUGS (ALT 637 FOR MEDICARE OP, ALT 636 FOR OP/ED): Mod: SE

## 2025-01-24 PROCEDURE — 99238 HOSP IP/OBS DSCHRG MGMT 30/<: CPT

## 2025-01-24 RX ORDER — FENTANYL 12.5 UG/1
2 PATCH TRANSDERMAL
Qty: 20 PATCH | Refills: 0 | Status: CANCELLED | OUTPATIENT
Start: 2025-01-27 | End: 2025-02-26

## 2025-01-24 RX ORDER — HYDROMORPHONE HYDROCHLORIDE 1 MG/ML
1 INJECTION, SOLUTION INTRAMUSCULAR; INTRAVENOUS; SUBCUTANEOUS
Status: DISCONTINUED | OUTPATIENT
Start: 2025-01-24 | End: 2025-01-25 | Stop reason: HOSPADM

## 2025-01-24 RX ORDER — HYDROMORPHONE HYDROCHLORIDE 1 MG/ML
0.5 INJECTION, SOLUTION INTRAMUSCULAR; INTRAVENOUS; SUBCUTANEOUS
Qty: 5 ML | Refills: 0 | Status: CANCELLED | OUTPATIENT
Start: 2025-01-24 | End: 2025-02-23

## 2025-01-24 RX ORDER — MORPHINE SULFATE 20 MG/ML
10 SOLUTION ORAL
Status: DISCONTINUED | OUTPATIENT
Start: 2025-01-24 | End: 2025-01-25 | Stop reason: HOSPADM

## 2025-01-24 RX ORDER — MORPHINE SULFATE 20 MG/ML
10 SOLUTION ORAL EVERY 4 HOURS PRN
Qty: 30 ML | Refills: 0 | Status: CANCELLED | OUTPATIENT
Start: 2025-01-24

## 2025-01-24 RX ORDER — HYDROMORPHONE HYDROCHLORIDE 1 MG/ML
1 INJECTION, SOLUTION INTRAMUSCULAR; INTRAVENOUS; SUBCUTANEOUS
Qty: 30 ML | Refills: 0 | Status: CANCELLED | OUTPATIENT
Start: 2025-01-24 | End: 2025-02-23

## 2025-01-24 RX ORDER — HYDROMORPHONE HYDROCHLORIDE 1 MG/ML
0.5 INJECTION, SOLUTION INTRAMUSCULAR; INTRAVENOUS; SUBCUTANEOUS
Status: DISCONTINUED | OUTPATIENT
Start: 2025-01-24 | End: 2025-01-25 | Stop reason: HOSPADM

## 2025-01-24 RX ORDER — FENTANYL 12.5 UG/1
2 PATCH TRANSDERMAL
Status: DISCONTINUED | OUTPATIENT
Start: 2025-01-24 | End: 2025-01-25 | Stop reason: HOSPADM

## 2025-01-24 RX ORDER — MORPHINE SULFATE 20 MG/ML
10 SOLUTION ORAL EVERY 4 HOURS PRN
Status: DISCONTINUED | OUTPATIENT
Start: 2025-01-24 | End: 2025-01-24

## 2025-01-24 RX ORDER — PREGABALIN 75 MG/1
75 CAPSULE ORAL 3 TIMES DAILY
Qty: 90 CAPSULE | Refills: 11 | Status: CANCELLED | OUTPATIENT
Start: 2025-01-24 | End: 2026-01-24

## 2025-01-24 RX ORDER — LIDOCAINE 560 MG/1
1 PATCH PERCUTANEOUS; TOPICAL; TRANSDERMAL EVERY 12 HOURS PRN
Qty: 30 PATCH | Refills: 1 | Status: CANCELLED | OUTPATIENT
Start: 2025-01-24

## 2025-01-24 RX ADMIN — THIAMINE HCL TAB 100 MG 100 MG: 100 TAB at 08:40

## 2025-01-24 RX ADMIN — HYDROMORPHONE HYDROCHLORIDE 1 MG: 1 INJECTION, SOLUTION INTRAMUSCULAR; INTRAVENOUS; SUBCUTANEOUS at 15:23

## 2025-01-24 RX ADMIN — TAMSULOSIN HYDROCHLORIDE 0.4 MG: 0.4 CAPSULE ORAL at 08:40

## 2025-01-24 RX ADMIN — HYDROMORPHONE HYDROCHLORIDE 1 MG: 1 INJECTION, SOLUTION INTRAMUSCULAR; INTRAVENOUS; SUBCUTANEOUS at 11:58

## 2025-01-24 RX ADMIN — SALINE NASAL SPRAY 2 SPRAY: 1.5 SOLUTION NASAL at 22:15

## 2025-01-24 RX ADMIN — PREGABALIN 75 MG: 25 CAPSULE ORAL at 15:23

## 2025-01-24 RX ADMIN — ROPINIROLE 0.5 MG: 0.5 TABLET, FILM COATED ORAL at 18:45

## 2025-01-24 RX ADMIN — PREGABALIN 75 MG: 25 CAPSULE ORAL at 08:40

## 2025-01-24 RX ADMIN — HYDROMORPHONE HYDROCHLORIDE 1 MG: 1 INJECTION, SOLUTION INTRAMUSCULAR; INTRAVENOUS; SUBCUTANEOUS at 08:46

## 2025-01-24 RX ADMIN — PANCRELIPASE 1 CAPSULE: 30000; 6000; 19000 CAPSULE, DELAYED RELEASE PELLETS ORAL at 08:58

## 2025-01-24 RX ADMIN — ROPINIROLE 0.5 MG: 0.5 TABLET, FILM COATED ORAL at 08:58

## 2025-01-24 RX ADMIN — LIDOCAINE 4% 1 PATCH: 40 PATCH TOPICAL at 08:40

## 2025-01-24 RX ADMIN — MONTELUKAST 10 MG: 10 TABLET, FILM COATED ORAL at 22:09

## 2025-01-24 RX ADMIN — HYDROMORPHONE HYDROCHLORIDE 1 MG: 1 INJECTION, SOLUTION INTRAMUSCULAR; INTRAVENOUS; SUBCUTANEOUS at 18:31

## 2025-01-24 RX ADMIN — NICOTINE 1 PATCH: 14 PATCH, EXTENDED RELEASE TRANSDERMAL at 22:09

## 2025-01-24 RX ADMIN — MORPHINE SULFATE 10 MG: 20 SOLUTION ORAL at 22:08

## 2025-01-24 RX ADMIN — TOPIRAMATE 100 MG: 100 TABLET, FILM COATED ORAL at 08:58

## 2025-01-24 RX ADMIN — PREGABALIN 75 MG: 25 CAPSULE ORAL at 22:08

## 2025-01-24 RX ADMIN — ROPINIROLE 0.5 MG: 0.5 TABLET, FILM COATED ORAL at 22:09

## 2025-01-24 RX ADMIN — FENTANYL 2 PATCH: 12 PATCH TRANSDERMAL at 08:54

## 2025-01-24 RX ADMIN — SALINE NASAL SPRAY 2 SPRAY: 1.5 SOLUTION NASAL at 08:42

## 2025-01-24 RX ADMIN — PANCRELIPASE 1 CAPSULE: 30000; 6000; 19000 CAPSULE, DELAYED RELEASE PELLETS ORAL at 22:10

## 2025-01-24 RX ADMIN — PANTOPRAZOLE SODIUM 40 MG: 40 TABLET, DELAYED RELEASE ORAL at 08:45

## 2025-01-24 RX ADMIN — TOPIRAMATE 100 MG: 100 TABLET, FILM COATED ORAL at 22:09

## 2025-01-24 RX ADMIN — HYDROMORPHONE HYDROCHLORIDE 1 MG: 1 INJECTION, SOLUTION INTRAMUSCULAR; INTRAVENOUS; SUBCUTANEOUS at 05:28

## 2025-01-24 RX ADMIN — MIRTAZAPINE 15 MG: 15 TABLET, FILM COATED ORAL at 22:09

## 2025-01-24 RX ADMIN — HYDROPHOR: 42 OINTMENT TOPICAL at 22:16

## 2025-01-24 RX ADMIN — STANDARDIZED SENNA CONCENTRATE 17.2 MG: 8.6 TABLET ORAL at 22:09

## 2025-01-24 RX ADMIN — HYDROMORPHONE HYDROCHLORIDE 1 MG: 1 INJECTION, SOLUTION INTRAMUSCULAR; INTRAVENOUS; SUBCUTANEOUS at 02:12

## 2025-01-24 ASSESSMENT — PAIN SCALES - GENERAL
PAINLEVEL_OUTOF10: 8
PAINLEVEL_OUTOF10: 8
PAINLEVEL_OUTOF10: 7
PAINLEVEL_OUTOF10: 8
PAINLEVEL_OUTOF10: 8
PAINLEVEL_OUTOF10: 6
PAINLEVEL_OUTOF10: 8
PAINLEVEL_OUTOF10: 8
PAINLEVEL_OUTOF10: 9
PAINLEVEL_OUTOF10: 7
PAINLEVEL_OUTOF10: 8
PAINLEVEL_OUTOF10: 8
PAINLEVEL_OUTOF10: 6
PAINLEVEL_OUTOF10: 8
PAINLEVEL_OUTOF10: 8
PAINLEVEL_OUTOF10: 6
PAINLEVEL_OUTOF10: 8

## 2025-01-24 ASSESSMENT — COGNITIVE AND FUNCTIONAL STATUS - GENERAL
MOVING TO AND FROM BED TO CHAIR: A LITTLE
DRESSING REGULAR UPPER BODY CLOTHING: A LITTLE
CLIMB 3 TO 5 STEPS WITH RAILING: A LITTLE
CLIMB 3 TO 5 STEPS WITH RAILING: A LOT
WALKING IN HOSPITAL ROOM: A LITTLE
MOBILITY SCORE: 20
STANDING UP FROM CHAIR USING ARMS: A LITTLE
DRESSING REGULAR LOWER BODY CLOTHING: A LITTLE
DAILY ACTIVITIY SCORE: 18
HELP NEEDED FOR BATHING: A LITTLE
DRESSING REGULAR UPPER BODY CLOTHING: A LITTLE
WALKING IN HOSPITAL ROOM: A LITTLE
PERSONAL GROOMING: A LITTLE
MOVING TO AND FROM BED TO CHAIR: A LITTLE
DRESSING REGULAR LOWER BODY CLOTHING: A LITTLE
DAILY ACTIVITIY SCORE: 20
TOILETING: A LITTLE
WALKING IN HOSPITAL ROOM: A LITTLE
EATING MEALS: A LITTLE
MOBILITY SCORE: 19
STANDING UP FROM CHAIR USING ARMS: A LITTLE
TOILETING: A LITTLE
MOBILITY SCORE: 19
DAILY ACTIVITIY SCORE: 19
MOVING TO AND FROM BED TO CHAIR: A LITTLE
TOILETING: A LITTLE
STANDING UP FROM CHAIR USING ARMS: A LITTLE
DRESSING REGULAR UPPER BODY CLOTHING: A LITTLE
HELP NEEDED FOR BATHING: A LITTLE
HELP NEEDED FOR BATHING: A LITTLE
CLIMB 3 TO 5 STEPS WITH RAILING: A LOT
DRESSING REGULAR LOWER BODY CLOTHING: A LITTLE
PERSONAL GROOMING: A LITTLE

## 2025-01-24 ASSESSMENT — PAIN DESCRIPTION - ORIENTATION
ORIENTATION: RIGHT

## 2025-01-24 ASSESSMENT — PAIN - FUNCTIONAL ASSESSMENT
PAIN_FUNCTIONAL_ASSESSMENT: 0-10

## 2025-01-24 ASSESSMENT — PAIN SCALES - PAIN ASSESSMENT IN ADVANCED DEMENTIA (PAINAD): TOTALSCORE: MEDICATION (SEE MAR)

## 2025-01-24 ASSESSMENT — PAIN DESCRIPTION - LOCATION
LOCATION: NECK
LOCATION: LEG
LOCATION: LEG

## 2025-01-24 NOTE — PROGRESS NOTES
01/24/25 1500   Discharge Planning   Living Arrangements Spouse/significant other   Support Systems Spouse/significant other   Type of Residence Skilled nursing facility   Home or Post Acute Services Other (Comment)  (Hospice referral)   Type of Post Acute Facility Services Other (Comment)  (Hospice at [facility])   Expected Discharge Disposition [facility]   Does the patient need discharge transport arranged? Yes   RoundTrip coordination needed? Yes   Has discharge transport been arranged? No     Pt needs to be sitter free for 24 hours in order to return to [facility].  Sitter was discontinued 8pm on 1/23.  [facility] is able to accept pt 1/25.  IMELDA notified [facility] of anticipated discharge on 1/25.  Updated clinical information sent via [facility] to [facility].  IMELDA will send updated clinical to [facility].  Pt was placed in will call in RoundTrip.    YOSVANY Terry

## 2025-01-24 NOTE — DISCHARGE INSTRUCTIONS
Discharge Instructions    Dear     You were admitted to [facility] for headache and vision changes where we had neurosurgery and Ophthalmology see you who did not recommend any surgical intervention. We also attempted multiple radiations which you could not tolerate due to pain even after we gave you some pain medications prior to the radiation treatments. During this hospitalization, we had a discussion with about goals of care, and you opted for hospice care. We will  focus on comfort measures.         It was a pleasure taking care of you,  Your  Care Team

## 2025-01-24 NOTE — CARE PLAN
Problem: Fall/Injury  Goal: Not fall by end of shift  Outcome: Progressing  Goal: Be free from injury by end of the shift  Outcome: Progressing  Goal: Verbalize understanding of personal risk factors for fall in the hospital  Outcome: Progressing  Goal: Verbalize understanding of risk factor reduction measures to prevent injury from fall in the home  Outcome: Progressing  Goal: Use assistive devices by end of the shift  Outcome: Progressing  Goal: Pace activities to prevent fatigue by end of the shift  Outcome: Progressing     Problem: Skin  Goal: Decreased wound size/increased tissue granulation at next dressing change  Recent Flowsheet Documentation  Taken 1/24/2025 0139 by Delano Henao RN  Decreased wound size/increased tissue granulation at next dressing change: Promote sleep for wound healing  Goal: Participates in plan/prevention/treatment measures  Recent Flowsheet Documentation  Taken 1/24/2025 0139 by Delano Henao RN  Participates in plan/prevention/treatment measures: Elevate heels  Goal: Prevent/manage excess moisture  Recent Flowsheet Documentation  Taken 1/24/2025 0139 by Delano Henao RN  Prevent/manage excess moisture: Cleanse incontinence/protect with barrier cream  Goal: Prevent/minimize sheer/friction injuries  Recent Flowsheet Documentation  Taken 1/24/2025 0139 by Delano Henao RN  Prevent/minimize sheer/friction injuries: HOB 30 degrees or less  Goal: Promote/optimize nutrition  Recent Flowsheet Documentation  Taken 1/24/2025 0139 by Delano Henao RN  Promote/optimize nutrition: Consume > 50% meals/supplements  Goal: Promote skin healing  Recent Flowsheet Documentation  Taken 1/24/2025 0139 by Delano Henao RN  Promote skin healing: Assess skin/pad under line(s)/device(s)

## 2025-01-24 NOTE — DISCHARGE SUMMARY
Discharge Diagnosis  Ptosis of right eyelid    Issues Requiring Follow-Up  - Radiation Onc appointment   - Med Onc appointment     Test Results Pending At Discharge  Pending Labs       No current pending labs.            Hospital Course  Lemuel Mckay is a 54 y.o. 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    CT imaging does not show hemorrhage or localized edema - discussed with NSGY and patient not a surgical candidate at this time. ophthalmology also consulted and did not recommend any surgical intervention at this time. Palliative radiation oncology consulted who tried many attempts with radiation therapies but patient could not tolerate with due to pain. On 1/19 patient was briefly transferred to the MICU for AHRF where ABG showed Ph of 7.29 and PCO2 of 88. Patient  was put BiPAP for which he only tolerate for 30 mins and took it off because of pain. Patient was eventually transferred back to the floor. Patient continued to be full code until 1/23 where he opted for hospice care and staus was changed from full code to DNR/comfort. Now pending placement to a hospice facility.     Pertinent Physical Exam At Time of Discharge  Physical Exam    Home Medications     Medication List      ASK your doctor about these medications     acetaminophen 325 mg tablet; Commonly known as: Tylenol; Take 2 tablets   (650 mg) by mouth every 6 hours if needed for mild pain (1 - 3).   amoxicillin-pot clavulanate 875-125 mg tablet; Commonly known as:   Augmentin   apixaban 5 mg tablet; Commonly known as: Eliquis; Take 1 tablet (5 mg)   by mouth 2 times a day.   atorvastatin 20 mg tablet; Commonly known as: Lipitor   bisacodyl 5 mg EC tablet; Commonly known  as: Dulcolax; Take 2 tablets   (10 mg) by mouth once daily as needed for constipation. Do not crush,   chew, or split.   butalbital-acetaminophen-caff -40 mg tablet; Take 1 tablet by   mouth every 4 hours if needed for headaches.   calcium carbonate 200 mg calcium chewable tablet; Commonly known as:   Tums   cyclobenzaprine 10 mg tablet; Commonly known as: Flexeril   dexAMETHasone 4 mg tablet; Commonly known as: Decadron; Take 1 tablet (4   mg) by mouth once daily for 7 doses.   ferrous sulfate 325 (65 Fe) MG EC tablet   Flomax 0.4 mg 24 hr capsule; Generic drug: tamsulosin   folic acid 1 mg tablet; Commonly known as: Folvite; Take 1 tablet (1 mg)   by mouth once daily.   guaiFENesin 600 mg 12 hr tablet; Commonly known as: Mucinex; Take 1   tablet (600 mg) by mouth 2 times a day. Do not crush, chew, or split.   ipratropium-albuteroL 0.5-2.5 mg/3 mL nebulizer solution; Commonly known   as: Duo-Neb; Take 3 mL by nebulization every 2 hours if needed for   wheezing.   LACTOBACILLUS ACIDOPHILUS ORAL   lidocaine 5 % patch; Commonly known as: Lidoderm   LORazepam 2 mg/mL injection; Commonly known as: Ativan; Infuse 0.25 mL   (0.5 mg) into a venous catheter every 2 hours if needed (CIWA score 6-7 or   HR greater than 100).   lubricating eye drops ophthalmic solution; Administer 1 drop into both   eyes every 6 hours if needed for dry eyes.   methadone 5 mg tablet; Commonly known as: Dolophine; Take 1 tablet (5   mg) by mouth every 8 hours for 7 days.; Ask about: Should I take this   medication?   mirtazapine 15 mg tablet; Commonly known as: Remeron   montelukast 10 mg tablet; Commonly known as: Singulair   morphine 15 mg tablet; Commonly known as: MSIR; Take 2 tablets (30 mg)   by mouth every 3 hours if needed for moderate pain (4 - 6) or severe pain   (7 - 10) for up to 7 days.; Ask about: Should I take this medication?   multivitamin with minerals tablet; Take 1 tablet by mouth once daily.   naloxone 0.4 mg/mL  injection; Commonly known as: Narcan; Infuse 0.5 mL   (0.2 mg) into a venous catheter if needed for respiratory depression (Once   PRN patient is unarousable, and respiratory rate less than 8).   nicotine 14 mg/24 hr patch; Commonly known as: Nicoderm CQ   OLANZapine 5 mg tablet; Commonly known as: ZyPREXA; Take 1 tablet (5 mg)   by mouth 2 times a day as needed (Severe agitation/restlessness or   interference with care and not redirectable).   ondansetron 4 mg tablet; Commonly known as: Zofran   ondansetron ODT 4 mg disintegrating tablet; Commonly known as:   Zofran-ODT; Dissolve 1 tablet (4 mg) in the mouth every 8 hours if needed   for nausea.   oxygen gas therapy; Commonly known as: O2; Inhale 2 L/min once every 24   hours.   pancrelipase (Lip-Prot-Amyl) 6,000-19,000 -30,000 unit capsule; Commonly   known as: Creon   pantoprazole 40 mg EC tablet; Commonly known as: ProtoNix; Take 1 tablet   (40 mg) by mouth once daily in the morning. Take before meals. Do not   crush, chew, or split.   polyethylene glycol 17 gram packet; Commonly known as: Glycolax,   Miralax; Take 17 g by mouth every 8 hours.; Ask about: Which instructions   should I use?   predniSONE 20 mg tablet; Commonly known as: Deltasone; Ask about: Should   I take this medication?   pregabalin 200 mg capsule; Commonly known as: Lyrica; Take 1 capsule   (200 mg) by mouth 3 times a day for 14 days.   rOPINIRole 0.5 mg tablet; Commonly known as: Requip   sennosides 8.6 mg tablet; Commonly known as: Senokot; Take 2 tablets   (17.2 mg) by mouth 2 times a day as needed for constipation.   sennosides-docusate sodium 8.6-50 mg tablet; Commonly known as:   Marya-Colace; Take 1 tablet by mouth once daily at bedtime.   sodium chloride 0.65 % nasal spray; Commonly known as: Ocean; Administer   2 sprays into each nostril 4 times a day.   thiamine 100 mg tablet; Commonly known as: Vitamin B-1   topiramate 100 mg tablet; Commonly known as: Topamax       Outpatient  Follow-Up  Future Appointments   Date Time Provider Department Center   1/27/2025 12:45 PM [id number] [facility]   1/27/2025  2:20 PM Eddie Murcia MD [id number] [facility]   2/5/2025  2:15 PM Kellee MURILLO MD [id number] Julian Larkin MD

## 2025-01-24 NOTE — PROGRESS NOTES
SUPPORTIVE AND PALLIATIVE ONCOLOGY PROGRESS NOTE      SERVICE DATE: 1/24/2025      SUBJECTIVE:  Interval Events:    Pain Assessment: Complaints loss 9/10 postauricular pain radiating to right side of the neck posteriorly aggravated by movement and relieved by pain medications. Onset of action of IV Dilaudid is 20 minutes which decreases the pain to 7. His goal pain is 5/10     Opioid Requirements  Past 24 h opioid requirements (1/2132025 at 0800 to 01/24/25 at 0800):   Hydromorphone 0.5 mg IV every 3 hours as needed.  0 dose/24 hours  Hydromorphone 1 mg IV every 3 hours as needed.  Used 3 dose/24 hours  Roxanol 20 mg/mL used 5 mg every 4 hours as needed.  Used 0 dose/24 hours  Total OME= 37.5  mg OME  Note: OME calculations based on equianalgesic table below. Please note this table is based on best available evidence but conversions are still approximate. These are NOT opioid DOSES for individual patient use; this is equivalency information.  Drug Parenteral Enteral   Morphine 10 25   Oxycodone N/A 20   Hydromorphone 2 5   Fentanyl 0.15 N/A   Tramadol N/A 120   Citation: Darnell ENGLAND. Demystifying opioid conversion calculations: A guide for effective dosing, Second edition. [place] Essie: American Society of Health-System Pharmacists, 2018.    Symptom Assessment:  Nausea none  Shortness of breath a little  Headache none  Anxiety somewhat  Difficulty Sleeping none.  Slept well last night    Information obtained from: chart review, interview of patient, and discussion with primary team  ______________________________________________________________________        Objective       Lab Results   Component Value Date    WBC 8.3 01/21/2025    HGB 13.0 (L) 01/21/2025    HCT 40.9 (L) 01/21/2025    MCV 84 01/21/2025     01/21/2025      Lab Results   Component Value Date    GLUCOSE 121 (H) 01/21/2025    CALCIUM 10.0 01/21/2025     01/21/2025    K 3.8 01/21/2025    CO2 28 01/21/2025     01/21/2025    BUN 8  01/21/2025    CREATININE 0.48 (L) 01/21/2025     Lab Results   Component Value Date    ALT 14 01/21/2025    AST 16 01/21/2025    ALKPHOS 84 01/21/2025    BILITOT 0.5 01/21/2025     CrCl cannot be calculated (Patient's most recent lab result is older than the maximum 3 days allowed.).       Scheduled medications   fentaNYL, 2 patch, transdermal, q72h  lidocaine, 1 patch, transdermal, Daily  mirtazapine, 15 mg, oral, Nightly  montelukast, 10 mg, oral, Daily  nicotine, 1 patch, transdermal, q24h  oxygen, , inhalation, Continuous - Inhalation  oxygen, , inhalation, Continuous - Inhalation  pancrelipase (Lip-Prot-Amyl), 1 capsule, oral, BID  pantoprazole, 40 mg, oral, Daily before breakfast  polyethylene glycol, 17 g, oral, Daily  pregabalin, 75 mg, oral, TID  rOPINIRole, 0.5 mg, oral, TID  sennosides, 2 tablet, oral, Nightly  sodium chloride, 2 spray, Each Nostril, 4x daily  tamsulosin, 0.4 mg, oral, Daily  topiramate, 100 mg, oral, BID  white petrolatum, , Topical, TID      Continuous medications     PRN medications  lidocaine, 1 patch, q12h PRN  melatonin, 3 mg, Nightly PRN  morphine, 10 mg, q4h PRN  naloxone, 0.2 mg, q5 min PRN  OLANZapine, 2.5 mg, q12h PRN  ondansetron ODT, 4 mg, q8h PRN                    PHYSICAL EXAMINATION:  Vital Signs:   Vital signs reviewed  Vitals:    01/24/25 1001   BP: 107/71   Pulse: 94   Resp: 18   Temp: 36.2 °C (97.1 °F)   SpO2: 94%     Pain Score: 8     Physical Exam  Constitutional:       Appearance: He is toxic-appearing.      Comments: Sitter at the bedside   HENT:      Head: Normocephalic and atraumatic.      Ears:      Comments: Erythematous swelling below the right ear.  Mild tenderness     Mouth/Throat:      Mouth: Mucous membranes are dry.   Eyes:      Conjunctiva/sclera: Conjunctivae normal.      Comments: Right eye closed   Neck:      Comments: Limited neck motion  Pulmonary:      Effort: Pulmonary effort is normal.   Abdominal:      General: Abdomen is flat.      Palpations:  Abdomen is soft.   Musculoskeletal:         General: Normal range of motion.      Cervical back: Neck supple.   Skin:     General: Skin is warm and dry.   Neurological:      Mental Status: He is alert.   Psychiatric:         Mood and Affect: Mood normal.       ASSESSMENT/PLAN:  54-year-old male with large lytic clival tumor with concern for chondroma, laryngeal cancer s/p surgery admitted for workup of pain.  We are consulted for pain management.  During prior admission patient was on methadone, Lyrica however per chart review it seems he was not prescribed those medications.  Patient is a bit sleepy especially since he has received sedative medications prior to scans.  He is unable to provide history clearly regarding the meds at home hence it is unclear if he was taking methadone and Lyrica at home.    Neoplasm related neck and ear pain.  Somatic and neuropathic.  Uncontrolled  Home regimen Lyrica 200 mg p.o. 3 times daily  OARRS/PDMP reviewed no aberrant behavior noted.consistent with  prescriptions/records and patient history   Total 24h OME use: 37.5 mg OME plus Fentanyl patch 25 mcg/h.  Patch dose increased today  Continue fentanyl patch 25 mcg every 72 hours.  Increased patch dose placed today  Recommend changing Roxanol 20 mg/mL take 10 mg every 3 hours as needed pain  Recommend stopping Dilaudid IV as needed in anticipation of discharge today  Please place the Lidoderm patch postauricular and not on the neck  Continue Lyrica 75 mg p.o. 3 times daily.  I  Continue to monitor pain scores and administer PRN medications as appropriate  Continue/initiate nonpharmacologic pain management strategies including ice/heat therapy, distraction techniques, deep breathing/relaxation techniques, calming music, and repositioning  Continue to monitor for signs of opioid efficacy (pain scores, improved functionality) and toxicity (pinpoint pupils, excess sedation/drowsiness/confusion, respiratory depression,  etc.)    Encephalopathy with agitation likely secondary to pain, disease.  Improved  Would prefer to avoid IM medications  Recommend stopping Zyprexa IM as needed.     At risk for nausea without vomiting related to opioids Well-controlled  Continue Zofran 4 mg ODT every 4 hours as needed     Insomnia and mood  Continue Remeron 15 mg p.o. nightly    At risk for constipation related to opioids,decreased mobility in the setting of hospitalization, and decreased PO intake, currently not constipated  Continue senna 2 tabs p.o. nightly   continue MiraLAX 17 g p.o. daily   Warm water, Prune juice  Encourage mobility as tolerated, PT/OT following   Monitor BM frequency, adjust regimen as needed. Goal to have BM without straining q48-72h    Medical Decision Making/Goals of Care/Advance Care Planning:  Plan to discharge to facility with hospice and then home with hospice  DNR comfort care    Disposition: Discharge today as above    SIGNATURE AND BILLING:  High Complexity   Today, I am treating the patient for acute on chronic pain which is in severe exacerbation,   Extensive DATA   Reviewed records from the following unique sources:  providers from oncology  services   Discussed plan of Care/management of pain with: Provider via shared electronic medical record/secure chat/email or face-to-face.  High risk of morbidity from additional diagnostic testing or treatment  The patient is on parenteral controlled substances: IV as needed as above Dilaudid which I recommended.    Changes to plan indicated in bold.     Thank you for asking Supportive and Palliative Oncology to assist with care of this patient. Recommendations will be communicated back to the consulting service by way of shared electronic medical record/secure chat/email or face-to-face. We will continue to follow. Please contact us for additional questions or concerns.    Medical Decision Making was high level due to high complexity of problems, extensive data review,  and high risk of management/treatment.   Time:   I spent 50 minutes the care of this patient which included chart review, interviewing patient/family, discussion with primary team, coordination of care, and documentation.    \  SIGNATURE: Florina Jackson MD   PAGER/CONTACT:  Contact information:  Supportive and Palliative Oncology  Monday-Friday 8 AM-5 PM  Epic Secure chat or pager [phone].  After hours and weekends:  pager [phone]

## 2025-01-24 NOTE — CARE PLAN
Problem: Fall/Injury  Goal: Not fall by end of shift  Outcome: Progressing  Goal: Be free from injury by end of the shift  Outcome: Progressing  Goal: Verbalize understanding of personal risk factors for fall in the hospital  Outcome: Progressing  Goal: Verbalize understanding of risk factor reduction measures to prevent injury from fall in the home  Outcome: Progressing  Goal: Use assistive devices by end of the shift  Outcome: Progressing  Goal: Pace activities to prevent fatigue by end of the shift  Outcome: Progressing     Problem: Skin  Goal: Decreased wound size/increased tissue granulation at next dressing change  Outcome: Progressing  Goal: Participates in plan/prevention/treatment measures  Outcome: Progressing  Goal: Prevent/manage excess moisture  Outcome: Progressing  Goal: Prevent/minimize sheer/friction injuries  Outcome: Progressing  Goal: Promote/optimize nutrition  Outcome: Progressing  Goal: Promote skin healing  Outcome: Progressing   The patient's goals for the shift include      The clinical goals for the shift include Patient will remain hemodynamically stable and free from fall/injury for entirety of shift.

## 2025-01-24 NOTE — PROGRESS NOTES
"Speech-Language Pathology                 Therapy Communication Note    Patient Name: Gaurav Mckay \"Lemuel\"  MRN: [id number]  Department: [facility]  Room: 6024/6024-A  Today's Date: 1/24/2025     Discipline: Speech Language Pathology        Pt has been made comfort measures only/DNR.  A regular diet has been ordered per the team.  No further SLP services warranted at this time.  Please re-consult should the need arise.      01/24/25 at 7:53 AM - HAYDER Garcia            "

## 2025-01-25 VITALS
WEIGHT: 173.2 LBS | RESPIRATION RATE: 18 BRPM | BODY MASS INDEX: 23.46 KG/M2 | OXYGEN SATURATION: 92 % | TEMPERATURE: 98.6 F | DIASTOLIC BLOOD PRESSURE: 94 MMHG | HEART RATE: 92 BPM | SYSTOLIC BLOOD PRESSURE: 148 MMHG | HEIGHT: 72 IN

## 2025-01-25 DIAGNOSIS — G93.89 BRAIN MASS: ICD-10-CM

## 2025-01-25 DIAGNOSIS — F41.9 ANXIETY: ICD-10-CM

## 2025-01-25 DIAGNOSIS — R52 PAIN: ICD-10-CM

## 2025-01-25 DIAGNOSIS — R45.1 TERMINAL RESTLESSNESS: Primary | ICD-10-CM

## 2025-01-25 PROCEDURE — 2500000005 HC RX 250 GENERAL PHARMACY W/O HCPCS: Mod: SE

## 2025-01-25 PROCEDURE — 2500000002 HC RX 250 W HCPCS SELF ADMINISTERED DRUGS (ALT 637 FOR MEDICARE OP, ALT 636 FOR OP/ED): Mod: SE

## 2025-01-25 PROCEDURE — 2500000001 HC RX 250 WO HCPCS SELF ADMINISTERED DRUGS (ALT 637 FOR MEDICARE OP): Mod: SE

## 2025-01-25 PROCEDURE — 2500000004 HC RX 250 GENERAL PHARMACY W/ HCPCS (ALT 636 FOR OP/ED): Mod: SE

## 2025-01-25 PROCEDURE — 94640 AIRWAY INHALATION TREATMENT: CPT

## 2025-01-25 RX ORDER — MORPHINE SULFATE 20 MG/ML
10 SOLUTION ORAL
Qty: 30 ML | Refills: 0 | Status: SHIPPED | OUTPATIENT
Start: 2025-01-25 | End: 2025-01-25 | Stop reason: ALTCHOICE

## 2025-01-25 RX ORDER — FENTANYL 12.5 UG/1
2 PATCH TRANSDERMAL
Qty: 20 PATCH | Refills: 0 | Status: SHIPPED | OUTPATIENT
Start: 2025-01-27 | End: 2025-02-26

## 2025-01-25 RX ORDER — LORAZEPAM 1 MG/1
1 TABLET ORAL EVERY 4 HOURS PRN
Qty: 42 TABLET | Refills: 0 | Status: SHIPPED | OUTPATIENT
Start: 2025-01-25 | End: 2025-02-01

## 2025-01-25 RX ORDER — PETROLATUM 420 MG/G
3 OINTMENT TOPICAL 3 TIMES DAILY
Qty: 425 G | Refills: 0 | Status: SHIPPED | OUTPATIENT
Start: 2025-01-25 | End: 2026-01-25

## 2025-01-25 RX ORDER — HYDROMORPHONE HYDROCHLORIDE 1 MG/ML
0.5 INJECTION, SOLUTION INTRAMUSCULAR; INTRAVENOUS; SUBCUTANEOUS
Qty: 20 ML | Refills: 0 | Status: SHIPPED | OUTPATIENT
Start: 2025-01-25 | End: 2025-02-24

## 2025-01-25 RX ORDER — HYDROMORPHONE HYDROCHLORIDE 1 MG/ML
1 INJECTION, SOLUTION INTRAMUSCULAR; INTRAVENOUS; SUBCUTANEOUS
Qty: 30 ML | Refills: 0 | Status: SHIPPED | OUTPATIENT
Start: 2025-01-25 | End: 2025-02-24

## 2025-01-25 RX ORDER — MORPHINE SULFATE 20 MG/ML
10 SOLUTION ORAL
Qty: 30 ML | Refills: 0 | Status: SHIPPED | OUTPATIENT
Start: 2025-01-25 | End: 2025-02-04

## 2025-01-25 RX ORDER — ALBUTEROL SULFATE 0.83 MG/ML
2.5 SOLUTION RESPIRATORY (INHALATION) EVERY 6 HOURS PRN
Status: DISCONTINUED | OUTPATIENT
Start: 2025-01-25 | End: 2025-01-25 | Stop reason: HOSPADM

## 2025-01-25 RX ORDER — PREGABALIN 200 MG/1
200 CAPSULE ORAL 3 TIMES DAILY
Qty: 90 CAPSULE | Refills: 0 | Status: SHIPPED | OUTPATIENT
Start: 2025-01-25 | End: 2025-02-24

## 2025-01-25 RX ADMIN — ALBUTEROL SULFATE 2.5 MG: 2.5 SOLUTION RESPIRATORY (INHALATION) at 09:47

## 2025-01-25 RX ADMIN — TAMSULOSIN HYDROCHLORIDE 0.4 MG: 0.4 CAPSULE ORAL at 08:03

## 2025-01-25 RX ADMIN — TOPIRAMATE 100 MG: 100 TABLET, FILM COATED ORAL at 08:03

## 2025-01-25 RX ADMIN — MORPHINE SULFATE 10 MG: 20 SOLUTION ORAL at 08:04

## 2025-01-25 RX ADMIN — PREGABALIN 75 MG: 25 CAPSULE ORAL at 08:03

## 2025-01-25 RX ADMIN — MORPHINE SULFATE 10 MG: 20 SOLUTION ORAL at 04:19

## 2025-01-25 RX ADMIN — PANCRELIPASE 1 CAPSULE: 30000; 6000; 19000 CAPSULE, DELAYED RELEASE PELLETS ORAL at 08:03

## 2025-01-25 RX ADMIN — LIDOCAINE 4% 1 PATCH: 40 PATCH TOPICAL at 08:04

## 2025-01-25 RX ADMIN — MORPHINE SULFATE 10 MG: 20 SOLUTION ORAL at 01:15

## 2025-01-25 RX ADMIN — PANTOPRAZOLE SODIUM 40 MG: 40 TABLET, DELAYED RELEASE ORAL at 06:09

## 2025-01-25 ASSESSMENT — PAIN - FUNCTIONAL ASSESSMENT
PAIN_FUNCTIONAL_ASSESSMENT: 0-10

## 2025-01-25 ASSESSMENT — PAIN SCALES - GENERAL
PAINLEVEL_OUTOF10: 7
PAINLEVEL_OUTOF10: 8
PAINLEVEL_OUTOF10: 7
PAINLEVEL_OUTOF10: 8
PAINLEVEL_OUTOF10: 4
PAINLEVEL_OUTOF10: 8

## 2025-01-25 NOTE — PROGRESS NOTES
"Gaurav Mckay \"Lucy" is a 54 y.o. male on day 10 of admission presenting with Ptosis of right eyelid.    Subjective   Pending hospice, feels pain is well managed.  Review of Systems   Review of Systems   10/10 negative except stated above       Objective     Last Recorded Vitals  Blood pressure (!) 148/94, pulse 92, temperature 37 °C (98.6 °F), temperature source Temporal, resp. rate 18, height 1.829 m (6'), weight 78.6 kg (173 lb 3.2 oz), SpO2 92%.  Intake/Output last 3 Shifts:  I/O last 3 completed shifts:  In: 710 (9 mL/kg) [P.O.:710]  Out: 3500 (44.6 mL/kg) [Urine:3500 (1.2 mL/kg/hr)]  Weight: 78.6 kg     Physical Exam  Constitutional:       Appearance: Normal appearance.   HENT:      Head: Normocephalic and atraumatic.      Nose: Nose normal.   Neck:      Comments: ROM limited by pain. Has neck mass   Cardiovascular:      Rate and Rhythm: Normal rate.      Pulses: Normal pulses.   Pulmonary:      Effort: Pulmonary effort is normal.      Comments: Stertorous breathing   Abdominal:      General: Abdomen is flat.      Palpations: Abdomen is soft.   Musculoskeletal:         General: Normal range of motion.      Cervical back: Normal range of motion.   Skin:     General: Skin is warm.   Neurological:      General: No focal deficit present.      Mental Status: He is alert and oriented to person, place, and time.      Comments: Sometimes with intermittent confusion   Psychiatric:         Mood and Affect: Mood normal.         Relevant Results    Labs    Results from last 7 days   Lab Units 01/21/25  0647 01/20/25  1640 01/20/25  0508   WBC AUTO x10*3/uL 8.3 8.0 6.0   HEMOGLOBIN g/dL 13.0* 12.1* 8.0*   HEMATOCRIT % 40.9* 38.8* 25.4*   PLATELETS AUTO x10*3/uL 389 363 240            Results from last 7 days   Lab Units 01/21/25  0647 01/20/25  1640 01/20/25  0902   SODIUM mmol/L 139 141 147*   POTASSIUM mmol/L 3.8 3.3* 4.5   CHLORIDE mmol/L 100 102 103   CO2 mmol/L 28 31 30   BUN mg/dL 8 6 4*   CREATININE mg/dL 0.48* " 0.46* 0.48*   CALCIUM mg/dL 10.0 9.8 10.0     Results from last 7 days   Lab Units 01/21/25  0647 01/20/25  1640   ALK PHOS U/L 84 83   BILIRUBIN TOTAL mg/dL 0.5 0.5   PROTEIN TOTAL g/dL 6.5 7.5   ALT U/L 14 12   AST U/L 16 12             Medications  Scheduled medications  fentaNYL, 2 patch, transdermal, q72h  lidocaine, 1 patch, transdermal, Daily  mirtazapine, 15 mg, oral, Nightly  montelukast, 10 mg, oral, Daily  nicotine, 1 patch, transdermal, q24h  oxygen, , inhalation, Continuous - Inhalation  oxygen, , inhalation, Continuous - Inhalation  pancrelipase (Lip-Prot-Amyl), 1 capsule, oral, BID  pantoprazole, 40 mg, oral, Daily before breakfast  polyethylene glycol, 17 g, oral, Daily  pregabalin, 75 mg, oral, TID  rOPINIRole, 0.5 mg, oral, TID  sennosides, 2 tablet, oral, Nightly  sodium chloride, 2 spray, Each Nostril, 4x daily  tamsulosin, 0.4 mg, oral, Daily  topiramate, 100 mg, oral, BID  white petrolatum, , Topical, TID      Continuous medications       PRN medications  PRN medications: albuterol, HYDROmorphone, HYDROmorphone, lidocaine, melatonin, morphine, naloxone, OLANZapine, ondansetron ODT, racepinephrine     Imaging  XR chest 1 view   Final Result   1. Interval development of right basilar opacity, consider   atelectasis/aspiration pneumonitis/infection.        I personally reviewed the images/study and I agree with the findings   as stated by Rayshawn Kidd MD, PGY-2 this study was interpreted at   [place], [place].        MACRO:   None        Signed by: Ev Dinero 1/22/2025 3:58 PM   Dictation workstation:   [id number]      XR chest 1 view   Final Result   1.  No evidence of acute cardiopulmonary process.        I personally reviewed the images/study and I agree with the findings   as stated by Rayshawn Kidd MD, PGY-2 this study was interpreted at   [place], [place].        MACRO:   None        Signed  by: Ev Dinero 1/22/2025 3:59 PM   Dictation workstation:   [id number]      XR chest 1 view   Final Result   1.  No evidence of acute cardiopulmonary process. No focal   consolidations or pleural effusions.   2. Minimal bibasilar atelectasis.        I personally reviewed the images/study and I agree with Dr. Joyce Parish findings as stated. This study was interpreted at [place], [place]        MACRO:   None        Signed by: Yovanny Sheehan 1/20/2025 8:01 AM   Dictation workstation:   [id number]      MR brain wo IV contrast   Final Result   Single axial T2 weighted images obtained, after which study was   terminated due to extensive motion. Please see above for findings   that could be ascertained from the markedly limited study.        MACRO:   None.        Signed by: Marc Deras 1/18/2025 1:48 PM   Dictation workstation:   [id number]      CT head wo IV contrast   Final Result   There is aggressive destructive heterogeneous mass centered in the   clivus with poorly defined margins extending into the bilateral   medial petrous apices, nasopharynx, and pituitary sella. 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 There is   erosion of the posterior cortex of the clivus without gross evidence   of extension of the prepontine cistern. The mass however has   increased in the short-term interval measuring 2.5 cm in AP dimension   on sagittal view, previously 2.0 cm.        Signed by: Enrrique Zhou 1/15/2025 8:01 PM   Dictation workstation:   [id number]               Assessment/Plan   Assessment & Plan  Ptosis of right eyelid    Lemuel Mckay is a 54 y.o. 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 Non treatable clival mass. Discussed GOC with patient and opted for hospice care.     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 Suraez, to addend as necessary.    Bridger Amor MD PGY-2

## 2025-01-26 ENCOUNTER — NURSING HOME VISIT (OUTPATIENT)
Dept: POST ACUTE CARE | Facility: EXTERNAL LOCATION | Age: 55
End: 2025-01-26
Payer: COMMERCIAL

## 2025-01-26 DIAGNOSIS — E78.5 HYPERLIPIDEMIA, UNSPECIFIED HYPERLIPIDEMIA TYPE: ICD-10-CM

## 2025-01-26 DIAGNOSIS — G93.40 ENCEPHALOPATHY, UNSPECIFIED TYPE: ICD-10-CM

## 2025-01-26 DIAGNOSIS — F31.9 BIPOLAR AFFECTIVE DISORDER, REMISSION STATUS UNSPECIFIED (MULTI): ICD-10-CM

## 2025-01-26 DIAGNOSIS — K86.1 CHRONIC PANCREATITIS, UNSPECIFIED PANCREATITIS TYPE (MULTI): ICD-10-CM

## 2025-01-26 DIAGNOSIS — R53.1 WEAKNESS: ICD-10-CM

## 2025-01-26 DIAGNOSIS — C32.9 LARYNGEAL CANCER (MULTI): ICD-10-CM

## 2025-01-26 DIAGNOSIS — J44.9 CHRONIC OBSTRUCTIVE PULMONARY DISEASE, UNSPECIFIED COPD TYPE (MULTI): ICD-10-CM

## 2025-01-26 DIAGNOSIS — I10 HYPERTENSION, UNSPECIFIED TYPE: ICD-10-CM

## 2025-01-26 DIAGNOSIS — F10.10 ALCOHOL ABUSE: Primary | ICD-10-CM

## 2025-01-26 DIAGNOSIS — I26.99 PULMONARY EMBOLISM, UNSPECIFIED CHRONICITY, UNSPECIFIED PULMONARY EMBOLISM TYPE, UNSPECIFIED WHETHER ACUTE COR PULMONALE PRESENT (MULTI): ICD-10-CM

## 2025-01-26 DIAGNOSIS — E46 MALNUTRITION, UNSPECIFIED TYPE (MULTI): ICD-10-CM

## 2025-01-26 DIAGNOSIS — Z91.81 AT HIGH RISK FOR FALLS: ICD-10-CM

## 2025-01-26 PROCEDURE — 99305 1ST NF CARE MODERATE MDM 35: CPT | Performed by: INTERNAL MEDICINE

## 2025-01-26 NOTE — LETTER
Patient: Lemuel Mckay  : 1970    Encounter Date: 2025    PLACE OF SERVICE:  [facility] [place]    This is a readmission.    Subjective  Patient ID: Lemuel Mckay is a 54 y.o. male who presents for readmission.    Mr. Gaurav Mckay is a 54-year-old male with history of alcohol use disorder with encephalopathy.  He has a history of metastatic laryngeal carcinoma.  He has multiple medical issues including prior pulmonary embolism.  He is unable to care for himself and requires supportive care.    Review of Systems   Constitutional:  Negative for chills and fever.   Cardiovascular:  Negative for chest pain.   All other systems reviewed and are negative.    Objective  /82   Pulse 82   Temp 36.6 °C (97.9 °F)   Resp 18     Physical Exam  Vitals reviewed.   Constitutional:       General: He is not in acute distress.     Comments: This is a well-developed, well-nourished male, sitting in a chair.   HENT:      Right Ear: Tympanic membrane, ear canal and external ear normal.      Left Ear: Tympanic membrane, ear canal and external ear normal.   Eyes:      General: No scleral icterus.     Pupils: Pupils are equal, round, and reactive to light.   Neck:      Vascular: No carotid bruit.   Cardiovascular:      Heart sounds: Normal heart sounds, S1 normal and S2 normal. No murmur heard.     No friction rub.   Pulmonary:      Breath sounds: Decreased breath sounds (throughout) present.   Abdominal:      Palpations: There is no hepatomegaly, splenomegaly or mass.   Musculoskeletal:         General: No swelling or deformity. Normal range of motion.      Cervical back: Neck supple.      Right lower leg: No edema.      Left lower leg: No edema.   Lymphadenopathy:      Cervical: No cervical adenopathy.      Upper Body:      Right upper body: No axillary adenopathy.      Left upper body: No axillary adenopathy.      Lower Body: No right inguinal adenopathy. No left inguinal  adenopathy.   Neurological:      Mental Status: He is oriented to person, place, and time.      Cranial Nerves: Cranial nerves 2-12 are intact. No cranial nerve deficit.      Sensory: No sensory deficit.      Motor: Motor function is intact. No weakness.      Gait: Gait is intact.      Deep Tendon Reflexes: Reflexes normal.   Psychiatric:         Mood and Affect: Mood normal. Mood is not anxious or depressed. Affect is not angry.         Behavior: Behavior is not agitated.         Thought Content: Thought content normal.         Judgment: Judgment normal.     LAB WORK: Laboratory studies were reviewed.    Assessment/Plan  Problem List Items Addressed This Visit             ICD-10-CM       Cardiac and Vasculature    HTN (hypertension) I10       Hematology and Neoplasia    Laryngeal cancer (Multi) C32.9       Pulmonary and Pneumonias    COPD (chronic obstructive pulmonary disease) (Multi) J44.9     Other Visit Diagnoses         Codes    Alcohol abuse    -  Primary F10.10    Encephalopathy, unspecified type     G93.40    Hyperlipidemia, unspecified hyperlipidemia type     E78.5    Pulmonary embolism, unspecified chronicity, unspecified pulmonary embolism type, unspecified whether acute cor pulmonale present (Multi)     I26.99    Chronic pancreatitis, unspecified pancreatitis type (Multi)     K86.1    Malnutrition, unspecified type (Multi)     E46    Weakness     R53.1    At high risk for falls     Z91.81    Bipolar affective disorder, remission status unspecified (Multi)     F31.9        1. Alcohol abuse, on CIWA protocol.  2. Encephalopathy, on supportive care.  3. Hypertension, med controlled.  4. Hyperlipidemia, on statin.  5. Pulmonary embolism, on Eliquis.  6. COPD, on bronchodilator therapy.  7. Chronic pancreatitis, on Creon.  8. Metastatic laryngeal carcinoma, follow with Oncology.  9.  Malnutrition, encouraged to eat.  10. Weakness, on PT/OT.  11. Fall risk, on fall precaution.  12. Bipolar disorder, on  medication.    Scribe Attestation  By signing my name below, I, Sharla Davila attest that this documentation has been prepared under the direction and in the presence of Sohan Saeed MD.       Electronically Signed By: Sohan Saeed MD   2/6/25  9:05 AM

## 2025-01-27 ENCOUNTER — APPOINTMENT (OUTPATIENT)
Dept: RADIATION ONCOLOGY | Facility: HOSPITAL | Age: 55
End: 2025-01-27
Payer: COMMERCIAL

## 2025-01-27 ENCOUNTER — APPOINTMENT (OUTPATIENT)
Dept: UROLOGY | Facility: CLINIC | Age: 55
End: 2025-01-27
Payer: COMMERCIAL

## 2025-01-28 ENCOUNTER — APPOINTMENT (OUTPATIENT)
Dept: RADIATION ONCOLOGY | Facility: HOSPITAL | Age: 55
End: 2025-01-28
Payer: COMMERCIAL

## 2025-01-28 VITALS
TEMPERATURE: 97.9 F | HEART RATE: 82 BPM | DIASTOLIC BLOOD PRESSURE: 82 MMHG | SYSTOLIC BLOOD PRESSURE: 126 MMHG | RESPIRATION RATE: 18 BRPM

## 2025-01-28 ASSESSMENT — ENCOUNTER SYMPTOMS
CHILLS: 0
FEVER: 0

## 2025-01-28 NOTE — PROGRESS NOTES
PLACE OF SERVICE:  [facility] [place]    This is a readmission.    Subjective   Patient ID: Lemuel Mckay is a 54 y.o. male who presents for readmission.    Mr. Gaurav Mckay is a 54-year-old male with history of alcohol use disorder with encephalopathy.  He has a history of metastatic laryngeal carcinoma.  He has multiple medical issues including prior pulmonary embolism.  He is unable to care for himself and requires supportive care.    Review of Systems   Constitutional:  Negative for chills and fever.   Cardiovascular:  Negative for chest pain.   All other systems reviewed and are negative.    Objective   /82   Pulse 82   Temp 36.6 °C (97.9 °F)   Resp 18     Physical Exam  Vitals reviewed.   Constitutional:       General: He is not in acute distress.     Comments: This is a well-developed, well-nourished male, sitting in a chair.   HENT:      Right Ear: Tympanic membrane, ear canal and external ear normal.      Left Ear: Tympanic membrane, ear canal and external ear normal.   Eyes:      General: No scleral icterus.     Pupils: Pupils are equal, round, and reactive to light.   Neck:      Vascular: No carotid bruit.   Cardiovascular:      Heart sounds: Normal heart sounds, S1 normal and S2 normal. No murmur heard.     No friction rub.   Pulmonary:      Breath sounds: Decreased breath sounds (throughout) present.   Abdominal:      Palpations: There is no hepatomegaly, splenomegaly or mass.   Musculoskeletal:         General: No swelling or deformity. Normal range of motion.      Cervical back: Neck supple.      Right lower leg: No edema.      Left lower leg: No edema.   Lymphadenopathy:      Cervical: No cervical adenopathy.      Upper Body:      Right upper body: No axillary adenopathy.      Left upper body: No axillary adenopathy.      Lower Body: No right inguinal adenopathy. No left inguinal adenopathy.   Neurological:      Mental Status: He is oriented to  person, place, and time.      Cranial Nerves: Cranial nerves 2-12 are intact. No cranial nerve deficit.      Sensory: No sensory deficit.      Motor: Motor function is intact. No weakness.      Gait: Gait is intact.      Deep Tendon Reflexes: Reflexes normal.   Psychiatric:         Mood and Affect: Mood normal. Mood is not anxious or depressed. Affect is not angry.         Behavior: Behavior is not agitated.         Thought Content: Thought content normal.         Judgment: Judgment normal.     LAB WORK: Laboratory studies were reviewed.    Assessment/Plan   Problem List Items Addressed This Visit             ICD-10-CM       Cardiac and Vasculature    HTN (hypertension) I10       Hematology and Neoplasia    Laryngeal cancer (Multi) C32.9       Pulmonary and Pneumonias    COPD (chronic obstructive pulmonary disease) (Multi) J44.9     Other Visit Diagnoses         Codes    Alcohol abuse    -  Primary F10.10    Encephalopathy, unspecified type     G93.40    Hyperlipidemia, unspecified hyperlipidemia type     E78.5    Pulmonary embolism, unspecified chronicity, unspecified pulmonary embolism type, unspecified whether acute cor pulmonale present (Multi)     I26.99    Chronic pancreatitis, unspecified pancreatitis type (Multi)     K86.1    Malnutrition, unspecified type (Multi)     E46    Weakness     R53.1    At high risk for falls     Z91.81    Bipolar affective disorder, remission status unspecified (Multi)     F31.9        1. Alcohol abuse, on CIWA protocol.  2. Encephalopathy, on supportive care.  3. Hypertension, med controlled.  4. Hyperlipidemia, on statin.  5. Pulmonary embolism, on Eliquis.  6. COPD, on bronchodilator therapy.  7. Chronic pancreatitis, on Creon.  8. Metastatic laryngeal carcinoma, follow with Oncology.  9.  Malnutrition, encouraged to eat.  10. Weakness, on PT/OT.  11. Fall risk, on fall precaution.  12. Bipolar disorder, on medication.    Scribe Attestation  By signing my name below, Lizbeth WHALEY  Sharla Colindres attest that this documentation has been prepared under the direction and in the presence of Sohan Saeed MD.     All medical record entries made by the nataliibe were personally dictated by me I have reviewed the chart and agree the record accurately reflects my personal performance of his history physical examination and management

## 2025-02-04 ENCOUNTER — APPOINTMENT (OUTPATIENT)
Dept: HEMATOLOGY/ONCOLOGY | Facility: HOSPITAL | Age: 55
End: 2025-02-04
Payer: COMMERCIAL

## 2025-02-05 ENCOUNTER — APPOINTMENT (OUTPATIENT)
Dept: OTOLARYNGOLOGY | Facility: CLINIC | Age: 55
End: 2025-02-05
Payer: COMMERCIAL

## 2025-02-08 NOTE — PROGRESS NOTES
Chief Complaint:   SNF F/U  -Brain mass  -Headache with visual changes  -Right 6th nerve palsy  -Pharyngeal dysphagia  -Bladder tumor  -Urinary retention  -Hematuria  -Physical deconditioning/weakness  -Hx laryngeal cancer    HPI:   54 year-old male presenting to [facility] on 12/13/24 for PAT for surgical clearance for a scheduled cystoscopy with ablation of tissue due to a bladder tumor (12/17/24). He presented to PAT appointment late and intoxicated. There was c/f compliance with pre-op instructions (Ex: holding eliquis), so he was direct admitted to the hospital to ensure sobriety, medication compliance, and surgical readiness. During hospitalization, he was found to have dysconjugate gaze and was c/o a severe headache. Symptoms were relatively acute and he felt related to a recent fall. Stat CT of head showed a tumor extending from the clivus, into the sphenoid area, sellar area, and near carotids as well. Neuro felt this is cause of diplopia. Mass appears new from October, so it is rapidly growing. After discussion with neuro, patient was transferred to [facility] for management. Anders catheter was placed for acute urinary retention, prior to transfer. He was transferred to [facility] on 12/15/24. Hospital course:    Brain mass/headache with visual changes/R 6th nerve palsy/pharyngeal dysphagia-NSGY consult, ophthalmology consult, ENT consult, s/p clival mass biopsy on 12/20, SLP consult, recommending pureed diet with mildly/nectar thick liquids, supportive oncology consulted for optimal pain control, radiation oncology consult, F/U with ENT, radiation oncology, oncology, and ophthalmology after discharge  Bladder tumor/urinary retention-urology consulted, s/p cystoscopy with bladder biopsy on 12/20, maintain anders until OP F/U, c/w flomax  Alcohol use disorder-cessation advised, CIWA protocol  Chronic hypoxic respiratory failure/COPD/HANY-c/w bronchial hygiene, duonebs, and mucinex, wean O2  HTN/orthostatic  hypotension-monitor BP  Hx PE s/p embolization (11/2023)-provoked by surgery, c/w eliquis  Tobacco use disorder-c/w nicotine patch  Decreased strength-PT/OT evaluations, recommending moderate intensity   Bowel regimen-miralax Q 8 hours, senna 2 tabs BID, bisacodyl 10 mg  Diarrhea-thought to be 2/2 recent bowel regimen, CDiff ruled out    Pt. was HDS and discharged to [facility] on 12/26/24. Today, patient is c/o constipation, mild nausea, and abdominal discomfort. He reports that his PO intake has been decreased, due to his pureed diet. He reports slight improvement in headache. He continues to report diplopia. He denies dizziness, SOB, cough, or chest pain. He reports chronic pain, controlled with current Rx. He states that he is unsure if he would want further treatment for his cancer. He is scheduled to F/U with oncology next week. Staff report no other clinical concerns at this time.     ROS:    As above in HPI. Otherwise, all other systems have been reviewed and are negative for complaint.    Medications reviewed and verified in NH chart.     Patient Active Problem List:  Cellulitis of neck  Orthostatic hypotension  Physical deconditioning/weakness  Malignant neoplasm of larynx  COPD   Severe protein-calorie malnutrition   Oropharyngeal dysphagia  Neck mass (May 2024; + for SCC)  Hx bilateral PE  Hx RLE DVT  HTN  HLD  Tinnitus  Constipation  BPH  Right cervical adenopathy  Hypokalemia  Hypomagnesemia  Pancytopenia  GERD  Neuropathy  Folic acid deficiency  Thiamine deficiency  Vit B12 deficiency  HANY (undiagnosed per EMR)  Allergic rhinitis  Anxiety  Arthritis  Hearing loss  Lumbosacral radiculopathy  Chronic pancreatitis  Asthma/COPD  Chronic back pain  Depression  RLS    Past Medical History:  RIJ thrombosis  Hepatitis C  Gastroenteritis  Sepsis  Urinary retention  UTI  Alcohol abuse  Acute respiratory failure  Bronchitis    Past Surgical History:   Procedure Laterality Date    ANKLE SURGERY Left     ANKLE  SURGERY  07/08/2024    R ankle spanning ex-fix revision    CHOLECYSTECTOMY      CT ABDOMEN PELVIS ANGIOGRAM W AND/OR WO IV CONTRAST  04/01/2020    CT ABDOMEN PELVIS ANGIOGRAM W AND/OR WO IV CONTRAST 4/1/2020 [facility]    FRACTURE SURGERY Right 07/15/2024    Open reduction and internal fixation of right distal tibia pilon and fibula fractures, removal of right ankle spanning external fixation under anesthesia, debridement down to including bone external fixator pin sites right leg    INVASIVE VASCULAR PROCEDURE N/A 11/08/2023    Procedure: Pulmonary Angiogram;  Surgeon: Surya Templeton MD;  Location: [facility] Cardiac Cath Lab;  Service: Cardiovascular;  Laterality: N/A;    LARYNGOSCOPY  07/01/2024    R radical neck dissection and L ALT free flap reconstruction    LEG SURGERY  07/06/2024    RLE ex-fix placement    MR HEAD ANGIO WO IV CONTRAST  01/24/2016    MR HEAD ANGIO WO IV CONTRAST 1/24/2016 [facility]    MR NECK ANGIO WO IV CONTRAST  01/24/2016    MR NECK ANGIO WO IV CONTRAST 1/24/2016 [facility]    THROMBECTOMY      WOUND DEBRIDEMENT Left 08/06/2024    L thigh debridement with woud vac placement 2/2 surgical wound dehiscence       Family History   Problem Relation Name Age of Onset    Diabetes Mother      Hypertension Mother      Heart attack Father      Hypertension Other         Social History     Tobacco Use   Smoking Status Former    Current packs/day: 1.00    Types: Cigarettes   Smokeless Tobacco Former    Types: Snuff   Tobacco Comments    1 ppd since the age of 16       Social History     Substance and Sexual Activity   Alcohol Use Not Currently    Comment: 6 pack beer a day       Social History     Substance and Sexual Activity   Drug Use Yes    Types: Methamphetamines, Cocaine, Marijuana    Comment: Hx cocaine use       Allergies   Allergen Reactions    Oxycodone Itching        Vital Signs:   122/88-80-17-98.6-96% on RA     Physical Exam:  General: Sitting up in WC in NAD,  alert   Head/Face: NCAT, symmetrical  Eyes: PERRLA, no injection, no discharge  ENT: Hearing impaired, ears without scars or lesions, nasal mucosa and turbinates pink, septum midline, lips pink and moist  Neck: Supple, symmetrical  Respiratory: CTA but diminished without adventitious sounds, respirations even and nonlabored without use of accessory muscles, good air exchange  Cardio: RRR without murmur or gallops, normal S1S2, no edema, pedal pulses 3+/4 bilaterally  Chest/Breast: Symmetrical  GI: BS x 4, normoactive, non-distended, abd round and soft, no masses or tenderness  : No suprapubic tenderness or distention, anders catheter draining clear/yellow urine   MSK: Gait not assessed, joints with full ROM without pain or contractures, + generalized weakness  Skin: Skin warm and dry, no induration  Neurologic: Cranial nerves II through XII intact, superficial touch and pain sensation intact  Psychiatric: Alert to person, place, and time, calm and cooperative     Results/Data:   12/31/24: Cr 0.6, CO2 31, HDL 34, Hgb 12.3, Hct 37.5  9/20/24: Alb 3.3, Phos 3.5, Mag 1.8, Hgb 9.7, Hct 30.1    Assessment/Plan:  Brain mass/headache with visual changes/R 6th nerve palsy-s/p clival mass biopsy on 12/20/24, c/w dexamethasone, c/w pain mgmt (methadone 5 mg Q 8 hours, MSIR 30 mg Q 3 hours prn, Imitrex prn), supportive care, F/U with ENT, radiation oncology, oncology, and ophthalmology as scheduled  Pharyngeal dysphagia-c/w pureed diet with NTL, aspiration precautions, SLP following   Bladder tumor/urinary retention/Hx hematuria/BPH-s/p cystoscopy with bladder biopsy on 12/20, c/w anders catheter care, c/w flomax, patient scheduled to F/U with urology this afternoon  Physical deconditioning/weakness-c/w PT/OT, safety and fall precautions  Alcohol use disorder-c/w MVI, folic acid, and thiamine, continued cessation advised  COPD/HANY-c/w singulair and mucinex, duonebs prn, monitor respiratory status closely  Tobacco use  disorder-c/w nicotine patch, continued cessation encouraged  Constipation-D/C miralax, D/C colace, increase senna plus to 2 tabs BID, add dulcolax 10 mg PO daily, increase fluids and fiber, increase ambulation, monitor for BMs  HTN/HLD/Hx orthostatic hypotension-c/w atorvastatin, monitor BP, monitor lipid panel and LFTs  Anemia-c/w iron supplement, monitor CBC and iron panel   Hx DVT/PE-s/p embolization (11/2023), c/w eliquis  Laryngeal cancer-s/p radiation, c/w pain mgmt (flexeril prn, methadone Q 8 hours, MSIS Q 3 hours prn, and lyrica), F/U with specialists as scheduled  GERD-c/w PPI and Tums  Pancreatic insufficiency-c/w Creon  RLS-c/w requip  Depression/anxiety-c/w remeron and topamax, vistaril prn, monitor behaviors, supportive care, consult Psych  Dry nares-c/w NaCl nasal spray    Orders:  D/C miralax   D/C Colace   Increase senna plus to 2 tabs PO BID   Bisacodyl 10 mg PO daily     Code Status:   Full Code    Time spent reviewing patient's chart on the unit (PMH, PSH, FH, Social Hx AND progress and/or consult notes, labs, and radiology results): 28 minutes  Time spent interviewing and/or examining the patient: 10 minutes  Time spent writing note on the unit: 6 minutes  Time spent reviewing POC w/ patient, family, and/or staff: 5 minutes  Total visit time: 49 minutes. Greater than 50% of time was spent on counseling and/or coordination of care of the patient. Start time: 12:01 PM, End time: 12:50 PM.

## 2025-02-16 NOTE — PROGRESS NOTES
Chief Complaint:   SNF F/U  -Brain mass  -Headache with visual changes  -Right 6th nerve palsy  -Pharyngeal dysphagia  -Bladder tumor  -Urinary retention  -Hematuria  -Physical deconditioning/weakness  -Hx laryngeal cancer  -Hypokalemia     HPI:   54 year-old male presenting to [facility] on 12/13/24 for PAT for surgical clearance for a scheduled cystoscopy with ablation of tissue due to a bladder tumor (12/17/24). He presented to PAT appointment late and intoxicated. There was c/f compliance with pre-op instructions (Ex: holding eliquis), so he was direct admitted to the hospital to ensure sobriety, medication compliance, and surgical readiness. During hospitalization, he was found to have dysconjugate gaze and was c/o a severe headache. Symptoms were relatively acute and he felt related to a recent fall. Stat CT of head showed a tumor extending from the clivus, into the sphenoid area, sellar area, and near carotids as well. Neuro felt this is cause of diplopia. Mass appears new from October, so it is rapidly growing. After discussion with neuro, patient was transferred to [facility] for management. Anders catheter was placed for acute urinary retention, prior to transfer. He was transferred to [facility] on 12/15/24. Hospital course:    Brain mass/headache with visual changes/R 6th nerve palsy/pharyngeal dysphagia-NSGY consult, ophthalmology consult, ENT consult, s/p clival mass biopsy on 12/20, SLP consult, recommending pureed diet with mildly/nectar thick liquids, supportive oncology consulted for optimal pain control, radiation oncology consult, F/U with ENT, radiation oncology, oncology, and ophthalmology after discharge  Bladder tumor/urinary retention-urology consulted, s/p cystoscopy with bladder biopsy on 12/20, maintain anders until OP F/U, c/w flomax  Alcohol use disorder-cessation advised, CIWA protocol  Chronic hypoxic respiratory failure/COPD/HANY-c/w bronchial hygiene, duonebs, and mucinex, wean  O2  HTN/orthostatic hypotension-monitor BP  Hx PE s/p embolization (11/2023)-provoked by surgery, c/w eliquis  Tobacco use disorder-c/w nicotine patch  Decreased strength-PT/OT evaluations, recommending moderate intensity   Bowel regimen-miralax Q 8 hours, senna 2 tabs BID, bisacodyl 10 mg  Diarrhea-thought to be 2/2 recent bowel regimen, CDiff ruled out    Pt. was HDS and discharged to [facility] on 12/26/24. Patient was seen by urology for F/U on 1/2 and anders catheter was removed. Pt. reports that he is able to void without difficulty. On 1/6, patient was noted to have a moist, productive cough (yellow, thick sputum) and abnormal lung sounds. CXR was obtained, which was negative for acute findings. He was started on Doxycycline x 7 days for URI. Today, patient denies dizziness, HA, SOB, chest pain or palpitations, abdominal pain, N/V/D/C, or dysuria. He reports slightly improvement in cough. He reports dry nares with blood observed. No other clinical concerns noted at this time.     ROS:    As above in HPI. Otherwise, all other systems have been reviewed and are negative for complaint.    Medications reviewed and verified in NH chart.     Patient Active Problem List:  Cellulitis of neck  Orthostatic hypotension  Physical deconditioning/weakness  Malignant neoplasm of larynx  COPD   Severe protein-calorie malnutrition   Oropharyngeal dysphagia  Neck mass (May 2024; + for SCC)  Hx bilateral PE  Hx RLE DVT  HTN  HLD  Tinnitus  Constipation  BPH  Right cervical adenopathy  Hypokalemia  Hypomagnesemia  Pancytopenia  GERD  Neuropathy  Folic acid deficiency  Thiamine deficiency  Vit B12 deficiency  HANY (undiagnosed per EMR)  Allergic rhinitis  Anxiety  Arthritis  Hearing loss  Lumbosacral radiculopathy  Chronic pancreatitis  Asthma/COPD  Chronic back pain  Depression  RLS    Past Medical History:  RIJ thrombosis  Hepatitis C  Gastroenteritis  Sepsis  Urinary retention  UTI  Alcohol abuse  Acute respiratory  failure  Bronchitis    Past Surgical History:   Procedure Laterality Date    ANKLE SURGERY Left     ANKLE SURGERY  07/08/2024    R ankle spanning ex-fix revision    CHOLECYSTECTOMY      CT ABDOMEN PELVIS ANGIOGRAM W AND/OR WO IV CONTRAST  04/01/2020    CT ABDOMEN PELVIS ANGIOGRAM W AND/OR WO IV CONTRAST 4/1/2020 [facility]    FRACTURE SURGERY Right 07/15/2024    Open reduction and internal fixation of right distal tibia pilon and fibula fractures, removal of right ankle spanning external fixation under anesthesia, debridement down to including bone external fixator pin sites right leg    INVASIVE VASCULAR PROCEDURE N/A 11/08/2023    Procedure: Pulmonary Angiogram;  Surgeon: Surya Templeton MD;  Location: [facility] Cardiac Cath Lab;  Service: Cardiovascular;  Laterality: N/A;    LARYNGOSCOPY  07/01/2024    R radical neck dissection and L ALT free flap reconstruction    LEG SURGERY  07/06/2024    RLE ex-fix placement    MR HEAD ANGIO WO IV CONTRAST  01/24/2016    MR HEAD ANGIO WO IV CONTRAST 1/24/2016 [facility]    MR NECK ANGIO WO IV CONTRAST  01/24/2016    MR NECK ANGIO WO IV CONTRAST 1/24/2016 [facility]    THROMBECTOMY      WOUND DEBRIDEMENT Left 08/06/2024    L thigh debridement with woud vac placement 2/2 surgical wound dehiscence       Family History   Problem Relation Name Age of Onset    Diabetes Mother      Hypertension Mother      Heart attack Father      Hypertension Other         Social History     Tobacco Use   Smoking Status Former    Current packs/day: 1.00    Types: Cigarettes   Smokeless Tobacco Former    Types: Snuff   Tobacco Comments    1 ppd since the age of 16       Social History     Substance and Sexual Activity   Alcohol Use Not Currently    Comment: 6 pack beer a day       Social History     Substance and Sexual Activity   Drug Use Yes    Types: Methamphetamines, Cocaine, Marijuana    Comment: Hx cocaine use       Allergies   Allergen Reactions    Oxycodone Itching         Vital Signs:   160/-68-98.0-95% on RA     Physical Exam:  General: Sitting up in bed in NAD, alert   Head/Face: NCAT, symmetrical  Eyes: PERRLA, no injection, no discharge  ENT: Hearing impaired, ears without scars or lesions, nasal mucosa dry with blood noted, septum midline, lips pink and moist  Neck: Supple, symmetrical  Respiratory: Wheezing and rhonchi noted throughout all lung fields, respirations even and nonlabored without use of accessory muscles, + moist cough noted during exam   Cardio: RRR without murmur or gallops, normal S1S2, no edema, pedal pulses 3+/4 bilaterally  Chest/Breast: Symmetrical  GI: BS x 4, normoactive, non-distended, abd round and soft, no masses or tenderness  : No suprapubic tenderness or distention  MSK: Gait not assessed, joints with full ROM without pain or contractures, + generalized weakness  Skin: Skin warm and dry, no induration  Neurologic: Cranial nerves II through XII intact, superficial touch and pain sensation intact  Psychiatric: Alert to person, place, and time, calm and cooperative     Results/Data:   1/6/25: Cr 0.6, K+ 3.2, CO2 35, CBC WNL   12/31/24: Cr 0.6, CO2 31, HDL 34, Hgb 12.3, Hct 37.5  9/20/24: Alb 3.3, Phos 3.5, Mag 1.8, Hgb 9.7, Hct 30.1    Assessment/Plan:  Brain mass/headache with visual changes/R 6th nerve palsy-s/p clival mass biopsy on 12/20/24, c/w dexamethasone, c/w pain mgmt (methadone 5 mg Q 8 hours, MSIR 30 mg Q 3 hours prn, Imitrex prn), supportive care, F/U with ENT, radiation oncology, oncology, and ophthalmology as scheduled  Pharyngeal dysphagia-c/w pureed diet with NTL, aspiration precautions, SLP following   Bladder tumor/urinary retention/Hx hematuria/BPH-s/p cystoscopy with bladder biopsy on 12/20, c/w flomax, F/U with urology as scheduled (seen 1/2 and advised to F/U in 1 month)   Physical deconditioning/weakness-c/w PT/OT, safety and fall precautions  Alcohol use disorder-c/w MVI, folic acid, and thiamine, continued cessation  advised  COPD/HANY-c/w singulair and mucinex, duonebs prn, monitor respiratory status closely  Tobacco use disorder-c/w nicotine patch, continued cessation encouraged  Constipation-c/w senna plus 2 tabs BID and dulcolax 10 mg PO daily, increase fluids and fiber, increase ambulation, monitor for BMs  HTN/HLD/Hx orthostatic hypotension-c/w atorvastatin, monitor BP, monitor lipid panel and LFTs  Anemia-c/w iron supplement, monitor CBC and iron panel   Hx DVT/PE-s/p embolization (11/2023), c/w eliquis  Laryngeal cancer-s/p radiation, c/w pain mgmt (flexeril prn, methadone Q 8 hours, MSIS Q 3 hours prn, and lyrica), F/U with specialists as scheduled  GERD-c/w PPI and Tums  Pancreatic insufficiency-c/w Creon  RLS-c/w requip  Depression/anxiety-c/w remeron and topamax, vistaril prn, monitor behaviors, supportive care, consult Psych  Dry nares-c/w NaCl nasal spray  URI-c/w doxycycline, encourage use of IS, OOB to chair, monitor resp. status closely   Hypokalemia-KDur 40 meq PO x 1, repeat K+ level on 1/9    Orders:  KDur 40 meq PO x 1   K+ level on 1/9     Code Status:   Full Code

## 2025-02-24 NOTE — PROGRESS NOTES
Chief Complaint:   SNF F/U  -Brain mass  -Headache with visual changes  -Right 6th nerve palsy  -Pharyngeal dysphagia  -Bladder tumor  -Urinary retention  -Hematuria  -Physical deconditioning/weakness  -Hx laryngeal cancer  -Hypokalemia     HPI:   54 year-old male presenting to [facility] on 12/13/24 for PAT for surgical clearance for a scheduled cystoscopy with ablation of tissue due to a bladder tumor (12/17/24). He presented to PAT appointment late and intoxicated. There was c/f compliance with pre-op instructions (Ex: holding eliquis), so he was direct admitted to the hospital to ensure sobriety, medication compliance, and surgical readiness. During hospitalization, he was found to have dysconjugate gaze and was c/o a severe headache. Symptoms were relatively acute and he felt related to a recent fall. Stat CT of head showed a tumor extending from the clivus, into the sphenoid area, sellar area, and near carotids as well. Neuro felt this is cause of diplopia. Mass appears new from October, so it is rapidly growing. After discussion with neuro, patient was transferred to [facility] for management. Anders catheter was placed for acute urinary retention, prior to transfer. He was transferred to [facility] on 12/15/24. Hospital course:    Brain mass/headache with visual changes/R 6th nerve palsy/pharyngeal dysphagia-NSGY consult, ophthalmology consult, ENT consult, s/p clival mass biopsy on 12/20, SLP consult, recommending pureed diet with mildly/nectar thick liquids, supportive oncology consulted for optimal pain control, radiation oncology consult, F/U with ENT, radiation oncology, oncology, and ophthalmology after discharge  Bladder tumor/urinary retention-urology consulted, s/p cystoscopy with bladder biopsy on 12/20, maintain anders until OP F/U, c/w flomax  Alcohol use disorder-cessation advised, CIWA protocol  Chronic hypoxic respiratory failure/COPD/HANY-c/w bronchial hygiene, duonebs, and mucinex, wean  O2  HTN/orthostatic hypotension-monitor BP  Hx PE s/p embolization (11/2023)-provoked by surgery, c/w eliquis  Tobacco use disorder-c/w nicotine patch  Decreased strength-PT/OT evaluations, recommending moderate intensity   Bowel regimen-miralax Q 8 hours, senna 2 tabs BID, bisacodyl 10 mg  Diarrhea-thought to be 2/2 recent bowel regimen, CDiff ruled out    Pt. was HDS and discharged to [facility] on 12/26/24. Patient was seen by urology for F/U on 1/2 and anders catheter was removed. Pt. reports that he is able to void without difficulty. On 1/6, patient was noted to have a moist, productive cough (yellow, thick sputum) and abnormal lung sounds. CXR was obtained, which was negative for acute findings. He was started on Doxycycline x 7 days for URI. Today, pt. is c/o shortness of breath, cough, and weakness. He also reports right-sided facial pain, swelling, and mild redness. He reports no improvement in respiratory symptoms with Doxycycline. He denies fevers or chills. Staff report no other clinical concerns at this time.     ROS:    As above in HPI. Otherwise, all other systems have been reviewed and are negative for complaint.    Medications reviewed and verified in NH chart.     Patient Active Problem List:  Cellulitis of neck  Orthostatic hypotension  Physical deconditioning/weakness  Malignant neoplasm of larynx  COPD   Severe protein-calorie malnutrition   Oropharyngeal dysphagia  Neck mass (May 2024; + for SCC)  Hx bilateral PE  Hx RLE DVT  HTN  HLD  Tinnitus  Constipation  BPH  Right cervical adenopathy  Hypokalemia  Hypomagnesemia  Pancytopenia  GERD  Neuropathy  Folic acid deficiency  Thiamine deficiency  Vit B12 deficiency  HANY (undiagnosed per EMR)  Allergic rhinitis  Anxiety  Arthritis  Hearing loss  Lumbosacral radiculopathy  Chronic pancreatitis  Asthma/COPD  Chronic back pain  Depression  RLS    Past Medical History:  RIJ thrombosis  Hepatitis C  Gastroenteritis  Sepsis  Urinary  retention  UTI  Alcohol abuse  Acute respiratory failure  Bronchitis    Past Surgical History:   Procedure Laterality Date    ANKLE SURGERY Left     ANKLE SURGERY  07/08/2024    R ankle spanning ex-fix revision    CHOLECYSTECTOMY      CT ABDOMEN PELVIS ANGIOGRAM W AND/OR WO IV CONTRAST  04/01/2020    CT ABDOMEN PELVIS ANGIOGRAM W AND/OR WO IV CONTRAST 4/1/2020 [facility]    FRACTURE SURGERY Right 07/15/2024    Open reduction and internal fixation of right distal tibia pilon and fibula fractures, removal of right ankle spanning external fixation under anesthesia, debridement down to including bone external fixator pin sites right leg    INVASIVE VASCULAR PROCEDURE N/A 11/08/2023    Procedure: Pulmonary Angiogram;  Surgeon: Surya Templeton MD;  Location: [facility] Cardiac Cath [facility];  Service: Cardiovascular;  Laterality: N/A;    LARYNGOSCOPY  07/01/2024    R radical neck dissection and L ALT free flap reconstruction    LEG SURGERY  07/06/2024    RLE ex-fix placement    MR HEAD ANGIO WO IV CONTRAST  01/24/2016    MR HEAD ANGIO WO IV CONTRAST 1/24/2016 [facility]    MR NECK ANGIO WO IV CONTRAST  01/24/2016    MR NECK ANGIO WO IV CONTRAST 1/24/2016 [facility]    THROMBECTOMY      WOUND DEBRIDEMENT Left 08/06/2024    L thigh debridement with woud vac placement 2/2 surgical wound dehiscence       Family History   Problem Relation Name Age of Onset    Diabetes Mother      Hypertension Mother      Heart attack Father      Hypertension Other         Social History     Tobacco Use   Smoking Status Former    Current packs/day: 1.00    Types: Cigarettes   Smokeless Tobacco Former    Types: Snuff   Tobacco Comments    1 ppd since the age of 16       Social History     Substance and Sexual Activity   Alcohol Use Not Currently    Comment: 6 pack beer a day       Social History     Substance and Sexual Activity   Drug Use Yes    Types: Methamphetamines, Cocaine, Marijuana    Comment: Hx cocaine use        Allergies   Allergen Reactions    Oxycodone Itching        Vital Signs:   129/65-85-18-98.5-96% on RA     Physical Exam:  General: Sitting up in bed in NAD, alert   Head/Face: NCAT, symmetrical  Eyes: PERRLA, no injection, no discharge  ENT: Hearing impaired, ears without scars or lesions, nasal mucosa dry with blood noted, septum midline, lips pink and moist  Neck: Supple, symmetrical  Respiratory: Rhonchi noted throughout all lung fields, respirations even and nonlabored without use of accessory muscles, + cough noted during exam, mild dyspnea noted   Cardio: RRR without murmur or gallops, normal S1S2, no edema, pedal pulses 3+/4 bilaterally  Chest/Breast: Symmetrical  GI: BS x 4, normoactive, non-distended, abd round and soft, no masses or tenderness  : No suprapubic tenderness or distention  MSK: Gait not assessed, joints with full ROM without pain or contractures, + generalized weakness  Skin: Skin warm and dry, no induration, R jaw-line with mild swelling and redness noted, no warmth with touch   Neurologic: Cranial nerves II through XII intact, superficial touch and pain sensation intact  Psychiatric: Alert to person, place, and time, calm and cooperative     Results/Data:   1/13/25: BUN 5, Cr 0.6, Na+ 135, K+ 3.2, CO2 32, CBC unremarkable   1/6/25: Cr 0.6, K+ 3.2, CO2 35, CBC WNL   12/31/24: Cr 0.6, CO2 31, HDL 34, Hgb 12.3, Hct 37.5  9/20/24: Alb 3.3, Phos 3.5, Mag 1.8, Hgb 9.7, Hct 30.1    Assessment/Plan:  Brain mass/headache with visual changes/R 6th nerve palsy-s/p clival mass biopsy on 12/20/24, s/p dexamethasone (end date 1/2), c/w pain mgmt (methadone 5 mg Q 8 hours, MSIR 30 mg Q 3 hours prn, Imitrex prn), supportive care, F/U with ENT, radiation oncology, oncology, and ophthalmology as scheduled  Pharyngeal dysphagia-c/w pureed diet with NTL, aspiration precautions, SLP following   Bladder tumor/urinary retention/Hx hematuria/BPH-s/p cystoscopy with bladder biopsy on 12/20, c/w flomax, F/U  with urology as scheduled (seen 1/2 and advised to F/U in 1 month)   Physical deconditioning/weakness-c/w PT/OT, safety and fall precautions  Alcohol use disorder-c/w MVI, folic acid, and thiamine, continued cessation advised  COPD/HANY-c/w singulair and mucinex, duonebs prn, monitor respiratory status closely  Tobacco use disorder-c/w nicotine patch, continued cessation encouraged  Constipation-c/w senna plus 2 tabs BID and dulcolax 10 mg PO daily, increase fluids and fiber, increase ambulation, monitor for BMs  HTN/HLD/Hx orthostatic hypotension-c/w atorvastatin, monitor BP, monitor lipid panel and LFTs  Anemia-c/w iron supplement, monitor CBC and iron panel   Hx DVT/PE-s/p embolization (11/2023), c/w eliquis  Laryngeal cancer-s/p radiation, c/w pain mgmt (flexeril prn, methadone Q 8 hours, MSIS Q 3 hours prn, and lyrica), F/U with specialists as scheduled  GERD-c/w PPI and Tums  Pancreatic insufficiency-c/w Creon  RLS-c/w requip  Depression/anxiety-c/w remeron and topamax, vistaril prn, monitor behaviors, supportive care, consult Psych  Dry nares-c/w NaCl nasal spray  URI/R jaw pain and swelling-s/p doxycycline, start on Prednisone 40 mg daily x 5 days and Augmentin 875/125 mg BID x 10 days, encourage use of IS, OOB to chair, monitor resp. status closely   Hypokalemia-s/p KDur, monitor K+ level on weekly labs     Orders:  Prednisone 40 mg PO daily x 5 days   Augmentin 875/125 mg PO BID x 10 days     Code Status:   Full Code

## (undated) DEVICE — COLLECTION BAG, FLUID, F/STERIS LOOP, STERILE

## (undated) DEVICE — BAG, DRAINAGE, URINARY, W/ANTI-REFLUX CHAMBER, INFECTION CON

## (undated) DEVICE — GUIDEWIRE, STRAIGHT, AMPLATZ SUPER STIFF ST-1, 0.035 IN X 260 CM

## (undated) DEVICE — COVER, PROBE, PULL UP ULTRASOUND KIT, 5 X 48

## (undated) DEVICE — STATLOCK, FOLEY SWIVEL, SILICONE TRICOT

## (undated) DEVICE — Device

## (undated) DEVICE — CANISTER, INDIGO MAX, FOR PMXENGN, NON-STERILE

## (undated) DEVICE — COVER, TABLE, 44 X 75 IN, DISPOSABLE, LF, STERILE

## (undated) DEVICE — TRACKER, INSTRUMENT

## (undated) DEVICE — SEALANT, HEMOSTATIC, FLOSEAL, 10 ML

## (undated) DEVICE — DRAPE, INSTRUMENT, W/POUCH, STERI DRAPE, 7 X 11 IN, DISPOSABLE, STERILE

## (undated) DEVICE — GUIDEWIRE, HI-TORQUE, VERSACORE, 260CM, FLOPPY

## (undated) DEVICE — REST, HEAD, BAGEL, 9 IN

## (undated) DEVICE — TRACKER, STINGRAY, NON-INVASIVE, AXIEM STEALTH NAVIGATION

## (undated) DEVICE — TUBING, IRRIGATION FOR M4

## (undated) DEVICE — BOWL, BASIN, 32 OZ, STERILE

## (undated) DEVICE — CATHETER, ANGIO, IMPULSE, PIGTAIL, 6 FR X 110 CM

## (undated) DEVICE — COVER, CART, 45 X 27 X 48 IN, CLEAR

## (undated) DEVICE — CATHETER, VISIONS PV 8.2 (IVUS)

## (undated) DEVICE — LINE, INJECTION, CONTRAST, HIGH PRESSURE, W/ROTATING ADAPTER, 59 IN, PVC

## (undated) DEVICE — CATHETER, ANGIO, IMPULSE, FR4, 6 FR X 100 CM

## (undated) DEVICE — TUBING, SUCTION, CONNECTING, STERILE 0.25 X 120 IN., LF

## (undated) DEVICE — MANIFOLD KIT, CUSTOM, GEAUGA

## (undated) DEVICE — SHEATH, BRITE TIP 8 X 11CM

## (undated) DEVICE — ACCESS KIT, S-MAK MINI, 5FR 10CM 0.018IN 40CM, NT/PT, ECHO ENHANCE NEEDLE

## (undated) DEVICE — ELECTRODE, ELECTROSURGICAL, COAGULATOR, W/SUCTION, HANDSWITCHING, 8 FR, 6 IN

## (undated) DEVICE — DRAPE, FLUID WARMER

## (undated) DEVICE — MANIFOLD, 4 PORT NEPTUNE STANDARD

## (undated) DEVICE — TOWELS 4-PK

## (undated) DEVICE — SYRINGE, INJECTOR, W/HANDI-FILL, ILLUMENA, LUER, 150ML

## (undated) DEVICE — BLADE, QUADCUT, 4.3 X 13 CM

## (undated) DEVICE — SPONGE, HEMOSTATIC, CELLULOSE, SURGICEL, 2 X 14 IN

## (undated) DEVICE — TOWEL, SURGICAL, NEURO, O/R, 16 X 26, BLUE, STERILE